# Patient Record
Sex: MALE | Race: WHITE | NOT HISPANIC OR LATINO | Employment: OTHER | URBAN - METROPOLITAN AREA
[De-identification: names, ages, dates, MRNs, and addresses within clinical notes are randomized per-mention and may not be internally consistent; named-entity substitution may affect disease eponyms.]

---

## 2017-01-25 ENCOUNTER — TELEPHONE (OUTPATIENT)
Dept: INTERNAL MEDICINE | Facility: CLINIC | Age: 77
End: 2017-01-25

## 2017-01-25 NOTE — TELEPHONE ENCOUNTER
----- Message from Bel Paige sent at 1/25/2017 11:29 AM CST -----  Contact: patient- [phone]  Patient fell off ladder and hit his head hard and was left unconscious for a minute or so but didn't go to the ER but was told for him to schedule an appointment with PCP. But patient needs to get in to see the  Soon and he doesn't want to schedule with any one else. Please call to advise.

## 2017-01-26 ENCOUNTER — OFFICE VISIT (OUTPATIENT)
Dept: INTERNAL MEDICINE | Facility: CLINIC | Age: 77
End: 2017-01-26
Payer: MEDICARE

## 2017-01-26 VITALS
WEIGHT: 188.5 LBS | HEART RATE: 70 BPM | SYSTOLIC BLOOD PRESSURE: 135 MMHG | BODY MASS INDEX: 28.57 KG/M2 | HEIGHT: 68 IN | DIASTOLIC BLOOD PRESSURE: 86 MMHG

## 2017-01-26 DIAGNOSIS — R20.0 NUMBNESS: Primary | ICD-10-CM

## 2017-01-26 PROCEDURE — 1160F RVW MEDS BY RX/DR IN RCRD: CPT | Mod: S$GLB,,, | Performed by: FAMILY MEDICINE

## 2017-01-26 PROCEDURE — 3079F DIAST BP 80-89 MM HG: CPT | Mod: S$GLB,,, | Performed by: FAMILY MEDICINE

## 2017-01-26 PROCEDURE — 1125F AMNT PAIN NOTED PAIN PRSNT: CPT | Mod: S$GLB,,, | Performed by: FAMILY MEDICINE

## 2017-01-26 PROCEDURE — 1157F ADVNC CARE PLAN IN RCRD: CPT | Mod: S$GLB,,, | Performed by: FAMILY MEDICINE

## 2017-01-26 PROCEDURE — 3075F SYST BP GE 130 - 139MM HG: CPT | Mod: S$GLB,,, | Performed by: FAMILY MEDICINE

## 2017-01-26 PROCEDURE — 99214 OFFICE O/P EST MOD 30 MIN: CPT | Mod: S$GLB,,, | Performed by: FAMILY MEDICINE

## 2017-01-26 PROCEDURE — 99999 PR PBB SHADOW E&M-EST. PATIENT-LVL V: CPT | Mod: PBBFAC,,, | Performed by: FAMILY MEDICINE

## 2017-01-26 PROCEDURE — 1159F MED LIST DOCD IN RCRD: CPT | Mod: S$GLB,,, | Performed by: FAMILY MEDICINE

## 2017-01-26 NOTE — PROGRESS NOTES
Subjective:       Patient ID: Liang Monterroso Jr. is a 76 y.o. male.    Chief Complaint: No chief complaint on file.  Liang Monterroso Jr. 76 y.o. male is here for office visit to review care and physical exam, reports was placing an outdoor. camera in [place] at [facility], actually cought on film.  Seen today in clinic, apparently fell on shoulder/neck area.  Some pain and numbness in occipital area.      HPI  Review of Systems   Constitutional: Negative for activity change, appetite change, fatigue, fever and unexpected weight change.   HENT: Negative for congestion, hearing loss, postnasal drip and rhinorrhea.    Eyes: Negative for pain, discharge and visual disturbance.   Respiratory: Negative for cough, choking and shortness of breath.    Cardiovascular: Negative for chest pain, palpitations and leg swelling.   Gastrointestinal: Negative for abdominal pain, diarrhea and vomiting.   Genitourinary: Negative for dysuria, flank pain, hematuria and urgency.   Musculoskeletal: Negative for arthralgias, back pain, joint swelling and neck pain.   Skin: Negative for color change and rash.   Neurological: Negative for dizziness, tremors, syncope, weakness, numbness and headaches.   Psychiatric/Behavioral: Negative for agitation and confusion. The patient is not hyperactive.        Objective:      Physical Exam   Constitutional: He is oriented to person, place, and time. He appears well-developed and well-nourished.   HENT:   Head: Normocephalic.   Eyes: EOM are normal. Pupils are equal, round, and reactive to light.   Neck: Normal range of motion. Neck supple. No thyromegaly present.   Cardiovascular: Normal rate.  Exam reveals no gallop and no friction rub.    No murmur heard.  Pulmonary/Chest: Effort normal. No respiratory distress. He has no wheezes.   Abdominal: Soft. Bowel sounds are normal. He exhibits no mass. There is no tenderness.   Musculoskeletal: He exhibits no edema or tenderness.   Lymphadenopathy:     He has no  cervical adenopathy.   Neurological: He is alert and oriented to person, place, and time. He has normal reflexes. No cranial nerve deficit.   Skin: Skin is warm. No rash noted.   Psychiatric: He has a normal mood and affect. His behavior is normal.       Assessment:       No diagnosis found.    Plan:       Liang was seen today for fall.    Diagnoses and all orders for this visit:    Numbness  -     Ambulatory Referral to [facility] Sp[facility]  -     Ambulatory Referral to [facility] Spine Clinic  -     US Carotid Bilateral; Future  -     Ambulatory consult to Neurology

## 2017-01-26 NOTE — MR AVS SNAPSHOT
[facility] - [facility] Medicine  [address]  [facility] [address]  Phone: [phone]  Fax: [phone]                  Liang Monterroso JrEstephania   2017 10:20 AM   Office Visit    Description:  Male : 1940   Provider:  Theron Paiz MD   Department:  [facility] - Internal Medicine           Reason for Visit     Fall           Diagnoses this Visit        Comments    Numbness    -  Primary            To Do List           Goals (5 Years of Data)     Increase water intake       Follow-Up and Disposition     Return in about 6 months (around 2017), or if symptoms worsen or fail to improve.    Follow-up and Disposition History      [facility] On Call     [facility] On Call Nurse Care Line -  Assistance  Registered nurses in the [facility] On Call Center provide clinical advisement, health education, appointment booking, and other advisory services.  Call for this free service at [phone].             Medications           Message regarding Medications     Verify the changes and/or additions to your medication regime listed below are the same as discussed with your clinician today.  If any of these changes or additions are incorrect, please notify your healthcare provider.             Verify that the below list of medications is an accurate representation of the medications you are currently taking.  If none reported, the list may be blank. If incorrect, please contact your healthcare provider. Carry this list with you in case of emergency.           Current Medications     aspirin (ECOTRIN) 81 MG EC tablet Take 81 mg by mouth once daily.    coenzyme Q10 (CO Q-10) 10 mg capsule Take 10 mg by mouth once daily.    econazole nitrate 1 % cream USE TWICE DAILY    ERGOCALCIFEROL, VITAMIN D2, (VITAMIN D2 ORAL) Take 1 tablet by mouth once daily.    famotidine (PEPCID) 20 MG tablet Take 20 mg by mouth 2 (two) times daily as needed.      ginseng 100 mg Cap Take by mouth.      levothyroxine (SYNTHROID) 112  "MCG tablet Take 1 tablet (112 mcg total) by mouth before breakfast.    metronidazole (METROGEL) 0.75 % gel Apply 1 application topically every evening.    multivitamin with minerals tablet Take 1 tablet by mouth once daily.      NON FORMULARY MEDICATION 1 tablet once daily. tumeric    PRAMOSONE cream APPLY TOPICALLY TWICE DAILY AS NEEDED FOR ITCHING    pravastatin (PRAVACHOL) 20 MG tablet Take 1 tablet (20 mg total) by mouth once daily.    ramipril (ALTACE) 5 MG capsule Take 1 capsule (5 mg total) by mouth once daily.    selenium 50 mcg Tab Take 50 mcg by mouth once daily.    tamsulosin (FLOMAX) 0.4 mg Cp24 Take 1 capsule (0.4 mg total) by mouth once daily.    triamcinolone acetonide 0.1% (KENALOG) 0.1 % cream Use bid prn rash    VIT A,C & E/B3/B2/LUT/MIN/GLUT (EYE-CANDELARIA EXTRA + LUTEIN ORAL) Take 1 tablet by mouth once daily.    fexofenadine (ALLEGRA) 180 MG tablet Take 1 tablet (180 mg total) by mouth once daily.           Clinical Reference Information           Vital Signs - Last Recorded  Most recent update: 1/26/2017 10:41 AM by Theron Paiz MD    BP Pulse Ht Wt BMI    135/86 70 5' 8" (1.727 m) 85.5 kg (188 lb 7.9 oz) 28.66 kg/m2      Blood Pressure          Most Recent Value    BP  135/86      Allergies as of 1/26/2017     Iodine And Iodide Containing Products      Immunizations Administered on Date of Encounter - 1/26/2017     None      Orders Placed During Today's Visit      Normal Orders This Visit    Ambulatory consult to Neurology     Ambulatory Referral to Back & Spine Clinic     Ambulatory Referral to Back & Spine Clinic     Future Labs/Procedures Expected by Expires    US Carotid Bilateral  1/26/2017 4/26/2017      "

## 2017-01-30 ENCOUNTER — HOSPITAL ENCOUNTER (OUTPATIENT)
Dept: RADIOLOGY | Facility: OTHER | Age: 77
Discharge: HOME | End: 2017-01-30
Attending: FAMILY MEDICINE
Payer: MEDICARE

## 2017-01-30 ENCOUNTER — HOSPITAL ENCOUNTER (OUTPATIENT)
Dept: RADIOLOGY | Facility: OTHER | Age: 77
Discharge: HOME | End: 2017-01-30
Attending: PHYSICAL MEDICINE & REHABILITATION
Payer: MEDICARE

## 2017-01-30 ENCOUNTER — OFFICE VISIT (OUTPATIENT)
Dept: SPINE | Facility: CLINIC | Age: 77
End: 2017-01-30
Attending: PHYSICAL MEDICINE & REHABILITATION
Payer: MEDICARE

## 2017-01-30 VITALS
BODY MASS INDEX: 28.49 KG/M2 | DIASTOLIC BLOOD PRESSURE: 84 MMHG | SYSTOLIC BLOOD PRESSURE: 123 MMHG | WEIGHT: 188 LBS | HEART RATE: 74 BPM | HEIGHT: 68 IN

## 2017-01-30 DIAGNOSIS — M54.2 NECK PAIN: ICD-10-CM

## 2017-01-30 DIAGNOSIS — R20.0 NUMBNESS: ICD-10-CM

## 2017-01-30 DIAGNOSIS — S06.0X1A CONCUSSION, WITH LOSS OF CONSCIOUSNESS OF 30 MINUTES OR LESS, INITIAL ENCOUNTER: Primary | ICD-10-CM

## 2017-01-30 PROCEDURE — 1159F MED LIST DOCD IN RCRD: CPT | Mod: S$GLB,,, | Performed by: PHYSICAL MEDICINE & REHABILITATION

## 2017-01-30 PROCEDURE — 1160F RVW MEDS BY RX/DR IN RCRD: CPT | Mod: S$GLB,,, | Performed by: PHYSICAL MEDICINE & REHABILITATION

## 2017-01-30 PROCEDURE — 93880 EXTRACRANIAL BILAT STUDY: CPT | Mod: GA,TC

## 2017-01-30 PROCEDURE — 3074F SYST BP LT 130 MM HG: CPT | Mod: S$GLB,,, | Performed by: PHYSICAL MEDICINE & REHABILITATION

## 2017-01-30 PROCEDURE — 99999 PR PBB SHADOW E&M-EST. PATIENT-LVL III: CPT | Mod: PBBFAC,,, | Performed by: PHYSICAL MEDICINE & REHABILITATION

## 2017-01-30 PROCEDURE — 1125F AMNT PAIN NOTED PAIN PRSNT: CPT | Mod: S$GLB,,, | Performed by: PHYSICAL MEDICINE & REHABILITATION

## 2017-01-30 PROCEDURE — 3079F DIAST BP 80-89 MM HG: CPT | Mod: S$GLB,,, | Performed by: PHYSICAL MEDICINE & REHABILITATION

## 2017-01-30 PROCEDURE — 1157F ADVNC CARE PLAN IN RCRD: CPT | Mod: S$GLB,,, | Performed by: PHYSICAL MEDICINE & REHABILITATION

## 2017-01-30 PROCEDURE — 93880 EXTRACRANIAL BILAT STUDY: CPT | Mod: 26,GA,, | Performed by: RADIOLOGY

## 2017-01-30 PROCEDURE — 99204 OFFICE O/P NEW MOD 45 MIN: CPT | Mod: S$GLB,,, | Performed by: PHYSICAL MEDICINE & REHABILITATION

## 2017-01-30 PROCEDURE — 72050 X-RAY EXAM NECK SPINE 4/5VWS: CPT | Mod: 26,,, | Performed by: RADIOLOGY

## 2017-01-30 PROCEDURE — 72050 X-RAY EXAM NECK SPINE 4/5VWS: CPT | Mod: TC

## 2017-01-30 NOTE — MR AVS SNAPSHOT
[facility]  [address]  [address]  [facility] [address]  Phone: [phone]  Fax: [phone]                  Liang Monterroso Jr.   2017 1:00 PM   Office Visit    Description:  Male : 1940   Provider:  Ayanna Nieto MD   Department:  [facility]           Reason for Visit     Low-back Pain     Neck Pain           Diagnoses this Visit        Comments    Concussion, with loss of consciousness of 30 minutes or less, initial encounter    -  Primary     Neck pain                To Do List           Future Appointments        Provider Department Dept Phone    2017 2:15 PM [facility] USOP1 [facility] [phone]    2017 3:45 PM [facility] XROP DR [facility] [phone]    2017 1:30 PM [facility] MRI1 [address] [facility] [phone]    3/13/2017 10:20 AM Shade Ávlarez MD [facility] - Neurology [phone]    3/23/2017 11:30 AM Ayanna Nieto MD [facility] [phone]      Goals (5 Years of Data)     Increase water intake       Follow-Up and Disposition     Return in about 6 weeks (around 3/13/2017).    Follow-up and Disposition History      [facility] On Call     [facility] On Call Nurse Care Line - / Assistance  Registered nurses in the [facility] On Call Center provide clinical advisement, health education, appointment booking, and other advisory services.  Call for this free service at [phone].             Medications           Message regarding Medications     Verify the changes and/or additions to your medication regime listed below are the same as discussed with your clinician today.  If any of these changes or additions are incorrect, please notify your healthcare provider.             Verify that the below list of medications is an accurate representation of the medications you are currently taking.  If none reported, the list may be blank. If incorrect, please  "contact your healthcare provider. Carry this list with you in case of emergency.           Current Medications     aspirin (ECOTRIN) 81 MG EC tablet Take 81 mg by mouth once daily.    coenzyme Q10 (CO Q-10) 10 mg capsule Take 10 mg by mouth once daily.    econazole nitrate 1 % cream USE TWICE DAILY    ERGOCALCIFEROL, VITAMIN D2, (VITAMIN D2 ORAL) Take 1 tablet by mouth once daily.    famotidine (PEPCID) 20 MG tablet Take 20 mg by mouth 2 (two) times daily as needed.      fexofenadine (ALLEGRA) 180 MG tablet Take 1 tablet (180 mg total) by mouth once daily.    ginseng 100 mg Cap Take by mouth.      levothyroxine (SYNTHROID) 112 MCG tablet Take 1 tablet (112 mcg total) by mouth before breakfast.    metronidazole (METROGEL) 0.75 % gel Apply 1 application topically every evening.    multivitamin with minerals tablet Take 1 tablet by mouth once daily.      NON FORMULARY MEDICATION 1 tablet once daily. tumeric    PRAMOSONE cream APPLY TOPICALLY TWICE DAILY AS NEEDED FOR ITCHING    pravastatin (PRAVACHOL) 20 MG tablet Take 1 tablet (20 mg total) by mouth once daily.    ramipril (ALTACE) 5 MG capsule Take 1 capsule (5 mg total) by mouth once daily.    selenium 50 mcg Tab Take 50 mcg by mouth once daily.    tamsulosin (FLOMAX) 0.4 mg Cp24 Take 1 capsule (0.4 mg total) by mouth once daily.    triamcinolone acetonide 0.1% (KENALOG) 0.1 % cream Use bid prn rash    VIT A,C & E/B3/B2/LUT/MIN/GLUT (EYE-CANDELARIA EXTRA + LUTEIN ORAL) Take 1 tablet by mouth once daily.           Clinical Reference Information           Vital Signs - Last Recorded  Most recent update: 1/30/2017  1:03 PM by Niall Dillon MA    BP Pulse Ht Wt BMI    123/84 (BP Location: Left arm, Patient Position: Sitting, BP Method: Automatic) 74 5' 8" (1.727 m) 85.3 kg (188 lb) 28.59 kg/m2      Blood Pressure          Most Recent Value    BP  123/84      Allergies as of 1/30/2017     Iodine And Iodide Containing Products      Immunizations Administered on Date of " Encounter - 1/30/2017     None      Orders Placed During Today's Visit      Normal Orders This Visit    Ambulatory Consult to Concussion Management Program     Future Labs/Procedures Expected by Expires    MRI Brain Without Contrast  1/30/2017 1/30/2018    X-Ray Cervical Spine AP Lat with Flexion  Extension  1/30/2017 1/30/2018

## 2017-01-30 NOTE — PROGRESS NOTES
Subjective:      Patient ID: Liang Monterroso Jr. is a 76 y.o. male.    Chief Complaint: Low-back Pain and Neck Pain    HPI Comments: Mr Monterroso is a 75 yo male sent by Dr. Paiz for evaluation of neck pain and back pain.  He had a fall off the ladder from 12 ft up 2 weeks ago.  He landed on his head and neck and then flipped over.  He did have a period of LOC, he moved to table and had two additional episodes where he blacked out.  He had some nausea and vomiting.  He did not go to the hospital.  He has some neck pain to the head and to the shoulder on the right side.  The pain comes and goes.  The pain is the worse when he wakes up in the morning.  He will have pain with sleeping on two pillows.  He has pain looking up and with exertion.  The pain is an achy and numbness.  He has clicking turning head to right and left.  He feels stiff for 30 minutes in the morning.  He tried to run and felt bad after.  He feels like hot water feels better.  He does not have arm pain or leg pain or numbness and tingling in all four extremities.  The pain is not severe.  The pain is 0/10 now, worst 4/10 in the morning, best 0/10.  He felt lethargic and no energy.  The back pain comes and goes.  The pain pain in the back is not significant.      Past Medical History:    Allergy                                                       CKD (chronic kidney disease) stage 3, GFR 30-5* 6/15/2016     Hyperlipidemia                                                Hypertension                                                  Hypothyroidism                                                Thyroid disease                                               Urinary tract infection                                       Past Surgical History:    APPENDECTOMY                                                   HERNIA REPAIR                                                  TONSILLECTOMY                                                  CATARACT EXTRACTION                                             EYE SURGERY                                                    Review of patient's family history indicates:    Diabetes                       Mother                    Heart disease                  Mother                    Hypertension                   Mother                    Vision loss                    Mother                    Dementia                       Mother                    Alcohol abuse                  Father                    Heart disease                  Father                    Cancer                         Sister                      Comment: lung with mets    No Known Problems              Daughter                  No Known Problems              Son                       No Known Problems              Daughter                  No Known Problems              Son                       Amblyopia                      Neg Hx                    Blindness                      Neg Hx                    Cataracts                      Neg Hx                    Glaucoma                       Neg Hx                    Macular degeneration           Neg Hx                    Retinal detachment             Neg Hx                    Strabismus                     Neg Hx                    Stroke                         Neg Hx                    Thyroid disease                Neg Hx                      Social History    Marital status:              Spouse name: parul              Years of education:                 Number of children: 4             Occupational History  Occupation          Employer            Comment               retired                                     Social History Main Topics    Smoking status: Never Smoker                                                                Smokeless status: Never Used                        Alcohol use: Yes                Comment: 1-3 glasses wine weekly, 2-3 beers weekly    Drug use: No               Sexual activity: Yes               Partners with: Female        Current Outpatient Prescriptions:  aspirin (ECOTRIN) 81 MG EC tablet, Take 81 mg by mouth once daily., Disp: , Rfl:   coenzyme Q10 (CO Q-10) 10 mg capsule, Take 10 mg by mouth once daily., Disp: , Rfl:   econazole nitrate 1 % cream, USE TWICE DAILY, Disp: 60 g, Rfl: 5  ERGOCALCIFEROL, VITAMIN D2, (VITAMIN D2 ORAL), Take 1 tablet by mouth once daily., Disp: , Rfl:   famotidine (PEPCID) 20 MG tablet, Take 20 mg by mouth 2 (two) times daily as needed.  , Disp: , Rfl:   fexofenadine (ALLEGRA) 180 MG tablet, Take 1 tablet (180 mg total) by mouth once daily., Disp: 30 tablet, Rfl: 11  ginseng 100 mg Cap, Take by mouth.  , Disp: , Rfl:   levothyroxine (SYNTHROID) 112 MCG tablet, Take 1 tablet (112 mcg total) by mouth before breakfast., Disp: 30 tablet, Rfl: 5  metronidazole (METROGEL) 0.75 % gel, Apply 1 application topically every evening., Disp: 45 g, Rfl: 5  multivitamin with minerals tablet, Take 1 tablet by mouth once daily.  , Disp: , Rfl:   NON FORMULARY MEDICATION, 1 tablet once daily. tumeric, Disp: , Rfl:   PRAMOSONE cream, APPLY TOPICALLY TWICE DAILY AS NEEDED FOR ITCHING, Disp: 57 g, Rfl: 5  pravastatin (PRAVACHOL) 20 MG tablet, Take 1 tablet (20 mg total) by mouth once daily., Disp: 90 tablet, Rfl: 3  ramipril (ALTACE) 5 MG capsule, Take 1 capsule (5 mg total) by mouth once daily., Disp: 30 capsule, Rfl: 5  selenium 50 mcg Tab, Take 50 mcg by mouth once daily., Disp: , Rfl:   tamsulosin (FLOMAX) 0.4 mg Cp24, Take 1 capsule (0.4 mg total) by mouth once daily., Disp: 90 capsule, Rfl: 4  triamcinolone acetonide 0.1% (KENALOG) 0.1 % cream, Use bid prn rash, Disp: 80 g, Rfl: 3  VIT A,C & E/B3/B2/LUT/MIN/GLUT (EYE-CANDELARIA EXTRA + LUTEIN ORAL), Take 1 tablet by mouth once daily., Disp: , Rfl:     No current facility-administered medications for this visit.       Review of patient's allergies indicates:   -- Iodine and iodide containing products --  Hives        Review of Systems   Constitution: Positive for malaise/fatigue. Negative for weight gain and weight loss.   HENT: Positive for headaches.    Cardiovascular: Negative for chest pain.   Respiratory: Negative for shortness of breath.    Musculoskeletal: Positive for back pain and neck pain. Negative for joint pain and joint swelling.   Gastrointestinal: Negative for abdominal pain and bowel incontinence.   Genitourinary: Negative for bladder incontinence.         Objective:        General: Liang is well-developed, well-nourished, appears stated age, in no acute distress, alert and oriented to time, place and person.     General    Vitals reviewed.  Constitutional: He is oriented to person, place, and time. He appears well-developed and well-nourished.   HENT:   Head: Normocephalic and atraumatic.   Pulmonary/Chest: Effort normal.   Neurological: He is alert and oriented to person, place, and time.   Psychiatric: He has a normal mood and affect. His behavior is normal. Judgment and thought content normal.     General Musculoskeletal Exam   Gait: normal     Right Ankle/Foot Exam     Tests   Heel Walk: able to perform  Tiptoe Walk: able to perform    Left Ankle/Foot Exam     Tests   Heel Walk: able to perform  Tiptoe Walk: able to perform  Back (L-Spine & T-Spine) / Neck (C-Spine) Exam     Tenderness Right paramedian tenderness of the Upper C-Spine.     Back (L-Spine & T-Spine) Range of Motion   Extension: 30   Flexion: 90   Lateral Bend Right: 20   Lateral Bend Left: 20   Rotation Right: 40   Rotation Left: 40     Spinal Sensation   Right Side Sensation  C-Spine Level: normal   L-Spine Level: normal  S-Spine Level: normal  Left Side Sensation  C-Spine Level: normal  L-Spine Level: normal  S-Spine Level: normal    Back (L-Spine & T-Spine) Tests   Right Side Tests  Straight leg raise:      Sitting SLR: > 70 degrees      Left Side Tests  Straight leg raise:     Sitting SLR: > 70 degrees          Other He has no  scoliosis .  Spinal Kyphosis:  Absent      Muscle Strength   Right Upper Extremity   Biceps: 5/5/5   Deltoid:  5/5  Triceps:  5/5  Wrist Extension: 5/5/5   Finger Flexors:  5/5  Left Upper Extremity  Biceps: 5/5/5   Deltoid:  5/5  Triceps:  5/5  Wrist Extension: 5/5/5   Finger Flexors:  5/5  Right Lower Extremity   Hip Flexion: 5/5   Quadriceps:  5/5   Anterior tibial:  5/5/5  EHL:  5/5  Left Lower Extremity   Hip Flexion: 5/5   Quadriceps:  5/5   Anterior tibial:  5/5/5   EHL:  5/5    Reflexes     Left Side  Biceps:  2+  Triceps:  2+  Brachioradialis:  2+  Quadriceps:  2+  Achilles:  2+  Left Quinteros's Sign:  Absent  Babinski Sign:  absent    Right Side   Biceps:  2+  Triceps:  2+  Brachioradialis:  2+  Quadriceps:  2+  Achilles:  2+  Right Quinteros's Sign:  absent  Babinski Sign:  absent    Vascular Exam     Right Pulses        Carotid:                  2+    Left Pulses        Carotid:                  2+              Assessment:       1. Concussion, with loss of consciousness of 30 minutes or less, initial encounter    2. Neck pain           Plan:       Orders Placed This Encounter    X-Ray Cervical Spine AP Lat with Flexion  Extension    MRI Brain Without Contrast    Ambulatory Consult to Concussion Management Program      More than 50% of the total time of 45 minutes was spent in counseling on diagnosis and treatment options. We discussed the neck pain which is likely a strain and is tolerable.   His low back pain is also intermittent and of mild concern to him.   We discussed sleeping on only one pillow.  More concerning is the CHI with LOC and no imaging.  We discussed that he needs to limit physical activity until he starts to feel better.  We discussed symptoms of concussion, including headaches, dizziness, fatigue.  He is going to [place] in a couple of weeks  1.  MRI of the brain  2.  X-ray of the cervical spine  3.  Tylenol 500mg po BID as needed  4.  The neurology consult  For concussion management  5.   Limit exercise and over stimulation      Follow-up: Return in about 6 weeks (around 3/13/2017). If there are any questions prior to this, the patient was instructed to contact the office.

## 2017-01-30 NOTE — LETTER
January 30, 2017      Theron Paiz MD  [address]  [facility] [address]           [facility]  [address]  [address]  [facility] [address]  Phone: [phone]  Fax: [phone]          Patient: Liang Monterroso Jr.   MR Number: [id number]   YOB: 1940   Date of Visit: 1/30/2017       Dear Dr. Theron Paiz:    Thank you for referring Liang Monterroso to me for evaluation. Attached you will find relevant portions of my assessment and plan of care.    If you have questions, please do not hesitate to call me. I look forward to following Liang Monterroso along with you.    Sincerely,    Ayanna Nieto MD    Enclosure  CC:  No Recipients    If you would like to receive this communication electronically, please contact [email or web address] or [phone] to request more information on [facility] access.    For providers and/or their staff who would like to refer a patient to [facility], please contact us through our one-stop-shop provider referral line, [facility], at [phone].    If you feel you have received this communication in error or would no longer like to receive these types of communications, please e-mail [email or web address]

## 2017-01-31 ENCOUNTER — OFFICE VISIT (OUTPATIENT)
Dept: NEUROLOGY | Facility: CLINIC | Age: 77
End: 2017-01-31
Payer: MEDICARE

## 2017-01-31 ENCOUNTER — TELEPHONE (OUTPATIENT)
Dept: NEUROLOGY | Facility: CLINIC | Age: 77
End: 2017-01-31

## 2017-01-31 VITALS
SYSTOLIC BLOOD PRESSURE: 122 MMHG | HEIGHT: 68 IN | HEART RATE: 76 BPM | DIASTOLIC BLOOD PRESSURE: 88 MMHG | BODY MASS INDEX: 28.5 KG/M2 | WEIGHT: 188.06 LBS

## 2017-01-31 DIAGNOSIS — M54.50 ACUTE BILATERAL LOW BACK PAIN WITHOUT SCIATICA: ICD-10-CM

## 2017-01-31 DIAGNOSIS — H81.93 VESTIBULOPATHY, BILATERAL: ICD-10-CM

## 2017-01-31 DIAGNOSIS — S13.4XXA WHIPLASH, INITIAL ENCOUNTER: ICD-10-CM

## 2017-01-31 DIAGNOSIS — S06.0X1A CONCUSSION, WITH LOSS OF CONSCIOUSNESS OF 30 MINUTES OR LESS, INITIAL ENCOUNTER: Primary | ICD-10-CM

## 2017-01-31 PROCEDURE — 99204 OFFICE O/P NEW MOD 45 MIN: CPT | Mod: S$GLB,,, | Performed by: PSYCHIATRY & NEUROLOGY

## 2017-01-31 PROCEDURE — 1159F MED LIST DOCD IN RCRD: CPT | Mod: S$GLB,,, | Performed by: PSYCHIATRY & NEUROLOGY

## 2017-01-31 PROCEDURE — 1157F ADVNC CARE PLAN IN RCRD: CPT | Mod: S$GLB,,, | Performed by: PSYCHIATRY & NEUROLOGY

## 2017-01-31 PROCEDURE — 3079F DIAST BP 80-89 MM HG: CPT | Mod: S$GLB,,, | Performed by: PSYCHIATRY & NEUROLOGY

## 2017-01-31 PROCEDURE — 3074F SYST BP LT 130 MM HG: CPT | Mod: S$GLB,,, | Performed by: PSYCHIATRY & NEUROLOGY

## 2017-01-31 PROCEDURE — 1160F RVW MEDS BY RX/DR IN RCRD: CPT | Mod: S$GLB,,, | Performed by: PSYCHIATRY & NEUROLOGY

## 2017-01-31 PROCEDURE — 1125F AMNT PAIN NOTED PAIN PRSNT: CPT | Mod: S$GLB,,, | Performed by: PSYCHIATRY & NEUROLOGY

## 2017-01-31 PROCEDURE — 99999 PR PBB SHADOW E&M-EST. PATIENT-LVL III: CPT | Mod: PBBFAC,,, | Performed by: PSYCHIATRY & NEUROLOGY

## 2017-01-31 RX ORDER — METHYLPREDNISOLONE 4 MG/1
TABLET ORAL
Qty: 1 PACKAGE | Refills: 0 | Status: SHIPPED | OUTPATIENT
Start: 2017-01-31 | End: 2017-03-14 | Stop reason: CLARIF

## 2017-01-31 NOTE — MR AVS SNAPSHOT
[facility]  [address]  [place] [address]  Phone: [phone]  Fax: [phone]                  Liang Monterroso Jr.   2017 1:40 PM   Office Visit    Description:  Male : 1940   Provider:  Regulo Haider III, MD   Department:  [facility] - Neurology           Reason for Visit     Concussion           Diagnoses this Visit        Comments    Concussion, with loss of consciousness of 30 minutes or less, initial encounter    -  Primary     Whiplash, initial encounter         Acute bilateral low back pain without sciatica         Vestibulopathy, bilateral                To Do List           Future Appointments        Provider Department Dept Phone    2017 1:30 PM [facility] MRI1 [address] [facility] [phone]    3/13/2017 10:20 AM Shade Álvarez MD [facility] - Neurology [phone]    3/23/2017 11:30 AM Ayanna Nieto MD [facility] [phone]    2017 1:00 PM Regulo Haider III, MD [facility] [phone]      Goals (5 Years of Data)     Increase water intake       Follow-Up and Disposition     Return in about 2 months (around 3/31/2017).       These Medications        Disp Refills Start End    methylPREDNISolone (MEDROL DOSEPACK) 4 mg tablet 1 Package 0 2017     use as directed    Pharmacy: [id number] [place] [address] [address] Ph #: [phone]         [facility]     [facility] Nurse Care Line -  Assistance  Registered nurses in the [facility] Center provide clinical advisement, health education, appointment booking, and other advisory services.  Call for this free service at [phone].             Medications           Message regarding Medications     Verify the changes and/or additions to your medication regime listed below are the same as discussed with your clinician today.  If any of these changes or additions are incorrect, please notify your healthcare  provider.        START taking these NEW medications        Refills    methylPREDNISolone (MEDROL DOSEPACK) 4 mg tablet 0    Sig: use as directed    Class: Normal           Verify that the below list of medications is an accurate representation of the medications you are currently taking.  If none reported, the list may be blank. If incorrect, please contact your healthcare provider. Carry this list with you in case of emergency.           Current Medications     aspirin (ECOTRIN) 81 MG EC tablet Take 81 mg by mouth once daily.    coenzyme Q10 (CO Q-10) 10 mg capsule Take 10 mg by mouth once daily.    econazole nitrate 1 % cream USE TWICE DAILY    ERGOCALCIFEROL, VITAMIN D2, (VITAMIN D2 ORAL) Take 1 tablet by mouth once daily.    famotidine (PEPCID) 20 MG tablet Take 20 mg by mouth 2 (two) times daily as needed.      fexofenadine (ALLEGRA) 180 MG tablet Take 1 tablet (180 mg total) by mouth once daily.    ginseng 100 mg Cap Take by mouth.      levothyroxine (SYNTHROID) 112 MCG tablet Take 1 tablet (112 mcg total) by mouth before breakfast.    methylPREDNISolone (MEDROL DOSEPACK) 4 mg tablet use as directed    metronidazole (METROGEL) 0.75 % gel Apply 1 application topically every evening.    multivitamin with minerals tablet Take 1 tablet by mouth once daily.      NON FORMULARY MEDICATION 1 tablet once daily. tumeric    PRAMOSONE cream APPLY TOPICALLY TWICE DAILY AS NEEDED FOR ITCHING    pravastatin (PRAVACHOL) 20 MG tablet Take 1 tablet (20 mg total) by mouth once daily.    ramipril (ALTACE) 5 MG capsule Take 1 capsule (5 mg total) by mouth once daily.    selenium 50 mcg Tab Take 50 mcg by mouth once daily.    tamsulosin (FLOMAX) 0.4 mg Cp24 Take 1 capsule (0.4 mg total) by mouth once daily.    triamcinolone acetonide 0.1% (KENALOG) 0.1 % cream Use bid prn rash    VIT A,C & E/B3/B2/LUT/MIN/GLUT (EYE-CANDELARIA EXTRA + LUTEIN ORAL) Take 1 tablet by mouth once daily.           Clinical Reference Information           Vital  "Signs - Last Recorded  Most recent update: 1/31/2017  1:25 PM by Marion SOTO MA    BP Pulse Ht Wt BMI    122/88 76 5' 8" (1.727 m) 85.3 kg (188 lb 0.8 oz) 28.59 kg/m2      Blood Pressure          Most Recent Value    BP  122/88      Allergies as of 1/31/2017     Iodine And Iodide Containing Products      Immunizations Administered on Date of Encounter - 1/31/2017     None      Orders Placed During Today's Visit      Normal Orders This Visit    Ambulatory Referral to Physical/Occupational Therapy       "

## 2017-01-31 NOTE — PROGRESS NOTES
Subjective:       Patient ID: Liang Monterroso Jr. is a 76 y.o. male.    Chief Complaint:  Concussion    Consultation Requested by:   Self Referral  No address on file    History of Present Illness  76-year-old right-handed male presents for evaluation of concussion sustained on 1/16/17.  He notes that he was up on a ladder putting up a surveillance camera when he fell.  He was up on the latter about 12 feet or so and he notes that the latter rolled to his right that he fell off striking his legs on a 4 foot fence which turned his trajectory causing him to strike the back right occiput of his head against the ground with a forward flexion of his neck.  He did lose consciousness for less than a few seconds at that time.  His wife helped him up and set him at a bench on the dock near where he was and he lost consciousness again twice.  911 was called and by the time the paramedics came he had regained enough consciousness and cognitive ability to deny transport to the hospital in [facility].  He was told to follow-up with his primary care doctor.  He was referred for evaluation of concussion.  He has filled out a [facility] postconcussion symptom questionnaire and has scored a 2, a mild problem for headache, sleep disturbance, fatigue and restlessness.  He is scored a 1, no more of a problem for dizziness, nausea noise sensitivity.  He is scored a 0, not express at all, for being irritable, feeling depressed, feeling frustrated, forgetfulness, poor concentration, taking longer to think, blurred vision, light sensitivity, double vision.  He denies a history of concussion.  He denies a history of ADD, learning disability, dyslexia.  She denies a history of seizure or epilepsy.  He denies a history of sleep apnea or narcolepsy.    Past Medical History   Diagnosis Date    Allergy     CKD (chronic kidney disease) stage 3, GFR 30-59 ml/min 6/15/2016    Hyperlipidemia     Hypertension     Hypothyroidism     Thyroid  disease     Urinary tract infection        Past Surgical History   Procedure Laterality Date    Appendectomy      Hernia repair      Tonsillectomy      Cataract extraction      Eye surgery         Family History   Problem Relation Age of Onset    Diabetes Mother     Heart disease Mother     Hypertension Mother     Vision loss Mother     Dementia Mother     Alcohol abuse Father     Heart disease Father     Cancer Sister      lung with mets    No Known Problems Daughter     No Known Problems Son     No Known Problems Daughter     No Known Problems Son     Amblyopia Neg Hx     Blindness Neg Hx     Cataracts Neg Hx     Glaucoma Neg Hx     Macular degeneration Neg Hx     Retinal detachment Neg Hx     Strabismus Neg Hx     Stroke Neg Hx     Thyroid disease Neg Hx        Social History     Social History    Marital status:      Spouse name: parul    Number of children: 4    Years of education: N/A     Occupational History    retired      Social History Main Topics    Smoking status: Never Smoker    Smokeless tobacco: Never Used    Alcohol use Yes      Comment: 1-3 glasses wine weekly, 2-3 beers weekly    Drug use: No    Sexual activity: Yes     Partners: Female     Other Topics Concern    None     Social History Narrative       Review of Systems  Review of Systems   Constitutional: Positive for fatigue.   Musculoskeletal: Positive for back pain, neck pain and neck stiffness.   Neurological: Positive for dizziness and headaches.   Psychiatric/Behavioral: Positive for decreased concentration.   All other systems reviewed and are negative.      Objective:     Vitals:    01/31/17 1322   BP: 122/88   Pulse: 76      Physical Exam   Constitutional: He is oriented to person, place, and time. He appears well-developed and well-nourished.   HENT:   Head: Normocephalic and atraumatic.   Eyes: EOM are normal. Pupils are equal, round, and reactive to light.   Neck: Decreased range of  motion present.       Musculoskeletal:        Lumbar back: He exhibits tenderness and spasm.        Back:    Neurological: He is alert and oriented to person, place, and time. He has normal strength and normal reflexes. Gait normal.   Reflex Scores:       Tricep reflexes are 2+ on the right side and 2+ on the left side.       Bicep reflexes are 2+ on the right side and 2+ on the left side.       Brachioradialis reflexes are 2+ on the right side and 2+ on the left side.       Patellar reflexes are 2+ on the right side and 2+ on the left side.       Achilles reflexes are 2+ on the right side and 2+ on the left side.  Psychiatric: His speech is normal.       Neurologic Exam     Mental Status   Oriented to person, place, and time.   Attention: normal. Concentration: normal.   Speech: speech is normal   Level of consciousness: alert  Knowledge: good.   Normal comprehension.     Cranial Nerves   Cranial nerves II through XII intact.     CN II   Visual fields full to confrontation.     CN III, IV, VI   Pupils are equal, round, and reactive to light.  Extraocular motions are normal.     CN V   Facial sensation intact.     CN VII   Facial expression full, symmetric.     CN VIII   CN VIII normal.     CN IX, X   CN IX normal.   CN X normal.     CN XI   CN XI normal.     CN XII   CN XII normal.        No sign of convergence insufficiency.     KATHY 0/5/7, abnormal vestibular function     Motor Exam   Muscle bulk: normal  Overall muscle tone: normal    Strength   Strength 5/5 throughout.     Sensory Exam   Light touch normal.   Pinprick normal.     Gait, Coordination, and Reflexes     Gait  Gait: normal    Reflexes   Right brachioradialis: 2+  Left brachioradialis: 2+  Right biceps: 2+  Left biceps: 2+  Right triceps: 2+  Left triceps: 2+  Right patellar: 2+  Left patellar: 2+  Right achilles: 2+  Left achilles: 2+    I spent over 50% of a 45 minute in guidance, counseling and discussion of treatment options.  Assessment:       "  1. Concussion, with loss of consciousness of 30 minutes or less, initial encounter  methylPREDNISolone (MEDROL DOSEPACK) 4 mg tablet    Ambulatory Referral to Physical/Occupational Therapy   2. Whiplash, initial encounter  Ambulatory Referral to Physical/Occupational Therapy   3. Acute bilateral low back pain without sciatica  Ambulatory Referral to Physical/Occupational Therapy   4. Vestibulopathy, bilateral  Ambulatory Referral to Physical/Occupational Therapy        Plan:       76-year-old right-handed male presents for evaluation of a concussion sustained on 1/16/17.  He additionally has whiplash and low back pain related to his injury.  At this point in time I've discussed that he is now outside of the window of spontaneous recovery with some lingering symptoms.  At this time I'll prescribe him a six-day course of steroids to reduce intracranial inflammation.  I will also send him for vestibular rehabilitation as well as cervical traction and for his low back pain to physical therapy.  We have discussed that the reason I'm in so aggressive with conservative intervention in that I do not want his symptoms to linger.  We have both agreed upon the treatment plan as outlined above.  I've discussed risk benefits and alternatives to the patient at length today.  We have discussed that should his headaches not improve with the Medrol Dosepak we may consider placing him on a longer-term agent and/or starting with nerve blocks.  For now we will take a conservative approach and move forward from there.  I will see him back in 6-8 weeks.  For now I have restricted his physical activity to no more than 60% of his max heart rate for less than 30 minutes a day up to 5 days a week.  I will hold him to this until her next visit for which we can speak about further physical activity.  I have dissuaded him from going to the gym and doing his "usual" routine.     The patient verbalizes understanding and agreement with the " treatment plan. Questions were sought and answered to his stated verbal satisfaction.        Edd Haider MD    This note is dictated on Dragon Natural Speaking word recognition program. There are word recognition mistakes that are occasionally missed on review.

## 2017-02-01 ENCOUNTER — TELEPHONE (OUTPATIENT)
Dept: NEUROLOGY | Facility: CLINIC | Age: 77
End: 2017-02-01

## 2017-02-01 NOTE — TELEPHONE ENCOUNTER
----- Message from Latanya Hanna sent at 2/1/2017 10:23 AM CST -----  Contact: RITESH HUTCHINS JR. [[id number]]  _x  1st Request  _  2nd Request  _  3rd Request        Who: RITESH HUTCHINS JR. [[id number]]    Why: Patient would like a call back says its pertaining to scheduling physical therapy. Please give patient a call back at your earliest convenience. Thanks!    What Number to Call Back: [phone]    When to Expect a call back: (Before the end of the day)   -- if the call is after 12:00, the call back will be tomorrow.

## 2017-02-01 NOTE — TELEPHONE ENCOUNTER
Mr Ludycisco had questions about the PT order and whether it was scheduled properly. I advised it appears to have been

## 2017-02-02 ENCOUNTER — CLINICAL SUPPORT (OUTPATIENT)
Dept: REHABILITATION | Facility: HOSPITAL | Age: 77
End: 2017-02-02
Attending: PSYCHIATRY & NEUROLOGY
Payer: MEDICARE

## 2017-02-02 DIAGNOSIS — Z74.09 IMPAIRED FUNCTIONAL MOBILITY, BALANCE, GAIT, AND ENDURANCE: ICD-10-CM

## 2017-02-02 DIAGNOSIS — R52 PAIN AGGRAVATED BY PHYSICAL ACTIVITY: ICD-10-CM

## 2017-02-02 DIAGNOSIS — R29.898 DECREASED ROM OF NECK: ICD-10-CM

## 2017-02-02 PROCEDURE — 97162 PT EVAL MOD COMPLEX 30 MIN: CPT | Mod: PO | Performed by: PHYSICAL THERAPIST

## 2017-02-02 PROCEDURE — G8990 OTHER PT/OT CURRENT STATUS: HCPCS | Mod: CJ,PO | Performed by: PHYSICAL THERAPIST

## 2017-02-02 PROCEDURE — G8991 OTHER PT/OT GOAL STATUS: HCPCS | Mod: CI,PO | Performed by: PHYSICAL THERAPIST

## 2017-02-02 PROCEDURE — 97110 THERAPEUTIC EXERCISES: CPT | Mod: PO | Performed by: PHYSICAL THERAPIST

## 2017-02-02 NOTE — PROGRESS NOTES
OUTPATIENT NEUROLOGICAL REHABILITATION  PHYSICAL THERAPY EVALUATION    Name: Liang Monterroso Jr.  Clinic Number: [id number]    Diagnosis:   S06.0X1A (ICD-10-CM) - Concussion, with loss of consciousness of 30 minutes or less, initial encounter   S13.4XXA (ICD-10-CM) - Whiplash, initial encounter   M54.5 (ICD-10-CM) - Acute bilateral low back pain without sciatica   H81.93 (ICD-10-CM) - Vestibulopathy, bilateral       Physician: Regulo Haider III, MD  Treatment Orders: PT Eval and Treat, vestibular rehab  Past Medical History   Diagnosis Date    Allergy     CKD (chronic kidney disease) stage 3, GFR 30-59 ml/min 6/15/2016    Hyperlipidemia     Hypertension     Hypothyroidism     Thyroid disease     Urinary tract infection        Evaluation Date: 2/2/2017  Visit #: 1  Plan of care expiration: 4/27/2017  Precautions: universal    Functional Limitations Reports - G Codes  Category: other   Tool: FOTO FS measure, cervical rotation ROM  Score: 63% (37% impairment), ~60 degrees  Current: CJ at least 20% < 40% impaired, limited or restricted  Goal: CI at least 1% but less than 20% impaired, limited or restricted    History   Medical Diagnosis: concussion, whiplash, left vestibulopathy, acute LBP  PT Diagnosis: impaired cervical ROM, pain balance  Chief complaint: loss of balance, increased cervical pain  History of Present Illness: Liang is a 76 y.o. male that presents to [facility] secondary to concussion/ whiplash injuries. Pt fell off of ladder Jan 16, 2017. 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 HTN controlled with meds.      Prior Therapy: no therapy  Social History: exercises 3x/week- [employer], currently not participating due to fall  Place of Residence (Steps/Adaptations/Levels)/ assistance available: lives with wife in a home in a 2 story home, also has an elevated home with ~20  "steps to enter. Per pt no difficulty with stairs.   Previous functional status includes: pt worked out at fitness center 3x/week, pt very active, no mobility or balance impairments reported, no significant pain  Current functional status:  slight LOB during normal daily activities  DME owned: none  Work/Job description:  retired      Subjective   Pt stated goals: "like to feel 20 yrs younger"  Family present/states: NA  Pain: 2/10 cervical pain, slight increase with ROM    Objective   - Follows commands: yes   - Speech: no deficits     Mental status: alert, oriented to person, place, and time  Appearance: Well groomed  Behavior:  calm and cooperative  Attention Span and Concentration:  Normal    Dominant hand:  right     Posture Alignment :WFLs    Sensation:  Light Touch: Intact           Proprioception:   Intact    Tone: 0 - No increase in muscle tone  Limbs/muscles affected: NA    Visual/Auditory: denies changes   Tracking:Intact  Saccades: Intact  Acuity:Intact  R/L discrimination: Intact  Visual field: Intact  VOR: intact- slowed  VOR cancellation: intact- slowed    Coordination:   - fine motor: thumb to fingertip- WFLs  - UE coordination: supination/ pronation- WFLs    - LE coordination:  Alternating toe taps- WFLs    ROM:   UPPER EXTREMITY--AROM/PROM  (R) UE: WFLs  (L) UE: WFLs         Cervical ROM:   Flex: 55 degrees  Ext: 50 degrees  SB: right= 38 degrees, left= 25 degrees  Rotation: right= 56 degrees, left= 62 degrees    RANGE OF MOTION--LOWER EXTREMITIES  (R) LE Hip: WFLs   Knee: WFLs   Ankle: WFLs    (L) LE: Hip: WFLs   Knee: WFLs   Ankle: WFLs    Strength: manual muscle test grades below   Upper Extremity Strength  BUE strength grossly WFLs    Lower Extremity Strength   RLE LLE   Hip Flexion: 4+/5 4+/5   Hip Extension:  4/5 4/5   Hip Abduction: 4/5 4/5   Hip Adduction: NT NT   Knee Extension: 5/5 5/5   Knee Flexion: 5/5 5/5   Ankle Dorsiflexion: 4+/5 5/5   Ankle Plantarflexion: 5/5 5/5   Ankle Inversion: " 4+/5 5/5   Ankle Eversion: 4+/5 5/5      Evaluation   Single Limb Stance R LE 16 sec  (<10 sec = HIGH FALL RISK)   Single Limb Stance L LE 11 sec  (<10 sec = HIGH FALL RISK)   30 second Chair Rise NT   5 times sit-stand NT     Postural control:  MCTSIB:  1. Eyes Open/feet together/Firm: >30 seconds  2. Eyes Closed/feet together/Firm: >30 seconds  3. Eyes Open/feet together/Foam: >30 seconds  4. Eyes Closed/feet together/Foam: >30 seconds  5. Tandem EO: right= >30 seconds, left= >30 seconds  6. Tandem EC: right= 4 seconds, left= 4 seconds    Gait Assessment:   - AD used: none  - Assistance: I  - Distance: community  - Curb: I  - Ramp:  I    Functional Gait Assessment:   1. Gait on level surface =  1, 7.11   (3) Normal: less than 5.5 sec, no A.D., no imbalance, normal gait pattern, deviates< 6in   (2) Mild impairment: 7-5.6 sec, uses A.D., mild gait deviations, or deviates 6-10 in   (1) Moderate impairment: > 7 sec, slow speed, imbalance, deviates 10-15 in.   (0) Severe impairment: needs assist, deviates >15 in, reach/touch wall  2. Change in Gait Speed = 3   (3) Normal: smooth change w/o loss of balance or gait deviation, deviates < 6 in, significant difference between speeds   (2) Mild impairment: changes speed, but demonstrates mild gait deviations, deviates 6-10 in, OR no deviations but unable to significantly speed, OR uses A.D.   (1) Moderate impairment: minor changes to speed, OR changes speed w/ significant deviations, deviates 10-15 in, OR  Changes speed , but loses balance & recovers   (0) Severe impairment: cannot change speed, deviates >15 in, or loses balance & needs assist  3. Gait with horizontal head turns  = 3   (3) Normal: no change in gait, deviates <6 in   (2) Mild impairment: slight change in speed, deviates 6-10 in, OR uses A.D.   (1) Moderate impairment: moderate change in speed, deviates 10-15 in   (0) Severe impairment: severe disruption of gait, deviates >15in  4. Gait with vertical head  turns = 3   (3) Normal: no change in gait, deviates <6 in   (2) Mild impairment: slight change in speed, deviates 6-10 in OR uses A.D.   (1) Moderate impairment: moderate change in speed, deviates 10-15 in   (0) Severe impairment: severe disruption of gait, deviates >15 in  5. Gait with pivot turns = 3   (3) Normal: performs safely in 3 sec, no LOB   (2) Mild impairment: performs in >3 sec & no LOB, OR turns safely & requires several steps to regain LOB   (1) Moderate impairment: turns slow, OR requires several small steps for balance following turn & stop   (0) Severe impairment: cannot turn safely, needs assist  6. Step over obstacle = 2   (3) Normal: steps over 2 stacked boxes w/o change in speed or LOB   (2) Mild impairment: able to step over 1 box w/o change in speed or LOB   (1) Moderate impairment: steps over 1 box but must slow down, may require VC   (0) Severe impairment: cannot perform w/o assist  7. Gait with Narrow ROSALIE = 3   (3) Normal: 10 steps no staggering   (2) Mild impairment: 7-9 steps   (1) Moderate impairment: 4-7 steps   (0) Severe impairment: < 4 steps or cannot perform w/o assist  8. Gait with eyes closed = 2   (3) Normal: < 7 sec, no A.D., no LOB, normal gait pattern, deviates <6 in   (2) Mild impairment: 7.1-9 sec, mild gait deviations, deviates 6-10 in   (1) Moderate impairment: > 9 sec, abnormal pattern, LOB, deviates 10-15 in   (0) Severe impairment: cannot perform w/o assist, LOB, deviates >15in  9. Ambulating Backwards = 2   (3) Normal: no A.D., no LOB, normal gait pattern, deviates <6in   (2) Mild impairment: uses A.D., slower speed, mild gait deviations, deviates 6-10 in   (1) Moderate impairment: slow speed, abnormal gait pattern, LOB, deviates 10-15 in   (0) Severe impairment: severe gait deviations or LOB, deviates >15in  10. Steps = 3   (3) Normal: alternating feet, no rail   (2) Mild Impairment: alternating feet, uses rail   (1) Moderate impairment: step-to, uses rail   (0)  Severe impairment: cannot perform safely  Score 26/30       GAIT DEVIATIONS:  Liang displays the following deviations with ambulation: no significant deviations    Impairments contributing to deviations: NA    Endurance Deficit: good for eval, no impairments reported by pt      Evaluation   Timed Up and Go WFLs   Self Selected Walking Speed 0.84 m/sec (6m/7.11s)   Fast Walking Speed 1.53 m/sec (6m/3.92s)     Functional Mobility (Bed mobility, transfers)  Bed mobility: I  Supine to sit: I  Sit to supine: I  Rolling: I  Transfers to bed: I  Transfers to toilet: I  Sit to stand:  I  Stand pivot:  I  Car transfers: I  Wheelchair mobility: NA  Floor transfers: NT    FOTO: Dizziness Handicap Inventory: 2/100  Oswestry Disability Index: 4%  FS Measure: 63/100= 37% impairment    Written Home Exercises Provided: eval-  levator scap stretch, upper trap stretch  Pt demo good understanding of the education provided. Liang demonstrated good return demonstration of activities.     Education provided re:role of PT, goals for PT, scheduling - pt verbalized understanding.     Liang verbalized good understanding of education provided.   Pt has no cultural, educational or language barriers to learning provided.    Pt participated in the following treatment today:  therapeutic exercise x 9 minutes:  Levator scap stretch  Upper trap stretch    Assessment   This is a 76 y.o. male referred to outpatient physical therapy and presents with a medical diagnosis of concussion, whiplash, vestibulopathy, and LBP and demonstrates limitations as described in the problem list. Upon evaluation pt denied issues with LBP. Due to pt's deficits, pt reports increased pain and minimal balance impairments. Pt is not able to perform usual exercise routine at gym. Pt also has a history of cervical arthritis which may impact prognosis/ progress. Pt's condition is currently evolving, he is not addressing impairments. PT is warranted to address impairments to  return pt to PLOF. Pt demonstrates an average of 35% cervical impairment limiting participation in normal activites, pt is anticipated to improve impairment level to <20% by d/c.     Pt rehab potential is Good. Pt will benefit from continuing skilled outpatient physical therapy to address the deficits listed below in the problem list, provide pt/family education and to maximize pt's level of independence in the home and community environment.     Medical necessity is demonstrated by the following IMPAIRMENTS/PROMBLEM LIST:   1. impaired balance   2. Decreased ROM- cervical  3. Impaired ambulation   4. Difficulty participating in daily activities   5. Requires skilled supervision to complete and progress HEP     Anticipated barriers to physical therapy: none    Pt's spiritual, cultural and educational needs considered and pt agreeable to plan of care and goals as stated below:     GOALS:   Short term goals: 6 weeks, pt agrees to goals set.  1. Pt will perform HEP for cervical ROM and strengthening with supervision to improve speed of progress and decrease pain.  2. Pt will demonstrate improved cervical extension AROM to 60 degrees to demonstrate improved mobility/ ROM.   3. Pt will demonstrate improved left cervical side bending PROM to 35 degrees to equal left and demonstrate improved mobility.     Long term goals: 12 weeks, pt agrees to goals set  4. Pt will demonstrate improved cervical rotation to 70 degrees to demonstrate improved ROM for daily activities and driving.  5. Pt will demonstrate improved balance with decreased reliance on visual system by performing tandem stance with eyes closed x 10 sec.   6. Pt will demonstrate improved gait velocity to 1.24 m/s to demonstrate normal mobility for age and improved balance.  7. Pt will deny cervical pain with AROM to demonstrate return to PLOF.      Plan   Outpatient physical therapy 1-2 times weekly for 8-12 weeks to include: Pt Education, HEP, therapeutic  exercises, gait training, neuromuscular re-education, therapeutic activities, manual therapy PRN, and modalities PRN to achieve established goals. Pt may be seen by PTA as part of the rehabilitation team.       Christine Hancock, PT  02/02/2017      I certify the need for these services furnished under this plan of treatment and while under my care.  ____________________________________ Physician/Referring Practitioner   Date of Signature

## 2017-02-06 ENCOUNTER — HOSPITAL ENCOUNTER (OUTPATIENT)
Dept: RADIOLOGY | Facility: OTHER | Age: 77
Discharge: HOME | End: 2017-02-06
Attending: PHYSICAL MEDICINE & REHABILITATION
Payer: MEDICARE

## 2017-02-06 DIAGNOSIS — S06.0X1A CONCUSSION, WITH LOSS OF CONSCIOUSNESS OF 30 MINUTES OR LESS, INITIAL ENCOUNTER: ICD-10-CM

## 2017-02-06 PROCEDURE — 70551 MRI BRAIN STEM W/O DYE: CPT | Mod: TC

## 2017-02-06 PROCEDURE — 70551 MRI BRAIN STEM W/O DYE: CPT | Mod: 26,,, | Performed by: RADIOLOGY

## 2017-02-07 ENCOUNTER — TELEPHONE (OUTPATIENT)
Dept: SPINE | Facility: CLINIC | Age: 77
End: 2017-02-07

## 2017-02-07 DIAGNOSIS — G93.89 BRAIN MASS: Primary | ICD-10-CM

## 2017-02-07 DIAGNOSIS — S06.0X1A CONCUSSION, WITH LOSS OF CONSCIOUSNESS OF 30 MINUTES OR LESS, INITIAL ENCOUNTER: ICD-10-CM

## 2017-02-07 NOTE — TELEPHONE ENCOUNTER
MRI was reviewed.  There is no contusion or trauma from his fall, however It does show a right  2.6 cm mass, likely a meningioma.  We will get an MRI with contrast to further look.  We will also get him scheduled with Dr. Chen for follow up.  He is not having HA symptoms from concussion

## 2017-02-10 ENCOUNTER — TELEPHONE (OUTPATIENT)
Dept: PAIN MEDICINE | Facility: CLINIC | Age: 77
End: 2017-02-10

## 2017-02-10 NOTE — TELEPHONE ENCOUNTER
Called spoke with patient schedule appointments for creatinine,serum on 2/17,MRI on 2/18 and  on 3/3 patient verbally agreed to time and dates.Advise if he has any questions to give our office a call.  ----- Message from Gavino Ramon sent at 2/10/2017  9:53 AM CST -----  x_  1st Request  _  2nd Request  _  3rd Request        Who: Liang    Why: Pt. Is waiting on referral for Neurosurgeon. Please return call.    What Number to Call Back:[phone] or [phone]    When to Expect a call back: (Before the end of the day)   -- if the call is after 12:00, the call back will be tomorrow.

## 2017-02-17 ENCOUNTER — LAB VISIT (OUTPATIENT)
Dept: LAB | Facility: HOSPITAL | Age: 77
End: 2017-02-17
Attending: PHYSICAL MEDICINE & REHABILITATION
Payer: MEDICARE

## 2017-02-17 DIAGNOSIS — S06.0X1A CONCUSSION, WITH LOSS OF CONSCIOUSNESS OF 30 MINUTES OR LESS, INITIAL ENCOUNTER: ICD-10-CM

## 2017-02-17 DIAGNOSIS — G93.89 BRAIN MASS: ICD-10-CM

## 2017-02-17 LAB
CREAT SERPL-MCNC: 1.3 MG/DL
EST. GFR  (AFRICAN AMERICAN): >60 ML/MIN/1.73 M^2
EST. GFR  (NON AFRICAN AMERICAN): 53.4 ML/MIN/1.73 M^2

## 2017-02-17 PROCEDURE — 36415 COLL VENOUS BLD VENIPUNCTURE: CPT

## 2017-02-17 PROCEDURE — 82565 ASSAY OF CREATININE: CPT

## 2017-02-18 ENCOUNTER — HOSPITAL ENCOUNTER (OUTPATIENT)
Dept: RADIOLOGY | Facility: HOSPITAL | Age: 77
Discharge: HOME | End: 2017-02-18
Attending: PHYSICAL MEDICINE & REHABILITATION
Payer: MEDICARE

## 2017-02-18 DIAGNOSIS — G93.89 BRAIN MASS: ICD-10-CM

## 2017-02-18 DIAGNOSIS — S06.0X1A CONCUSSION, WITH LOSS OF CONSCIOUSNESS OF 30 MINUTES OR LESS, INITIAL ENCOUNTER: ICD-10-CM

## 2017-02-18 PROCEDURE — 70553 MRI BRAIN STEM W/O & W/DYE: CPT | Mod: TC

## 2017-02-18 PROCEDURE — 70553 MRI BRAIN STEM W/O & W/DYE: CPT | Mod: 26,,, | Performed by: RADIOLOGY

## 2017-02-18 PROCEDURE — A9585 GADOBUTROL INJECTION: HCPCS | Performed by: PHYSICAL MEDICINE & REHABILITATION

## 2017-02-18 PROCEDURE — 25500020 PHARM REV CODE 255: Performed by: PHYSICAL MEDICINE & REHABILITATION

## 2017-02-18 RX ORDER — GADOBUTROL 604.72 MG/ML
9 INJECTION INTRAVENOUS
Status: COMPLETED | OUTPATIENT
Start: 2017-02-18 | End: 2017-02-18

## 2017-02-18 RX ADMIN — GADOBUTROL 9 ML: 604.72 INJECTION INTRAVENOUS at 11:02

## 2017-02-20 ENCOUNTER — TELEPHONE (OUTPATIENT)
Dept: SPINE | Facility: CLINIC | Age: 77
End: 2017-02-20

## 2017-02-20 NOTE — TELEPHONE ENCOUNTER
MRI of the brain showed 2.7 x 2.7 mass, likely a meningioma.  He has an appointment with Dr. Chen on 3/3 for further evaluation

## 2017-02-23 ENCOUNTER — TELEPHONE (OUTPATIENT)
Dept: NEUROLOGY | Facility: CLINIC | Age: 77
End: 2017-02-23

## 2017-02-23 NOTE — TELEPHONE ENCOUNTER
Letter mailed asking patient to call and reschedule appointment with Dr. Haider on 4/11. He will not be in clinic

## 2017-02-28 PROBLEM — R29.898 DECREASED ROM OF NECK: Status: ACTIVE | Noted: 2017-02-28

## 2017-02-28 PROBLEM — Z74.09 IMPAIRED FUNCTIONAL MOBILITY, BALANCE, GAIT, AND ENDURANCE: Status: ACTIVE | Noted: 2017-02-28

## 2017-02-28 PROBLEM — R52 PAIN AGGRAVATED BY PHYSICAL ACTIVITY: Status: ACTIVE | Noted: 2017-02-28

## 2017-02-28 NOTE — PLAN OF CARE
OUTPATIENT NEUROLOGICAL REHABILITATION  PHYSICAL THERAPY EVALUATION    Name: Liang Monterroso Jr.  Clinic Number: [id number]    Diagnosis:   S06.0X1A (ICD-10-CM) - Concussion, with loss of consciousness of 30 minutes or less, initial encounter   S13.4XXA (ICD-10-CM) - Whiplash, initial encounter   M54.5 (ICD-10-CM) - Acute bilateral low back pain without sciatica   H81.93 (ICD-10-CM) - Vestibulopathy, bilateral       Physician: Regulo Haider III, MD  Treatment Orders: PT Eval and Treat, vestibular rehab  Past Medical History   Diagnosis Date    Allergy     CKD (chronic kidney disease) stage 3, GFR 30-59 ml/min 6/15/2016    Hyperlipidemia     Hypertension     Hypothyroidism     Thyroid disease     Urinary tract infection        Evaluation Date: 2/2/2017  Visit #: 1  Plan of care expiration: 4/27/2017  Precautions: universal    Functional Limitations Reports - G Codes  Category: other   Tool: FOTO FS measure, cervical rotation ROM  Score: 63% (37% impairment), ~60 degrees  Current: CJ at least 20% < 40% impaired, limited or restricted  Goal: CI at least 1% but less than 20% impaired, limited or restricted    History   Medical Diagnosis: concussion, whiplash, left vestibulopathy, acute LBP  PT Diagnosis: impaired cervical ROM, pain balance  Chief complaint: loss of balance, increased cervical pain  History of Present Illness: Liang is a 76 y.o. male that presents to [facility] secondary to concussion/ whiplash injuries. Pt fell off of ladder Jan 16, 2017. 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 HTN controlled with meds.      Prior Therapy: no therapy  Social History: exercises 3x/week- [facility], currently not participating due to fall  Place of Residence (Steps/Adaptations/Levels)/ assistance available: lives with wife in a home in a 2 story home, also has an elevated home with ~20  "steps to enter. Per pt no difficulty with stairs.   Previous functional status includes: pt worked out at fitness center 3x/week, pt very active, no mobility or balance impairments reported, no significant pain  Current functional status:  slight LOB during normal daily activities  DME owned: none  Work/Job description:  retired      Subjective   Pt stated goals: "like to feel 20 yrs younger"  Family present/states: NA  Pain: 2/10 cervical pain, slight increase with ROM    Objective   - Follows commands: yes   - Speech: no deficits     Mental status: alert, oriented to person, place, and time  Appearance: Well groomed  Behavior:  calm and cooperative  Attention Span and Concentration:  Normal    Dominant hand:  right     Posture Alignment :WFLs    Sensation:  Light Touch: Intact           Proprioception:   Intact    Tone: 0 - No increase in muscle tone  Limbs/muscles affected: NA    Visual/Auditory: denies changes   Tracking:Intact  Saccades: Intact  Acuity:Intact  R/L discrimination: Intact  Visual field: Intact  VOR: intact- slowed  VOR cancellation: intact- slowed    Coordination:   - fine motor: thumb to fingertip- WFLs  - UE coordination: supination/ pronation- WFLs    - LE coordination:  Alternating toe taps- WFLs    ROM:   UPPER EXTREMITY--AROM/PROM  (R) UE: WFLs  (L) UE: WFLs         Cervical ROM:   Flex: 55 degrees  Ext: 50 degrees  SB: right= 38 degrees, left= 25 degrees  Rotation: right= 56 degrees, left= 62 degrees    RANGE OF MOTION--LOWER EXTREMITIES  (R) LE Hip: WFLs   Knee: WFLs   Ankle: WFLs    (L) LE: Hip: WFLs   Knee: WFLs   Ankle: WFLs    Strength: manual muscle test grades below   Upper Extremity Strength  BUE strength grossly WFLs    Lower Extremity Strength   RLE LLE   Hip Flexion: 4+/5 4+/5   Hip Extension:  4/5 4/5   Hip Abduction: 4/5 4/5   Hip Adduction: NT NT   Knee Extension: 5/5 5/5   Knee Flexion: 5/5 5/5   Ankle Dorsiflexion: 4+/5 5/5   Ankle Plantarflexion: 5/5 5/5   Ankle Inversion: " 4+/5 5/5   Ankle Eversion: 4+/5 5/5      Evaluation   Single Limb Stance R LE 16 sec  (<10 sec = HIGH FALL RISK)   Single Limb Stance L LE 11 sec  (<10 sec = HIGH FALL RISK)   30 second Chair Rise NT   5 times sit-stand NT     Postural control:  MCTSIB:  1. Eyes Open/feet together/Firm: >30 seconds  2. Eyes Closed/feet together/Firm: >30 seconds  3. Eyes Open/feet together/Foam: >30 seconds  4. Eyes Closed/feet together/Foam: >30 seconds  5. Tandem EO: right= >30 seconds, left= >30 seconds  6. Tandem EC: right= 4 seconds, left= 4 seconds    Gait Assessment:   - AD used: none  - Assistance: I  - Distance: community  - Curb: I  - Ramp:  I    Functional Gait Assessment:   1. Gait on level surface =  1, 7.11   (3) Normal: less than 5.5 sec, no A.D., no imbalance, normal gait pattern, deviates< 6in   (2) Mild impairment: 7-5.6 sec, uses A.D., mild gait deviations, or deviates 6-10 in   (1) Moderate impairment: > 7 sec, slow speed, imbalance, deviates 10-15 in.   (0) Severe impairment: needs assist, deviates >15 in, reach/touch wall  2. Change in Gait Speed = 3   (3) Normal: smooth change w/o loss of balance or gait deviation, deviates < 6 in, significant difference between speeds   (2) Mild impairment: changes speed, but demonstrates mild gait deviations, deviates 6-10 in, OR no deviations but unable to significantly speed, OR uses A.D.   (1) Moderate impairment: minor changes to speed, OR changes speed w/ significant deviations, deviates 10-15 in, OR  Changes speed , but loses balance & recovers   (0) Severe impairment: cannot change speed, deviates >15 in, or loses balance & needs assist  3. Gait with horizontal head turns  = 3   (3) Normal: no change in gait, deviates <6 in   (2) Mild impairment: slight change in speed, deviates 6-10 in, OR uses A.D.   (1) Moderate impairment: moderate change in speed, deviates 10-15 in   (0) Severe impairment: severe disruption of gait, deviates >15in  4. Gait with vertical head  turns = 3   (3) Normal: no change in gait, deviates <6 in   (2) Mild impairment: slight change in speed, deviates 6-10 in OR uses A.D.   (1) Moderate impairment: moderate change in speed, deviates 10-15 in   (0) Severe impairment: severe disruption of gait, deviates >15 in  5. Gait with pivot turns = 3   (3) Normal: performs safely in 3 sec, no LOB   (2) Mild impairment: performs in >3 sec & no LOB, OR turns safely & requires several steps to regain LOB   (1) Moderate impairment: turns slow, OR requires several small steps for balance following turn & stop   (0) Severe impairment: cannot turn safely, needs assist  6. Step over obstacle = 2   (3) Normal: steps over 2 stacked boxes w/o change in speed or LOB   (2) Mild impairment: able to step over 1 box w/o change in speed or LOB   (1) Moderate impairment: steps over 1 box but must slow down, may require VC   (0) Severe impairment: cannot perform w/o assist  7. Gait with Narrow ROSALIE = 3   (3) Normal: 10 steps no staggering   (2) Mild impairment: 7-9 steps   (1) Moderate impairment: 4-7 steps   (0) Severe impairment: < 4 steps or cannot perform w/o assist  8. Gait with eyes closed = 2   (3) Normal: < 7 sec, no A.D., no LOB, normal gait pattern, deviates <6 in   (2) Mild impairment: 7.1-9 sec, mild gait deviations, deviates 6-10 in   (1) Moderate impairment: > 9 sec, abnormal pattern, LOB, deviates 10-15 in   (0) Severe impairment: cannot perform w/o assist, LOB, deviates >15in  9. Ambulating Backwards = 2   (3) Normal: no A.D., no LOB, normal gait pattern, deviates <6in   (2) Mild impairment: uses A.D., slower speed, mild gait deviations, deviates 6-10 in   (1) Moderate impairment: slow speed, abnormal gait pattern, LOB, deviates 10-15 in   (0) Severe impairment: severe gait deviations or LOB, deviates >15in  10. Steps = 3   (3) Normal: alternating feet, no rail   (2) Mild Impairment: alternating feet, uses rail   (1) Moderate impairment: step-to, uses rail   (0)  Severe impairment: cannot perform safely  Score 26/30       GAIT DEVIATIONS:  Liang displays the following deviations with ambulation: no significant deviations    Impairments contributing to deviations: NA    Endurance Deficit: good for eval, no impairments reported by pt      Evaluation   Timed Up and Go WFLs   Self Selected Walking Speed 0.84 m/sec (6m/7.11s)   Fast Walking Speed 1.53 m/sec (6m/3.92s)     Functional Mobility (Bed mobility, transfers)  Bed mobility: I  Supine to sit: I  Sit to supine: I  Rolling: I  Transfers to bed: I  Transfers to toilet: I  Sit to stand:  I  Stand pivot:  I  Car transfers: I  Wheelchair mobility: NA  Floor transfers: NT    FOTO: Dizziness Handicap Inventory: 2/100  Oswestry Disability Index: 4%  FS Measure: 63/100= 37% impairment    Written Home Exercises Provided: eval-  levator scap stretch, upper trap stretch  Pt demo good understanding of the education provided. Liang demonstrated good return demonstration of activities.     Education provided re:role of PT, goals for PT, scheduling - pt verbalized understanding.     Liang verbalized good understanding of education provided.   Pt has no cultural, educational or language barriers to learning provided.    Pt participated in the following treatment today:  therapeutic exercise x 9 minutes:  Levator scap stretch  Upper trap stretch      Assessment   This is a 76 y.o. male referred to outpatient physical therapy and presents with a medical diagnosis of concussion, whiplash, vestibulopathy, and LBP and demonstrates limitations as described in the problem list. Upon evaluation pt denied issues with LBP. Due to pt's deficits, pt reports increased pain and minimal balance impairments. Pt is not able to perform usual exercise routine at gym. Pt also has a history of cervical arthritis which may impact prognosis/ progress. Pt's condition is currently evolving, he is not addressing impairments. PT is warranted to address impairments to  return pt to PLOF. Pt demonstrates an average of 35% cervical impairment limiting participation in normal activites, pt is anticipated to improve impairment level to <20% by d/c.     Pt rehab potential is Good. Pt will benefit from continuing skilled outpatient physical therapy to address the deficits listed below in the problem list, provide pt/family education and to maximize pt's level of independence in the home and community environment.     Medical necessity is demonstrated by the following IMPAIRMENTS/PROMBLEM LIST:   1. impaired balance   2. Decreased ROM- cervical  3. Impaired ambulation   4. Difficulty participating in daily activities   5. Requires skilled supervision to complete and progress HEP     Anticipated barriers to physical therapy: none    Pt's spiritual, cultural and educational needs considered and pt agreeable to plan of care and goals as stated below:     GOALS:   Short term goals: 6 weeks, pt agrees to goals set.  1. Pt will perform HEP for cervical ROM and strengthening with supervision to improve speed of progress and decrease pain.  2. Pt will demonstrate improved cervical extension AROM to 60 degrees to demonstrate improved mobility/ ROM.   3. Pt will demonstrate improved left cervical side bending PROM to 35 degrees to equal left and demonstrate improved mobility.     Long term goals: 12 weeks, pt agrees to goals set  4. Pt will demonstrate improved cervical rotation to 70 degrees to demonstrate improved ROM for daily activities and driving.  5. Pt will demonstrate improved balance with decreased reliance on visual system by performing tandem stance with eyes closed x 10 sec.   6. Pt will demonstrate improved gait velocity to 1.24 m/s to demonstrate normal mobility for age and improved balance.  7. Pt will deny cervical pain with AROM to demonstrate return to PLOF.      Plan   Outpatient physical therapy 1-2 times weekly for 8-12 weeks to include: Pt Education, HEP, therapeutic  exercises, gait training, neuromuscular re-education, therapeutic activities, manual therapy PRN, and modalities PRN to achieve established goals. Pt may be seen by PTA as part of the rehabilitation team.       Christine Hancock, PT  02/02/2017      I certify the need for these services furnished under this plan of treatment and while under my care.  ____________________________________ Physician/Referring Practitioner   Date of Signature

## 2017-03-03 ENCOUNTER — OFFICE VISIT (OUTPATIENT)
Dept: NEUROSURGERY | Facility: CLINIC | Age: 77
End: 2017-03-03
Payer: MEDICARE

## 2017-03-03 VITALS
WEIGHT: 185 LBS | HEIGHT: 68 IN | BODY MASS INDEX: 28.04 KG/M2 | SYSTOLIC BLOOD PRESSURE: 126 MMHG | HEART RATE: 74 BPM | DIASTOLIC BLOOD PRESSURE: 80 MMHG

## 2017-03-03 DIAGNOSIS — G93.6 CEREBRAL EDEMA: ICD-10-CM

## 2017-03-03 DIAGNOSIS — D32.9 MENINGIOMA: Primary | ICD-10-CM

## 2017-03-03 PROCEDURE — 99999 PR PBB SHADOW E&M-EST. PATIENT-LVL III: CPT | Mod: PBBFAC,,, | Performed by: NEUROLOGICAL SURGERY

## 2017-03-03 PROCEDURE — 1159F MED LIST DOCD IN RCRD: CPT | Mod: S$GLB,,, | Performed by: NEUROLOGICAL SURGERY

## 2017-03-03 PROCEDURE — 3079F DIAST BP 80-89 MM HG: CPT | Mod: S$GLB,,, | Performed by: NEUROLOGICAL SURGERY

## 2017-03-03 PROCEDURE — 1160F RVW MEDS BY RX/DR IN RCRD: CPT | Mod: S$GLB,,, | Performed by: NEUROLOGICAL SURGERY

## 2017-03-03 PROCEDURE — 99499 UNLISTED E&M SERVICE: CPT | Mod: S$GLB,,, | Performed by: NEUROLOGICAL SURGERY

## 2017-03-03 PROCEDURE — 1126F AMNT PAIN NOTED NONE PRSNT: CPT | Mod: S$GLB,,, | Performed by: NEUROLOGICAL SURGERY

## 2017-03-03 PROCEDURE — 3074F SYST BP LT 130 MM HG: CPT | Mod: S$GLB,,, | Performed by: NEUROLOGICAL SURGERY

## 2017-03-03 PROCEDURE — 1157F ADVNC CARE PLAN IN RCRD: CPT | Mod: S$GLB,,, | Performed by: NEUROLOGICAL SURGERY

## 2017-03-03 PROCEDURE — 99204 OFFICE O/P NEW MOD 45 MIN: CPT | Mod: S$GLB,,, | Performed by: NEUROLOGICAL SURGERY

## 2017-03-03 NOTE — PATIENT INSTRUCTIONS
I have reviewed the MRI of the brain with the patient, which shows a 2.7 x 2.7 cm extra-axial mass near the right temporal occipital junction with associated swelling. This mass appears to be a meningioma, which is usually a benign mass; however, there is swelling associated with this mass.    I have discussed treatment options with the patient. The only way to know the pathology of this mass is a right-sided craniotomy for surgical resection of the tumor. His risk profile is very low and I would not expect him to have severe complications from this surgery. This surgery would take approximately 1.5 hours. I have discussed the timeline of the surgery and the recovery. He would have a follow up 2-weeks after surgery and we would likely discuss the pathology report at that time. l have discussed the risks with the patient and have answered his questions. Another option is serial imaging with follow up every 3 months. This tumor is too large for treatment with focused-radiation therapy.     I have discussed the risks/benefits, indications, and alternatives for the proposed procedure in detail.  I have answered all of their questions and the pt wishes to proceed with surgery.  We will schedule the pt.      He will be seen in Pre-Op prior to surgery and will need a MRI Stealth for planning.

## 2017-03-03 NOTE — PROGRESS NOTES
"Subjective:    I, Shelby Woodall, am scribing for, and in the presence of, Dr. Jameson Chen.     Patient ID: Liang Monterroso Jr. is a 76 y.o. male.    Chief Complaint: Consult    HPI This is a 76-year-old man, referred by Dr. Nieto, who presents today for consultation regarding the right temporal lobe tumor. The patient sustained a concussion on 1/16/17 . He states that he was up on a ladder putting up a surveillance camera when he fell. He was up on the ladder about 12 feet or so and he notes that the ladder rolled to his right that he fell off striking his legs on a 4 foot fence which turned his trajectory causing him to strike the back right occiput of his head against the ground with a forward flexion of his neck. He did lose consciousness for less than a few seconds at that time. He was evaluated by Dr. Nieto for his neck soreness and Dr. Haider for the concussion. He denies any problems since the fall or symptoms related to the concussion.     Today, he complains of cold symptoms, but otherwise feels well.    Review of Systems   Constitutional: Negative for activity change, fatigue and fever.   HENT: Positive for congestion, postnasal drip and rhinorrhea. Negative for facial swelling.    Eyes: Negative.    Respiratory: Negative.    Cardiovascular: Negative.    Gastrointestinal: Negative for diarrhea, nausea and vomiting.   Genitourinary: Negative.    Musculoskeletal: Negative for back pain, joint swelling and myalgias.   Neurological: Negative for seizures, weakness, numbness and headaches.   Psychiatric/Behavioral: Negative.        Past Medical History:   Diagnosis Date    Allergy     CKD (chronic kidney disease) stage 3, GFR 30-59 ml/min 6/15/2016    Hyperlipidemia     Hypertension     Hypothyroidism     Thyroid disease     Urinary tract infection      Objective:     /80  Pulse 74  Ht 5' 8" (1.727 m)  Wt 83.9 kg (185 lb)  BMI 28.13 kg/m2  Physical Exam   Constitutional: He is oriented to " person, place, and time. He appears well-developed and well-nourished.   HENT:   Head: Normocephalic and atraumatic.   Eyes: Pupils are equal, round, and reactive to light.   Neck: Neck supple.   Pulmonary/Chest: Effort normal.   Musculoskeletal: He exhibits no edema.   Neurological: He is alert and oriented to person, place, and time. No cranial nerve deficit. He displays a negative Romberg sign. GCS eye subscore is 4. GCS verbal subscore is 5. GCS motor subscore is 6.   Skin: Skin is warm and dry.   Psychiatric: He has a normal mood and affect.       Imaging:  MRI of the brain w/wo contrast, dated 2/18/2017, shows 2.7 x 2.7 cm extra-axial mass near the right temporal occipital junction. There is swelling associated with this tumor. Characteristics are suggestive of a meningioma.     I have personally reviewed the images with the pt.      I, Dr. Jameson Chen, personally performed the services described in this documentation as scribed by Shelby Woodall in my presence, and it is both accurate and complete.  Assessment:       No diagnosis found.    Plan:   I have reviewed the MRI of the brain with the patient, which shows a 2.7 x 2.7 cm extra-axial mass near the right temporal occipital junction with associated swelling. This mass appears to be a meningioma, which is usually benign mass; however, there is swelling associated with this mass.    I have discussed treatment options with the patient. The only way to know the pathology of this mass is a right-sided craniotomy for surgical resection of the tumor. His risk profile is very low and I would not expect him to have severe complications from this surgery. I have discussed the timeline of the surgery and the recovery. He would have a follow up 2-weeks after surgery and we would likely discuss the pathology report at that time. l have discussed the risks with the patient and have answered his questions. Another option is serial imaging with follow up every 3 months.  This tumor is too large for treatment with focused-radiation therapy.     I have discussed the risks/benefits, indications, and alternatives for the proposed procedure in detail.  I have answered all of their questions and the pt wishes to proceed with surgery.  We will schedule the pt March 20, 2017.    He will be seen in Pre-Op prior to surgery and will need a MRI Stealth for planning.

## 2017-03-03 NOTE — LETTER
March 6, 2017      Ayanna Nieto MD  [address]  [address]  [facility]  [facility] [address]           [facility]  [address]  [place] [address]  Phone: [phone]          Patient: Liang Monterroso Jr.   MR Number: [id number]   YOB: 1940   Date of Visit: 3/3/2017       Dear Dr. Ayanna Nieto:    Thank you for referring Liang Monterroso to me for evaluation. Attached you will find relevant portions of my assessment and plan of care.    If you have questions, please do not hesitate to call me. I look forward to following Liang Monterroso along with you.    Sincerely,    Jameson Chen MD    Enclosure  CC:  No Recipients    If you would like to receive this communication electronically, please contact [email or web address] or [phone] to request more information on [employer] Link access.    For providers and/or their staff who would like to refer a patient to [facility], please contact us through our one-stop-shop provider referral line, [facility], at [phone].    If you feel you have received this communication in error or would no longer like to receive these types of communications, please e-mail [email or web address]

## 2017-03-06 ENCOUNTER — CLINICAL SUPPORT (OUTPATIENT)
Dept: REHABILITATION | Facility: HOSPITAL | Age: 77
End: 2017-03-06
Attending: PSYCHIATRY & NEUROLOGY
Payer: MEDICARE

## 2017-03-06 ENCOUNTER — TELEPHONE (OUTPATIENT)
Dept: NEUROSURGERY | Facility: CLINIC | Age: 77
End: 2017-03-06

## 2017-03-06 DIAGNOSIS — R29.898 DECREASED ROM OF NECK: ICD-10-CM

## 2017-03-06 DIAGNOSIS — R52 PAIN AGGRAVATED BY PHYSICAL ACTIVITY: ICD-10-CM

## 2017-03-06 DIAGNOSIS — Z74.09 IMPAIRED FUNCTIONAL MOBILITY, BALANCE, GAIT, AND ENDURANCE: ICD-10-CM

## 2017-03-06 DIAGNOSIS — D32.9 MENINGIOMA: Primary | ICD-10-CM

## 2017-03-06 PROCEDURE — 97112 NEUROMUSCULAR REEDUCATION: CPT | Mod: PO | Performed by: PHYSICAL THERAPIST

## 2017-03-06 PROCEDURE — 97110 THERAPEUTIC EXERCISES: CPT | Mod: PO | Performed by: PHYSICAL THERAPIST

## 2017-03-06 NOTE — PROGRESS NOTES
Physical Therapy Progress Note     Name: Liang Monterroso Jr.  Clinic Number: [id number]  Diagnosis: concussion  Encounter Diagnoses   Name Primary?    Decreased ROM of neck     Pain aggravated by physical activity     Impaired functional mobility, balance, gait, and endurance      Physician: Regulo Haider III, MD  Treatment Orders: PT Eval and Treat, vestibular rehab  Past Medical History:   Diagnosis Date    Allergy     CKD (chronic kidney disease) stage 3, GFR 30-59 ml/min 6/15/2016    Hyperlipidemia     Hypertension     Hypothyroidism     Thyroid disease     Urinary tract infection        Precautions: standard  Visit #: 2  Date of Eval: 2/2/2017  Plan of Care Expiration: 4/27/2017    Functional Limitations Reports - G Codes  Category: other   Tool: FOTO FS measure, cervical rotation ROM  Score: 63% (37% impairment), ~60 degrees    G-CODE  2/10   eval CJ-CI             Subjective   Pt reports: the scan found a possible Meningioma, surgery scheduled in 2 weeks  Pain Scale:  no significant complaints    Objective     Patient received individual therapy to increase strength, endurance, ROM, flexibility, posture with activities as follows:     Pt participated in therapeutic exercise x 26 minutes, to improve ROM and strength to decrease occurences of pain:    Cervical AROM in sitting:   Flex: 55 degrees  Ext: 50 degrees  SB: right= 25 degrees, left= 31 degrees  Rotation: right= 60 degrees-->72, left= 67 degrees-->76 degrees    Chin tucks 10x  Doorway pec stretch  PROM cervical rotation, upper traps  * no reports of TTP cervical area    Pt participated in neuromuscular re education x 13 minutes to address balance deficits:     Tandem stance= 30 sec  Tandem gait- 1 lap  Tandem EC= right 10 sec max, left 4-6 sec    FSWS= 6m in 4.57 sec= 1.31 m/s  SSWS= 6m in 6.46 sec= 0.93 m/s    Functional Gait Assessment:   1. Gait on level surface =  2, 6.46 sec   (3) Normal: less  than 5.5 sec, no A.D., no imbalance, normal gait pattern, deviates< 6in   (2) Mild impairment: 7-5.6 sec, uses A.D., mild gait deviations, or deviates 6-10 in   (1) Moderate impairment: > 7 sec, slow speed, imbalance, deviates 10-15 in.   (0) Severe impairment: needs assist, deviates >15 in, reach/touch wall  2. Change in Gait Speed = 3   (3) Normal: smooth change w/o loss of balance or gait deviation, deviates < 6 in, significant difference between speeds   (2) Mild impairment: changes speed, but demonstrates mild gait deviations, deviates 6-10 in, OR no deviations but unable to significantly speed, OR uses A.D.   (1) Moderate impairment: minor changes to speed, OR changes speed w/ significant deviations, deviates 10-15 in, OR  Changes speed , but loses balance & recovers   (0) Severe impairment: cannot change speed, deviates >15 in, or loses balance & needs assist  3. Gait with horizontal head turns  = 3   (3) Normal: no change in gait, deviates <6 in   (2) Mild impairment: slight change in speed, deviates 6-10 in, OR uses A.D.   (1) Moderate impairment: moderate change in speed, deviates 10-15 in   (0) Severe impairment: severe disruption of gait, deviates >15in  4. Gait with vertical head turns = 2   (3) Normal: no change in gait, deviates <6 in   (2) Mild impairment: slight change in speed, deviates 6-10 in OR uses A.D.   (1) Moderate impairment: moderate change in speed, deviates 10-15 in   (0) Severe impairment: severe disruption of gait, deviates >15 in  5. Gait with pivot turns = 3   (3) Normal: performs safely in 3 sec, no LOB   (2) Mild impairment: performs in >3 sec & no LOB, OR turns safely & requires several steps to regain LOB   (1) Moderate impairment: turns slow, OR requires several small steps for balance following turn & stop   (0) Severe impairment: cannot turn safely, needs assist  6. Step over obstacle = 3   (3) Normal: steps over 2 stacked boxes w/o change in speed or LOB   (2) Mild  impairment: able to step over 1 box w/o change in speed or LOB   (1) Moderate impairment: steps over 1 box but must slow down, may require VC   (0) Severe impairment: cannot perform w/o assist  7. Gait with Narrow ROSALIE = 3   (3) Normal: 10 steps no staggering   (2) Mild impairment: 7-9 steps   (1) Moderate impairment: 4-7 steps   (0) Severe impairment: < 4 steps or cannot perform w/o assist  8. Gait with eyes closed = 2   (3) Normal: < 7 sec, no A.D., no LOB, normal gait pattern, deviates <6 in   (2) Mild impairment: 7.1-9 sec, mild gait deviations, deviates 6-10 in   (1) Moderate impairment: > 9 sec, abnormal pattern, LOB, deviates 10-15 in   (0) Severe impairment: cannot perform w/o assist, LOB, deviates >15in  9. Ambulating Backwards = 2   (3) Normal: no A.D., no LOB, normal gait pattern, deviates <6in   (2) Mild impairment: uses A.D., slower speed, mild gait deviations, deviates 6-10 in   (1) Moderate impairment: slow speed, abnormal gait pattern, LOB, deviates 10-15 in   (0) Severe impairment: severe gait deviations or LOB, deviates >15in  10. Steps = 3   (3) Normal: alternating feet, no rail   (2) Mild Impairment: alternating feet, uses rail   (1) Moderate impairment: step-to, uses rail   (0) Severe impairment: cannot perform safely  Score 26/30       Written Home Exercises: 3/6/17- chin tucks, cervical AROM rotation, doorway pec stretch, tandem standce EC in corner.  Pt demo good understanding of the education provided. Liang demonstrated good return demonstration of activities.     Education provided re: POC, HEP    Pt has no cultural, educational or language barriers to learning provided.    Assessment   Liang tolerated treatment well. Pt reported recent finding of possible meningioma, surgery scheduled in 2 weeks. Pt denies significant cervical pain. Pt's cervical rotation did improve after treatment. Pt was instructed in HEP for ROM and cervical strengthening). PT will schedule followup visit to address  cervical ROM. Possible early d/c due only minimal symptoms reported and recent finding of brain mass.     Pt prognosis is Good. Pt will continue to benefit from skilled outpatient physical therapy to address the deficits listed in the problem list, provide pt/family education and to maximize pt's level of independence in the home and community environment.     Anticipated barriers to physical therapy: recent finding of brain mass    Medical necessity is demonstrated by the following IMPAIRMENTS/PROMBLEM LIST:   1. impaired balance   2. Decreased ROM- cervical  3. Impaired ambulation   4. Difficulty participating in daily activities   5. Requires skilled supervision to complete and progress HEP      Anticipated barriers to physical therapy: none     Pt's spiritual, cultural and educational needs considered and pt agreeable to plan of care and goals as stated below:      GOALS:   Short term goals: 6 weeks, pt agrees to goals set.  1. Pt will perform HEP for cervical ROM and strengthening with supervision to improve speed of progress and decrease pain.  2. Pt will demonstrate improved cervical extension AROM to 60 degrees to demonstrate improved mobility/ ROM.   3. Pt will demonstrate improved left cervical side bending PROM to 35 degrees to equal left and demonstrate improved mobility.      Long term goals: 12 weeks, pt agrees to goals set  4. Pt will demonstrate improved cervical rotation to 70 degrees to demonstrate improved ROM for daily activities and driving.  5. Pt will demonstrate improved balance with decreased reliance on visual system by performing tandem stance with eyes closed x 10 sec.   6. Pt will demonstrate improved gait velocity to 1.24 m/s to demonstrate normal mobility for age and improved balance.  7. Pt will deny cervical pain with AROM to demonstrate return to PLOF.       Plan   Continue PT 1-2x weekly under established Plan of Care, with treatment to include: pt education, HEP, therapeutic  exercises, neuromuscular re-education/balance exercises, therapeutic activities, joint mobilizations, and modalities PRN, to work towards established goals. Pt may be seen by PTA to carry out plan of care.     Christine Hancock, PT   03/06/2017

## 2017-03-08 ENCOUNTER — CLINICAL SUPPORT (OUTPATIENT)
Dept: REHABILITATION | Facility: HOSPITAL | Age: 77
End: 2017-03-08
Attending: PSYCHIATRY & NEUROLOGY
Payer: MEDICARE

## 2017-03-08 DIAGNOSIS — R52 PAIN AGGRAVATED BY PHYSICAL ACTIVITY: ICD-10-CM

## 2017-03-08 DIAGNOSIS — Z74.09 IMPAIRED FUNCTIONAL MOBILITY, BALANCE, GAIT, AND ENDURANCE: ICD-10-CM

## 2017-03-08 DIAGNOSIS — R29.898 DECREASED ROM OF NECK: ICD-10-CM

## 2017-03-08 PROCEDURE — G8992 OTHER PT/OT  D/C STATUS: HCPCS | Mod: CJ,PO | Performed by: PHYSICAL THERAPIST

## 2017-03-08 PROCEDURE — 97140 MANUAL THERAPY 1/> REGIONS: CPT | Mod: PO | Performed by: PHYSICAL THERAPIST

## 2017-03-08 PROCEDURE — 97110 THERAPEUTIC EXERCISES: CPT | Mod: PO | Performed by: PHYSICAL THERAPIST

## 2017-03-08 PROCEDURE — G8991 OTHER PT/OT GOAL STATUS: HCPCS | Mod: CI,PO | Performed by: PHYSICAL THERAPIST

## 2017-03-08 NOTE — PROGRESS NOTES
PHYSICAL THERAPY DISCHARGE SUMMARY     Name: Liang Monterroso Jr.  Clinic Number: [id number]    Diagnosis: concussion  Physician: Regulo Haider III, MD  Treatment Orders: PT Eval and Treat  Past Medical History:   Diagnosis Date    Allergy     CKD (chronic kidney disease) stage 3, GFR 30-59 ml/min 6/15/2016    Hyperlipidemia     Hypertension     Hypothyroidism     Thyroid disease     Urinary tract infection        Initial visit: 2/2/2017  Date of Last visit: 3/8/2017  Total Visits Received: 3    Functional Limitations Reports - G Codes  Functional Limitations Reports - G Codes  Category: other   Tool: FOTO FS measure, cervical rotation ROM  Score: 63% (37% impairment), 70-75 degrees  Current: CJ at least 20% < 40% impaired, limited or restricted  D/C: CI at least 1% but less than 20% impaired, limited or restricted    DME recommended: none  HEP provided:  BUE ER with BTB, chin tucks, cervical AROM rotation, doorway pec stretch, tandem stance EC in corner  Pain: 2/10 cervical    OBJECTIVE     ROM:   UPPER EXTREMITY--AROM/PROM  (R) UE: WFLs  (L) UE: WFLs           RANGE OF MOTION--LOWER EXTREMITIES  (R) LE Hip: equal bilaterally   Knee: equal bilaterally   Ankle: equal bilaterally    (L) LE: Hip: equal bilaterally   Knee: equal bilaterally   Ankle: equal bilaterally    Cervical AROM in sitting:   SB: right= 33 degrees, left= 31 degrees  Rotation: right= 75 degrees-->same, left= 70 degrees-->75 degrees      Strength: manual muscle test grades below   Upper Extremity Strength  BUE strength grossly WFLs    Lower Extremity Strength  BLE strength grossly WFLs     Evaluation   Single Limb Stance R LE NT  (<10 sec = HIGH FALL RISK)   Single Limb Stance L LE NT  (<10 sec = HIGH FALL RISK)   30 second Chair Rise NT   5 times sit-stand NT     Gait Assessment:   - AD used: none  - Assistance: I  - Distance: community  - Curb: I  - Ramp:  I     Evaluation   Timed Up and Go WFLs   Self Selected Walking Speed 0.93 m/sec  (6m/6.46s)   Fast Walking Speed 1.33 m/sec (6m/4.57s)     Functional Mobility (Bed mobility, transfers)  Bed mobility: I  Supine to sit: I  Sit to supine: I  Rolling: I  Transfers to bed: I  Transfers to toilet: I  Sit to stand:  I  Stand pivot:  I  Car transfers: I  Wheelchair mobility: NA  Floor transfers: NT    Pt participated in the following treatment today:   therapeutic exercise x 24 minutes:   PROM cervical rotation, upper traps  bruggers BTB 2 x 10  Lat pulls BTB 2 x 10  Self stretching UT and levator 30 sec x 5, VC to use weight of hand and not pull.     Manual therapy x 14 minutes:   Inhibitive distraction  OA/AA flex, ext, rotation  TTP right sub occipitals    ASSESSMENT   76 yr. Old male with diagnosis of concussion referred to [facility] received outpatient PT 2/2/17 to 3/8/17. Pt treated 2 visits after eval. Pt reports minimal cervical pain, possibly more frequent than prior to fall. Pt was instructed in self ROM/ stretching and chin tucks for strengthening. Pt was also provided with scapular strengthening activities. Pt reports performing lat pulls and rows at gym, PT recommends continue with those exercises. Pt was instructed in signs of excessive activity/ overstimulation and instructed to stop activity if any symptoms occur. Pt demonstrated good improvements with gait speed, cervical ROM, and balance. Pt reports recent finding of mass in brain. Pt d/c due to having only minimal complaints and is d/c due to upcoming surgery. Pt to participate in HEP until day of surgery and may perform post op as able.     Status Towards Goals Met:     Short term goals:   1. Pt will perform HEP for cervical ROM and strengthening with supervision to improve speed of progress and decrease pain. met  2. Pt will demonstrate improved cervical extension AROM to 60 degrees to demonstrate improved mobility/ ROM. Met- right= 75 degrees, left= 70 degrees  3. Pt will demonstrate improved left cervical side  bending PROM to 35 degrees to equal left and demonstrate improved mobility. Improved/ nearly met right= 33 degrees, left= 31 degrees     Long term goals:   4. Pt will demonstrate improved cervical rotation to 70 degrees to demonstrate improved ROM for daily activities and driving. met  5. Pt will demonstrate improved balance with decreased reliance on visual system by performing tandem stance with eyes closed x 10 sec. Improved/ partially met-  10 sec with right front, left front= 4-6 sec   6. Pt will demonstrate improved gait velocity to 1.24 m/s to demonstrate normal mobility for age and improved balance. Improved- 0.93 m/s  7. Pt will deny cervical pain with AROM to demonstrate return to PLOF. Improved- minimal complaints of pain with end range of cervical left rotation and SB    Goals Not achieved and why: early d/c due to other medical issues    Discharge reason : pt d/c due to upcoming surgery    PLAN   This patient is discharged from Physical Therapy Services.         Christine Hancock, PT  03/08/2017

## 2017-03-08 NOTE — PLAN OF CARE
PHYSICAL THERAPY DISCHARGE SUMMARY     Name: Liang Monterroso Jr.  Clinic Number: [id number]    Diagnosis: concussion  Physician: Regulo Haider III, MD  Treatment Orders: PT Eval and Treat  Past Medical History:   Diagnosis Date    Allergy     CKD (chronic kidney disease) stage 3, GFR 30-59 ml/min 6/15/2016    Hyperlipidemia     Hypertension     Hypothyroidism     Thyroid disease     Urinary tract infection        Initial visit: 2/2/2017  Date of Last visit: 3/8/2017  Total Visits Received: 3    Functional Limitations Reports - G Codes  Functional Limitations Reports - G Codes  Category: other   Tool: FOTO FS measure, cervical rotation ROM  Score: 63% (37% impairment), 70-75 degrees  Current: CJ at least 20% < 40% impaired, limited or restricted  D/C: CI at least 1% but less than 20% impaired, limited or restricted    DME recommended: none  HEP provided:  BUE ER with BTB, chin tucks, cervical AROM rotation, doorway pec stretch, tandem stance EC in corner  Pain: 2/10 cervical    OBJECTIVE     ROM:   UPPER EXTREMITY--AROM/PROM  (R) UE: WFLs  (L) UE: WFLs           RANGE OF MOTION--LOWER EXTREMITIES  (R) LE Hip: equal bilaterally   Knee: equal bilaterally   Ankle: equal bilaterally    (L) LE: Hip: equal bilaterally   Knee: equal bilaterally   Ankle: equal bilaterally    Cervical AROM in sitting:   SB: right= 33 degrees, left= 31 degrees  Rotation: right= 75 degrees-->same, left= 70 degrees-->75 degrees      Strength: manual muscle test grades below   Upper Extremity Strength  BUE strength grossly WFLs    Lower Extremity Strength  BLE strength grossly WFLs     Evaluation   Single Limb Stance R LE NT  (<10 sec = HIGH FALL RISK)   Single Limb Stance L LE NT  (<10 sec = HIGH FALL RISK)   30 second Chair Rise NT   5 times sit-stand NT     Gait Assessment:   - AD used: none  - Assistance: I  - Distance: community  - Curb: I  - Ramp:  I     Evaluation   Timed Up and Go WFLs   Self Selected Walking Speed 0.93 m/sec  (6m/6.46s)   Fast Walking Speed 1.33 m/sec (6m/4.57s)     Functional Mobility (Bed mobility, transfers)  Bed mobility: I  Supine to sit: I  Sit to supine: I  Rolling: I  Transfers to bed: I  Transfers to toilet: I  Sit to stand:  I  Stand pivot:  I  Car transfers: I  Wheelchair mobility: NA  Floor transfers: NT    Pt participated in the following treatment today:   therapeutic exercise x 24 minutes:   PROM cervical rotation, upper traps  bruggers BTB 2 x 10  Lat pulls BTB 2 x 10  Self stretching UT and levator 30 sec x 5, VC to use weight of hand and not pull.     Manual therapy x 14 minutes:   Inhibitive distraction  OA/AA flex, ext, rotation  TTP right sub occipitals    ASSESSMENT   76 yr. Old male with diagnosis of concussion referred to [facility] received outpatient PT 2/2/17 to 3/8/17. Pt treated 2 visits after eval. Pt reports minimal cervical pain, possibly more frequent than prior to fall. Pt was instructed in self ROM/ stretching and chin tucks for strengthening. Pt was also provided with scapular strengthening activities. Pt reports performing lat pulls and rows at gym, PT recommends continue with those exercises. Pt was instructed in signs of excessive activity/ overstimulation and instructed to stop activity if any symptoms occur. Pt demonstrated good improvements with gait speed, cervical ROM, and balance. Pt reports recent finding of mass in brain. Pt d/c due to having only minimal complaints and is d/c due to upcoming surgery. Pt to participate in HEP until day of surgery and may perform post op as able. Pt demonstrates ~22% impairment in ROM/ pain.    Status Towards Goals Met:     Short term goals:   1. Pt will perform HEP for cervical ROM and strengthening with supervision to improve speed of progress and decrease pain. met  2. Pt will demonstrate improved cervical extension AROM to 60 degrees to demonstrate improved mobility/ ROM. Met- right= 75 degrees, left= 70 degrees  3. Pt  will demonstrate improved left cervical side bending PROM to 35 degrees to equal left and demonstrate improved mobility. Improved/ nearly met right= 33 degrees, left= 31 degrees     Long term goals:   4. Pt will demonstrate improved cervical rotation to 70 degrees to demonstrate improved ROM for daily activities and driving. met  5. Pt will demonstrate improved balance with decreased reliance on visual system by performing tandem stance with eyes closed x 10 sec. Improved/ partially met-  10 sec with right front, left front= 4-6 sec   6. Pt will demonstrate improved gait velocity to 1.24 m/s to demonstrate normal mobility for age and improved balance. Improved- 0.93 m/s  7. Pt will deny cervical pain with AROM to demonstrate return to PLOF. Improved- minimal complaints of pain with end range of cervical left rotation and SB    Goals Not achieved and why: early d/c due to other medical issues    Discharge reason : pt d/c due to upcoming surgery    PLAN   This patient is discharged from Physical Therapy Services.         Christine Hancock, PT  03/08/2017

## 2017-03-14 NOTE — PRE ADMISSION SCREENING
Anesthesia Assessment: Preoperative EQUATION    Planned Procedure: Procedure(s) (LRB):  CRANIOTOMY WITH STEALTH/  Right Temporal Craniotomy (Right)  Requested Anesthesia Type:General  Surgeon: Jameson Chen MD  Service: Neurosurgery  Known or anticipated Date of Surgery:3/20/2017    Surgeon notes: reviewed    Electronic QUestionnaire Assessment completed via nurse interview with patient.      LocLiang Jr. [[id number]] - 76 y.o. 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 retent. due to nerve/prostate/bladder/pelvic dis.  Yes [chronic prostatitis]       Flags      Red Flag Score:  1                Yellow Flag Score:  4           Red Flags           Pre-admit from 3/20/2017 in [facility]     Has been diagnosed with CKD  Yes       Yellow Flags           Pre-admit from 3/20/2017 in [facility]     Has had steroids in the last year  Yes [medrol after fall in January, medrol for URI 12 weeks ago ]     At least 6 beers or 3 gl wine or 3 oz. 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 Age >45     1 Exercise on a routine basis     1 Diagnosed with high blood pressure     1 Has hyperlipoproteinemia       CHADS Score if applicable (history of atrial fib/flutter, Max=6)  Total Score: 1      1 Diagnosed with high blood pressure       Maximal Exercise Capacity           Pre-admit from 3/20/2017 in [facility]     Maximal Exercise Capacity  METS 4       Summary of Dependence  Total Score: 1      1 Is totally independent of others for activities of daily living       Phone Fraility Score (Max = 17)  Total Score: 0        Pain Factors      No data to display       Risk Triggers (Evidence-Based Risk Triggers)        Pulmonary Risk Triggers  Total Score: 1      1 Age > or = 60       Renal Risk Triggers  Total Score: 2      1 Diagnosed with high blood pressure     1 Has been diagnosed with CKD       Delirium Risk Triggers  Total Score: 2      1 Age > or = 75     1 At least 6 beers or 3 gl wine or 3 oz. hard liquor most days       Urologic Risk Triggers  Total Score: 1      1 Has problems emptying bladder/ u. retent. due to nerve/prostate/bladder/pelvic  dis.       Logistics        Pre-op Clinic Logistics  Total Score: 3      1 Has had anesthesia, either as adult or as a child     1 Takes medication for high blood pressure     1 Has been diagnosed with CKD       DOSC Logistics  Total Score: 1      1 Has been diagnosed with CKD       Discharge Logistics  Total Score: 2      2 At least 6 beers or 3 gl wine or 3 oz. hard liquor most days       Discharge Planning           Pre-admit from 3/20/2017 in [facility]     Discharge Planning      Discharge plans  Home with assistance     Will assist patient 24/7, if needed  wife      Who will transport you to therapy, if need  WIFE        Anes & Int Med <For office use only>      Total Score: 15        Surgical Risk Level Assessment         Triage considerations:     The patient has no apparent active cardiac condition (No unstable coronary Syndrome such as severe unstable angina or recent [<1 month] myocardial infarction, decompensated CHF, severe valvular   disease or significant arrhythmia)    Previous anesthesia records:Not available    Last PCP note: within 3 months , within [facility]   Subspecialty notes: n/a    Other important co-morbidities: htn, hld, ckd, hypothyroid, bph      Tests already available:  Results have been reviewed. labs 9/27/16 CMP, CBC, TSH             Instructions given. (See in Nurse's note)    Optimization:  Anesthesia Preop Clinic Assessment  Indicated    Medical Opinion Indicated       Sub-specialist consult indicated:   TBD         Plan:    Testing:  Hematology Profile, BMP, PT/INR, PTT, T&S and EKG   Pre-anesthesia  visit       Visit focus: concerns in complex and/or prolonged anesthesia, airway concerns     Consultation:IM Perioperative Hospitalist     Patient  has previously scheduled Medical Appointment:    Navigation: Tests Scheduled.              Consults scheduled.             Results will be tracked by Preop Clinic.

## 2017-03-15 ENCOUNTER — HOSPITAL ENCOUNTER (OUTPATIENT)
Dept: CARDIOLOGY | Facility: CLINIC | Age: 77
Discharge: HOME | End: 2017-03-15
Attending: ANESTHESIOLOGY
Payer: MEDICARE

## 2017-03-15 ENCOUNTER — HOSPITAL ENCOUNTER (OUTPATIENT)
Dept: PREADMISSION TESTING | Facility: HOSPITAL | Age: 77
Discharge: HOME | End: 2017-03-15
Attending: ANESTHESIOLOGY
Payer: MEDICARE

## 2017-03-15 ENCOUNTER — ANESTHESIA EVENT (OUTPATIENT)
Dept: SURGERY | Facility: HOSPITAL | Age: 77
DRG: 025 | End: 2017-03-15
Payer: MEDICARE

## 2017-03-15 ENCOUNTER — INITIAL CONSULT (OUTPATIENT)
Dept: INTERNAL MEDICINE | Facility: CLINIC | Age: 77
End: 2017-03-15
Payer: MEDICARE

## 2017-03-15 VITALS
TEMPERATURE: 98 F | HEIGHT: 68 IN | SYSTOLIC BLOOD PRESSURE: 130 MMHG | RESPIRATION RATE: 16 BRPM | WEIGHT: 188 LBS | OXYGEN SATURATION: 96 % | HEART RATE: 62 BPM | BODY MASS INDEX: 28.49 KG/M2 | DIASTOLIC BLOOD PRESSURE: 73 MMHG

## 2017-03-15 VITALS
TEMPERATURE: 98 F | HEART RATE: 62 BPM | SYSTOLIC BLOOD PRESSURE: 130 MMHG | WEIGHT: 188.06 LBS | DIASTOLIC BLOOD PRESSURE: 73 MMHG | HEIGHT: 68 IN | BODY MASS INDEX: 28.5 KG/M2 | OXYGEN SATURATION: 96 %

## 2017-03-15 DIAGNOSIS — N13.8 BPH WITH URINARY OBSTRUCTION: ICD-10-CM

## 2017-03-15 DIAGNOSIS — E06.3 HYPOTHYROIDISM DUE TO HASHIMOTO'S THYROIDITIS: ICD-10-CM

## 2017-03-15 DIAGNOSIS — R59.0 INGUINAL LYMPHADENOPATHY: ICD-10-CM

## 2017-03-15 DIAGNOSIS — N18.30 CKD (CHRONIC KIDNEY DISEASE) STAGE 3, GFR 30-59 ML/MIN: ICD-10-CM

## 2017-03-15 DIAGNOSIS — N40.1 BPH WITH URINARY OBSTRUCTION: ICD-10-CM

## 2017-03-15 DIAGNOSIS — E03.8 HYPOTHYROIDISM DUE TO HASHIMOTO'S THYROIDITIS: ICD-10-CM

## 2017-03-15 DIAGNOSIS — I10 ESSENTIAL HYPERTENSION: Primary | ICD-10-CM

## 2017-03-15 DIAGNOSIS — K59.00 CONSTIPATION, UNSPECIFIED CONSTIPATION TYPE: ICD-10-CM

## 2017-03-15 DIAGNOSIS — Z01.818 PREOP TESTING: ICD-10-CM

## 2017-03-15 DIAGNOSIS — D32.9 MENINGIOMA: ICD-10-CM

## 2017-03-15 PROBLEM — R52 PAIN AGGRAVATED BY PHYSICAL ACTIVITY: Status: RESOLVED | Noted: 2017-02-28 | Resolved: 2017-03-15

## 2017-03-15 PROCEDURE — 1157F ADVNC CARE PLAN IN RCRD: CPT | Mod: S$GLB,,, | Performed by: HOSPITALIST

## 2017-03-15 PROCEDURE — 1159F MED LIST DOCD IN RCRD: CPT | Mod: S$GLB,,, | Performed by: HOSPITALIST

## 2017-03-15 PROCEDURE — 3075F SYST BP GE 130 - 139MM HG: CPT | Mod: S$GLB,,, | Performed by: HOSPITALIST

## 2017-03-15 PROCEDURE — 99214 OFFICE O/P EST MOD 30 MIN: CPT | Mod: S$GLB,,, | Performed by: HOSPITALIST

## 2017-03-15 PROCEDURE — 3078F DIAST BP <80 MM HG: CPT | Mod: S$GLB,,, | Performed by: HOSPITALIST

## 2017-03-15 PROCEDURE — 93000 ELECTROCARDIOGRAM COMPLETE: CPT | Mod: S$GLB,,, | Performed by: INTERNAL MEDICINE

## 2017-03-15 PROCEDURE — 99999 PR PBB SHADOW E&M-EST. PATIENT-LVL III: CPT | Mod: PBBFAC,,, | Performed by: HOSPITALIST

## 2017-03-15 PROCEDURE — 1160F RVW MEDS BY RX/DR IN RCRD: CPT | Mod: S$GLB,,, | Performed by: HOSPITALIST

## 2017-03-15 PROCEDURE — 99499 UNLISTED E&M SERVICE: CPT | Mod: S$GLB,,, | Performed by: HOSPITALIST

## 2017-03-15 NOTE — PATIENT INSTRUCTIONS
Your surgery has been scheduled for:___Monday 3/20___     You should report to:  _____[facility], located on the [place] side of the first floor of the [facility] ([phone])  ___X___[facility], located on the [facility] side of the Second floor of the [facility] ([phone])  ______3rd Floor [facility] located on the [facility] side of the [facility] [phone]    Please Note  -Tell your doctors if you take asprin, products containing aspirin, herbal medications or blood thinners, such as Coumadin, Ticlid, or Plavix. (Consult your provider regarding holding or stopping before surgery).  -Arrange for someone to drive you home following surgery. You will not be allowed to leave the surgical facility alone or drive yourself home following sedation and anesthesia.      Outpatient Encounter Prescriptions as of 3/15/2017   Medication Sig Note Dispense Refill    aspirin (ECOTRIN) 81 MG EC tablet Take 81 mg by mouth every evening.  3/15/2017: Continue to hold until after surgery or per Dr Del Rosario      coenzyme Q10 (CO Q-10) 10 mg capsule Take 10 mg by mouth once daily. 3/15/2017: Continue to hold until after surgery      econazole nitrate 1 % cream USE TWICE DAILY (Patient taking differently: USE TWICE DAILY (prn)) 3/15/2017: Hold AM of surgery 60 g 5    ERGOCALCIFEROL, VITAMIN D2, (VITAMIN D2 ORAL) Take 1 tablet by mouth once daily. 3/15/2017: Continue to hold until after surgery      famotidine (PEPCID) 20 MG tablet Take 20 mg by mouth 2 (two) times daily as needed.   3/15/2017: Take as needed      fexofenadine (ALLEGRA) 180 MG tablet Take 1 tablet (180 mg total) by mouth once daily. (Patient taking differently: Take 180 mg by mouth daily as needed. ) 3/15/2017: Take as needed 30 tablet 11    ginseng 100 mg Cap Take by mouth.   3/15/2017: Continue to hold until after surgery      levothyroxine (SYNTHROID) 112 MCG  tablet Take 1 tablet (112 mcg total) by mouth before breakfast. 3/15/2017: Take AM of surgery 30 tablet 5    metronidazole (METROGEL) 0.75 % gel Apply 1 application topically every evening. 3/15/2017: Not currently using 45 g 5    multivitamin with minerals tablet Take 1 tablet by mouth once daily.   3/15/2017: Continue to hold until after surgery      PRAMOSONE cream APPLY TOPICALLY TWICE DAILY AS NEEDED FOR ITCHING 3/15/2017: Hold AM of surgery 57 g 5    pravastatin (PRAVACHOL) 20 MG tablet Take 1 tablet (20 mg total) by mouth once daily. 3/15/2017: Take as sched PM 90 tablet 3    ramipril (ALTACE) 5 MG capsule Take 1 capsule (5 mg total) by mouth once daily. 3/15/2017: Hold and bring AM of surgery 30 capsule 5    selenium 50 mcg Tab Take 50 mcg by mouth once daily. 3/15/2017: Continue to hold until after surgery      tamsulosin (FLOMAX) 0.4 mg Cp24 Take 1 capsule (0.4 mg total) by mouth once daily. 3/15/2017: Take as sched PM  90 capsule 4    triamcinolone acetonide 0.1% (KENALOG) 0.1 % cream Use bid prn rash 3/15/2017: Hold AM of surgery 80 g 3    TURMERIC ROOT EXTRACT ORAL Take by mouth. 3/15/2017: Continue to hold until after surgery      VIT A,C & E/B3/B2/LUT/MIN/GLUT (EYE-CANDELARIA EXTRA + LUTEIN ORAL) Take 1 tablet by mouth once daily. 3/15/2017: Continue to hold until after surgery      [DISCONTINUED] NON FORMULARY MEDICATION 1 tablet once daily. tumeric 3/15/2017: Continue to hold until after surgery       No facility-administered encounter medications on file as of 3/15/2017.          Please review the instructions regarding your above listed medications.    Before Surgery  -Stop taking all herbal medications 14 days prior to surgery  -Stop taking asprin, products containing asprin 7 days before surgery  -Stop taking blood thinners   days before surgery  -Refrain from drinking alcoholic beverages for 24 hours before and after surgery  -Stop or limit smoking   days before surgery    Night before  Surgery  -DO NOT EAT OR DRINK ANYTHING AFTER MIDNIGHT, INCLUDING GUM, HARD CANDY, MINTS, OR CHEWING TOBACCO  -Take a shower or bath (shower is recommended). Bathe with Hibiciens soap or an antibacterial soap from the neck down. If not supplied by your surgeon, Hibiciens soap will need to be purchased over the counter in the pharmacy. Rinse soap off thoroughly.  -Shampoo your hair with your regular shampoo    The Day of Surgery  -Take another bath or shower with Hibiciens or any antibacterial soap, to reduce the chance of infection.  -Take heart and blood pressure medications with a small sip of water, as advised by the perioperative team.  -Do not take fluid pills  -You may brush your teeth and rinse your mouth, but do not swallow any additional water.  -Do not apply perfumes, powder, body lotions, or deodorant on the day of surgery.  -Nail polish should be removed  -Do not wear makeup or moisturizer  -Wear comfortable clothes, such as a button front shirt and loose fitting pants.  -Leave all jewelry, including body piercings, and valuables at home.  -Bring any devices you will need after surgery such as crutches or canes.  -If you have sleep apnea, please bring your CPAP machine    In the event of your physical conditions including the onset of a cold or respiratory illness, or if you have to delay or cancel your surgery, please notify your surgeon.     If you have any questions or concerns, please don't hesitate to call.    Sincerely,    Shavon [phone]

## 2017-03-15 NOTE — PROGRESS NOTES
Chief complaint-Preoperative evaluation , Perioperative Medical management, complication reduction plan     Date of Evaluation- 03/15/2017    PCP-  Theron Paiz MD    Future cases for Liang Monterroso Jr. [[id number]]     Case ID Status Date Time Dashawn Procedure Provider Location    [id number] [facility] 3/20/2017  7:00  CRANIOTOMY WITH STEALTH/  Right Temporal Craniotomy Jameson Chen MD [[id number]] [facility] OR 2ND FLR          History of present illness- I had the pleasure of meeting this pleasant 76 y.o. gentleman in the pre op clinic prior to his elective Head and Neck surgery. The patient is new to me . Liang was accompanied by wife Alyssa.    I have obtained the history by speaking to the patient and by reviewing the electronic health records.    Events leading up to surgery / History of presenting illness -      Right temporal lobe tumor.   The patient sustained a concussion on 1/16/17 , 4PM.   He was up on a ladder putting up a surveillance camera when he fell. He was up on the ladder about 12 feet or so and he notes that the ladder rolled to his right that he fell off striking his legs on a 4 foot fence which turned his trajectory causing him to strike the back right occiput of his head against the ground with a forward flexion of his neck. He did lose consciousness for less than a few seconds at that time.   He was evaluated by Dr. Nieto for his neck soreness and Dr. Haider for the concussion.   Incidentally found to have meningioma , there is swelling associated with this mass.     No pain from this .No aggravating factors. No relieving factors     Relevant health conditions of significance for the perioperative period/ History of presenting illness -    Medrol dose pack- Jan 2017 for inflammation   Since then had another dose pack Early March 2015 for bronchitis  Still has dry cough, getting better , does not feel sick  Did not feel like flu. No wheezing    Hypertension ,On medication  Usual BP readings 120-130/70-80    BPH  with urinary obstruction   Occasional slow stream  History of chronic prostatitis -and when has flare up has pain urinating and dribbling urine- currently no problem     CKD (chronic kidney disease) stage 3, GFR 30-59 ml/min   Stages of CKD discussed  Hydration- discussed ( 64 ounces of water discussed)  Rarely uses Aleve , chronic use Avoidance discussed     Decreased ROM of neck   Getting therapy- near normal  Able to extend the neck    GERD- takes antacids most nights  Epigastric retrosternal burning with heavier meals and eating pain  No exertional chest pain, no radiation    Postural dizziness   Change positions gradually     Active cardiac conditions- none    Revised cardiac risk index predictors- None     Functional capacity -Examples of physical activity- used to jog 3 miles every other day until the fall, active, has an elevated house , and takes 2 flights of stairs,     can take 1 flight of stairs----- He can undertake all the above activities without  chest pain,chest tightness, Shortness of breath ,dizziness,lightheadedness making his exercise tolerance more  than 4 Mets.       Review of symptoms    ROS    Constitutional - No significant weight changes ,No fever  Eyes- No new vision changes  ENT- STOP BANG - snoring, witnessed stopping of breathing, elevated blood pressure, age over 50, neck circumference over 40 cm and male sex    BMI 28.59Cardiac-As above   Respiratory- no hemoptysis and  dry cough  GI- Bowel movements  constipation and no recent change in bowel habit  No overt GI/ blood losses   -No dysuria  MS-As above  Neurologic-No unilateral weakness   Psychiatric- No depression,Anxiety  Endocrine-  Prednisone use for over 3 weeks -no  Hematological/Lymphatic-No spontaneous bruising, bleeding    Past Medical history- reviewed in EPIC-    Pertinent negatives-   DVT-  Pulmonary embolism-  Heart disease -  Vascular stenting -    Past Surgical history - reviewed in EPIC-    Most recent surgery -  "cataract-about 5 plus years ago  No Anaesthetic,Bleeding ,Cardiac problems with previous surgeries-  No history of delirium -  No history of post operative  nausea, vomiting -    Family history- reviewed in EPIC    Heart disease- mother- age of onset - 80-90 Y  Thrombosis- None-  Anaesthetic complications- None-  Diabetes Mellitus - mother - Type 2 - 60-70    Social history- reviewed in EPIC    Lives with wife  Help available post op     Medications and Allergies - reviewed in EPIC    Exam    Physical Exam  Constitutional-   /73  Pulse 62  Temp 98 °F (36.7 °C) (Oral)   Ht 5' 8" (1.727 m)  Wt 85.3 kg (188 lb 0.8 oz)  SpO2 96%  BMI 28.59 kg/l3Qiakdav appearance-Conscious,Coherent  Eyes- No conjunctival icterus,pupils  round , reactive to light  and  bilateral intra ocular lenses  ENT-Oral cavity-ear wax and  moist  , Hearing grossly normal   Neck- No thyromegaly ,Trachea -central, No jugular venous distension, No Carotid Bruit ,   Cardiovascular -Heart Sounds- Normal ,  no murmur  and  occasional irregularity suggestive of ectopy   , No gallop rhythm   Respiratory - Normal Respiratory Effort, Normal breath sounds,  Crepitationsleft base chronic  and  no wheeze    Peripheral pitting pedal edema-- none , no calf pain   Gastrointestinal -Soft abdomen, No palpable masses, Non Tender,Liver,Spleen not palpable. No-- free fluid and shifting dullness  Musculoskeletal- No finger Clubbing. Strength grossly normal   Lymphatic-No Palpable cervical, axillary,left Inguinal lymphadenopathy   Psychiatric - normal effect,Orientation  Rt Dorsalis pedis pulses-palpable    Lt Dorsalis pedis pulses- palpable   Rt Posterior tibial pulses -palpable   Left posterior tibial pulses -palpable   Miscellaneous -  no asterixis,  Surgical scarrt lower quadrant  and  no renal bruit   Left inguinal lymph node enlargement - non tender     Investigations    Review of Medicine tests    EKG- I had independently reviewed the EKG from--Today "   No ischemic changes    Review of clinical lab tests-Date--3/15/2017 - Creatinine-1.2  Date--3/15/2017 Hemoglobin--N  Platelet count--N    Carotid 2017  No hemodynamically significant stenosis in bilaterally.     2011     No evidence of stress induced myocardial ischemia      Review of old records- Was done and information gathered regards to events leading to surgery and health conditions of significance in the perioperative period        Assessment and plan    New problems to me      Preoperative  risk assessment    Cardiac    Revised cardiac risk index ( RCRI ) predictors- 0---.Functional capacity  Is more than 4 Mets. He will be undergoing a Head and Neck procedure that carries a intermediate risk     The estimated risk of the rate of adverse cardiac outcomes   0.4%   No further cardiac work up is indicated prior to proceeding with the surgery     American Society of Anesthesiologists Physical status classification ( ASA ) class- -     Postoperative pulmonary complication risk       Perioperative Medical management / Optimization    Right temporal lobe tumor.   No seizures    Hypertension-  Blood pressure is acceptable .  I suggest holding -Ramipril - on the morning of the surgery and can continue that  post operatively under blood pressure, electrolyte and renal function monitoring as long as they are acceptable.I suggest addressing pain control as uncontrolled pain can increased blood pressure     ASA- 81 mg dose- Preventive dose  Last dose 3/14    Hypothyroidism- I suggest continuation of synthroid replacement in the perioperative period      HLD-I  suggest continuation of statin during the entire perioperative period.    Steroid treatment- I suggest monitoring him for any suggestions of adrenal insufficiency in view of the recent steroid use    BPH with urinary obstruction   Occasional slow stream  Risk of post op urinary retention     CKD (chronic kidney disease) stage 3, GFR 30-59 ml/min    I  suggest  monitoring renal function, in put and out put status lisa-operatively. I  suggest avoiding nephrotoxic medication including NSAIDs, COX2 inhibitors, intravenous contrast agent,avoiding hypotension to prevent further renal impairment.     GERD- I suggest aspiration precautions    Possible sleep apnea- I suggest a sleep study and suggest caution with usage of medication that can cause respiratory suppression in the perioperative period  potential ramifications of untreated sleep apnea, which could include daytime sleepiness, hypertension, heart disease and stroke were discussed  avoid supine sleep, weight gain and alcoholic beverages since all of these can worsen JUDITH     Left inguinal lymph node enlargement - non tender   Possibly from chronic prostate problem - chronic   No testicular masses , breast lumps    Preventive perioperative care    Thromboembolic prophylaxis:  His risk factors for thrombosis include surgical procedure and age.I suggest  thromboembolic prophylaxis ( mechanical/pharmacological, weighing the risk benefits of pharmacological agent use considering lisa procedural bleeding )  during the perioperative period.I suggested being active in the post operative period.     Postoperative pulmonary complication prophylaxis-Risk factors for post operative pulmonary complications include possible of sleep apnea and  age over 65  years - I suggest incentive Spirometry use ,  early ambulation  and  end tidal carbon dioxide  Monitoring       Renal complication prophylaxis-Risk factors for renal complications include pre-existing renal disease and Hypertension . I suggest keeping him well hydrated and avoidance/ minimizing the use of  NSAID's,MORRISON 2 Inhibitors ,IV contrast if possible in the perioperative period.I suggested drinking 2 litre's of water a day     Surgical site Infection Prophylaxis-I  suggest appropriate antibiotic for Prophylaxis against Surgical site infections    Delirium prophylaxis-Risk  factors - Advanced age - I suggest avoidance / minimizing the use of  Benzodiazepines ( unless the patient has been taking it on a regular basis ),Anticholinergic medication,Antihistamines ( like  Benadryl).I suggest minimizing the use of opioid medication and use of IV tylenol,if it is appropriate. I suggest using the lowest possible dose of opioids for the shortest duration possible in the perioperative period. I suggest to Keep shades/blinds open during the day, lights off and shades closed at night to encourage normal sleep/wake cycle.I encourage the presence of the family member with the patient at all times, if at all possible as mental status changes can be picked up early by the family members and they help with reorientation. I encouraged the presence of family to help with orientation in the perioperative period. Benadryl avoidance suggested     In view of  BPH the patient  is at risk of postoperative urinary retention.  I suggest avoidance / minimizing the of  Benzodiazepines,Anticholinergic medication,antihistamines ( Benadryl) , if possible in the perioperative period. I suggest using the minimum possible use of opioids for the minimum period of time in the perioperative period. Benadryl avoidance suggested       This visit was focussed on Preoperative evaluation , Perioperative Medical management, complication reduction plans and I suggest that the patient follows up with the primary care ,relevant sub specialists for on going health care      I appreciate the opportunity to be involved in this  pleasant  gentleman's care.Please feel free to contact me if there were any questions about this consultation     Patient is -optimized  for the above     Dr TERE Del Rosario MD MRCP ( ),FACP   Perioperative Medicine  [facility]   Pager [phone]  -----------------------------    3/16- 1809     US- Normal size left groin lymph node demonstrates benign morphology without suspicious  findings.  Called and spoke to patient   ---------------    3/17- 1831    Called to follow up   Spoke to patient

## 2017-03-15 NOTE — MR AVS SNAPSHOT
[facility] - Pre Op Consult  [address]  1st Floor [address]  [facility] [address]  Phone: [phone]                  Liang Monterroso    3/15/2017 11:00 AM   Initial consult    Description:  Male : 1940   Provider:  Kristin Del Rosario MD   Department:  [facility] - Pre Op Consult           Reason for Visit     Pre-op Exam                To Do List           Future Appointments        Provider Department Dept Phone    3/15/2017 12:20 PM LAB, APPOINTMENT [place] [facility] [phone]    3/15/2017  1:30 PM EKG, APPT [facility] [phone]    3/23/2017 11:30 AM Ayanna Nieto MD [facility] [phone]    2017 10:45 AM Jameson Chen MD [facility] Neurosurgery [place] [phone]    4/10/2017 2:20 PM Regulo Haider III, MD [facility] Neurology [phone]      Your Future Surgeries/Procedures     Mar 20, 2017   Surgery with Jameson Chen MD   [facility]    [address] [facility] [address] [address] [phone]              Goals (5 Years of Data)     Increase water intake       [facility]     [facility] Nurse Care Line -  Assistance  Registered nurses in the [facility] provide clinical advisement, health education, appointment booking, and other advisory services.  Call for this free service at [phone].             Medications           Message regarding Medications     Verify the changes and/or additions to your medication regime listed below are the same as discussed with your clinician today.  If any of these changes or additions are incorrect, please notify your healthcare provider.        STOP taking these medications     NON FORMULARY MEDICATION 1 tablet once daily. tumeric           Verify that the below list of medications is an accurate representation of the medications you are currently taking.  If none reported, the list may be blank. If  "incorrect, please contact your healthcare provider. Carry this list with you in case of emergency.           Current Medications     aspirin (ECOTRIN) 81 MG EC tablet Take 81 mg by mouth every evening.     coenzyme Q10 (CO Q-10) 10 mg capsule Take 10 mg by mouth once daily.    econazole nitrate 1 % cream USE TWICE DAILY    ERGOCALCIFEROL, VITAMIN D2, (VITAMIN D2 ORAL) Take 1 tablet by mouth once daily.    famotidine (PEPCID) 20 MG tablet Take 20 mg by mouth 2 (two) times daily as needed.      fexofenadine (ALLEGRA) 180 MG tablet Take 1 tablet (180 mg total) by mouth once daily.    ginseng 100 mg Cap Take by mouth.      levothyroxine (SYNTHROID) 112 MCG tablet Take 1 tablet (112 mcg total) by mouth before breakfast.    metronidazole (METROGEL) 0.75 % gel Apply 1 application topically every evening.    multivitamin with minerals tablet Take 1 tablet by mouth once daily.      PRAMOSONE cream APPLY TOPICALLY TWICE DAILY AS NEEDED FOR ITCHING    pravastatin (PRAVACHOL) 20 MG tablet Take 1 tablet (20 mg total) by mouth once daily.    ramipril (ALTACE) 5 MG capsule Take 1 capsule (5 mg total) by mouth once daily.    selenium 50 mcg Tab Take 50 mcg by mouth once daily.    tamsulosin (FLOMAX) 0.4 mg Cp24 Take 1 capsule (0.4 mg total) by mouth once daily.    triamcinolone acetonide 0.1% (KENALOG) 0.1 % cream Use bid prn rash    TURMERIC ROOT EXTRACT ORAL Take by mouth.    VIT A,C & E/B3/B2/LUT/MIN/GLUT (EYE-CANDELARIA EXTRA + LUTEIN ORAL) Take 1 tablet by mouth once daily.           Clinical Reference Information           Your Vitals Were     BP Pulse Temp Height Weight SpO2    130/73 62 98 °F (36.7 °C) (Oral) 5' 8" (1.727 m) 85.3 kg (188 lb 0.8 oz) 96%    BMI                28.59 kg/m2          Blood Pressure          Most Recent Value    BP  130/73      Allergies as of 3/15/2017     Iodine And Iodide Containing Products      Immunizations Administered on Date of Encounter - 3/15/2017     None      Instructions      Your surgery " has been scheduled for:___Monday 3/20___     You should report to:  _____[facility], located on the [place] side of the first floor of the [facility] ([phone])  ___X___The Second [facility] Surgery Center, located on the [facility] side of the Second floor of the [facility] ([phone])  ______3rd Floor SSCU located on the [facility] side of the [facility] [phone]    Please Note  -Tell your doctors if you take asprin, products containing aspirin, herbal medications or blood thinners, such as Coumadin, Ticlid, or Plavix. (Consult your provider regarding holding or stopping before surgery).  -Arrange for someone to drive you home following surgery. You will not be allowed to leave the surgical facility alone or drive yourself home following sedation and anesthesia.      Outpatient Encounter Prescriptions as of 3/15/2017   Medication Sig Note Dispense Refill    aspirin (ECOTRIN) 81 MG EC tablet Take 81 mg by mouth every evening.  3/15/2017: Continue to hold until after surgery or per Dr Del Rosario      coenzyme Q10 (CO Q-10) 10 mg capsule Take 10 mg by mouth once daily. 3/15/2017: Continue to hold until after surgery      econazole nitrate 1 % cream USE TWICE DAILY (Patient taking differently: USE TWICE DAILY (prn)) 3/15/2017: Hold AM of surgery 60 g 5    ERGOCALCIFEROL, VITAMIN D2, (VITAMIN D2 ORAL) Take 1 tablet by mouth once daily. 3/15/2017: Continue to hold until after surgery      famotidine (PEPCID) 20 MG tablet Take 20 mg by mouth 2 (two) times daily as needed.   3/15/2017: Take as needed      fexofenadine (ALLEGRA) 180 MG tablet Take 1 tablet (180 mg total) by mouth once daily. (Patient taking differently: Take 180 mg by mouth daily as needed. ) 3/15/2017: Take as needed 30 tablet 11    ginseng 100 mg Cap Take by mouth.   3/15/2017: Continue to hold until after surgery      levothyroxine (SYNTHROID) 112 MCG tablet Take 1  tablet (112 mcg total) by mouth before breakfast. 3/15/2017: Take AM of surgery 30 tablet 5    metronidazole (METROGEL) 0.75 % gel Apply 1 application topically every evening. 3/15/2017: Not currently using 45 g 5    multivitamin with minerals tablet Take 1 tablet by mouth once daily.   3/15/2017: Continue to hold until after surgery      PRAMOSONE cream APPLY TOPICALLY TWICE DAILY AS NEEDED FOR ITCHING 3/15/2017: Hold AM of surgery 57 g 5    pravastatin (PRAVACHOL) 20 MG tablet Take 1 tablet (20 mg total) by mouth once daily. 3/15/2017: Take as sched PM 90 tablet 3    ramipril (ALTACE) 5 MG capsule Take 1 capsule (5 mg total) by mouth once daily. 3/15/2017: Hold and bring AM of surgery 30 capsule 5    selenium 50 mcg Tab Take 50 mcg by mouth once daily. 3/15/2017: Continue to hold until after surgery      tamsulosin (FLOMAX) 0.4 mg Cp24 Take 1 capsule (0.4 mg total) by mouth once daily. 3/15/2017: Take as sched PM  90 capsule 4    triamcinolone acetonide 0.1% (KENALOG) 0.1 % cream Use bid prn rash 3/15/2017: Hold AM of surgery 80 g 3    TURMERIC ROOT EXTRACT ORAL Take by mouth. 3/15/2017: Continue to hold until after surgery      VIT A,C & E/B3/B2/LUT/MIN/GLUT (EYE-CANDELARIA EXTRA + LUTEIN ORAL) Take 1 tablet by mouth once daily. 3/15/2017: Continue to hold until after surgery      [DISCONTINUED] NON FORMULARY MEDICATION 1 tablet once daily. tumeric 3/15/2017: Continue to hold until after surgery       No facility-administered encounter medications on file as of 3/15/2017.          Please review the instructions regarding your above listed medications.    Before Surgery  -Stop taking all herbal medications 14 days prior to surgery  -Stop taking asprin, products containing asprin 7 days before surgery  -Stop taking blood thinners   days before surgery  -Refrain from drinking alcoholic beverages for 24 hours before and after surgery  -Stop or limit smoking   days before surgery    Night before Surgery  -DO NOT  EAT OR DRINK ANYTHING AFTER MIDNIGHT, INCLUDING GUM, HARD CANDY, MINTS, OR CHEWING TOBACCO  -Take a shower or bath (shower is recommended). Bathe with Hibiciens soap or an antibacterial soap from the neck down. If not supplied by your surgeon, Hibiciens soap will need to be purchased over the counter in the pharmacy. Rinse soap off thoroughly.  -Shampoo your hair with your regular shampoo    The Day of Surgery  -Take another bath or shower with Hibiciens or any antibacterial soap, to reduce the chance of infection.  -Take heart and blood pressure medications with a small sip of water, as advised by the perioperative team.  -Do not take fluid pills  -You may brush your teeth and rinse your mouth, but do not swallow any additional water.  -Do not apply perfumes, powder, body lotions, or deodorant on the day of surgery.  -Nail polish should be removed  -Do not wear makeup or moisturizer  -Wear comfortable clothes, such as a button front shirt and loose fitting pants.  -Leave all jewelry, including body piercings, and valuables at home.  -Bring any devices you will need after surgery such as crutches or canes.  -If you have sleep apnea, please bring your CPAP machine    In the event of your physical conditions including the onset of a cold or respiratory illness, or if you have to delay or cancel your surgery, please notify your surgeon.     If you have any questions or concerns, please don't hesitate to call.    Sincerely,    Shavon [phone]         Language Assistance Services     ATTENTION: Language assistance services are available, free of charge. Please call [phone].      ATENCIÓN: Si habla español, tiene a jane disposición servicios gratuitos de asistencia lingüística. Llame al [phone].     JOSHUA Ý: N?u b?n nói Ti?ng Vi?t, có các d?ch v? h? tr? ngôn ng? mi?n phí dành cho b?n. G?i s? [phone].         [facility] - Pre Op Consult complies with applicable Federal civil rights laws and does not  discriminate on the basis of race, color, national origin, age, disability, or sex.

## 2017-03-15 NOTE — ANESTHESIA PREPROCEDURE EVALUATION
Anesthesia Assessment: Preoperative EQUATION    Planned Procedure: Procedure(s) (LRB):  CRANIOTOMY WITH STEALTH/  Right Temporal Craniotomy (Right)  Requested Anesthesia Type:General  Surgeon: Jameson Chen MD  Service: Neurosurgery  Known or anticipated Date of Surgery:3/20/2017    Surgeon notes: reviewed    Electronic QUestionnaire Assessment completed via nurse interview with patient.      LocLiang Jr. [[id number]] - 76 y.o. 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 retent. due to nerve/prostate/bladder/pelvic dis.  Yes [chronic prostatitis]       Flags      Red Flag Score:  1                Yellow Flag Score:  4           Red Flags           Pre-admit from 3/20/2017 in [facility]     Has been diagnosed with CKD  Yes       Yellow Flags           [facility]     Has had steroids in the last year  Yes [medrol after fall in January, medrol for URI 12 weeks ago ]     At least 6 beers or 3 gl wine or 3 oz. 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 Age >45     1 Exercise on a routine basis     1 Diagnosed with high blood pressure     1 Has hyperlipoproteinemia       CHADS Score if applicable (history of atrial fib/flutter, Max=6)  Total Score: 1      1 Diagnosed with high blood pressure       Maximal Exercise Capacity           Pre-admit from 3/20/2017 in [facility]     Maximal Exercise Capacity  METS 4       Summary of Dependence  Total Score: 1      1 Is totally independent of others for activities of daily living       Phone Fraility Score (Max = 17)  Total Score: 0        Pain Factors      No data to display       Risk Triggers (Evidence-Based Risk Triggers)        Pulmonary Risk Triggers  Total Score: 1      1 Age > or = 60       Renal Risk Triggers  Total Score: 2      1 Diagnosed with high blood pressure     1 Has been diagnosed with CKD       Delirium Risk Triggers  Total Score: 2      1 Age > or = 75     1 At least 6 beers or 3 gl wine or 3 oz. hard liquor most days       Urologic Risk Triggers  Total Score: 1      1 Has problems emptying bladder/ u. retent. due to nerve/prostate/bladder/pelvic  dis.       Logistics        Pre-op Clinic Logistics  Total Score: 3      1 Has had anesthesia, either as adult or as a child     1 Takes medication for high blood pressure     1 Has been diagnosed with CKD       DOSC Logistics  Total Score: 1      1 Has been diagnosed with CKD       Discharge Logistics  Total Score: 2      2 At least 6 beers or 3 gl wine or 3 oz. hard liquor most days       Discharge Planning           Pre-admit from 3/20/2017 in [facility]     Discharge Planning      Discharge plans  Home with assistance     Will assist patient 24/7, if needed  wife      Who will transport you to therapy, if need  WIFE        Anes & Int Med <For office use only>      Total Score: 15        Surgical Risk Level Assessment         Triage considerations:     The patient has no apparent active cardiac condition (No unstable coronary Syndrome such as severe unstable angina or recent [<1 month] myocardial infarction, decompensated CHF, severe valvular   disease or significant arrhythmia)    Previous anesthesia records:Not available    Last PCP note: within 3 months , within [facility]   Subspecialty notes: n/a    Other important co-morbidities: htn, hld, ckd, hypothyroid, bph      Tests already available:  Results have been reviewed. labs 9/27/16 CMP, CBC, TSH             Instructions given. (See in Nurse's note)    Optimization:  Anesthesia Preop Clinic Assessment  Indicated    Medical Opinion Indicated       Sub-specialist consult indicated:   TBD         Plan:    Testing:  Hematology Profile, BMP, PT/INR, PTT, T&S and EKG   Pre-anesthesia  visit       Visit focus: concerns in complex and/or prolonged anesthesia, airway concerns     Consultation:IM Perioperative Hospitalist     Patient  has previously scheduled Medical Appointment:    Navigation: Tests Scheduled.              Consults scheduled.             Results will be tracked by Preop Clinic.                                                                                                                                 03/15/2017  Pre-operative evaluation for Procedure(s) (LRB):  CRANIOTOMY WITH STEALTH/  Right Temporal Craniotomy (Right)    Liang Monterroso Jr. is a 76 y.o. male with PMH of HTN, HLD, Hypothyroidism, CKD III, and recent diagnosis of Right Temporal Lobe Tumor who is scheduled for above procedure. Patient presents to preop clinic for evaluation prior to surgery. Patient denies any cardiopulmonary disease and is having no active chest pain or SOB.    Prev airway: None on file    Past Medical History:   Diagnosis Date    Allergy      CKD (chronic kidney disease) stage 3, GFR 30-59 ml/min 6/15/2016    Hyperlipidemia      Hypertension      Hypothyroidism      Thyroid disease      Urinary tract infection          Review of patient's allergies indicates:   Allergen Reactions    Iodine and iodide containing products Hives        Current Outpatient Prescriptions on File Prior to Encounter   Medication Sig Dispense Refill    aspirin (ECOTRIN) 81 MG EC tablet Take 81 mg by mouth once daily.      coenzyme Q10 (CO Q-10) 10 mg capsule Take 10 mg by mouth once daily.      econazole nitrate 1 % cream USE TWICE DAILY 60 g 5    ERGOCALCIFEROL, VITAMIN D2, (VITAMIN D2 ORAL) Take 1 tablet by mouth once daily.      famotidine (PEPCID) 20 MG tablet Take 20 mg by mouth 2 (two) times daily as needed.        fexofenadine (ALLEGRA) 180 MG tablet Take 1 tablet (180 mg total) by mouth once daily. 30 tablet 11    ginseng 100 mg Cap Take by mouth.        levothyroxine (SYNTHROID) 112 MCG tablet Take 1 tablet (112 mcg total) by mouth before breakfast. 30 tablet 5    metronidazole (METROGEL) 0.75 % gel Apply 1 application topically every evening. 45 g 5    multivitamin with minerals tablet Take 1 tablet by mouth once daily.        NON FORMULARY MEDICATION 1 tablet once daily. tumeric      PRAMOSONE cream APPLY TOPICALLY TWICE DAILY AS NEEDED FOR ITCHING 57 g 5     pravastatin (PRAVACHOL) 20 MG tablet Take 1 tablet (20 mg total) by mouth once daily. 90 tablet 3    ramipril (ALTACE) 5 MG capsule Take 1 capsule (5 mg total) by mouth once daily. 30 capsule 5    selenium 50 mcg Tab Take 50 mcg by mouth once daily.      tamsulosin (FLOMAX) 0.4 mg Cp24 Take 1 capsule (0.4 mg total) by mouth once daily. 90 capsule 4    triamcinolone acetonide 0.1% (KENALOG) 0.1 % cream Use bid prn rash 80 g 3    TURMERIC ROOT EXTRACT ORAL Take by mouth.      VIT A,C & E/B3/B2/LUT/MIN/GLUT (EYE-CANDELARIA EXTRA + LUTEIN ORAL) Take 1 tablet by mouth once daily.       No current facility-administered medications on file prior to encounter.        Past Surgical History:   Procedure Laterality Date    APPENDECTOMY      CATARACT EXTRACTION      EYE SURGERY      HERNIA REPAIR      TONSILLECTOMY         Social History     Social History    Marital status:      Spouse name: parul    Number of children: 4    Years of education: N/A     Occupational History    retired      Social History Main Topics    Smoking status: Never Smoker    Smokeless tobacco: Never Used    Alcohol use Yes      Comment: 1-3 glasses wine weekly, 2-3 beers weekly    Drug use: No    Sexual activity: Yes     Partners: Female     Other Topics Concern    Not on file     Social History Narrative         Vital Signs Range (Last 24H):         CBC: No results for input(s): WBC, RBC, HGB, HCT, PLT, MCV, MCH, MCHC in the last 72 hours.    CMP: No results for input(s): NA, K, CL, CO2, BUN, CREATININE, GLU, MG, PHOS, CALCIUM, ALBUMIN, PROT, ALKPHOS, ALT, AST, BILITOT in the last 72 hours.    INR  No results for input(s): INR, PROTIME, APTT in the last 72 hours.    Invalid input(s): PT        Diagnostic Studies:  MRI Brain 2/18/17:  2.7 cm homogeneously enhancing extra-axial mass subjacent to the right temporal occipital junction with mild localized mass effect and parenchymal edema. Lesion is not entirely specific, but  imaging characteristics are highly suggestive of a meningioma.    EKG: Pending      2D Echo: None          [facility] Anesthesia Evaluation    I have reviewed the Patient Summary Reports.     I have reviewed the Medications.     Review of Systems  Anesthesia Hx:  No problems with previous Anesthesia  History of prior surgery of interest to airway management or planning: Denies Family Hx of Anesthesia complications.   Denies Personal Hx of Anesthesia complications.   Social:  Non-Smoker    Hematology/Oncology:  Hematology Normal      Current/Recent Cancer.   EENT/Dental:EENT/Dental Normal   Cardiovascular:   Hypertension    Pulmonary:  Pulmonary Normal    Renal/:   Chronic Renal Disease    Hepatic/GI:   GERD    Musculoskeletal:   Arthritis     Neurological:  Neurology Normal    Endocrine:   Hypothyroidism    Psych:  Psychiatric Normal           Physical Exam  General:  Well nourished    Airway/Jaw/Neck:  Airway Findings: Mouth Opening: Normal Tongue: Normal  General Airway Assessment: Adult  Oropharynx Findings: Normal Mallampati: III        Eyes/Ears/Nose:  EYES/EARS/NOSE FINDINGS: Normal   Dental:  Dental Findings: In tact   Chest/Lungs:  Chest/Lungs Clear    Heart/Vascular:  Heart Findings: Normal Heart murmur: negative       Mental Status:  Mental Status Findings: Normal        Anesthesia Plan  Type of Anesthesia, risks & benefits discussed:  Anesthesia Type:  general  Patient's Preference:   Intra-op Monitoring Plan: arterial line  Intra-op Monitoring Plan Comments:   Post Op Pain Control Plan:   Post Op Pain Control Plan Comments:   Induction:   IV  Beta Blocker:  Patient is not currently on a Beta-Blocker (No further documentation required).       Informed Consent: Patient understands risks and agrees with Anesthesia plan.  Questions answered. Anesthesia consent signed with patient.  ASA Score: 2     Day of Surgery Review of History & Physical:    H&P update referred to the surgeon.         Ready For Surgery From  Anesthesia Perspective.

## 2017-03-15 NOTE — LETTER
March 15, 2017      Jameson Chen MD  [address]  [place] [address]           [facility]  [address] [facility] [address]  1st Floor [address]  [facility] [address]  Phone: [phone]          Patient: Liang Monterroso Jr.   MR Number: [id number]   YOB: 1940   Date of Visit: 3/15/2017       Dear Dr. Jameson Chen:    Thank you for referring Liang Monterroso to me for evaluation. Attached you will find relevant portions of my assessment and plan of care.    If you have questions, please do not hesitate to call me. I look forward to following Liang Monterroso along with you.    Sincerely,    Kristin Del Rosario MD    Enclosure  CC:  MD Regulo Burnette III, MD Christine M. Keating, MD Lester J. Prats Jr., MD    If you would like to receive this communication electronically, please contact [email or web address] or [phone] to request more information on Private Outlet Link access.    For providers and/or their staff who would like to refer a patient to [facility], please contact us through our one-stop-shop provider referral line, [facility], at [phone].    If you feel you have received this communication in error or would no longer like to receive these types of communications, please e-mail [email or web address]

## 2017-03-16 ENCOUNTER — HOSPITAL ENCOUNTER (OUTPATIENT)
Dept: RADIOLOGY | Facility: HOSPITAL | Age: 77
Discharge: HOME | End: 2017-03-16
Attending: HOSPITALIST
Payer: MEDICARE

## 2017-03-16 DIAGNOSIS — D36.0: ICD-10-CM

## 2017-03-16 DIAGNOSIS — R59.0 INGUINAL LYMPHADENOPATHY: ICD-10-CM

## 2017-03-16 PROCEDURE — 76882 US LMTD JT/FCL EVL NVASC XTR: CPT | Mod: TC

## 2017-03-16 PROCEDURE — 76882 US LMTD JT/FCL EVL NVASC XTR: CPT | Mod: 26,GC,, | Performed by: RADIOLOGY

## 2017-03-17 ENCOUNTER — TELEPHONE (OUTPATIENT)
Dept: NEUROSURGERY | Facility: CLINIC | Age: 77
End: 2017-03-17

## 2017-03-17 DIAGNOSIS — G93.89 BRAIN MASS: Primary | ICD-10-CM

## 2017-03-19 ENCOUNTER — HOSPITAL ENCOUNTER (OUTPATIENT)
Dept: RADIOLOGY | Facility: HOSPITAL | Age: 77
Discharge: HOME | DRG: 025 | End: 2017-03-19
Attending: NEUROLOGICAL SURGERY
Payer: MEDICARE

## 2017-03-19 DIAGNOSIS — G93.89 BRAIN MASS: ICD-10-CM

## 2017-03-19 PROCEDURE — 70552 MRI BRAIN STEM W/DYE: CPT | Mod: 26,,, | Performed by: RADIOLOGY

## 2017-03-19 RX ORDER — GADOBUTROL 604.72 MG/ML
9 INJECTION INTRAVENOUS
Status: COMPLETED | OUTPATIENT
Start: 2017-03-19 | End: 2017-03-19

## 2017-03-19 RX ADMIN — GADOBUTROL 9 ML: 604.72 INJECTION INTRAVENOUS at 09:03

## 2017-03-20 ENCOUNTER — HOSPITAL ENCOUNTER (INPATIENT)
Facility: HOSPITAL | Age: 77
LOS: 1 days | Discharge: HOME | DRG: 025 | End: 2017-03-21
Attending: NEUROLOGICAL SURGERY | Admitting: NEUROLOGICAL SURGERY
Payer: MEDICARE

## 2017-03-20 ENCOUNTER — ANESTHESIA (OUTPATIENT)
Dept: SURGERY | Facility: HOSPITAL | Age: 77
DRG: 025 | End: 2017-03-20
Payer: MEDICARE

## 2017-03-20 DIAGNOSIS — R29.898 DECREASED ROM OF NECK: ICD-10-CM

## 2017-03-20 DIAGNOSIS — Z12.11 ENCOUNTER FOR SCREENING COLONOSCOPY: ICD-10-CM

## 2017-03-20 DIAGNOSIS — M72.0 DUPUYTREN CONTRACTURE: ICD-10-CM

## 2017-03-20 DIAGNOSIS — Z74.09 IMPAIRED FUNCTIONAL MOBILITY, BALANCE, GAIT, AND ENDURANCE: ICD-10-CM

## 2017-03-20 DIAGNOSIS — I70.0 AORTIC ATHEROSCLEROSIS: ICD-10-CM

## 2017-03-20 DIAGNOSIS — N40.1 BPH WITH URINARY OBSTRUCTION: Primary | ICD-10-CM

## 2017-03-20 DIAGNOSIS — N18.30 CKD (CHRONIC KIDNEY DISEASE) STAGE 3, GFR 30-59 ML/MIN: ICD-10-CM

## 2017-03-20 DIAGNOSIS — N13.8 BPH WITH URINARY OBSTRUCTION: Primary | ICD-10-CM

## 2017-03-20 DIAGNOSIS — D32.9 MENINGIOMA: ICD-10-CM

## 2017-03-20 DIAGNOSIS — R59.0 INGUINAL LYMPHADENOPATHY: ICD-10-CM

## 2017-03-20 PROCEDURE — D9220A PRA ANESTHESIA: Mod: ,,, | Performed by: ANESTHESIOLOGY

## 2017-03-20 PROCEDURE — 63600175 PHARM REV CODE 636 W HCPCS: Performed by: ANESTHESIOLOGY

## 2017-03-20 PROCEDURE — 63600175 PHARM REV CODE 636 W HCPCS: Performed by: NEUROLOGICAL SURGERY

## 2017-03-20 PROCEDURE — 71000039 HC RECOVERY, EACH ADD'L HOUR: Performed by: NEUROLOGICAL SURGERY

## 2017-03-20 PROCEDURE — 88307 TISSUE EXAM BY PATHOLOGIST: CPT | Performed by: PATHOLOGY

## 2017-03-20 PROCEDURE — 25000003 PHARM REV CODE 250: Performed by: STUDENT IN AN ORGANIZED HEALTH CARE EDUCATION/TRAINING PROGRAM

## 2017-03-20 PROCEDURE — 64450 NJX AA&/STRD OTHER PN/BRANCH: CPT | Mod: 50,59,, | Performed by: ANESTHESIOLOGY

## 2017-03-20 PROCEDURE — 86920 COMPATIBILITY TEST SPIN: CPT

## 2017-03-20 PROCEDURE — 64405 NJX AA&/STRD GR OCPL NRV: CPT | Mod: 50,59,, | Performed by: ANESTHESIOLOGY

## 2017-03-20 PROCEDURE — 25000003 PHARM REV CODE 250: Performed by: NEUROLOGICAL SURGERY

## 2017-03-20 PROCEDURE — 69990 MICROSURGERY ADD-ON: CPT | Mod: 59,,, | Performed by: NEUROLOGICAL SURGERY

## 2017-03-20 PROCEDURE — 27201423 OPTIME MED/SURG SUP & DEVICES STERILE SUPPLY: Performed by: NEUROLOGICAL SURGERY

## 2017-03-20 PROCEDURE — 37000008 HC ANESTHESIA 1ST 15 MINUTES: Performed by: NEUROLOGICAL SURGERY

## 2017-03-20 PROCEDURE — 36620 INSERTION CATHETER ARTERY: CPT | Mod: 59,,, | Performed by: ANESTHESIOLOGY

## 2017-03-20 PROCEDURE — 27201037 HC PRESSURE MONITORING SET UP

## 2017-03-20 PROCEDURE — 88307 TISSUE EXAM BY PATHOLOGIST: CPT | Mod: 26,,, | Performed by: PATHOLOGY

## 2017-03-20 PROCEDURE — 37000009 HC ANESTHESIA EA ADD 15 MINS: Performed by: NEUROLOGICAL SURGERY

## 2017-03-20 PROCEDURE — 25000003 PHARM REV CODE 250: Performed by: ANESTHESIOLOGY

## 2017-03-20 PROCEDURE — C1713 ANCHOR/SCREW BN/BN,TIS/BN: HCPCS | Performed by: NEUROLOGICAL SURGERY

## 2017-03-20 PROCEDURE — 00B10ZZ EXCISION OF CEREBRAL MENINGES, OPEN APPROACH: ICD-10-PCS | Performed by: NEUROLOGICAL SURGERY

## 2017-03-20 PROCEDURE — 64400 NJX AA&/STRD TRIGEMINAL NRV: CPT | Mod: 50,59,, | Performed by: ANESTHESIOLOGY

## 2017-03-20 PROCEDURE — 61781 SCAN PROC CRANIAL INTRA: CPT | Mod: ,,, | Performed by: NEUROLOGICAL SURGERY

## 2017-03-20 PROCEDURE — 36000713 HC OR TIME LEV V EA ADD 15 MIN: Performed by: NEUROLOGICAL SURGERY

## 2017-03-20 PROCEDURE — 36000712 HC OR TIME LEV V 1ST 15 MIN: Performed by: NEUROLOGICAL SURGERY

## 2017-03-20 PROCEDURE — 61512 CRNEC TREPH EXC MNGIOMA STTL: CPT | Mod: ,,, | Performed by: NEUROLOGICAL SURGERY

## 2017-03-20 PROCEDURE — 71000033 HC RECOVERY, INTIAL HOUR: Performed by: NEUROLOGICAL SURGERY

## 2017-03-20 PROCEDURE — 20600001 HC STEP DOWN PRIVATE ROOM

## 2017-03-20 DEVICE — COVER UN3 BURRHOLE 14MM TAB: Type: IMPLANTABLE DEVICE | Site: CEREBRUM | Status: FUNCTIONAL

## 2017-03-20 DEVICE — SCREW SD 1.5X4MM TI CROSS PIN: Type: IMPLANTABLE DEVICE | Site: CEREBRUM | Status: FUNCTIONAL

## 2017-03-20 RX ORDER — SODIUM CHLORIDE 9 MG/ML
INJECTION, SOLUTION INTRAVENOUS
Status: DISCONTINUED | OUTPATIENT
Start: 2017-03-20 | End: 2017-03-20 | Stop reason: HOSPADM

## 2017-03-20 RX ORDER — SODIUM CHLORIDE 9 MG/ML
INJECTION, SOLUTION INTRAVENOUS CONTINUOUS
Status: DISCONTINUED | OUTPATIENT
Start: 2017-03-20 | End: 2017-03-21

## 2017-03-20 RX ORDER — BUPIVACAINE HCL/EPINEPHRINE 0.5-1:200K
VIAL (ML) INJECTION
Status: DISCONTINUED | OUTPATIENT
Start: 2017-03-20 | End: 2017-03-20 | Stop reason: HOSPADM

## 2017-03-20 RX ORDER — DEXAMETHASONE SODIUM PHOSPHATE 10 MG/ML
10 INJECTION INTRAMUSCULAR; INTRAVENOUS
Status: COMPLETED | OUTPATIENT
Start: 2017-03-20 | End: 2017-03-20

## 2017-03-20 RX ORDER — LIDOCAINE HYDROCHLORIDE 10 MG/ML
1 INJECTION, SOLUTION EPIDURAL; INFILTRATION; INTRACAUDAL; PERINEURAL
Status: DISCONTINUED | OUTPATIENT
Start: 2017-03-20 | End: 2017-03-20 | Stop reason: HOSPADM

## 2017-03-20 RX ORDER — HYDROMORPHONE HYDROCHLORIDE 1 MG/ML
0.2 INJECTION, SOLUTION INTRAMUSCULAR; INTRAVENOUS; SUBCUTANEOUS EVERY 5 MIN PRN
Status: DISCONTINUED | OUTPATIENT
Start: 2017-03-20 | End: 2017-03-20 | Stop reason: HOSPADM

## 2017-03-20 RX ORDER — PHENYLEPHRINE HYDROCHLORIDE 10 MG/ML
INJECTION INTRAVENOUS
Status: DISCONTINUED | OUTPATIENT
Start: 2017-03-20 | End: 2017-03-20

## 2017-03-20 RX ORDER — MIDAZOLAM HYDROCHLORIDE 1 MG/ML
INJECTION INTRAMUSCULAR; INTRAVENOUS
Status: DISCONTINUED | OUTPATIENT
Start: 2017-03-20 | End: 2017-03-20

## 2017-03-20 RX ORDER — ONDANSETRON 2 MG/ML
4 INJECTION INTRAMUSCULAR; INTRAVENOUS ONCE AS NEEDED
Status: COMPLETED | OUTPATIENT
Start: 2017-03-20 | End: 2017-03-20

## 2017-03-20 RX ORDER — ACETAMINOPHEN 10 MG/ML
1000 INJECTION, SOLUTION INTRAVENOUS ONCE
Status: COMPLETED | OUTPATIENT
Start: 2017-03-20 | End: 2017-03-20

## 2017-03-20 RX ORDER — LIDOCAINE HYDROCHLORIDE AND EPINEPHRINE 10; 10 MG/ML; UG/ML
INJECTION, SOLUTION INFILTRATION; PERINEURAL
Status: DISCONTINUED | OUTPATIENT
Start: 2017-03-20 | End: 2017-03-20 | Stop reason: HOSPADM

## 2017-03-20 RX ORDER — LEVETIRACETAM 10 MG/ML
1000 INJECTION INTRAVASCULAR
Status: COMPLETED | OUTPATIENT
Start: 2017-03-20 | End: 2017-03-20

## 2017-03-20 RX ORDER — ROCURONIUM BROMIDE 10 MG/ML
INJECTION, SOLUTION INTRAVENOUS
Status: DISCONTINUED | OUTPATIENT
Start: 2017-03-20 | End: 2017-03-20

## 2017-03-20 RX ORDER — BACITRACIN ZINC 500 UNIT/G
OINTMENT (GRAM) TOPICAL
Status: DISCONTINUED | OUTPATIENT
Start: 2017-03-20 | End: 2017-03-20 | Stop reason: HOSPADM

## 2017-03-20 RX ORDER — ONDANSETRON 4 MG/1
4 TABLET, ORALLY DISINTEGRATING ORAL EVERY 6 HOURS PRN
Status: DISCONTINUED | OUTPATIENT
Start: 2017-03-20 | End: 2017-03-21 | Stop reason: HOSPADM

## 2017-03-20 RX ORDER — ACETAMINOPHEN 500 MG
1000 TABLET ORAL EVERY 8 HOURS
Status: DISPENSED | OUTPATIENT
Start: 2017-03-20 | End: 2017-03-21

## 2017-03-20 RX ORDER — TAMSULOSIN HYDROCHLORIDE 0.4 MG/1
0.4 CAPSULE ORAL DAILY
Status: DISCONTINUED | OUTPATIENT
Start: 2017-03-20 | End: 2017-03-21 | Stop reason: HOSPADM

## 2017-03-20 RX ORDER — BACITRACIN 50000 [IU]/1
INJECTION, POWDER, FOR SOLUTION INTRAMUSCULAR
Status: DISCONTINUED | OUTPATIENT
Start: 2017-03-20 | End: 2017-03-20 | Stop reason: HOSPADM

## 2017-03-20 RX ORDER — FENTANYL CITRATE 50 UG/ML
INJECTION, SOLUTION INTRAMUSCULAR; INTRAVENOUS
Status: DISCONTINUED | OUTPATIENT
Start: 2017-03-20 | End: 2017-03-20

## 2017-03-20 RX ORDER — PROPOFOL 10 MG/ML
VIAL (ML) INTRAVENOUS
Status: DISCONTINUED | OUTPATIENT
Start: 2017-03-20 | End: 2017-03-20

## 2017-03-20 RX ORDER — PRAVASTATIN SODIUM 20 MG/1
20 TABLET ORAL DAILY
Status: DISCONTINUED | OUTPATIENT
Start: 2017-03-20 | End: 2017-03-21 | Stop reason: HOSPADM

## 2017-03-20 RX ADMIN — EPHEDRINE SULFATE 5 MG: 50 INJECTION, SOLUTION INTRAMUSCULAR; INTRAVENOUS; SUBCUTANEOUS at 09:03

## 2017-03-20 RX ADMIN — PROPOFOL 180 MG: 10 INJECTION, EMULSION INTRAVENOUS at 07:03

## 2017-03-20 RX ADMIN — LEVETIRACETAM 1000 MG: 10 INJECTION INTRAVENOUS at 07:03

## 2017-03-20 RX ADMIN — EPHEDRINE SULFATE 5 MG: 50 INJECTION, SOLUTION INTRAMUSCULAR; INTRAVENOUS; SUBCUTANEOUS at 08:03

## 2017-03-20 RX ADMIN — MIDAZOLAM HYDROCHLORIDE 1 MG: 1 INJECTION, SOLUTION INTRAMUSCULAR; INTRAVENOUS at 07:03

## 2017-03-20 RX ADMIN — GLYCOPYRROLATE 0.6 MG: 0.2 INJECTION, SOLUTION INTRAMUSCULAR; INTRAVENOUS at 10:03

## 2017-03-20 RX ADMIN — CEFTRIAXONE 2 G: 2 INJECTION, SOLUTION INTRAVENOUS at 07:03

## 2017-03-20 RX ADMIN — PRAVASTATIN SODIUM 20 MG: 20 TABLET ORAL at 09:03

## 2017-03-20 RX ADMIN — EPHEDRINE SULFATE 10 MG: 50 INJECTION, SOLUTION INTRAMUSCULAR; INTRAVENOUS; SUBCUTANEOUS at 07:03

## 2017-03-20 RX ADMIN — DEXAMETHASONE SODIUM PHOSPHATE 12 MG: 10 INJECTION, SOLUTION INTRAMUSCULAR; INTRAVENOUS at 07:03

## 2017-03-20 RX ADMIN — NEOSTIGMINE METHYLSULFATE 5 MG: 1 INJECTION INTRAVENOUS at 10:03

## 2017-03-20 RX ADMIN — ACETAMINOPHEN 1000 MG: 10 INJECTION, SOLUTION INTRAVENOUS at 12:03

## 2017-03-20 RX ADMIN — PHENYLEPHRINE HYDROCHLORIDE 200 MCG: 10 INJECTION INTRAVENOUS at 08:03

## 2017-03-20 RX ADMIN — ONDANSETRON 4 MG: 2 INJECTION INTRAMUSCULAR; INTRAVENOUS at 12:03

## 2017-03-20 RX ADMIN — PHENYLEPHRINE HYDROCHLORIDE 200 MCG: 10 INJECTION INTRAVENOUS at 07:03

## 2017-03-20 RX ADMIN — SODIUM CHLORIDE: 0.9 INJECTION, SOLUTION INTRAVENOUS at 07:03

## 2017-03-20 RX ADMIN — SODIUM CHLORIDE, SODIUM GLUCONATE, SODIUM ACETATE, POTASSIUM CHLORIDE, MAGNESIUM CHLORIDE, SODIUM PHOSPHATE, DIBASIC, AND POTASSIUM PHOSPHATE: .53; .5; .37; .037; .03; .012; .00082 INJECTION, SOLUTION INTRAVENOUS at 07:03

## 2017-03-20 RX ADMIN — FENTANYL CITRATE 150 MCG: 50 INJECTION, SOLUTION INTRAMUSCULAR; INTRAVENOUS at 07:03

## 2017-03-20 RX ADMIN — SODIUM CHLORIDE: 0.9 INJECTION, SOLUTION INTRAVENOUS at 11:03

## 2017-03-20 RX ADMIN — ONDANSETRON 4 MG: 2 INJECTION, SOLUTION INTRAMUSCULAR; INTRAVENOUS at 10:03

## 2017-03-20 RX ADMIN — ACETAMINOPHEN 1000 MG: 500 TABLET ORAL at 09:03

## 2017-03-20 RX ADMIN — ROCURONIUM BROMIDE 20 MG: 10 INJECTION, SOLUTION INTRAVENOUS at 08:03

## 2017-03-20 RX ADMIN — ROCURONIUM BROMIDE 50 MG: 10 INJECTION, SOLUTION INTRAVENOUS at 07:03

## 2017-03-20 RX ADMIN — CEFTRIAXONE 2 G: 2 INJECTION, SOLUTION INTRAVENOUS at 06:03

## 2017-03-20 RX ADMIN — FENTANYL CITRATE 100 MCG: 50 INJECTION, SOLUTION INTRAMUSCULAR; INTRAVENOUS at 07:03

## 2017-03-20 RX ADMIN — TAMSULOSIN HYDROCHLORIDE 0.4 MG: 0.4 CAPSULE ORAL at 09:03

## 2017-03-20 RX ADMIN — PROPOFOL 20 MG: 10 INJECTION, EMULSION INTRAVENOUS at 07:03

## 2017-03-20 RX ADMIN — PHENYLEPHRINE HYDROCHLORIDE 100 MCG: 10 INJECTION INTRAVENOUS at 07:03

## 2017-03-20 RX ADMIN — PHENYLEPHRINE HYDROCHLORIDE 100 MCG: 10 INJECTION INTRAVENOUS at 09:03

## 2017-03-20 RX ADMIN — EPHEDRINE SULFATE 10 MG: 50 INJECTION, SOLUTION INTRAMUSCULAR; INTRAVENOUS; SUBCUTANEOUS at 08:03

## 2017-03-20 NOTE — IP AVS SNAPSHOT
[facility]  [address]  [facility] [address]  Phone: [phone]           Patient Discharge Instructions     Our goal is to set you up for success. This packet includes information on your condition, medications, and your home care. It will help you to care for yourself so you don't get sicker and need to go back to the hospital.     Please ask your nurse if you have any questions.        There are many details to remember when preparing to leave the hospital. Here is what you will need to do:    1. Take your medicine. If you are prescribed medications, review your Medication List in the following pages. You may have new medications to  at the pharmacy and others that you'll need to stop taking. Review the instructions for how and when to take your medications. Talk with your doctor or nurses if you are unsure of what to do.     2. Go to your follow-up appointments. Specific follow-up information is listed in the following pages. Your may be contacted by a transition nurse or clinical provider about future appointments. Be sure we have all of the phone numbers to reach you, if needed. Please contact your provider's office if you are unable to make an appointment.     3. Watch for warning signs. Your doctor or nurse will give you detailed warning signs to watch for and when to call for assistance. These instructions may also include educational information about your condition. If you experience any of warning signs to your health, call your doctor.               [facility]  Unless otherwise directed by your provider, please contact [facility], our nurse care line that is available for 24/7 assistance.     [phone] (toll-free)    Registered nurses in the [facility] provide clinical advisement, health education, appointment booking, and other advisory services.                    ** Verify the list of medication(s) below is accurate and up  to date. Carry this with you in case of emergency. If your medications have changed, please notify your healthcare provider.             Medication List      START taking these medications        Additional Info                      dexamethasone 2 MG tablet   Commonly known as:  DECADRON   Quantity:  75 tablet   Refills:  0    Instructions:  Take 4mg q 6h for 3 days, then 4mg q 8h for 3 days, then 4mg q 12h for 3 days, then 2mg q 8h for 3 days, then 2mg q 12h for 3 days, then 2mg daily for 6 days, then OFF.     Begin Date    AM    Noon    PM    Bedtime       hydrocodone-acetaminophen 7.5-325mg 7.5-325 mg per tablet   Commonly known as:  NORCO   Quantity:  60 tablet   Refills:  0   Dose:  1 tablet    Instructions:  Take 1 tablet by mouth every 6 (six) hours as needed for Pain.     Begin Date    AM    Noon    PM    Bedtime         CHANGE how you take these medications        Additional Info                      econazole nitrate 1 % cream   Quantity:  60 g   Refills:  5   What changed:  See the new instructions.    Instructions:  USE TWICE DAILY     Begin Date    AM    Noon    PM    Bedtime       fexofenadine 180 MG tablet   Commonly known as:  ALLEGRA   Quantity:  30 tablet   Refills:  11   Dose:  180 mg   What changed:    - when to take this  - reasons to take this    Instructions:  Take 1 tablet (180 mg total) by mouth once daily.     Begin Date    AM    Noon    PM    Bedtime         CONTINUE taking these medications        Additional Info                      ANTACID ORAL   Refills:  0    Instructions:  Take by mouth as needed (acid reflux).     Begin Date    AM    Noon    PM    Bedtime       famotidine 20 MG tablet   Commonly known as:  PEPCID   Refills:  0   Dose:  20 mg    Instructions:  Take 20 mg by mouth 2 (two) times daily as needed.     Begin Date    AM    Noon    PM    Bedtime       levothyroxine 112 MCG tablet   Commonly known as:  SYNTHROID   Quantity:  30 tablet   Refills:  5   Dose:  112 mcg    Last  time this was given:  112 mcg on 3/21/2017  5:42 AM   Instructions:  Take 1 tablet (112 mcg total) by mouth before breakfast.     Begin Date    AM    Noon    PM    Bedtime       metronidazole 0.75 % gel   Commonly known as:  METROGEL   Quantity:  45 g   Refills:  5   Dose:  1 application    Instructions:  Apply 1 application topically every evening.     Begin Date    AM    Noon    PM    Bedtime       multivitamin with minerals tablet   Refills:  0   Dose:  1 tablet    Instructions:  Take 1 tablet by mouth once daily.     Begin Date    AM    Noon    PM    Bedtime       PRAMOSONE cream   Quantity:  57 g   Refills:  5   Generic drug:  pramoxine-hydrocortisone    Instructions:  APPLY TOPICALLY TWICE DAILY AS NEEDED FOR ITCHING     Begin Date    AM    Noon    PM    Bedtime       pravastatin 20 MG tablet   Commonly known as:  PRAVACHOL   Quantity:  90 tablet   Refills:  3   Dose:  20 mg    Last time this was given:  20 mg on 3/21/2017  9:41 AM   Instructions:  Take 1 tablet (20 mg total) by mouth once daily.     Begin Date    AM    Noon    PM    Bedtime       ramipril 5 MG capsule   Commonly known as:  ALTACE   Quantity:  30 capsule   Refills:  5   Dose:  5 mg    Last time this was given:  5 mg on 3/21/2017  9:40 AM   Instructions:  Take 1 capsule (5 mg total) by mouth once daily.     Begin Date    AM    Noon    PM    Bedtime       tamsulosin 0.4 mg Cp24   Commonly known as:  FLOMAX   Quantity:  90 capsule   Refills:  4   Dose:  1 capsule    Last time this was given:  0.4 mg on 3/21/2017  9:40 AM   Instructions:  Take 1 capsule (0.4 mg total) by mouth once daily.     Begin Date    AM    Noon    PM    Bedtime       triamcinolone acetonide 0.1% 0.1 % cream   Commonly known as:  KENALOG   Quantity:  80 g   Refills:  3    Instructions:  Use bid prn rash     Begin Date    AM    Noon    PM    Bedtime         STOP taking these medications     aspirin 81 MG EC tablet   Commonly known as:  ECOTRIN       CO Q-10 10 mg capsule  "  Generic drug:  coenzyme Q10       EYE-CANDELARIA EXTRA + LUTEIN ORAL       ginseng 100 mg Cap       METAMUCIL ORAL       selenium 50 mcg Tab       TURMERIC ROOT EXTRACT ORAL       VITAMIN D2 ORAL            Where to Get Your Medications      You can get these medications from any pharmacy     Bring a paper prescription for each of these medications     dexamethasone 2 MG tablet    hydrocodone-acetaminophen 7.5-325mg 7.5-325 mg per tablet                  Please bring to all follow up appointments:    1. A copy of your discharge instructions.  2. All medicines you are currently taking in their original bottles.  3. Identification and insurance card.    Please arrive 15 minutes ahead of scheduled appointment time.    Please call 24 hours in advance if you must reschedule your appointment and/or time.        Your Scheduled Appointments     Apr 04, 2017 10:45 AM CDT   Post OP with Jameson Chen MD   [facility] - Neurosurgery [place] ([facility] )    [address]  [place] [address]   [phone]            Apr 10, 2017  2:20 PM CDT   Neurology - Established Patient with Regulo Haider III, MD   [facility] - Neurology ([address] [address]   [phone]            Apr 11, 2017  1:00 PM CDT   Follow Up-Concussion with Regulo Haider III, MD   [facility] Neurology ([address] [address]   [phone]            May 09, 2017  9:30 AM CDT   Back & Spine Established Patient with Ayanna Nieto MD   [facility] Spine Services ([address]  [address]  [facility] [address]   [phone]                Discharge Instructions     Future Orders    Activity as tolerated     Call MD for:  Confusion, memory loss, trouble speaking, or hallucinations     Call MD for:  difficulty breathing or increased cough     Call MD for:  Fainting or "blacking out"     Call MD for:  increased confusion or weakness     Call MD for:  Numbness, " tingling, or weakness in face, arms, hands, legs, or feet     Call MD for:  persistent dizziness, light-headedness, or visual disturbances     Call MD for:  persistent nausea and vomiting or diarrhea     Call MD for:  redness, tenderness, or signs of infection (pain, swelling, redness, odor or green/yellow discharge around incision site)     Call MD for:  Seizure     Call MD for:  severe persistent headache     Call MD for:  Severe sensitivity to light or severe headache     Call MD for:  severe uncontrolled pain     Call MD for:  Signs of infection (red, hot, or drainage from incision)     Call MD for:  Swelling on the face or scalp     Call MD for:  Temperature >100.4 or chills     Call MD for:  temperature >100.4     Call MD for:  Vision changes / blurred vision, loss of sight     Call MD for:  worsening rash     Diet general     Questions:    Total calories:      Fat restriction, if any:      Protein restriction, if any:      Na restriction, if any:      Fluid restriction:      Additional restrictions:      Do not shampoo hair until staples removed     Keep dressing clean and dry and change as needed     Lifting restrictions     Comments:    No strenuous exercise or lifting of > 10 lbs    No dressing needed     No driving, operating heavy equipment or signing legal documents while taking pain medication     No ointment, creams, or lotions until after post-op MD visit         Discharge Instructions       Patient Information  -No driving while taking narcotic pain medications  -Do not take any OTC products containing acetaminophen at the same time as you take your narcotic pain medication. Medications that may contain acetaminophen include but are not limited to: Excedrin and other headache medications, arthritis medications, cold and sinus medications, etc. Please review the list of active ingredients in any OTC medication prior to taking it.  -Do not take any Aspirin or Aspirin containing products for 2 weeks  after surgery. OK to resume on 4/3/2017.  -Do not take any of your herbal supplements for 2 weeks after surgery. OK to resume on 4/3/2017.  -Do not take any Aleeve, Naprosyn, Naproxen, Ibuprofen, Advil or any other NSAID for 2 weeks.   -Do not consume any alcoholic beverages until released by your Neurosurgeon  -Do not perform any excessive bending over or leaning forward as this is a fall hazard.  -Do not perform any heavy lifting or lifting more than 10 lbs from the ground level as this is a fall hazard.     Contact the Neurosurgery Office immediately if:  -If you begin to notice any neurologic changes such as:  -Sudden onset of lethargy or sleepiness  -Sudden confusion, trouble speaking, or understanding   -Sudden trouble seeing in one or both eyes   -Sudden trouble walking, dizziness, loss of coordination   -Sudden severe headache with no known cause   -Sudden onset of numbness or weakness      Wound Care:  Keep your incision open to air. You may shower on Day 2. Have the stream of water hit you opposite from the incision. Do not scrub the incision. Pat the incision dry after your shower. You cannot take a bath/swim/submerge the incision until 8 weeks after surgery.     Apply Bacitracin ointment (over the counter) to incision twice daily.     Call your doctor or go to the Emergency Room for any signs of infection including: increased redness, drainage, pain or fever (temperature greater than or equal to 101.4).         Miscellaneous:  -You were started on a steroid called Decadron. Please pay attention to dosing instructions because you are being sent home with a 3 week taper of the medication to off. Please take Protonix to protect your stomach while you are taking the steroids.   -Follow up with Dr. 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      Admission Information     Date & Time Provider Department CSN    3/20/2017  5:00 AM Jameson Chen MD [facility] [id number]      Care Providers     Provider Role Specialty Primary office phone    Jameson Chen MD Attending Provider Neurosurgery [phone]    Jameson Chen MD Surgeon  Neurosurgery [phone]      Your Vitals Were     BP Pulse Temp Resp Height Weight    165/85 (BP Location: Right arm, Patient Position: Lying, BP Method: Automatic) 73 97.8 °F (36.6 °C) (Oral) 18 5' 8" (1.727 m) 83.9 kg (185 lb)    SpO2 BMI             97% 28.13 kg/m2         Recent Lab Values        7/31/2006 11/30/2006 10/8/2009                    10:38 AM 10:56 AM 11:10 AM         A1C 5.3 5.4 5.5                   Pending Labs     Order Current Status    Specimen to Pathology - Surgery In process    Prepare RBC 2 Units; intraop Preliminary result      Allergies as of 3/21/2017        Reactions    Iodine And Iodide Containing Products Hives      Advance Directives     An advance directive is a document which, in the event you are no longer able to make decisions for yourself, tells your healthcare team what kind of treatment you do or do not want to receive, or who you would like to make those decisions for you.  If you do not currently have an advance directive, [facility] encourages you to create one.  For more information call:  [phone]), [phone] ([phone]),  or log on to [email or web address].        Language Assistance Services     ATTENTION: Language assistance services are available, free of charge. Please call [phone].      ATENCIÓN: Si habla español, tiene a jane disposición servicios gratuitos de asistencia lingüística. Llame al [phone].     CHÚ Ý: N?u b?n nói Ti?ng Vi?t, có các d?ch v? h? tr? ngôn ng? mi?n phí dành cho b?n. G?i s? [phone].        Chronic Kindey Disease Education              [facility] complies with " applicable Federal civil rights laws and does not discriminate on the basis of race, color, national origin, age, disability, or sex.

## 2017-03-20 NOTE — PROGRESS NOTES
Patient is back from CT and on pacu monitor. Reports nausea and need to void. Family at bedside. VSS.

## 2017-03-20 NOTE — PROGRESS NOTES
MD at bedside. CT called and can take patient at 1150. To call neurosurgery after CT to decide where patient will go after recovery. Pt is awake and doing well.

## 2017-03-20 NOTE — ANESTHESIA PROCEDURE NOTES
Scalp block    Patient location during procedure: OR   Block not for primary anesthetic.  Reason for block: at surgeon's request and post-op pain management   Post-op Pain Location: Bilateral Scalp  Start time: 3/20/2017 7:30 AM  Timeout: 3/20/2017 7:30 AM   End time: 3/20/2017 7:40 AM  Staffing  Anesthesiologist: OMAR TEE  Resident/CRNA: RYLAND LOVE  Performed: resident/CRNA and anesthesiologist   Preanesthetic Checklist  Completed: patient identified, site marked, surgical consent, pre-op evaluation, timeout performed, IV checked, risks and benefits discussed and monitors and equipment checked  Peripheral Block  Patient position: supine  Prep: ChloraPrep  Patient monitoring: heart rate, cardiac monitor, continuous capnometry, frequent blood pressure checks and continuous pulse ox  Block type: greater occipital, lesser occipital, supratrochlear, supraorbital and zygomaticotemporal (scalp)  Laterality: bilateral  Injection technique: single shot  Needle  Needle gauge: 25 G  Needle localization: anatomical landmarks     Assessment  Heart rate change: no  Slow fractionated injection: no  Medications:  Bolus administered: 30 mL of 1.0 ropivacaine

## 2017-03-20 NOTE — ANESTHESIA POSTPROCEDURE EVALUATION
"Anesthesia Post Evaluation    Patient: Liang Monterroso JrEstephania    Procedure(s) Performed: Procedure(s) (LRB):  CRANIOTOMY WITH STEALTH/  Right Temporal Craniotomy (Right)    Final Anesthesia Type: general  Patient location during evaluation: PACU  Patient participation: Yes- Able to Participate  Level of consciousness: awake and alert  Post-procedure vital signs: reviewed and stable  Pain management: adequate  Airway patency: patent  PONV status at discharge: No PONV  Anesthetic complications: no      Cardiovascular status: blood pressure returned to baseline  Respiratory status: unassisted  Hydration status: euvolemic  Follow-up not needed.        Visit Vitals    /68    Pulse 83    Temp 35.6 °C (96 °F) (Axillary)    Resp 18    Ht 5' 8" (1.727 m)    Wt 83.9 kg (185 lb)    SpO2 98%    BMI 28.13 kg/m2       Pain/Namita Score: Pain Assessment Performed: Yes (3/20/2017 12:45 PM)  Presence of Pain: complains of pain/discomfort (3/20/2017 12:45 PM)  Pain Rating Prior to Med Admin: 4 (3/20/2017 12:45 PM)  Namita Score: 10 (3/20/2017 12:45 PM)      "

## 2017-03-20 NOTE — BRIEF OP NOTE
[facility]  Neurosurgery  Operative Note    SUMMARY      Date of Procedure: 3/20/2017     Procedure: Procedure(s) (LRB):  CRANIOTOMY WITH STEALTH/  Right Temporal Craniotomy (Right)     Surgeon(s) and Role:     * Jameson Chen MD - Primary    Assisting Surgeon: Ryan Bergeron MD HOV    Pre-Operative Diagnosis: Meningioma [D32.9]    Post-Operative Diagnosis: Post-Op Diagnosis Codes:     * Meningioma [D32.9]    Anesthesia: General    Technical Procedures Used: R image guided craniotomy resection extraxial mass.     Description of the Findings of the Procedure: see full op note    Complications: No    Estimated Blood Loss (EBL): * No values recorded between 3/20/2017  8:09 AM and 3/20/2017 10:31 AM *           Specimens:   Specimen (12h ago through future)    Start     Ordered    03/20/17 0946  Specimen to Pathology - Surgery  Once     Comments:  1. Brain tumor-permanent.    03/20/17 0946           Implants:   Implant Name Type Inv. Item Serial No.  Lot No. LRB No. Used   COVER UN3 BURRHOLE 14MM TAB - [id number]  COVER UN3 BURRHOLE 14MM TAB  [employer]ES[employer].  Right 2   SCREW SD 1.5X4MM TI CROSS PIN - [id number]   SCREW SD 1.5X4MM TI CROSS PIN   [employer] VANES[employer].   Right 12              Condition: Good    Disposition: PACU - hemodynamically stable.

## 2017-03-20 NOTE — INTERVAL H&P NOTE
The patient has been examined and the H&P has been reviewed:    I concur with the findings and no changes have occurred since H&P was written.patient no longer experiencing any malaise/cough/rhinorrhea/congentsion/postnasal drip.     Anesthesia/Surgery risks, benefits and alternative options discussed and understood by patient/family.          Active Hospital Problems    Diagnosis  POA    Meningioma [D32.9]  Yes      Resolved Hospital Problems    Diagnosis Date Resolved POA   No resolved problems to display.

## 2017-03-20 NOTE — PROGRESS NOTES
Nursing Transfer Note      3/20/2017     Transfer to 743[address]    Transfer via stretcher    Transfer with nothing    Transported by PCT    Medicines sent: none    Chart send with patient: Yes    Notified: spouse    Patient reassessed at: 8487    Report called to CORNELL Plunkett on 7th floor

## 2017-03-20 NOTE — ANESTHESIA RELEASE NOTE
Anesthesia Release from PACU note     Patient: Liang Monterroso   Procedure(s) Performed: Procedure(s) (LRB):  CRANIOTOMY WITH STEALTH/  Right Temporal Craniotomy (Right)  Anesthesia type: general  Post pain: Adequate analgesi  Post assessment: no apparent anesthetic complications, tolerated procedure well and no evidence of recall  Last Vitals:   Vitals:    03/20/17 1300   BP:    Pulse: 83   Resp: 18   Temp:    SpO2: 98%     Post vital signs: stable  Level of consciousness: awake, alert  and oriented  Nausea/Vomiting: no nausea/no vomiting  Complications: none  Airway Patency: patent  Respiratory: unassisted  Cardiovascular: stable and blood pressure at baseline  Hydration: euvolemic

## 2017-03-20 NOTE — PROGRESS NOTES
Received call from OR nurse verbalizing that Dr. Chen viewed CT and patient can proceed to floor. Bed request placed.

## 2017-03-20 NOTE — TRANSFER OF CARE
"Anesthesia Transfer of Care Note    Patient: Liang Monterroso JrEstephania    Procedure(s) Performed: Procedure(s) (LRB):  CRANIOTOMY WITH STEALTH/  Right Temporal Craniotomy (Right)    Patient location: PACU    Anesthesia Type: general    Transport from OR: Transported from OR on room air with adequate spontaneous ventilation    Post pain: adequate analgesia    Post assessment: no apparent anesthetic complications    Post vital signs: stable    Level of consciousness: awake, alert and oriented    Nausea/Vomiting: no nausea/vomiting    Complications: none          Last vitals:   Visit Vitals    BP (!) 145/88 (BP Location: Right arm, Patient Position: Lying, BP Method: Automatic)    Pulse 84    Temp 36.6 °C (97.8 °F) (Axillary)    Resp 18    Ht 5' 8" (1.727 m)    Wt 83.9 kg (185 lb)    SpO2 100%    BMI 28.13 kg/m2     "

## 2017-03-20 NOTE — ANESTHESIA PROCEDURE NOTES
Arterial    Diagnosis: meningioma  Doctor requesting consult: narendra    Patient location during procedure: done in OR  Procedure start time: 3/20/2017 7:57 AM  Timeout: 3/20/2017 7:57 AM  Procedure end time: 3/20/2017 7:57 AM  Staffing  Anesthesiologist: OMAR TEE  Resident/CRNA: RYLAND LOVE  Performed by: resident/CRNA   Anesthesiologist was present at the time of the procedure.  Preanesthetic Checklist  Completed: patient identified, site marked, surgical consent, pre-op evaluation, timeout performed, IV checked, risks and benefits discussed, monitors and equipment checked and anesthesia consent given  Arterial Line  Skin Prep: chlorhexidine gluconate  Orientation: left  Location: radial  Catheter Size: 20 G{[facility] ANESTHESIA BLOCK ART PLACEMENTInsertion Attempts: 1  Assessment  Dressing: secured with tape and tegaderm and secured with tape  Patient: Tolerated well

## 2017-03-20 NOTE — PLAN OF CARE
Pt AAOx4 with family at bedside. Pre-op complete per RN. Awaiting anesthesia consent. Will continue to monitor pt.

## 2017-03-21 ENCOUNTER — TELEPHONE (OUTPATIENT)
Dept: NEUROSURGERY | Facility: CLINIC | Age: 77
End: 2017-03-21

## 2017-03-21 VITALS
HEART RATE: 73 BPM | RESPIRATION RATE: 18 BRPM | HEIGHT: 68 IN | TEMPERATURE: 98 F | SYSTOLIC BLOOD PRESSURE: 165 MMHG | OXYGEN SATURATION: 97 % | BODY MASS INDEX: 28.04 KG/M2 | DIASTOLIC BLOOD PRESSURE: 85 MMHG | WEIGHT: 185 LBS

## 2017-03-21 LAB
ANION GAP SERPL CALC-SCNC: 12 MMOL/L
BASOPHILS # BLD AUTO: 0 K/UL
BASOPHILS NFR BLD: 0 %
BUN SERPL-MCNC: 12 MG/DL
CALCIUM SERPL-MCNC: 8.4 MG/DL
CHLORIDE SERPL-SCNC: 108 MMOL/L
CO2 SERPL-SCNC: 21 MMOL/L
CREAT SERPL-MCNC: 1 MG/DL
DIFFERENTIAL METHOD: ABNORMAL
EOSINOPHIL # BLD AUTO: 0 K/UL
EOSINOPHIL NFR BLD: 0 %
ERYTHROCYTE [DISTWIDTH] IN BLOOD BY AUTOMATED COUNT: 14.8 %
EST. GFR  (AFRICAN AMERICAN): >60 ML/MIN/1.73 M^2
EST. GFR  (NON AFRICAN AMERICAN): >60 ML/MIN/1.73 M^2
GLUCOSE SERPL-MCNC: 109 MG/DL
HCT VFR BLD AUTO: 39.2 %
HGB BLD-MCNC: 13 G/DL
LYMPHOCYTES # BLD AUTO: 1.3 K/UL
LYMPHOCYTES NFR BLD: 10.8 %
MCH RBC QN AUTO: 28.8 PG
MCHC RBC AUTO-ENTMCNC: 33.2 %
MCV RBC AUTO: 87 FL
MONOCYTES # BLD AUTO: 0.6 K/UL
MONOCYTES NFR BLD: 5 %
NEUTROPHILS # BLD AUTO: 9.8 K/UL
NEUTROPHILS NFR BLD: 83.9 %
PLATELET # BLD AUTO: 183 K/UL
PMV BLD AUTO: 10.4 FL
POTASSIUM SERPL-SCNC: 4.2 MMOL/L
RBC # BLD AUTO: 4.52 M/UL
SODIUM SERPL-SCNC: 141 MMOL/L
WBC # BLD AUTO: 11.68 K/UL

## 2017-03-21 PROCEDURE — 85025 COMPLETE CBC W/AUTO DIFF WBC: CPT

## 2017-03-21 PROCEDURE — 25000003 PHARM REV CODE 250: Performed by: STUDENT IN AN ORGANIZED HEALTH CARE EDUCATION/TRAINING PROGRAM

## 2017-03-21 PROCEDURE — 97165 OT EVAL LOW COMPLEX 30 MIN: CPT

## 2017-03-21 PROCEDURE — 36415 COLL VENOUS BLD VENIPUNCTURE: CPT

## 2017-03-21 PROCEDURE — 99024 POSTOP FOLLOW-UP VISIT: CPT | Mod: ,,, | Performed by: PHYSICIAN ASSISTANT

## 2017-03-21 PROCEDURE — 97161 PT EVAL LOW COMPLEX 20 MIN: CPT

## 2017-03-21 PROCEDURE — 80048 BASIC METABOLIC PNL TOTAL CA: CPT

## 2017-03-21 RX ORDER — DEXAMETHASONE 2 MG/1
TABLET ORAL
Qty: 75 TABLET | Refills: 0 | Status: SHIPPED | OUTPATIENT
Start: 2017-03-21 | End: 2017-05-09

## 2017-03-21 RX ORDER — LEVOTHYROXINE SODIUM 112 UG/1
112 TABLET ORAL
Status: DISCONTINUED | OUTPATIENT
Start: 2017-03-21 | End: 2017-03-21 | Stop reason: HOSPADM

## 2017-03-21 RX ORDER — HYDROCODONE BITARTRATE AND ACETAMINOPHEN 7.5; 325 MG/1; MG/1
1 TABLET ORAL EVERY 6 HOURS PRN
Qty: 60 TABLET | Refills: 0 | Status: SHIPPED | OUTPATIENT
Start: 2017-03-21 | End: 2017-06-05

## 2017-03-21 RX ORDER — RAMIPRIL 2.5 MG/1
5 CAPSULE ORAL DAILY
Status: DISCONTINUED | OUTPATIENT
Start: 2017-03-21 | End: 2017-03-21 | Stop reason: HOSPADM

## 2017-03-21 RX ADMIN — RAMIPRIL 5 MG: 2.5 CAPSULE ORAL at 09:03

## 2017-03-21 RX ADMIN — LEVOTHYROXINE SODIUM 112 MCG: 112 TABLET ORAL at 05:03

## 2017-03-21 RX ADMIN — TAMSULOSIN HYDROCHLORIDE 0.4 MG: 0.4 CAPSULE ORAL at 09:03

## 2017-03-21 RX ADMIN — PRAVASTATIN SODIUM 20 MG: 20 TABLET ORAL at 09:03

## 2017-03-21 NOTE — DISCHARGE SUMMARY
[facility]  Discharge Summary      Admit Date: 3/20/2017    Discharge Date and Time:  03/21/2017     Attending Physician: Jameson Chen MD     Reason for Admission:   Mr. Monterroso is a pleasant 76-year-old gentleman who was discovered to have a brain tumor after suffering a concussion after a fall. The patient was seen and evaluated by Dr. Jameson Chen and after the after the risks, benefits and alternatives were described, the patient wished to have the tumor removed. The patient was consented and wished to proceed.    DATE OF PROCEDURE: 03/20/2017     SURGEON: Jameson Chen M.D., Ph.D.  ASSISTANT: Ryan Bergeron M.D. (RES) (the assistant is a [facility] resident).     PREOPERATIVE DIAGNOSES:  1. 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  2. Cerebral edema.     POSTOPERATIVE DIAGNOSES:  1. 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  2. Cerebral edema.     PROCEDURES:  1. Right temporal craniotomy for tumor resection.  2. Stealth navigation.  3. Microsurgical technique.          Hospital Course   Mr. Monterro[place] [facility] [place] for the above stated procedure. He tolerated the procedure well and there were no intra-operative complications. He recovered in PACU and was then transferred to the floor. Post-op head CT showed expected post op changes. No evidence of hemorrhage or other detrimental findings. He was evaluated by PT/OT who recommended discharge home with family support. On 3/21/17, he was discharged home with pain medication and follow up appointments. Regular diet. Activity as tolerated. At the time of discharge, vital signs were stable, patient was afebrile and neuro stable. Discharge instructions were given verbally/written to the patient and all of his questions were answered. Patient voiced understanding. He was encouraged to call the clinic with any questions he may have prior to the follow up appointments.         Consults: PT, OT    Significant  Diagnostic Studies: Labs:   BMP:   Recent Labs  Lab 03/21/17  0509         K 4.2      CO2 21*   BUN 12   CREATININE 1.0   CALCIUM 8.4*    and CBC   Recent Labs  Lab 03/21/17  0509   WBC 11.68   HGB 13.0*   HCT 39.2*        Radiology: CT scan: head     Final Diagnoses:    Principal Problem: Meningioma   Secondary Diagnoses:   Active Hospital Problems    Diagnosis  POA    *Meningioma [D32.9]  Yes      Resolved Hospital Problems    Diagnosis Date Resolved POA   No resolved problems to display.       Discharged Condition: good    Disposition: Home or Self Care    Follow Up/Patient Instructions:   See the patient instruction tab for detailed discharge instructions and follow up information.     Medications:  Reconciled Home Medications:   Current Discharge Medication List      START taking these medications    Details   dexamethasone (DECADRON) 2 MG tablet Take 4mg q 6h for 3 days, then 4mg q 8h for 3 days, then 4mg q 12h for 3 days, then 2mg q 8h for 3 days, then 2mg q 12h for 3 days, then 2mg daily for 6 days, then OFF.  Qty: 75 tablet, Refills: 0      hydrocodone-acetaminophen 7.5-325mg (NORCO) 7.5-325 mg per tablet Take 1 tablet by mouth every 6 (six) hours as needed for Pain.  Qty: 60 tablet, Refills: 0         CONTINUE these medications which have NOT CHANGED    Details   famotidine (PEPCID) 20 MG tablet Take 20 mg by mouth 2 (two) times daily as needed.        levothyroxine (SYNTHROID) 112 MCG tablet Take 1 tablet (112 mcg total) by mouth before breakfast.  Qty: 30 tablet, Refills: 5      MAG HYDROX/AL HYDROX/SIMETH (ANTACID ORAL) Take by mouth as needed (acid reflux).      multivitamin with minerals tablet Take 1 tablet by mouth once daily.        PRAMOSONE cream APPLY TOPICALLY TWICE DAILY AS NEEDED FOR ITCHING  Qty: 57 g, Refills: 5      pravastatin (PRAVACHOL) 20 MG tablet Take 1 tablet (20 mg total) by mouth once daily.  Qty: 90 tablet, Refills: 3      ramipril (ALTACE) 5 MG capsule  Take 1 capsule (5 mg total) by mouth once daily.  Qty: 30 capsule, Refills: 5    Associated Diagnoses: Essential hypertension      tamsulosin (FLOMAX) 0.4 mg Cp24 Take 1 capsule (0.4 mg total) by mouth once daily.  Qty: 90 capsule, Refills: 4    Associated Diagnoses: BPH with urinary obstruction      econazole nitrate 1 % cream USE TWICE DAILY  Qty: 60 g, Refills: 5      fexofenadine (ALLEGRA) 180 MG tablet Take 1 tablet (180 mg total) by mouth once daily.  Qty: 30 tablet, Refills: 11      metronidazole (METROGEL) 0.75 % gel Apply 1 application topically every evening.  Qty: 45 g, Refills: 5      triamcinolone acetonide 0.1% (KENALOG) 0.1 % cream Use bid prn rash  Qty: 80 g, Refills: 3    Associated Diagnoses: Eczema         STOP taking these medications       aspirin (ECOTRIN) 81 MG EC tablet Comments:   Reason for Stopping:         coenzyme Q10 (CO Q-10) 10 mg capsule Comments:   Reason for Stopping:         ERGOCALCIFEROL, VITAMIN D2, (VITAMIN D2 ORAL) Comments:   Reason for Stopping:         ginseng 100 mg Cap Comments:   Reason for Stopping:         PSYLLIUM SEED, WITH SUGAR, (METAMUCIL ORAL) Comments:   Reason for Stopping:         selenium 50 mcg Tab Comments:   Reason for Stopping:         TURMERIC ROOT EXTRACT ORAL Comments:   Reason for Stopping:         VIT A,C & E/B3/B2/LUT/MIN/GLUT (EYE-CANDELARIA EXTRA + LUTEIN ORAL) Comments:   Reason for Stopping:               Discharge Procedure Orders  Diet general     Do not shampoo hair until staples removed     Lifting restrictions   Order Comments: No strenuous exercise or lifting of > 10 lbs     No driving, operating heavy equipment or signing legal documents while taking pain medication     No ointment, creams, or lotions until after post-op MD visit     Keep dressing clean and dry and change as needed     Call MD for:  Temperature >100.4 or chills     Call MD for:  Swelling on the face or scalp     Call MD for:  Numbness, tingling, or weakness in face, arms,  "hands, legs, or feet     Call MD for:  Fainting or "blacking out"     Call MD for:  Signs of infection (red, hot, or drainage from incision)     Call MD for:  Confusion, memory loss, trouble speaking, or hallucinations     Call MD for:  Severe sensitivity to light or severe headache     Call MD for:  Vision changes / blurred vision, loss of sight     Call MD for:  Seizure     Activity as tolerated     Call MD for:  temperature >100.4     Call MD for:  persistent nausea and vomiting or diarrhea     Call MD for:  severe uncontrolled pain     Call MD for:  redness, tenderness, or signs of infection (pain, swelling, redness, odor or green/yellow discharge around incision site)     Call MD for:  difficulty breathing or increased cough     Call MD for:  severe persistent headache     Call MD for:  worsening rash     Call MD for:  persistent dizziness, light-headedness, or visual disturbances     Call MD for:  increased confusion or weakness     No dressing needed         "

## 2017-03-21 NOTE — PROGRESS NOTES
Progress Note  Neurosurgery    Admit Date: 3/20/2017  Post-operative Day: 1 Day Post-Op  Hospital Day: 2    SUBJECTIVE:     Liang Monterroso Jr. is a 76 y.o. male s/p craniotomy for resection of meningioma, POD #1. NAEON. Patient reports mild headache that waxes and wanes and improves with medication. Denies any vision changes, N/V, seizure like activity, weakness, incontinence or gait disturbance. Patient ambulated down the halls with therapy, without difficulty. Tolerating diet. Voiding appropriately.       Scheduled Meds:   acetaminophen  1,000 mg Oral Q8H    levothyroxine  112 mcg Oral Before breakfast    pravastatin  20 mg Oral Daily    ramipril  5 mg Oral Daily    tamsulosin  0.4 mg Oral Daily     Continuous Infusions:   PRN Meds:ondansetron    Review of patient's allergies indicates:   Allergen Reactions    Iodine and iodide containing products Hives       OBJECTIVE:     Vital Signs (Most Recent)  Temp: 97.8 °F (36.6 °C) (03/21/17 0745)  Pulse: 77 (03/21/17 0900)  Resp: 18 (03/21/17 0745)  BP: (!) 165/85 (03/21/17 0745)  SpO2: 97 % (03/21/17 0745)    Vital Signs Range (Last 24H):  Temp:  [96 °F (35.6 °C)-97.8 °F (36.6 °C)]   Pulse:  []   Resp:  [16-18]   BP: (127-165)/(59-88)   SpO2:  [94 %-100 %]   Arterial Line BP: (148-160)/(67-79)     I & O (Last 24H):  Intake/Output Summary (Last 24 hours) at 03/21/17 1033  Last data filed at 03/21/17 0600   Gross per 24 hour   Intake             1300 ml   Output              250 ml   Net             1050 ml     Physical Exam:  General: well developed, well nourished, no distress.   Head: normocephalic, atraumatic  Neurologic: Alert and oriented. Thought content appropriate.  GCS: Motor: 6/Verbal: 5/Eyes: 4 GCS Total: 15  Mental Status: Awake, Alert, Oriented x 4  Language: No aphasia  Speech: No dysarthria  Cranial nerves: face symmetric, tongue midline, CN II-XII grossly intact.   Eyes: pupils equal, round, reactive to light with accomodation, EOMI.    Pulmonary: normal respirations, no signs of respiratory distress  Abdomen: soft, non-distended, not tender to palpation  Sensory: intact to light touch throughout  Motor Strength:Moves all extremities spontaneously with good tone.  Full strength upper and lower extremities. No abnormal movements seen.   Pronator Drift: no drift noted  Finger-to-nose: Intact bilaterally  No LE edema  Skin: Skin is warm, dry and intact.      Lines/Drains:       Peripheral IV - Single Lumen 03/20/17 0551 Left Forearm (Active)   Site Assessment Clean;Dry;Intact;No redness;No swelling 3/21/2017  7:45 AM   Line Status Flushed;Saline locked 3/21/2017  7:45 AM   Dressing Status Clean;Dry;Intact 3/21/2017  7:45 AM   Dressing Intervention Dressing reinforced 3/20/2017  8:00 PM   Dressing Change Due 03/24/17 3/20/2017  8:00 PM   Site Change Due 03/24/17 3/20/2017  8:00 PM   Reason Not Rotated Not due 3/21/2017  7:45 AM   Number of days:1            Peripheral IV - Single Lumen 03/20/17 0735 Right Hand (Active)   Site Assessment Clean;Dry;Intact;No redness;No swelling 3/21/2017  7:45 AM   Line Status Infusing 3/21/2017  7:45 AM   Dressing Status Clean;Dry;Intact 3/21/2017  7:45 AM   Dressing Intervention Dressing reinforced 3/20/2017  8:00 PM   Dressing Change Due 03/24/17 3/20/2017  8:00 PM   Site Change Due 03/24/17 3/20/2017  8:00 PM   Reason Not Rotated Not due 3/21/2017  7:45 AM   Number of days:1       Wound/Incision:  clean, dry, intact, no drainage    Laboratory:  CBC:   Recent Labs  Lab 03/21/17  0509   WBC 11.68   RBC 4.52*   HGB 13.0*   HCT 39.2*      MCV 87   MCH 28.8   MCHC 33.2     BMP:   Recent Labs  Lab 03/21/17  0509         K 4.2      CO2 21*   BUN 12   CREATININE 1.0   CALCIUM 8.4*       Diagnostic Results:  Head CT:  I independently reviewed the imaging.     Expected operative change from posterior temporal occipital craniotomy for previous right temporal occipital lesion resection. Small likely  postoperative gas fluid and hemorrhage within the resection cavity underlying the craniotomy.    Superimposed Age-appropriate generalized cerebral volume loss with mild degree of patchy decreased attenuation supratentorial white matter suggestive for chronic microvascular ischemic change .     ASSESSMENT/PLAN:     Assessment:   Liang Monterroso Jr. is a 76 y.o. male s/p craniotomy for resection of meningioma, POD #1.     Plan:   -Patient neurologically stable on exam  -Head CT shows expected post op changes. No evidence of hemorrhage or other detrimental findings.   -PT/OT cleared patient for discharge home, without needs.   -Will begin 3 week Dex taper to off. Continue home dose of PPI.   -OK to restart ASA for preventative health in 2 weeks  -Follow up with Dr. Chen in 2 weeks for wound check and path results  -Discharge instructions given verbally to patient. All of her questions were answered. She was encouraged to call the clinic with any questions or concerns prior to follow up appt.     Discussed with Dr. Chen  Please call with any questions    Debbie Lopez PA-C   Neurosurgery   Pager: [phone]

## 2017-03-21 NOTE — PLAN OF CARE
PCP: Theron Paiz MD  ADDRESS: [address] / [place] [address]    Payor: [employer] MANAGED MEDICARE / Plan: [employer] MEDICARE HMO / Product Type: Capitation /     PHARMACY:    [facility] [place] [facility] [address]  [address] [facility] [address]  Phone: [phone] Fax: [phone]    [id number] - [facility] [place] - [address]  [address] [facility] [address]  Phone: [phone] Fax: [phone]      Future Appointments  Date Time Provider Department Center   4/4/2017 10:45 AM Jameson Chen MD [facility]   4/10/2017 2:20 PM Regulo Haider III, MD [facility]   4/11/2017 1:00 PM Regulo Haider III, MD [facility]   5/9/2017 9:30 AM [facility]       CM visit with pt (sitting in chair) and wife, Alyssa, to discuss POC and d/c plan. Pt ambulated with PT/OT this am, tolerated PO this am and IVF d/c'ed. Pt doing well, CM anticipate d/c home today with family support. Pt's wife to provide transportation home.       03/21/17 1041   Discharge Assessment   Assessment Type Discharge Planning Assessment   Confirmed/corrected address and phone number on facesheet? Yes   Assessment information obtained from? Patient;Caregiver  (Pt's wife, Florina, at bedside during d/c assessment.)   Expected Length of Stay (days) 2   Communicated expected length of stay with patient/caregiver yes   Prior to hospitilization cognitive status: Alert/Oriented   Prior to hospitalization functional status: Independent   Current cognitive status: Alert/Oriented   Current Functional Status: Independent   Arrived From home or self-care   Lives With spouse   Able to Return to Prior Arrangements yes   Is patient able to care for self after discharge? Yes   How many people do you have in your home that can help with your care after discharge? 1   Who are your caregiver(s) and their phone number(s)? Wife,  ""[facility]", [phone]   Patient's perception of discharge disposition home or selfcare   Readmission Within The Last 30 Days no previous admission in last 30 days   Patient currently being followed by outpatient case management? No   Patient currently receives home health services? No   Does the patient currently use HME? No   Patient currently receives private duty nursing? N/A   Patient currently receives any other outside agency services? No   Equipment Currently Used at Home none   Do you have any problems affording any of your prescribed medications? No   Is the patient taking medications as prescribed? yes   Do you have any financial concerns preventing you from receiving the healthcare you need? No   Does the patient have transportation to healthcare appointments? Yes   Transportation Available car;family or friend will provide   On Dialysis? No   Does the patient receive services at the Coumadin Clinic? No   Are there any open cases? No   Discharge Plan A Home with family   Discharge Plan B Home   Patient/Family In Agreement With Plan yes     "

## 2017-03-21 NOTE — PLAN OF CARE
Problem: Occupational Therapy Goal  Goal: Occupational Therapy Goal  Outcome: Outcome(s) achieved Date Met:  03/21/17     OT evaluation complete.  Pt does not require OT services in the acute care setting at this time.  Pt to d/c from OT with no further therapy needs recommended upon d/c from acute care.     BETINA Thayer  3/21/2017

## 2017-03-21 NOTE — PT/OT/SLP EVAL
"Physical Therapy  Evaluation/Discharge    Liang Monterroso Jr.   MRN: [id number]   Admitting Diagnosis: Meningioma    PT Received On: 17  PT Start Time: 846     PT Stop Time: 902    PT Total Time (min): 16 min       Billable Minutes:  Evaluation 16 min (Co-eval with OT)    Diagnosis: Meningioma  S/p craniotomy     Past Medical History:   Diagnosis Date    Allergy     CKD (chronic kidney disease) stage 3, GFR 30-59 ml/min 6/15/2016    GERD (gastroesophageal reflux disease)     Hyperlipidemia     Hypertension     Hypothyroidism     Thyroid disease     Urinary tract infection       Past Surgical History:   Procedure Laterality Date    APPENDECTOMY      CATARACT EXTRACTION      EYE SURGERY      HERNIA REPAIR      TONSILLECTOMY         Referring physician: Jameson Chen MD  Date referred to PT: 3/20/17    General Precautions: Standard, fall  Orthopedic Precautions: N/A   Braces: N/A       Do you have any cultural, spiritual, Holiness conflicts, given your current situation?: no conflicts    Patient History:  Lives With: spouse  Living Arrangements: house  Home Accessibility: stairs within home  Transportation Available: family or friend will provide  Living Environment Comment: Pt reported living with wife in 2SH with preferred br/ba on 2nd level (able to live on 1st floor if necessary). Prior to admit, pt was (I) with mobility and ADLs. Pt will have (A) from wife upon discharge as needed  Equipment Currently Used at Home: none  DME owned (not currently used): none    Previous Level of Function:  Ambulation Skills: independent  Transfer Skills: independent  ADL Skills: independent  Work/Leisure Activity: independent    Subjective:  Communicated with RN prior to session. Pt agreeable to participate in therapy evaluation. Pt reported "I'm feeling great.. Just mild pain in my head."     Chief Complaint: impaired balance   Patient goals: return to PLOF    Pain Ratin/10 (Pt did not rate pain on numeric " "scale; reported having "mild pain")   Location - Orientation:  (head)  Pain Rating Post-Intervention: 0/10    Objective:   Patient found with: peripheral IV, telemetry     Cognitive Exam:  Oriented to: Person, Place, Time and Situation    Follows Commands/attention: Follows multistep  commands  Communication: clear/fluent  Safety awareness/insight to disability: intact    Physical Exam:  Postural examination/scapula alignment: No postural abnormalities identified    Skin integrity: Visible skin intact  Edema: None noted BLEs    Sensation:   Intact  light/touch BLEs    Lower Extremity Range of Motion:  Right Lower Extremity: WFL  Left Lower Extremity: WFL    Lower Extremity Strength:  Right Lower Extremity: WFL  Left Lower Extremity: WFL    Gross motor coordination: WFL    Functional Mobility:  Bed Mobility:  Supine to Sit: Independent    Transfers:  Sit <> Stand Assistance: Independent  Sit <> Stand Assistive Device: No Assistive Device    Gait:   Gait Distance: ~110' with no AD, (I); no signficant gait or balance deficits identified   Gait Assistive Device: No device  Gait Pattern: reciprocal     Balance:   Static Sit: GOOD+: Takes MAXIMAL challenges from all directions.    Dynamic Sit: GOOD+: Maintains balance through MAXIMAL excursions of active trunk motion  Static Stand: GOOD+: Takes MAXIMAL challenges from all directions  Dynamic stand: GOOD+: Independent gait (with or without assistive device)    Therapeutic Activities and Exercises:  Pt educated on role of PT and POC/discharge from therapy as well as safety with mobility. Pt verbalized understanding. Pt expressed no further concerns/questions.       AM-PAC 6 CLICK MOBILITY  How much help from another person does this patient currently need?   1 = Unable, Total/Dependent Assistance  2 = A lot, Maximum/Moderate Assistance  3 = A little, Minimum/Contact Guard/Supervision  4 = None, Modified [place]/Independent    Turning over in bed (including adjusting " bedclothes, sheets and blankets)?: 4  Sitting down on and standing up from a chair with arms (e.g., wheelchair, bedside commode, etc.): 4  Moving from lying on back to sitting on the side of the bed?: 4  Moving to and from a bed to a chair (including a wheelchair)?: 4  Need to walk in hospital room?: 4  Climbing 3-5 steps with a railing?: 3  Total Score: 23     AM-PAC Raw Score CMS G-Code Modifier Level of Impairment Assistance   6 % Total / Unable   7 - 9 CM 80 - 100% Maximal Assist   10 - 14 CL 60 - 80% Moderate Assist   15 - 19 CK 40 - 60% Moderate Assist   20 - 22 CJ 20 - 40% Minimal Assist   23 CI 1-20% SBA / CGA   24 CH 0% Independent/ Mod I     Patient left up in chair with all lines intact, call button in reach, RN notified and wife present.    Assessment:   Liang Monterroso Jr. is a 76 y.o. male with a medical diagnosis of Meningioma, s/p craniotomy. Upon evaluation, pt demonstrated high (I) with functional mobility, no significant gait or balance deficits identified. Pt discharged from PT services. Recommending pt discharge home with no further therapy or DME needs anticipated.     Rehab identified problem list/impairments: Rehab identified problem list/impairments:  (No signficant functional deficits identified )    Activity tolerance: Good    Discharge recommendations: Discharge Facility/Level Of Care Needs: home     Barriers to discharge: Barriers to Discharge: None    Equipment recommendations: Equipment Needed After Discharge: none     GOALS:   Physical Therapy Goals     Not on file      Multidisciplinary Problems (Resolved)        Problem: Physical Therapy Goal    Goal Priority Disciplines Outcome Goal Variances Interventions   Physical Therapy Goal   (Resolved)     PT/OT, PT Outcome(s) achieved               PLAN:    Pt discharged from PT services. Recommending pt discharge home with no further therapy or DME needs anticipated.     Plan of Care reviewed with: patient, spouse        Dulce  Jonathon PT, DPT   3/21/2017  Pager: [phone]

## 2017-03-21 NOTE — PROGRESS NOTES
Saline lock dc'd. Discharge instructions given to pt, spouse at bedside. No distress noted. Pt escort requested via wheelchair.

## 2017-03-21 NOTE — PT/OT/SLP EVAL
Occupational Therapy  Evaluation    Liang Monterroso Jr.   MRN: [id number]   Admitting Diagnosis: Meningioma    OT Date of Treatment: 03/21/17   OT Start Time: 0846  OT Stop Time: 0902  OT Total Time (min): 16 min    Billable Minutes:  Evaluation 16  *Completed with PT    Diagnosis: Meningioma     Past Medical History:   Diagnosis Date    Allergy     CKD (chronic kidney disease) stage 3, GFR 30-59 ml/min 6/15/2016    GERD (gastroesophageal reflux disease)     Hyperlipidemia     Hypertension     Hypothyroidism     Thyroid disease     Urinary tract infection       Past Surgical History:   Procedure Laterality Date    APPENDECTOMY      CATARACT EXTRACTION      EYE SURGERY      HERNIA REPAIR      TONSILLECTOMY         Referring physician: Jameosn Chen MD  Date referred to OT: 3/20/2017    General Precautions: Standard, fall  Orthopedic Precautions: N/A  Braces: N/A    Do you have any cultural, spiritual, Buddhist conflicts, given your current situation?: None reported     Patient History:  Living Environment  Lives With: spouse  Living Arrangements: house  Home Accessibility: stairs within home  Transportation Available: family or friend will provide  Living Environment Comment: Pt lives with wife in [facility]; bathroom and bedroom on 2nd floor (able to live on 1st floor; however, pt prefers to live on 2nd floor).  Bathroom contains walk-in shower with seat available.  PTA pt reports being (I) with ADLs and mobility.  Assistance from wife available upon d/c.    Equipment Currently Used at Home: none    Prior level of function:   Bed Mobility/Transfers: independent  Grooming: independent  Bathing: independent  Upper Body Dressing: independent  Lower Body Dressing: independent  Toileting: independent  Home Management Skills: independent  Driving License: Yes  Mode of Transportation: Car     Dominant hand: right    Subjective:  Communicated with RN prior to session.    Chief Complaint: None stated  Patient/Family  stated goals: Return home    Pain Rating:  (Pt reported having mild pain in head, but did not rate)  Pain Rating Post-Intervention: 0/10    Objective:  Patient found with: peripheral IV, telemetry.  Wife present during evaluation.      Cognitive Exam:  Oriented to: Person, Place, Time and Situation  Follows Commands/attention: Follows multistep  commands  Communication: clear/fluent  Memory:  No Deficits noted  Safety awareness/insight to disability: intact  Coping skills/emotional control: Appropriate to situation    Visual/perceptual:  Intact    Physical Exam:  Postural examination/scapula alignment: No postural abnormalities identified  Skin integrity: Visible skin intact  Edema: None noted     Sensation:   Intact    Upper Extremity Range of Motion:  Right Upper Extremity: WNL  Left Upper Extremity: WNL    Upper Extremity Strength:  Right Upper Extremity: WNL  Left Upper Extremity: WNL   Strength: 5/5 both hands    Fine motor coordination:   Intact    Gross motor coordination: WFL    Functional Mobility:  Bed Mobility:  Rolling/Turning to Left: Independent  Rolling/Turning Right: Independent  Scooting/Bridging: Independent  Supine to Sit: Independent    Transfers:  Sit <> Stand Assistance: Independent x 1 trial from EOB  Sit <> Stand Assistive Device: No Assistive Device    Functional Ambulation: Pt walked     Activities of Daily Living:    Grooming Position: Standing at sink  Grooming Level of Assistance: Independent for washing face with cloth while utilizing (B) UE.     Balance:   Static Sit: NORMAL: No deviations seen in posture held statically  Dynamic Sit: NORMAL: No deviations seen in posture held dynamically  Static Stand: NORMAL: No deviations seen in posture held statically  Dynamic stand: GOOD+: Independent gait (with or without assistive device)    Therapeutic Activities and Exercises:  *Pt educated on role of OT/PT and POC discussed    AM-PAC 6 CLICK ADL  How much help from another person does  "this patient currently need?  1 = Unable, Total/Dependent Assistance  2 = A lot, Maximum/Moderate Assistance  3 = A little, Minimum/Contact Guard/Supervision  4 = None, Modified [place]/Independent    Putting on and taking off regular lower body clothing? : 4  Toileting, which includes using toilet, bedpan, or urinal? : 4  Putting on and taking off regular upper body clothing?: 4  Taking care of personal grooming such as brushing teeth?: 4  Eating meals?: 4    AM-PAC Raw Score CMS "G-Code Modifier Level of Impairment Assistance   6 % Total / Unable   7 - 9 CM 80 - 100% Maximal Assist   10 - 14 CL 60 - 80% Moderate Assist   15 - 19 CK 40 - 60% Moderate Assist   20 - 22 CJ 20 - 40% Minimal Assist   23 CI 1-20% SBA / CGA   24 CH 0% Independent/ Mod I       Patient left up in chair with all lines intact and call button in reach    Assessment:  Liang Monterroso Jr. is a 76 y.o. male with a medical diagnosis of Meningioma and presents with mild pain in head near incision site.  Pt demonstrates strength and ROM in (B) UE needed for ADLs that is WNL, and is able to ambulate independently with no instances of LOB noted.  PTA pt reports being (I) with all ADLs and mobility.  Pt is safe to return home and does not require OT services in the acute care setting.     Rehab identified problem list/impairments: Rehab identified problem list/impairments:  (No major functional deficits noted with ADLs and mobility)    Rehab potential is excellent.    Activity tolerance: Good    Discharge recommendations: Discharge Facility/Level Of Care Needs: home     Barriers to discharge: Barriers to Discharge: None    Equipment recommendations: none     GOALS:   Occupational Therapy Goals     Not on file      Multidisciplinary Problems (Resolved)        Problem: Occupational Therapy Goal    Goal Priority Disciplines Outcome Interventions   Occupational Therapy Goal   (Resolved)     OT, PT/OT Outcome(s) achieved              PLAN:  Patient " does not require OT services in the acute care setting at this time.  Pt to d/c from OT with no further therapy needs recommended upon d/c from acute care.  Plan of Care reviewed with: patient, spouse       BETINA Thayer  03/21/2017

## 2017-03-21 NOTE — PLAN OF CARE
Problem: Physical Therapy Goal  Goal: Physical Therapy Goal  Outcome: Outcome(s) achieved Date Met:  03/21/17  Pt chart reviewed and PT evaluation completed- see note for details. Pt demonstrated high (I) with functional mobility, no significant gait or balance deficits identified. Pt discharged from PT services. Recommending pt discharge home with no further therapy or DME needs anticipated.      Dulce Holt, PT, DPT   3/21/2017  Pager: [phone]

## 2017-03-21 NOTE — PLAN OF CARE
Pt chart reviewed and PT evaluation completed POD 1- see note for details. Pt demonstrated high (I) with functional mobility this date. Pt discharged from PT services. Recommending pt discharge home with no further therapy or DME needs anticipated.     Dulce Holt, PT, DPT   3/21/2017  Pager: [phone]

## 2017-03-21 NOTE — TELEPHONE ENCOUNTER
Attempted to contact patient's wife. No answer. Voice message left advising wife to call clinic back. Call back number provided.

## 2017-03-21 NOTE — PLAN OF CARE
POC reviewed with patient and spouse. Verbalized understanding. AAOx4. Pt remained free from falls and injuries this shift. No new skin breakdown noted. VSS. Afebrile. Safety maintained. Telemetry monitoring in progress- NSR. Surgical dressing remained intact without complications. C/o pain managed with tylenol and rest. NS @ 100 cc/hr. Questions and concerns addressed. No acute events overnight. Pt progressing towards goals. Will continue to monitor.

## 2017-03-21 NOTE — OP NOTE
DATE OF PROCEDURE:  03/20/2017    SURGEON:  Jameson Chen M.D., Ph.D.    ASSISTANT:  Ryan Bergeron M.D. (RES) (the assistant is a [facility]uro[facility] resident).    PREOPERATIVE DIAGNOSES:  1.  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  2.  Cerebral edema.    POSTOPERATIVE DIAGNOSES:  1.  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  2.  Cerebral edema.    PROCEDURES:  1.  Right temporal craniotomy for tumor resection.  2.  Stealth navigation.  3.  Microsurgical technique.    ESTIMATED BLOOD LOSS:  Approximately 50 mL.    INDICATIONS IN DETAIL:  Mr. Monterroso is a pleasant 76-year-old gentleman who was   discovered to have a brain tumor after suffering a concussion after a fall.  The   patient was seen and evaluated by Dr. Jameson Chen and after the after the   risks, benefits and alternatives were described, the patient wished to have the   tumor removed.  The patient was consented and wished to proceed.    PROCEDURE IN DETAIL:  The patient was brought to the Operating Room and general   anesthetic was administered.  All proper lines were placed.  The patient was   placed in the supine position with a bump underneath his right shoulder and his   head turned towards the left to allow the surgeons access to the right posterior   temporal area.  His head was placed in 3-point fixation and all pressure points   were padded.  The Stealth navigation system was brought to the field and an   accurate registration was achieved using the tracer function.  The area over the   tumor could be seen and was drawn on the patient's scalp.  A line bisecting   this parallel to the blood supply in the scalp was made.  This line bisecting   the projection of the tumor measured approximately 5 cm.  The skin was clipped   around this line.  The skin was then cleaned, prepped and draped in usual   fashion.  A lidocaine-bupivacaine mix was infiltrated under the skin.  Prior to   positioning, the patient had a scalp block  as he was part of the craniotomy for   tumor pathway.  The incision was made using a #10 blade and carried down to the   calvarium using Bovie cautery.  The soft tissue was dissected off the calvarium   and held in retraction using cerebellar retractor.  A large bur hole was made   using an acorn karl over the transverse sinus.  Once the dura was visible, we   then expanded the bur hole.  We were able to see the junction of the sinus in   the normal dura.  Another bur hole was made superior to the projection of the   tumor.  We then turned a craniotomy flap going from the lower karl hole to the   upper.  The microscope was brought to the field.  The wound was irrigated   copiously and all bleeding points were coagulated.  The dura was then opened   using a #15 blade and flap down towards the sinus.  Once this was performed, the   tumor could be seen as well as its junction with the normal brain parenchyma.    Under microscopic visualization, we dissected the tumor away from the brain at   its edges initially.  Then given the consistency of the tumor, we cord the tumor   by removing its center using the Sonopet.  We did this until the tumor was   relatively soft and the outer edges of the tumor could be easily moved into the   cord out center.  Under microscopic visualization, we then dissected the tumor   away from the brain edge.  We completed this maneuver until the tumor was   completely  from the brain.  We then resected the tumor and its capsule   using suction bipolar and microscissors.  Once this was complete, there was   tumor attached to the tentorium.  For this we cauterized the tentorium and   removed the tumor using a #2 Rhoton instrument.  Once this was complete, the   tumor had been removed entirely.  The wound was irrigated copiously.  All   bleeding points were coagulated.  The resection cavity was lined with Surgicel.    Evicel was placed on this to glue it in place.  The dura was then    reapproximated using 4-0 Nurolon sutures.  The bone flap was replaced using   Kristin plates and screws.  The soft tissues were then closed in layers with a   4-0 Monocryl in the skin.  A clean dressing was placed.  The patient was taken   out of 3-point fixation.  The patient was awakened by the Anesthesia staff.  At   that point, the patient was following commands, he was extubated.    All counts were correct at the end of surgery.    There were no intraoperative complications.    Dr. Jameson Chen was present during the entire procedure.      FARZANEH  dd: 03/20/2017 16:40:52 (CDT)  td: 03/20/2017 20:27:15 (CDT)  Doc ID   #[id number]  Job ID #[id number]    CC:

## 2017-03-21 NOTE — DISCHARGE INSTRUCTIONS
Patient Information  -No driving while taking narcotic pain medications  -Do not take any OTC products containing acetaminophen at the same time as you take your narcotic pain medication. Medications that may contain acetaminophen include but are not limited to: Excedrin and other headache medications, arthritis medications, cold and sinus medications, etc. Please review the list of active ingredients in any OTC medication prior to taking it.  -Do not take any Aspirin or Aspirin containing products for 2 weeks after surgery. OK to resume on 4/3/2017.  -Do not take any of your herbal supplements for 2 weeks after surgery. OK to resume on 4/3/2017.  -Do not take any Aleeve, Naprosyn, Naproxen, Ibuprofen, Advil or any other NSAID for 2 weeks.   -Do not consume any alcoholic beverages until released by your Neurosurgeon  -Do not perform any excessive bending over or leaning forward as this is a fall hazard.  -Do not perform any heavy lifting or lifting more than 10 lbs from the ground level as this is a fall hazard.     Contact the Neurosurgery Office immediately if:  -If you begin to notice any neurologic changes such as:  -Sudden onset of lethargy or sleepiness  -Sudden confusion, trouble speaking, or understanding   -Sudden trouble seeing in one or both eyes   -Sudden trouble walking, dizziness, loss of coordination   -Sudden severe headache with no known cause   -Sudden onset of numbness or weakness      Wound Care:  Keep your incision open to air. You may shower on Day 2. Have the stream of water hit you opposite from the incision. Do not scrub the incision. Pat the incision dry after your shower. You cannot take a bath/swim/submerge the incision until 8 weeks after surgery.     Apply Bacitracin ointment (over the counter) to incision twice daily.     Call your doctor or go to the Emergency Room for any signs of infection including: increased redness, drainage, pain or fever (temperature greater than or equal to  101.4).         Miscellaneous:  -You were started on a steroid called Decadron. Please pay attention to dosing instructions because you are being sent home with a 3 week taper of the medication to off. Please take Protonix to protect your stomach while you are taking the steroids.   -Follow up with Dr. Chen in 2 weeks for wound check and pathology results  -Appointments will be mailed to you        Neurosurgery Office: [phone]

## 2017-03-21 NOTE — TELEPHONE ENCOUNTER
Advised patient, per Debbie Lopez PA-C, no herbal supplements for 2 weeks, but Metamucil is okay. Understanding verbalized.

## 2017-03-21 NOTE — TELEPHONE ENCOUNTER
----- Message from Lesley Marcus sent at 3/21/2017  2:13 PM CDT -----  Contact: parul (wife) @ [phone]  Pt is being discharged from the hospital right now.  pts wife says they were advised to contact carlos if they had any questions.  Would like to speak with you about not taking metamucil.  pls call.

## 2017-03-21 NOTE — PLAN OF CARE
Plan of care reviewed with Pt and spouse. Fall precautions maintained. Side rails up X 2, bed in lowest position, call bell within reach. No distress noted.

## 2017-03-22 ENCOUNTER — PATIENT OUTREACH (OUTPATIENT)
Dept: ADMINISTRATIVE | Facility: CLINIC | Age: 77
End: 2017-03-22
Payer: MEDICARE

## 2017-03-22 ENCOUNTER — NURSE TRIAGE (OUTPATIENT)
Dept: ADMINISTRATIVE | Facility: CLINIC | Age: 77
End: 2017-03-22

## 2017-03-22 LAB
BLD PROD TYP BPU: NORMAL
BLD PROD TYP BPU: NORMAL
BLOOD UNIT EXPIRATION DATE: NORMAL
BLOOD UNIT EXPIRATION DATE: NORMAL
BLOOD UNIT TYPE CODE: 5100
BLOOD UNIT TYPE CODE: 5100
BLOOD UNIT TYPE: NORMAL
BLOOD UNIT TYPE: NORMAL
CODING SYSTEM: NORMAL
CODING SYSTEM: NORMAL
DISPENSE STATUS: NORMAL
DISPENSE STATUS: NORMAL
TRANS ERYTHROCYTES VOL PATIENT: NORMAL ML
TRANS ERYTHROCYTES VOL PATIENT: NORMAL ML

## 2017-03-22 NOTE — PLAN OF CARE
Future Appointments  Date Time Provider Department Center   4/4/2017 10:45 AM Jameson Chen MD [facility] NEUROSC [address]   4/10/2017 2:20 PM Regulo Haider III, MD [facility]   5/9/2017 9:30 AM [facility]          03/22/17 0838   Final Note   Assessment Type Final Discharge Note   Discharge Disposition Home   Discharge planning education complete? Yes   What phone number can be called within the next 1-3 days to see how you are doing after discharge? [phone]   Hospital Follow Up  Appt(s) scheduled? Yes   Discharge plans and expectations educations in teach back method with documentation complete? Yes   Offered [facility] -- Bedside Delivery? n/a   Discharge/Hospital Encounter Summary to ([facility]) PCP n/a   Referral to Outpatient Case Management complete? n/a   Referral to / orders for Home Health Complete? n/a   30 day supply of medicines given at discharge, if documented non-compliance / non-adherence? n/a   Any social issues identified prior to discharge? Yes   Did you assess the readiness or willingness of the family or caregiver to support self management of care? Yes

## 2017-03-22 NOTE — TELEPHONE ENCOUNTER
Reason for Disposition   Caller has NON-URGENT question and triager unable to answer question    Answer Assessment - Initial Assessment Questions  Pt reported he had brain tumor removed Monday. This am was taking a shower and had sudden expulsion of thick whitish/yellow mucous from right nare. Was < a tsp. No drainage since and no other new sx's.  He had some concerns as he was reading paperwork and noted it might have extended into his sinuses. He had been given a card from the PA who advised him to call with any concerns. He called about 1500 but no call back as of this time.    Protocols used: ST POST-OP SYMPTOMS AND [id number]    Advised him I would send message to 's office - he is to f/u with them tomorrow for further questions/concerns. Advised to call back if any new sx's or any new drainage from nose/ears.

## 2017-03-22 NOTE — PATIENT INSTRUCTIONS
Discharge Instructions for Craniotomy  You had a craniotomy, which is the surgical opening of the skull. Your healthcare provider needed to do this to perform brain surgery. Recovery after a craniotomy varies, depending on why the procedure was done. The guidelines provided here are for general care. Ask your healthcare provider to provide additional information based on your specific condition.  Home care  Do's and don'ts include:   · Increase your activity slowly.  · Dont drive until your healthcare provider says its OK.  · Dont lift anything until your healthcare provider says its OK.  · Take your medicine exactly as directed.  · Shower as necessary, but keep your incision dry. You may wash your hair with mild soap after your stitches or staples have been removed.  · Dont apply creams, lotions, or other ointments to your incision. Keeping the incision clean and dry will help it to heal quickly. Most stitches or staples in the scalp are removed in 7 to 10 days.  · Do not drink alcohol.  · Get plenty of rest and sleep.  · Do not take aspirin or ibuprofen (or similar medicines) unless your healthcare provider says it's OK.   Follow-up  Make a follow-up appointment.     When to call your healthcare provider  Call your healthcare provider right away if you have any of the following:  · Swelling on the face or scalp  · Incision that becomes red and hot or drainage from incision  · Fever of 100.4°F (38°C) or higher, or chills  · Confusion, memory loss, trouble speaking, or hallucinations  · Fainting or blacking out  · Double or blurred vision; partial or total loss of vision  · Numbness, tingling, or weakness in your face, arms, hands, legs, or feet  · Stiffness in your neck  · Severe sensitivity to light (photophobia) or severe headache  · Seizure  · Trouble controlling your bowels or bladder  · Nausea or vomiting  · Fluid draining from the nose or ear      Date Last Reviewed: 11/5/2015  © 5804-0038 The  [employer]. [address] [place] [address], [place] [address]. All rights reserved. This information is not intended as a substitute for professional medical care. Always follow your healthcare professional's instructions.

## 2017-03-28 ENCOUNTER — TELEPHONE (OUTPATIENT)
Dept: NEUROSURGERY | Facility: CLINIC | Age: 77
End: 2017-03-28

## 2017-03-28 NOTE — TELEPHONE ENCOUNTER
Advised patient per Dr. Chen that mucus is normal and to continue using Ocean nasal spray. Patient verbalized understanding.

## 2017-04-04 ENCOUNTER — OFFICE VISIT (OUTPATIENT)
Dept: NEUROSURGERY | Facility: CLINIC | Age: 77
End: 2017-04-04
Payer: MEDICARE

## 2017-04-04 VITALS
SYSTOLIC BLOOD PRESSURE: 123 MMHG | WEIGHT: 185 LBS | BODY MASS INDEX: 28.04 KG/M2 | HEIGHT: 68 IN | HEART RATE: 60 BPM | DIASTOLIC BLOOD PRESSURE: 80 MMHG

## 2017-04-04 DIAGNOSIS — D32.9 MENINGIOMA: Primary | ICD-10-CM

## 2017-04-04 DIAGNOSIS — G93.6 CEREBRAL EDEMA: ICD-10-CM

## 2017-04-04 PROCEDURE — 99024 POSTOP FOLLOW-UP VISIT: CPT | Mod: S$GLB,,, | Performed by: NEUROLOGICAL SURGERY

## 2017-04-04 PROCEDURE — 99999 PR PBB SHADOW E&M-EST. PATIENT-LVL III: CPT | Mod: PBBFAC,,, | Performed by: NEUROLOGICAL SURGERY

## 2017-04-04 NOTE — PROGRESS NOTES
"Subjective:    I, Shelby Woodall, am scribing for, and in the presence of, Dr. Jameson Chen.     Patient ID: Liang Monterroso Jr. is a 76 y.o. male.    Chief Complaint: Post-op Evaluation    HPI This is a 76-year-old male who presents today 2-weeks post-op right temporal craniotomy for resection of tentorial meningioma measuring 2.7 x 2.7 cm on 3/20/2017. The patient is doing well without significant complaints. He is weaning off the steroids.    Review of Systems   Constitutional: Negative for activity change, fatigue and fever.   HENT: Negative for facial swelling.    Eyes: Negative.    Respiratory: Negative.    Cardiovascular: Negative.    Gastrointestinal: Negative for diarrhea, nausea and vomiting.   Genitourinary: Negative.    Musculoskeletal: Negative for back pain, joint swelling and myalgias.   Neurological: Negative for seizures, weakness, numbness and headaches.   Psychiatric/Behavioral: Negative.        Past Medical History:   Diagnosis Date    Allergy     CKD (chronic kidney disease) stage 3, GFR 30-59 ml/min 6/15/2016    GERD (gastroesophageal reflux disease)     Hyperlipidemia     Hypertension     Hypothyroidism     Thyroid disease     Urinary tract infection      Objective:     /80  Pulse 60  Ht 5' 8" (1.727 m)  Wt 83.9 kg (185 lb)  BMI 28.13 kg/m2    Physical Exam   Constitutional: He is oriented to person, place, and time. He appears well-developed and well-nourished.   HENT:   Head: Normocephalic and atraumatic.   The incision site is healing well   Neck: Neck supple.   Neurological: He is alert and oriented to person, place, and time. No cranial nerve deficit. He displays a negative Romberg sign. GCS eye subscore is 4. GCS verbal subscore is 5. GCS motor subscore is 6.       Pathology report shows a meningioma, WHO grade I    I, Dr. Jameson Chen, personally performed the services described in this documentation as scribed by Shelby Woodall in my presence, and it is both accurate and " complete.  Assessment:       1. Meningioma    2. Cerebral edema        Plan:   The patient is doing well 2-weeks post-op right temporal craniotomy for resection of tentorial meningioma measuring 2.7 x 2.7 cm on 3/20/2017. I have reviewed the mechanism of the surgery and both the pre-operative and post-operative imaging with the patient. The skull bone will take approximately 2 months to fuse. The pathology report shows a meningioma, WHO grade I, which is a benign tumor with low likelihood of recurrence. I have answered all the patient's questions. I will schedule him follow up in 6 months with repeat MRI of the brain.

## 2017-04-04 NOTE — PATIENT INSTRUCTIONS
The patient is doing well 2-weeks post-op right temporal craniotomy for resection of tentorial meningioma measuring 2.7 x 2.7 cm on 3/20/2017. I have reviewed the mechanism of the surgery and both the pre-operative and post-operative imaging with the patient. The skull bone will take approximately 2 months to fuse. The pathology report shows a meningioma, WHO grade I, which is a benign tumor with low likelihood of recurrence. I have answered all the patient's questions. I will schedule him follow up in 6 months with repeat MRI of the brain.

## 2017-04-04 NOTE — MR AVS SNAPSHOT
[facility]  [address]  [facility] [address]  Phone: [phone]                  Liang Monterroso JrEstephania   2017 10:45 AM   Office Visit    Description:  Male : 1940   Provider:  Jameson Chen MD   Department:  [facility]           Reason for Visit     Post-op Evaluation           Diagnoses this Visit        Comments    Meningioma    -  Primary     Cerebral edema                To Do List           Future Appointments        Provider Department Dept Phone    4/10/2017 2:20 PM Regulo Haider III, MD [facility] [phone]    2017 9:30 AM Ayanna Nieto MD [facility] [phone]      Goals (5 Years of Data)     Increase water intake       [facility]     [facility] On Call Nurse Care Line -  Assistance  Unless otherwise directed by your provider, please contact [facility], our nurse care line that is available for  assistance.     Registered nurses in the [facility] On Call Center provide: appointment scheduling, clinical advisement, health education, and other advisory services.  Call: [phone] (toll free)               Medications           Message regarding Medications     Verify the changes and/or additions to your medication regime listed below are the same as discussed with your clinician today.  If any of these changes or additions are incorrect, please notify your healthcare provider.             Verify that the below list of medications is an accurate representation of the medications you are currently taking.  If none reported, the list may be blank. If incorrect, please contact your healthcare provider. Carry this list with you in case of emergency.           Current Medications     dexamethasone (DECADRON) 2 MG tablet Take 4mg q 6h for 3 days, then 4mg q 8h for 3 days, then 4mg q 12h for 3 days, then 2mg q 8h for 3 days, then 2mg q 12h for 3 days, then 2mg daily for 6 days, then OFF.     "econazole nitrate 1 % cream USE TWICE DAILY    famotidine (PEPCID) 20 MG tablet Take 20 mg by mouth 2 (two) times daily as needed.      hydrocodone-acetaminophen 7.5-325mg (NORCO) 7.5-325 mg per tablet Take 1 tablet by mouth every 6 (six) hours as needed for Pain.    levothyroxine (SYNTHROID) 112 MCG tablet Take 1 tablet (112 mcg total) by mouth before breakfast.    MAG HYDROX/AL HYDROX/SIMETH (ANTACID ORAL) Take by mouth as needed (acid reflux).    metronidazole (METROGEL) 0.75 % gel Apply 1 application topically every evening.    multivitamin with minerals tablet Take 1 tablet by mouth once daily.      PRAMOSONE cream APPLY TOPICALLY TWICE DAILY AS NEEDED FOR ITCHING    pravastatin (PRAVACHOL) 20 MG tablet Take 1 tablet (20 mg total) by mouth once daily.    ramipril (ALTACE) 5 MG capsule Take 1 capsule (5 mg total) by mouth once daily.    tamsulosin (FLOMAX) 0.4 mg Cp24 Take 1 capsule (0.4 mg total) by mouth once daily.    triamcinolone acetonide 0.1% (KENALOG) 0.1 % cream Use bid prn rash    fexofenadine (ALLEGRA) 180 MG tablet Take 1 tablet (180 mg total) by mouth once daily.           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 I have reviewed the mechanism of the surgery and both the pre-operative and post-operative imaging with the patient. The skull bone will take approximately 2 months to fuse. The pathology " report shows a meningioma, WHO grade I, which is a benign tumor with low likelihood of recurrence. I have answered all the patient's questions. I will schedule him follow up in 6 months with repeat MRI of the brain.       Language Assistance Services     ATTENTION: Language assistance services are available, free of charge. Please call [phone].      ATENCIÓN: Si habla español, tiene a jane disposición servicios gratuitos de asistencia lingüística. Llame al [phone].     CHÚ Ý: N?u b?n nói Ti?ng Vi?t, có các d?ch v? h? tr? ngôn ng? mi?n phí dành cho b?n. G?i s? [phone].         [facility] complies with applicable Federal civil rights laws and does not discriminate on the basis of race, color, national origin, age, disability, or sex.

## 2017-04-10 ENCOUNTER — OFFICE VISIT (OUTPATIENT)
Dept: NEUROLOGY | Facility: CLINIC | Age: 77
End: 2017-04-10
Payer: MEDICARE

## 2017-04-10 VITALS — BODY MASS INDEX: 27.74 KG/M2 | HEART RATE: 80 BPM | HEIGHT: 68 IN | WEIGHT: 183 LBS

## 2017-04-10 DIAGNOSIS — Z86.018 S/P RESECTION OF MENINGIOMA: ICD-10-CM

## 2017-04-10 DIAGNOSIS — S06.0X1D CONCUSSION, WITH LOC OF 30 MIN OR LESS, SUBSEQUENT ENCOUNTER: Primary | ICD-10-CM

## 2017-04-10 DIAGNOSIS — Z98.890 S/P RESECTION OF MENINGIOMA: ICD-10-CM

## 2017-04-10 PROCEDURE — 99214 OFFICE O/P EST MOD 30 MIN: CPT | Mod: S$GLB,,, | Performed by: PSYCHIATRY & NEUROLOGY

## 2017-04-10 PROCEDURE — 1160F RVW MEDS BY RX/DR IN RCRD: CPT | Mod: S$GLB,,, | Performed by: PSYCHIATRY & NEUROLOGY

## 2017-04-10 PROCEDURE — 99999 PR PBB SHADOW E&M-EST. PATIENT-LVL III: CPT | Mod: PBBFAC,,, | Performed by: PSYCHIATRY & NEUROLOGY

## 2017-04-10 PROCEDURE — 1126F AMNT PAIN NOTED NONE PRSNT: CPT | Mod: S$GLB,,, | Performed by: PSYCHIATRY & NEUROLOGY

## 2017-04-10 PROCEDURE — 1159F MED LIST DOCD IN RCRD: CPT | Mod: S$GLB,,, | Performed by: PSYCHIATRY & NEUROLOGY

## 2017-04-10 PROCEDURE — 1157F ADVNC CARE PLAN IN RCRD: CPT | Mod: S$GLB,,, | Performed by: PSYCHIATRY & NEUROLOGY

## 2017-04-10 NOTE — PROGRESS NOTES
Subjective:       Patient ID: Liang Monterroso Jr. is a 76 y.o. male.    Reason for Consult: Concussion and Headache      Interval History:  Liang Monterroso Jr. is here for follow up. 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  He is now status post resection of this on 3/20/17 by Dr. Chen.  He has filled out a [facility] postconcussion symptom questionnaire and scored a 4, severe problem for sleep disturbance.  He scored a 3, a moderate problem for fatigue, being irritable and difficulty urinating.  He is scored a 2, a mild problem for headaches, dizziness, feeling frustrated, restlessness.  He is scored a 1, no more of a problem for feeling depressed.  He is scored a 0, not express at all for nausea, noise sensitivity, forgetfulness, poor concentration, taking longer to think, blurred vision, light sensitivity, double vision.  He doesn't attribute the worsening of his difficulty urinating to the steroid taper that he is currently taking after his meningioma resection.  He will finish this in the next 2 days.     Objective:     Vitals:    04/10/17 1409   Pulse: 80     Patient is awake alert oriented to person place and time. Cranial Nerves II through XII without focal deficits.  Strength is 5 out of 5 in all 4 extremities.  Gait and station within normal limits.  Focused examination was undertaken today. Over 50% of face to face time of 25 minute visit time was in giving guidance, counseling and discussing treatment options.    I personally reviewed the patient's imaging and relayed my impression to him.  Results for orders placed or performed during the hospital encounter of 03/20/17   CT Head Without Contrast    Narrative    CT brain without contrast.    Comparison: MRI 03/19/2017    Technique: Multiple 5 mm axial images of the head were obtained without intravenous contrast.    Results: Interval operative change from right posterior temporal  occipital craniotomy for resection previous identified enhancing lesion resection..    There is mixed density collection within the right temporal occipital resection cavity compatible with postoperative gas fluid and hemorrhage. There is postoperative fat packing material underlying the craniotomy flap superficial to the dural plasty. Please note evaluation for residual lesion limited by noncontrast CT technique.    Superimposed age-appropriate generalized volume loss with scattered decreased attenuation supratentorial white matter for chronic ischemic change.    No evidence for  hydrocephalus. No midline shift. Single surgical skin staple overlies the left frontal soft tissues with overlying soft tissue swelling.    Impression     Expected operative change from posterior temporal occipital craniotomy for previous right temporal occipital lesion resection. Small likely postoperative gas fluid and hemorrhage within the resection cavity underlying the craniotomy.    Superimposed Age-appropriate generalized cerebral volume loss with mild degree of patchy decreased attenuation supratentorial white matter suggestive for chronic microvascular ischemic change .     Clinical correlation and followup advised.      Electronically signed by: DODIE BASSETT DO  Date:     03/20/17  Time:    12:23    Results for orders placed or performed during the hospital encounter of 02/18/17   MRI Brain W WO Contrast    Narrative    MRI brain with contrast    02/18/17 11:36:02    Accession# [id number]    CLINICAL INDICATION: 76 year old M with brain mass seen on MRI without contrast      TECHNIQUE: Multiplanar multisequence MR imaging of the brain was performed before and after the administration of 9 ml Gadavistintravenous contrast.     COMPARISON:  Noncontrast MRI 2/6/17    FINDINGS:    Patient again demonstrates an extra-axial mass extending superiorly from the tentorium uplifting the right temporal occipital junction. This lesion  demonstrates homogeneous enhancement and a broad dural attachment, most consistent with a meningioma. Low T2 signal intensity and susceptibility suggests associated calcification. Lesion measures 2.7 x 2.7 cm in maximal transverse dimension. There is localized mass effect with sulcal effacement and displacement of adjacent brain parenchyma. Mild adjacent vasogenic type edema. No impending herniation or midline shift.    No additional enhancing intra-or extra-axial lesions identified. The brain appears otherwise unchanged, again noting mild chronic microvascular ischemic disease.    The ventricles are stable in size and configuration without evidence of hydrocephalus. T2 skull base flow voids are preserved. Bone marrow signal intensity is unremarkable. Mild mucosal thickening in the paranasal sinuses.    Impression    2.7 cm homogeneously enhancing extra-axial mass subjacent to the right temporal occipital junction with mild localized mass effect and parenchymal edema. Lesion is not entirely specific, but imaging characteristics are highly suggestive of a meningioma.        Electronically signed by: SAM BEE MD  Date:     02/18/17  Time:    12:15    Results for orders placed or performed during the hospital encounter of 02/06/17   MRI Brain Without Contrast    Narrative    HISTORY: LOC    TECHNIQUE:  Multiplanar multisequence images of the brain without intravenous contrast.      COMPARISON: N/A    FINDINGS:     Brain and intracranial structures:      2.6 x 2.7 x 2.4 cm mildly T2 hypointense and T1 isointense lesion is present along the right temporal occipital junction with associated mass effect and adjacent white matter edema signal.  This mass appears to be extra-axial in location and demonstrates an internal serpiginous flow voids.  No associated internal hemorrhage or calcification.  There is question of minimal overlying hyperostosis.      There is no hemorrhage or or acute infarct. There is underlying  mild to moderate generalized cerebral volume loss, normal to slightly advanced for age. Mild to moderate burden of T2/FLAIR hyperintensities are scattered in the periventricular and subcortical white matter, likely related to sequela of chronic microvascular ischemic disease.  There are normal vascular flow voids.     Skull:  Normal.    Scalp: Normal.    Orbits and face (included portions): Bilateral aphakia.    Paranasal sinuses and mastoid air cells (included portions): Mucosal retention cyst versus polyp in the left maxillary sinus.    Other findings: N/A    Impression    2.7 cm lobulated extra-axial lesion at the right temporal occipital junction most characteristic of meningioma, with associated underlying white matter edema.      Electronically signed by: TANNER SAENZ  Date:     02/06/17  Time:    15:29        Assessment:     1. Concussion, with LOC of 30 min or less, subsequent encounter     2. S/P resection of meningioma         Plan:   76-year-old male presents for evaluation and follow-up of concussion sustained on January 16, 2017.  During the workup.  For his concussion he was found to have a meningioma in the right parietotemporal portion of his brain.  He is now status post resection and I have explained that likely the symptoms that he is reporting a related to the steroids that he has been prescribed or healing after the resection itself.  For now we have discussed that it would be prudent to monitor his clinical status and if he has not returned to baseline after 3 months than pursue rehabilitative efforts aggressively.  I have given him a way to titrate and then wean a dose of melatonin over the next 3-4 weeks to help with his sleep disturbance.  In terms of his urinary problems he may have had a response to the steroids for his possible BPH but as this is outside of my scope of practice I have asked him to reach out to his urologist.  We have discussed risk benefits and alternatives to the treatment  plan as outlined above.  I will follow up with them in 3 month(s).  The patient verbalizes understanding and agreement with the treatment plan. Questions were sought and answered to his stated verbal satisfaction.        Edd Haider MD    This note is dictated on Dragon Natural Speaking word recognition program. There are word recognition mistakes that are occasionally missed on review.

## 2017-04-10 NOTE — MR AVS SNAPSHOT
[facility]  [address]  [place] [address]  Phone: [phone]  Fax: [phone]                  Liang Monterroso Jr.   4/10/2017 2:20 PM   Office Visit    Description:  Male : 1940   Provider:  Regulo Haider III, MD   Department:  [facility]           Reason for Visit     Concussion     Headache           Diagnoses this Visit        Comments    Concussion, with LOC of 30 min or less, subsequent encounter    -  Primary     S/P resection of meningioma                To Do List           Future Appointments        Provider Department Dept Phone    2017 9:30 AM Ayanna Nieto MD [facility] [phone]    7/10/2017 2:20 PM Regulo Haider III, MD [facility] [phone]      Goals (5 Years of Data)     Increase water intake       Follow-Up and Disposition     Return in about 3 months (around 7/10/2017).      [facility]     [facility] On Call Nurse Care Line -  Assistance  Unless otherwise directed by your provider, please contact [facility] On-[facility], our nurse care line that is available for  assistance.     Registered nurses in the [facility] Center provide: appointment scheduling, clinical advisement, health education, and other advisory services.  Call: [phone] (toll free)               Medications           Message regarding Medications     Verify the changes and/or additions to your medication regime listed below are the same as discussed with your clinician today.  If any of these changes or additions are incorrect, please notify your healthcare provider.             Verify that the below list of medications is an accurate representation of the medications you are currently taking.  If none reported, the list may be blank. If incorrect, please contact your healthcare provider. Carry this list with you in case of emergency.           Current Medications     dexamethasone (DECADRON) 2 MG tablet Take 4mg q 6h for 3 days,  "then 4mg q 8h for 3 days, then 4mg q 12h for 3 days, then 2mg q 8h for 3 days, then 2mg q 12h for 3 days, then 2mg daily for 6 days, then OFF.    econazole nitrate 1 % cream USE TWICE DAILY    famotidine (PEPCID) 20 MG tablet Take 20 mg by mouth 2 (two) times daily as needed.      fexofenadine (ALLEGRA) 180 MG tablet Take 1 tablet (180 mg total) by mouth once daily.    hydrocodone-acetaminophen 7.5-325mg (NORCO) 7.5-325 mg per tablet Take 1 tablet by mouth every 6 (six) hours as needed for Pain.    levothyroxine (SYNTHROID) 112 MCG tablet Take 1 tablet (112 mcg total) by mouth before breakfast.    MAG HYDROX/AL HYDROX/SIMETH (ANTACID ORAL) Take by mouth as needed (acid reflux).    metronidazole (METROGEL) 0.75 % gel Apply 1 application topically every evening.    multivitamin with minerals tablet Take 1 tablet by mouth once daily.      PRAMOSONE cream APPLY TOPICALLY TWICE DAILY AS NEEDED FOR ITCHING    pravastatin (PRAVACHOL) 20 MG tablet Take 1 tablet (20 mg total) by mouth once daily.    ramipril (ALTACE) 5 MG capsule Take 1 capsule (5 mg total) by mouth once daily.    tamsulosin (FLOMAX) 0.4 mg Cp24 Take 1 capsule (0.4 mg total) by mouth once daily.    triamcinolone acetonide 0.1% (KENALOG) 0.1 % cream Use bid prn rash           Clinical Reference Information           Your Vitals Were     Pulse Height Weight BMI       80 5' 8" (1.727 m) 83 kg (182 lb 15.7 oz) 27.82 kg/m2       Allergies as of 4/10/2017     Iodine And Iodide Containing Products      Immunizations Administered on Date of Encounter - 4/10/2017     None      Language Assistance Services     ATTENTION: Language assistance services are available, free of charge. Please call [phone].      ATENCIÓN: Si sravanila jocelyne, tiene a jane disposición servicios gratuitos de asistencia lingüística. Llame al [phone].     CHÚ Ý: N?u b?n nói Ti?ng Vi?t, có các d?ch v? h? tr? ngôn ng? mi?n phí dành cho b?n. G?i s? [phone].         [facility] complies with applicable Federal civil rights laws and does not discriminate on the basis of race, color, national origin, age, disability, or sex.

## 2017-04-11 ENCOUNTER — OFFICE VISIT (OUTPATIENT)
Dept: UROLOGY | Facility: CLINIC | Age: 77
End: 2017-04-11
Payer: MEDICARE

## 2017-04-11 VITALS
BODY MASS INDEX: 27.9 KG/M2 | WEIGHT: 184.06 LBS | SYSTOLIC BLOOD PRESSURE: 127 MMHG | HEART RATE: 79 BPM | HEIGHT: 68 IN | DIASTOLIC BLOOD PRESSURE: 79 MMHG

## 2017-04-11 DIAGNOSIS — R82.71 BACTERIA IN URINE: ICD-10-CM

## 2017-04-11 DIAGNOSIS — N42.81 PROSTADYNIA: Primary | ICD-10-CM

## 2017-04-11 DIAGNOSIS — N40.1 BENIGN NON-NODULAR PROSTATIC HYPERPLASIA WITH LOWER URINARY TRACT SYMPTOMS: ICD-10-CM

## 2017-04-11 DIAGNOSIS — N41.1 CHRONIC PROSTATITIS: ICD-10-CM

## 2017-04-11 LAB
BILIRUB SERPL-MCNC: NORMAL MG/DL
BLOOD URINE, POC: 250
COLOR, POC UA: NORMAL
GLUCOSE UR QL STRIP: NORMAL
KETONES UR QL STRIP: NORMAL
LEUKOCYTE ESTERASE URINE, POC: NORMAL
NITRITE, POC UA: NORMAL
PH, POC UA: 6
PROTEIN, POC: NORMAL
SPECIFIC GRAVITY, POC UA: 1
UROBILINOGEN, POC UA: NORMAL

## 2017-04-11 PROCEDURE — 1125F AMNT PAIN NOTED PAIN PRSNT: CPT | Mod: S$GLB,,, | Performed by: NURSE PRACTITIONER

## 2017-04-11 PROCEDURE — 1157F ADVNC CARE PLAN IN RCRD: CPT | Mod: S$GLB,,, | Performed by: NURSE PRACTITIONER

## 2017-04-11 PROCEDURE — 81002 URINALYSIS NONAUTO W/O SCOPE: CPT | Mod: S$GLB,,, | Performed by: NURSE PRACTITIONER

## 2017-04-11 PROCEDURE — 99999 PR PBB SHADOW E&M-EST. PATIENT-LVL IV: CPT | Mod: PBBFAC,,, | Performed by: NURSE PRACTITIONER

## 2017-04-11 PROCEDURE — 99214 OFFICE O/P EST MOD 30 MIN: CPT | Mod: 25,S$GLB,, | Performed by: NURSE PRACTITIONER

## 2017-04-11 PROCEDURE — 87086 URINE CULTURE/COLONY COUNT: CPT

## 2017-04-11 PROCEDURE — 3074F SYST BP LT 130 MM HG: CPT | Mod: S$GLB,,, | Performed by: NURSE PRACTITIONER

## 2017-04-11 PROCEDURE — 1160F RVW MEDS BY RX/DR IN RCRD: CPT | Mod: S$GLB,,, | Performed by: NURSE PRACTITIONER

## 2017-04-11 PROCEDURE — 1159F MED LIST DOCD IN RCRD: CPT | Mod: S$GLB,,, | Performed by: NURSE PRACTITIONER

## 2017-04-11 PROCEDURE — 3078F DIAST BP <80 MM HG: CPT | Mod: S$GLB,,, | Performed by: NURSE PRACTITIONER

## 2017-04-11 RX ORDER — CIPROFLOXACIN 500 MG/1
500 TABLET ORAL 2 TIMES DAILY
Qty: 60 TABLET | Refills: 1 | Status: SHIPPED | OUTPATIENT
Start: 2017-04-11 | End: 2017-05-11

## 2017-04-11 NOTE — PATIENT INSTRUCTIONS
Chronic Prostatitis/Chronic Pelvic Pain Syndrome (CP/CPPS)      With a healthy prostate, urine flows easily through the urethra.     Chronic prostatitis/chronic pelvic pain syndrome (CP/CPPS) is ongoing pain in the area of the prostate gland. The prostate gland is part of the male reproductive system. It sits just below the bladder and surrounds the urethra. The urethra is the tube that takes urine and semen out of the body. CP/CPPS is the most common form of pain of the gland. It is also known as nonbacterial prostatitis. Symptoms such as pain and trouble urinating may come and go.  Causes of CP/CPPS  The exact cause of CP/CPPS isnt known. It may be caused by an infection that comes back again and again. It may be caused by inflammation of the gland. Muscle spasms in the pelvis may be a cause. Other causes of CP/CPPS may include:  · Stress that tightens the pelvic muscles  · Urine flowing back up into the prostate ducts  · Not ejaculating often  In many cases, the cause isnt clear.  Symptoms of CP/CPPS  Some men dont have symptoms. Or, they may have symptoms that come and go. The symptoms can include:  · Pain in the genitals and pelvic area  · Trouble urinating  · Pain while urinating  · Pain during or after ejaculation  Diagnosing CP/CPPS  Your health care provider will ask about your medical history and your symptoms. He or she may give you a physical exam, including a rectal exam. Your urine, blood, and semen may be tested for bacteria or certain chemicals. In some cases, you may have other tests. You may have a transrectal ultrasound-guided biopsy. This is done to take tiny pieces of tissue to look at with a microscope. Or, you may have imaging tests such as a CT scan, MRI, or ultrasound scan. These are done to look at your abdomen and pelvic areas.  Treating CP/CPPS  The goal of treatment is to help relieve symptoms. Treatments can include one or more of these:  · Antibiotic  medication  · Anti-inflammatory or muscle-relaxing medications   · Alpha-blocker medications, which relax the muscles in and around the gland  · Sitz baths  · Prostate massage  · Dietary changes  · Biofeedback  Date Last Reviewed: 9/12/2014  © 8463-7739 [employer]. [address] [place] [address], [place] [address]. All rights reserved. This information is not intended as a substitute for professional medical care. Always follow your healthcare professional's instructions.

## 2017-04-11 NOTE — PROGRESS NOTES
CHIEF COMPLAINT:    Mr. Monterroso is a 76 y.o. male presenting for cloudy urine and dysuria.   PRESENTING ILLNESS:    Liang Monterroso Jr. is a 76 y.o. male with a PMH of BPH and recurrent prostatitis who presents for cloudy urine, dysuria, and difficulty urinating.   Last seen with Dr. Edi on 12/28/16.    He had a cysto 3/2016 for consideration of TURP by Dr. Eid.    He reports dysuria (rates a 3-4), difficulty urinating, frequency, and cloudy urine starting yesterday. Symptoms worsened today.   He reports a decent urinary stream and incomplete bladder emptying.  He takes flomax daily.       REVIEW OF SYSTEMS:    Review of Systems    Constitutional: Negative for fever and chills.   HENT: Negative for hearing loss.   Eyes: Negative for visual disturbance.   Respiratory: Negative for shortness of breath.   Cardiovascular: Negative for chest pain.   Gastrointestinal: Negative for nausea, vomiting, and constipation.   Genitourinary: see above  Neurological: Negative for dizziness.   Hematological: Does not bruise/bleed easily.   Psychiatric/Behavioral: Negative for confusion.       PATIENT HISTORY:    Past Medical History:   Diagnosis Date    Allergy     CKD (chronic kidney disease) stage 3, GFR 30-59 ml/min 6/15/2016    GERD (gastroesophageal reflux disease)     Hyperlipidemia     Hypertension     Hypothyroidism     Thyroid disease     Urinary tract infection        Past Surgical History:   Procedure Laterality Date    APPENDECTOMY      CATARACT EXTRACTION      EYE SURGERY      HERNIA REPAIR      TONSILLECTOMY         Family History   Problem Relation Age of Onset    Diabetes Mother     Heart disease Mother     Hypertension Mother     Vision loss Mother     Dementia Mother     Alcohol abuse Father     Cancer Sister      lung with mets, was a heavy smoker    No Known Problems Daughter     No Known Problems Son     No Known Problems Daughter     No Known Problems Son     Amblyopia Neg Hx      Blindness Neg Hx     Cataracts Neg Hx     Glaucoma Neg Hx     Macular degeneration Neg Hx     Retinal detachment Neg Hx     Strabismus Neg Hx     Stroke Neg Hx     Thyroid disease Neg Hx        Social History     Social History    Marital status:      Spouse name: parul    Number of children: 4    Years of education: N/A     Occupational History    retired      Social History Main Topics    Smoking status: Never Smoker    Smokeless tobacco: Never Used    Alcohol use Yes      Comment: 1-3 glasses wine weekly, 2-3 beers weekly    Drug use: No    Sexual activity: Yes     Partners: Female     Other Topics Concern    Not on file     Social History Narrative       Allergies:  Iodine and iodide containing products    Medications:    Current Outpatient Prescriptions:     levothyroxine (SYNTHROID) 112 MCG tablet, Take 1 tablet (112 mcg total) by mouth before breakfast., Disp: 30 tablet, Rfl: 5    multivitamin with minerals tablet, Take 1 tablet by mouth once daily.  , Disp: , Rfl:     pravastatin (PRAVACHOL) 20 MG tablet, Take 1 tablet (20 mg total) by mouth once daily., Disp: 90 tablet, Rfl: 3    ramipril (ALTACE) 5 MG capsule, Take 1 capsule (5 mg total) by mouth once daily., Disp: 30 capsule, Rfl: 5    tamsulosin (FLOMAX) 0.4 mg Cp24, Take 1 capsule (0.4 mg total) by mouth once daily., Disp: 90 capsule, Rfl: 4    ciprofloxacin HCl (CIPRO) 500 MG tablet, Take 1 tablet (500 mg total) by mouth 2 (two) times daily., Disp: 60 tablet, Rfl: 1    dexamethasone (DECADRON) 2 MG tablet, Take 4mg q 6h for 3 days, then 4mg q 8h for 3 days, then 4mg q 12h for 3 days, then 2mg q 8h for 3 days, then 2mg q 12h for 3 days, then 2mg daily for 6 days, then OFF., Disp: 75 tablet, Rfl: 0    econazole nitrate 1 % cream, USE TWICE DAILY (Patient taking differently: USE TWICE DAILY (prn)), Disp: 60 g, Rfl: 5    famotidine (PEPCID) 20 MG tablet, Take 20 mg by mouth 2 (two) times daily as needed.  , Disp: , Rfl:      fexofenadine (ALLEGRA) 180 MG tablet, Take 1 tablet (180 mg total) by mouth once daily. (Patient taking differently: Take 180 mg by mouth daily as needed. ), Disp: 30 tablet, Rfl: 11    fluconazole (DIFLUCAN) 150 MG Tab, Take 1 tablet (150 mg total) by mouth once daily., Disp: 3 tablet, Rfl: 3    hydrocodone-acetaminophen 7.5-325mg (NORCO) 7.5-325 mg per tablet, Take 1 tablet by mouth every 6 (six) hours as needed for Pain., Disp: 60 tablet, Rfl: 0    MAG HYDROX/AL HYDROX/SIMETH (ANTACID ORAL), Take by mouth as needed (acid reflux)., Disp: , Rfl:     metronidazole (METROGEL) 0.75 % gel, Apply 1 application topically every evening., Disp: 45 g, Rfl: 5    PRAMOSONE cream, APPLY TOPICALLY TWICE DAILY AS NEEDED FOR ITCHING, Disp: 57 g, Rfl: 5    triamcinolone acetonide 0.1% (KENALOG) 0.1 % cream, Use bid prn rash, Disp: 80 g, Rfl: 3    PHYSICAL EXAMINATION:    Constitutional: He is oriented to person, place, and time. He appears well-developed and well-nourished.  He is in no apparent distress.    Neck: No tracheal deviation present.     Cardiovascular: Normal rate.      Pulmonary/Chest: Effort normal. No respiratory distress.     Abdominal:  He exhibits no distension.  There is no CVA tenderness.     Lymphadenopathy:        Right: No supraclavicular adenopathy present.        Left: No supraclavicular adenopathy present.     Neurological: He is alert and oriented to person, place, and time.     Skin: Skin is warm and dry.     Extremities: No pitting edema noted in lower extremities bilaterally    Psych: Cooperative with normal affect.    Genitourinary: The penis is normal. The urethral meatus is normal. The testes, epididymides, and cord structures are normal in size and contour bilaterally. The scrotum is normal in size and contour.    Normal anal sphincter tone. No rectal mass.    The prostate is enlarged.  Prostate is boogy, tender with no nodules noted.    Physical Exam      LABS:  EPS visualized under  microscope and WBC visualized.   U/a today showed + leuk and 250 blood. Spec grav 1.005 and ph 6.   Lab Results   Component Value Date    PSA 0.91 12/17/2012    PSA 4.89 (H) 11/12/2012    PSA 0.66 11/14/2011    PSADIAG 1.0 01/11/2016    PSADIAG 1.0 12/04/2014    PSADIAG 0.67 12/03/2013       IMPRESSION:    Encounter Diagnoses   Name Primary?    Prostadynia Yes    Bacteria in urine     Benign non-nodular prostatic hyperplasia with lower urinary tract symptoms     Chronic prostatitis          PLAN:  -Discussed chronic prostatitis.   -UC sent to the lab.   -Discussed side effects, indications, and MOA for cipro. Prescription sent to the pharmacy. Pt verbalized understanding.  -Continue flomax  -RTC in 1 quita   -I encouraged him or any of his family members to call or email me with questions and/or concerns.        Larissa Strong, MARCELC

## 2017-04-11 NOTE — MR AVS SNAPSHOT
[facility] [address] Urology 4th Floor  [address]  [facility] [address]  Phone: [phone]                  Liang Monterroso Jr.   2017 10:20 AM   Office Visit    Description:  Male : 1940   Provider:  Larissa Strong NP   Department:  [facility] [address] Urolog 4th Floor           Reason for Visit     Urinary Tract Infection           Diagnoses this Visit        Comments    Prostadynia    -  Primary     Bacteria in urine                To Do List           Future Appointments        Provider Department Dept Phone    2017 10:15 AM Anastacio Eid Jr., MD [facility] [address] Urology 4th Floor [phone]    2017 9:30 AM Ayanna Nieto MD [facility] Spine Services [phone]    7/10/2017 2:20 PM Regulo Haider III, MD [facility] Neurology [phone]      Goals (5 Years of Data)     Increase water intake        These Medications        Disp Refills Start End    ciprofloxacin HCl (CIPRO) 500 MG tablet 60 tablet 1 2017    Take 1 tablet (500 mg total) by mouth 2 (two) times daily. - Oral    Pharmacy: [id number] [place] [address] [address]  #: [phone]         [facility]     [facility] Nurse Care Line -  Assistance  Unless otherwise directed by your provider, please contact [facility], our nurse care line that is available for  assistance.     Registered nurses in the [facility] Center provide: appointment scheduling, clinical advisement, health education, and other advisory services.  Call: [phone] (toll free)               Medications           Message regarding Medications     Verify the changes and/or additions to your medication regime listed below are the same as discussed with your clinician today.  If any of these changes or additions are incorrect, please notify your healthcare provider.        START taking these NEW medications        Refills    ciprofloxacin HCl (CIPRO) 500 MG tablet 1    Sig: Take 1  "tablet (500 mg total) by mouth 2 (two) times daily.    Class: Print    Route: Oral           Verify that the below list of medications is an accurate representation of the medications you are currently taking.  If none reported, the list may be blank. If incorrect, please contact your healthcare provider. Carry this list with you in case of emergency.           Current Medications     ciprofloxacin HCl (CIPRO) 500 MG tablet Take 1 tablet (500 mg total) by mouth 2 (two) times daily.    dexamethasone (DECADRON) 2 MG tablet Take 4mg q 6h for 3 days, then 4mg q 8h for 3 days, then 4mg q 12h for 3 days, then 2mg q 8h for 3 days, then 2mg q 12h for 3 days, then 2mg daily for 6 days, then OFF.    econazole nitrate 1 % cream USE TWICE DAILY    famotidine (PEPCID) 20 MG tablet Take 20 mg by mouth 2 (two) times daily as needed.      fexofenadine (ALLEGRA) 180 MG tablet Take 1 tablet (180 mg total) by mouth once daily.    hydrocodone-acetaminophen 7.5-325mg (NORCO) 7.5-325 mg per tablet Take 1 tablet by mouth every 6 (six) hours as needed for Pain.    levothyroxine (SYNTHROID) 112 MCG tablet Take 1 tablet (112 mcg total) by mouth before breakfast.    MAG HYDROX/AL HYDROX/SIMETH (ANTACID ORAL) Take by mouth as needed (acid reflux).    metronidazole (METROGEL) 0.75 % gel Apply 1 application topically every evening.    multivitamin with minerals tablet Take 1 tablet by mouth once daily.      PRAMOSONE cream APPLY TOPICALLY TWICE DAILY AS NEEDED FOR ITCHING    pravastatin (PRAVACHOL) 20 MG tablet Take 1 tablet (20 mg total) by mouth once daily.    ramipril (ALTACE) 5 MG capsule Take 1 capsule (5 mg total) by mouth once daily.    tamsulosin (FLOMAX) 0.4 mg Cp24 Take 1 capsule (0.4 mg total) by mouth once daily.    triamcinolone acetonide 0.1% (KENALOG) 0.1 % cream Use bid prn rash           Clinical Reference Information           Your Vitals Were     BP Pulse Height Weight BMI    127/79 79 5' 8" (1.727 m) 83.5 kg (184 lb 1.4 oz) " 27.99 kg/m2      Blood Pressure          Most Recent Value    BP  127/79      Allergies as of 4/11/2017     Iodine And Iodide Containing Products      Immunizations Administered on Date of Encounter - 4/11/2017     None      Orders Placed During Today's Visit      Normal Orders This Visit    POCT urine dipstick without microscope     Urine culture          4/11/2017 11:02 AM - Marline Correia LPN      Component Results     Component    Color, UA    Spec Grav UA    1.005    pH, UA    6    WBC, UA    2+    Nitrite, UA    n    Protein    n    Glucose, UA    n    Ketones, UA    n    Urobilinogen, UA    n    Bilirubin    n    Blood, UA    250            Instructions      Chronic Prostatitis/Chronic Pelvic Pain Syndrome (CP/CPPS)      With a healthy prostate, urine flows easily through the urethra.     Chronic prostatitis/chronic pelvic pain syndrome (CP/CPPS) is ongoing pain in the area of the prostate gland. The prostate gland is part of the male reproductive system. It sits just below the bladder and surrounds the urethra. The urethra is the tube that takes urine and semen out of the body. CP/CPPS is the most common form of pain of the gland. It is also known as nonbacterial prostatitis. Symptoms such as pain and trouble urinating may come and go.  Causes of CP/CPPS  The exact cause of CP/CPPS isnt known. It may be caused by an infection that comes back again and again. It may be caused by inflammation of the gland. Muscle spasms in the pelvis may be a cause. Other causes of CP/CPPS may include:  · Stress that tightens the pelvic muscles  · Urine flowing back up into the prostate ducts  · Not ejaculating often  In many cases, the cause isnt clear.  Symptoms of CP/CPPS  Some men dont have symptoms. Or, they may have symptoms that come and go. The symptoms can include:  · Pain in the genitals and pelvic area  · Trouble urinating  · Pain while urinating  · Pain during or after ejaculation  Diagnosing CP/CPPS  Your  health care provider will ask about your medical history and your symptoms. He or she may give you a physical exam, including a rectal exam. Your urine, blood, and semen may be tested for bacteria or certain chemicals. In some cases, you may have other tests. You may have a transrectal ultrasound-guided biopsy. This is done to take tiny pieces of tissue to look at with a microscope. Or, you may have imaging tests such as a CT scan, MRI, or ultrasound scan. These are done to look at your abdomen and pelvic areas.  Treating CP/CPPS  The goal of treatment is to help relieve symptoms. Treatments can include one or more of these:  · Antibiotic medication  · Anti-inflammatory or muscle-relaxing medications   · Alpha-blocker medications, which relax the muscles in and around the gland  · Sitz baths  · Prostate massage  · Dietary changes  · Biofeedback  Date Last Reviewed: 9/12/2014  © 0286-3175 [email or web address]. [address] [place] [address]. All rights reserved. This information is not intended as a substitute for professional medical care. Always follow your healthcare professional's instructions.             Language Assistance Services     ATTENTION: Language assistance services are available, free of charge. Please call [phone].      ATENCIÓN: Si habla jocelyne, tiene a jane disposición servicios gratuitos de asistencia lingüística. Llame al [phone].     JOSHUA Ý: N?u b?n nói Ti?ng Vi?t, có các d?ch v? h? tr? ngôn ng? mi?n phí dành cho b?n. G?i s? [phone].         [facility] 4th [facility] complies with applicable Federal civil rights laws and does not discriminate on the basis of race, color, national origin, age, disability, or sex.

## 2017-04-13 LAB — BACTERIA UR CULT: NO GROWTH

## 2017-04-17 ENCOUNTER — LAB VISIT (OUTPATIENT)
Dept: LAB | Facility: HOSPITAL | Age: 77
End: 2017-04-17
Attending: FAMILY MEDICINE
Payer: MEDICARE

## 2017-04-17 ENCOUNTER — OFFICE VISIT (OUTPATIENT)
Dept: INTERNAL MEDICINE | Facility: CLINIC | Age: 77
End: 2017-04-17
Payer: MEDICARE

## 2017-04-17 VITALS — SYSTOLIC BLOOD PRESSURE: 119 MMHG | DIASTOLIC BLOOD PRESSURE: 69 MMHG | HEART RATE: 70 BPM

## 2017-04-17 DIAGNOSIS — I10 ESSENTIAL HYPERTENSION: ICD-10-CM

## 2017-04-17 DIAGNOSIS — E06.3 HYPOTHYROIDISM DUE TO HASHIMOTO'S THYROIDITIS: ICD-10-CM

## 2017-04-17 DIAGNOSIS — E03.8 HYPOTHYROIDISM DUE TO HASHIMOTO'S THYROIDITIS: ICD-10-CM

## 2017-04-17 DIAGNOSIS — N18.30 CKD (CHRONIC KIDNEY DISEASE) STAGE 3, GFR 30-59 ML/MIN: ICD-10-CM

## 2017-04-17 DIAGNOSIS — D32.9 MENINGIOMA: Primary | ICD-10-CM

## 2017-04-17 DIAGNOSIS — R21 RASH: ICD-10-CM

## 2017-04-17 LAB
ALBUMIN SERPL BCP-MCNC: 3 G/DL
ALP SERPL-CCNC: 59 U/L
ALT SERPL W/O P-5'-P-CCNC: 37 U/L
ANION GAP SERPL CALC-SCNC: 12 MMOL/L
AST SERPL-CCNC: 20 U/L
BASOPHILS # BLD AUTO: 0.02 K/UL
BASOPHILS NFR BLD: 0.3 %
BILIRUB SERPL-MCNC: 0.4 MG/DL
BUN SERPL-MCNC: 15 MG/DL
CALCIUM SERPL-MCNC: 8.9 MG/DL
CHLORIDE SERPL-SCNC: 107 MMOL/L
CO2 SERPL-SCNC: 20 MMOL/L
CREAT SERPL-MCNC: 1.2 MG/DL
DIFFERENTIAL METHOD: ABNORMAL
EOSINOPHIL # BLD AUTO: 0.2 K/UL
EOSINOPHIL NFR BLD: 2.2 %
ERYTHROCYTE [DISTWIDTH] IN BLOOD BY AUTOMATED COUNT: 15.5 %
EST. GFR  (AFRICAN AMERICAN): >60 ML/MIN/1.73 M^2
EST. GFR  (NON AFRICAN AMERICAN): 58.8 ML/MIN/1.73 M^2
GLUCOSE SERPL-MCNC: 86 MG/DL
HCT VFR BLD AUTO: 41.2 %
HGB BLD-MCNC: 13.7 G/DL
LYMPHOCYTES # BLD AUTO: 1.8 K/UL
LYMPHOCYTES NFR BLD: 26.5 %
MCH RBC QN AUTO: 28.7 PG
MCHC RBC AUTO-ENTMCNC: 33.3 %
MCV RBC AUTO: 86 FL
MONOCYTES # BLD AUTO: 0.3 K/UL
MONOCYTES NFR BLD: 5 %
NEUTROPHILS # BLD AUTO: 4.5 K/UL
NEUTROPHILS NFR BLD: 65.9 %
PLATELET # BLD AUTO: 124 K/UL
PMV BLD AUTO: 10.5 FL
POTASSIUM SERPL-SCNC: 4.2 MMOL/L
PROT SERPL-MCNC: 6.5 G/DL
RBC # BLD AUTO: 4.77 M/UL
SODIUM SERPL-SCNC: 139 MMOL/L
TSH SERPL DL<=0.005 MIU/L-ACNC: 3.81 UIU/ML
WBC # BLD AUTO: 6.84 K/UL

## 2017-04-17 PROCEDURE — 36415 COLL VENOUS BLD VENIPUNCTURE: CPT

## 2017-04-17 PROCEDURE — 1159F MED LIST DOCD IN RCRD: CPT | Mod: S$GLB,,, | Performed by: FAMILY MEDICINE

## 2017-04-17 PROCEDURE — 3074F SYST BP LT 130 MM HG: CPT | Mod: S$GLB,,, | Performed by: FAMILY MEDICINE

## 2017-04-17 PROCEDURE — 99215 OFFICE O/P EST HI 40 MIN: CPT | Mod: S$GLB,,, | Performed by: FAMILY MEDICINE

## 2017-04-17 PROCEDURE — 1160F RVW MEDS BY RX/DR IN RCRD: CPT | Mod: S$GLB,,, | Performed by: FAMILY MEDICINE

## 2017-04-17 PROCEDURE — 80053 COMPREHEN METABOLIC PANEL: CPT

## 2017-04-17 PROCEDURE — 85025 COMPLETE CBC W/AUTO DIFF WBC: CPT

## 2017-04-17 PROCEDURE — 3078F DIAST BP <80 MM HG: CPT | Mod: S$GLB,,, | Performed by: FAMILY MEDICINE

## 2017-04-17 PROCEDURE — 99999 PR PBB SHADOW E&M-EST. PATIENT-LVL II: CPT | Mod: PBBFAC,,, | Performed by: FAMILY MEDICINE

## 2017-04-17 PROCEDURE — 84443 ASSAY THYROID STIM HORMONE: CPT

## 2017-04-17 RX ORDER — FLUCONAZOLE 150 MG/1
150 TABLET ORAL DAILY
Qty: 3 TABLET | Refills: 3 | Status: SHIPPED | OUTPATIENT
Start: 2017-04-17 | End: 2017-04-18

## 2017-04-17 NOTE — MR AVS SNAPSHOT
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's thyroiditis         CKD (chronic kidney disease) stage 3, GFR 30-59 ml/min         Rash                To Do List           Future Appointments        Provider Department Dept Phone    2017 10:15 AM Anastacio Eid Jr., MD [facility] - Urology 4th Floor [phone]    2017 9:30 AM Ayanna Nieto MD [facility] Spine Services [phone]    7/10/2017 2:20 PM Regulo Haider III, MD [facility] Neurology [phone]      Goals (5 Years of Data)     Increase water intake       Follow-Up and Disposition     Return in about 3 months (around 2017), or if symptoms worsen or fail to improve.    Follow-up and Disposition History       These Medications        Disp Refills Start End    fluconazole (DIFLUCAN) 150 MG Tab 3 tablet 3 2017    Take 1 tablet (150 mg total) by mouth once daily. - Oral      [facility]     [facility] Nurse Care Line - 24/ Assistance  Unless otherwise directed by your provider, please contact [facility], our nurse care line that is available for  assistance.     Registered nurses in the [facility] Call Center provide: appointment scheduling, clinical advisement, health education, and other advisory services.  Call: [phone] (toll free)               Medications           Message regarding Medications     Verify the changes and/or additions to your medication regime listed below are the same as discussed with your clinician today.  If any of these changes or additions are incorrect, please notify your  healthcare provider.        START taking these NEW medications        Refills    fluconazole (DIFLUCAN) 150 MG Tab 3    Sig: Take 1 tablet (150 mg total) by mouth once daily.    Class: Print    Route: Oral           Verify that the below list of medications is an accurate representation of the medications you are currently taking.  If none reported, the list may be blank. If incorrect, please contact your healthcare provider. Carry this list with you in case of emergency.           Current Medications     ciprofloxacin HCl (CIPRO) 500 MG tablet Take 1 tablet (500 mg total) by mouth 2 (two) times daily.    dexamethasone (DECADRON) 2 MG tablet Take 4mg q 6h for 3 days, then 4mg q 8h for 3 days, then 4mg q 12h for 3 days, then 2mg q 8h for 3 days, then 2mg q 12h for 3 days, then 2mg daily for 6 days, then OFF.    econazole nitrate 1 % cream USE TWICE DAILY    famotidine (PEPCID) 20 MG tablet Take 20 mg by mouth 2 (two) times daily as needed.      fexofenadine (ALLEGRA) 180 MG tablet Take 1 tablet (180 mg total) by mouth once daily.    fluconazole (DIFLUCAN) 150 MG Tab Take 1 tablet (150 mg total) by mouth once daily.    hydrocodone-acetaminophen 7.5-325mg (NORCO) 7.5-325 mg per tablet Take 1 tablet by mouth every 6 (six) hours as needed for Pain.    levothyroxine (SYNTHROID) 112 MCG tablet Take 1 tablet (112 mcg total) by mouth before breakfast.    MAG HYDROX/AL HYDROX/SIMETH (ANTACID ORAL) Take by mouth as needed (acid reflux).    metronidazole (METROGEL) 0.75 % gel Apply 1 application topically every evening.    multivitamin with minerals tablet Take 1 tablet by mouth once daily.      PRAMOSONE cream APPLY TOPICALLY TWICE DAILY AS NEEDED FOR ITCHING    pravastatin (PRAVACHOL) 20 MG tablet Take 1 tablet (20 mg total) by mouth once daily.    ramipril (ALTACE) 5 MG capsule Take 1 capsule (5 mg total) by mouth once daily.    tamsulosin (FLOMAX) 0.4 mg Cp24 Take 1 capsule (0.4 mg total) by mouth once daily.     triamcinolone acetonide 0.1% (KENALOG) 0.1 % cream Use bid prn rash           Clinical Reference Information           Your Vitals Were     BP Pulse                119/69 70          Blood Pressure          Most Recent Value    BP  119/69      Allergies as of 4/17/2017     Iodine And Iodide Containing Products      Immunizations Administered on Date of Encounter - 4/17/2017     None      Orders Placed During Today's Visit      Normal Orders This Visit    Ambulatory Referral to Dermatology     Future Labs/Procedures Expected by Expires    CBC auto differential  4/17/2017 4/17/2018    Comprehensive metabolic panel  4/17/2017 4/17/2018    TSH  4/17/2017 7/16/2017      Language Assistance Services     ATTENTION: Language assistance services are available, free of charge. Please call [phone].      ATENCIÓN: Si daniel casanova, tiene a jane disposición servicios gratuitos de asistencia lingüística. Llame al [phone].     CHÚ Ý: N?u b?n nói Ti?ng Vi?t, có các d?ch v? h? tr? ngôn ng? mi?n phí dành cho b?n. G?i s? [phone].         [facility] complies with applicable Federal civil rights laws and does not discriminate on the basis of race, color, national origin, age, disability, or sex.

## 2017-04-17 NOTE — PROGRESS NOTES
Subjective:       Patient ID: Liang Monterroso Jr. is a 76 y.o. male.    Chief Complaint: No chief complaint on file.  Liang Monterroso Jr. 76 y.o. male is here for office visit to review care and physical exam, reports doing well in general.  Seems to have some fatigue after recent surgery and recovery.  Here for rash on feet noted last few days, no imp with otc Lamicil.  No itch.      HPI  Review of Systems   Constitutional: Negative for activity change, appetite change, fatigue, fever and unexpected weight change.   HENT: Negative for congestion, hearing loss, postnasal drip and rhinorrhea.    Eyes: Negative for pain, discharge and visual disturbance.   Respiratory: Negative for cough, choking and shortness of breath.    Cardiovascular: Negative for chest pain, palpitations and leg swelling.   Gastrointestinal: Negative for abdominal pain, diarrhea and vomiting.   Genitourinary: Negative for dysuria, flank pain, hematuria and urgency.   Musculoskeletal: Negative for arthralgias, back pain, joint swelling and neck pain.   Skin: Negative for color change and rash.   Neurological: Negative for dizziness, tremors, syncope, weakness, numbness and headaches.   Psychiatric/Behavioral: Negative for agitation and confusion. The patient is not hyperactive.        Objective:      Physical Exam   Constitutional: He is oriented to person, place, and time. He appears well-developed and well-nourished.   HENT:   Head: Normocephalic.   Eyes: EOM are normal. Pupils are equal, round, and reactive to light.   Neck: Normal range of motion. Neck supple. No thyromegaly present.   Cardiovascular: Normal rate.  Exam reveals no gallop and no friction rub.    No murmur heard.  Pulmonary/Chest: Effort normal. No respiratory distress. He has no wheezes.   Abdominal: Soft. Bowel sounds are normal. He exhibits no mass. There is no tenderness.   Musculoskeletal: He exhibits no edema or tenderness.   Lymphadenopathy:     He has no cervical adenopathy.    Neurological: He is alert and oriented to person, place, and time. He has normal reflexes. No cranial nerve deficit.   Skin: Skin is warm. No rash noted.   Psychiatric: He has a normal mood and affect. His behavior is normal.       Assessment:       No diagnosis found.    Plan:       Diagnoses and all orders for this visit:    Meningioma  - Reviewed  Essential hypertension  -     Comprehensive metabolic panel; Future    Hypothyroidism due to Hashimoto's thyroiditis  -     CBC auto differential; Future  -     TSH; Future    CKD (chronic kidney disease) stage 3, GFR 30-59 ml/min  -     Comprehensive metabolic panel; Future    Rash  -     fluconazole (DIFLUCAN) 150 MG Tab; Take 1 tablet (150 mg total) by mouth once daily.  -     Ambulatory Referral to Dermatology

## 2017-04-21 ENCOUNTER — OFFICE VISIT (OUTPATIENT)
Dept: DERMATOLOGY | Facility: CLINIC | Age: 77
End: 2017-04-21
Payer: MEDICARE

## 2017-04-21 VITALS — WEIGHT: 184 LBS | BODY MASS INDEX: 27.98 KG/M2

## 2017-04-21 DIAGNOSIS — Z87.2 HISTORY OF ACTINIC KERATOSES: ICD-10-CM

## 2017-04-21 DIAGNOSIS — B35.3 TINEA PEDIS OF BOTH FEET: Primary | ICD-10-CM

## 2017-04-21 PROCEDURE — 1157F ADVNC CARE PLAN IN RCRD: CPT | Mod: S$GLB,,, | Performed by: DERMATOLOGY

## 2017-04-21 PROCEDURE — 99213 OFFICE O/P EST LOW 20 MIN: CPT | Mod: S$GLB,,, | Performed by: DERMATOLOGY

## 2017-04-21 PROCEDURE — 1159F MED LIST DOCD IN RCRD: CPT | Mod: S$GLB,,, | Performed by: DERMATOLOGY

## 2017-04-21 PROCEDURE — 99999 PR PBB SHADOW E&M-EST. PATIENT-LVL II: CPT | Mod: PBBFAC,,, | Performed by: DERMATOLOGY

## 2017-04-21 PROCEDURE — 1160F RVW MEDS BY RX/DR IN RCRD: CPT | Mod: S$GLB,,, | Performed by: DERMATOLOGY

## 2017-04-21 NOTE — MR AVS SNAPSHOT
[place] [facility]   [facility]  [place] [address]  Phone: [phone]  Fax: [phone]                  Liang Monterroso JrEstephania   2017 3:20 PM   Office Visit    Description:  Male : 1940   Provider:  Alicia Weathers MD   Department:  [place] [facility]           Reason for Visit     Rash           Diagnoses this Visit        Comments    Tinea pedis of both feet    -  Primary     History of actinic keratoses                To Do List           Future Appointments        Provider Department Dept Phone    2017 10:15 AM MD Rajiv Lazo Jr. - Urology 4th Floor [phone]    2017 11:00 AM KEL Johnson - Optometry [phone]    2017 9:30 AM Ayanna Nieto MD [facility] Spine Services [phone]    7/10/2017 2:20 PM Regulo Haider III, MD [facility] Neurology [phone]      Goals (5 Years of Data)     Increase water intake       Follow-Up and Disposition     Return in about 6 months (around 10/21/2017).      [facility]     [facility] On Call Nurse Care Line -  Assistance  Unless otherwise directed by your provider, please contact [facility], our nurse care line that is available for  assistance.     Registered nurses in the [facility] Center provide: appointment scheduling, clinical advisement, health education, and other advisory services.  Call: [phone] (toll free)               Medications           Message regarding Medications     Verify the changes and/or additions to your medication regime listed below are the same as discussed with your clinician today.  If any of these changes or additions are incorrect, please notify your healthcare provider.             Verify that the below list of medications is an accurate representation of the medications you are currently taking.  If none reported, the list may be blank. If incorrect, please contact your healthcare provider. Carry this list with you  in case of emergency.           Current Medications     ciprofloxacin HCl (CIPRO) 500 MG tablet Take 1 tablet (500 mg total) by mouth 2 (two) times daily.    dexamethasone (DECADRON) 2 MG tablet Take 4mg q 6h for 3 days, then 4mg q 8h for 3 days, then 4mg q 12h for 3 days, then 2mg q 8h for 3 days, then 2mg q 12h for 3 days, then 2mg daily for 6 days, then OFF.    econazole nitrate 1 % cream USE TWICE DAILY    famotidine (PEPCID) 20 MG tablet Take 20 mg by mouth 2 (two) times daily as needed.      hydrocodone-acetaminophen 7.5-325mg (NORCO) 7.5-325 mg per tablet Take 1 tablet by mouth every 6 (six) hours as needed for Pain.    levothyroxine (SYNTHROID) 112 MCG tablet Take 1 tablet (112 mcg total) by mouth before breakfast.    MAG HYDROX/AL HYDROX/SIMETH (ANTACID ORAL) Take by mouth as needed (acid reflux).    metronidazole (METROGEL) 0.75 % gel Apply 1 application topically every evening.    multivitamin with minerals tablet Take 1 tablet by mouth once daily.      PRAMOSONE cream APPLY TOPICALLY TWICE DAILY AS NEEDED FOR ITCHING    pravastatin (PRAVACHOL) 20 MG tablet Take 1 tablet (20 mg total) by mouth once daily.    ramipril (ALTACE) 5 MG capsule Take 1 capsule (5 mg total) by mouth once daily.    tamsulosin (FLOMAX) 0.4 mg Cp24 Take 1 capsule (0.4 mg total) by mouth once daily.    triamcinolone acetonide 0.1% (KENALOG) 0.1 % cream Use bid prn rash    fexofenadine (ALLEGRA) 180 MG tablet Take 1 tablet (180 mg total) by mouth once daily.           Clinical Reference Information           Your Vitals Were     Weight BMI             83.5 kg (184 lb) 27.98 kg/m2         Allergies as of 4/21/2017     Iodine And Iodide Containing Products      Immunizations Administered on Date of Encounter - 4/21/2017     None      Language Assistance Services     ATTENTION: Language assistance services are available, free of charge. Please call [phone].      ATENCIÓN: Si june brennan a jane disposición servicios  albertinaos de asistencia lingüística. Niru padilla [phone].     JOSHUA Ý: N?u b?n nói Ti?ng Vi?t, có các d?ch v? h? tr? ngôn ng? mi?n phí dành cho b?n. G?i s? [phone].         [place] [facility] complies with applicable Federal civil rights laws and does not discriminate on the basis of race, color, national origin, age, disability, or sex.

## 2017-04-21 NOTE — PROGRESS NOTES
Subjective:       Patient ID:  Liang Monterroso Jr. is a 76 y.o. male who presents for   Chief Complaint   Patient presents with    Rash     HPI Comments: History of Present Illness: The patient presents with chief complaint of rash.  Location: feet  Duration: weeks  Signs/Symptoms: itches    Prior treatments: diflucan      Rash         Review of Systems   Constitutional: Negative for fever.   Skin: Positive for itching and rash.   Hematologic/Lymphatic: Does not bruise/bleed easily.        Objective:    Physical Exam   Constitutional: He appears well-developed and well-nourished. No distress.   Neurological: He is alert and oriented to person, place, and time. He is not disoriented.   Psychiatric: He has a normal mood and affect.   Skin:   Areas Examined (abnormalities noted in diagram):   Head / Face Inspection Performed  Neck Inspection Performed  Chest / Axilla Inspection Performed  Back Inspection Performed  RUE Inspected  LUE Inspection Performed  RLE Inspected  LLE Inspection Performed                    Assessment / Plan:        Tinea pedis of both feet  lamisil cream bid  Zeasorb powder    History of actinic keratoses             Return in about 6 months (around 10/21/2017).

## 2017-04-21 NOTE — LETTER
April 21, 2017      Theron Paiz MD  [address]  [facility] [address]           [place] [facility]  [address] [facility]  [place] [address]  Phone: [phone]  Fax: [phone]          Patient: Liang Monterroso Jr.   MR Number: [id number]   YOB: 1940   Date of Visit: 4/21/2017       Dear Dr. Theron Paiz:    Thank you for referring Liang Monterroso to me for evaluation. Attached you will find relevant portions of my assessment and plan of care.    If you have questions, please do not hesitate to call me. I look forward to following Liang Monterroso along with you.    Sincerely,    Alicia Weathers MD    Enclosure  CC:  No Recipients    If you would like to receive this communication electronically, please contact [email or web address] or [phone] to request more information on [facility] Link access.    For providers and/or their staff who would like to refer a patient to [facility], please contact us through our one-stop-shop provider referral line, [facility], at [phone].    If you feel you have received this communication in error or would no longer like to receive these types of communications, please e-mail [email or web address]

## 2017-04-24 DIAGNOSIS — I10 ESSENTIAL HYPERTENSION: ICD-10-CM

## 2017-04-24 RX ORDER — PRAVASTATIN SODIUM 20 MG/1
20 TABLET ORAL DAILY
Qty: 90 TABLET | Refills: 3 | Status: SHIPPED | OUTPATIENT
Start: 2017-04-24 | End: 2018-06-25 | Stop reason: SDUPTHER

## 2017-04-24 RX ORDER — RAMIPRIL 5 MG/1
5 CAPSULE ORAL DAILY
Qty: 30 CAPSULE | Refills: 5 | Status: SHIPPED | OUTPATIENT
Start: 2017-04-24 | End: 2017-10-19 | Stop reason: SDUPTHER

## 2017-04-24 RX ORDER — LEVOTHYROXINE SODIUM 112 UG/1
112 TABLET ORAL
Qty: 30 TABLET | Refills: 5 | Status: SHIPPED | OUTPATIENT
Start: 2017-04-24 | End: 2017-10-19 | Stop reason: SDUPTHER

## 2017-04-26 NOTE — ADDENDUM NOTE
Addendum  created 04/26/17 0756 by Fabian Flores MD    Anesthesia Intra Blocks edited, Sign clinical note

## 2017-05-04 ENCOUNTER — OFFICE VISIT (OUTPATIENT)
Dept: UROLOGY | Facility: CLINIC | Age: 77
End: 2017-05-04
Payer: MEDICARE

## 2017-05-04 VITALS — DIASTOLIC BLOOD PRESSURE: 78 MMHG | SYSTOLIC BLOOD PRESSURE: 115 MMHG | HEART RATE: 64 BPM

## 2017-05-04 DIAGNOSIS — N40.1 BPH WITH URINARY OBSTRUCTION: Primary | ICD-10-CM

## 2017-05-04 DIAGNOSIS — N13.8 BPH WITH URINARY OBSTRUCTION: Primary | ICD-10-CM

## 2017-05-04 PROCEDURE — 1159F MED LIST DOCD IN RCRD: CPT | Mod: S$GLB,,, | Performed by: UROLOGY

## 2017-05-04 PROCEDURE — 3074F SYST BP LT 130 MM HG: CPT | Mod: S$GLB,,, | Performed by: UROLOGY

## 2017-05-04 PROCEDURE — 99999 PR PBB SHADOW E&M-EST. PATIENT-LVL II: CPT | Mod: PBBFAC,,, | Performed by: UROLOGY

## 2017-05-04 PROCEDURE — 3078F DIAST BP <80 MM HG: CPT | Mod: S$GLB,,, | Performed by: UROLOGY

## 2017-05-04 PROCEDURE — 99213 OFFICE O/P EST LOW 20 MIN: CPT | Mod: S$GLB,,, | Performed by: UROLOGY

## 2017-05-04 PROCEDURE — 1160F RVW MEDS BY RX/DR IN RCRD: CPT | Mod: S$GLB,,, | Performed by: UROLOGY

## 2017-05-04 NOTE — PROGRESS NOTES
Subjective:       Patient ID: Liang Monterroso Jr. is a 76 y.o. male.    Chief Complaint: Follow-up    HPI patient recently saw BRUCE Strong for prostatitis.  He was treated with antibiotics and is feeling better.  He has a good stream and empties his bladder well.  His urine is clear    Past Medical History:   Diagnosis Date    Allergy     CKD (chronic kidney disease) stage 3, GFR 30-59 ml/min 6/15/2016    GERD (gastroesophageal reflux disease)     Hyperlipidemia     Hypertension     Hypothyroidism     Thyroid disease     Urinary tract infection        Past Surgical History:   Procedure Laterality Date    APPENDECTOMY      CATARACT EXTRACTION      EYE SURGERY      HERNIA REPAIR      TONSILLECTOMY         Family History   Problem Relation Age of Onset    Diabetes Mother     Heart disease Mother     Hypertension Mother     Vision loss Mother     Dementia Mother     Alcohol abuse Father     Cancer Sister      lung with mets, was a heavy smoker    No Known Problems Daughter     No Known Problems Son     No Known Problems Daughter     No Known Problems Son     Amblyopia Neg Hx     Blindness Neg Hx     Cataracts Neg Hx     Glaucoma Neg Hx     Macular degeneration Neg Hx     Retinal detachment Neg Hx     Strabismus Neg Hx     Stroke Neg Hx     Thyroid disease Neg Hx        Social History     Social History    Marital status:      Spouse name: parul    Number of children: 4    Years of education: N/A     Occupational History    retired      Social History Main Topics    Smoking status: Never Smoker    Smokeless tobacco: Never Used    Alcohol use Yes      Comment: 1-3 glasses wine weekly, 2-3 beers weekly    Drug use: No    Sexual activity: Yes     Partners: Female     Other Topics Concern    Not on file     Social History Narrative       Allergies:  Iodine and iodide containing products    Medications:    Current Outpatient Prescriptions:     ramipril (ALTACE) 5 MG capsule,  Take 1 capsule (5 mg total) by mouth once daily., Disp: 30 capsule, Rfl: 5    tamsulosin (FLOMAX) 0.4 mg Cp24, Take 1 capsule (0.4 mg total) by mouth once daily., Disp: 90 capsule, Rfl: 4    ciprofloxacin HCl (CIPRO) 500 MG tablet, Take 1 tablet (500 mg total) by mouth 2 (two) times daily., Disp: 60 tablet, Rfl: 1    dexamethasone (DECADRON) 2 MG tablet, Take 4mg q 6h for 3 days, then 4mg q 8h for 3 days, then 4mg q 12h for 3 days, then 2mg q 8h for 3 days, then 2mg q 12h for 3 days, then 2mg daily for 6 days, then OFF., Disp: 75 tablet, Rfl: 0    econazole nitrate 1 % cream, USE TWICE DAILY (Patient taking differently: USE TWICE DAILY (prn)), Disp: 60 g, Rfl: 5    famotidine (PEPCID) 20 MG tablet, Take 20 mg by mouth 2 (two) times daily as needed.  , Disp: , Rfl:     fexofenadine (ALLEGRA) 180 MG tablet, Take 1 tablet (180 mg total) by mouth once daily. (Patient taking differently: Take 180 mg by mouth daily as needed. ), Disp: 30 tablet, Rfl: 11    hydrocodone-acetaminophen 7.5-325mg (NORCO) 7.5-325 mg per tablet, Take 1 tablet by mouth every 6 (six) hours as needed for Pain., Disp: 60 tablet, Rfl: 0    levothyroxine (SYNTHROID) 112 MCG tablet, Take 1 tablet (112 mcg total) by mouth before breakfast., Disp: 30 tablet, Rfl: 5    MAG HYDROX/AL HYDROX/SIMETH (ANTACID ORAL), Take by mouth as needed (acid reflux)., Disp: , Rfl:     metronidazole (METROGEL) 0.75 % gel, Apply 1 application topically every evening., Disp: 45 g, Rfl: 5    multivitamin with minerals tablet, Take 1 tablet by mouth once daily.  , Disp: , Rfl:     PRAMOSONE cream, APPLY TOPICALLY TWICE DAILY AS NEEDED FOR ITCHING, Disp: 57 g, Rfl: 5    pravastatin (PRAVACHOL) 20 MG tablet, Take 1 tablet (20 mg total) by mouth once daily., Disp: 90 tablet, Rfl: 3    triamcinolone acetonide 0.1% (KENALOG) 0.1 % cream, Use bid prn rash, Disp: 80 g, Rfl: 3    Review of Systems   Constitutional: Negative for activity change, appetite change, chills,  diaphoresis, fatigue, fever and unexpected weight change.   HENT: Negative for congestion, dental problem, hearing loss, mouth sores, postnasal drip, rhinorrhea, sinus pressure and trouble swallowing.    Eyes: Negative for pain, discharge and itching.   Respiratory: Negative for apnea, cough, choking, chest tightness, shortness of breath and wheezing.    Cardiovascular: Negative for chest pain, palpitations and leg swelling.   Gastrointestinal: Negative for abdominal distention, abdominal pain, anal bleeding, blood in stool, constipation, diarrhea, nausea, rectal pain and vomiting.   Endocrine: Negative for polydipsia and polyuria.   Genitourinary: Negative for decreased urine volume, difficulty urinating, discharge, dysuria, enuresis, flank pain, frequency, genital sores, hematuria, penile pain, penile swelling, scrotal swelling, testicular pain and urgency.   Musculoskeletal: Negative for arthralgias, back pain and myalgias.   Skin: Negative for color change, rash and wound.   Neurological: Negative for dizziness, syncope, speech difficulty, light-headedness and headaches.   Hematological: Negative for adenopathy. Does not bruise/bleed easily.   Psychiatric/Behavioral: Negative for behavioral problems, confusion, hallucinations and sleep disturbance.       Objective:      Physical Exam   Constitutional: He appears well-developed.   HENT:   Head: Normocephalic.   Cardiovascular: Normal rate.    Pulmonary/Chest: Effort normal.   Abdominal: Soft.   Genitourinary: Prostate normal.   Genitourinary Comments: 35 g benign   Neurological: He is alert.   Skin: Skin is warm.     Psychiatric: He has a normal mood and affect.       Assessment:       1. BPH with urinary obstruction        Plan:       Liang was seen today for follow-up.    Diagnoses and all orders for this visit:    BPH with urinary obstruction        follow-up for yearly check or sooner when necessary return of symptoms.

## 2017-05-08 ENCOUNTER — OFFICE VISIT (OUTPATIENT)
Dept: OPTOMETRY | Facility: CLINIC | Age: 77
End: 2017-05-08
Payer: MEDICARE

## 2017-05-08 DIAGNOSIS — Z98.42 S/P CATARACT SURGERY, LEFT: ICD-10-CM

## 2017-05-08 DIAGNOSIS — H26.492 POSTERIOR CAPSULAR OPACIFICATION NON VISUALLY SIGNIFICANT OF LEFT EYE: Primary | ICD-10-CM

## 2017-05-08 DIAGNOSIS — Z13.5 SCREENING FOR EYE CONDITION: ICD-10-CM

## 2017-05-08 DIAGNOSIS — Z98.41 S/P CATARACT SURGERY, RIGHT: ICD-10-CM

## 2017-05-08 DIAGNOSIS — H35.363 MACULAR DRUSEN, BILATERAL: ICD-10-CM

## 2017-05-08 DIAGNOSIS — Z96.1 PSEUDOPHAKIA: ICD-10-CM

## 2017-05-08 DIAGNOSIS — H52.223 REGULAR ASTIGMATISM OF BOTH EYES: ICD-10-CM

## 2017-05-08 DIAGNOSIS — I10 ESSENTIAL HYPERTENSION: ICD-10-CM

## 2017-05-08 PROCEDURE — 99499 UNLISTED E&M SERVICE: CPT | Mod: S$GLB,,, | Performed by: OPTOMETRIST

## 2017-05-08 PROCEDURE — 99999 PR PBB SHADOW E&M-EST. PATIENT-LVL III: CPT | Mod: PBBFAC,,, | Performed by: OPTOMETRIST

## 2017-05-08 PROCEDURE — 92015 DETERMINE REFRACTIVE STATE: CPT | Mod: S$GLB,,, | Performed by: OPTOMETRIST

## 2017-05-08 PROCEDURE — 92014 COMPRE OPH EXAM EST PT 1/>: CPT | Mod: S$GLB,,, | Performed by: OPTOMETRIST

## 2017-05-08 NOTE — MR AVS SNAPSHOT
[place] [facility]  [address]  [facility] [address]  Phone: [phone]  Fax: [phone]                  Liang Monterroso    2017 11:00 AM   Office Visit    Description:  Male : 1940   Provider:  Abdiaziz Gill OD   Department:  [facility]           Reason for Visit     Concerns About Ocular Health                To Do List           Future Appointments        Provider Department Dept Phone    2017 9:30 AM Ayanna Nieto MD [facility] - Spine Services [phone]    7/10/2017 2:20 PM Regulo Haider III, MD [facility] Neurology [phone]      Goals (5 Years of Data)     Increase water intake       [facility]     [facility] On Call Nurse Care Line -  Assistance  Unless otherwise directed by your provider, please contact [facility], our nurse care line that is available for  assistance.     Registered nurses in the [facility] provide: appointment scheduling, clinical advisement, health education, and other advisory services.  Call: [phone] (toll free)               Medications           Message regarding Medications     Verify the changes and/or additions to your medication regime listed below are the same as discussed with your clinician today.  If any of these changes or additions are incorrect, please notify your healthcare provider.             Verify that the below list of medications is an accurate representation of the medications you are currently taking.  If none reported, the list may be blank. If incorrect, please contact your healthcare provider. Carry this list with you in case of emergency.           Current Medications     ciprofloxacin HCl (CIPRO) 500 MG tablet Take 1 tablet (500 mg total) by mouth 2 (two) times daily.    dexamethasone (DECADRON) 2 MG tablet Take 4mg q 6h for 3 days, then 4mg q 8h for 3 days, then 4mg q 12h for 3 days, then 2mg q 8h for 3 days, then 2mg q 12h for 3 days, then 2mg daily for 6  days, then OFF.    econazole nitrate 1 % cream USE TWICE DAILY    famotidine (PEPCID) 20 MG tablet Take 20 mg by mouth 2 (two) times daily as needed.      hydrocodone-acetaminophen 7.5-325mg (NORCO) 7.5-325 mg per tablet Take 1 tablet by mouth every 6 (six) hours as needed for Pain.    levothyroxine (SYNTHROID) 112 MCG tablet Take 1 tablet (112 mcg total) by mouth before breakfast.    MAG HYDROX/AL HYDROX/SIMETH (ANTACID ORAL) Take by mouth as needed (acid reflux).    metronidazole (METROGEL) 0.75 % gel Apply 1 application topically every evening.    multivitamin with minerals tablet Take 1 tablet by mouth once daily.      PRAMOSONE cream APPLY TOPICALLY TWICE DAILY AS NEEDED FOR ITCHING    pravastatin (PRAVACHOL) 20 MG tablet Take 1 tablet (20 mg total) by mouth once daily.    ramipril (ALTACE) 5 MG capsule Take 1 capsule (5 mg total) by mouth once daily.    tamsulosin (FLOMAX) 0.4 mg Cp24 Take 1 capsule (0.4 mg total) by mouth once daily.    triamcinolone acetonide 0.1% (KENALOG) 0.1 % cream Use bid prn rash    fexofenadine (ALLEGRA) 180 MG tablet Take 1 tablet (180 mg total) by mouth once daily.           Clinical Reference Information           Allergies as of 5/8/2017     Iodine And Iodide Containing Products      Immunizations Administered on Date of Encounter - 5/8/2017     None      Instructions    Bilateral macular changes (drusen and pigmentary changes) consistent with age-related macular degeneration, more apparent in the right eye.  Continue Ocuvite supplement by mouth (recommend AREDS-2) formuation.  S/P cataract surgery in both eyes, with bilateral posterior chamber intraocular lens.  Mild clouding/opacification of the posterior lens capsule in both eyes. No need for YAG laser posterior capsulotomy at this time.   Astigmatic refractive error in each eye. Good correctable VA in each eye.  New spectacle lens Rx issued for use as desired.  Recheck in one year.           Language Assistance Services      ATTENTION: Language assistance services are available, free of charge. Please call [phone].      ATENCIÓN: Si habla jocelyne, tiene a jane disposición servicios gratuitos de asistencia lingüística. Llame al [phone].     CHÚ Ý: N?u b?n nói Ti?ng Vi?t, có các d?ch v? h? tr? ngôn ng? mi?n phí dành cho b?n. G?i s? [phone].         [facility] complies with applicable Federal civil rights laws and does not discriminate on the basis of race, color, national origin, age, disability, or sex.

## 2017-05-08 NOTE — PROGRESS NOTES
"HPI     Concerns About Ocular Health    Additional comments: Eye exam and refraction.  No acute ocular/vision   problems.            Comments   Patient's age: 76 y.o. WM  Occupation: Retired   Approximate date of last eye examination:  2/11/2016  Name of last eye doctor seen: [facility]/State: [place]  Wears glasses? Yes     If yes, wears  Full-time or part-time?  Part-time  Present glasses are: Bifocal, SV Distance, SV Reading?  SV Reading  Approximate age of present glasses:  1 year   Got new glasses following last exam, or subsequently?:  no   Any problem with VA with glasses?  no  Wears CLs?:  no  Headaches?  no  Eye pain/discomfort?  no                                                                                     Flashes?  no  Floaters?  no  Diplopia/Double vision?  no  Patient's Ocular History:         Any eye surgeries? PCIOL [facility]         Any eye injury?  no         Any treatment for eye disease?  no  Family history of eye disease?  no  Significant patient medical history:         1. Diabetes?  no       If yes, IDDM or NIDDM? no   2. HBP?  Yes              3. Other (describe):  Prostate, Hyperipidemia   ! OTC eyedrops currently using:  no   ! Prescription eye meds currently using:  no   ! Any history of allergy/adverse reaction to any eye meds used   previously?  no    ! Any history of allergy/adverse reaction to eyedrops used during prior   eye exam(s)? no    ! Any history of allergy/adverse reaction to Novacaine or similar meds?   no   ! Any history of allergy/adverse reaction to Epinephrine or similar meds?   no    ! Patient okay with use of anesthetic eyedrops to check eye pressure?    yes        ! Patient okay with use of eyedrops to dilate pupils today?  yes   !  Allergies/Medications/Medical History/Family History reviewed today?    yes      PD =   63/59  Desired reading distance =  17.5"                                                                    Last edited by Abdiaziz MESA " Jairo, OD on 5/8/2017 11:15 AM. (History)            Assessment /Plan     For exam results, see Encounter Report.    1. Posterior capsular opacification non visually significant of left eye     2. S/P cataract surgery, left     3. Macular drusen, bilateral     4. S/P cataract surgery, right     5. Pseudophakia - Both Eyes     6. Essential hypertension     7. Screening for eye condition     8. Regular astigmatism of both eyes                   Bilateral macular changes (drusen and pigmentary changes) consistent with age-related macular degeneration, more apparent in the right eye.  Continue Ocuvite supplement by mouth (recommend AREDS-2) formuation.  S/P cataract surgery in both eyes, with bilateral posterior chamber intraocular lens.  Mild clouding/opacification of the posterior lens capsule in both eyes. No need for YAG laser posterior capsulotomy at this time.   Astigmatic refractive error in each eye. Good correctable VA in each eye.  New spectacle lens Rx issued for use as desired.  Recheck in one year.

## 2017-05-08 NOTE — PATIENT INSTRUCTIONS
Bilateral macular changes (drusen and pigmentary changes) consistent with age-related macular degeneration, more apparent in the right eye.  Continue Ocuvite supplement by mouth (recommend AREDS-2) formuation.  S/P cataract surgery in both eyes, with bilateral posterior chamber intraocular lens.  Mild clouding/opacification of the posterior lens capsule in both eyes. No need for YAG laser posterior capsulotomy at this time.   Astigmatic refractive error in each eye. Good correctable VA in each eye.  New spectacle lens Rx issued for use as desired.  Recheck in one year.

## 2017-05-09 ENCOUNTER — OFFICE VISIT (OUTPATIENT)
Dept: SPINE | Facility: CLINIC | Age: 77
End: 2017-05-09
Attending: PHYSICAL MEDICINE & REHABILITATION
Payer: MEDICARE

## 2017-05-09 VITALS
DIASTOLIC BLOOD PRESSURE: 80 MMHG | WEIGHT: 190 LBS | HEART RATE: 90 BPM | HEIGHT: 68 IN | SYSTOLIC BLOOD PRESSURE: 122 MMHG | BODY MASS INDEX: 28.79 KG/M2

## 2017-05-09 DIAGNOSIS — M54.2 NECK PAIN: Primary | ICD-10-CM

## 2017-05-09 DIAGNOSIS — M50.30 DDD (DEGENERATIVE DISC DISEASE), CERVICAL: ICD-10-CM

## 2017-05-09 PROCEDURE — 1160F RVW MEDS BY RX/DR IN RCRD: CPT | Mod: S$GLB,,, | Performed by: PHYSICAL MEDICINE & REHABILITATION

## 2017-05-09 PROCEDURE — 3079F DIAST BP 80-89 MM HG: CPT | Mod: S$GLB,,, | Performed by: PHYSICAL MEDICINE & REHABILITATION

## 2017-05-09 PROCEDURE — 99214 OFFICE O/P EST MOD 30 MIN: CPT | Mod: S$GLB,,, | Performed by: PHYSICAL MEDICINE & REHABILITATION

## 2017-05-09 PROCEDURE — 3074F SYST BP LT 130 MM HG: CPT | Mod: S$GLB,,, | Performed by: PHYSICAL MEDICINE & REHABILITATION

## 2017-05-09 PROCEDURE — 1125F AMNT PAIN NOTED PAIN PRSNT: CPT | Mod: S$GLB,,, | Performed by: PHYSICAL MEDICINE & REHABILITATION

## 2017-05-09 PROCEDURE — 99499 UNLISTED E&M SERVICE: CPT | Mod: S$GLB,,, | Performed by: PHYSICAL MEDICINE & REHABILITATION

## 2017-05-09 PROCEDURE — 1159F MED LIST DOCD IN RCRD: CPT | Mod: S$GLB,,, | Performed by: PHYSICAL MEDICINE & REHABILITATION

## 2017-05-09 PROCEDURE — 99999 PR PBB SHADOW E&M-EST. PATIENT-LVL III: CPT | Mod: PBBFAC,,, | Performed by: PHYSICAL MEDICINE & REHABILITATION

## 2017-05-09 NOTE — PROGRESS NOTES
Subjective:      Patient ID: Liang Monterroso Jr. is a 76 y.o. male.    Chief Complaint: Low-back Pain and Neck Pain    HPI Comments: Mr Monterroso is a 77 yo male here for follow up of his low back and neck pain.  He was last seen by me on 1/30/2017 after a fall from a ladder early in January.  He was having some neck pain.  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  He is now status post resection of this on 3/20/17 by Dr. Chen.  Path returned as a meningioma.  He has been following with Dr. Haider for concussion management.  He is still dealing with some concussion symptoms.  He feels like he is tired and lost his since of taste.  He is walking for exercise.  He does not feel like he has lost strength, vision, or memory issues.  He is still having some right sided neck pain.  The neck pain is not constant.  He does not have pain with daily activities.  The pain is with sitting and with looking down.  He went to PT visits, and it helped the neck some.  He did not continue because of the surgery.  He has been doing research.  The pain is on the right side of the neck . There is no arm pain.  Pain is 2/10, worst 4/10 sitting, best 0/10    X-ray cervical  AP, neutral lateral, flexion lateral, and extension lateral radiographs of the cervical spine were obtained.  There is 2 mm anterolisthesis of C3 on C4 which increases to 4 mm on the flexion radiograph and resolves on the extension radiograph of the cervical spine.  The remainder of the posterior vertebral alignment is satisfactory with normal physiologic subluxation on flexion.  Vertebral body heights are well-maintained.  There is no evidence for acute fracture or bone destruction.  There appears to be fusion of the C2 and C3 vertebral bodies and facet joints.  There are degenerative changes with facet arthropathy and small anterior osteophytes within the mid to lower cervical spine.   Prevertebral soft tissues are unremarkable.  There is atherosclerotic calcification identified the region of the carotid bulbs bilaterally.  Impression     2 mm anterolisthesis of C3 on C4 which increases to 4 mm on the flexion radiograph and resolves on the extension radiograph of the cervical spine.  Cervical spondylosis.  Bony fusion of the C2 and C3 vertebral bodies.        Past Medical History:  No date: Allergy  6/15/2016: CKD (chronic kidney disease) stage 3, GFR 30-5*  No date: GERD (gastroesophageal reflux disease)  No date: Hyperlipidemia  No date: Hypertension  No date: Hypothyroidism  No date: Thyroid disease  No date: Urinary tract infection    Past Surgical History:  No date: APPENDECTOMY  No date: CATARACT EXTRACTION  No date: EYE SURGERY  No date: HERNIA REPAIR  No date: TONSILLECTOMY    Review of patient's family history indicates:    Diabetes                       Mother                    Heart disease                  Mother                    Hypertension                   Mother                    Vision loss                    Mother                    Dementia                       Mother                    Alcohol abuse                  Father                    Cancer                         Sister                      Comment: lung with mets, was a heavy smoker    No Known Problems              Daughter                  No Known Problems              Son                       No Known Problems              Daughter                  No Known Problems              Son                       Amblyopia                      Neg Hx                    Blindness                      Neg Hx                    Cataracts                      Neg Hx                    Glaucoma                       Neg Hx                    Macular degeneration           Neg Hx                    Retinal detachment             Neg Hx                    Strabismus                     Neg Hx                    Stroke                          Neg Hx                    Thyroid disease                Neg Hx                      Social History    Marital status:              Spouse name: parul              Years of education:                 Number of children: 4             Occupational History  Occupation          Employer            Comment               retired                                     Social History Main Topics    Smoking status: Never Smoker                                                                Smokeless status: Never Used                        Alcohol use: Yes                Comment: 1-3 glasses wine weekly, 2-3 beers weekly    Drug use: No              Sexual activity: Yes               Partners with: Female        Current Outpatient Prescriptions:  ciprofloxacin HCl (CIPRO) 500 MG tablet, Take 1 tablet (500 mg total) by mouth 2 (two) times daily., Disp: 60 tablet, Rfl: 1  dexamethasone (DECADRON) 2 MG tablet, Take 4mg q 6h for 3 days, then 4mg q 8h for 3 days, then 4mg q 12h for 3 days, then 2mg q 8h for 3 days, then 2mg q 12h for 3 days, then 2mg daily for 6 days, then OFF., Disp: 75 tablet, Rfl: 0  econazole nitrate 1 % cream, USE TWICE DAILY (Patient taking differently: USE TWICE DAILY (prn)), Disp: 60 g, Rfl: 5  famotidine (PEPCID) 20 MG tablet, Take 20 mg by mouth 2 (two) times daily as needed.  , Disp: , Rfl:   hydrocodone-acetaminophen 7.5-325mg (NORCO) 7.5-325 mg per tablet, Take 1 tablet by mouth every 6 (six) hours as needed for Pain., Disp: 60 tablet, Rfl: 0  levothyroxine (SYNTHROID) 112 MCG tablet, Take 1 tablet (112 mcg total) by mouth before breakfast., Disp: 30 tablet, Rfl: 5  MAG HYDROX/AL HYDROX/SIMETH (ANTACID ORAL), Take by mouth as needed (acid reflux)., Disp: , Rfl:   metronidazole (METROGEL) 0.75 % gel, Apply 1 application topically every evening., Disp: 45 g, Rfl: 5  multivitamin with minerals tablet, Take 1 tablet by mouth once daily.  , Disp: , Rfl:   PRAMOSONE cream,  APPLY TOPICALLY TWICE DAILY AS NEEDED FOR ITCHING, Disp: 57 g, Rfl: 5  pravastatin (PRAVACHOL) 20 MG tablet, Take 1 tablet (20 mg total) by mouth once daily., Disp: 90 tablet, Rfl: 3  ramipril (ALTACE) 5 MG capsule, Take 1 capsule (5 mg total) by mouth once daily., Disp: 30 capsule, Rfl: 5  tamsulosin (FLOMAX) 0.4 mg Cp24, Take 1 capsule (0.4 mg total) by mouth once daily., Disp: 90 capsule, Rfl: 4  triamcinolone acetonide 0.1% (KENALOG) 0.1 % cream, Use bid prn rash, Disp: 80 g, Rfl: 3  fexofenadine (ALLEGRA) 180 MG tablet, Take 1 tablet (180 mg total) by mouth once daily. (Patient taking differently: Take 180 mg by mouth daily as needed. ), Disp: 30 tablet, Rfl: 11    No current facility-administered medications for this visit.       Review of patient's allergies indicates:   -- Iodine and iodide containing products -- Hives        Review of Systems   Constitution: Positive for malaise/fatigue. Negative for weight gain and weight loss.   Cardiovascular: Negative for chest pain.   Respiratory: Negative for shortness of breath.    Musculoskeletal: Positive for neck pain. Negative for joint pain and joint swelling.   Gastrointestinal: Negative for abdominal pain and bowel incontinence.   Genitourinary: Negative for bladder incontinence.         Objective:        General: Liang is well-developed, well-nourished, appears stated age, in no acute distress, alert and oriented to time, place and person.     General    Vitals reviewed.  Constitutional: He is oriented to person, place, and time. He appears well-developed and well-nourished.   HENT:   Head: Normocephalic and atraumatic.   Pulmonary/Chest: Effort normal.   Neurological: He is alert and oriented to person, place, and time.   Psychiatric: He has a normal mood and affect. His behavior is normal. Judgment and thought content normal.     General Musculoskeletal Exam   Gait: normal     Right Ankle/Foot Exam     Tests   Heel Walk: able to perform  Tiptoe Walk: able to  perform    Left Ankle/Foot Exam     Tests   Heel Walk: able to perform  Tiptoe Walk: able to perform  Back (L-Spine & T-Spine) / Neck (C-Spine) Exam     Tenderness Right paramedian tenderness of the Upper C-Spine.     Back (L-Spine & T-Spine) Range of Motion   Extension: 30   Flexion: 90   Lateral Bend Right: 20   Lateral Bend Left: 20   Rotation Right: 40   Rotation Left: 40     Neck (C-Spine) Range of Motion   Flexion:     40  Extension: > 40Right Lateral Bend: 20 Left Lateral Bend: 20     Spinal Sensation   Right Side Sensation  C-Spine Level: normal   L-Spine Level: normal  S-Spine Level: normal  Left Side Sensation  C-Spine Level: normal  L-Spine Level: normal  S-Spine Level: normal    Back (L-Spine & T-Spine) Tests   Right Side Tests  Straight leg raise:      Sitting SLR: > 70 degrees      Left Side Tests  Straight leg raise:     Sitting SLR: > 70 degrees          Other He has no scoliosis .  Spinal Kyphosis:  Absent      Muscle Strength   Right Upper Extremity   Biceps: 5/5/5   Deltoid:  5/5  Triceps:  5/5  Wrist Extension: 5/5/5   Finger Flexors:  5/5  Left Upper Extremity  Biceps: 5/5/5   Deltoid:  5/5  Triceps:  5/5  Wrist Extension: 5/5/5   Finger Flexors:  5/5  Right Lower Extremity   Hip Flexion: 5/5   Quadriceps:  5/5   Anterior tibial:  5/5/5  EHL:  5/5  Left Lower Extremity   Hip Flexion: 5/5   Quadriceps:  5/5   Anterior tibial:  5/5/5   EHL:  5/5    Reflexes     Left Side  Biceps:  2+  Triceps:  2+  Brachioradialis:  2+  Quadriceps:  2+  Achilles:  2+  Left Quinteros's Sign:  Absent  Babinski Sign:  absent    Right Side   Biceps:  2+  Triceps:  2+  Brachioradialis:  2+  Quadriceps:  2+  Achilles:  2+  Right Quinteros's Sign:  absent  Babinski Sign:  absent    Vascular Exam     Right Pulses        Carotid:                  2+    Left Pulses        Carotid:                  2+              Assessment:       1. Neck pain    2. DDD (degenerative disc disease), cervical           Plan:       Orders Placed  This Encounter    Ambulatory Referral to Physical/Occupational Therapy        1.  Continue to follow with neurology and Dr. Chen  2.  PT for neck retractions, scapula stabilization, posture training and HEP  3.  Tylenol 500mg po BID as needed  4.  Aleve as needed for neck pain  5.  If continued neck pain, might need an MRI  6.  rtc 3 months      Follow-up: Return in about 3 months (around 8/9/2017). If there are any questions prior to this, the patient was instructed to contact the office.

## 2017-05-16 NOTE — ADDENDUM NOTE
Addendum  created 05/16/17 1728 by Rick Andrade MD    Anesthesia Intra Blocks edited, Sign clinical note

## 2017-05-23 ENCOUNTER — CLINICAL SUPPORT (OUTPATIENT)
Dept: REHABILITATION | Facility: HOSPITAL | Age: 77
End: 2017-05-23
Attending: PHYSICAL MEDICINE & REHABILITATION
Payer: MEDICARE

## 2017-05-23 DIAGNOSIS — M54.2 CERVICAL PAIN: ICD-10-CM

## 2017-05-23 PROCEDURE — 97140 MANUAL THERAPY 1/> REGIONS: CPT

## 2017-05-23 PROCEDURE — G8978 MOBILITY CURRENT STATUS: HCPCS | Mod: CJ

## 2017-05-23 PROCEDURE — 97161 PT EVAL LOW COMPLEX 20 MIN: CPT

## 2017-05-23 PROCEDURE — G8979 MOBILITY GOAL STATUS: HCPCS | Mod: CJ

## 2017-05-23 NOTE — PLAN OF CARE
Physical Therapy Initial Evaluation     Name: Liang Monterroso Jr.  Clinic Number: [id number]    Diagnosis:   Encounter Diagnosis   Name Primary?    Cervical pain      Physician: Ayanna Nieto, *  Treatment Orders: PT Eval and Treat  Past Medical History:   Diagnosis Date    Allergy     CKD (chronic kidney disease) stage 3, GFR 30-59 ml/min 6/15/2016    GERD (gastroesophageal reflux disease)     Hyperlipidemia     Hypertension     Hypothyroidism     Thyroid disease     Urinary tract infection      Current Outpatient Prescriptions   Medication Sig    econazole nitrate 1 % cream USE TWICE DAILY (Patient taking differently: USE TWICE DAILY (prn))    famotidine (PEPCID) 20 MG tablet Take 20 mg by mouth 2 (two) times daily as needed.      fexofenadine (ALLEGRA) 180 MG tablet Take 1 tablet (180 mg total) by mouth once daily. (Patient taking differently: Take 180 mg by mouth daily as needed. )    hydrocodone-acetaminophen 7.5-325mg (NORCO) 7.5-325 mg per tablet Take 1 tablet by mouth every 6 (six) hours as needed for Pain.    levothyroxine (SYNTHROID) 112 MCG tablet Take 1 tablet (112 mcg total) by mouth before breakfast.    MAG HYDROX/AL HYDROX/SIMETH (ANTACID ORAL) Take by mouth as needed (acid reflux).    metronidazole (METROGEL) 0.75 % gel Apply 1 application topically every evening.    multivitamin with minerals tablet Take 1 tablet by mouth once daily.      PRAMOSONE cream APPLY TOPICALLY TWICE DAILY AS NEEDED FOR ITCHING    pravastatin (PRAVACHOL) 20 MG tablet Take 1 tablet (20 mg total) by mouth once daily.    ramipril (ALTACE) 5 MG capsule Take 1 capsule (5 mg total) by mouth once daily.    tamsulosin (FLOMAX) 0.4 mg Cp24 Take 1 capsule (0.4 mg total) by mouth once daily.    triamcinolone acetonide 0.1% (KENALOG) 0.1 % cream Use bid prn rash     No current facility-administered medications for this visit.      Review of patient's allergies  indicates:   Allergen Reactions    Iodine and iodide containing products Hives       Subjective     Patient states:  Jan 16, 2017 fell off ladder and hit the back of his head.   LOC following fall with resultant balance issues and cervical pain.. 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 R side pain from occiput to right UT.  Pt reports pain is decreased since onset and condition is improving.  Pain Scale: Liang rates pain on a scale of 0-10 to be 4 at worst; 1 currently; 1 at best .RUT/R occiput  Onset: sudden  Radicular symptoms:  Denied.  Aggravating factors:   Sitting down/ driving  Easing factors: alleve  Prior Therapy: for balance  Functional Deficits Leading to Referral:   Prior functional status: walks daily/strengtheing ex at gym 4x/wk  DME owned/used: none  Occupation:  retired                      Diagnostics Impression       2 mm anterolisthesis of C3 on C4 which increases to 4 mm on the flexion radiograph and resolves on the extension radiograph of the cervical spine.  Cervical spondylosis.  Bony fusion of the C2 and C3 vertebral bodies.         Objective     Posture Alignment: forward head/dec cervical lordosis/R scapula down    CERVICAL SPINE AROM:   Flexion: 100   Extension: 75   Left Sidebend: 25   Right Sidebend: 25   Left Rotation: 75   Right Rotation: 50     SEGMENTAL MOBILITY: decreased mid cervical region C3-C5    UPPER EXTREMITY STRENGTH:   Left Right   Shoulder Flexion 5/5 5/5   Shoulder Abduction 5/5 5/5     Shoulder Internal Rotation 5/5 5/5   Shoulder External Rotation 5/5 5/5   Elbow Flexion 5/5 5/5   Elbow Extension 5/5 5/5       DTR's:   Left Right   Biceps Tendon 2+ 2+     Dermatomes: Sensation: Light Touch: Intact B UE    Palpation: anterior scalenes R/right occipital ridge    Special Tests:   Left Right   Compression neg neg   Dystraction Pos Pos   Spurling neg neg     Pt/family was provided educational information, including: role of PT, goals  for PT, scheduling - pt verbalized understanding. Discussed insurance limitations with pt.     TREATMENT     Time In: 1:00pm  Time Out: 2:00pm    PT Evaluation Completed? Yes  Discussed Plan of Care with patient: Yes    Liang received 0 minutes of therapeutic exercise & instruction including:  UBE Bwds  Corner stretch  Cervical retraction  UTS  LSS  Scalene stretch  scap squeeze  TB rows/pulldown  Supine serratus  Supine scaption      Liang received 10 minutes of manual therapy including:  Sub occipt release  Manual cervical traction  PROM stretching    Written Home Exercises Provided: anterior scalene stretch/retraction/scap squeeze  Liang demo good understanding of the education provided. Patient demo good return demo of skill of exercises.    Assessment     Patient presents with decreased cervical ROM, poor posture, decreased functional mobility, decreased flexibility, 37% FOTO disability score and increased pain with ADL's  Pt prognosis is Good.  Pt will benefit from skilled outpatient physical therapy to address the above stated deficits, provide pt/family education and to maximize pt's level of independence.   History  Co-morbidities and personal factors that may impact the plan of care Examination  Body Structures and Functions, activity limitations and participation restrictions that may impact the plan of care Clinical Presentation   Decision Making/ Complexity Score   Co-morbidities:   CKD  UTI  HTN  thyroid  Personal Factors:   no deficits Body Regions:   neck    Body Systems:   ROM  strength   flexibility    Activity limitations:   Learning and applying knowledge  no deficits    General Tasks and Commands  no deficits    Communication  no deficits    Mobility  lifting and carrying objects    Self care  no deficits    Domestic Life  no deficits    Interactions/Relationships  no deficits    Life Areas  no deficits    Community and Social Life  recreation and leisure    Participation Restrictions:    Driving/sitting         stable and uncomplicated            low low low low         Pt's spiritual, cultural and educational needs considered and pt agreeable to plan of care and goals as stated below:         Short Term GOALS: 3 weeks. Pt agrees with goals set.  1. Patient demonstrates independence with HEP.   2. Patient demonstrates independence with Postural Awareness.   3. Patient demonstrates independence with body mechanics.     Long Term GOALS: 6 weeks. Pt agrees with goals set.  1. Patient demonstrates increased SB bilateral to 50% to improve tolerance to functional activities pain free.   2. Patient will report no difficulty with sitting/driving  3. Patient demonstrates improved overall function per FOTO Neck Survey to 20% Limitation or less.     Functional Limitations Reports - G Codes  Category: Mobility  Tool: FOTO Neck Survey  Score: 37% Limitation   Modifier  Impairment Limitation Restriction    CH  0 % impaired, limited or restricted    CI  @ least 1% but less than 20% impaired, limited or restricted    CJ  @ least 20%<40% impaired, limited or restricted    CK  @ least 40%<60% impaired, limited or restricted    CL  @ least 60% <80% impaired, limited or restricted    CM  @ least 80%<100% impaired limited or restricted    CN  100% impaired, limited or restricted     Current/  CJ = 37% limitation  Goal/ : CJ =  20% limitation    PLAN     Outpatient physical therapy 2 times weekly to include: pt ed, hep, therapeutic exercises, neuromuscular re-education/ balance exercises, joint mobilizations, aquatic therapy and modalities prn. Cont PT for  6 weeks. Pt may be seen by PTA as part of the rehabilitation team.     Therapist: Shavon Dawson, PT  5/23/2017

## 2017-05-23 NOTE — PROGRESS NOTES
Physical Therapy Initial Evaluation     Name: Liang Monterroso Jr.  Clinic Number: [id number]    Diagnosis:   Encounter Diagnosis   Name Primary?    Cervical pain      Physician: Ayanna Nieto, *  Treatment Orders: PT Eval and Treat  Past Medical History:   Diagnosis Date    Allergy     CKD (chronic kidney disease) stage 3, GFR 30-59 ml/min 6/15/2016    GERD (gastroesophageal reflux disease)     Hyperlipidemia     Hypertension     Hypothyroidism     Thyroid disease     Urinary tract infection      Current Outpatient Prescriptions   Medication Sig    econazole nitrate 1 % cream USE TWICE DAILY (Patient taking differently: USE TWICE DAILY (prn))    famotidine (PEPCID) 20 MG tablet Take 20 mg by mouth 2 (two) times daily as needed.      fexofenadine (ALLEGRA) 180 MG tablet Take 1 tablet (180 mg total) by mouth once daily. (Patient taking differently: Take 180 mg by mouth daily as needed. )    hydrocodone-acetaminophen 7.5-325mg (NORCO) 7.5-325 mg per tablet Take 1 tablet by mouth every 6 (six) hours as needed for Pain.    levothyroxine (SYNTHROID) 112 MCG tablet Take 1 tablet (112 mcg total) by mouth before breakfast.    MAG HYDROX/AL HYDROX/SIMETH (ANTACID ORAL) Take by mouth as needed (acid reflux).    metronidazole (METROGEL) 0.75 % gel Apply 1 application topically every evening.    multivitamin with minerals tablet Take 1 tablet by mouth once daily.      PRAMOSONE cream APPLY TOPICALLY TWICE DAILY AS NEEDED FOR ITCHING    pravastatin (PRAVACHOL) 20 MG tablet Take 1 tablet (20 mg total) by mouth once daily.    ramipril (ALTACE) 5 MG capsule Take 1 capsule (5 mg total) by mouth once daily.    tamsulosin (FLOMAX) 0.4 mg Cp24 Take 1 capsule (0.4 mg total) by mouth once daily.    triamcinolone acetonide 0.1% (KENALOG) 0.1 % cream Use bid prn rash     No current facility-administered medications for this visit.      Review of patient's allergies  indicates:   Allergen Reactions    Iodine and iodide containing products Hives       Subjective     Patient states:  Jan 16, 2017 fell off ladder and hit the back of his head.   LOC following fall with resultant balance issues and cervical pain.. 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 R side pain from occiput to right UT.  Pt reports pain is decreased since onset and condition is improving.  Pain Scale: Liang rates pain on a scale of 0-10 to be 4 at worst; 1 currently; 1 at best .RUT/R occiput  Onset: sudden  Radicular symptoms:  Denied.  Aggravating factors:   Sitting down/ driving  Easing factors: alleve  Prior Therapy: for balance  Functional Deficits Leading to Referral:   Prior functional status: walks daily/strengtheing ex at gym 4x/wk  DME owned/used: none  Occupation:  retired                      Diagnostics Impression       2 mm anterolisthesis of C3 on C4 which increases to 4 mm on the flexion radiograph and resolves on the extension radiograph of the cervical spine.  Cervical spondylosis.  Bony fusion of the C2 and C3 vertebral bodies.         Objective     Posture Alignment: forward head/dec cervical lordosis/R scapula down    CERVICAL SPINE AROM:   Flexion: 100   Extension: 75   Left Sidebend: 25   Right Sidebend: 25   Left Rotation: 75   Right Rotation: 50     SEGMENTAL MOBILITY: decreased mid cervical region C3-C5    UPPER EXTREMITY STRENGTH:   Left Right   Shoulder Flexion 5/5 5/5   Shoulder Abduction 5/5 5/5     Shoulder Internal Rotation 5/5 5/5   Shoulder External Rotation 5/5 5/5   Elbow Flexion 5/5 5/5   Elbow Extension 5/5 5/5       DTR's:   Left Right   Biceps Tendon 2+ 2+     Dermatomes: Sensation: Light Touch: Intact B UE    Palpation: anterior scalenes R/right occipital ridge    Special Tests:   Left Right   Compression neg neg   Dystraction Pos Pos   Spurling neg neg     Pt/family was provided educational information, including: role of PT, goals  for PT, scheduling - pt verbalized understanding. Discussed insurance limitations with pt.     TREATMENT     Time In: 1:00pm  Time Out: 2:00pm    PT Evaluation Completed? Yes  Discussed Plan of Care with patient: Yes    Liang received 0 minutes of therapeutic exercise & instruction including:  UBE Bwds  Corner stretch  Cervical retraction  UTS  LSS  Scalene stretch  scap squeeze  TB rows/pulldown  Supine serratus  Supine scaption      Liang received 10 minutes of manual therapy including:  Sub occipt release  Manual cervical traction  PROM stretching    Written Home Exercises Provided: anterior scalene stretch/retraction/scap squeeze  Liang demo good understanding of the education provided. Patient demo good return demo of skill of exercises.    Assessment     Patient presents with decreased cervical ROM, poor posture, decreased functional mobility, decreased flexibility, 37% FOTO disability score and increased pain with ADL's  Pt prognosis is Good.  Pt will benefit from skilled outpatient physical therapy to address the above stated deficits, provide pt/family education and to maximize pt's level of independence.   History  Co-morbidities and personal factors that may impact the plan of care Examination  Body Structures and Functions, activity limitations and participation restrictions that may impact the plan of care Clinical Presentation   Decision Making/ Complexity Score   Co-morbidities:   CKD  UTI  HTN  thyroid  Personal Factors:   no deficits Body Regions:   neck    Body Systems:   ROM  strength   flexibility    Activity limitations:   Learning and applying knowledge  no deficits    General Tasks and Commands  no deficits    Communication  no deficits    Mobility  lifting and carrying objects    Self care  no deficits    Domestic Life  no deficits    Interactions/Relationships  no deficits    Life Areas  no deficits    Community and Social Life  recreation and leisure    Participation Restrictions:    Driving/sitting         stable and uncomplicated            low low low low         Pt's spiritual, cultural and educational needs considered and pt agreeable to plan of care and goals as stated below:         Short Term GOALS: 3 weeks. Pt agrees with goals set.  1. Patient demonstrates independence with HEP.   2. Patient demonstrates independence with Postural Awareness.   3. Patient demonstrates independence with body mechanics.     Long Term GOALS: 6 weeks. Pt agrees with goals set.  1. Patient demonstrates increased SB bilateral to 50% to improve tolerance to functional activities pain free.   2. Patient will report no difficulty with sitting/driving  3. Patient demonstrates improved overall function per FOTO Neck Survey to 20% Limitation or less.     Functional Limitations Reports - G Codes  Category: Mobility  Tool: FOTO Neck Survey  Score: 37% Limitation   Modifier  Impairment Limitation Restriction    CH  0 % impaired, limited or restricted    CI  @ least 1% but less than 20% impaired, limited or restricted    CJ  @ least 20%<40% impaired, limited or restricted    CK  @ least 40%<60% impaired, limited or restricted    CL  @ least 60% <80% impaired, limited or restricted    CM  @ least 80%<100% impaired limited or restricted    CN  100% impaired, limited or restricted     Current/  CJ = 37% limitation  Goal/ : CJ =  20% limitation    PLAN     Outpatient physical therapy 2 times weekly to include: pt ed, hep, therapeutic exercises, neuromuscular re-education/ balance exercises, joint mobilizations, aquatic therapy and modalities prn. Cont PT for  6 weeks. Pt may be seen by PTA as part of the rehabilitation team.     Therapist: Shavon Dawson, PT  5/23/2017

## 2017-05-25 ENCOUNTER — CLINICAL SUPPORT (OUTPATIENT)
Dept: REHABILITATION | Facility: HOSPITAL | Age: 77
End: 2017-05-25
Attending: PHYSICAL MEDICINE & REHABILITATION
Payer: MEDICARE

## 2017-05-25 DIAGNOSIS — M54.2 CERVICAL PAIN: ICD-10-CM

## 2017-05-25 PROCEDURE — 97110 THERAPEUTIC EXERCISES: CPT

## 2017-05-25 PROCEDURE — 97140 MANUAL THERAPY 1/> REGIONS: CPT

## 2017-05-25 NOTE — PROGRESS NOTES
"                                                    Physical Therapy Daily Note     Name: Liang Monterroso Jr.  Clinic Number: [id number]  Diagnosis:   Encounter Diagnosis   Name Primary?    Cervical pain      Physician: Ayanna Nieto, *  Precautions:     2 mm anterolisthesis of C3 on C4 which increases to 4 mm on the flexion radiograph and resolves on the extension radiograph of the cervical spine.  Cervical spondylosis.  Bony fusion of the C2 and C3 vertebral bodies.     Visit #: 2 of 12  PTA Visit #: 1  Time In: 9:33  Time Out: 10:33  Total Treatment Time 1:1: 30    Evaluation Date: 5/23/17  Plan of care Expiration: 7/4/17      Subjective     Pt reports: neck is about a 2  Pain Scale: Liang rates pain on a scale of 0-10 to be 2 currently.    Objective     Liang received individual therapeutic exercises to develop strength, ROM, flexibility, posture and core stabilization for 35 minutes including: (1:1 w/ PT/PTA x 20)  UBE Fwd/bwd 2 min ea.   Corner stretch  Cervical retraction 20x5"  Serratus punch 2x10  Scaption 2x10  UTS 30" x 3  LSS 3x30"  Scalene stretch 3x30"  scap squeeze 20x5"  TB rows/pulldown OTB 2x10         Liang received the following manual therapy techniques: Soft tissue Mobilization were applied to the: c/s for 10 minutes including:  Sub occipt release  STM to R UT  Manual cervical traction- NP  PROM stretching UT      The patient received the following direct contact modalities after being cleared for contraindications: HP to R UT x 10 min      Written Home Exercises Provided: as per eval  Pt demo good understanding of the education provided. Liang demonstrated good return demonstration of activities.     Education provided re: proper ms activation  Liang verbalized good understanding of education provided.   No spiritual or educational barriers to learning provided    Assessment     Patient tolerated deniz well w/o adverse reaction. Tone R UT. VC needed to not hike shoulders w/ deniz.   This is a 76 y.o. " male referred to outpatient physical therapy and presents with a medical diagnosis of C/S pain and demonstrates limitations as described in the problem list. Pt prognosis is Good. Pt will continue to benefit from skilled outpatient physical therapy to address the deficits listed in the problem list, provide pt/family education and to maximize pt's level of independence in the home and community environment.     Goals as follows:  Short Term GOALS: 3 weeks. Pt agrees with goals set.  1. Patient demonstrates independence with HEP.   2. Patient demonstrates independence with Postural Awareness.   3. Patient demonstrates independence with body mechanics.     Long Term GOALS: 6 weeks. Pt agrees with goals set.  1. Patient demonstrates increased SB bilateral to 50% to improve tolerance to functional activities pain free.   2. Patient will report no difficulty with sitting/driving  3. Patient demonstrates improved overall function per FOTO Neck Survey to 20% Limitation or less.      Plan     Continue with established Plan of Care towards PT goals.    Therapist: Nyasia Hatfield, PTA  5/25/2017

## 2017-05-30 ENCOUNTER — CLINICAL SUPPORT (OUTPATIENT)
Dept: REHABILITATION | Facility: HOSPITAL | Age: 77
End: 2017-05-30
Attending: PHYSICAL MEDICINE & REHABILITATION
Payer: MEDICARE

## 2017-05-30 ENCOUNTER — TELEPHONE (OUTPATIENT)
Dept: UROLOGY | Facility: CLINIC | Age: 77
End: 2017-05-30

## 2017-05-30 ENCOUNTER — OFFICE VISIT (OUTPATIENT)
Dept: UROLOGY | Facility: CLINIC | Age: 77
End: 2017-05-30
Payer: MEDICARE

## 2017-05-30 VITALS
DIASTOLIC BLOOD PRESSURE: 90 MMHG | HEART RATE: 111 BPM | HEIGHT: 68 IN | BODY MASS INDEX: 28.64 KG/M2 | WEIGHT: 189 LBS | SYSTOLIC BLOOD PRESSURE: 130 MMHG

## 2017-05-30 DIAGNOSIS — N13.8 BPH WITH URINARY OBSTRUCTION: ICD-10-CM

## 2017-05-30 DIAGNOSIS — M54.2 CERVICAL PAIN: ICD-10-CM

## 2017-05-30 DIAGNOSIS — R33.9 URINARY RETENTION WITH INCOMPLETE BLADDER EMPTYING: Primary | ICD-10-CM

## 2017-05-30 DIAGNOSIS — R39.15 URGENCY OF URINATION: ICD-10-CM

## 2017-05-30 DIAGNOSIS — N40.1 BPH WITH URINARY OBSTRUCTION: ICD-10-CM

## 2017-05-30 DIAGNOSIS — R35.0 FREQUENCY OF URINATION: ICD-10-CM

## 2017-05-30 DIAGNOSIS — R35.1 NOCTURIA: ICD-10-CM

## 2017-05-30 PROCEDURE — 97110 THERAPEUTIC EXERCISES: CPT

## 2017-05-30 PROCEDURE — 1125F AMNT PAIN NOTED PAIN PRSNT: CPT | Mod: S$GLB,,, | Performed by: PHYSICIAN ASSISTANT

## 2017-05-30 PROCEDURE — 1159F MED LIST DOCD IN RCRD: CPT | Mod: S$GLB,,, | Performed by: PHYSICIAN ASSISTANT

## 2017-05-30 PROCEDURE — 99214 OFFICE O/P EST MOD 30 MIN: CPT | Mod: 25,S$GLB,, | Performed by: PHYSICIAN ASSISTANT

## 2017-05-30 PROCEDURE — 99999 PR PBB SHADOW E&M-EST. PATIENT-LVL III: CPT | Mod: PBBFAC,,, | Performed by: PHYSICIAN ASSISTANT

## 2017-05-30 PROCEDURE — 51702 INSERT TEMP BLADDER CATH: CPT | Mod: S$GLB,,, | Performed by: PHYSICIAN ASSISTANT

## 2017-05-30 RX ORDER — TAMSULOSIN HYDROCHLORIDE 0.4 MG/1
0.8 CAPSULE ORAL DAILY
Qty: 180 CAPSULE | Refills: 3 | Status: SHIPPED | OUTPATIENT
Start: 2017-05-30 | End: 2018-05-30

## 2017-05-30 RX ORDER — FLUCONAZOLE 150 MG/1
TABLET ORAL
COMMUNITY
Start: 2017-05-08 | End: 2017-07-05

## 2017-05-30 NOTE — TELEPHONE ENCOUNTER
----- Message from Randi Edward sent at 5/30/2017  8:13 AM CDT -----  Contact: Self- [phone] or [phone]  Ragini- pt called to schedule a f/u appt with Dr. Eid- says he is having symptoms of a bladder and prostate infection- offered next available but pt would like to been seen today if possible- please call pt back at [phone] or [phone]

## 2017-05-30 NOTE — TELEPHONE ENCOUNTER
Pt has symptoms of prostatitis and would like to be seen today. appt made for charu medley at 1134

## 2017-05-30 NOTE — PROGRESS NOTES
"CHIEF COMPLAINT:    Mr. Monterroso is a 76 y.o. male presenting for difficulty urinating.    PRESENTING ILLNESS:    Liang Monterroso Jr. is a 76 y.o. male with a PMH of BPH who presents for difficulty urinating.  Mid January he fell off a ladder and landed on the back of his head.  He was found to have a brain tumor which was removed in March.  He was on steroids for 3 weeks.  He reports experiencing urinary problems (frequency, difficulty urinating) and was told it could be due to the steroid use.  He saw BERTHA Strong on 4/11/17and was diagnosed with prostatitis.  He was prescribed cipro in which he completed on 5/11.  He reports bladder pain and difficulty urinating.    Today his bladder pain is constant.  Prior to the day it was less noticeable when he was moving around.    He took two aleves but that did not help.   He seems to be having more difficulty urinating today.  He is having to strain to urinate.  His urine flow is "fair".    Sometimes he has the urge and nothing comes out.     He denies dysuria and gross hematuria.    He reports urinary frequency and nocturia.   He takes tamsulosin.    He had a cysto on 3/2016 by Dr. Eid which showed Mild-to-moderate looking outlet obstruction.  He was treated for prostatitis and they elected to proceed with conservative treatment. If symptoms persist then he may benefit for TURP.     REVIEW OF SYSTEMS:    Constitutional: Negative for fever and chills.   HENT: Negative for hearing loss.   Eyes: Negative for visual disturbance.   Respiratory: Negative for shortness of breath.   Cardiovascular: Negative for chest pain.   Gastrointestinal: Negative for nausea, vomiting, and constipation.   Genitourinary: Positive for urgency, frequency, nocturia, difficulty urinating, Negative for  incontinence, dysuria, hematuria, intermittency, and decreased urine volume.   Neurological: Negative for dizziness.   Hematological: Does not bruise/bleed easily.   Psychiatric/Behavioral: Negative for " confusion.       PATIENT HISTORY:    Past Medical History:   Diagnosis Date    Allergy     CKD (chronic kidney disease) stage 3, GFR 30-59 ml/min 6/15/2016    GERD (gastroesophageal reflux disease)     Hyperlipidemia     Hypertension     Hypothyroidism     Thyroid disease     Urinary tract infection        Past Surgical History:   Procedure Laterality Date    APPENDECTOMY      CATARACT EXTRACTION      EYE SURGERY      HERNIA REPAIR      TONSILLECTOMY         Family History   Problem Relation Age of Onset    Diabetes Mother     Heart disease Mother     Hypertension Mother     Vision loss Mother     Dementia Mother     Alcohol abuse Father     Cancer Sister      lung with mets, was a heavy smoker    No Known Problems Daughter     No Known Problems Son     No Known Problems Daughter     No Known Problems Son     Amblyopia Neg Hx     Blindness Neg Hx     Cataracts Neg Hx     Glaucoma Neg Hx     Macular degeneration Neg Hx     Retinal detachment Neg Hx     Strabismus Neg Hx     Stroke Neg Hx     Thyroid disease Neg Hx        Social History     Social History    Marital status:      Spouse name: parul    Number of children: 4    Years of education: N/A     Occupational History    retired      Social History Main Topics    Smoking status: Never Smoker    Smokeless tobacco: Never Used    Alcohol use Yes      Comment: 1-3 glasses wine weekly, 2-3 beers weekly    Drug use: No    Sexual activity: Yes     Partners: Female     Other Topics Concern    Not on file     Social History Narrative    No narrative on file       Allergies:  Iodine and iodide containing products    Medications:    Current Outpatient Prescriptions:     econazole nitrate 1 % cream, USE TWICE DAILY (Patient taking differently: USE TWICE DAILY (prn)), Disp: 60 g, Rfl: 5    famotidine (PEPCID) 20 MG tablet, Take 20 mg by mouth 2 (two) times daily as needed.  , Disp: , Rfl:     fexofenadine (ALLEGRA) 180  MG tablet, Take 1 tablet (180 mg total) by mouth once daily. (Patient taking differently: Take 180 mg by mouth daily as needed. ), Disp: 30 tablet, Rfl: 11    fluconazole (DIFLUCAN) 150 MG Tab, , Disp: , Rfl:     hydrocodone-acetaminophen 7.5-325mg (NORCO) 7.5-325 mg per tablet, Take 1 tablet by mouth every 6 (six) hours as needed for Pain., Disp: 60 tablet, Rfl: 0    levothyroxine (SYNTHROID) 112 MCG tablet, Take 1 tablet (112 mcg total) by mouth before breakfast., Disp: 30 tablet, Rfl: 5    MAG HYDROX/AL HYDROX/SIMETH (ANTACID ORAL), Take by mouth as needed (acid reflux)., Disp: , Rfl:     metronidazole (METROGEL) 0.75 % gel, Apply 1 application topically every evening., Disp: 45 g, Rfl: 5    multivitamin with minerals tablet, Take 1 tablet by mouth once daily.  , Disp: , Rfl:     PRAMOSONE cream, APPLY TOPICALLY TWICE DAILY AS NEEDED FOR ITCHING, Disp: 57 g, Rfl: 5    pravastatin (PRAVACHOL) 20 MG tablet, Take 1 tablet (20 mg total) by mouth once daily., Disp: 90 tablet, Rfl: 3    ramipril (ALTACE) 5 MG capsule, Take 1 capsule (5 mg total) by mouth once daily., Disp: 30 capsule, Rfl: 5    tamsulosin (FLOMAX) 0.4 mg Cp24, Take 2 capsules (0.8 mg total) by mouth once daily., Disp: 180 capsule, Rfl: 3    triamcinolone acetonide 0.1% (KENALOG) 0.1 % cream, Use bid prn rash, Disp: 80 g, Rfl: 3    PHYSICAL EXAMINATION:    Constitutional: He appears well-developed and well-nourished.  He is in no apparent distress.    Eyes: No scleral icterus noted bilaterally.  No discharge noted billaterally.      Cardiovascular:   No pitting edema noted in lower extremities bilaterally    Pulmonary/Chest: Effort normal. No respiratory distress.     Abdominal:  He exhibits no distension.  There is no CVA tenderness.     Lymphadenopathy:        Right: No supraclavicular adenopathy present.        Left: No supraclavicular adenopathy present.     Neurological: He is alert and oriented to person, place, and time.     Skin: Skin  is warm and dry.     Psych: Cooperative with normal affect.    Genitourinary: The penis is uncircumcised with no evidence of phimosis and paraphimosis.  Hypospadias noted.   Normal anal sphincter tone. No rectal mass.    The prostate is enlarged.  Prostate is smooth, non tender with no nodules noted.    Physical Exam      LABS:    U/a: 1.005, pH 6, negative.    Lab Results   Component Value Date    PSA 0.91 12/17/2012    PSA 4.89 (H) 11/12/2012    PSA 0.66 11/14/2011    PSADIAG 1.0 01/11/2016    PSADIAG 1.0 12/04/2014    PSADIAG 0.67 12/03/2013       IMPRESSION:    Encounter Diagnoses   Name Primary?    Urinary retention with incomplete bladder emptying Yes    BPH with urinary obstruction     Urgency of urination     Frequency of urination     Nocturia          PLAN:  Bladder scan showed 504ml.    12fr coude catheter placed by Nurse Deleon.    520ml of yellow urine was drained from bladder.  Noble catheter bag was connected to catheter.  Will increase flomax.to 0.8mg.    Follow up in a week for voiding trial.    Jeannette Rocha PA-C

## 2017-05-30 NOTE — PROGRESS NOTES
"                                                    Physical Therapy Daily Note     Name: Liang Monterroso Jr.  Clinic Number: [id number]  Diagnosis:   Encounter Diagnosis   Name Primary?    Cervical pain      Physician: Ayanna Nieto, *  Precautions:     2 mm anterolisthesis of C3 on C4 which increases to 4 mm on the flexion radiograph and resolves on the extension radiograph of the cervical spine.  Cervical spondylosis.  Bony fusion of the C2 and C3 vertebral bodies.     Visit #: 3 of 12  PTA Visit #: 2  Time In: 2:02 pm  Time Out: 2:50  Total Treatment Time 1:1: 30    Evaluation Date: 5/23/17  Plan of care Expiration: 7/4/17      Subjective     Pt reports: feeling the same, comes and goes, No pain today  Pain Scale: Liang rates pain on a scale of 0-10 to be 0 currently.    Objective     Liang received individual therapeutic exercises to develop strength, ROM, flexibility, posture and core stabilization for 48 minutes including: (1:1 w/ PT/PTA x 30)  UBE Fwd/bwd 2 min ea.   Corner stretch  Cervical retraction 20x5"  Serratus [id number]  Scaption 3x10  UTS 30" x 3  LSS 3x30"  Scalene stretch 3x30" -  scap squeeze 20x5"  TB rows/pulldown OTB 3x10-  SL FL R UE  SL hz abd R UE  SL ER R UE 2x10         Liang received the following manual therapy techniques: Soft tissue Mobilization were applied to the: c/s for 10 minutes including:  Sub occipt release  STM to R UT  Manual cervical traction- NP  PROM stretching UT      The patient received the following direct contact modalities after being cleared for contraindications: HP to R UT x 10 min      Written Home Exercises Provided: as per eval  Pt demo good understanding of the education provided. Liang demonstrated good return demonstration of activities.     Education provided re: proper ms activation  Liang verbalized good understanding of education provided.   No spiritual or educational barriers to learning provided    Assessment     Patient tolerated deniz well w/o adverse " reaction. Decreased tone R UT. Added SL series to work on R periscap strength and stability.   This is a 76 y.o. male referred to outpatient physical therapy and presents with a medical diagnosis of C/S pain and demonstrates limitations as described in the problem list. Pt prognosis is Good. Pt will continue to benefit from skilled outpatient physical therapy to address the deficits listed in the problem list, provide pt/family education and to maximize pt's level of independence in the home and community environment.     Goals as follows:  Short Term GOALS: 3 weeks. Pt agrees with goals set.  1. Patient demonstrates independence with HEP.   2. Patient demonstrates independence with Postural Awareness.   3. Patient demonstrates independence with body mechanics.     Long Term GOALS: 6 weeks. Pt agrees with goals set.  1. Patient demonstrates increased SB bilateral to 50% to improve tolerance to functional activities pain free.   2. Patient will report no difficulty with sitting/driving  3. Patient demonstrates improved overall function per FOTO Neck Survey to 20% Limitation or less.      Plan     Continue with established Plan of Care towards PT goals.    Therapist: Nyasia Hatfield, PTA  5/30/2017

## 2017-06-05 ENCOUNTER — OFFICE VISIT (OUTPATIENT)
Dept: UROLOGY | Facility: CLINIC | Age: 77
End: 2017-06-05
Payer: MEDICARE

## 2017-06-05 VITALS
SYSTOLIC BLOOD PRESSURE: 96 MMHG | WEIGHT: 187.38 LBS | HEIGHT: 68 IN | HEART RATE: 110 BPM | DIASTOLIC BLOOD PRESSURE: 63 MMHG | BODY MASS INDEX: 28.4 KG/M2

## 2017-06-05 DIAGNOSIS — R36.9 URETHRAL DISCHARGE: Primary | ICD-10-CM

## 2017-06-05 DIAGNOSIS — R33.9 URINARY RETENTION: ICD-10-CM

## 2017-06-05 PROCEDURE — 99999 PR PBB SHADOW E&M-EST. PATIENT-LVL III: CPT | Mod: PBBFAC,,, | Performed by: PHYSICIAN ASSISTANT

## 2017-06-05 PROCEDURE — 99213 OFFICE O/P EST LOW 20 MIN: CPT | Mod: S$GLB,,, | Performed by: PHYSICIAN ASSISTANT

## 2017-06-05 PROCEDURE — 1125F AMNT PAIN NOTED PAIN PRSNT: CPT | Mod: S$GLB,,, | Performed by: PHYSICIAN ASSISTANT

## 2017-06-05 PROCEDURE — 1159F MED LIST DOCD IN RCRD: CPT | Mod: S$GLB,,, | Performed by: PHYSICIAN ASSISTANT

## 2017-06-05 NOTE — PROGRESS NOTES
CHIEF COMPLAINT:    Mr. Monterroso is a 76 y.o. male presenting for pus around catheter.  PRESENTING ILLNESS:    Liang Monterroso Jr. is a 76 y.o. male with a PMH of urinary retention who presents for pus around catheter.  He reports seeing yellow secretion in his underwear for the last few days.  He thought that it may be pus and was concerned that he may have an infection.  He denies fever and chills.  Occasionally he has bladder pressure.  His catheter has been draining well.    He also reports penile pain when he has an nighttime/early morning erection.    He is taking flomax 0.8mg.    He was seen in clinic on 5/30/17 for urinary frequency and difficulty urinating.  He was found to be in urinary retention.  Noble catheter was placed and 520ml.  He is a patient of Dr. Eid.     REVIEW OF SYSTEMS:   Constitutional: Negative for fever and chills.   HENT: Negative for hearing loss.   Eyes: Negative for visual disturbance.   Respiratory: Negative for shortness of breath.   Cardiovascular: Negative for chest pain.   Gastrointestinal: Negative for nausea, vomiting, and constipation.   Genitourinary:  Negative for urgency, nocturia, incontinence, dysuria, hematuria, intermittency, hesitancy, and decreased urine volume.   Neurological: Negative for dizziness.   Hematological: Does not bruise/bleed easily.   Psychiatric/Behavioral: Negative for confusion.       PATIENT HISTORY:    Past Medical History:   Diagnosis Date    Allergy     CKD (chronic kidney disease) stage 3, GFR 30-59 ml/min 6/15/2016    GERD (gastroesophageal reflux disease)     Hyperlipidemia     Hypertension     Hypothyroidism     Thyroid disease     Urinary tract infection        Past Surgical History:   Procedure Laterality Date    APPENDECTOMY      CATARACT EXTRACTION      EYE SURGERY      HERNIA REPAIR      TONSILLECTOMY         Family History   Problem Relation Age of Onset    Diabetes Mother     Heart disease Mother     Hypertension Mother      Vision loss Mother     Dementia Mother     Alcohol abuse Father     Cancer Sister      lung with mets, was a heavy smoker    No Known Problems Daughter     No Known Problems Son     No Known Problems Daughter     No Known Problems Son     Amblyopia Neg Hx     Blindness Neg Hx     Cataracts Neg Hx     Glaucoma Neg Hx     Macular degeneration Neg Hx     Retinal detachment Neg Hx     Strabismus Neg Hx     Stroke Neg Hx     Thyroid disease Neg Hx        Social History     Social History    Marital status:      Spouse name: parul    Number of children: 4    Years of education: N/A     Occupational History    retired      Social History Main Topics    Smoking status: Never Smoker    Smokeless tobacco: Never Used    Alcohol use Yes      Comment: 1-3 glasses wine weekly, 2-3 beers weekly    Drug use: No    Sexual activity: Yes     Partners: Female     Other Topics Concern    Not on file     Social History Narrative    No narrative on file       Allergies:  Iodine and iodide containing products    Medications:    Current Outpatient Prescriptions:     econazole nitrate 1 % cream, USE TWICE DAILY (Patient taking differently: USE TWICE DAILY (prn)), Disp: 60 g, Rfl: 5    famotidine (PEPCID) 20 MG tablet, Take 20 mg by mouth 2 (two) times daily as needed.  , Disp: , Rfl:     fluconazole (DIFLUCAN) 150 MG Tab, , Disp: , Rfl:     levothyroxine (SYNTHROID) 112 MCG tablet, Take 1 tablet (112 mcg total) by mouth before breakfast., Disp: 30 tablet, Rfl: 5    MAG HYDROX/AL HYDROX/SIMETH (ANTACID ORAL), Take by mouth as needed (acid reflux)., Disp: , Rfl:     metronidazole (METROGEL) 0.75 % gel, Apply 1 application topically every evening., Disp: 45 g, Rfl: 5    multivitamin with minerals tablet, Take 1 tablet by mouth once daily.  , Disp: , Rfl:     PRAMOSONE cream, APPLY TOPICALLY TWICE DAILY AS NEEDED FOR ITCHING, Disp: 57 g, Rfl: 5    pravastatin (PRAVACHOL) 20 MG tablet, Take 1  tablet (20 mg total) by mouth once daily., Disp: 90 tablet, Rfl: 3    ramipril (ALTACE) 5 MG capsule, Take 1 capsule (5 mg total) by mouth once daily., Disp: 30 capsule, Rfl: 5    tamsulosin (FLOMAX) 0.4 mg Cp24, Take 2 capsules (0.8 mg total) by mouth once daily., Disp: 180 capsule, Rfl: 3    triamcinolone acetonide 0.1% (KENALOG) 0.1 % cream, Use bid prn rash, Disp: 80 g, Rfl: 3    fexofenadine (ALLEGRA) 180 MG tablet, Take 1 tablet (180 mg total) by mouth once daily. (Patient taking differently: Take 180 mg by mouth daily as needed. ), Disp: 30 tablet, Rfl: 11    PHYSICAL EXAMINATION:    Constitutional: He appears well-developed and well-nourished.  He is in no apparent distress.  Manually HR 80 beats per min.    Eyes: No scleral icterus noted bilaterally.     Cardiovascular: Normal rate.  No pitting edema noted in lower extremities bilaterally    Pulmonary/Chest: Effort normal. No respiratory distress.     Abdominal:  He exhibits no distension.  There is no CVA tenderness.     Lymphadenopathy:        Right: No supraclavicular adenopathy present.        Left: No supraclavicular adenopathy present.     Neurological: He is alert and oriented to person, place, and time.     Skin: Skin is warm and dry.     Psych: Cooperative with normal affect.    Genitourinary: The penis is circumcised. Hypospadias noted.  Noble catheter noted in urethra.    There is a small amount of thin pale yellow discharge noted beneath the urethra meatus.  No erythema, bleeding or lesions noted.  No signs of infection noted.     Physical Exam      LABS:    Lab Results   Component Value Date    PSA 0.91 12/17/2012    PSA 4.89 (H) 11/12/2012    PSA 0.66 11/14/2011    PSADIAG 1.0 01/11/2016    PSADIAG 1.0 12/04/2014    PSADIAG 0.67 12/03/2013       IMPRESSION:    Encounter Diagnoses   Name Primary?    Urethral discharge Yes    Urinary retention        PLAN:    Provided reassurance concerning discharge.  Painful erection is due to presence  of barboza catheter.  Continue flomax.   He will follow up on 6/9 for voiding trial.      Jeannette Rocha PA-C

## 2017-06-05 NOTE — ADDENDUM NOTE
Addendum  created 06/05/17 1536 by Rick Andrade MD    Anesthesia Intra Blocks edited, Sign clinical note

## 2017-06-06 ENCOUNTER — CLINICAL SUPPORT (OUTPATIENT)
Dept: REHABILITATION | Facility: HOSPITAL | Age: 77
End: 2017-06-06
Attending: PHYSICAL MEDICINE & REHABILITATION
Payer: MEDICARE

## 2017-06-06 DIAGNOSIS — M54.2 CERVICAL PAIN: ICD-10-CM

## 2017-06-06 PROCEDURE — 97140 MANUAL THERAPY 1/> REGIONS: CPT

## 2017-06-06 PROCEDURE — 97110 THERAPEUTIC EXERCISES: CPT

## 2017-06-06 NOTE — PROGRESS NOTES
"                                                    Physical Therapy Daily Note     Name: Liang Monterroso Jr.  Clinic Number: [id number]  Diagnosis:   Encounter Diagnosis   Name Primary?    Cervical pain      Physician: Ayanna Nieto, *  Precautions:     2 mm anterolisthesis of C3 on C4 which increases to 4 mm on the flexion radiograph and resolves on the extension radiograph of the cervical spine.  Cervical spondylosis.  Bony fusion of the C2 and C3 vertebral bodies.     Visit #: 4 of 12  PTA Visit #: 3  Time In: 1:30 pm  Time Out: 2:23 pm  Total Treatment Time 1:1: 43    Evaluation Date: 5/23/17  Plan of care Expiration: 7/4/17      Subjective     Pt reports: getting better, mainly hurts when sitting on couch watching tv  Pain Scale: Liang rates pain on a scale of 0-10 to be 0 currently.    Objective     Liang received individual therapeutic exercises to develop strength, ROM, flexibility, posture and core stabilization for 33 minutes including: (1:1 w/ PT/PTA x 33)  UBE Fwd/bwd 2 min ea. - NP  Corner stretch 10x10"  Cervical retraction 20x5"  Serratus punch 3x10 #2  Scaption 3x10 #2  R UTS 30" x 3  R LSS 3x30"  R Scalene stretch 3x30" -  scap squeeze 20x5"  TB W's/pulldown OTB 3x10-  SL FL R UE 2x15  SL hz abd R UE 2x15  SL ER R UE 2x15  wall angles 10x         Liang received the following manual therapy techniques: Soft tissue Mobilization were applied to the: c/s for 10 minutes including:  Sub occipt release  STM to R UT  Manual cervical traction- NP  PROM stretching UT      The patient received the following direct contact modalities after being cleared for contraindications: HP to R UT x 10 min      Written Home Exercises Provided: as per eval  Pt demo good understanding of the education provided. Liang demonstrated good return demonstration of activities.     Education provided re: proper ms activation  Liang verbalized good understanding of education provided.   No spiritual or educational barriers to learning " provided    Assessment     Patient tolerated deniz well w/o adverse reaction. Unable to place arms on wall for wall angles 2* decreased flexibility. UBE not performed today, perform next session. Cont to progress w. poc  This is a 76 y.o. male referred to outpatient physical therapy and presents with a medical diagnosis of C/S pain and demonstrates limitations as described in the problem list. Pt prognosis is Good. Pt will continue to benefit from skilled outpatient physical therapy to address the deficits listed in the problem list, provide pt/family education and to maximize pt's level of independence in the home and community environment.     Goals as follows:  Short Term GOALS: 3 weeks. Pt agrees with goals set.  1. Patient demonstrates independence with HEP.   2. Patient demonstrates independence with Postural Awareness.   3. Patient demonstrates independence with body mechanics.     Long Term GOALS: 6 weeks. Pt agrees with goals set.  1. Patient demonstrates increased SB bilateral to 50% to improve tolerance to functional activities pain free.   2. Patient will report no difficulty with sitting/driving  3. Patient demonstrates improved overall function per FOTO Neck Survey to 20% Limitation or less.      Plan     Continue with established Plan of Care towards PT goals.    Therapist: Nyasia Hatfield, PTA  6/6/2017

## 2017-06-09 ENCOUNTER — OFFICE VISIT (OUTPATIENT)
Dept: UROLOGY | Facility: CLINIC | Age: 77
End: 2017-06-09
Payer: MEDICARE

## 2017-06-09 ENCOUNTER — TELEPHONE (OUTPATIENT)
Dept: UROLOGY | Facility: CLINIC | Age: 77
End: 2017-06-09

## 2017-06-09 VITALS
WEIGHT: 185 LBS | HEART RATE: 83 BPM | HEIGHT: 68 IN | DIASTOLIC BLOOD PRESSURE: 72 MMHG | BODY MASS INDEX: 28.04 KG/M2 | SYSTOLIC BLOOD PRESSURE: 113 MMHG

## 2017-06-09 DIAGNOSIS — R33.9 URINARY RETENTION: ICD-10-CM

## 2017-06-09 DIAGNOSIS — Z46.6 ENCOUNTER FOR FOLEY CATHETER REMOVAL: Primary | ICD-10-CM

## 2017-06-09 PROCEDURE — 1159F MED LIST DOCD IN RCRD: CPT | Mod: S$GLB,,, | Performed by: PHYSICIAN ASSISTANT

## 2017-06-09 PROCEDURE — 1125F AMNT PAIN NOTED PAIN PRSNT: CPT | Mod: S$GLB,,, | Performed by: PHYSICIAN ASSISTANT

## 2017-06-09 PROCEDURE — 51700 IRRIGATION OF BLADDER: CPT | Mod: S$GLB,,, | Performed by: PHYSICIAN ASSISTANT

## 2017-06-09 PROCEDURE — 99999 PR PBB SHADOW E&M-EST. PATIENT-LVL III: CPT | Mod: PBBFAC,,, | Performed by: PHYSICIAN ASSISTANT

## 2017-06-09 PROCEDURE — 99213 OFFICE O/P EST LOW 20 MIN: CPT | Mod: 25,S$GLB,, | Performed by: PHYSICIAN ASSISTANT

## 2017-06-09 NOTE — TELEPHONE ENCOUNTER
Spoke to pt regarding catheter discontinuation earlier today in clinic. Pt states that since leaving clinic he was able to urinate on his own without straining, has a strong urine stream and feels as though he is emptying his bladder appropriately. Ed precautions given. Pt verbalized understanding. F/u six weeks.

## 2017-06-09 NOTE — PROGRESS NOTES
CHIEF COMPLAINT:    Mr. Monterroso is a 76 y.o. male presenting for voiding trial.    PRESENTING ILLNESS:    Liang Monterroso Jr. is a 76 y.o. male who presents for voiding trial.  He was seen in clinic on 5/30/17 for difficulty urinating, urgency, frequency, nocturia, and bladder pain.  Bladder scan showed 504ml.  Barboza catheter was placed and 520ml of yellow urine was drained from bladder.  He was instructed to increase his flomax to 0.8mg.    His catheter has been draining well.  He is here to have his barboza catheter removed.      He had a cysto on 3/2016 by Dr. Eid which showed Mild-to-moderate looking outlet obstruction.  He was treated for prostatitis and they elected to proceed with conservative treatment. If symptoms persist then he may benefit for TURP.     REVIEW OF SYSTEMS:    Constitutional: Negative for fever and chills.   HENT: Negative for hearing loss.   Eyes: Negative for visual disturbance.   Respiratory: Negative for shortness of breath.   Cardiovascular: Negative for chest pain.   Gastrointestinal: Negative for nausea, vomiting, and constipation.   Genitourinary:  Negative for urgency, frequency, nocturia, incontinence, dysuria, hematuria, intermittency, hesitancy, and decreased urine volume.   Neurological: Negative for dizziness.   Hematological: Does not bruise/bleed easily.   Psychiatric/Behavioral: Negative for confusion.       PATIENT HISTORY:    Past Medical History:   Diagnosis Date    Allergy     CKD (chronic kidney disease) stage 3, GFR 30-59 ml/min 6/15/2016    GERD (gastroesophageal reflux disease)     Hyperlipidemia     Hypertension     Hypothyroidism     Thyroid disease     Urinary tract infection        Past Surgical History:   Procedure Laterality Date    APPENDECTOMY      CATARACT EXTRACTION      EYE SURGERY      HERNIA REPAIR      TONSILLECTOMY         Family History   Problem Relation Age of Onset    Diabetes Mother     Heart disease Mother     Hypertension Mother      Vision loss Mother     Dementia Mother     Alcohol abuse Father     Cancer Sister      lung with mets, was a heavy smoker    No Known Problems Daughter     No Known Problems Son     No Known Problems Daughter     No Known Problems Son     Amblyopia Neg Hx     Blindness Neg Hx     Cataracts Neg Hx     Glaucoma Neg Hx     Macular degeneration Neg Hx     Retinal detachment Neg Hx     Strabismus Neg Hx     Stroke Neg Hx     Thyroid disease Neg Hx        Social History     Social History    Marital status:      Spouse name: parul    Number of children: 4    Years of education: N/A     Occupational History    retired      Social History Main Topics    Smoking status: Never Smoker    Smokeless tobacco: Never Used    Alcohol use Yes      Comment: 1-3 glasses wine weekly, 2-3 beers weekly    Drug use: No    Sexual activity: Yes     Partners: Female     Other Topics Concern    Not on file     Social History Narrative    No narrative on file       Allergies:  Contrast media    Medications:    Current Outpatient Prescriptions:     econazole nitrate 1 % cream, USE TWICE DAILY (Patient taking differently: USE TWICE DAILY (prn)), Disp: 60 g, Rfl: 5    famotidine (PEPCID) 20 MG tablet, Take 20 mg by mouth 2 (two) times daily as needed.  , Disp: , Rfl:     fexofenadine (ALLEGRA) 180 MG tablet, Take 1 tablet (180 mg total) by mouth once daily. (Patient taking differently: Take 180 mg by mouth daily as needed. ), Disp: 30 tablet, Rfl: 11    fluconazole (DIFLUCAN) 150 MG Tab, , Disp: , Rfl:     levothyroxine (SYNTHROID) 112 MCG tablet, Take 1 tablet (112 mcg total) by mouth before breakfast., Disp: 30 tablet, Rfl: 5    MAG HYDROX/AL HYDROX/SIMETH (ANTACID ORAL), Take by mouth as needed (acid reflux)., Disp: , Rfl:     metronidazole (METROGEL) 0.75 % gel, Apply 1 application topically every evening., Disp: 45 g, Rfl: 5    multivitamin with minerals tablet, Take 1 tablet by mouth once daily.   , Disp: , Rfl:     PRAMOSONE cream, APPLY TOPICALLY TWICE DAILY AS NEEDED FOR ITCHING, Disp: 57 g, Rfl: 5    pravastatin (PRAVACHOL) 20 MG tablet, Take 1 tablet (20 mg total) by mouth once daily., Disp: 90 tablet, Rfl: 3    ramipril (ALTACE) 5 MG capsule, Take 1 capsule (5 mg total) by mouth once daily., Disp: 30 capsule, Rfl: 5    tamsulosin (FLOMAX) 0.4 mg Cp24, Take 2 capsules (0.8 mg total) by mouth once daily., Disp: 180 capsule, Rfl: 3    triamcinolone acetonide 0.1% (KENALOG) 0.1 % cream, Use bid prn rash, Disp: 80 g, Rfl: 3    PHYSICAL EXAMINATION:    Constitutional: He appears well-developed and well-nourished.  He is in no apparent distress.    Eyes: No scleral icterus noted bilaterally.  No discharge noted bilaterally.      Cardiovascular: Normal rate.  No pitting edema noted in lower extremities bilaterally    Pulmonary/Chest: Effort normal. No respiratory distress.     Abdominal:  He exhibits no distension.  There is no CVA tenderness.     Lymphadenopathy:        Right: No supraclavicular adenopathy present.        Left: No supraclavicular adenopathy present.     Neurological: He is alert and oriented to person, place, and time.     Skin: Skin is warm and dry.     Psych: Cooperative with normal affect.    Physical Exam      LABS:    Lab Results   Component Value Date    PSA 0.91 12/17/2012    PSA 4.89 (H) 11/12/2012    PSA 0.66 11/14/2011    PSADIAG 1.0 01/11/2016    PSADIAG 1.0 12/04/2014    PSADIAG 0.67 12/03/2013       IMPRESSION:    Encounter Diagnoses   Name Primary?    Encounter for Noble catheter removal Yes    Urinary retention          PLAN:    Voiding trial performed by Nurse Adrienne.  400ml of sterile water was instilled into bladder.  Noble catheter was removed. Patient urinated 300ml without difficulty.      Voiding trial passed  Patient was instructed to drink plenty of fluids today.  Instructed patient to call at 1p.m. to give an update on urine output.  I instructed patient to  return to clinic or emergency department (if after clinic hours) to have barboza catheter put back in if unable to urinate within 5 hours of barboza catheter removal or starts to experience bladder pressure/pain, decrease flow, straining/difficulty urinating.    Patient voiced understanding.    Discussed repeating cysto in the future if his urinary symptoms return with 0.8mg of flomax.      Return to clinic in 6-8 weeks for pvr.    Jeannette Rocha PA-C

## 2017-06-13 ENCOUNTER — CLINICAL SUPPORT (OUTPATIENT)
Dept: REHABILITATION | Facility: HOSPITAL | Age: 77
End: 2017-06-13
Attending: PHYSICAL MEDICINE & REHABILITATION
Payer: MEDICARE

## 2017-06-13 DIAGNOSIS — M54.2 CERVICAL PAIN: ICD-10-CM

## 2017-06-13 PROCEDURE — 97140 MANUAL THERAPY 1/> REGIONS: CPT

## 2017-06-13 PROCEDURE — 97110 THERAPEUTIC EXERCISES: CPT

## 2017-06-13 RX ORDER — GLYCOPYRROLATE 0.2 MG/ML
INJECTION INTRAMUSCULAR; INTRAVENOUS
Status: DISCONTINUED | OUTPATIENT
Start: 2017-03-20 | End: 2017-06-13

## 2017-06-13 RX ORDER — NEOSTIGMINE METHYLSULFATE 1 MG/ML
INJECTION, SOLUTION INTRAVENOUS
Status: DISCONTINUED | OUTPATIENT
Start: 2017-03-20 | End: 2017-06-13

## 2017-06-13 RX ORDER — ONDANSETRON HYDROCHLORIDE 2 MG/ML
INJECTION, SOLUTION INTRAMUSCULAR; INTRAVENOUS
Status: DISCONTINUED | OUTPATIENT
Start: 2017-03-20 | End: 2017-06-13

## 2017-06-13 NOTE — PROGRESS NOTES
"                                                    Physical Therapy Daily Note     Name: Liang Monterroso Jr.  Clinic Number: [id number]  Diagnosis:   Encounter Diagnosis   Name Primary?    Cervical pain      Physician: Ayanna Nieto, *  Precautions:     2 mm anterolisthesis of C3 on C4 which increases to 4 mm on the flexion radiograph and resolves on the extension radiograph of the cervical spine.  Cervical spondylosis.  Bony fusion of the C2 and C3 vertebral bodies.     Visit #: 5 of 12  PTA Visit #: 4  Time In: 1:53 pm  Time Out: 2:57 pm  Total Treatment Time 1:1: 30    Evaluation Date: 5/23/17  Plan of care Expiration: 7/4/17      Subjective     Pt reports: "not really having pain just cracking and stiffness"  Pain Scale: Liang rates pain on a scale of 0-10 to be 0 currently.    Objective     Liang received individual therapeutic exercises to develop strength, ROM, flexibility, posture and core stabilization for 44 minutes including: (1:1 w/ PT/PTA x 20)  UBE Fwd/bwd 3 min ea.   Corner stretch 10x10"- NT  Cervical retraction 20x5"  Serratus punch 3x10 #2  Scaption 3x10 #2  R UTS 30" x 3  R LSS 3x30"  R Scalene stretch 3x30" -  TB W's 3x10-  TB shoulder ext GTB 3x10  SL FL R UE 2x15  SL hz abd R UE 2x15  SL ER R UE 2x15  wall angles 10x  Wall slides YTB 10x         Liang received the following manual therapy techniques: Soft tissue Mobilization were applied to the: c/s for 10 minutes including:  Sub occipt release  STM to R UT  Manual cervical traction- NP  PROM stretching UT      The patient received the following direct contact modalities after being cleared for contraindications: HP to R UT x 10 min      Written Home Exercises Provided: as per eval  Pt demo good understanding of the education provided. Liang demonstrated good return demonstration of activities.     Education provided re: proper ms activation  Liang verbalized good understanding of education provided.   No spiritual or educational barriers to " learning provided    Assessment     Patient tolerated deniz well w/o adverse reaction. Decreased tone R UT. PT c/o mainly of stiffness and cracking of C/S pain has reduced. Cont to progress w/ poc.  This is a 76 y.o. male referred to outpatient physical therapy and presents with a medical diagnosis of C/S pain and demonstrates limitations as described in the problem list. Pt prognosis is Good. Pt will continue to benefit from skilled outpatient physical therapy to address the deficits listed in the problem list, provide pt/family education and to maximize pt's level of independence in the home and community environment.     Goals as follows:  Short Term GOALS: 3 weeks. Pt agrees with goals set.  1. Patient demonstrates independence with HEP.   2. Patient demonstrates independence with Postural Awareness.   3. Patient demonstrates independence with body mechanics.     Long Term GOALS: 6 weeks. Pt agrees with goals set.  1. Patient demonstrates increased SB bilateral to 50% to improve tolerance to functional activities pain free.   2. Patient will report no difficulty with sitting/driving  3. Patient demonstrates improved overall function per FOTO Neck Survey to 20% Limitation or less.      Plan     Continue with established Plan of Care towards PT goals.    Therapist: Nyasia Hatfield, PTA  6/13/2017

## 2017-06-13 NOTE — ADDENDUM NOTE
Addendum  created 06/13/17 0948 by Rick Andrade MD    Anesthesia Intra Blocks edited, Anesthesia Intra Meds edited, Sign clinical note

## 2017-06-15 ENCOUNTER — CLINICAL SUPPORT (OUTPATIENT)
Dept: REHABILITATION | Facility: HOSPITAL | Age: 77
End: 2017-06-15
Attending: PHYSICAL MEDICINE & REHABILITATION
Payer: MEDICARE

## 2017-06-15 DIAGNOSIS — M54.2 CERVICAL PAIN: ICD-10-CM

## 2017-06-15 PROCEDURE — 97110 THERAPEUTIC EXERCISES: CPT

## 2017-06-15 PROCEDURE — 97140 MANUAL THERAPY 1/> REGIONS: CPT

## 2017-06-15 NOTE — PROGRESS NOTES
"                                                    Physical Therapy Daily Note     Name: Liang Monterroso Jr.  Clinic Number: [id number]  Diagnosis:   Encounter Diagnosis   Name Primary?    Cervical pain      Physician: Ayanna Nieto, *  Precautions:     2 mm anterolisthesis of C3 on C4 which increases to 4 mm on the flexion radiograph and resolves on the extension radiograph of the cervical spine.  Cervical spondylosis.  Bony fusion of the C2 and C3 vertebral bodies.     Visit #: 6 of 12  PTA Visit #: 0  Time In: 2:00 pm  Time Out: 3:10 pm    Evaluation Date: 5/23/17  Plan of care Expiration: 7/4/17    Subjective     Pt reports: no pain, just "cracking" with certain movements   Pain Scale: Liang rates pain on a scale of 0-10 to be 0 currently.    Objective     Liang received individual therapeutic exercises to develop strength, ROM, flexibility, posture and core stabilization for 40 minutes including: (1:1 w/ PT/PTA x 40 minutes)  UBE Fwd/bwd 3 min ea.   Corner stretch 10x10"- NT  Cervical retraction 30x5"  Serratus punch 3x10 #2  Scaption 3x10 #2  R UTS 30" x 3  R LSS 3x30"  R Scalene stretch 3x30"  TB W's OTB 3x10  TB shoulder ext GTB 3x10  SL FL R UE 2x15  SL hz abd R UE 2x15  SL ER R UE 2x15  Wall angels 10x  Wall slides YTB 10x    Liang received the following manual therapy techniques: Soft tissue Mobilization were applied to the: c/s for 15 minutes including:  Sub occipt release  STM to R UT  Manual cervical traction  PROM stretching UT    The patient received the following direct contact modalities after being cleared for contraindications: HP to R UT x 15 min    Written Home Exercises Provided: as per eval  Pt demo good understanding of the education provided. Liang demonstrated good return demonstration of activities.     Education provided re: proper ms activation  Liang verbalized good understanding of education provided.   No spiritual or educational barriers to learning provided    Assessment     Patient " tolerated deniz well w/o adverse reaction. Pt requires cues for proper muscle activation with exercises. Pt challenged with wall circles secondary to kyphotic and rounded shoulder posture. Pt progressing well with decreased subjective complaints.   This is a 76 y.o. male referred to outpatient physical therapy and presents with a medical diagnosis of C/S pain and demonstrates limitations as described in the problem list. Pt prognosis is Good. Pt will continue to benefit from skilled outpatient physical therapy to address the deficits listed in the problem list, provide pt/family education and to maximize pt's level of independence in the home and community environment.     Goals as follows:  Short Term GOALS: 3 weeks. Pt agrees with goals set.  1. Patient demonstrates independence with HEP.   2. Patient demonstrates independence with Postural Awareness.   3. Patient demonstrates independence with body mechanics.     Long Term GOALS: 6 weeks. Pt agrees with goals set.  1. Patient demonstrates increased SB bilateral to 50% to improve tolerance to functional activities pain free.   2. Patient will report no difficulty with sitting/driving  3. Patient demonstrates improved overall function per FOTO Neck Survey to 20% Limitation or less.      Plan     Continue with established Plan of Care towards PT goals.    Therapist: Pradip Monterroso, PT  6/15/2017

## 2017-06-22 ENCOUNTER — CLINICAL SUPPORT (OUTPATIENT)
Dept: REHABILITATION | Facility: HOSPITAL | Age: 77
End: 2017-06-22
Attending: PHYSICAL MEDICINE & REHABILITATION
Payer: MEDICARE

## 2017-06-22 DIAGNOSIS — M54.2 CERVICAL PAIN: ICD-10-CM

## 2017-06-22 PROCEDURE — 97110 THERAPEUTIC EXERCISES: CPT

## 2017-06-22 PROCEDURE — 97140 MANUAL THERAPY 1/> REGIONS: CPT

## 2017-06-22 NOTE — PROGRESS NOTES
"                                                    Physical Therapy Daily Note     Name: Liang Monterroso Jr.  Clinic Number: [id number]  Diagnosis:   Encounter Diagnosis   Name Primary?    Cervical pain      Physician: Ayanna Nieto, *  Precautions:     2 mm anterolisthesis of C3 on C4 which increases to 4 mm on the flexion radiograph and resolves on the extension radiograph of the cervical spine.  Cervical spondylosis.  Bony fusion of the C2 and C3 vertebral bodies.     Visit #: 7 of 12  PTA Visit #: 0  Time In: 2:00 pm  Time Out: 3:05 pm    Evaluation Date: 5/23/17  Plan of care Expiration: 7/4/17    Subjective     Pt reports: no pain, just "cracking" with certain movements   Pain Scale: Liang rates pain on a scale of 0-10 to be 0 currently.    Objective     Liang received individual therapeutic exercises to develop strength, ROM, flexibility, posture and core stabilization for 40 minutes including: (1:1 w/ PT/PTA x 20 minutes)  UBE Fwd/bwd 3 min ea.   Corner stretch 10x10"- NT  Cervical retraction 30x5"  Serratus punch 3x10 #2  Scaption 3x10 #2  R UTS 30" x 3 - manual   R LSS 3x30"- NT  R Scalene stretch 3x30" - NT  TB W's OTB 3x10  TB shoulder ext GTB 3x10  SL FL R UE 1# 2x15  SL hz abd R UE 1# 2x15  SL ER R UE 1# 2x15  Wall angels 10x  Wall slides RTB 10x  pec stretch 3x30"    Liang received the following manual therapy techniques: Soft tissue Mobilization were applied to the: c/s for 10 minutes including:  Sub occipt release  STM to B UT  Manual cervical traction  PROM stretching UT    The patient received the following direct contact modalities after being cleared for contraindications: HP to R UT x 15 min    Written Home Exercises Provided: as per eval  Pt demo good understanding of the education provided. Liang demonstrated good return demonstration of activities.     Education provided re: proper ms activation  Liang verbalized good understanding of education provided.   No spiritual or educational barriers " to learning provided    Assessment     Patient tolerated tx well w/o adverse reaction. Pt requires cues for proper muscle activation with exercises. Pt challenged with wall angels secondary to kyphotic and rounded shoulder posture. Pt progressing well with decreased subjective complaints.   This is a 76 y.o. male referred to outpatient physical therapy and presents with a medical diagnosis of C/S pain and demonstrates limitations as described in the problem list. Pt prognosis is Good. Pt will continue to benefit from skilled outpatient physical therapy to address the deficits listed in the problem list, provide pt/family education and to maximize pt's level of independence in the home and community environment.     Goals as follows:  Short Term GOALS: 3 weeks. Pt agrees with goals set.  1. Patient demonstrates independence with HEP.   2. Patient demonstrates independence with Postural Awareness.   3. Patient demonstrates independence with body mechanics.     Long Term GOALS: 6 weeks. Pt agrees with goals set.  1. Patient demonstrates increased SB bilateral to 50% to improve tolerance to functional activities pain free.   2. Patient will report no difficulty with sitting/driving  3. Patient demonstrates improved overall function per FOTO Neck Survey to 20% Limitation or less.      Plan     Continue with established Plan of Care towards PT goals.    Therapist: Pradip Monterroso, PT  6/22/2017

## 2017-06-23 ENCOUNTER — LAB VISIT (OUTPATIENT)
Dept: LAB | Facility: HOSPITAL | Age: 77
End: 2017-06-23
Attending: FAMILY MEDICINE
Payer: MEDICARE

## 2017-06-23 ENCOUNTER — OFFICE VISIT (OUTPATIENT)
Dept: INTERNAL MEDICINE | Facility: CLINIC | Age: 77
End: 2017-06-23
Payer: MEDICARE

## 2017-06-23 VITALS — HEART RATE: 88 BPM | SYSTOLIC BLOOD PRESSURE: 133 MMHG | DIASTOLIC BLOOD PRESSURE: 80 MMHG

## 2017-06-23 DIAGNOSIS — R94.31 ABNORMAL EKG: Primary | ICD-10-CM

## 2017-06-23 DIAGNOSIS — N18.30 CKD (CHRONIC KIDNEY DISEASE) STAGE 3, GFR 30-59 ML/MIN: ICD-10-CM

## 2017-06-23 DIAGNOSIS — N41.9 PROSTATITIS, UNSPECIFIED PROSTATITIS TYPE: ICD-10-CM

## 2017-06-23 DIAGNOSIS — N39.0 URINARY TRACT INFECTION WITHOUT HEMATURIA, SITE UNSPECIFIED: ICD-10-CM

## 2017-06-23 DIAGNOSIS — I10 ESSENTIAL HYPERTENSION: ICD-10-CM

## 2017-06-23 DIAGNOSIS — D32.9 MENINGIOMA: ICD-10-CM

## 2017-06-23 DIAGNOSIS — N39.0 URINARY TRACT INFECTION WITHOUT HEMATURIA, SITE UNSPECIFIED: Primary | ICD-10-CM

## 2017-06-23 DIAGNOSIS — I70.0 AORTIC ATHEROSCLEROSIS: ICD-10-CM

## 2017-06-23 LAB
ALBUMIN SERPL BCP-MCNC: 3.7 G/DL
ALP SERPL-CCNC: 49 U/L
ALT SERPL W/O P-5'-P-CCNC: 14 U/L
ANION GAP SERPL CALC-SCNC: 10 MMOL/L
AST SERPL-CCNC: 18 U/L
BASOPHILS # BLD AUTO: 0.02 K/UL
BASOPHILS NFR BLD: 0.3 %
BILIRUB SERPL-MCNC: 0.4 MG/DL
BUN SERPL-MCNC: 15 MG/DL
CALCIUM SERPL-MCNC: 9.2 MG/DL
CHLORIDE SERPL-SCNC: 105 MMOL/L
CO2 SERPL-SCNC: 24 MMOL/L
CREAT SERPL-MCNC: 1.1 MG/DL
DIFFERENTIAL METHOD: ABNORMAL
EOSINOPHIL # BLD AUTO: 0.1 K/UL
EOSINOPHIL NFR BLD: 1.5 %
ERYTHROCYTE [DISTWIDTH] IN BLOOD BY AUTOMATED COUNT: 14.1 %
EST. GFR  (AFRICAN AMERICAN): >60 ML/MIN/1.73 M^2
EST. GFR  (NON AFRICAN AMERICAN): >60 ML/MIN/1.73 M^2
GLUCOSE SERPL-MCNC: 85 MG/DL
HCT VFR BLD AUTO: 40.7 %
HGB BLD-MCNC: 13.4 G/DL
LYMPHOCYTES # BLD AUTO: 2.9 K/UL
LYMPHOCYTES NFR BLD: 39.6 %
MCH RBC QN AUTO: 28.3 PG
MCHC RBC AUTO-ENTMCNC: 32.9 %
MCV RBC AUTO: 86 FL
MONOCYTES # BLD AUTO: 0.4 K/UL
MONOCYTES NFR BLD: 5.9 %
NEUTROPHILS # BLD AUTO: 3.9 K/UL
NEUTROPHILS NFR BLD: 52.6 %
PLATELET # BLD AUTO: 195 K/UL
PMV BLD AUTO: 10.1 FL
POTASSIUM SERPL-SCNC: 4 MMOL/L
PROT SERPL-MCNC: 6.8 G/DL
RBC # BLD AUTO: 4.74 M/UL
SODIUM SERPL-SCNC: 139 MMOL/L
WBC # BLD AUTO: 7.32 K/UL

## 2017-06-23 PROCEDURE — 99999 PR PBB SHADOW E&M-EST. PATIENT-LVL II: CPT | Mod: PBBFAC,,, | Performed by: FAMILY MEDICINE

## 2017-06-23 PROCEDURE — 85025 COMPLETE CBC W/AUTO DIFF WBC: CPT

## 2017-06-23 PROCEDURE — 93005 ELECTROCARDIOGRAM TRACING: CPT | Mod: S$GLB,,, | Performed by: FAMILY MEDICINE

## 2017-06-23 PROCEDURE — 36415 COLL VENOUS BLD VENIPUNCTURE: CPT

## 2017-06-23 PROCEDURE — 1159F MED LIST DOCD IN RCRD: CPT | Mod: S$GLB,,, | Performed by: FAMILY MEDICINE

## 2017-06-23 PROCEDURE — 99215 OFFICE O/P EST HI 40 MIN: CPT | Mod: S$GLB,,, | Performed by: FAMILY MEDICINE

## 2017-06-23 PROCEDURE — 80053 COMPREHEN METABOLIC PANEL: CPT

## 2017-06-23 PROCEDURE — 99499 UNLISTED E&M SERVICE: CPT | Mod: S$GLB,,, | Performed by: FAMILY MEDICINE

## 2017-06-23 PROCEDURE — 93010 ELECTROCARDIOGRAM REPORT: CPT | Mod: S$GLB,,, | Performed by: INTERNAL MEDICINE

## 2017-06-23 NOTE — PROGRESS NOTES
Subjective:       Patient ID: Liang Monterroso Jr. is a 76 y.o. male.    Chief Complaint: No chief complaint on file.  Liang Monterroso Jr. 76 y.o. male is here for office visit to review care and physical exam, here with concern over Prostate? Issues.  Has LUTs.  Had UTI?  Has seen NPs, PAs and MD.  Had prostate massage and Of note has taste change since Meningeoma surgery.  Has been having fatigue more pronounced.  Notes no MCCONNELL with exercise.      HPI  Review of Systems   Constitutional: Negative for activity change, appetite change, fatigue, fever and unexpected weight change.   HENT: Negative for congestion, hearing loss, postnasal drip and rhinorrhea.    Eyes: Negative for pain, discharge and visual disturbance.   Respiratory: Negative for cough, choking and shortness of breath.    Cardiovascular: Negative for chest pain, palpitations and leg swelling.   Gastrointestinal: Negative for abdominal pain, diarrhea and vomiting.   Genitourinary: Negative for dysuria, flank pain, hematuria and urgency.   Musculoskeletal: Negative for arthralgias, back pain, joint swelling and neck pain.   Skin: Negative for color change and rash.   Neurological: Negative for dizziness, tremors, syncope, weakness, numbness and headaches.   Psychiatric/Behavioral: Negative for agitation and confusion. The patient is not hyperactive.        Objective:      Physical Exam   Constitutional: He is oriented to person, place, and time. He appears well-developed and well-nourished.   HENT:   Head: Normocephalic.   Eyes: EOM are normal. Pupils are equal, round, and reactive to light.   Neck: Normal range of motion. Neck supple. No thyromegaly present.   Cardiovascular: Normal rate.  Exam reveals no gallop and no friction rub.    No murmur heard.  Pulmonary/Chest: Effort normal. No respiratory distress. He has no wheezes.   Abdominal: Soft. Bowel sounds are normal. He exhibits no mass. There is no tenderness.   Musculoskeletal: He exhibits no edema or  tenderness.   Lymphadenopathy:     He has no cervical adenopathy.   Neurological: He is alert and oriented to person, place, and time. He has normal reflexes. No cranial nerve deficit.   Skin: Skin is warm. No rash noted.   Psychiatric: He has a normal mood and affect. His behavior is normal.       Assessment:       No diagnosis found.    Plan:       Diagnoses and all orders for this visit:    Abnormal EKG  -     Ambulatory Referral to Cardiology    Aortic atherosclerosis  - Chart reviewed  Essential hypertension  - controled  Meningioma  - chart reviewed  CKD (chronic kidney disease) stage 3, GFR 30-59 ml/min  - Follow  Prostatitis, unspecified prostatitis type  - Discussed

## 2017-06-27 ENCOUNTER — CLINICAL SUPPORT (OUTPATIENT)
Dept: REHABILITATION | Facility: HOSPITAL | Age: 77
End: 2017-06-27
Attending: PHYSICAL MEDICINE & REHABILITATION
Payer: MEDICARE

## 2017-06-27 DIAGNOSIS — M54.2 CERVICAL PAIN: ICD-10-CM

## 2017-06-27 PROCEDURE — 97140 MANUAL THERAPY 1/> REGIONS: CPT

## 2017-06-27 PROCEDURE — 97110 THERAPEUTIC EXERCISES: CPT

## 2017-06-27 NOTE — PROGRESS NOTES
"                                                    Physical Therapy Daily Note     Name: Liang Monterroso Jr.  Clinic Number: [id number]  Diagnosis:   Encounter Diagnosis   Name Primary?    Cervical pain      Physician: Ayanna Nieto, *  Precautions:     2 mm anterolisthesis of C3 on C4 which increases to 4 mm on the flexion radiograph and resolves on the extension radiograph of the cervical spine.  Cervical spondylosis.  Bony fusion of the C2 and C3 vertebral bodies.     Visit #: 8 of 12  PTA Visit #: 0  Time In: 2:00 pm  Time Out: 3:05 pm    Evaluation Date: 5/23/17  Plan of care Expiration: 7/4/17    Subjective     Pt reports: no pain, just "cracking" with certain movements   Pain Scale: Liang rates pain on a scale of 0-10 to be 0 currently.    Objective     Liang received individual therapeutic exercises to develop strength, ROM, flexibility, posture and core stabilization for 50 minutes including: (1:1 w/ PT/PTA x 20 minutes)  UBE Fwd/bwd 3 min ea.   Corner stretch 10x10"- NT  Cervical retraction 30x5"  Serratus punch 3x10 #2  Scaption 3x10 #2  R UTS 30" x 3 - manual   R LSS 3x30"- NT  R Scalene stretch 3x30" - NT  TB W's OTB 3x10  TB shoulder ext GTB 3x10  SL FL R UE 1# 2x15  SL hz abd R UE 1# 2x15  SL ER R UE 1# 2x15  Wall angels 10x  Wall slides RTB 10x  pec stretch 3x30"  Ws on wall 2x10     Liang received the following manual therapy techniques: Soft tissue Mobilization were applied to the: c/s for 10 minutes including:  Sub occipt release  STM to B UT  Manual cervical traction  PROM stretching UT    The patient received the following direct contact modalities after being cleared for contraindications: HP to R UT x 15 min    Written Home Exercises Provided: as per eval  Pt demo good understanding of the education provided. Liang demonstrated good return demonstration of activities.     Education provided re: proper ms activation  Liang verbalized good understanding of education provided.   No spiritual or " educational barriers to learning provided    Assessment     Patient tolerated tx well w/o adverse reaction. Pt requires cues for proper muscle activation with exercises. Pt challenged with wall angels secondary to poor posture. Pt progressing well with decreased subjective complaints.   This is a 76 y.o. male referred to outpatient physical therapy and presents with a medical diagnosis of C/S pain and demonstrates limitations as described in the problem list. Pt prognosis is Good. Pt will continue to benefit from skilled outpatient physical therapy to address the deficits listed in the problem list, provide pt/family education and to maximize pt's level of independence in the home and community environment.     Goals as follows:  Short Term GOALS: 3 weeks. Pt agrees with goals set.  1. Patient demonstrates independence with HEP.   2. Patient demonstrates independence with Postural Awareness.   3. Patient demonstrates independence with body mechanics.     Long Term GOALS: 6 weeks. Pt agrees with goals set.  1. Patient demonstrates increased SB bilateral to 50% to improve tolerance to functional activities pain free.   2. Patient will report no difficulty with sitting/driving  3. Patient demonstrates improved overall function per FOTO Neck Survey to 20% Limitation or less.      Plan     Continue with established Plan of Care towards PT goals.    Therapist: Pradip Monterroso, PT  6/27/2017

## 2017-06-29 ENCOUNTER — CLINICAL SUPPORT (OUTPATIENT)
Dept: REHABILITATION | Facility: HOSPITAL | Age: 77
End: 2017-06-29
Attending: PHYSICAL MEDICINE & REHABILITATION
Payer: MEDICARE

## 2017-06-29 DIAGNOSIS — M54.2 CERVICAL PAIN: ICD-10-CM

## 2017-06-29 PROCEDURE — 97140 MANUAL THERAPY 1/> REGIONS: CPT

## 2017-06-29 PROCEDURE — 97110 THERAPEUTIC EXERCISES: CPT

## 2017-06-29 NOTE — PROGRESS NOTES
"                                                    Physical Therapy Daily Note     Name: Liang Monterroso Jr.  Clinic Number: [id number]  Diagnosis:   Encounter Diagnosis   Name Primary?    Cervical pain      Physician: Ayanna Nieto, *  Precautions:     2 mm anterolisthesis of C3 on C4 which increases to 4 mm on the flexion radiograph and resolves on the extension radiograph of the cervical spine.  Cervical spondylosis.  Bony fusion of the C2 and C3 vertebral bodies.     Visit #: 9 of 12  PTA Visit #: 1  Time In: 2:00 pm  Time Out: 3:01 pm    Evaluation Date: 5/23/17  Plan of care Expiration: 7/4/17    Subjective     Pt reports: reports no change   Pain Scale: Liang rates pain on a scale of 0-10 to be 0 currently.    Objective     Liang received individual therapeutic exercises to develop strength, ROM, flexibility, posture and core stabilization for 41 minutes including: (1:1 w/ PT/PTA x 20 minutes)  UBE Fwd/bwd 3 min ea.   Corner stretch 10x10"- NT  Cervical retraction 30x5"  Serratus punch 3x10 #2  Scaption 3x10 #2  R UTS 30" x 3 - manual   R LSS 3x30"- NT  R Scalene stretch 3x30" - NT  TB W's OTB 3x10  TB shoulder ext GTB 3x10  SL FL R UE 1# 2x15  SL hz abd R UE 1# 2x15  SL ER R UE 1# 2x15  Wall angels 10x  Wall slides RTB 10x2   pec stretch 3x30"  Ws on wall 2x10     Liang received the following manual therapy techniques: Soft tissue Mobilization were applied to the: c/s for 10 minutes including:  Sub occipt release  STM to B UT  Manual cervical traction-NT  PROM stretching UT    The patient received the following direct contact modalities after being cleared for contraindications: HP to R UT x 10 min    Written Home Exercises Provided: as per eval  Pt demo good understanding of the education provided. Liang demonstrated good return demonstration of activities.     Education provided re: proper ms activation  Liang verbalized good understanding of education provided.   No spiritual or educational barriers to " learning provided    Assessment     Patient tolerated tx well w/o adverse reaction. Pt requires cues for proper muscle activation with exercises. Pt challenged with wall angels secondary to poor posture. Pt progressing well   This is a 76 y.o. male referred to outpatient physical therapy and presents with a medical diagnosis of C/S pain and demonstrates limitations as described in the problem list. Pt prognosis is Good. Pt will continue to benefit from skilled outpatient physical therapy to address the deficits listed in the problem list, provide pt/family education and to maximize pt's level of independence in the home and community environment.     Goals as follows:  Short Term GOALS: 3 weeks. Pt agrees with goals set.  1. Patient demonstrates independence with HEP.   2. Patient demonstrates independence with Postural Awareness.   3. Patient demonstrates independence with body mechanics.     Long Term GOALS: 6 weeks. Pt agrees with goals set.  1. Patient demonstrates increased SB bilateral to 50% to improve tolerance to functional activities pain free.   2. Patient will report no difficulty with sitting/driving  3. Patient demonstrates improved overall function per FOTO Neck Survey to 20% Limitation or less.      Plan     Continue with established Plan of Care towards PT goals.    Therapist: Nyasia Hatfield, PTA  6/29/2017

## 2017-06-30 ENCOUNTER — OFFICE VISIT (OUTPATIENT)
Dept: CARDIOLOGY | Facility: CLINIC | Age: 77
End: 2017-06-30
Payer: MEDICARE

## 2017-06-30 VITALS
HEART RATE: 84 BPM | WEIGHT: 190.25 LBS | DIASTOLIC BLOOD PRESSURE: 80 MMHG | HEIGHT: 68 IN | BODY MASS INDEX: 28.83 KG/M2 | SYSTOLIC BLOOD PRESSURE: 130 MMHG

## 2017-06-30 DIAGNOSIS — I10 ESSENTIAL HYPERTENSION: Primary | ICD-10-CM

## 2017-06-30 DIAGNOSIS — R94.31 ABNORMAL ECG: ICD-10-CM

## 2017-06-30 DIAGNOSIS — E78.00 PURE HYPERCHOLESTEROLEMIA: ICD-10-CM

## 2017-06-30 PROCEDURE — 1159F MED LIST DOCD IN RCRD: CPT | Mod: S$GLB,,, | Performed by: INTERNAL MEDICINE

## 2017-06-30 PROCEDURE — 1126F AMNT PAIN NOTED NONE PRSNT: CPT | Mod: S$GLB,,, | Performed by: INTERNAL MEDICINE

## 2017-06-30 PROCEDURE — 99499 UNLISTED E&M SERVICE: CPT | Mod: S$GLB,,, | Performed by: INTERNAL MEDICINE

## 2017-06-30 PROCEDURE — 93000 ELECTROCARDIOGRAM COMPLETE: CPT | Mod: S$GLB,,, | Performed by: INTERNAL MEDICINE

## 2017-06-30 PROCEDURE — 99204 OFFICE O/P NEW MOD 45 MIN: CPT | Mod: S$GLB,,, | Performed by: INTERNAL MEDICINE

## 2017-06-30 PROCEDURE — 99999 PR PBB SHADOW E&M-EST. PATIENT-LVL III: CPT | Mod: PBBFAC,,, | Performed by: INTERNAL MEDICINE

## 2017-06-30 NOTE — LETTER
June 30, 2017      Theron Paiz MD  [address]  [place] [address]           [facility] - Cardiology  [address]  [place] [address]  Phone: [phone]          Patient: Liang Monterroso Jr.   MR Number: [id number]   YOB: 1940   Date of Visit: 6/30/2017       Dear Dr. Theron Paiz:    Thank you for referring Liang Monterroso to me for evaluation. Attached you will find relevant portions of my assessment and plan of care.    If you have questions, please do not hesitate to call me. I look forward to following Liang Monterroso along with you.    Sincerely,    Adryan Schmitt MD    Enclosure  CC:  No Recipients    If you would like to receive this communication electronically, please contact [email or web address] or [phone] to request more information on [employer] access.    For providers and/or their staff who would like to refer a patient to [facility], please contact us through our one-stop-shop provider referral line, [facility] , at [phone].    If you feel you have received this communication in error or would no longer like to receive these types of communications, please e-mail [email or web address]

## 2017-06-30 NOTE — PROGRESS NOTES
"Subjective:   Patient ID:  Liang Monterroso Jr. is a 76 y.o. male who presents for follow-up of Abnormal ECG      HPI:   Pt is here for an abnormal ECG.  ECG was obtained for fatigue.  He has a h/o HTN, HPL and recent menigioma resection.  He also has BPH with h/o UTI.  ECG demonstrated sinus with 1stDAVB, LAFB/LAD, incomplete RBBB and low voltage across anterior leads.  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  LAD present since 2004 although his axis has shifted more leftward to meet LAFB criteria.  The ecg from 6-23 was the first one that demonstrated poor R wave progression and has the appearance of incorrect lead placement.  We repeated the ECG in the office today which was essentially unchanged from most of his ECGs.  ECG from 6-23 likely had leads misplaced.    ECG today- incomplete RBBB, LAD/LAFB, normal R wave progression.  Pt without any complaints and feels well. He denies any exertional chest discomfort, exertional dyspnea, palpitations, TIA's, syncope or presyncope.  Has HTN and smartphone.     Recently had menigioma resection and had steroids.  Noticed increased HRs and BPs since.     Vitals:    06/30/17 1009   BP: 130/80   Pulse: 84   Weight: 86.3 kg (190 lb 4.1 oz)   Height: 5' 8" (1.727 m)     Body mass index is 28.93 kg/m².  Estimated Creatinine Clearance: 62 mL/min (based on Cr of 1.1).    Lab Results   Component Value Date     06/23/2017    K 4.0 06/23/2017     06/23/2017    CO2 24 06/23/2017    BUN 15 06/23/2017    CREATININE 1.1 06/23/2017    GLU 85 06/23/2017    HGBA1C 5.5 10/08/2009    MG 2.4 04/02/2004    AST 18 06/23/2017    ALT 14 06/23/2017    ALBUMIN 3.7 06/23/2017    PROT 6.8 06/23/2017    BILITOT 0.4 06/23/2017    WBC 7.32 06/23/2017    HGB 13.4 (L) 06/23/2017    HCT 40.7 06/23/2017    MCV 86 06/23/2017     06/23/2017    INR 0.9 03/15/2017    PSA 0.91 12/17/2012    TSH 3.812 04/17/2017    CHOL 205 (H) 09/27/2016    HDL 56 09/27/2016    LDLCALC " 130.0 09/27/2016    TRIG 95 09/27/2016       Current Outpatient Prescriptions   Medication Sig    econazole nitrate 1 % cream USE TWICE DAILY (Patient taking differently: USE TWICE DAILY (prn))    famotidine (PEPCID) 20 MG tablet Take 20 mg by mouth 2 (two) times daily as needed.      fluconazole (DIFLUCAN) 150 MG Tab     levothyroxine (SYNTHROID) 112 MCG tablet Take 1 tablet (112 mcg total) by mouth before breakfast.    MAG HYDROX/AL HYDROX/SIMETH (ANTACID ORAL) Take by mouth as needed (acid reflux).    metronidazole (METROGEL) 0.75 % gel Apply 1 application topically every evening.    multivitamin with minerals tablet Take 1 tablet by mouth once daily.      PRAMOSONE cream APPLY TOPICALLY TWICE DAILY AS NEEDED FOR ITCHING    pravastatin (PRAVACHOL) 20 MG tablet Take 1 tablet (20 mg total) by mouth once daily.    ramipril (ALTACE) 5 MG capsule Take 1 capsule (5 mg total) by mouth once daily.    tamsulosin (FLOMAX) 0.4 mg Cp24 Take 2 capsules (0.8 mg total) by mouth once daily.    triamcinolone acetonide 0.1% (KENALOG) 0.1 % cream Use bid prn rash    fexofenadine (ALLEGRA) 180 MG tablet Take 1 tablet (180 mg total) by mouth once daily. (Patient taking differently: Take 180 mg by mouth daily as needed. )     No current facility-administered medications for this visit.        Review of Systems   Constitution: Negative for decreased appetite, weakness, malaise/fatigue, weight gain and weight loss.   HENT: Negative for headaches.    Eyes: Negative for visual disturbance.   Cardiovascular: Negative for chest pain, claudication, dyspnea on exertion, irregular heartbeat, orthopnea, palpitations, paroxysmal nocturnal dyspnea and syncope.   Respiratory: Negative for cough, shortness of breath and snoring.    Skin: Negative for rash.   Musculoskeletal: Negative for arthritis, muscle cramps, muscle weakness and myalgias.   Gastrointestinal: Negative for abdominal pain, anorexia, change in bowel habit and nausea.    Genitourinary: Negative for dysuria and frequency.   Neurological: Negative for excessive daytime sleepiness, dizziness, loss of balance and numbness.   Psychiatric/Behavioral: Negative for depression.       Objective:   Physical Exam   Constitutional: He is oriented to person, place, and time. He appears well-developed and well-nourished.   HENT:   Head: Normocephalic and atraumatic.   Eyes: Pupils are equal, round, and reactive to light.   Neck: Normal range of motion. Neck supple.   Cardiovascular: Normal rate, regular rhythm, normal heart sounds, intact distal pulses and normal pulses.  Exam reveals no gallop.    No murmur heard.  Pulmonary/Chest: Effort normal and breath sounds normal.   Abdominal: Soft. Bowel sounds are normal. There is no hepatosplenomegaly. There is no tenderness.   Musculoskeletal: Normal range of motion.   Neurological: He is alert and oriented to person, place, and time.   Skin: Skin is warm and dry.   Psychiatric: He has a normal mood and affect. His speech is normal and behavior is normal. Judgment and thought content normal.       Assessment:     1. Essential hypertension    2. Pure hypercholesterolemia    3. Abnormal ECG        Plan:   Pt's ECG is unchanged when compared to his long history of ECGs.  I think the ECG from 6-23-17 was due to poor lead placement.  He is asymptomatic and over doing well.  Enroll in Dig HTN management.      Orders Placed This Encounter   Procedures    Hypertension Digital Medicine (HDMP) Enrollment Order    EKG 12-lead

## 2017-07-05 ENCOUNTER — TELEPHONE (OUTPATIENT)
Dept: UROLOGY | Facility: CLINIC | Age: 77
End: 2017-07-05

## 2017-07-05 ENCOUNTER — OFFICE VISIT (OUTPATIENT)
Dept: UROLOGY | Facility: CLINIC | Age: 77
End: 2017-07-05
Payer: MEDICARE

## 2017-07-05 VITALS
WEIGHT: 190.94 LBS | SYSTOLIC BLOOD PRESSURE: 134 MMHG | HEIGHT: 68 IN | DIASTOLIC BLOOD PRESSURE: 80 MMHG | BODY MASS INDEX: 28.94 KG/M2 | HEART RATE: 107 BPM

## 2017-07-05 DIAGNOSIS — N40.1 BPH WITH URINARY OBSTRUCTION: Primary | ICD-10-CM

## 2017-07-05 DIAGNOSIS — N13.8 BPH WITH URINARY OBSTRUCTION: Primary | ICD-10-CM

## 2017-07-05 DIAGNOSIS — N35.919 URETHRAL STRICTURE UNSPECIFIED: ICD-10-CM

## 2017-07-05 DIAGNOSIS — N40.0 BENIGN PROSTATIC HYPERPLASIA, PRESENCE OF LOWER URINARY TRACT SYMPTOMS UNSPECIFIED, UNSPECIFIED MORPHOLOGY: Primary | ICD-10-CM

## 2017-07-05 PROCEDURE — 99999 PR PBB SHADOW E&M-EST. PATIENT-LVL III: CPT | Mod: PBBFAC,,, | Performed by: UROLOGY

## 2017-07-05 PROCEDURE — 1159F MED LIST DOCD IN RCRD: CPT | Mod: S$GLB,,, | Performed by: UROLOGY

## 2017-07-05 PROCEDURE — 99213 OFFICE O/P EST LOW 20 MIN: CPT | Mod: S$GLB,,, | Performed by: UROLOGY

## 2017-07-05 PROCEDURE — 1126F AMNT PAIN NOTED NONE PRSNT: CPT | Mod: S$GLB,,, | Performed by: UROLOGY

## 2017-07-05 NOTE — PROGRESS NOTES
Subjective:       Patient ID: Liang Monterroso Jr. is a 76 y.o. male.    Chief Complaint: bph with urinary obstruction. (hx of urinary retention pt last vist he had voided trial which he pass the voided trial h e urinate ok .)    HPI patient with BPH with outlet obstruction his had a indwelling Noble.  He had it DC'd after passing a voiding trial.  He is here today to schedule possible TURP.  He also has a history of urethral stricture..  His postvoid residual today is 290 cc.  He is comfortable with his voiding pattern does not wish to learn CIC at the present time.  He understands that he may have to go to the emergency room for Noble catheterinto retention RS and benefits of surgery were discussed    Past Medical History:   Diagnosis Date    Allergy     CKD (chronic kidney disease) stage 3, GFR 30-59 ml/min 6/15/2016    GERD (gastroesophageal reflux disease)     Hyperlipidemia     Hypertension     Hypothyroidism     Thyroid disease     Urinary tract infection        Past Surgical History:   Procedure Laterality Date    APPENDECTOMY      CATARACT EXTRACTION      EYE SURGERY      HERNIA REPAIR      TONSILLECTOMY         Family History   Problem Relation Age of Onset    Diabetes Mother     Heart disease Mother     Hypertension Mother     Vision loss Mother     Dementia Mother     Alcohol abuse Father     Cancer Sister      lung with mets, was a heavy smoker    No Known Problems Daughter     No Known Problems Son     No Known Problems Daughter     No Known Problems Son     Amblyopia Neg Hx     Blindness Neg Hx     Cataracts Neg Hx     Glaucoma Neg Hx     Macular degeneration Neg Hx     Retinal detachment Neg Hx     Strabismus Neg Hx     Stroke Neg Hx     Thyroid disease Neg Hx        Social History     Social History    Marital status:      Spouse name: parul    Number of children: 4    Years of education: N/A     Occupational History    retired      Social History Main  Topics    Smoking status: Never Smoker    Smokeless tobacco: Never Used    Alcohol use Yes      Comment: 1-3 glasses wine weekly, 2-3 beers weekly    Drug use: No    Sexual activity: Yes     Partners: Female     Other Topics Concern    Not on file     Social History Narrative    No narrative on file       Allergies:  Contrast media    Medications:    Current Outpatient Prescriptions:     famotidine (PEPCID) 20 MG tablet, Take 20 mg by mouth 2 (two) times daily as needed.  , Disp: , Rfl:     levothyroxine (SYNTHROID) 112 MCG tablet, Take 1 tablet (112 mcg total) by mouth before breakfast., Disp: 30 tablet, Rfl: 5    multivitamin with minerals tablet, Take 1 tablet by mouth once daily.  , Disp: , Rfl:     pravastatin (PRAVACHOL) 20 MG tablet, Take 1 tablet (20 mg total) by mouth once daily., Disp: 90 tablet, Rfl: 3    tamsulosin (FLOMAX) 0.4 mg Cp24, Take 2 capsules (0.8 mg total) by mouth once daily., Disp: 180 capsule, Rfl: 3    econazole nitrate 1 % cream, USE TWICE DAILY (Patient taking differently: USE TWICE DAILY (prn)), Disp: 60 g, Rfl: 5    fexofenadine (ALLEGRA) 180 MG tablet, Take 1 tablet (180 mg total) by mouth once daily. (Patient taking differently: Take 180 mg by mouth daily as needed. ), Disp: 30 tablet, Rfl: 11    ramipril (ALTACE) 5 MG capsule, Take 1 capsule (5 mg total) by mouth once daily., Disp: 30 capsule, Rfl: 5    triamcinolone acetonide 0.1% (KENALOG) 0.1 % cream, Use bid prn rash, Disp: 80 g, Rfl: 3    Review of Systems   Constitutional: Negative for activity change, appetite change, chills, diaphoresis, fatigue, fever and unexpected weight change.   HENT: Negative for congestion, dental problem, hearing loss, mouth sores, postnasal drip, rhinorrhea, sinus pressure and trouble swallowing.    Eyes: Negative for pain, discharge and itching.   Respiratory: Negative for apnea, cough, choking, chest tightness, shortness of breath and wheezing.    Cardiovascular: Negative for chest  pain, palpitations and leg swelling.   Gastrointestinal: Negative for abdominal distention, abdominal pain, anal bleeding, blood in stool, constipation, diarrhea, nausea, rectal pain and vomiting.   Endocrine: Negative for polydipsia and polyuria.   Genitourinary: Negative for decreased urine volume, difficulty urinating, discharge, dysuria, enuresis, flank pain, frequency, genital sores, hematuria, penile pain, penile swelling, scrotal swelling, testicular pain and urgency.   Musculoskeletal: Negative for arthralgias, back pain and myalgias.   Skin: Negative for color change, rash and wound.   Neurological: Negative for dizziness, syncope, speech difficulty, light-headedness and headaches.   Hematological: Negative for adenopathy. Does not bruise/bleed easily.   Psychiatric/Behavioral: Negative for behavioral problems, confusion, hallucinations and sleep disturbance.       Objective:      Physical Exam   Constitutional: He appears well-developed.   HENT:   Head: Normocephalic.   Cardiovascular: Normal rate.    Pulmonary/Chest: Effort normal.   Abdominal: Soft.   Neurological: He is alert.   Skin: Skin is warm.     Psychiatric: He has a normal mood and affect.       Assessment:       1. BPH with urinary obstruction    2. Urethral stricture unspecified        Plan:       Liang was seen today for bph with urinary obstruction..    Diagnoses and all orders for this visit:    BPH with urinary obstruction  -     Cancel: Urine culture  -     Simple urodynamics; Future    Urethral stricture unspecified        e'll do laser TURP and possible DVIU if needed

## 2017-07-06 ENCOUNTER — DOCUMENTATION ONLY (OUTPATIENT)
Dept: CARDIOLOGY | Facility: CLINIC | Age: 77
End: 2017-07-06

## 2017-07-06 ENCOUNTER — CLINICAL SUPPORT (OUTPATIENT)
Dept: REHABILITATION | Facility: HOSPITAL | Age: 77
End: 2017-07-06
Attending: PHYSICAL MEDICINE & REHABILITATION
Payer: MEDICARE

## 2017-07-06 DIAGNOSIS — M54.2 CERVICAL PAIN: ICD-10-CM

## 2017-07-06 PROCEDURE — 97140 MANUAL THERAPY 1/> REGIONS: CPT

## 2017-07-06 PROCEDURE — 97110 THERAPEUTIC EXERCISES: CPT

## 2017-07-06 NOTE — PROGRESS NOTES
"                                                    Physical Therapy Daily Note     Name: Liang Monterroso Jr.  Clinic Number: [id number]  Diagnosis:   Encounter Diagnosis   Name Primary?    Cervical pain      Physician: Ayanna Nieto, *  Precautions:     2 mm anterolisthesis of C3 on C4 which increases to 4 mm on the flexion radiograph and resolves on the extension radiograph of the cervical spine.  Cervical spondylosis.  Bony fusion of the C2 and C3 vertebral bodies.     Visit #: 10 of 12  PTA Visit #: 2  Time In: 3:00 pm  Time Out: 3:56 pm    Evaluation Date: 5/23/17  Plan of care Expiration: 7/4/17    Subjective     Pt reports: no pain today, very rarely feels discomfort. Decreased crepitus  Pain Scale: Liang rates pain on a scale of 0-10 to be 0 currently.    Objective     Liang received individual therapeutic exercises to develop strength, ROM, flexibility, posture and core stabilization for 36 minutes including: (1:1 w/ PT/PTA x 20 minutes)  UBE Fwd/bwd 3 min ea.   Corner stretch 10x10"- NT  Cervical retraction 30x5"  Serratus punch 3x10 #2  Scaption 3x10 #2  R UTS 30" x 3   R LSS 3x30"  R Scalene stretch 3x30" - NT  TB W's OTB 3x10  TB shoulder ext GTB 3x10  SL FL R UE 1# 2x15  SL hz abd R UE 1# 2x15  SL ER R UE 1# 2x15  Wall angels 10x  Wall slides RTB 10x2   pec stretch 3x30"  Ws on wall 1x10     Liang received the following manual therapy techniques: Soft tissue Mobilization were applied to the: c/s for 10 minutes including:  Sub occipt release-NT  STM to B UT  Manual cervical traction-NT  PROM stretching UT    The patient received the following direct contact modalities after being cleared for contraindications: HP to R UT x 10 min    Written Home Exercises Provided: as per eval  Pt demo good understanding of the education provided. Liang demonstrated good return demonstration of activities.     Education provided re: proper ms activation  Liang verbalized good understanding of education provided.   No " spiritual or educational barriers to learning provided    Assessment     Patient tolerated tx well w/o adverse reaction.Pt not experiencing much pain and decreased crepitus in C/S and UT. Decreased tone B UT.Cont to progress w/ poc  This is a 76 y.o. male referred to outpatient physical therapy and presents with a medical diagnosis of C/S pain and demonstrates limitations as described in the problem list. Pt prognosis is Good. Pt will continue to benefit from skilled outpatient physical therapy to address the deficits listed in the problem list, provide pt/family education and to maximize pt's level of independence in the home and community environment.     Goals as follows:  Short Term GOALS: 3 weeks. Pt agrees with goals set.  1. Patient demonstrates independence with HEP.   2. Patient demonstrates independence with Postural Awareness.   3. Patient demonstrates independence with body mechanics.     Long Term GOALS: 6 weeks. Pt agrees with goals set.  1. Patient demonstrates increased SB bilateral to 50% to improve tolerance to functional activities pain free.   2. Patient will report no difficulty with sitting/driving  3. Patient demonstrates improved overall function per FOTO Neck Survey to 20% Limitation or less.      Plan     Continue with established Plan of Care towards PT goals.    Therapist: Nyasia Hatfield, PTA  7/6/2017

## 2017-07-06 NOTE — PROGRESS NOTES
Pt has no active cardiac condition (ACS/USA, decompenstated CHF, significant arrhythmias or severe valvular disease) and can easily achieve 4 METS.  Pt does not require further cardiac evaluation prior to undergoing prostate surgery. Aspirin therapy can be held but should be restarted as soon as safely possible.  The remaining cardiac meds can beheld as needed but should also be restarted after the procedure. These recommendations follow the most current Guideline on Perioperative Cardiovascular Evaluation and Management of Patients Undergoing Noncardiac Surgery released by the ACC/AHA. (JACC 2014.07.944).

## 2017-07-07 ENCOUNTER — OFFICE VISIT (OUTPATIENT)
Dept: UROLOGY | Facility: CLINIC | Age: 77
End: 2017-07-07
Payer: MEDICARE

## 2017-07-07 DIAGNOSIS — N40.0 BPH (BENIGN PROSTATIC HYPERPLASIA): ICD-10-CM

## 2017-07-07 PROCEDURE — 99499 UNLISTED E&M SERVICE: CPT | Mod: S$GLB,,, | Performed by: STUDENT IN AN ORGANIZED HEALTH CARE EDUCATION/TRAINING PROGRAM

## 2017-07-07 PROCEDURE — 99999 PR PBB SHADOW E&M-EST. PATIENT-LVL II: CPT | Mod: PBBFAC,,,

## 2017-07-07 NOTE — PROGRESS NOTES
"Urology ([facility]) H&P  Staff: Dr. Anastacio Eid MD    CC: bladder outlet obstruction    HPI: Mr. Monterroso is a 75yo M with history of prostatitis and BPH with bladder outlet obstruction. He has had symptoms of frequency, urgency, nocturia, weak FOS, hesitancy and incomplete emptying for "quite a while". He was seen in clinic in May 2017 for frequency and difficulty urinating. He was found to be in urinary retention and a barboza catheter was placed. The catheter has since been removed and he is able to void but he does not feel he empties his bladder well. He has to strain and experiences intermittency. No dysuria, hematuria, or incontinence. He has taken flomax, which helps.     - Indications for TURP: difficult urination refractory to medical therapy, urinary retention requiring placement of barboza catheter.     - has not been on finasteride.  - has been on flomax.  - does not have a catheter in place. However, has required indwelling barboza catheter for urinary retention in the past.   - has had cystoscopy. This demonstrated 4cm prostate, grade II to III trabeculations, moderate lateral lobe enlargement.  - has not urodynamics. Scheduled for 7/14/17  - has not had history of elevated PSA. Never had TRUS biopsy.  - DARLYN documented on 04/17 was Abnormal - enlarged, boggy, tender, no nodules  - Last urine culture on 04/17 demonstrated no growth.     ROS:  Neg except per HPI    Past Medical History:   Diagnosis Date    Allergy     CKD (chronic kidney disease) stage 3, GFR 30-59 ml/min 6/15/2016    GERD (gastroesophageal reflux disease)     Hyperlipidemia     Hypertension     Hypothyroidism     Thyroid disease     Urinary tract infection        Past Surgical History:   Procedure Laterality Date    APPENDECTOMY      CATARACT EXTRACTION      EYE SURGERY      HERNIA REPAIR      TONSILLECTOMY         Social History     Social History    Marital status:      Spouse name: parul    Number of children: 4 "    Years of education: N/A     Occupational History    retired      Social History Main Topics    Smoking status: Never Smoker    Smokeless tobacco: Never Used    Alcohol use Yes      Comment: 1-3 glasses wine weekly, 2-3 beers weekly    Drug use: No    Sexual activity: Yes     Partners: Female     Other Topics Concern    Not on file     Social History Narrative    No narrative on file       Family History   Problem Relation Age of Onset    Diabetes Mother     Heart disease Mother     Hypertension Mother     Vision loss Mother     Dementia Mother     Alcohol abuse Father     Cancer Sister      lung with mets, was a heavy smoker    No Known Problems Daughter     No Known Problems Son     No Known Problems Daughter     No Known Problems Son     Amblyopia Neg Hx     Blindness Neg Hx     Cataracts Neg Hx     Glaucoma Neg Hx     Macular degeneration Neg Hx     Retinal detachment Neg Hx     Strabismus Neg Hx     Stroke Neg Hx     Thyroid disease Neg Hx        Review of patient's allergies indicates:   Allergen Reactions    Contrast media Hives and Itching     Iv dye       Current Outpatient Prescriptions on File Prior to Visit   Medication Sig Dispense Refill    econazole nitrate 1 % cream USE TWICE DAILY (Patient taking differently: USE TWICE DAILY (prn)) 60 g 5    famotidine (PEPCID) 20 MG tablet Take 20 mg by mouth 2 (two) times daily as needed.        fexofenadine (ALLEGRA) 180 MG tablet Take 1 tablet (180 mg total) by mouth once daily. (Patient taking differently: Take 180 mg by mouth daily as needed. ) 30 tablet 11    levothyroxine (SYNTHROID) 112 MCG tablet Take 1 tablet (112 mcg total) by mouth before breakfast. 30 tablet 5    multivitamin with minerals tablet Take 1 tablet by mouth once daily.        pravastatin (PRAVACHOL) 20 MG tablet Take 1 tablet (20 mg total) by mouth once daily. 90 tablet 3    ramipril (ALTACE) 5 MG capsule Take 1 capsule (5 mg total) by mouth once  daily. 30 capsule 5    tamsulosin (FLOMAX) 0.4 mg Cp24 Take 2 capsules (0.8 mg total) by mouth once daily. 180 capsule 3    triamcinolone acetonide 0.1% (KENALOG) 0.1 % cream Use bid prn rash 80 g 3     No current facility-administered medications on file prior to visit.        Anticoagulation:  Yes ASA 81mg, will stop 7 days prior    Physical Exam:  Weight 190lb/86kg  BMI 29    AAOx4, NAD, WDWN  NC/AT, EOMI, PER, sclerae anicteric, speech normal, tongue midline  Nl effort  RRR  Soft, non-tender, non-distended  DARLYN 35g prostate, smooth, non tender, no nodules    Labs:    Urine dipstick today - unable to give urine sample, voided just before appointment    Lab Results   Component Value Date    WBC 7.32 06/23/2017    HGB 13.4 (L) 06/23/2017    HCT 40.7 06/23/2017    MCV 86 06/23/2017     06/23/2017       BMP  Lab Results   Component Value Date     06/23/2017    K 4.0 06/23/2017     06/23/2017    CO2 24 06/23/2017    BUN 15 06/23/2017    CREATININE 1.1 06/23/2017    CALCIUM 9.2 06/23/2017    ANIONGAP 10 06/23/2017    ESTGFRAFRICA >60 06/23/2017    EGFRNONAA >60 06/23/2017       Lab Results   Component Value Date    PSA 0.91 12/17/2012    PSA 4.89 (H) 11/12/2012    PSA 0.66 11/14/2011    PSA 0.81 11/03/2010    PSA 0.86 10/26/2009    PSA 0.59 10/14/2008    PSA 0.6 10/22/2007    PSA 0.9 10/23/2006    PSA 0.7 08/15/2005    PSA 0.6 08/03/2004    PSADIAG 1.0 01/11/2016    PSADIAG 1.0 12/04/2014    PSADIAG 0.67 12/03/2013       Assessment: Liang RODO Monterroso Jr. is a 76 y.o. male with history of prostatitis and BPH here to discuss definitive surgical therapy    Plan:     1. To OR on 7/21/17 for laser TURP and possible DVIU  2. Consents signed for surgery.  3. I have explained the risk, benefits, and alternatives of the procedure in detail. The patient voices understanding and all questions have been answered. The patient agrees to proceed as planned.          Colton Schumacher MD

## 2017-07-08 ENCOUNTER — NURSE TRIAGE (OUTPATIENT)
Dept: ADMINISTRATIVE | Facility: CLINIC | Age: 77
End: 2017-07-08

## 2017-07-08 ENCOUNTER — HOSPITAL ENCOUNTER (EMERGENCY)
Facility: HOSPITAL | Age: 77
End: 2017-07-08
Attending: EMERGENCY MEDICINE
Payer: MEDICARE

## 2017-07-08 VITALS
BODY MASS INDEX: 28.04 KG/M2 | SYSTOLIC BLOOD PRESSURE: 136 MMHG | DIASTOLIC BLOOD PRESSURE: 65 MMHG | RESPIRATION RATE: 18 BRPM | HEART RATE: 89 BPM | TEMPERATURE: 99 F | HEIGHT: 68 IN | OXYGEN SATURATION: 98 % | WEIGHT: 185 LBS

## 2017-07-08 DIAGNOSIS — N40.1 BPH WITH URINARY OBSTRUCTION: Primary | ICD-10-CM

## 2017-07-08 DIAGNOSIS — N13.8 BPH WITH URINARY OBSTRUCTION: Primary | ICD-10-CM

## 2017-07-08 LAB
BILIRUB UR QL STRIP: NEGATIVE
CLARITY UR REFRACT.AUTO: CLEAR
COLOR UR AUTO: ABNORMAL
GLUCOSE UR QL STRIP: NEGATIVE
HGB UR QL STRIP: NEGATIVE
KETONES UR QL STRIP: NEGATIVE
LEUKOCYTE ESTERASE UR QL STRIP: NEGATIVE
NITRITE UR QL STRIP: NEGATIVE
PH UR STRIP: 6 [PH] (ref 5–8)
PROT UR QL STRIP: NEGATIVE
SP GR UR STRIP: 1 (ref 1–1.03)
URN SPEC COLLECT METH UR: ABNORMAL
UROBILINOGEN UR STRIP-ACNC: NEGATIVE EU/DL

## 2017-07-08 PROCEDURE — 51702 INSERT TEMP BLADDER CATH: CPT

## 2017-07-08 PROCEDURE — 81003 URINALYSIS AUTO W/O SCOPE: CPT

## 2017-07-08 PROCEDURE — 99284 EMERGENCY DEPT VISIT MOD MDM: CPT | Mod: 25

## 2017-07-08 PROCEDURE — 25000003 PHARM REV CODE 250: Performed by: STUDENT IN AN ORGANIZED HEALTH CARE EDUCATION/TRAINING PROGRAM

## 2017-07-08 PROCEDURE — 99284 EMERGENCY DEPT VISIT MOD MDM: CPT | Mod: ,,, | Performed by: EMERGENCY MEDICINE

## 2017-07-08 RX ORDER — LIDOCAINE HYDROCHLORIDE 20 MG/ML
JELLY TOPICAL
Status: COMPLETED | OUTPATIENT
Start: 2017-07-08 | End: 2017-07-08

## 2017-07-08 RX ADMIN — LIDOCAINE HYDROCHLORIDE 10 ML: 20 JELLY TOPICAL at 02:07

## 2017-07-08 NOTE — ED PROVIDER NOTES
Encounter Date: 7/8/2017    SCRIBE #1 NOTE: I, Xin Moody, am scribing for, and in the presence of,  Dr. Suarez. I have scribed the following portions of the note - the Resident attestation.       History     Chief Complaint   Patient presents with    Urinary Retention     This is a 77 yo M with BPH and previous episodes of urinary retention requiring indwelling barboza, who has TURP scheduled for 7/21/17. He presents today for worsening urinary retention. He explains that he is able to urinate small amounts without any pain, but feels as if he is retaining urine. He was offered teaching for self-catheterization at his urologist's office three days ago, but refused because he has had bad experiences with catheters. He now requests to be taught self-catheterization.           Review of patient's allergies indicates:   Allergen Reactions    Contrast media Hives and Itching     Iv dye     Past Medical History:   Diagnosis Date    Allergy     CKD (chronic kidney disease) stage 3, GFR 30-59 ml/min 6/15/2016    GERD (gastroesophageal reflux disease)     Hyperlipidemia     Hypertension     Hypospadias in male     Hypothyroidism     Thyroid disease     Urinary tract infection      Past Surgical History:   Procedure Laterality Date    APPENDECTOMY      CATARACT EXTRACTION      EYE SURGERY      HERNIA REPAIR      TONSILLECTOMY       Family History   Problem Relation Age of Onset    Diabetes Mother     Heart disease Mother     Hypertension Mother     Vision loss Mother     Dementia Mother     Alcohol abuse Father     Cancer Sister      lung with mets, was a heavy smoker    No Known Problems Daughter     No Known Problems Son     No Known Problems Daughter     No Known Problems Son     Amblyopia Neg Hx     Blindness Neg Hx     Cataracts Neg Hx     Glaucoma Neg Hx     Macular degeneration Neg Hx     Retinal detachment Neg Hx     Strabismus Neg Hx     Stroke Neg Hx     Thyroid disease Neg Hx       Social History   Substance Use Topics    Smoking status: Never Smoker    Smokeless tobacco: Never Used    Alcohol use Yes      Comment: 1-3 glasses wine weekly, 2-3 beers weekly     Review of Systems   Constitutional: Negative for fever.   HENT: Negative for sore throat.    Respiratory: Negative for shortness of breath.    Cardiovascular: Negative for chest pain.   Gastrointestinal: Negative for nausea.   Genitourinary: Positive for difficulty urinating. Negative for discharge, dysuria, hematuria and penile pain.   Musculoskeletal: Negative for back pain.   Skin: Negative for rash.   Neurological: Negative for weakness.   Hematological: Does not bruise/bleed easily.       Physical Exam     Initial Vitals [07/08/17 1134]   BP Pulse Resp Temp SpO2   139/64 98 18 98.4 °F (36.9 °C) 99 %      MAP       89         Physical Exam    Nursing note and vitals reviewed.  Constitutional: Vital signs are normal. He appears well-developed and well-nourished.   HENT:   Head: Normocephalic and atraumatic.   Eyes: Conjunctivae and EOM are normal.   Neck: Normal range of motion. Neck supple.   Cardiovascular: Normal rate, regular rhythm, S1 normal, S2 normal, normal heart sounds and intact distal pulses.   Pulmonary/Chest: Breath sounds normal. No respiratory distress. He has no wheezes. He has no rhonchi. He has no rales.   Abdominal: Soft. Normal appearance and bowel sounds are normal. There is tenderness (mild suprapubic tenderness). There is no rigidity and no guarding.   Bladder not grossly distended by physical exam   Musculoskeletal: Normal range of motion.   Neurological: He is alert and oriented to person, place, and time.   Skin: Skin is warm and dry.   Psychiatric: He has a normal mood and affect. His speech is normal.         ED Course   Procedures  Labs Reviewed   URINALYSIS, REFLEX TO URINE CULTURE - Abnormal; Notable for the following:        Result Value    Color, UA Orange (*)     All other components within  normal limits    Narrative:     Preferred Collection Type->Urine, Clean Catch             Medical Decision Making:   History:   Old Medical Records: I decided to obtain old medical records.  Clinical Tests:   Lab Tests: Ordered and Reviewed  This patient's presentation of urinary retention with history of BPH and prostatitis most likely related to outlet obstruction secondary to BPH. Will check bladder scan to see how much urine is retained. Will also check UA to evaluate for possible prostatitis. Will consider catheterization depending on bladder scan results.        APC / Resident Notes:   2:06 PM  Urine was clear, orange secondary to pyridium use. Bladder scan revealed 374mL after voiding. Will place indwelling catheter and give referral to see urologist Monday to learn self-catheterization.        Scribe Attestation:   Scribe #1: I performed the above scribed service and the documentation accurately describes the services I performed. I attest to the accuracy of the note.    Attending Attestation:   Physician Attestation Statement for Resident:  As the supervising MD   Physician Attestation Statement: I have personally seen and examined this patient.   I agree with the above history. -: 76 year old male with history of BPH presents for evaluation of worsening of difficulty voiding.   As the supervising MD I agree with the above PE.    As the supervising MD I agree with the above treatment, course, plan, and disposition.          Physician Attestation for Scribe:  Physician Attestation Statement for Scribe #1: I, Dr. Suarez, reviewed documentation, as scribed by Xin Moody in my presence, and it is both accurate and complete.                 ED Course     Clinical Impression:   The encounter diagnosis was BPH with urinary obstruction.    Disposition:   Disposition: Discharged  Condition: Stable                        Parviz Suarez MD  07/16/17 1211

## 2017-07-08 NOTE — ED NOTES
Patient identifiers for Liang Monterroso Jr. 76 y.o. male checked and correct.  Chief Complaint   Patient presents with    Urinary Retention     Pt presents with urinary retention x 1 month with varying degrees of retention. Pt states he was able to urinate approx 10 minutes ago with a good amount of output. Pt states it is worse at night and the u/o is decreased. Pt reports bladder pain r/t distention. Pt states the pain is worse to the left side that travels up his ureter. Denies blood in urine or fever.    Past Medical History:   Diagnosis Date    Allergy     CKD (chronic kidney disease) stage 3, GFR 30-59 ml/min 6/15/2016    GERD (gastroesophageal reflux disease)     Hyperlipidemia     Hypertension     Hypothyroidism     Thyroid disease     Urinary tract infection      Allergies reported:   Review of patient's allergies indicates:   Allergen Reactions    Contrast media Hives and Itching     Iv dye       LOC: Patient is awake, alert, and aware of environment with an appropriate affect. Patient is oriented x 3 and speaking appropriately.  APPEARANCE: Patient resting comfortably and in no acute distress. Patient is clean and well groomed, patient's clothing is properly fastened.  HEENT: WNL  SKIN: The skin is warm and dry. Patient has normal skin turgor and moist mucus membranes. Skin is intact; no bruising or breakdown noted.  MUSKULOSKELETAL: Patient is moving all extremities well, no obvious deformities noted. Pulses intact.   RESPIRATORY: Airway is open and patent. Respirations are spontaneous and non-labored with normal effort and rate, BBS=clear  CARDIAC: Patient has a normal rate and rhythm. No peripheral edema noted.   ABDOMEN: No distention noted. Bowel sounds active in all 4 quadrants. Soft and non-tender upon palpation.  NEUROLOGICAL: pupils 3mm, PERRL. Facial expression is symmetrical. Hand grasps are equal bilaterally. Normal sensation in all extremities when touched with finger.  : Pt reports  urinary retention.

## 2017-07-08 NOTE — TELEPHONE ENCOUNTER
Patient is requesting an appointment to come in for catheterizing.  He has had some difficulty Friday and Saturday.  I have advised to the ED as noted in notes of Dr. Eid.  Patient continues to request waiting for Monday

## 2017-07-08 NOTE — TELEPHONE ENCOUNTER
"    Reason for Disposition   Urination is difficult to start (i.e., hesitancy) or straining    Answer Assessment - Initial Assessment Questions  1. SYMPTOM: "What's the main symptom you're concerned about?" (e.g., frequency, incontinence)      retention  2. ONSET: "When did the  ________  start?"      retention  3. PAIN: "Is there any pain?" If so, ask: "How bad is it?" (Scale: 1-10; mild, moderate, severe)      Moderate pressure and distention in bladder area  4. CAUSE: "What do you think is causing the symptoms?"      BPH  5. OTHER SYMPTOMS: "Do you have any other symptoms?" (e.g., fever, flank pain, blood in urine, pain with urination)      None  6. PREGNANCY: "Is there any chance you are pregnant?" "When was your last menstrual period?"      n/a    Protocols used: ST URINARY SYMPTOMS-A-      "

## 2017-07-10 ENCOUNTER — OFFICE VISIT (OUTPATIENT)
Dept: NEUROLOGY | Facility: CLINIC | Age: 77
End: 2017-07-10
Payer: MEDICARE

## 2017-07-10 ENCOUNTER — OFFICE VISIT (OUTPATIENT)
Dept: UROLOGY | Facility: CLINIC | Age: 77
End: 2017-07-10
Payer: MEDICARE

## 2017-07-10 VITALS
HEIGHT: 68 IN | BODY MASS INDEX: 28.64 KG/M2 | HEART RATE: 85 BPM | WEIGHT: 189 LBS | DIASTOLIC BLOOD PRESSURE: 66 MMHG | SYSTOLIC BLOOD PRESSURE: 132 MMHG

## 2017-07-10 VITALS
DIASTOLIC BLOOD PRESSURE: 82 MMHG | WEIGHT: 190.06 LBS | SYSTOLIC BLOOD PRESSURE: 106 MMHG | BODY MASS INDEX: 28.8 KG/M2 | HEIGHT: 68 IN | HEART RATE: 76 BPM

## 2017-07-10 DIAGNOSIS — N40.1 BENIGN PROSTATIC HYPERPLASIA WITH INCOMPLETE BLADDER EMPTYING: Primary | ICD-10-CM

## 2017-07-10 DIAGNOSIS — Z86.018 S/P RESECTION OF MENINGIOMA: ICD-10-CM

## 2017-07-10 DIAGNOSIS — S06.0X1D CONCUSSION, WITH LOC OF 30 MIN OR LESS, SUBSEQUENT ENCOUNTER: Primary | ICD-10-CM

## 2017-07-10 DIAGNOSIS — R39.14 BENIGN PROSTATIC HYPERPLASIA WITH INCOMPLETE BLADDER EMPTYING: Primary | ICD-10-CM

## 2017-07-10 DIAGNOSIS — Z98.890 S/P RESECTION OF MENINGIOMA: ICD-10-CM

## 2017-07-10 PROCEDURE — 1126F AMNT PAIN NOTED NONE PRSNT: CPT | Mod: S$GLB,,, | Performed by: NURSE PRACTITIONER

## 2017-07-10 PROCEDURE — 1126F AMNT PAIN NOTED NONE PRSNT: CPT | Mod: S$GLB,,, | Performed by: PSYCHIATRY & NEUROLOGY

## 2017-07-10 PROCEDURE — 99499 UNLISTED E&M SERVICE: CPT | Mod: S$GLB,,, | Performed by: PSYCHIATRY & NEUROLOGY

## 2017-07-10 PROCEDURE — 1159F MED LIST DOCD IN RCRD: CPT | Mod: S$GLB,,, | Performed by: NURSE PRACTITIONER

## 2017-07-10 PROCEDURE — 99999 PR PBB SHADOW E&M-EST. PATIENT-LVL III: CPT | Mod: PBBFAC,,, | Performed by: NURSE PRACTITIONER

## 2017-07-10 PROCEDURE — 99214 OFFICE O/P EST MOD 30 MIN: CPT | Mod: S$GLB,,, | Performed by: PSYCHIATRY & NEUROLOGY

## 2017-07-10 PROCEDURE — 99999 PR PBB SHADOW E&M-EST. PATIENT-LVL III: CPT | Mod: PBBFAC,,, | Performed by: PSYCHIATRY & NEUROLOGY

## 2017-07-10 PROCEDURE — 1159F MED LIST DOCD IN RCRD: CPT | Mod: S$GLB,,, | Performed by: PSYCHIATRY & NEUROLOGY

## 2017-07-10 PROCEDURE — 99212 OFFICE O/P EST SF 10 MIN: CPT | Mod: S$GLB,,, | Performed by: NURSE PRACTITIONER

## 2017-07-10 NOTE — PROGRESS NOTES
Subjective:       Patient ID: Liang Monterroso Jr. is a 76 y.o. male.    Reason for Consult: Concussion      Interval History:  Liang Monterroso Jr. is here for follow up. Their condition has completely improved from a neurologic standpoint.  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  He is scheduled to have urologic surgery later this month.  He notes that he has been more fatigued and sleeping too much lately but we have discussed that this may be related to him finding and low-level infection for more than a month now.  He is recently stopped taking antibiotics for his retained urine.  He notes that he is scheduled to have a repeat MRI from his meningioma resection and some sort of follow-up with his neurosurgeon.     Objective:     Vitals:    07/10/17 1410   BP: 106/82   Pulse: 76     Patient is awake alert oriented to person place and time.  He is able to articulate lengthy in detail oriented stories without any problem.  Cranial nerves II through XII are without focal deficit.  Gait and station within normal limits.  Focused examination was undertaken today. Over 50% of face to face time of 25 minute visit time was in giving guidance, counseling and discussing treatment options.    I personally reviewed his imaging and relayed my impression to him.  Results for orders placed or performed during the hospital encounter of 03/20/17   CT Head Without Contrast    Narrative    CT brain without contrast.    Comparison: MRI 03/19/2017    Technique: Multiple 5 mm axial images of the head were obtained without intravenous contrast.    Results: Interval operative change from right posterior temporal occipital craniotomy for resection previous identified enhancing lesion resection..    There is mixed density collection within the right temporal occipital resection cavity compatible with postoperative gas fluid and hemorrhage. There is postoperative fat packing material underlying the craniotomy  flap superficial to the dural plasty. Please note evaluation for residual lesion limited by noncontrast CT technique.    Superimposed age-appropriate generalized volume loss with scattered decreased attenuation supratentorial white matter for chronic ischemic change.    No evidence for  hydrocephalus. No midline shift. Single surgical skin staple overlies the left frontal soft tissues with overlying soft tissue swelling.    Impression     Expected operative change from posterior temporal occipital craniotomy for previous right temporal occipital lesion resection. Small likely postoperative gas fluid and hemorrhage within the resection cavity underlying the craniotomy.    Superimposed Age-appropriate generalized cerebral volume loss with mild degree of patchy decreased attenuation supratentorial white matter suggestive for chronic microvascular ischemic change .     Clinical correlation and followup advised.      Electronically signed by: DODIE BASSETT DO  Date:     03/20/17  Time:    12:23    Results for orders placed or performed during the hospital encounter of 02/18/17   MRI Brain W WO Contrast    Narrative    MRI brain with contrast    02/18/17 11:36:02    Accession# [id number]    CLINICAL INDICATION: 76 year old M with brain mass seen on MRI without contrast      TECHNIQUE: Multiplanar multisequence MR imaging of the brain was performed before and after the administration of 9 ml Gadavistintravenous contrast.     COMPARISON:  Noncontrast MRI 2/6/17    FINDINGS:    Patient again demonstrates an extra-axial mass extending superiorly from the tentorium uplifting the right temporal occipital junction. This lesion demonstrates homogeneous enhancement and a broad dural attachment, most consistent with a meningioma. Low T2 signal intensity and susceptibility suggests associated calcification. Lesion measures 2.7 x 2.7 cm in maximal transverse dimension. There is localized mass effect with sulcal effacement and  displacement of adjacent brain parenchyma. Mild adjacent vasogenic type edema. No impending herniation or midline shift.    No additional enhancing intra-or extra-axial lesions identified. The brain appears otherwise unchanged, again noting mild chronic microvascular ischemic disease.    The ventricles are stable in size and configuration without evidence of hydrocephalus. T2 skull base flow voids are preserved. Bone marrow signal intensity is unremarkable. Mild mucosal thickening in the paranasal sinuses.    Impression    2.7 cm homogeneously enhancing extra-axial mass subjacent to the right temporal occipital junction with mild localized mass effect and parenchymal edema. Lesion is not entirely specific, but imaging characteristics are highly suggestive of a meningioma.        Electronically signed by: SAM BEE MD  Date:     02/18/17  Time:    12:15    Results for orders placed or performed during the hospital encounter of 02/06/17   MRI Brain Without Contrast    Narrative    HISTORY: LOC    TECHNIQUE:  Multiplanar multisequence images of the brain without intravenous contrast.      COMPARISON: N/A    FINDINGS:     Brain and intracranial structures:      2.6 x 2.7 x 2.4 cm mildly T2 hypointense and T1 isointense lesion is present along the right temporal occipital junction with associated mass effect and adjacent white matter edema signal.  This mass appears to be extra-axial in location and demonstrates an internal serpiginous flow voids.  No associated internal hemorrhage or calcification.  There is question of minimal overlying hyperostosis.      There is no hemorrhage or or acute infarct. There is underlying mild to moderate generalized cerebral volume loss, normal to slightly advanced for age. Mild to moderate burden of T2/FLAIR hyperintensities are scattered in the periventricular and subcortical white matter, likely related to sequela of chronic microvascular ischemic disease.  There are normal  vascular flow voids.     Skull:  Normal.    Scalp: Normal.    Orbits and face (included portions): Bilateral aphakia.    Paranasal sinuses and mastoid air cells (included portions): Mucosal retention cyst versus polyp in the left maxillary sinus.    Other findings: N/A    Impression    2.7 cm lobulated extra-axial lesion at the right temporal occipital junction most characteristic of meningioma, with associated underlying white matter edema.      Electronically signed by: TANNER SAENZ  Date:     02/06/17  Time:    15:29        Assessment/Plan:     Problem List Items Addressed This Visit     None      Visit Diagnoses     Concussion, with LOC of 30 min or less, subsequent encounter    -  Primary    S/P resection of meningioma            76-year-old male presents for follow-up of concussion sustained on 1/16/17.  He was found to have an incidental meningioma which required resection.  He is now dealing with prostate issues and is scheduled for a TURP later on this month.  From a concussion standpoint I believe he is back to baseline.  I have told him he should have follow-up with his neurosurgeon and continued care for his urologic issues as well.  He may call me for a follow-up should any other symptoms arise that we are both pleased with his recovery after concussion.  I will follow up with them PRN.  The patient verbalizes understanding and agreement with the treatment plan. I have discussed risks, benefits and alternatives to the treatment plan. Questions were sought and answered to his stated verbal satisfaction.        Edd Haider MD    This note is dictated on Dragon Natural Speaking word recognition program. There are word recognition mistakes that are occasionally missed on review.

## 2017-07-10 NOTE — PROGRESS NOTES
"  CC: Urinary retention and CIC teaching    HPI: Mr. Monterroso is a 77yo M with history of prostatitis and BPH with bladder outlet obstruction. He has had symptoms of frequency, urgency, nocturia, weak FOS, hesitancy and incomplete emptying for "quite a while". He was seen in clinic in May 2017 for frequency and difficulty urinating. He was found to be in urinary retention and a barboza catheter was placed. The catheter has since been removed and he is able to void but he does not feel he empties his bladder well. He has to strain and experiences intermittency. No dysuria, hematuria, or incontinence. He has taken flomax, which helps.     He presented to the ED on 7/8/17 with worsening urinary retention. He explains that he is able to urinate small amounts without any pain, but feels as if he is retaining urine. He was offered teaching for self-catheterization w/ Dr. Eid last Wednesday, but refused because he has had bad experiences with catheters. Bladder scan revealed 374mL after voiding and an indwelling catheter placed.     Today, he presents with requests to be taught self-catheterization.   His catheter had been draining well with no complaints.   He is scheduled for a TURP with Dr. Eid on 7/21/17.       ROS:  Neg except per HPI    Past Medical History:   Diagnosis Date    Allergy     CKD (chronic kidney disease) stage 3, GFR 30-59 ml/min 6/15/2016    GERD (gastroesophageal reflux disease)     Hyperlipidemia     Hypertension     Hypospadias in male     Hypothyroidism     Thyroid disease     Urinary tract infection        Past Surgical History:   Procedure Laterality Date    APPENDECTOMY      CATARACT EXTRACTION      EYE SURGERY      HERNIA REPAIR      TONSILLECTOMY         Social History     Social History    Marital status:      Spouse name: parul    Number of children: 4    Years of education: N/A     Occupational History    retired      Social History Main Topics    Smoking status: " Never Smoker    Smokeless tobacco: Never Used    Alcohol use Yes      Comment: 1-3 glasses wine weekly, 2-3 beers weekly    Drug use: No    Sexual activity: Yes     Partners: Female     Other Topics Concern    None     Social History Narrative    None       Family History   Problem Relation Age of Onset    Diabetes Mother     Heart disease Mother     Hypertension Mother     Vision loss Mother     Dementia Mother     Alcohol abuse Father     Cancer Sister      lung with mets, was a heavy smoker    No Known Problems Daughter     No Known Problems Son     No Known Problems Daughter     No Known Problems Son     Amblyopia Neg Hx     Blindness Neg Hx     Cataracts Neg Hx     Glaucoma Neg Hx     Macular degeneration Neg Hx     Retinal detachment Neg Hx     Strabismus Neg Hx     Stroke Neg Hx     Thyroid disease Neg Hx        Review of patient's allergies indicates:   Allergen Reactions    Contrast media Hives and Itching     Iv dye       Current Outpatient Prescriptions on File Prior to Visit   Medication Sig Dispense Refill    econazole nitrate 1 % cream USE TWICE DAILY (Patient taking differently: USE TWICE DAILY (prn)) 60 g 5    famotidine (PEPCID) 20 MG tablet Take 20 mg by mouth 2 (two) times daily as needed.        fexofenadine (ALLEGRA) 180 MG tablet Take 1 tablet (180 mg total) by mouth once daily. (Patient taking differently: Take 180 mg by mouth daily as needed. ) 30 tablet 11    levothyroxine (SYNTHROID) 112 MCG tablet Take 1 tablet (112 mcg total) by mouth before breakfast. 30 tablet 5    multivitamin with minerals tablet Take 1 tablet by mouth once daily.        pravastatin (PRAVACHOL) 20 MG tablet Take 1 tablet (20 mg total) by mouth once daily. 90 tablet 3    ramipril (ALTACE) 5 MG capsule Take 1 capsule (5 mg total) by mouth once daily. 30 capsule 5    tamsulosin (FLOMAX) 0.4 mg Cp24 Take 2 capsules (0.8 mg total) by mouth once daily. 180 capsule 3    triamcinolone  acetonide 0.1% (KENALOG) 0.1 % cream Use bid prn rash 80 g 3     No current facility-administered medications on file prior to visit.        Anticoagulation:  Yes ASA 81mg, will stop 7 days prior    Physical Exam:  Weight 190lb/86kg  BMI 29    AAOx4, NAD, WDWN  NC/AT, EOMI, PER, sclerae anicteric, speech normal, tongue midline  Nl effort  RRR  Soft, non-tender, non-distended    Labs:    Lab Results   Component Value Date    WBC 7.32 06/23/2017    HGB 13.4 (L) 06/23/2017    HCT 40.7 06/23/2017    MCV 86 06/23/2017     06/23/2017       BMP  Lab Results   Component Value Date     06/23/2017    K 4.0 06/23/2017     06/23/2017    CO2 24 06/23/2017    BUN 15 06/23/2017    CREATININE 1.1 06/23/2017    CALCIUM 9.2 06/23/2017    ANIONGAP 10 06/23/2017    ESTGFRAFRICA >60 06/23/2017    EGFRNONAA >60 06/23/2017       Lab Results   Component Value Date    PSA 0.91 12/17/2012    PSA 4.89 (H) 11/12/2012    PSA 0.66 11/14/2011    PSA 0.81 11/03/2010    PSA 0.86 10/26/2009    PSA 0.59 10/14/2008    PSA 0.6 10/22/2007    PSA 0.9 10/23/2006    PSA 0.7 08/15/2005    PSA 0.6 08/03/2004    PSADIAG 1.0 01/11/2016    PSADIAG 1.0 12/04/2014    PSADIAG 0.67 12/03/2013       Assessment: Liang Monterroso . is a 76 y.o. male with history of prostatitis and BPH here to discuss definitive surgical therapy    Plan:   -Noble catheter was removed,  -Discussed and educated pt on CIC. Pt watched video and demonstrated for Nurse Mandujano. Continue CIC 2-4x daily until TURP. Multiple catheter samples given to the pt.   -UC prior to TURP  -Return to OR on 7/21/17 for laser TURP and possible DVIU        Larissa Strong NP

## 2017-07-11 ENCOUNTER — CLINICAL SUPPORT (OUTPATIENT)
Dept: REHABILITATION | Facility: HOSPITAL | Age: 77
End: 2017-07-11
Attending: PHYSICAL MEDICINE & REHABILITATION
Payer: MEDICARE

## 2017-07-11 DIAGNOSIS — M54.2 CERVICAL PAIN: ICD-10-CM

## 2017-07-11 PROCEDURE — G8979 MOBILITY GOAL STATUS: HCPCS | Mod: CI

## 2017-07-11 PROCEDURE — G8980 MOBILITY D/C STATUS: HCPCS | Mod: CJ

## 2017-07-11 PROCEDURE — 97110 THERAPEUTIC EXERCISES: CPT

## 2017-07-11 NOTE — PROGRESS NOTES
"                   Physical Therapy Daily Note and DISCHARGE NOTE     Name: Liang Monterroso Jr.  Clinic Number: [id number]  Diagnosis:   Encounter Diagnosis   Name Primary?    Cervical pain      Physician: Ayanna Nieto, *  Precautions:     2 mm anterolisthesis of C3 on C4 which increases to 4 mm on the flexion radiograph and resolves on the extension radiograph of the cervical spine.  Cervical spondylosis.  Bony fusion of the C2 and C3 vertebral bodies.     Visit #: 11 of 12  PTA Visit #: 0  Time In: 2:00 pm  Time Out: 3:15 pm    Evaluation Date: 5/23/17  Plan of care Expiration: 7/4/17    Subjective     Pt reports: no pain; pt reports he does not have much stiffness and cracking in his neck anymore   Pain Scale: Liang rates pain on a scale of 0-10 to be 0 currently.    Objective   Functional Limitations Reports - G Codes  Category: Mobility  Tool: FOTO Neck Survey  Score: 31% Limitation   Modifier  Impairment Limitation Restriction    CH  0 % impaired, limited or restricted    CI  @ least 1% but less than 20% impaired, limited or restricted    CJ  @ least 20%<40% impaired, limited or restricted    CK  @ least 40%<60% impaired, limited or restricted    CL  @ least 60% <80% impaired, limited or restricted    CM  @ least 80%<100% impaired limited or restricted    CN  100% impaired, limited or restricted     Discharge/  CJ = 31% limitation  Goal/ : CJ =  20% limitation    Liang received individual therapeutic exercises to develop strength, ROM, flexibility, posture and core stabilization for 45 minutes including: (1:1 w/ PT/PTA x 25 minutes)  UBE Fwd/bwd 3 min ea.   Cervical retraction 30x5"  Serratus punch 3x10 #2  Scaption 3x10 #2  R UTS 30" x 3   R LSS 3x30"  TB W's OTB 3x10  TB shoulder ext GTB 3x10  SL FL R UE 1# 2x15  SL hz abd R UE 1# 2x15  SL ER R UE 1# 2x15  Wall angels 10x  Wall slides RTB 10x2   pec stretch 3x30"  Ws on wall 1x10     Liang received the following manual therapy techniques: Soft " tissue Mobilization were applied to the: c/s for 5 minutes including:  STM to B UT  Manual cervical traction  PROM stretching UT    The patient received the following direct contact modalities after being cleared for contraindications: HP to R UT x 10 min    Written Home Exercises Provided: updated 7/11/17 (see handout)  Pt demo good understanding of the education provided. Liang demonstrated good return demonstration of activities.     Education provided re: continue HEP  Liang verbalized good understanding of education provided.   No spiritual or educational barriers to learning provided    CERVICAL SPINE AROM:   Flexion: 100   Extension: 75   Left Sidebend: 50   Right Sidebend:    Left Rotation: 75   Right Rotation: 75     Assessment     Patient tolerated tx well w/o adverse reaction.Pt not experiencing much pain and decreased crepitus in C/S and UT. Pt discharged with updated HEP.   This is a 76 y.o. male referred to outpatient physical therapy and presents with a medical diagnosis of C/S pain and demonstrates limitations as described in the problem list.     Goals as follows:  Short Term GOALS: 3 weeks. Pt agrees with goals set.  1. Patient demonstrates independence with HEP.   2. Patient demonstrates independence with Postural Awareness.   3. Patient demonstrates independence with body mechanics.     Long Term GOALS: 6 weeks. Pt agrees with goals set.  1. Patient demonstrates increased SB bilateral to 50% to improve tolerance to functional activities pain free. - MET  2. Patient will report no difficulty with sitting/driving - MET  3. Patient demonstrates improved overall function per FOTO Neck Survey to 20% Limitation or less. - PARTIALLY MET     Plan     Discharge with updated HEP.     Therapist: Pradip Monterroso, PT  7/11/2017

## 2017-07-12 ENCOUNTER — PROCEDURE VISIT (OUTPATIENT)
Dept: UROLOGY | Facility: CLINIC | Age: 77
End: 2017-07-12
Payer: MEDICARE

## 2017-07-12 VITALS
HEIGHT: 68 IN | TEMPERATURE: 98 F | DIASTOLIC BLOOD PRESSURE: 82 MMHG | RESPIRATION RATE: 16 BRPM | WEIGHT: 185 LBS | SYSTOLIC BLOOD PRESSURE: 134 MMHG | HEART RATE: 98 BPM | BODY MASS INDEX: 28.04 KG/M2

## 2017-07-12 DIAGNOSIS — N40.1 BPH WITH URINARY OBSTRUCTION: ICD-10-CM

## 2017-07-12 DIAGNOSIS — N13.8 BPH WITH URINARY OBSTRUCTION: ICD-10-CM

## 2017-07-12 NOTE — PATIENT INSTRUCTIONS
SIMPLE URODYNAMIC STUDY (SUDS) & CYSTOSCOPY  UROLOGY CLINIC DISCHARGE INSTRUCTIONS    You have had a procedure that will require time to properly heal. Follow the instructions you have been given on how to care for yourself once you are home. Below is additional information to help in your recovery.    ACTIVITY  · There are no restrictions in activity. Start doing again the things you did before the procedure.  · You may experience a slight burning sensation. You may notice a small amount of blood in your urine. This will clear up within a day. Call the clinic if this continues beyond 48 hours.    DIET  · Continue your normal diet. You may eat the same foods you ate before your procedure.  · Drink plenty of fluids during the first 24-48 hours following your procedure.    MEDICATIONS  · Resume all other previous medications from your prescribing physician.  · Continue any pre=procedure antibiotics until they are all gone.    SIGNS AND SYMPTOMS TO REPORT TO THE DOCTOR  · Chills or fever greater than 101° F within 24 hours of procedure.  · Changes in urination, such as increased bleeding, foul smell, cloudy urine, or painful urination.  · Call your doctor with any questions or concerns.    For any emergency situation, call 911 immediately or go to your nearest emergency room.    [facility]  [phone]                                                                                                                                                                                                                                                      Instructed by ________________________________                                                    Patient signature____________________________

## 2017-07-12 NOTE — PROCEDURES
This office note has been dictated.  Procedure Date: 7/12/2017     Preoperative diagnosis neurogenic bladder versus BPH with outlet obstruction postop diagnosis probable neurogenic bladder with mild outlet obstruction    Surgeon Ragini    Operation performed simple urodynamics    Gentleman was seen in the office with urinary retention.  He is voiding somewhat and does not wish to do intermittent catheterization.  His residuals are approximately 300 cc.  It was felt that simple urodynamics was indicated.  He has had cystoscopy in the past demonstrated mild to moderate looking outlet obstruction.  The 2 catheters CMG study was performed using subtractive rectal pressure monitoring sterile water was used as a medium at 30 cc/m there were no uninhibited bladder contractions no evidence of bladder sphincter dyssynergia first sensation was at 197 cc first desire was at 328 cc strong desire at 342 cc capacity was 405 cc excellent flow rate was 29 cc/s, maximum detrusor pressure was 27 cm of water digital urine was 300 cc and he voided 180 cc there was no evidence of a elevated sugars or pressure no evidence of outlet obstruction impression mild BPH with outlet obstruction with low pressure bladder recommendations would recommend CIC for the patient once his residuals approach 4 500 cc.  I don't think that a TURP would be helpful or certainly if he did have one he may still carry elevated residuals and still need to do CIC which of course he does not wish to do.  I will call the patient discussed this with him.  Patient tolerated procedure well was discharged in satisfactory condition    Brandon Eid M.D.

## 2017-07-14 ENCOUNTER — ANESTHESIA EVENT (OUTPATIENT)
Dept: SURGERY | Facility: HOSPITAL | Age: 77
End: 2017-07-14
Payer: MEDICARE

## 2017-07-14 ENCOUNTER — TELEPHONE (OUTPATIENT)
Dept: UROLOGY | Facility: CLINIC | Age: 77
End: 2017-07-14

## 2017-07-14 RX ORDER — ASPIRIN 81 MG/1
81 TABLET ORAL DAILY
COMMUNITY
End: 2023-08-08

## 2017-07-14 NOTE — PRE ADMISSION SCREENING
Anesthesia Assessment: Preoperative EQUATION    Planned Procedure: Procedure(s) (LRB):  TURP W LASER (N/A)  URETHROTOMY-DIRECT VISUAL INTERNAL (DVIU) (N/A)  Requested Anesthesia Type:General  Surgeon: Anastacio Eid Jr., MD  Service: Urology  Known or anticipated Date of Surgery:7/21/2017    Surgeon notes: reviewed    Electronic QUestionnaire Assessment completed via nurse interview with patient.        NO AQ      Triage considerations:     The patient has no apparent active cardiac condition (No unstable coronary Syndrome such as severe unstable angina or recent [<1 month] myocardial infarction, decompensated CHF, severe valvular   disease or significant arrhythmia)    Previous anesthesia records:GETA, MAC and No problems craniotomy 03/20/17; Placement Time: 0724; Method of Intubation: Direct laryngoscopy; Inserted by: Anesthesia Resident; Airway Device: Endotracheal Tube; Mask Ventilation: Easy; Intubated: Postinduction; Blade: Wan #2; Airway Device Size: 8.0; Style: Cuffed; Cuff Inflation: Minimal occlusive pressure; Placement Verified By: Auscultation, Capnometry; Grade: Grade II; Complicating Factors: None; Intubation Findings: Positive EtCO2, Bilateral breath sounds, Atraumatic/Condition of teeth unchanged;  Depth of Insertion: 23; Securment: Lips; Complications: None; Breath Sounds: Equal Bilateral; Removal Date: 03/20/17;   Airway/Jaw/Neck:  Airway Findings: Mouth Opening: Normal Tongue: Normal  General Airway Assessment: Adult  Oropharynx Findings: Normal Mallampati: III          Last PCP note: within 1 month , within [facility]   Subspecialty notes: Cardiology: General, Neurology    Other important co-morbidities: HTN/HLP, GERD, Hypothyroidism, CKD stage3     Tests already available:  Available tests,  within 1 month , within [facility] . 6/23/17 CBC, CMP. 6/30/17 EKG            Instructions given. (See in Nurse's note)    Optimization:  Anesthesia Preop Clinic Assessment  Indicated-not required for this  procedure      Plan:    Testing:  none     Patient  has previously scheduled Medical Appointment:none    Navigation:                Straight Line to surgery.               No tests, anesthesia preop clinic visit, or consult required.

## 2017-07-14 NOTE — TELEPHONE ENCOUNTER
Pt reports blood in the morning yesterday and today which cleared up during the day. Pt does do cic. Reassured pt that this is to be expected and that he should drink more water.pt verbalizes understanding and is agreeable

## 2017-07-14 NOTE — TELEPHONE ENCOUNTER
----- Message from Adia Beckford sent at 7/14/2017  8:11 AM CDT -----  Contact: Pt:[phone]  Pt called and states he had a urodynamics test done. Pt states he was told to call if he was having some bleeding. Pt states he would like to speak with the nurse in regards to what's going on post urodynamics.

## 2017-07-14 NOTE — ANESTHESIA PREPROCEDURE EVALUATION
Pre Admission Screening  Malu Benavides RN      []Hide copied text  Anesthesia Assessment: Preoperative EQUATION     Planned Procedure: Procedure(s) (LRB):  TURP W LASER (N/A)  URETHROTOMY-DIRECT VISUAL INTERNAL (DVIU) (N/A)  Requested Anesthesia Type:General  Surgeon: Anastacio Eid Jr., MD  Service: Urology  Known or anticipated Date of Surgery:7/21/2017     Surgeon notes: reviewed     Electronic QUestionnaire Assessment completed via nurse interview with patient.         NO AQ        Triage considerations:      The patient has no apparent active cardiac condition (No unstable coronary Syndrome such as severe unstable angina or recent [<1 month] myocardial infarction, decompensated CHF, severe valvular   disease or significant arrhythmia)     Previous anesthesia records:GETA, MAC and No problems craniotomy 03/20/17; Placement Time: 0724; Method of Intubation: Direct laryngoscopy; Inserted by: Anesthesia Resident; Airway Device: Endotracheal Tube; Mask Ventilation: Easy; Intubated: Postinduction; Blade: Wan #2; Airway Device Size: 8.0; Style: Cuffed; Cuff Inflation: Minimal occlusive pressure; Placement Verified By: Auscultation, Capnometry; Grade: Grade II; Complicating Factors: None; Intubation Findings: Positive EtCO2, Bilateral breath sounds, Atraumatic/Condition of teeth unchanged;  Depth of Insertion: 23; Securment: Lips; Complications: None; Breath Sounds: Equal Bilateral; Removal Date: 03/20/17;   Airway/Jaw/Neck:  Airway Findings: Mouth Opening: Normal Tongue: Normal  General Airway Assessment: Adult  Oropharynx Findings: Normal Mallampati: III           Last PCP note: within 1 month , within [facility]   Subspecialty notes: Cardiology: General, Neurology     Other important co-morbidities: HTN/HLP, GERD, Hypothyroidism, CKD stage3     Tests already available:  Available tests,  within 1 month , within [facility] . 6/23/17 CBC, CMP. 6/30/17 EKG                                                Instructions  given. (See in Nurse's note)     Optimization:  Anesthesia Preop Clinic Assessment  Indicated-not required for this procedure                          Plan:              Testing:  none                                               Patient  has previously scheduled Medical Appointment:none     Navigation:                                    Straight Line to surgery.                                    No tests, anesthesia preop clinic visit, or consult required.                                                                                      Electronically signed by Malu Benavides RN at 7/14/2017 11:06 AM        Pre-admit on 7/21/2017            Detailed Report                                                                                                                       07/14/2017  Liang Monterroso Jr. is a 76 y.o., male.    Anesthesia Evaluation    I have reviewed the Patient Summary Reports.     I have reviewed the Medications.     Review of Systems  Anesthesia Hx:  No problems with previous Anesthesia  History of prior surgery of interest to airway management or planning: Previous anesthesia: General 3/2017 craniotomy with general anesthesia.  Procedure performed at an [facility]. Denies Family Hx of Anesthesia complications.   Denies Personal Hx of Anesthesia complications.   Social:  Non-Smoker    Hematology/Oncology:  Hematology Normal        EENT/Dental:EENT/Dental Normal   Cardiovascular:   Exercise tolerance: good Hypertension, well controlled  Denies Angina. hyperlipidemia Abn EKG evaluated by cardiology 6/30/17 Functional Capacity good / => 4 METS  Hypertension , Well Controlled on Rx    Pulmonary:  Pulmonary Normal  Denies Shortness of breath.  Denies Recent URI.    Renal/:   Chronic Renal Disease, CRI Cr 1.1 Kidney Function/Disease, Chronic Kidney Disease (CKD) , CKD Stage III (GFR 30-59)  Other Renal / Gu Conditions: Benign Prostatic Hypertrophy (BPH) (self caths daily)  Hepatic/GI:    GERD, well controlled    Neurological:   Hx of meningioma resection/craniotomy 3/2017   Endocrine:   Hypothyroidism  Thyroid Disease Hypothyroidism        Physical Exam  General:  Well nourished    Airway/Jaw/Neck:  Airway Findings: Mouth Opening: Normal Tongue: Normal  Pre-Existing Airway Tube(s): Oral Endotracheal tube  General Airway Assessment: Adult  Mallampati: III  Improves to II with phonation.  TM Distance: Normal, at least 6 cm  Jaw/Neck Findings:  Neck ROM: Normal ROM      Dental:  Dental Findings: In tact   Chest/Lungs:  Chest/Lungs Findings: Clear to auscultation, Normal Respiratory Rate     Heart/Vascular:  Heart Findings: Rate: Normal  Rhythm: Regular Rhythm  Sounds: Normal        Mental Status:  Mental Status Findings:  Cooperative, Alert and Oriented         Anesthesia Plan  Type of Anesthesia, risks & benefits discussed:  Anesthesia Type:  general  Patient's Preference:   Intra-op Monitoring Plan: standard ASA monitors  Intra-op Monitoring Plan Comments:   Post Op Pain Control Plan: multimodal analgesia, IV/PO Opioids PRN and per primary service following discharge from PACU  Post Op Pain Control Plan Comments:   Induction:   IV  Beta Blocker:  Patient is not currently on a Beta-Blocker (No further documentation required).       Informed Consent: Patient understands risks and agrees with Anesthesia plan.  Questions answered. Anesthesia consent signed with patient.  ASA Score: 2     Day of Surgery Review of History & Physical:    H&P update referred to the surgeon.         Ready For Surgery From Anesthesia Perspective.

## 2017-07-18 ENCOUNTER — TELEPHONE (OUTPATIENT)
Dept: UROLOGY | Facility: CLINIC | Age: 77
End: 2017-07-18

## 2017-07-18 NOTE — TELEPHONE ENCOUNTER
Called to get him to bring UC to the lab. He explained no UTI symptoms currently. He was urinating well at this time. He will bring urine to clinic tomorrow at 10 and I will check. Verbalized understanding.

## 2017-07-19 DIAGNOSIS — R35.0 FREQUENCY OF URINATION: Primary | ICD-10-CM

## 2017-07-19 PROCEDURE — 87086 URINE CULTURE/COLONY COUNT: CPT

## 2017-07-19 NOTE — PROGRESS NOTES
Pt presented to the clinic today w/ a urine sample. He reports frequency starting yesterday afternoon. UA today is + leuk and 250 blood. No nitrites. Spec grav 1.000 and ph 5. He reports he took a cipro last night and this morning. Gave UA results and explained I would sent for culture. Explained to continue cipro tonight and tomorrow AM. He verbalized understanding and would present for surgery tomorrow.

## 2017-07-20 ENCOUNTER — TELEPHONE (OUTPATIENT)
Dept: UROLOGY | Facility: CLINIC | Age: 77
End: 2017-07-20

## 2017-07-21 ENCOUNTER — HOSPITAL ENCOUNTER (OUTPATIENT)
Facility: HOSPITAL | Age: 77
Discharge: HOME | End: 2017-07-21
Attending: UROLOGY | Admitting: UROLOGY
Payer: MEDICARE

## 2017-07-21 ENCOUNTER — SURGERY (OUTPATIENT)
Age: 77
End: 2017-07-21

## 2017-07-21 ENCOUNTER — ANESTHESIA (OUTPATIENT)
Dept: SURGERY | Facility: HOSPITAL | Age: 77
End: 2017-07-21
Payer: MEDICARE

## 2017-07-21 VITALS
OXYGEN SATURATION: 99 % | DIASTOLIC BLOOD PRESSURE: 86 MMHG | WEIGHT: 185 LBS | TEMPERATURE: 98 F | RESPIRATION RATE: 20 BRPM | SYSTOLIC BLOOD PRESSURE: 132 MMHG | HEIGHT: 68 IN | BODY MASS INDEX: 28.04 KG/M2 | HEART RATE: 80 BPM

## 2017-07-21 DIAGNOSIS — N40.0 BPH (BENIGN PROSTATIC HYPERPLASIA): ICD-10-CM

## 2017-07-21 PROCEDURE — D9220A PRA ANESTHESIA: Mod: CRNA,,, | Performed by: NURSE ANESTHETIST, CERTIFIED REGISTERED

## 2017-07-21 PROCEDURE — 71000033 HC RECOVERY, INTIAL HOUR: Performed by: UROLOGY

## 2017-07-21 PROCEDURE — 25000003 PHARM REV CODE 250: Performed by: NURSE ANESTHETIST, CERTIFIED REGISTERED

## 2017-07-21 PROCEDURE — 63600175 PHARM REV CODE 636 W HCPCS: Performed by: STUDENT IN AN ORGANIZED HEALTH CARE EDUCATION/TRAINING PROGRAM

## 2017-07-21 PROCEDURE — D9220A PRA ANESTHESIA: Mod: ANES,,, | Performed by: ANESTHESIOLOGY

## 2017-07-21 PROCEDURE — 88305 TISSUE EXAM BY PATHOLOGIST: CPT | Performed by: PATHOLOGY

## 2017-07-21 PROCEDURE — 36000706: Performed by: UROLOGY

## 2017-07-21 PROCEDURE — 88305 TISSUE EXAM BY PATHOLOGIST: CPT | Mod: 26,,, | Performed by: PATHOLOGY

## 2017-07-21 PROCEDURE — 37000008 HC ANESTHESIA 1ST 15 MINUTES: Performed by: UROLOGY

## 2017-07-21 PROCEDURE — 63600175 PHARM REV CODE 636 W HCPCS: Performed by: NURSE ANESTHETIST, CERTIFIED REGISTERED

## 2017-07-21 PROCEDURE — 36000707: Performed by: UROLOGY

## 2017-07-21 PROCEDURE — 37000009 HC ANESTHESIA EA ADD 15 MINS: Performed by: UROLOGY

## 2017-07-21 PROCEDURE — 52649 PROSTATE LASER ENUCLEATION: CPT | Mod: ,,, | Performed by: UROLOGY

## 2017-07-21 PROCEDURE — 71000015 HC POSTOP RECOV 1ST HR: Performed by: UROLOGY

## 2017-07-21 RX ORDER — SUCCINYLCHOLINE CHLORIDE 20 MG/ML
INJECTION INTRAMUSCULAR; INTRAVENOUS
Status: DISCONTINUED | OUTPATIENT
Start: 2017-07-21 | End: 2017-07-21

## 2017-07-21 RX ORDER — AMPICILLIN 1 G/1
INJECTION, POWDER, FOR SOLUTION INTRAMUSCULAR; INTRAVENOUS
Status: DISCONTINUED
Start: 2017-07-21 | End: 2017-07-21 | Stop reason: HOSPADM

## 2017-07-21 RX ORDER — FENTANYL CITRATE 50 UG/ML
INJECTION, SOLUTION INTRAMUSCULAR; INTRAVENOUS
Status: DISCONTINUED | OUTPATIENT
Start: 2017-07-21 | End: 2017-07-21

## 2017-07-21 RX ORDER — MIDAZOLAM HYDROCHLORIDE 1 MG/ML
INJECTION, SOLUTION INTRAMUSCULAR; INTRAVENOUS
Status: DISCONTINUED | OUTPATIENT
Start: 2017-07-21 | End: 2017-07-21

## 2017-07-21 RX ORDER — ONDANSETRON 2 MG/ML
4 INJECTION INTRAMUSCULAR; INTRAVENOUS ONCE AS NEEDED
Status: DISCONTINUED | OUTPATIENT
Start: 2017-07-21 | End: 2017-07-21 | Stop reason: HOSPADM

## 2017-07-21 RX ORDER — PROPOFOL 10 MG/ML
VIAL (ML) INTRAVENOUS
Status: DISCONTINUED | OUTPATIENT
Start: 2017-07-21 | End: 2017-07-21

## 2017-07-21 RX ORDER — SODIUM CHLORIDE 9 MG/ML
INJECTION, SOLUTION INTRAVENOUS CONTINUOUS
Status: DISCONTINUED | OUTPATIENT
Start: 2017-07-21 | End: 2017-07-21 | Stop reason: HOSPADM

## 2017-07-21 RX ORDER — ROCURONIUM BROMIDE 10 MG/ML
INJECTION, SOLUTION INTRAVENOUS
Status: DISCONTINUED | OUTPATIENT
Start: 2017-07-21 | End: 2017-07-21

## 2017-07-21 RX ORDER — CEFAZOLIN SODIUM 2 G/50ML
2 SOLUTION INTRAVENOUS
Status: COMPLETED | OUTPATIENT
Start: 2017-07-21 | End: 2017-07-21

## 2017-07-21 RX ORDER — SODIUM CHLORIDE 9 MG/ML
INJECTION, SOLUTION INTRAVENOUS CONTINUOUS PRN
Status: DISCONTINUED | OUTPATIENT
Start: 2017-07-21 | End: 2017-07-21

## 2017-07-21 RX ORDER — ONDANSETRON 8 MG/1
8 TABLET, ORALLY DISINTEGRATING ORAL EVERY 8 HOURS PRN
Status: DISCONTINUED | OUTPATIENT
Start: 2017-07-21 | End: 2017-07-21 | Stop reason: HOSPADM

## 2017-07-21 RX ORDER — OXYCODONE AND ACETAMINOPHEN 5; 325 MG/1; MG/1
1 TABLET ORAL EVERY 4 HOURS PRN
Qty: 31 TABLET | Refills: 0 | Status: SHIPPED | OUTPATIENT
Start: 2017-07-21 | End: 2018-05-10

## 2017-07-21 RX ORDER — POLYETHYLENE GLYCOL 3350 17 G/17G
17 POWDER, FOR SOLUTION ORAL DAILY
Qty: 30 PACKET | Refills: 0 | Status: SHIPPED | OUTPATIENT
Start: 2017-07-21 | End: 2021-12-22

## 2017-07-21 RX ORDER — LIDOCAINE HCL/PF 100 MG/5ML
SYRINGE (ML) INTRAVENOUS
Status: DISCONTINUED | OUTPATIENT
Start: 2017-07-21 | End: 2017-07-21

## 2017-07-21 RX ORDER — FENTANYL CITRATE 50 UG/ML
25 INJECTION, SOLUTION INTRAMUSCULAR; INTRAVENOUS EVERY 5 MIN PRN
Status: DISCONTINUED | OUTPATIENT
Start: 2017-07-21 | End: 2017-07-21 | Stop reason: HOSPADM

## 2017-07-21 RX ORDER — PHENYLEPHRINE HYDROCHLORIDE 10 MG/ML
INJECTION INTRAVENOUS
Status: DISCONTINUED | OUTPATIENT
Start: 2017-07-21 | End: 2017-07-21

## 2017-07-21 RX ORDER — HYDROCODONE BITARTRATE AND ACETAMINOPHEN 5; 325 MG/1; MG/1
1 TABLET ORAL EVERY 4 HOURS PRN
Status: DISCONTINUED | OUTPATIENT
Start: 2017-07-21 | End: 2017-07-21 | Stop reason: HOSPADM

## 2017-07-21 RX ORDER — AMOXICILLIN AND CLAVULANATE POTASSIUM 500; 125 MG/1; MG/1
1 TABLET, FILM COATED ORAL 2 TIMES DAILY
Qty: 6 TABLET | Refills: 0 | Status: SHIPPED | OUTPATIENT
Start: 2017-07-21 | End: 2017-07-24

## 2017-07-21 RX ORDER — SODIUM CHLORIDE 0.9 % (FLUSH) 0.9 %
3 SYRINGE (ML) INJECTION
Status: DISCONTINUED | OUTPATIENT
Start: 2017-07-21 | End: 2017-07-21 | Stop reason: HOSPADM

## 2017-07-21 RX ADMIN — PHENYLEPHRINE HYDROCHLORIDE 100 MCG: 10 INJECTION INTRAVENOUS at 07:07

## 2017-07-21 RX ADMIN — PROPOFOL 170 MG: 10 INJECTION, EMULSION INTRAVENOUS at 07:07

## 2017-07-21 RX ADMIN — EPHEDRINE SULFATE 10 MG: 50 INJECTION, SOLUTION INTRAMUSCULAR; INTRAVENOUS; SUBCUTANEOUS at 07:07

## 2017-07-21 RX ADMIN — AMPICILLIN SODIUM 1 G: 1 INJECTION, POWDER, FOR SOLUTION INTRAMUSCULAR; INTRAVENOUS at 07:07

## 2017-07-21 RX ADMIN — FENTANYL CITRATE 100 MCG: 50 INJECTION, SOLUTION INTRAMUSCULAR; INTRAVENOUS at 07:07

## 2017-07-21 RX ADMIN — ROCURONIUM BROMIDE 5 MG: 10 INJECTION, SOLUTION INTRAVENOUS at 07:07

## 2017-07-21 RX ADMIN — MIDAZOLAM HYDROCHLORIDE 2 MG: 1 INJECTION, SOLUTION INTRAMUSCULAR; INTRAVENOUS at 06:07

## 2017-07-21 RX ADMIN — SODIUM CHLORIDE: 0.9 INJECTION, SOLUTION INTRAVENOUS at 07:07

## 2017-07-21 RX ADMIN — LIDOCAINE HYDROCHLORIDE 100 MG: 20 INJECTION, SOLUTION INTRAVENOUS at 07:07

## 2017-07-21 RX ADMIN — CEFAZOLIN SODIUM 2 G: 2 SOLUTION INTRAVENOUS at 07:07

## 2017-07-21 RX ADMIN — SUCCINYLCHOLINE CHLORIDE 160 MG: 20 INJECTION, SOLUTION INTRAMUSCULAR; INTRAVENOUS at 07:07

## 2017-07-21 RX ADMIN — SODIUM CHLORIDE: 0.9 INJECTION, SOLUTION INTRAVENOUS at 06:07

## 2017-07-21 NOTE — DISCHARGE SUMMARY
[facility]  Discharge Note  Short Stay    Admit Date: 7/21/2017    Discharge Date and Time: 07/21/2017    Attending Physician: Anastacio Eid Jr., MD     Discharge Provider: Loren Buckley    Diagnoses:  Active Hospital Problems    Diagnosis  POA    *BPH (benign prostatic hyperplasia) [N40.0]  Yes    Impaired functional mobility, balance, gait, and endurance [Z74.09]  Yes    Dupuytren contracture [M72.0]  Yes    CKD (chronic kidney disease) stage 3, GFR 30-59 ml/min [N18.3]  Yes    Aortic atherosclerosis [I70.0]  Yes    Hyperlipidemia [E78.5]  Yes    Hypothyroid [E03.9]  Yes    Hypertension [I10]  Yes      Resolved Hospital Problems    Diagnosis Date Resolved POA   No resolved problems to display.       Discharged Condition: good    Hospital Course: Patient was admitted for an outpatient procedure and tolerated the procedure well with no complications.    Final Diagnoses: Same as principal problem.    Disposition: Home or Self Care    Follow up/Patient Instructions:    Medications:  Reconciled Home Medications:   Current Discharge Medication List      START taking these medications    Details   amoxicillin-clavulanate 500-125mg (AUGMENTIN) 500-125 mg Tab Take 1 tablet (500 mg total) by mouth 2 (two) times daily.  Qty: 6 tablet, Refills: 0      oxycodone-acetaminophen (PERCOCET) 5-325 mg per tablet Take 1 tablet by mouth every 4 (four) hours as needed for Pain.  Qty: 31 tablet, Refills: 0      polyethylene glycol (GLYCOLAX) 17 gram PwPk Take 17 g by mouth once daily.  Qty: 30 packet, Refills: 0         CONTINUE these medications which have NOT CHANGED    Details   aspirin (ECOTRIN) 81 MG EC tablet Take 81 mg by mouth once daily.      famotidine (PEPCID) 20 MG tablet Take 20 mg by mouth nightly as needed.       levothyroxine (SYNTHROID) 112 MCG tablet Take 1 tablet (112 mcg total) by mouth before breakfast.  Qty: 30 tablet, Refills: 5      multivitamin with minerals tablet Take 1 tablet by mouth  once daily.        pravastatin (PRAVACHOL) 20 MG tablet Take 1 tablet (20 mg total) by mouth once daily.  Qty: 90 tablet, Refills: 3      ramipril (ALTACE) 5 MG capsule Take 1 capsule (5 mg total) by mouth once daily.  Qty: 30 capsule, Refills: 5    Associated Diagnoses: Essential hypertension      tamsulosin (FLOMAX) 0.4 mg Cp24 Take 2 capsules (0.8 mg total) by mouth once daily.  Qty: 180 capsule, Refills: 3    Associated Diagnoses: BPH with urinary obstruction; Urinary retention with incomplete bladder emptying      econazole nitrate 1 % cream USE TWICE DAILY  Qty: 60 g, Refills: 5      fexofenadine (ALLEGRA) 180 MG tablet Take 1 tablet (180 mg total) by mouth once daily.  Qty: 30 tablet, Refills: 11      triamcinolone acetonide 0.1% (KENALOG) 0.1 % cream Use bid prn rash  Qty: 80 g, Refills: 3    Associated Diagnoses: Eczema             Discharge Procedure Orders  Diet general     Activity as tolerated     Call MD for:  temperature >100.4     Call MD for:  persistent nausea and vomiting     Call MD for:  severe uncontrolled pain     No dressing needed       Follow-up Information     Anastacio Eid Jr, MD On 7/24/2017.    Specialty:  Urology  Why:  barboza removal, voiding trial  Contact information:  [address] [place] [address] [id number] [phone]                     Loren Buckley MD  Urology, PGY-3  Pager# [phone]

## 2017-07-21 NOTE — ANESTHESIA POSTPROCEDURE EVALUATION
"Anesthesia Post Evaluation    Patient: Liang Monterroso JrEstephania    Procedure(s) Performed: Procedure(s) (LRB):  TURP W LASER (N/A)    Final Anesthesia Type: general  Patient location during evaluation: PACU  Patient participation: Yes- Able to Participate  Level of consciousness: awake and alert and oriented  Post-procedure vital signs: reviewed and stable  Pain management: adequate  Airway patency: patent  PONV status at discharge: No PONV  Anesthetic complications: no      Cardiovascular status: blood pressure returned to baseline and hemodynamically stable  Respiratory status: unassisted, spontaneous ventilation and room air  Hydration status: euvolemic  Follow-up not needed.        Visit Vitals  /86   Pulse 80   Temp 36.5 °C (97.7 °F) (Temporal)   Resp 20   Ht 5' 8" (1.727 m)   Wt 83.9 kg (185 lb)   SpO2 99%   BMI 28.13 kg/m²       Pain/Namita Score: Pain Assessment Performed: Yes (7/21/2017  8:52 AM)  Presence of Pain: denies (7/21/2017  8:52 AM)  Namita Score: 10 (7/21/2017  8:52 AM)      "

## 2017-07-21 NOTE — OP NOTE
[facility]  Operative Note    Date: 07/21/2017    Pre-Op Diagnosis: BPH  Patient Active Problem List   Diagnosis    Allergic rhinitis due to pollen    Hypertension    Hypothyroid    Hyperlipidemia    BPH with urinary obstruction    Encounter for screening colonoscopy    Aortic atherosclerosis    CKD (chronic kidney disease) stage 3, GFR 30-59 ml/min    Dupuytren contracture    Decreased ROM of neck    Impaired functional mobility, balance, gait, and endurance    Constipation    Inguinal lymphadenopathy- Left     Meningioma    Cervical pain    Prostatitis    BPH (benign prostatic hyperplasia)       Post-Op Diagnosis: same    Procedure(s) Performed:   1.  Laser vaporization of the prostate  2. Enucleation of median lobe    Specimen(s): prostate chip    Staff Surgeon: Anastacio Eid MD    Assistant Surgeon: Loren Buckley MD    Anesthesia: General endotracheal anesthesia    Indications: Liang Monterroso Jr. is a 77 y.o. male with BPH    Findings:   1. Bilobar hypertrophy  2. Sphincter, bilateral ureteral orifices, and veru intact at conclusion of procedure    Estimated Blood Loss: min    Drains: 22 Fr barboza catheter    Procedure in Detail:  After risks, benefits and possible complications of Laser TURP were discussed, the patient elected to undergo the procedure and informed consent was obtained. All questions were answered in the lisa-operative area. The patient was transferred to the cystoscopy suite and placed on the fluoroscopy table in the supine position. Anesthesia was administered.  When the patient was adequately sedated he was placed in the dorsal lithotomy position and prepped and draped in the usual sterile fashion.  Time out was performed, lisa-procedural antibiotics were confirmed.      A 22 [place] revolix laser scope was introduced into the bladder per urethra. Bilobar hyperplasia was seen. The right and left ureteral orifices were visualized in the normal anatomic  position bilaterally.     Our attention was first turned to enucleating the median lobe of the prostate. An incision in the prostate was made with the laser fiber at the bladder neck at the 5 o'clock position and brought just proximal to the verumontanum to the depth of the prostatic capsule. A right counter incision was made in the prostate at the 7 o'clock position and brought to just proximal of the verumontanum. The median lobe was then enucleated by incising at the base between the two previously mentioned counterincisions. The median lobe was advanced into the bladder and and vaporized at the base. Once free, the median lobe was removed and sent to pathology.    Next lateral incisions were made from the 2 o'clock position and brought down to the proximal verumontanum to the level of the prostatic capsule. The lateral lobe was then vaporized superficially to deep in a stepwise fashion. This was repeated from the 10' o'clock position to just proximal to the veru. After all hyperplasia had been vaporized the bladder was drained. A good channel was seen. All bleeding was coagulated with the quanta laser and adequate hemostasis was obtained.      The patient tolerated the procedure well and was transferred to the recovery room in stable condition.      Follow up care:  The patient will follow up with Dr. Eid in 3 days for barboza removal.  He was given prescriptions for percocet, miralax, and Augmentin.       Loren Buckley MD

## 2017-07-21 NOTE — DISCHARGE INSTRUCTIONS
Post Cystoscopy and Transurethral resection of Prostate Instructions  Do not strain to have a bowel movement  No strenuous exercise x 7 days  No driving while you are on narcotic pain medications or if your barboza  catheter is in place    You can expect:  To see blood in your urine.    Go to the ER if:  Your catheter stops draining  You are having severe abdominal pain  Inability to void if you do not have a catheter    Call the doctor if:   Temperature is greater than 101F   Persistent vomiting and inability to keep food down

## 2017-07-21 NOTE — TRANSFER OF CARE
"Anesthesia Transfer of Care Note    Patient: Liang Monterroso     Procedure(s) Performed: Procedure(s) (LRB):  TURP W LASER (N/A)    Patient location: PACU    Anesthesia Type: general    Transport from OR: Transported from OR on 6-10 L/min O2 by face mask with adequate spontaneous ventilation    Post pain: adequate analgesia    Post assessment: no apparent anesthetic complications    Post vital signs: stable          Last vitals:   Visit Vitals  BP (!) 145/87   Pulse 90   Temp 36.5 °C (97.7 °F) (Temporal)   Resp 18   Ht 5' 8" (1.727 m)   Wt 83.9 kg (185 lb)   SpO2 99%   BMI 28.13 kg/m²     "

## 2017-07-21 NOTE — PLAN OF CARE
Pt and family given dc instructions and verbalized understanding.   0840- Pt and spouse given barboza care instructions and given extra supplies.  Pt and spouse stated pt has had barboza in past and knows how to do braboza care.  0910- Pt requesting to speak w/ DR. Eid. Dr. Eid notified.

## 2017-07-21 NOTE — INTERVAL H&P NOTE
The patient has been examined and the H&P has been reviewed:    I concur with the findings and no changes have occurred since H&P was written.    Anesthesia/Surgery risks, benefits and alternative options discussed and understood by patient/family.    UA today negative for blood, leuks, nitrite    Active Hospital Problems    Diagnosis  POA    BPH (benign prostatic hyperplasia) [N40.0]  Yes      Resolved Hospital Problems    Diagnosis Date Resolved POA   No resolved problems to display.

## 2017-07-21 NOTE — H&P (VIEW-ONLY)
"Urology ([facility]) H&P  Staff: Dr. Anastacio Eid MD    CC: bladder outlet obstruction    HPI: Mr. Monterroso is a 75yo M with history of prostatitis and BPH with bladder outlet obstruction. He has had symptoms of frequency, urgency, nocturia, weak FOS, hesitancy and incomplete emptying for "quite a while". He was seen in clinic in May 2017 for frequency and difficulty urinating. He was found to be in urinary retention and a barboza catheter was placed. The catheter has since been removed and he is able to void but he does not feel he empties his bladder well. He has to strain and experiences intermittency. No dysuria, hematuria, or incontinence. He has taken flomax, which helps.     - Indications for TURP: difficult urination refractory to medical therapy, urinary retention requiring placement of barboza catheter.     - has not been on finasteride.  - has been on flomax.  - does not have a catheter in place. However, has required indwelling barboza catheter for urinary retention in the past.   - has had cystoscopy. This demonstrated 4cm prostate, grade II to III trabeculations, moderate lateral lobe enlargement.  - has not urodynamics. Scheduled for 7/14/17  - has not had history of elevated PSA. Never had TRUS biopsy.  - DARLYN documented on 04/17 was Abnormal - enlarged, boggy, tender, no nodules  - Last urine culture on 04/17 demonstrated no growth.     ROS:  Neg except per HPI    Past Medical History:   Diagnosis Date    Allergy     CKD (chronic kidney disease) stage 3, GFR 30-59 ml/min 6/15/2016    GERD (gastroesophageal reflux disease)     Hyperlipidemia     Hypertension     Hypothyroidism     Thyroid disease     Urinary tract infection        Past Surgical History:   Procedure Laterality Date    APPENDECTOMY      CATARACT EXTRACTION      EYE SURGERY      HERNIA REPAIR      TONSILLECTOMY         Social History     Social History    Marital status:      Spouse name: parul    Number of children: 4 "    Years of education: N/A     Occupational History    retired      Social History Main Topics    Smoking status: Never Smoker    Smokeless tobacco: Never Used    Alcohol use Yes      Comment: 1-3 glasses wine weekly, 2-3 beers weekly    Drug use: No    Sexual activity: Yes     Partners: Female     Other Topics Concern    Not on file     Social History Narrative    No narrative on file       Family History   Problem Relation Age of Onset    Diabetes Mother     Heart disease Mother     Hypertension Mother     Vision loss Mother     Dementia Mother     Alcohol abuse Father     Cancer Sister      lung with mets, was a heavy smoker    No Known Problems Daughter     No Known Problems Son     No Known Problems Daughter     No Known Problems Son     Amblyopia Neg Hx     Blindness Neg Hx     Cataracts Neg Hx     Glaucoma Neg Hx     Macular degeneration Neg Hx     Retinal detachment Neg Hx     Strabismus Neg Hx     Stroke Neg Hx     Thyroid disease Neg Hx        Review of patient's allergies indicates:   Allergen Reactions    Contrast media Hives and Itching     Iv dye       Current Outpatient Prescriptions on File Prior to Visit   Medication Sig Dispense Refill    econazole nitrate 1 % cream USE TWICE DAILY (Patient taking differently: USE TWICE DAILY (prn)) 60 g 5    famotidine (PEPCID) 20 MG tablet Take 20 mg by mouth 2 (two) times daily as needed.        fexofenadine (ALLEGRA) 180 MG tablet Take 1 tablet (180 mg total) by mouth once daily. (Patient taking differently: Take 180 mg by mouth daily as needed. ) 30 tablet 11    levothyroxine (SYNTHROID) 112 MCG tablet Take 1 tablet (112 mcg total) by mouth before breakfast. 30 tablet 5    multivitamin with minerals tablet Take 1 tablet by mouth once daily.        pravastatin (PRAVACHOL) 20 MG tablet Take 1 tablet (20 mg total) by mouth once daily. 90 tablet 3    ramipril (ALTACE) 5 MG capsule Take 1 capsule (5 mg total) by mouth once  daily. 30 capsule 5    tamsulosin (FLOMAX) 0.4 mg Cp24 Take 2 capsules (0.8 mg total) by mouth once daily. 180 capsule 3    triamcinolone acetonide 0.1% (KENALOG) 0.1 % cream Use bid prn rash 80 g 3     No current facility-administered medications on file prior to visit.        Anticoagulation:  Yes ASA 81mg, will stop 7 days prior    Physical Exam:  Weight 190lb/86kg  BMI 29    AAOx4, NAD, WDWN  NC/AT, EOMI, PER, sclerae anicteric, speech normal, tongue midline  Nl effort  RRR  Soft, non-tender, non-distended  DARLYN 35g prostate, smooth, non tender, no nodules    Labs:    Urine dipstick today - unable to give urine sample, voided just before appointment    Lab Results   Component Value Date    WBC 7.32 06/23/2017    HGB 13.4 (L) 06/23/2017    HCT 40.7 06/23/2017    MCV 86 06/23/2017     06/23/2017       BMP  Lab Results   Component Value Date     06/23/2017    K 4.0 06/23/2017     06/23/2017    CO2 24 06/23/2017    BUN 15 06/23/2017    CREATININE 1.1 06/23/2017    CALCIUM 9.2 06/23/2017    ANIONGAP 10 06/23/2017    ESTGFRAFRICA >60 06/23/2017    EGFRNONAA >60 06/23/2017       Lab Results   Component Value Date    PSA 0.91 12/17/2012    PSA 4.89 (H) 11/12/2012    PSA 0.66 11/14/2011    PSA 0.81 11/03/2010    PSA 0.86 10/26/2009    PSA 0.59 10/14/2008    PSA 0.6 10/22/2007    PSA 0.9 10/23/2006    PSA 0.7 08/15/2005    PSA 0.6 08/03/2004    PSADIAG 1.0 01/11/2016    PSADIAG 1.0 12/04/2014    PSADIAG 0.67 12/03/2013       Assessment: Liang RODO Monterroso Jr. is a 76 y.o. male with history of prostatitis and BPH here to discuss definitive surgical therapy    Plan:     1. To OR on 7/21/17 for laser TURP and possible DVIU  2. Consents signed for surgery.  3. I have explained the risk, benefits, and alternatives of the procedure in detail. The patient voices understanding and all questions have been answered. The patient agrees to proceed as planned.          Colton Schumacher MD

## 2017-07-24 ENCOUNTER — OFFICE VISIT (OUTPATIENT)
Dept: UROLOGY | Facility: CLINIC | Age: 77
End: 2017-07-24
Payer: MEDICARE

## 2017-07-24 VITALS
SYSTOLIC BLOOD PRESSURE: 120 MMHG | WEIGHT: 189.63 LBS | HEART RATE: 104 BPM | BODY MASS INDEX: 28.74 KG/M2 | HEIGHT: 68 IN | DIASTOLIC BLOOD PRESSURE: 76 MMHG

## 2017-07-24 DIAGNOSIS — Z98.890 POST-OPERATIVE STATE: ICD-10-CM

## 2017-07-24 DIAGNOSIS — N13.8 BPH WITH URINARY OBSTRUCTION: Primary | ICD-10-CM

## 2017-07-24 DIAGNOSIS — N40.1 BPH WITH URINARY OBSTRUCTION: Primary | ICD-10-CM

## 2017-07-24 PROCEDURE — 99024 POSTOP FOLLOW-UP VISIT: CPT | Mod: S$GLB,,, | Performed by: NURSE PRACTITIONER

## 2017-07-24 PROCEDURE — 51700 IRRIGATION OF BLADDER: CPT | Mod: S$GLB,,, | Performed by: NURSE PRACTITIONER

## 2017-07-24 PROCEDURE — 99999 PR PBB SHADOW E&M-EST. PATIENT-LVL III: CPT | Mod: PBBFAC,,, | Performed by: NURSE PRACTITIONER

## 2017-07-24 NOTE — PATIENT INSTRUCTIONS
Urinary Retention (Male)  Urinary retention is the medical term for difficulty or inability to pass urine, even though your bladder is full.  Causes  The most common cause of urinary retention in men is the bladder outlet being blocked. This can be due to an enlarged prostate gland or a bladder infection. Certain medicines can also cause this problem. This condition is more likely to occur as men get older.    This condition is treated by insertion of a catheter into the bladder to drain the urine. This provides immediate relief. The catheter may need to remain in place for a few days to prevent a recurrence. The catheter has a balloon on the tip which was inflated after insertion. This prevents the catheter from falling out.  Symptoms  Common symptoms of urinary retention include:  · Pain (not experienced by everyone)  · Frequent urination  · Feeling that the bladder is still full after urinating  · Incontinence (not being able to control the release of urine)  · Swollen abdomen  Treatment  This condition is treated by inserting a tube (catheter) into the bladder to drain the urine. This provides immediate relief. The catheter may need to stay in place for a few days. The catheter has a balloon on the tip, which is inflated after insertion. This prevents the catheter from falling out.  Home care  · If you were given antibiotics, take them until they are used up, or your healthcare provider tells you to stop. It is important to finish the antibiotics even though you feel better. This is to make sure your infection has cleared.  · If a catheter was left in place, it is important to keep bacteria from getting into the collection bag. Do not disconnect the catheter from the collection bag.  · Use a leg band to secure the drainage tube, so it does not pull on the catheter. Drain the collection bag when it becomes full using the drain spout at the bottom of the bag.  · Do not pull on or try to remove your catheter.  This will injure your urethra. The catheter must be removed by a healthcare provider.  Follow-up care  Follow up with your healthcare provider, or as advised.  If a catheter was left in place, it can usually be removed within 3 to 7 days. Some conditions require the catheter to stay in longer. Your healthcare provider will tell you when to return to have the catheter removed.  When to seek medical advice  Call your healthcare provider right away if any of these occur:  · Fever of 101.4ºF (38ºC) or higher, or as directed by your healthcare provider  · Bladder or lower-abdominal pain or fullness  · Abdominal swelling, nausea, vomiting, or back pain  · Blood or urine leakage around the catheter  · Bloody urine coming from the catheter (if a new symptom)  · Weakness, dizziness, or fainting  · Confusion or change in usual level of alertness  Date Last Reviewed: 7/26/2015  © 5722-0708 [employer]. [address] [place] [address], [place] [address]. All rights reserved. This information is not intended as a substitute for professional medical care. Always follow your healthcare professional's instructions.

## 2017-07-24 NOTE — PROGRESS NOTES
"  CC: post op for cath removal.     HPI: Mr. Monterroso is a 77yo M with history of prostatitis and BPH with bladder outlet obstruction. He  had symptoms of frequency, urgency, nocturia, weak FOS, hesitancy and incomplete emptying for "quite a while". He was seen in clinic in May 2017 for frequency and difficulty urinating. He was found to be in urinary retention and a barboza catheter was placed. The catheter has since been removed and he is able to void but he does not feel he empties his bladder well. He has to strain and experiences intermittency. No dysuria, hematuria, or incontinence. He has taken flomax, which helps.     Today, he presents for a voiding trial after his TURP 3 days ago.   His catheter had been draining well with no complaints. Clear yellow urine is draining into the bag.   Denies f/c and n/v.     7/21/17 Procedure(s) Performed:   1.  Laser vaporization of the prostate  2. Enucleation of median lobe     Staff Surgeon: Anastacio Eid MD     Findings:   1. Bilobar hypertrophy  2. Sphincter, bilateral ureteral orifices, and veru intact at conclusion of procedure       ROS:  Neg except per HPI    Past Medical History:   Diagnosis Date    Allergy     CKD (chronic kidney disease) stage 3, GFR 30-59 ml/min 6/15/2016    GERD (gastroesophageal reflux disease)     Hyperlipidemia     Hypertension     Hypospadias in male     Hypothyroidism     Thyroid disease     Urinary tract infection        Past Surgical History:   Procedure Laterality Date    APPENDECTOMY      CATARACT EXTRACTION      EYE SURGERY      HERNIA REPAIR      TONSILLECTOMY         Social History     Social History    Marital status:      Spouse name: parul    Number of children: 4    Years of education: N/A     Occupational History    retired      Social History Main Topics    Smoking status: Never Smoker    Smokeless tobacco: Never Used    Alcohol use Yes      Comment: 1-3 glasses wine weekly, 2-3 beers weekly    " Drug use: No    Sexual activity: Yes     Partners: Female     Other Topics Concern    None     Social History Narrative    None       Family History   Problem Relation Age of Onset    Diabetes Mother     Heart disease Mother     Hypertension Mother     Vision loss Mother     Dementia Mother     Alcohol abuse Father     Cancer Sister      lung with mets, was a heavy smoker    No Known Problems Daughter     No Known Problems Son     No Known Problems Daughter     No Known Problems Son     Amblyopia Neg Hx     Blindness Neg Hx     Cataracts Neg Hx     Glaucoma Neg Hx     Macular degeneration Neg Hx     Retinal detachment Neg Hx     Strabismus Neg Hx     Stroke Neg Hx     Thyroid disease Neg Hx        Review of patient's allergies indicates:   Allergen Reactions    Contrast media Hives and Itching     Iv dye       Current Outpatient Prescriptions on File Prior to Visit   Medication Sig Dispense Refill    econazole nitrate 1 % cream USE TWICE DAILY (Patient taking differently: USE TWICE DAILY (prn)) 60 g 5    famotidine (PEPCID) 20 MG tablet Take 20 mg by mouth nightly as needed.       levothyroxine (SYNTHROID) 112 MCG tablet Take 1 tablet (112 mcg total) by mouth before breakfast. 30 tablet 5    polyethylene glycol (GLYCOLAX) 17 gram PwPk Take 17 g by mouth once daily. 30 packet 0    pravastatin (PRAVACHOL) 20 MG tablet Take 1 tablet (20 mg total) by mouth once daily. 90 tablet 3    ramipril (ALTACE) 5 MG capsule Take 1 capsule (5 mg total) by mouth once daily. 30 capsule 5    tamsulosin (FLOMAX) 0.4 mg Cp24 Take 2 capsules (0.8 mg total) by mouth once daily. 180 capsule 3    triamcinolone acetonide 0.1% (KENALOG) 0.1 % cream Use bid prn rash 80 g 3    aspirin (ECOTRIN) 81 MG EC tablet Take 81 mg by mouth once daily.      fexofenadine (ALLEGRA) 180 MG tablet Take 1 tablet (180 mg total) by mouth once daily. (Patient taking differently: Take 180 mg by mouth daily as needed. ) 30 tablet  11    multivitamin with minerals tablet Take 1 tablet by mouth once daily.        oxycodone-acetaminophen (PERCOCET) 5-325 mg per tablet Take 1 tablet by mouth every 4 (four) hours as needed for Pain. 31 tablet 0    [DISCONTINUED] amoxicillin-clavulanate 500-125mg (AUGMENTIN) 500-125 mg Tab Take 1 tablet (500 mg total) by mouth 2 (two) times daily. 6 tablet 0     No current facility-administered medications on file prior to visit.        Anticoagulation:  Yes ASA 81mg, will stop 7 days prior    Physical Exam:  Weight 190lb/86kg  BMI 29    AAOx4, NAD, WDWN  NC/AT, EOMI, PER, sclerae anicteric, speech normal, tongue midline  Nl effort  RRR  Soft, non-tender, non-distended    Labs:    Lab Results   Component Value Date    WBC 7.32 06/23/2017    HGB 13.4 (L) 06/23/2017    HCT 40.7 06/23/2017    MCV 86 06/23/2017     06/23/2017         Lab Results   Component Value Date     06/23/2017    K 4.0 06/23/2017     06/23/2017    CO2 24 06/23/2017    BUN 15 06/23/2017    CREATININE 1.1 06/23/2017    CALCIUM 9.2 06/23/2017    ANIONGAP 10 06/23/2017    ESTGFRAFRICA >60 06/23/2017    EGFRNONAA >60 06/23/2017       Lab Results   Component Value Date    PSA 0.91 12/17/2012    PSA 4.89 (H) 11/12/2012    PSA 0.66 11/14/2011    PSA 0.81 11/03/2010    PSA 0.86 10/26/2009    PSA 0.59 10/14/2008    PSA 0.6 10/22/2007    PSA 0.9 10/23/2006    PSA 0.7 08/15/2005    PSA 0.6 08/03/2004    PSADIAG 1.0 01/11/2016    PSADIAG 1.0 12/04/2014    PSADIAG 0.67 12/03/2013       Assessment: Liang KOEHLER Loc Neely is a 77 y.o. male with history of prostatitis and BPH here to discuss definitive surgical therapy    Plan:   -Voiding trial performed by Nurse Mandujano.  300 ml of sterile water was instilled into bladder.  Noble catheter was removed. Patient urinated 240 ml without difficulty. PVR was 200 cc.   Voiding trial passed.  Patient was instructed to drink plenty of fluids today.  Instructed patient to call at 2 p.m. to give an update on  urine output.  Informed patient to return to clinic or emergency department (if after clinic hours) to have barboza catheter put back in if unable to urinate within 5 hours of barboza catheter removal or starts to experience bladder pressure/pain, decrease flow, straining/difficulty urinating.    Patient voiced understanding.  -Pathology pending.   -PVR in 6 weeks w/ bladder scanner.         Larissa Strong NP        Addendum: 2823 Pt called and states he is urinating every 30 min- 1 hr. No dysuria or hematuria. Urine is clear yellow urine. Explained to take flomax and do CIC.   He called back and reports 200 cc drained from catheter. Explained that if he was having worsening symptoms to come to the clinic. Explained to call me back this week to inform me on his progress. Verbalized understanding.

## 2017-07-25 ENCOUNTER — PATIENT MESSAGE (OUTPATIENT)
Dept: NEUROLOGY | Facility: CLINIC | Age: 77
End: 2017-07-25

## 2017-07-25 ENCOUNTER — PATIENT MESSAGE (OUTPATIENT)
Dept: INTERNAL MEDICINE | Facility: CLINIC | Age: 77
End: 2017-07-25

## 2017-07-25 ENCOUNTER — PATIENT MESSAGE (OUTPATIENT)
Dept: UROLOGY | Facility: CLINIC | Age: 77
End: 2017-07-25

## 2017-08-24 ENCOUNTER — OFFICE VISIT (OUTPATIENT)
Dept: UROLOGY | Facility: CLINIC | Age: 77
End: 2017-08-24
Payer: MEDICARE

## 2017-08-24 ENCOUNTER — PATIENT MESSAGE (OUTPATIENT)
Dept: UROLOGY | Facility: CLINIC | Age: 77
End: 2017-08-24

## 2017-08-24 VITALS
DIASTOLIC BLOOD PRESSURE: 72 MMHG | HEIGHT: 68 IN | SYSTOLIC BLOOD PRESSURE: 117 MMHG | HEART RATE: 67 BPM | BODY MASS INDEX: 28.64 KG/M2 | WEIGHT: 189 LBS

## 2017-08-24 DIAGNOSIS — Z98.890 POST-OPERATIVE STATE: Primary | ICD-10-CM

## 2017-08-24 PROCEDURE — 99024 POSTOP FOLLOW-UP VISIT: CPT | Mod: S$GLB,,, | Performed by: UROLOGY

## 2017-08-24 PROCEDURE — 99999 PR PBB SHADOW E&M-EST. PATIENT-LVL III: CPT | Mod: PBBFAC,,, | Performed by: UROLOGY

## 2017-08-24 NOTE — PROGRESS NOTES
Subjective:       Patient ID: Liang Monterroso Jr. is a 77 y.o. male.    Chief Complaint: Follow-up    HPI patient is postop laser TURP.  He had a relatively low detrusor pressure to begin with an elevated  cc.  I had explained to him that he still may have an elevated DDDR even though his prostate was open.  He is noticed that his symptoms have become better.  His postvoid residual is now 200 cc.  Urine is clear.  He occasionally has some perineal discomfort but no evidence of infection.  Patient still taking Flomax    Past Medical History:   Diagnosis Date    Allergy     CKD (chronic kidney disease) stage 3, GFR 30-59 ml/min 6/15/2016    GERD (gastroesophageal reflux disease)     Hyperlipidemia     Hypertension     Hypospadias in male     Hypothyroidism     Thyroid disease     Urinary tract infection        Past Surgical History:   Procedure Laterality Date    APPENDECTOMY      CATARACT EXTRACTION      EYE SURGERY      HERNIA REPAIR      TONSILLECTOMY         Family History   Problem Relation Age of Onset    Diabetes Mother     Heart disease Mother     Hypertension Mother     Vision loss Mother     Dementia Mother     Alcohol abuse Father     Cancer Sister      lung with mets, was a heavy smoker    No Known Problems Daughter     No Known Problems Son     No Known Problems Daughter     No Known Problems Son     Amblyopia Neg Hx     Blindness Neg Hx     Cataracts Neg Hx     Glaucoma Neg Hx     Macular degeneration Neg Hx     Retinal detachment Neg Hx     Strabismus Neg Hx     Stroke Neg Hx     Thyroid disease Neg Hx        Social History     Social History    Marital status:      Spouse name: parul    Number of children: 4    Years of education: N/A     Occupational History    retired      Social History Main Topics    Smoking status: Never Smoker    Smokeless tobacco: Never Used    Alcohol use Yes      Comment: 1-3 glasses wine weekly, 2-3 beers weekly    Drug  use: No    Sexual activity: Yes     Partners: Female     Other Topics Concern    Not on file     Social History Narrative    No narrative on file       Allergies:  Contrast media    Medications:    Current Outpatient Prescriptions:     aspirin (ECOTRIN) 81 MG EC tablet, Take 81 mg by mouth once daily., Disp: , Rfl:     famotidine (PEPCID) 20 MG tablet, Take 20 mg by mouth nightly as needed. , Disp: , Rfl:     levothyroxine (SYNTHROID) 112 MCG tablet, Take 1 tablet (112 mcg total) by mouth before breakfast., Disp: 30 tablet, Rfl: 5    multivitamin with minerals tablet, Take 1 tablet by mouth once daily.  , Disp: , Rfl:     pravastatin (PRAVACHOL) 20 MG tablet, Take 1 tablet (20 mg total) by mouth once daily., Disp: 90 tablet, Rfl: 3    ramipril (ALTACE) 5 MG capsule, Take 1 capsule (5 mg total) by mouth once daily., Disp: 30 capsule, Rfl: 5    tamsulosin (FLOMAX) 0.4 mg Cp24, Take 2 capsules (0.8 mg total) by mouth once daily., Disp: 180 capsule, Rfl: 3    econazole nitrate 1 % cream, USE TWICE DAILY (Patient taking differently: USE TWICE DAILY (prn)), Disp: 60 g, Rfl: 5    fexofenadine (ALLEGRA) 180 MG tablet, Take 1 tablet (180 mg total) by mouth once daily. (Patient taking differently: Take 180 mg by mouth daily as needed. ), Disp: 30 tablet, Rfl: 11    oxycodone-acetaminophen (PERCOCET) 5-325 mg per tablet, Take 1 tablet by mouth every 4 (four) hours as needed for Pain., Disp: 31 tablet, Rfl: 0    polyethylene glycol (GLYCOLAX) 17 gram PwPk, Take 17 g by mouth once daily., Disp: 30 packet, Rfl: 0    triamcinolone acetonide 0.1% (KENALOG) 0.1 % cream, Use bid prn rash, Disp: 80 g, Rfl: 3    Review of Systems   Constitutional: Negative for activity change, appetite change, chills, diaphoresis, fatigue, fever and unexpected weight change.   HENT: Negative for congestion, dental problem, hearing loss, mouth sores, postnasal drip, rhinorrhea, sinus pressure and trouble swallowing.    Eyes: Negative for  pain, discharge and itching.   Respiratory: Negative for apnea, cough, choking, chest tightness, shortness of breath and wheezing.    Cardiovascular: Negative for chest pain, palpitations and leg swelling.   Gastrointestinal: Negative for abdominal distention, abdominal pain, anal bleeding, blood in stool, constipation, diarrhea, nausea, rectal pain and vomiting.   Endocrine: Negative for polydipsia and polyuria.   Genitourinary: Negative for decreased urine volume, difficulty urinating, discharge, dysuria, enuresis, flank pain, frequency, genital sores, hematuria, penile pain, penile swelling, scrotal swelling, testicular pain and urgency.   Musculoskeletal: Negative for arthralgias, back pain and myalgias.   Skin: Negative for color change, rash and wound.   Neurological: Negative for dizziness, syncope, speech difficulty, light-headedness and headaches.   Hematological: Negative for adenopathy. Does not bruise/bleed easily.   Psychiatric/Behavioral: Negative for behavioral problems, confusion, hallucinations and sleep disturbance.       Objective:      Physical Exam   Constitutional: He appears well-developed.   HENT:   Head: Normocephalic.   Cardiovascular: Normal rate.    Pulmonary/Chest: Effort normal.   Abdominal: Soft.   Neurological: He is alert.   Skin: Skin is warm.     Psychiatric: He has a normal mood and affect.       Assessment:       1. Post-operative state        Plan:       Liang was seen today for follow-up.    Diagnoses and all orders for this visit:    Post-operative state     return to clinic 6 months to check symptoms with postvoid residual can do CIC when necessary the patient would rather not do that

## 2017-09-03 ENCOUNTER — PATIENT MESSAGE (OUTPATIENT)
Dept: NEUROSURGERY | Facility: CLINIC | Age: 77
End: 2017-09-03

## 2017-09-20 RX ORDER — METRONIDAZOLE 7.5 MG/G
GEL TOPICAL
Qty: 45 G | Refills: 3 | OUTPATIENT
Start: 2017-09-20 | End: 2017-09-20 | Stop reason: SDUPTHER

## 2017-09-20 RX ORDER — METRONIDAZOLE 7.5 MG/G
GEL TOPICAL
Qty: 45 G | Refills: 3 | OUTPATIENT
Start: 2017-09-20 | End: 2023-07-25

## 2017-09-28 ENCOUNTER — PATIENT MESSAGE (OUTPATIENT)
Dept: INTERNAL MEDICINE | Facility: CLINIC | Age: 77
End: 2017-09-28

## 2017-10-10 ENCOUNTER — HOSPITAL ENCOUNTER (OUTPATIENT)
Dept: RADIOLOGY | Facility: HOSPITAL | Age: 77
Discharge: HOME | End: 2017-10-10
Attending: FAMILY MEDICINE
Payer: MEDICARE

## 2017-10-10 ENCOUNTER — OFFICE VISIT (OUTPATIENT)
Dept: NEUROSURGERY | Facility: CLINIC | Age: 77
End: 2017-10-10
Payer: MEDICARE

## 2017-10-10 ENCOUNTER — OFFICE VISIT (OUTPATIENT)
Dept: INTERNAL MEDICINE | Facility: CLINIC | Age: 77
End: 2017-10-10
Payer: MEDICARE

## 2017-10-10 ENCOUNTER — HOSPITAL ENCOUNTER (OUTPATIENT)
Dept: RADIOLOGY | Facility: HOSPITAL | Age: 77
Discharge: HOME | End: 2017-10-10
Attending: NEUROLOGICAL SURGERY
Payer: MEDICARE

## 2017-10-10 VITALS
BODY MASS INDEX: 28.44 KG/M2 | HEART RATE: 64 BPM | HEIGHT: 68 IN | SYSTOLIC BLOOD PRESSURE: 118 MMHG | DIASTOLIC BLOOD PRESSURE: 82 MMHG | WEIGHT: 187.63 LBS

## 2017-10-10 VITALS
SYSTOLIC BLOOD PRESSURE: 126 MMHG | HEART RATE: 67 BPM | BODY MASS INDEX: 28.53 KG/M2 | DIASTOLIC BLOOD PRESSURE: 80 MMHG | RESPIRATION RATE: 13 BRPM | HEIGHT: 68 IN | WEIGHT: 188.25 LBS

## 2017-10-10 DIAGNOSIS — G93.6 CEREBRAL EDEMA: ICD-10-CM

## 2017-10-10 DIAGNOSIS — D32.9 MENINGIOMA: ICD-10-CM

## 2017-10-10 DIAGNOSIS — E03.8 HYPOTHYROIDISM DUE TO HASHIMOTO'S THYROIDITIS: ICD-10-CM

## 2017-10-10 DIAGNOSIS — R53.83 FATIGUE, UNSPECIFIED TYPE: ICD-10-CM

## 2017-10-10 DIAGNOSIS — R53.83 FATIGUE, UNSPECIFIED TYPE: Primary | ICD-10-CM

## 2017-10-10 DIAGNOSIS — I10 ESSENTIAL HYPERTENSION: ICD-10-CM

## 2017-10-10 DIAGNOSIS — E78.00 PURE HYPERCHOLESTEROLEMIA: ICD-10-CM

## 2017-10-10 DIAGNOSIS — D32.9 MENINGIOMA: Primary | ICD-10-CM

## 2017-10-10 DIAGNOSIS — E06.3 HYPOTHYROIDISM DUE TO HASHIMOTO'S THYROIDITIS: ICD-10-CM

## 2017-10-10 PROCEDURE — 99999 PR PBB SHADOW E&M-EST. PATIENT-LVL V: CPT | Mod: PBBFAC,,, | Performed by: FAMILY MEDICINE

## 2017-10-10 PROCEDURE — 25500020 PHARM REV CODE 255: Performed by: NEUROLOGICAL SURGERY

## 2017-10-10 PROCEDURE — 70553 MRI BRAIN STEM W/O & W/DYE: CPT | Mod: TC

## 2017-10-10 PROCEDURE — 99999 PR PBB SHADOW E&M-EST. PATIENT-LVL III: CPT | Mod: PBBFAC,,, | Performed by: NEUROLOGICAL SURGERY

## 2017-10-10 PROCEDURE — 99499 UNLISTED E&M SERVICE: CPT | Mod: S$GLB,,, | Performed by: FAMILY MEDICINE

## 2017-10-10 PROCEDURE — 70553 MRI BRAIN STEM W/O & W/DYE: CPT | Mod: 26,,, | Performed by: RADIOLOGY

## 2017-10-10 PROCEDURE — A9585 GADOBUTROL INJECTION: HCPCS | Performed by: NEUROLOGICAL SURGERY

## 2017-10-10 PROCEDURE — 99214 OFFICE O/P EST MOD 30 MIN: CPT | Mod: S$GLB,,, | Performed by: NEUROLOGICAL SURGERY

## 2017-10-10 PROCEDURE — 99214 OFFICE O/P EST MOD 30 MIN: CPT | Mod: S$GLB,,, | Performed by: FAMILY MEDICINE

## 2017-10-10 PROCEDURE — 71020 XR CHEST PA AND LATERAL: CPT | Mod: TC

## 2017-10-10 PROCEDURE — 71020 XR CHEST PA AND LATERAL: CPT | Mod: 26,,, | Performed by: RADIOLOGY

## 2017-10-10 RX ORDER — GADOBUTROL 604.72 MG/ML
8 INJECTION INTRAVENOUS
Status: COMPLETED | OUTPATIENT
Start: 2017-10-10 | End: 2017-10-10

## 2017-10-10 RX ADMIN — GADOBUTROL 8 ML: 604.72 INJECTION INTRAVENOUS at 08:10

## 2017-10-10 NOTE — PROGRESS NOTES
Subjective:       Patient ID: Liang Monterroso Jr. is a 77 y.o. male.    Chief Complaint: Follow-up  Liang Monterroso Jr. 77 y.o. male is here for office visit to review care and physical exam, reports has been having care with Urology, no ass concern.  Has seen Dr Chen for f/u resection meningeoma.  Has seen Cardiology about variable HR.  Here to discuss fatigue, worse as day goes on.  No change in po, no change in BMs.  No SOB or activity intolerance, but slight more fatigue climbing stairs.      HPI  Review of Systems   Constitutional: Negative for activity change, appetite change, fatigue, fever and unexpected weight change.   HENT: Negative for congestion, hearing loss, postnasal drip and rhinorrhea.    Eyes: Negative for pain, discharge and visual disturbance.   Respiratory: Negative for cough, choking and shortness of breath.    Cardiovascular: Negative for chest pain, palpitations and leg swelling.   Gastrointestinal: Negative for abdominal pain, diarrhea and vomiting.   Genitourinary: Negative for dysuria, flank pain, hematuria and urgency.   Musculoskeletal: Negative for arthralgias, back pain, joint swelling and neck pain.   Skin: Negative for color change and rash.   Neurological: Negative for dizziness, tremors, syncope, weakness, numbness and headaches.   Psychiatric/Behavioral: Negative for agitation and confusion. The patient is not hyperactive.        Objective:      Physical Exam   Constitutional: He is oriented to person, place, and time. He appears well-developed and well-nourished.   HENT:   Head: Normocephalic.   Eyes: EOM are normal. Pupils are equal, round, and reactive to light.   Neck: Normal range of motion. Neck supple. No thyromegaly present.   Cardiovascular: Normal rate.  Exam reveals no gallop and no friction rub.    No murmur heard.  Pulmonary/Chest: Effort normal. No respiratory distress. He has no wheezes.   Abdominal: Soft. Bowel sounds are normal. He exhibits no mass. There is no  tenderness.   Musculoskeletal: He exhibits no edema or tenderness.   Lymphadenopathy:     He has no cervical adenopathy.   Neurological: He is alert and oriented to person, place, and time. He has normal reflexes. No cranial nerve deficit.   Skin: Skin is warm. No rash noted.   Psychiatric: He has a normal mood and affect. His behavior is normal.       Assessment:       1. Fatigue, unspecified type        Plan:       Liang was seen today for follow-up.    Diagnoses and all orders for this visit:    Fatigue, unspecified type  -     Exercise stress echo; Future  -     EKG 12-LEAD - [place]; Future    Essential hypertension  - follow  Hypothyroidism due to Hashimoto's thyroiditis  - follow  Pure hypercholesterolemia  - reviewed  Meningioma  - discussed

## 2017-10-10 NOTE — PROGRESS NOTES
Subjective:       Patient ID: Liang Monterroso Jr. is a 77 y.o. male.    Chief Complaint: Follow-up  Liang Monterroso Jr. 77 y.o. male is here for office visit to review care and physical exam,  HPI  Review of Systems   Constitutional: Negative for activity change, appetite change, fatigue, fever and unexpected weight change.   HENT: Negative for congestion, hearing loss, postnasal drip and rhinorrhea.    Eyes: Negative for pain, discharge and visual disturbance.   Respiratory: Negative for cough, choking and shortness of breath.    Cardiovascular: Negative for chest pain, palpitations and leg swelling.   Gastrointestinal: Negative for abdominal pain, diarrhea and vomiting.   Genitourinary: Negative for dysuria, flank pain, hematuria and urgency.   Musculoskeletal: Negative for arthralgias, back pain, joint swelling and neck pain.   Skin: Negative for color change and rash.   Neurological: Negative for dizziness, tremors, syncope, weakness, numbness and headaches.   Psychiatric/Behavioral: Negative for agitation and confusion. The patient is not hyperactive.        Objective:      Physical Exam   Constitutional: He is oriented to person, place, and time. He appears well-developed and well-nourished.   HENT:   Head: Normocephalic.   Eyes: EOM are normal. Pupils are equal, round, and reactive to light.   Neck: Normal range of motion. Neck supple. No thyromegaly present.   Cardiovascular: Normal rate.  Exam reveals no gallop and no friction rub.    No murmur heard.  Pulmonary/Chest: Effort normal. No respiratory distress. He has no wheezes.   Abdominal: Soft. Bowel sounds are normal. He exhibits no mass. There is no tenderness.   Musculoskeletal: He exhibits no edema or tenderness.   Lymphadenopathy:     He has no cervical adenopathy.   Neurological: He is alert and oriented to person, place, and time. He has normal reflexes. No cranial nerve deficit.   Skin: Skin is warm. No rash noted.   Psychiatric: He has a normal mood and  affect. His behavior is normal.       Assessment:       No diagnosis found.    Plan:       Liang was seen today for follow-up.    Diagnoses and all orders for this visit:    Fatigue, unspecified type  -     Exercise stress echo; Future  -     EKG 12-LEAD - [place]; Future  -     X-Ray Chest PA And Lateral; Future    Essential hypertension    Hypothyroidism due to Hashimoto's thyroiditis    Pure hypercholesterolemia    Meningioma

## 2017-10-10 NOTE — PROGRESS NOTES
"Subjective:    I, Renuka Hart, am scribing for, and in the presence of, Dr. Jameson Chen.     Patient ID: Liang Monterroso Jr. is a 77 y.o. male.    Chief Complaint: No chief complaint on file.    HPI   Pt is a 76 yo male with a meningioma who presents today for 6 month FU with MRI. On 3/20/2017, pt underwent right temporal craniotomy for tumor resection. Pt complains of fatigue and head tenderness near surgery site. Pt states he experiences extreme fatigue after doing less strenuous activities. Pt states prior to surgery he was very active in his physical activity.    Since last office visit the patient underwent a laser vaporization of the prostate, enucleation of median lobe on 7/21/2017.     Review of Systems   Constitutional: Positive for activity change (decrease) and fatigue. Negative for chills and fever.   HENT: Negative.    Eyes: Negative.    Respiratory: Negative.    Cardiovascular: Negative.    Gastrointestinal: Negative.    Endocrine: Negative.    Genitourinary: Negative.    Musculoskeletal: Negative.    Skin: Negative.    Allergic/Immunologic: Negative.    Neurological: Negative for tremors, weakness, light-headedness, numbness and headaches.   Hematological: Negative.    Psychiatric/Behavioral: Negative.        Past Medical History:   Diagnosis Date    Allergy     CKD (chronic kidney disease) stage 3, GFR 30-59 ml/min 6/15/2016    GERD (gastroesophageal reflux disease)     Hyperlipidemia     Hypertension     Hypospadias in male     Hypothyroidism     Thyroid disease     Urinary tract infection        Objective:     /80   Pulse 67   Resp 13   Ht 5' 8" (1.727 m)   Wt 85.4 kg (188 lb 4.4 oz)   BMI 28.63 kg/m²     Physical Exam   Constitutional: He is oriented to person, place, and time. He appears well-developed and well-nourished.   Eyes: Pupils are equal, round, and reactive to light.   Neurological: He is alert and oriented to person, place, and time. No cranial nerve deficit.     "   Imaging:  MRI Brain W WO Contrast 10/10/2017 shows expected postoperative changes from right temporal occipital craniotomy and meningioma resection. No evidence of residual tumor.     I have personally reviewed the images with the pt.      I, Dr. Jameson Chen, personally performed the services described in this documentation as scribed by Renuka Hart in my presence, and it is both accurate and complete.    Assessment:       1. Meningioma        Plan:   I have reviewed the patient's MRI of brain, which shows expected postoperative changes from right temporal occipital craniotomy and meningioma resection. No evidence of residual tumor. I will schedule the patient a 1 year FU with MRI of brain.

## 2017-10-10 NOTE — PATIENT INSTRUCTIONS
I have reviewed the patient's MRI of brain, which shows expected postoperative changes from right temporal occipital craniotomy and meningioma resection. No evidence of residual tumor. I will schedule the patient a 1 year FU with MRI of brain.

## 2017-10-10 NOTE — PROGRESS NOTES
Subjective:       Patient ID: Liang Monterroso Jr. is a 77 y.o. male.    Chief Complaint: Follow-up  Liang Monterroso Jr. 77 y.o. male is here for office visit to review care and physical exam,  HPI  Review of Systems    Objective:      Physical Exam    Assessment:       No diagnosis found.    Plan:

## 2017-10-15 ENCOUNTER — PATIENT MESSAGE (OUTPATIENT)
Dept: NEUROLOGY | Facility: CLINIC | Age: 77
End: 2017-10-15

## 2017-10-16 ENCOUNTER — TELEPHONE (OUTPATIENT)
Dept: NEUROLOGY | Facility: CLINIC | Age: 77
End: 2017-10-16

## 2017-10-16 NOTE — TELEPHONE ENCOUNTER
Left detailed  vmail on both numbers asking for call back to schedule visit to go over MRI results.

## 2017-10-16 NOTE — TELEPHONE ENCOUNTER
----- Message from Latanya Hanna sent at 10/16/2017 11:42 AM CDT -----  Contact: RITESH HUTCHINS JR. [[id number]]  _x 1st Request  _  2nd Request  _  3rd Request        Who: RITESH HUTCHINS JR. [[id number]]    Why: Returning call     What Number to Call Back: [phone]    When to Expect a call back: (Within 24 hours)    Please return the call at earliest convenience. Thanks!

## 2017-10-17 ENCOUNTER — TELEPHONE (OUTPATIENT)
Dept: INTERNAL MEDICINE | Facility: CLINIC | Age: 77
End: 2017-10-17

## 2017-10-17 ENCOUNTER — OFFICE VISIT (OUTPATIENT)
Dept: NEUROLOGY | Facility: CLINIC | Age: 77
End: 2017-10-17
Payer: MEDICARE

## 2017-10-17 ENCOUNTER — HOSPITAL ENCOUNTER (OUTPATIENT)
Dept: CARDIOLOGY | Facility: CLINIC | Age: 77
Discharge: HOME | End: 2017-10-17
Payer: MEDICARE

## 2017-10-17 VITALS
HEIGHT: 68 IN | WEIGHT: 185 LBS | HEART RATE: 72 BPM | SYSTOLIC BLOOD PRESSURE: 122 MMHG | DIASTOLIC BLOOD PRESSURE: 82 MMHG | BODY MASS INDEX: 28.04 KG/M2

## 2017-10-17 DIAGNOSIS — I35.9 NONRHEUMATIC AORTIC VALVE DISORDER: ICD-10-CM

## 2017-10-17 DIAGNOSIS — R53.83 FATIGUE, UNSPECIFIED TYPE: ICD-10-CM

## 2017-10-17 DIAGNOSIS — R53.83 OTHER FATIGUE: Primary | ICD-10-CM

## 2017-10-17 DIAGNOSIS — G47.33 OSA (OBSTRUCTIVE SLEEP APNEA): ICD-10-CM

## 2017-10-17 DIAGNOSIS — R94.31 ABNORMAL EKG: Primary | ICD-10-CM

## 2017-10-17 LAB
DIASTOLIC DYSFUNCTION: NO
RETIRED EF AND QEF - SEE NOTES: 55 (ref 55–65)

## 2017-10-17 PROCEDURE — 93321 DOPPLER ECHO F-UP/LMTD STD: CPT | Mod: S$GLB,,, | Performed by: INTERNAL MEDICINE

## 2017-10-17 PROCEDURE — 93000 ELECTROCARDIOGRAM COMPLETE: CPT | Mod: S$GLB,,, | Performed by: INTERNAL MEDICINE

## 2017-10-17 PROCEDURE — 99499 UNLISTED E&M SERVICE: CPT | Mod: S$GLB,,, | Performed by: PSYCHIATRY & NEUROLOGY

## 2017-10-17 PROCEDURE — 99214 OFFICE O/P EST MOD 30 MIN: CPT | Mod: S$GLB,,, | Performed by: PSYCHIATRY & NEUROLOGY

## 2017-10-17 PROCEDURE — 93351 STRESS TTE COMPLETE: CPT | Mod: S$GLB,,, | Performed by: INTERNAL MEDICINE

## 2017-10-17 PROCEDURE — 99999 PR PBB SHADOW E&M-EST. PATIENT-LVL III: CPT | Mod: PBBFAC,,, | Performed by: PSYCHIATRY & NEUROLOGY

## 2017-10-17 NOTE — PROGRESS NOTES
Subjective:       Patient ID: Liang Monterroso Jr. is a 77 y.o. male.    Reason for Consult: Fatigue      Interval History:  Liang Monterroso Jr. is here for follow up. Their condition is relatively clinically stable.  He notes that really his only symptom that he complaining about is fatigue.  He also would like to review his MRI with me.  The patient is accompanied by his wife.  We have discussed the fact that he snores and gasps for breath in the evening.  We've also discussed issues regarding his hypothyroid and possible low testosterone levels.    Objective:     Vitals:    10/17/17 1259   BP: 122/82   Pulse: 72     Patient is awake alert oriented to person place and time.  Cranial nerves II through XII without focal deficit.  Gait and station within normal limits.  Focused examination was undertaken today. Over 50% of face to face time of 25 minute visit time was in giving guidance, counseling and discussing treatment options.    I personally reviewed the patient's imaging and relayed my impression to them.  I've gone over the images extensively and identified areas of microvascular ischemic change and postoperative changes which are actually there prior to right temporoparietal meningioma resection.   Results for orders placed or performed during the hospital encounter of 10/10/17   MRI Brain W WO Contrast    Narrative    MRI OF THE BRAIN WITHOUT AND WITH IV CONTRAST:     Technique: Multiplanar multisequence images of the brain were obtained before and after the administration of 8 cc of Gadavist IV contrast material.      Comparison: MRI is brain [facility] 3/19/17, 2/18/17; CT head without contrast 3/20/17     Indication: Postop meningioma resection    Findings:    The ventricular system is stable in size and configuration.    There is generalized cerebral volume loss with associated mild compensatory dilatation of the ventricular system and associated mild sulcal prominence.  There is patchy T2/FLAIR signal  hyperintensity within the supratentorial white matter in a distribution consistent with chronic microvascular ischemic change.  No acute infarct, intra-axial mass, or intraparenchymal hemorrhage.  There is encephalomalacia in the posterior right temporal lobe in this patient status post meningioma resection.  No nodular postcontrast enhancement to suggest residual neoplasm.    There is smooth pachymeningeal enhancement underlying the craniotomy site.  No new extra-axial mass or hemorrhage.    Bone marrow appears unremarkable aside from postoperative changes above.  There is mild sinus disease with associated left maxillary mucosal retention cyst.  There are postoperative changes to the bilateral orbits.    Impression    Expected postoperative changes from right temporal occipital craniotomy and meningioma resection with a small focus of encephalomalacia within the posterior right temporal lobe.  No evidence of residual tumor.    Mild generalized cerebral flow and loss with associated mild chronic microvascular ischemic change.    ______________________________________     Electronically signed by resident: CARMEN SAWYER MD  Date:     10/10/17  Time:    09:35            As the supervising and teaching physician, I personally reviewed the images and resident's interpretation and I agree with the findings.            Electronically signed by: BROWN ORO MD  Date:     10/10/17  Time:    10:21    Results for orders placed or performed during the hospital encounter of 03/20/17   CT Head Without Contrast    Narrative    CT brain without contrast.    Comparison: MRI 03/19/2017    Technique: Multiple 5 mm axial images of the head were obtained without intravenous contrast.    Results: Interval operative change from right posterior temporal occipital craniotomy for resection previous identified enhancing lesion resection..    There is mixed density collection within the right temporal occipital resection cavity compatible  with postoperative gas fluid and hemorrhage. There is postoperative fat packing material underlying the craniotomy flap superficial to the dural plasty. Please note evaluation for residual lesion limited by noncontrast CT technique.    Superimposed age-appropriate generalized volume loss with scattered decreased attenuation supratentorial white matter for chronic ischemic change.    No evidence for  hydrocephalus. No midline shift. Single surgical skin staple overlies the left frontal soft tissues with overlying soft tissue swelling.    Impression     Expected operative change from posterior temporal occipital craniotomy for previous right temporal occipital lesion resection. Small likely postoperative gas fluid and hemorrhage within the resection cavity underlying the craniotomy.    Superimposed Age-appropriate generalized cerebral volume loss with mild degree of patchy decreased attenuation supratentorial white matter suggestive for chronic microvascular ischemic change .     Clinical correlation and followup advised.      Electronically signed by: DODIE BASSETT DO  Date:     03/20/17  Time:    12:23    Results for orders placed or performed during the hospital encounter of 02/06/17   MRI Brain Without Contrast    Narrative    HISTORY: LOC    TECHNIQUE:  Multiplanar multisequence images of the brain without intravenous contrast.      COMPARISON: N/A    FINDINGS:     Brain and intracranial structures:      2.6 x 2.7 x 2.4 cm mildly T2 hypointense and T1 isointense lesion is present along the right temporal occipital junction with associated mass effect and adjacent white matter edema signal.  This mass appears to be extra-axial in location and demonstrates an internal serpiginous flow voids.  No associated internal hemorrhage or calcification.  There is question of minimal overlying hyperostosis.      There is no hemorrhage or or acute infarct. There is underlying mild to moderate generalized cerebral volume loss,  normal to slightly advanced for age. Mild to moderate burden of T2/FLAIR hyperintensities are scattered in the periventricular and subcortical white matter, likely related to sequela of chronic microvascular ischemic disease.  There are normal vascular flow voids.     Skull:  Normal.    Scalp: Normal.    Orbits and face (included portions): Bilateral aphakia.    Paranasal sinuses and mastoid air cells (included portions): Mucosal retention cyst versus polyp in the left maxillary sinus.    Other findings: N/A    Impression    2.7 cm lobulated extra-axial lesion at the right temporal occipital junction most characteristic of meningioma, with associated underlying white matter edema.      Electronically signed by: TANNER SAENZ  Date:     02/06/17  Time:    15:29        Assessment/Plan:     Problem List Items Addressed This Visit     None      Visit Diagnoses     Other fatigue    -  Primary    Relevant Orders    Ambulatory consult to Sleep Disorders    JUDITH (obstructive sleep apnea)        Relevant Orders    Ambulatory consult to Sleep Disorders        37-year-old male presents for evaluation and follow-up of a lingering issue of fatigue.  At this point in time I've told him it may be multifactorial including his chronic hypothyroidism as well as possible low testosterone levels.  I have also discussed Iovera exercises with him as he does have chronic microvascular ischemic change.  We have discussed the American heart and the American stroke Association guidelines regarding physical activity and the role of cerebrovascular health improvement.  I've also discussed with him his MRI at length today.  His wife notes that he snores and gasps for air.  I have discussed with the patient that I believe he may be suffering from obstructive sleep apnea and this may be leading to his lingering fatigue.  I will see him on an as-needed basis but I will make the referral to sleep medicine for him.    The patient verbalizes understanding  and agreement with the treatment plan. I have discussed risks, benefits and alternatives to the treatment plan. Questions were sought and answered to his stated verbal satisfaction.        Edd Haider MD    This note is dictated on Dragon Natural Speaking word recognition program. There are word recognition mistakes that are occasionally missed on review.

## 2017-10-17 NOTE — TELEPHONE ENCOUNTER
Please advise stress test was good.  EKG showed pre mature beats, likely ok but will order cardiology to review, thanks

## 2017-10-18 NOTE — TELEPHONE ENCOUNTER
Spoke with pt and informed him of this information and Cardiology appt was made for 12/8/17 @ 10am with .

## 2017-10-19 DIAGNOSIS — I10 ESSENTIAL HYPERTENSION: ICD-10-CM

## 2017-10-19 RX ORDER — RAMIPRIL 5 MG/1
5 CAPSULE ORAL DAILY
Qty: 30 CAPSULE | Refills: 0 | Status: SHIPPED | OUTPATIENT
Start: 2017-10-19 | End: 2017-11-16 | Stop reason: SDUPTHER

## 2017-10-19 RX ORDER — LEVOTHYROXINE SODIUM 112 UG/1
112 TABLET ORAL
Qty: 30 TABLET | Refills: 0 | Status: SHIPPED | OUTPATIENT
Start: 2017-10-19 | End: 2017-11-16 | Stop reason: SDUPTHER

## 2017-11-16 DIAGNOSIS — I10 ESSENTIAL HYPERTENSION: ICD-10-CM

## 2017-11-16 RX ORDER — RAMIPRIL 5 MG/1
5 CAPSULE ORAL DAILY
Qty: 30 CAPSULE | Refills: 0 | Status: SHIPPED | OUTPATIENT
Start: 2017-11-16 | End: 2017-12-14 | Stop reason: SDUPTHER

## 2017-11-16 RX ORDER — LEVOTHYROXINE SODIUM 112 UG/1
112 TABLET ORAL
Qty: 30 TABLET | Refills: 0 | Status: SHIPPED | OUTPATIENT
Start: 2017-11-16 | End: 2017-12-14 | Stop reason: SDUPTHER

## 2017-12-06 NOTE — PROGRESS NOTES
"Subjective:   Patient ID:  Liang Monterroso Jr. is a 77 y.o. male who presents for follow-up of Abnormal ECG      HPI:   He has a h/o HTN, HPL and recent menigioma resection. I saw him earlier this year for an abnormal ECG    Pt is here for PACs/PVC noted on a stress test.  The study was ordered for fatigue. Stress echo portion was negative for ischemia and demonstrated preserved LVEF.  PACs/PVC burden on the stress test was minimal.  Manual count ~ 10-20 premature beats total throughout the stress test.  Pt without any complaints and feels well other than daytime fatigue. He denies any exertional chest discomfort, exertional dyspnea, palpitations, TIA's, syncope or presyncope.    Baseline ECG demonstrates sinus with 1stDAVB, LAFB/LAD, incomplete RBBB and low voltage across anterior leads. 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  LAD present since 2004 although his axis has shifted more leftward to meet LAFB criteria.  The ecg from 6-23 was the first one that demonstrated poor R wave progression and has the appearance of incorrect lead placement.  We repeated the ECG in the office which was essentially unchanged from most of his ECGs.  ECG from 6-23 likely had leads misplaced.      He hasas HTN and smartphone but didn't want to pursue dig HTN program.        Vitals:    12/08/17 0944   BP: 125/83   BP Location: Left arm   Patient Position: Sitting   BP Method: Medium (Automatic)   Pulse: 87   Weight: 87.8 kg (193 lb 9 oz)   Height: 5' 8" (1.727 m)     Body mass index is 29.43 kg/m².  CrCl cannot be calculated (Patient's most recent lab result is older than the maximum 7 days allowed.).    Lab Results   Component Value Date     06/23/2017    K 4.0 06/23/2017     06/23/2017    CO2 24 06/23/2017    BUN 15 06/23/2017    CREATININE 1.1 06/23/2017    GLU 85 06/23/2017    HGBA1C 5.5 10/08/2009    MG 2.4 04/02/2004    AST 18 06/23/2017    ALT 14 06/23/2017    ALBUMIN 3.7 06/23/2017    PROT " 6.8 06/23/2017    BILITOT 0.4 06/23/2017    WBC 7.32 06/23/2017    HGB 13.4 (L) 06/23/2017    HCT 40.7 06/23/2017    MCV 86 06/23/2017     06/23/2017    INR 0.9 03/15/2017    PSA 0.91 12/17/2012    TSH 3.812 04/17/2017    CHOL 205 (H) 09/27/2016    HDL 56 09/27/2016    LDLCALC 130.0 09/27/2016    TRIG 95 09/27/2016       Current Outpatient Prescriptions   Medication Sig    aspirin (ECOTRIN) 81 MG EC tablet Take 81 mg by mouth once daily.    econazole nitrate 1 % cream USE TWICE DAILY (Patient taking differently: USE TWICE DAILY (prn))    famotidine (PEPCID) 20 MG tablet Take 20 mg by mouth nightly as needed.     levothyroxine (SYNTHROID) 112 MCG tablet Take 1 tablet (112 mcg total) by mouth before breakfast.    metronidazole (METROGEL) 0.75 % gel Use hs    multivitamin with minerals tablet Take 1 tablet by mouth once daily.      oxycodone-acetaminophen (PERCOCET) 5-325 mg per tablet Take 1 tablet by mouth every 4 (four) hours as needed for Pain.    polyethylene glycol (GLYCOLAX) 17 gram PwPk Take 17 g by mouth once daily.    pravastatin (PRAVACHOL) 20 MG tablet Take 1 tablet (20 mg total) by mouth once daily.    ramipril (ALTACE) 5 MG capsule Take 1 capsule (5 mg total) by mouth once daily.    tamsulosin (FLOMAX) 0.4 mg Cp24 Take 2 capsules (0.8 mg total) by mouth once daily.    triamcinolone acetonide 0.1% (KENALOG) 0.1 % cream Use bid prn rash    fexofenadine (ALLEGRA) 180 MG tablet Take 1 tablet (180 mg total) by mouth once daily. (Patient taking differently: Take 180 mg by mouth daily as needed. )     No current facility-administered medications for this visit.        Review of Systems   Constitution: Negative for decreased appetite, weakness, malaise/fatigue, weight gain and weight loss.   Eyes: Negative for visual disturbance.   Cardiovascular: Negative for chest pain, claudication, dyspnea on exertion, irregular heartbeat, orthopnea, palpitations, paroxysmal nocturnal dyspnea and syncope.  "  Respiratory: Negative for cough, shortness of breath and snoring.    Skin: Negative for rash.   Musculoskeletal: Negative for arthritis, muscle cramps, muscle weakness and myalgias.   Gastrointestinal: Negative for abdominal pain, anorexia, change in bowel habit and nausea.   Genitourinary: Negative for dysuria and frequency.   Neurological: Negative for excessive daytime sleepiness, dizziness, headaches, loss of balance and numbness.   Psychiatric/Behavioral: Negative for depression.       Objective:   Physical Exam   Constitutional: He is oriented to person, place, and time. He appears well-developed and well-nourished.   HENT:   Head: Normocephalic and atraumatic.   Eyes: Pupils are equal, round, and reactive to light.   Neck: Normal range of motion. Neck supple.   Cardiovascular: Normal rate, regular rhythm, normal heart sounds, intact distal pulses and normal pulses.  Exam reveals no gallop.    No murmur heard.  Pulmonary/Chest: Effort normal and breath sounds normal.   Abdominal: Soft. Bowel sounds are normal. There is no hepatosplenomegaly. There is no tenderness.   Musculoskeletal: Normal range of motion.   Neurological: He is alert and oriented to person, place, and time.   Skin: Skin is warm and dry.   Psychiatric: He has a normal mood and affect. His speech is normal and behavior is normal. Judgment and thought content normal.       Assessment:     1. Pure hypercholesterolemia    2. Essential hypertension        Plan:   Occasional PVCs and PACs are normal findings as dictated in the final stress report "There were no significant electrocardiographic changes throughout the protocol suggesting ischemia."  ECG is stable and has been addressed as benign in my previous consult.   Fatigue is likely secondary to poor sleep hygiene.  Has a sleep consult pending but it appears that he is getting up several times in the middle of the night.    "

## 2017-12-08 ENCOUNTER — OFFICE VISIT (OUTPATIENT)
Dept: CARDIOLOGY | Facility: CLINIC | Age: 77
End: 2017-12-08
Payer: MEDICARE

## 2017-12-08 VITALS
DIASTOLIC BLOOD PRESSURE: 83 MMHG | BODY MASS INDEX: 29.34 KG/M2 | HEIGHT: 68 IN | WEIGHT: 193.56 LBS | HEART RATE: 87 BPM | SYSTOLIC BLOOD PRESSURE: 125 MMHG

## 2017-12-08 DIAGNOSIS — E78.00 PURE HYPERCHOLESTEROLEMIA: ICD-10-CM

## 2017-12-08 DIAGNOSIS — I49.49 PREMATURE BEATS: Primary | ICD-10-CM

## 2017-12-08 DIAGNOSIS — I10 ESSENTIAL HYPERTENSION: ICD-10-CM

## 2017-12-08 PROCEDURE — 99499 UNLISTED E&M SERVICE: CPT | Mod: S$GLB,,, | Performed by: INTERNAL MEDICINE

## 2017-12-08 PROCEDURE — 99214 OFFICE O/P EST MOD 30 MIN: CPT | Mod: S$GLB,,, | Performed by: INTERNAL MEDICINE

## 2017-12-08 PROCEDURE — 99999 PR PBB SHADOW E&M-EST. PATIENT-LVL III: CPT | Mod: PBBFAC,,, | Performed by: INTERNAL MEDICINE

## 2017-12-08 NOTE — LETTER
December 8, 2017      Theron Paiz MD  [address]  [place] [address]           [facility] - Cardiology  [address]  [place] [address]  Phone: [phone]          Patient: Liang Monterroso Jr.   MR Number: [id number]   YOB: 1940   Date of Visit: 12/8/2017       Dear Dr. Theron Paiz:    Thank you for referring Liang Monterroso to me for evaluation. Attached you will find relevant portions of my assessment and plan of care.    If you have questions, please do not hesitate to call me. I look forward to following Liang Monterroso along with you.    Sincerely,    Adryan Schmitt MD    Enclosure  CC:  No Recipients    If you would like to receive this communication electronically, please contact [email or web address] or [phone] to request more information on [facility] access.    For providers and/or their staff who would like to refer a patient to [facility], please contact us through our one-stop-shop provider referral line, [facility] , at [phone].    If you feel you have received this communication in error or would no longer like to receive these types of communications, please e-mail [email or web address]

## 2017-12-14 DIAGNOSIS — I10 ESSENTIAL HYPERTENSION: ICD-10-CM

## 2017-12-14 RX ORDER — LEVOTHYROXINE SODIUM 112 UG/1
112 TABLET ORAL
Qty: 30 TABLET | Refills: 0 | Status: SHIPPED | OUTPATIENT
Start: 2017-12-14 | End: 2018-01-15 | Stop reason: SDUPTHER

## 2017-12-14 RX ORDER — RAMIPRIL 5 MG/1
5 CAPSULE ORAL DAILY
Qty: 30 CAPSULE | Refills: 0 | Status: SHIPPED | OUTPATIENT
Start: 2017-12-14 | End: 2018-01-15 | Stop reason: SDUPTHER

## 2017-12-20 ENCOUNTER — OFFICE VISIT (OUTPATIENT)
Dept: UROLOGY | Facility: CLINIC | Age: 77
End: 2017-12-20
Payer: MEDICARE

## 2017-12-20 VITALS
SYSTOLIC BLOOD PRESSURE: 148 MMHG | DIASTOLIC BLOOD PRESSURE: 88 MMHG | RESPIRATION RATE: 18 BRPM | HEIGHT: 68 IN | WEIGHT: 185 LBS | HEART RATE: 93 BPM | BODY MASS INDEX: 28.04 KG/M2

## 2017-12-20 DIAGNOSIS — N40.1 BPH WITH URINARY OBSTRUCTION: Primary | ICD-10-CM

## 2017-12-20 DIAGNOSIS — N13.8 BPH WITH URINARY OBSTRUCTION: Primary | ICD-10-CM

## 2017-12-20 PROCEDURE — 99999 PR PBB SHADOW E&M-EST. PATIENT-LVL III: CPT | Mod: PBBFAC,,, | Performed by: UROLOGY

## 2017-12-20 PROCEDURE — 99213 OFFICE O/P EST LOW 20 MIN: CPT | Mod: S$GLB,,, | Performed by: UROLOGY

## 2017-12-20 NOTE — PROGRESS NOTES
Subjective:       Patient ID: Liang Monterroso Jr. is a 77 y.o. male.    Chief Complaint: Benign Prostatic Hypertrophy (states he is voiding better, no problems, he is not catheterizing. took cipro for 5 days since last visit for a uti)    HPI  patient is status post laser TURP.  He is voiding well.  His PVR is 300 cc which is stable.  He does not catheterize anymore.  Urine is clear.  No complaints except occasional dysuria    Past Medical History:   Diagnosis Date    Allergy     CKD (chronic kidney disease) stage 3, GFR 30-59 ml/min 6/15/2016    GERD (gastroesophageal reflux disease)     Hyperlipidemia     Hypertension     Hypospadias in male     Hypothyroidism     Thyroid disease     Urinary tract infection        Past Surgical History:   Procedure Laterality Date    APPENDECTOMY      CATARACT EXTRACTION      EYE SURGERY      HERNIA REPAIR      TONSILLECTOMY         Family History   Problem Relation Age of Onset    Diabetes Mother     Heart disease Mother     Hypertension Mother     Vision loss Mother     Dementia Mother     Alcohol abuse Father     Cancer Sister      lung with mets, was a heavy smoker    No Known Problems Daughter     No Known Problems Son     No Known Problems Daughter     No Known Problems Son     Amblyopia Neg Hx     Blindness Neg Hx     Cataracts Neg Hx     Glaucoma Neg Hx     Macular degeneration Neg Hx     Retinal detachment Neg Hx     Strabismus Neg Hx     Stroke Neg Hx     Thyroid disease Neg Hx        Social History     Social History    Marital status:      Spouse name: parul    Number of children: 4    Years of education: N/A     Occupational History    retired      Social History Main Topics    Smoking status: Never Smoker    Smokeless tobacco: Never Used    Alcohol use Yes      Comment: 1-3 glasses wine weekly, 2-3 beers weekly    Drug use: No    Sexual activity: Yes     Partners: Female     Other Topics Concern    Not on file      Social History Narrative    No narrative on file       Allergies:  Contrast media    Medications:    Current Outpatient Prescriptions:     aspirin (ECOTRIN) 81 MG EC tablet, Take 81 mg by mouth once daily., Disp: , Rfl:     econazole nitrate 1 % cream, USE TWICE DAILY (Patient taking differently: USE TWICE DAILY (prn)), Disp: 60 g, Rfl: 5    famotidine (PEPCID) 20 MG tablet, Take 20 mg by mouth nightly as needed. , Disp: , Rfl:     fexofenadine (ALLEGRA) 180 MG tablet, Take 1 tablet (180 mg total) by mouth once daily. (Patient taking differently: Take 180 mg by mouth daily as needed. ), Disp: 30 tablet, Rfl: 11    levothyroxine (SYNTHROID) 112 MCG tablet, Take 1 tablet (112 mcg total) by mouth before breakfast., Disp: 30 tablet, Rfl: 0    metronidazole (METROGEL) 0.75 % gel, Use hs, Disp: 45 g, Rfl: 3    multivitamin with minerals tablet, Take 1 tablet by mouth once daily.  , Disp: , Rfl:     oxycodone-acetaminophen (PERCOCET) 5-325 mg per tablet, Take 1 tablet by mouth every 4 (four) hours as needed for Pain., Disp: 31 tablet, Rfl: 0    polyethylene glycol (GLYCOLAX) 17 gram PwPk, Take 17 g by mouth once daily., Disp: 30 packet, Rfl: 0    pravastatin (PRAVACHOL) 20 MG tablet, Take 1 tablet (20 mg total) by mouth once daily., Disp: 90 tablet, Rfl: 3    ramipril (ALTACE) 5 MG capsule, Take 1 capsule (5 mg total) by mouth once daily., Disp: 30 capsule, Rfl: 0    tamsulosin (FLOMAX) 0.4 mg Cp24, Take 2 capsules (0.8 mg total) by mouth once daily., Disp: 180 capsule, Rfl: 3    triamcinolone acetonide 0.1% (KENALOG) 0.1 % cream, Use bid prn rash, Disp: 80 g, Rfl: 3    Review of Systems   Constitutional: Negative for activity change, appetite change, chills, diaphoresis, fatigue, fever and unexpected weight change.   HENT: Negative for congestion, dental problem, hearing loss, mouth sores, postnasal drip, rhinorrhea, sinus pressure and trouble swallowing.    Eyes: Negative for pain, discharge and  itching.   Respiratory: Negative for apnea, cough, choking, chest tightness, shortness of breath and wheezing.    Cardiovascular: Negative for chest pain, palpitations and leg swelling.   Gastrointestinal: Negative for abdominal distention, abdominal pain, anal bleeding, blood in stool, constipation, diarrhea, nausea, rectal pain and vomiting.   Endocrine: Negative for polydipsia and polyuria.   Genitourinary: Negative for decreased urine volume, difficulty urinating, discharge, dysuria, enuresis, flank pain, frequency, genital sores, hematuria, penile pain, penile swelling, scrotal swelling, testicular pain and urgency.   Musculoskeletal: Negative for arthralgias, back pain and myalgias.   Skin: Negative for color change, rash and wound.   Neurological: Negative for dizziness, syncope, speech difficulty, light-headedness and headaches.   Hematological: Negative for adenopathy. Does not bruise/bleed easily.   Psychiatric/Behavioral: Negative for behavioral problems, confusion, hallucinations and sleep disturbance.       Objective:      Physical Exam   Constitutional: He appears well-developed.   HENT:   Head: Normocephalic.   Cardiovascular: Normal rate.    Pulmonary/Chest: Effort normal.   Abdominal: Soft.   Neurological: He is alert.   Skin: Skin is warm.     Psychiatric: He has a normal mood and affect.       Assessment:       1. BPH with urinary obstruction        Plan:       Liang was seen today for benign prostatic hypertrophy.    Diagnoses and all orders for this visit:    BPH with urinary obstruction  -     US Retroperitoneal Complete (Kidney and; Future     return clinic 1 year with renal ultrasound

## 2017-12-21 ENCOUNTER — TELEPHONE (OUTPATIENT)
Dept: INTERNAL MEDICINE | Facility: CLINIC | Age: 77
End: 2017-12-21

## 2017-12-21 NOTE — TELEPHONE ENCOUNTER
----- Message from Janey Osuna sent at 12/21/2017  8:21 AM CST -----  Contact: Spouse, Florina    [phone]  Patient would like to get medical advice.  Symptoms (please be specific):  Sore trhoat , Cough and Congested  How long has patient had these symptoms:  Less than 24  hrs  Pharm[facility] name and phone #:  [facility]  [phone] (Phone) or [phone] (Fax)  Any drug allergies:  Contrast media    Comments: Would prefer to be seen today if you can get him in with only you

## 2017-12-22 ENCOUNTER — OFFICE VISIT (OUTPATIENT)
Dept: INTERNAL MEDICINE | Facility: CLINIC | Age: 77
End: 2017-12-22
Payer: MEDICARE

## 2017-12-22 VITALS
DIASTOLIC BLOOD PRESSURE: 74 MMHG | HEIGHT: 68 IN | BODY MASS INDEX: 27.76 KG/M2 | SYSTOLIC BLOOD PRESSURE: 126 MMHG | WEIGHT: 183.19 LBS

## 2017-12-22 DIAGNOSIS — I10 ESSENTIAL HYPERTENSION: ICD-10-CM

## 2017-12-22 DIAGNOSIS — J40 BRONCHITIS: Primary | ICD-10-CM

## 2017-12-22 DIAGNOSIS — N18.30 CKD (CHRONIC KIDNEY DISEASE) STAGE 3, GFR 30-59 ML/MIN: ICD-10-CM

## 2017-12-22 PROCEDURE — 99499 UNLISTED E&M SERVICE: CPT | Mod: S$GLB,,, | Performed by: FAMILY MEDICINE

## 2017-12-22 PROCEDURE — 99999 PR PBB SHADOW E&M-EST. PATIENT-LVL III: CPT | Mod: PBBFAC,,, | Performed by: FAMILY MEDICINE

## 2017-12-22 PROCEDURE — 99214 OFFICE O/P EST MOD 30 MIN: CPT | Mod: S$GLB,,, | Performed by: FAMILY MEDICINE

## 2017-12-22 RX ORDER — DOXYCYCLINE 100 MG/1
100 CAPSULE ORAL 2 TIMES DAILY
Qty: 10 CAPSULE | Refills: 0 | Status: SHIPPED | OUTPATIENT
Start: 2017-12-22 | End: 2018-05-10

## 2017-12-22 RX ORDER — AZITHROMYCIN 500 MG/1
500 TABLET, FILM COATED ORAL DAILY
Qty: 10 TABLET | Refills: 0 | Status: SHIPPED | OUTPATIENT
Start: 2017-12-22 | End: 2018-02-16

## 2017-12-22 NOTE — PROGRESS NOTES
Subjective:       Patient ID: Liang Monterroso Jr. is a 77 y.o. male.    Chief Complaint: No chief complaint on file.  Liang Monterroso Jr. 77 y.o. male is here for office visit to review care and physical exam,  HPI  Review of Systems   Constitutional: Negative for activity change, appetite change, fatigue, fever and unexpected weight change.   HENT: Negative for congestion, hearing loss, postnasal drip and rhinorrhea.    Eyes: Negative for pain, discharge and visual disturbance.   Respiratory: Negative for cough, choking and shortness of breath.    Cardiovascular: Negative for chest pain, palpitations and leg swelling.   Gastrointestinal: Negative for abdominal pain, diarrhea and vomiting.   Genitourinary: Negative for dysuria, flank pain, hematuria and urgency.   Musculoskeletal: Negative for arthralgias, back pain, joint swelling and neck pain.   Skin: Negative for color change and rash.   Neurological: Negative for dizziness, tremors, syncope, weakness, numbness and headaches.   Psychiatric/Behavioral: Negative for agitation and confusion. The patient is not hyperactive.        Objective:      Physical Exam   Constitutional: He is oriented to person, place, and time. He appears well-developed and well-nourished.   HENT:   Head: Normocephalic.   Eyes: EOM are normal. Pupils are equal, round, and reactive to light.   Neck: Normal range of motion. Neck supple. No thyromegaly present.   Cardiovascular: Normal rate.  Exam reveals no gallop and no friction rub.    No murmur heard.  Pulmonary/Chest: Effort normal. No respiratory distress. He has no wheezes.   Abdominal: Soft. Bowel sounds are normal. He exhibits no mass. There is no tenderness.   Musculoskeletal: He exhibits no edema or tenderness.   Lymphadenopathy:     He has no cervical adenopathy.   Neurological: He is alert and oriented to person, place, and time. He has normal reflexes. No cranial nerve deficit.   Skin: Skin is warm. No rash noted.   Psychiatric: He has  a normal mood and affect. His behavior is normal.       Assessment:       No diagnosis found.    Plan:       Liang was seen today for cough.    Diagnoses and all orders for this visit:    Bronchitis  -     azithromycin (ZITHROMAX) 500 MG tablet; Take 1 tablet (500 mg total) by mouth once daily.    Liang was seen today for cough.    Diagnoses and all orders for this visit:    Bronchitis  -     azithromycin (ZITHROMAX) 500 MG tablet; Take 1 tablet (500 mg total) by mouth once daily.    CKD (chronic kidney disease) stage 3, GFR 30-59 ml/min  - Reviewed  Essential hypertension    - controled

## 2018-01-15 DIAGNOSIS — I10 ESSENTIAL HYPERTENSION: ICD-10-CM

## 2018-01-15 RX ORDER — LEVOTHYROXINE SODIUM 112 UG/1
112 TABLET ORAL
Qty: 30 TABLET | Refills: 0 | Status: SHIPPED | OUTPATIENT
Start: 2018-01-15 | End: 2018-02-16 | Stop reason: SDUPTHER

## 2018-01-15 RX ORDER — RAMIPRIL 5 MG/1
5 CAPSULE ORAL DAILY
Qty: 30 CAPSULE | Refills: 0 | Status: SHIPPED | OUTPATIENT
Start: 2018-01-15 | End: 2018-02-16 | Stop reason: SDUPTHER

## 2018-01-29 ENCOUNTER — PES CALL (OUTPATIENT)
Dept: ADMINISTRATIVE | Facility: CLINIC | Age: 78
End: 2018-01-29

## 2018-02-02 ENCOUNTER — PATIENT MESSAGE (OUTPATIENT)
Dept: SLEEP MEDICINE | Facility: CLINIC | Age: 78
End: 2018-02-02

## 2018-02-16 ENCOUNTER — OFFICE VISIT (OUTPATIENT)
Dept: SLEEP MEDICINE | Facility: CLINIC | Age: 78
End: 2018-02-16
Payer: MEDICARE

## 2018-02-16 VITALS
BODY MASS INDEX: 28.51 KG/M2 | WEIGHT: 188.13 LBS | SYSTOLIC BLOOD PRESSURE: 122 MMHG | DIASTOLIC BLOOD PRESSURE: 84 MMHG | HEIGHT: 68 IN | HEART RATE: 68 BPM

## 2018-02-16 DIAGNOSIS — I10 ESSENTIAL HYPERTENSION: ICD-10-CM

## 2018-02-16 DIAGNOSIS — G47.30 SLEEP APNEA, UNSPECIFIED TYPE: Primary | ICD-10-CM

## 2018-02-16 PROCEDURE — 1159F MED LIST DOCD IN RCRD: CPT | Mod: S$GLB,,, | Performed by: PSYCHIATRY & NEUROLOGY

## 2018-02-16 PROCEDURE — 1126F AMNT PAIN NOTED NONE PRSNT: CPT | Mod: S$GLB,,, | Performed by: PSYCHIATRY & NEUROLOGY

## 2018-02-16 PROCEDURE — 3008F BODY MASS INDEX DOCD: CPT | Mod: S$GLB,,, | Performed by: PSYCHIATRY & NEUROLOGY

## 2018-02-16 PROCEDURE — 99499 UNLISTED E&M SERVICE: CPT | Mod: S$GLB,,, | Performed by: PSYCHIATRY & NEUROLOGY

## 2018-02-16 PROCEDURE — 99999 PR PBB SHADOW E&M-EST. PATIENT-LVL III: CPT | Mod: PBBFAC,,, | Performed by: PSYCHIATRY & NEUROLOGY

## 2018-02-16 PROCEDURE — 99204 OFFICE O/P NEW MOD 45 MIN: CPT | Mod: S$GLB,,, | Performed by: PSYCHIATRY & NEUROLOGY

## 2018-02-16 RX ORDER — RAMIPRIL 5 MG/1
5 CAPSULE ORAL DAILY
Qty: 30 CAPSULE | Refills: 0 | Status: SHIPPED | OUTPATIENT
Start: 2018-02-16 | End: 2020-01-15 | Stop reason: SDUPTHER

## 2018-02-16 RX ORDER — RAMIPRIL 5 MG/1
CAPSULE ORAL
Qty: 30 CAPSULE | Refills: 0 | Status: SHIPPED | OUTPATIENT
Start: 2018-02-16 | End: 2018-04-16 | Stop reason: SDUPTHER

## 2018-02-16 RX ORDER — LEVOTHYROXINE SODIUM 112 UG/1
112 TABLET ORAL
Qty: 30 TABLET | Refills: 0 | Status: SHIPPED | OUTPATIENT
Start: 2018-02-16 | End: 2018-04-16 | Stop reason: SDUPTHER

## 2018-02-16 RX ORDER — LEVOTHYROXINE SODIUM 112 UG/1
112 TABLET ORAL
Qty: 30 TABLET | Refills: 0 | Status: SHIPPED | OUTPATIENT
Start: 2018-02-16 | End: 2018-10-18 | Stop reason: SDUPTHER

## 2018-02-16 NOTE — PROGRESS NOTES
Liang Monterroso  was seen at the request of  Regulo Haider III, MD for sleep evaluation.    02/16/2018: INITIAL HISTORY OF PRESENT ILLNESS:  Liang Monterroso Jr. is a 77 y.o. male is here to be evaluated for a sleep disorder.       CHIEF COMPLAINT:      Surgery for meningeoma last year followed by prolonged steroid course -> had some difficulty falling staying asleep -> improved on Melatonin -> recently reports excessive daytime sleepiness.  He is told to be snoring sometimes.     Some recent difficulty heart racing.     Denies  dry mouth and sore throat  Denies nasal congestion   Denies  morning headaches  Denies  interrupted sleep  Reports frequent leg movements  Reports symptoms concerning for parasomnia    The ESS ([place] Sleepiness Score) taken on initial visit is 6 /24    The patient had tonsillectomy in the past      SLEEP ROUTINE AND LIFESTYLE 02/16/2018 :    Occupation:    Bed partner:      Time to bed - wake up time on a workday : 9:30 PM to 6  Time to bed - wake up time on a day off: same   Sleep onset latency: 5 min  Disruptions or awakenings: 1-2 (sometimes 0)  Time to fall back into sleep: 10-30 min   Perceived sleep quality: 4-5/5  Perceived total sleep time:  8  hours.  Daytime naps: feels like taking a nap in the afternoon    Exercise routine: yes  Caffeine:       PREVIOUS SLEEP STUDIES:     none      DME:       PAST MEDICAL HISTORY:    Active Ambulatory Problems     Diagnosis Date Noted    Allergic rhinitis due to pollen 08/01/2012    Hypertension 08/01/2012    Hypothyroid 10/09/2012    Hyperlipidemia 10/09/2012    BPH with urinary obstruction 11/26/2012    Aortic atherosclerosis 10/12/2015    CKD (chronic kidney disease) stage 3, GFR 30-59 ml/min 06/15/2016    Dupuytren contracture 09/27/2016    Decreased ROM of neck 02/28/2017    Impaired functional mobility, balance, gait, and endurance 02/28/2017    Constipation 03/15/2017    Inguinal lymphadenopathy- Left  03/15/2017    Meningioma  03/20/2017    Cervical pain 05/23/2017    Prostatitis 06/23/2017    BPH (benign prostatic hyperplasia) 07/21/2017     Resolved Ambulatory Problems     Diagnosis Date Noted    Cerumen impaction 09/26/2012    Prostatitis, acute 11/26/2012    Encounter for screening colonoscopy 04/15/2014    UTI (urinary tract infection) 07/29/2015    Pain aggravated by physical activity 02/28/2017     Past Medical History:   Diagnosis Date    Allergy     CKD (chronic kidney disease) stage 3, GFR 30-59 ml/min 6/15/2016    GERD (gastroesophageal reflux disease)     Hyperlipidemia     Hypertension     Hypospadias in male     Hypothyroidism     Thyroid disease     Urinary tract infection                 PAST SURGICAL HISTORY:    Past Surgical History:   Procedure Laterality Date    APPENDECTOMY      CATARACT EXTRACTION      EYE SURGERY      HERNIA REPAIR      TONSILLECTOMY           FAMILY HISTORY:                Family History   Problem Relation Age of Onset    Diabetes Mother     Heart disease Mother     Hypertension Mother     Vision loss Mother     Dementia Mother     Alcohol abuse Father     Cancer Sister      lung with mets, was a heavy smoker    No Known Problems Daughter     No Known Problems Son     No Known Problems Daughter     No Known Problems Son     Amblyopia Neg Hx     Blindness Neg Hx     Cataracts Neg Hx     Glaucoma Neg Hx     Macular degeneration Neg Hx     Retinal detachment Neg Hx     Strabismus Neg Hx     Stroke Neg Hx     Thyroid disease Neg Hx        SOCIAL HISTORY:          Tobacco:   History   Smoking Status    Never Smoker   Smokeless Tobacco    Never Used       alcohol use:    History   Alcohol Use    Yes     Comment: 1-3 glasses wine weekly, 2-3 beers weekly                   ALLERGIES:    Review of patient's allergies indicates:   Allergen Reactions    Contrast media Hives and Itching     Iv dye       CURRENT MEDICATIONS:    Current Outpatient Prescriptions  "  Medication Sig Dispense Refill    aspirin (ECOTRIN) 81 MG EC tablet Take 81 mg by mouth once daily.      doxycycline (MONODOX) 100 MG capsule Take 1 capsule (100 mg total) by mouth 2 (two) times daily. 10 capsule 0    econazole nitrate 1 % cream USE TWICE DAILY (Patient taking differently: USE TWICE DAILY (prn)) 60 g 5    famotidine (PEPCID) 20 MG tablet Take 20 mg by mouth nightly as needed.       metronidazole (METROGEL) 0.75 % gel Use hs 45 g 3    multivitamin with minerals tablet Take 1 tablet by mouth once daily.        oxycodone-acetaminophen (PERCOCET) 5-325 mg per tablet Take 1 tablet by mouth every 4 (four) hours as needed for Pain. 31 tablet 0    polyethylene glycol (GLYCOLAX) 17 gram PwPk Take 17 g by mouth once daily. 30 packet 0    pravastatin (PRAVACHOL) 20 MG tablet Take 1 tablet (20 mg total) by mouth once daily. 90 tablet 3    tamsulosin (FLOMAX) 0.4 mg Cp24 Take 2 capsules (0.8 mg total) by mouth once daily. 180 capsule 3    triamcinolone acetonide 0.1% (KENALOG) 0.1 % cream Use bid prn rash 80 g 3    fexofenadine (ALLEGRA) 180 MG tablet Take 1 tablet (180 mg total) by mouth once daily. (Patient taking differently: Take 180 mg by mouth daily as needed. ) 30 tablet 11    levothyroxine (SYNTHROID) 112 MCG tablet Take 1 tablet (112 mcg total) by mouth before breakfast. 30 tablet 0    ramipril (ALTACE) 5 MG capsule Take 1 capsule (5 mg total) by mouth once daily. 30 capsule 0     No current facility-administered medications for this visit.                       REVIEW OF SYSTEMS:   Sleep related symptoms as per HPI    reports weight gain  Denies dyspnea  Reports occasional palpitations  Reports acid reflux   Denies polyuria  Denies  mood diturbance  Denies  anemia  Denies  muscle pain  Denies  Gait imbalance    Otherwise, a balance of 10 systems reviewed is negative.    PHYSICAL EXAM:  /84   Pulse 68   Ht 5' 8" (1.727 m)   Wt 85.3 kg (188 lb 1.6 oz)   BMI 28.60 kg/m² " "  GENERAL: Overweight body habitus, well groomed.  HEENT:   HEENT:  Conjunctivae are non-erythematous; Pupils equal, round, and reactive to light; Nose is symmetrical; Nasal mucosa is pink and moist; Septum is midline; Inferior turbinates are hypertrophied; Nasal airflow is normal; Posterior pharynx is pink; Modified Mallampati:III-IV; Posterior palate is very low; Tonsils not visualized; Uvula is wide and elongated;Tongue is enlarged; Dentition is fair; No TMJ tenderness; Jaw opening and protrusion without click and without discomfort.  NECK: Supple. Neck circumference is 16.5 inches. No thyromegaly. No palpable nodes.     SKIN: On face and neck: No abrasions, no rashes, no lesions.  No subcutaneous nodules are palpable.  RESPIRATORY: Chest is clear to auscultation.  Normal chest expansion and non-labored breathing at rest.  CARDIOVASCULAR: Normal S1, S2.  No murmurs, gallops or rubs. No carotid bruits bilaterally.  No edema. No clubbing. No cyanosis.    NEURO: Oriented to time, place and person. Normal attention span and concentration. Gait normal.    PSYCH: Affect is full. Mood is normal  MUSCULOSKELETAL: Moves 4 extremities. Gait normal.         Using [facility]:       ASSESSMENT:    1. Sleep Apnea NEC. The patient symptomatically has  excessive daytime sleepiness, snoring, excessive daytime fatigue and interrupted sleep  with exam findings of "a crowded oral airway and elevated body mass index. The patient has medical co-morbidities of hyperlipidemia and hypertension,  which can be worsened by JUDITH. This warrants further investigation for possible obstructive sleep apnea.          PLAN:    Diagnostic: Polysomnogram in lab. The nature of this procedure and its indication was discussed with the patient. he would  like to come discuss PSG results.    Weight loss strategies were discussed in detail       More than 25 minutes of this 45 minutes visit was spent in counseling: during our discussion today, we talked " about the etiology of  JUDITH as well as the potential ramifications of untreated sleep apnea, which could include daytime sleepiness, hypertension, heart disease and/or stroke.  We discussed potential treatment options, which could include weight loss, body positioning, continuous positive airway pressure (CPAP), or referral for surgical consideration. Meanwhile, he  is urged to avoid supine sleep, weight gain and alcoholic beverages since all of these can worsen JUDITH.     Precautions: The patient was advised to abstain from driving should he feel sleepy or drowsy.    Follow up: MD/NP  after the sleep study has been completed.     Thank you for allowing me the opportunity to participate in the care of your patient.    This visit summary will be sent to referring provider via inbasket

## 2018-02-16 NOTE — PATIENT INSTRUCTIONS
SLEEP LAB ([facility]) will contact you to schedulethe sleep study. Their number is [phone] (ext 2). Please call them if you do not hear from them in 10 business days from now.  The [facility] is located on 7th floor of the [facility]; [place] lab is located in [facility].    SLEEP CLINIC (my assistant) will call you when the sleep study results are ready - if you have not heard from us by 2 weeks from the date of the study, please call [phone] (ext 1) or you can use [facility] to contact me.    You are advised to abstain from driving should you feel sleepy or drowsy.

## 2018-02-16 NOTE — LETTER
February 16, 2018      Regulo Haider III, MD  [address]  [address]  [facility] [address]           [facility]  [address] [facility] [address]  Phone: [phone]  Fax: [phone]          Patient: Liang Monterroso Jr.   MR Number: [id number]   YOB: 1940   Date of Visit: 2/16/2018       Dear Dr. Regulo Haider III:    Thank you for referring Liang Monterroso to me for evaluation. Attached you will find relevant portions of my assessment and plan of care.    If you have questions, please do not hesitate to call me. I look forward to following Liang Monterroso along with you.    Sincerely,    Celestina Milligan MD    Enclosure  CC:  No Recipients    If you would like to receive this communication electronically, please contact [email or web address] or [phone] to request more information on [facility] access.    For providers and/or their staff who would like to refer a patient to [facility], please contact us through our one-stop-shop provider referral line, [facility], at [phone].    If you feel you have received this communication in error or would no longer like to receive these types of communications, please e-mail [email or web address]

## 2018-02-23 ENCOUNTER — TELEPHONE (OUTPATIENT)
Dept: SLEEP MEDICINE | Facility: OTHER | Age: 78
End: 2018-02-23

## 2018-04-03 ENCOUNTER — LAB VISIT (OUTPATIENT)
Dept: LAB | Facility: HOSPITAL | Age: 78
End: 2018-04-03
Attending: FAMILY MEDICINE
Payer: MEDICARE

## 2018-04-03 ENCOUNTER — OFFICE VISIT (OUTPATIENT)
Dept: INTERNAL MEDICINE | Facility: CLINIC | Age: 78
End: 2018-04-03
Payer: MEDICARE

## 2018-04-03 VITALS
OXYGEN SATURATION: 97 % | WEIGHT: 187.63 LBS | HEART RATE: 68 BPM | BODY MASS INDEX: 28.44 KG/M2 | HEIGHT: 68 IN | DIASTOLIC BLOOD PRESSURE: 70 MMHG | SYSTOLIC BLOOD PRESSURE: 114 MMHG

## 2018-04-03 DIAGNOSIS — R53.83 OTHER FATIGUE: ICD-10-CM

## 2018-04-03 DIAGNOSIS — Z00.00 PREVENTATIVE HEALTH CARE: ICD-10-CM

## 2018-04-03 DIAGNOSIS — I70.0 AORTIC ATHEROSCLEROSIS: ICD-10-CM

## 2018-04-03 DIAGNOSIS — N18.30 CKD (CHRONIC KIDNEY DISEASE) STAGE 3, GFR 30-59 ML/MIN: ICD-10-CM

## 2018-04-03 DIAGNOSIS — E03.9 ACQUIRED HYPOTHYROIDISM: ICD-10-CM

## 2018-04-03 DIAGNOSIS — I10 ESSENTIAL HYPERTENSION: ICD-10-CM

## 2018-04-03 DIAGNOSIS — E78.00 PURE HYPERCHOLESTEROLEMIA: ICD-10-CM

## 2018-04-03 DIAGNOSIS — I10 ESSENTIAL HYPERTENSION: Primary | ICD-10-CM

## 2018-04-03 LAB
ALBUMIN SERPL BCP-MCNC: 3.9 G/DL
ALP SERPL-CCNC: 48 U/L
ALT SERPL W/O P-5'-P-CCNC: 18 U/L
ANION GAP SERPL CALC-SCNC: 9 MMOL/L
AST SERPL-CCNC: 19 U/L
BASOPHILS # BLD AUTO: 0.03 K/UL
BASOPHILS NFR BLD: 0.5 %
BILIRUB SERPL-MCNC: 0.5 MG/DL
BNP SERPL-MCNC: 43 PG/ML
BUN SERPL-MCNC: 18 MG/DL
CALCIUM SERPL-MCNC: 9.3 MG/DL
CHLORIDE SERPL-SCNC: 105 MMOL/L
CHOLEST SERPL-MCNC: 198 MG/DL
CHOLEST/HDLC SERPL: 3.4 {RATIO}
CO2 SERPL-SCNC: 24 MMOL/L
CREAT SERPL-MCNC: 1.2 MG/DL
DIFFERENTIAL METHOD: ABNORMAL
EOSINOPHIL # BLD AUTO: 0.2 K/UL
EOSINOPHIL NFR BLD: 3.5 %
ERYTHROCYTE [DISTWIDTH] IN BLOOD BY AUTOMATED COUNT: 14.8 %
EST. GFR  (AFRICAN AMERICAN): >60 ML/MIN/1.73 M^2
EST. GFR  (NON AFRICAN AMERICAN): 58 ML/MIN/1.73 M^2
ESTIMATED AVG GLUCOSE: 100 MG/DL
GLUCOSE SERPL-MCNC: 95 MG/DL
HBA1C MFR BLD HPLC: 5.1 %
HCT VFR BLD AUTO: 44.8 %
HDLC SERPL-MCNC: 58 MG/DL
HDLC SERPL: 29.3 %
HGB BLD-MCNC: 14.8 G/DL
LDLC SERPL CALC-MCNC: 118 MG/DL
LYMPHOCYTES # BLD AUTO: 2.6 K/UL
LYMPHOCYTES NFR BLD: 41.5 %
MCH RBC QN AUTO: 28.4 PG
MCHC RBC AUTO-ENTMCNC: 33 G/DL
MCV RBC AUTO: 86 FL
MONOCYTES # BLD AUTO: 0.4 K/UL
MONOCYTES NFR BLD: 6.1 %
NEUTROPHILS # BLD AUTO: 3 K/UL
NEUTROPHILS NFR BLD: 48.4 %
NONHDLC SERPL-MCNC: 140 MG/DL
PLATELET # BLD AUTO: 222 K/UL
PMV BLD AUTO: 10.9 FL
POTASSIUM SERPL-SCNC: 4.5 MMOL/L
PROT SERPL-MCNC: 7.1 G/DL
RBC # BLD AUTO: 5.22 M/UL
SODIUM SERPL-SCNC: 138 MMOL/L
T4 FREE SERPL-MCNC: 1.39 NG/DL
TRIGL SERPL-MCNC: 110 MG/DL
TSH SERPL DL<=0.005 MIU/L-ACNC: 0.13 UIU/ML
WBC # BLD AUTO: 6.27 K/UL

## 2018-04-03 PROCEDURE — 84443 ASSAY THYROID STIM HORMONE: CPT

## 2018-04-03 PROCEDURE — 3078F DIAST BP <80 MM HG: CPT | Mod: CPTII,S$GLB,, | Performed by: FAMILY MEDICINE

## 2018-04-03 PROCEDURE — 80053 COMPREHEN METABOLIC PANEL: CPT

## 2018-04-03 PROCEDURE — 99397 PER PM REEVAL EST PAT 65+ YR: CPT | Mod: S$GLB,,, | Performed by: FAMILY MEDICINE

## 2018-04-03 PROCEDURE — 80061 LIPID PANEL: CPT

## 2018-04-03 PROCEDURE — 3074F SYST BP LT 130 MM HG: CPT | Mod: CPTII,S$GLB,, | Performed by: FAMILY MEDICINE

## 2018-04-03 PROCEDURE — 84439 ASSAY OF FREE THYROXINE: CPT

## 2018-04-03 PROCEDURE — 85025 COMPLETE CBC W/AUTO DIFF WBC: CPT

## 2018-04-03 PROCEDURE — 83880 ASSAY OF NATRIURETIC PEPTIDE: CPT

## 2018-04-03 PROCEDURE — 83036 HEMOGLOBIN GLYCOSYLATED A1C: CPT

## 2018-04-03 PROCEDURE — 36415 COLL VENOUS BLD VENIPUNCTURE: CPT

## 2018-04-03 PROCEDURE — 99999 PR PBB SHADOW E&M-EST. PATIENT-LVL IV: CPT | Mod: PBBFAC,,, | Performed by: FAMILY MEDICINE

## 2018-04-03 PROCEDURE — 99499 UNLISTED E&M SERVICE: CPT | Mod: S$GLB,,, | Performed by: FAMILY MEDICINE

## 2018-04-03 NOTE — PROGRESS NOTES
Subjective:       Patient ID: Liang Monterroso Jr. is a 77 y.o. male.    Chief Complaint: Follow-up  Liang Monterroso Jr. 77 y.o. male is here for office visit to review care and physical exam, here for usual care.  Feels well with unremarkable ros except has fatigue noted.  Could have sleep issue, \[facility]\"" sleep ctr appt. Otherwise ros unremarkable.      HPI  Review of Systems   Constitutional: Negative for activity change, appetite change, fatigue, fever and unexpected weight change.   HENT: Negative for congestion, hearing loss, postnasal drip and rhinorrhea.    Eyes: Negative for pain, discharge and visual disturbance.   Respiratory: Negative for cough, choking and shortness of breath.    Cardiovascular: Negative for chest pain, palpitations and leg swelling.   Gastrointestinal: Negative for abdominal pain, diarrhea and vomiting.   Genitourinary: Negative for dysuria, flank pain, hematuria and urgency.   Musculoskeletal: Negative for arthralgias, back pain, joint swelling and neck pain.   Skin: Negative for color change and rash.   Neurological: Negative for dizziness, tremors, syncope, weakness, numbness and headaches.   Psychiatric/Behavioral: Negative for agitation and confusion. The patient is not hyperactive.        Objective:      Physical Exam   Constitutional: He is oriented to person, place, and time. He appears well-developed and well-nourished.   HENT:   Head: Normocephalic.   Eyes: EOM are normal. Pupils are equal, round, and reactive to light.   Neck: Normal range of motion. Neck supple. No thyromegaly present.   Cardiovascular: Normal rate.  Exam reveals no gallop and no friction rub.    No murmur heard.  Pulmonary/Chest: Effort normal. No respiratory distress. He has no wheezes.   Abdominal: Soft. Bowel sounds are normal. He exhibits no mass. There is no tenderness.   Musculoskeletal: He exhibits no edema or tenderness.   Lymphadenopathy:     He has no cervical adenopathy.   Neurological: He is alert and  oriented to person, place, and time. He has normal reflexes. No cranial nerve deficit.   Skin: Skin is warm. No rash noted.   Psychiatric: He has a normal mood and affect. His behavior is normal.       Assessment:       1. Essential hypertension    2. Pure hypercholesterolemia    3. Acquired hypothyroidism    4. CKD (chronic kidney disease) stage 3, GFR 30-59 ml/min    5. Aortic atherosclerosis    6. Other fatigue    7. Preventative health care        Plan:       Liang was seen today for follow-up.    Diagnoses and all orders for this visit:    Essential hypertension  -     Comprehensive metabolic panel; Future, presently controled    Pure hypercholesterolemia  -     Comprehensive metabolic panel; Future  -     Lipid panel; Future    Acquired hypothyroidism  -     TSH; Future, usually controled    CKD (chronic kidney disease) stage 3, GFR 30-59 ml/min  -     Comprehensive metabolic panel; Future, conrrrol risk    Aortic atherosclerosis  -     Lipid panel; Future, chart reviewed  -     CBC auto differential; Future    Other fatigue  -     CBC auto differential; Future, f/u ssleep ctr  -     TSH; Future  -     Brain natriuretic peptide; Future    Preventative health care  -     Comprehensive metabolic panel; Future  -     Lipid panel; Future  -     CBC auto differential; Future  -     Hemoglobin A1c; Future  -     TSH; Future  -     Brain natriuretic peptide; Future

## 2018-04-04 ENCOUNTER — PATIENT MESSAGE (OUTPATIENT)
Dept: INTERNAL MEDICINE | Facility: CLINIC | Age: 78
End: 2018-04-04

## 2018-04-05 ENCOUNTER — PATIENT MESSAGE (OUTPATIENT)
Dept: INTERNAL MEDICINE | Facility: CLINIC | Age: 78
End: 2018-04-05

## 2018-04-16 DIAGNOSIS — I10 ESSENTIAL HYPERTENSION: ICD-10-CM

## 2018-04-16 RX ORDER — RAMIPRIL 5 MG/1
CAPSULE ORAL
Qty: 30 CAPSULE | Refills: 0 | Status: SHIPPED | OUTPATIENT
Start: 2018-04-16 | End: 2018-05-10 | Stop reason: SDUPTHER

## 2018-04-16 RX ORDER — LEVOTHYROXINE SODIUM 112 UG/1
112 TABLET ORAL
Qty: 30 TABLET | Refills: 5 | Status: SHIPPED | OUTPATIENT
Start: 2018-04-16 | End: 2018-05-10 | Stop reason: SDUPTHER

## 2018-04-17 ENCOUNTER — PES CALL (OUTPATIENT)
Dept: ADMINISTRATIVE | Facility: CLINIC | Age: 78
End: 2018-04-17

## 2018-04-24 ENCOUNTER — HOSPITAL ENCOUNTER (OUTPATIENT)
Dept: SLEEP MEDICINE | Facility: HOSPITAL | Age: 78
Discharge: HOME | End: 2018-04-24
Attending: PSYCHIATRY & NEUROLOGY
Payer: MEDICARE

## 2018-04-24 DIAGNOSIS — G47.30 SLEEP APNEA, UNSPECIFIED TYPE: ICD-10-CM

## 2018-04-24 DIAGNOSIS — G47.33 OSA (OBSTRUCTIVE SLEEP APNEA): ICD-10-CM

## 2018-04-24 PROCEDURE — 95810 POLYSOM 6/> YRS 4/> PARAM: CPT

## 2018-04-24 PROCEDURE — 95810 PR POLYSOMNOGRAPHY, 4 OR MORE: ICD-10-PCS | Mod: 26,,, | Performed by: PSYCHIATRY & NEUROLOGY

## 2018-04-24 PROCEDURE — 95810 POLYSOM 6/> YRS 4/> PARAM: CPT | Mod: 26,,, | Performed by: PSYCHIATRY & NEUROLOGY

## 2018-04-25 NOTE — PROGRESS NOTES
Education was done via explanation of sleep study process and procedure. All questions were answered.    Pt. did not meet criteria for CPAP. Resiratory events were observed mainly during supine sleep and late in study during side REM sleep. Supine sleep was encouraged.    Low sat of 85% was observed in study. EKG revealed frequent PVC  and  bradycardia. Soft to moderate snoring was heard. Thank you letter was given in a.m.

## 2018-05-08 ENCOUNTER — TELEPHONE (OUTPATIENT)
Dept: SLEEP MEDICINE | Facility: CLINIC | Age: 78
End: 2018-05-08

## 2018-05-08 NOTE — TELEPHONE ENCOUNTER
Please schedule for the follow up with MD / NP  to review sleep study results.  Positive for significant sleep apnea.    Thanks!

## 2018-05-10 ENCOUNTER — OFFICE VISIT (OUTPATIENT)
Dept: SLEEP MEDICINE | Facility: CLINIC | Age: 78
End: 2018-05-10
Payer: MEDICARE

## 2018-05-10 VITALS
BODY MASS INDEX: 28.57 KG/M2 | SYSTOLIC BLOOD PRESSURE: 128 MMHG | HEART RATE: 73 BPM | DIASTOLIC BLOOD PRESSURE: 77 MMHG | WEIGHT: 188.5 LBS | HEIGHT: 68 IN

## 2018-05-10 DIAGNOSIS — I10 ESSENTIAL HYPERTENSION: ICD-10-CM

## 2018-05-10 DIAGNOSIS — N18.30 STAGE 3 CHRONIC KIDNEY DISEASE: ICD-10-CM

## 2018-05-10 DIAGNOSIS — G47.33 OSA (OBSTRUCTIVE SLEEP APNEA): Primary | ICD-10-CM

## 2018-05-10 PROCEDURE — 99999 PR PBB SHADOW E&M-EST. PATIENT-LVL III: CPT | Mod: PBBFAC,,, | Performed by: NURSE PRACTITIONER

## 2018-05-10 PROCEDURE — 99214 OFFICE O/P EST MOD 30 MIN: CPT | Mod: S$GLB,,, | Performed by: NURSE PRACTITIONER

## 2018-05-10 PROCEDURE — 99499 UNLISTED E&M SERVICE: CPT | Mod: S$GLB,,, | Performed by: NURSE PRACTITIONER

## 2018-05-10 PROCEDURE — 3074F SYST BP LT 130 MM HG: CPT | Mod: CPTII,S$GLB,, | Performed by: NURSE PRACTITIONER

## 2018-05-10 PROCEDURE — 3078F DIAST BP <80 MM HG: CPT | Mod: CPTII,S$GLB,, | Performed by: NURSE PRACTITIONER

## 2018-05-10 NOTE — PROGRESS NOTES
Liang Monterroso  was seen as f/u today for mgt of JUDITH. Last seen by MD 18, this is my initial visit with him.     He has since undergone an in -lab sleep study which we reviewed together today in detail. Continues to feel lack of energy and lethargic. He wants to do things but body physically not on same page. ESS=4. Symptoms began after steroid course 2017 following meningioma resection. He stays active/attends gym regularly. Denies dyspnea with stair climbing (has stairs at home and camp). Side sleeper. Denies claustrophobia. Does not think he could sleep with mask on his face.     PS18: AHI was 15.0 with an oxygen juan of 81.0%. The supine AHI was 50.7 and the REM AHI was 28.1       HISTORY  2018: INITIAL HISTORY OF PRESENT ILLNESS:  Liang Monterroso Jr. is a 77 y.o. male is here to be evaluated for a sleep disorder.       CHIEF COMPLAINT:      Surgery for meningeoma last year followed by prolonged steroid course -> had some difficulty falling staying asleep -> improved on Melatonin -> recently reports excessive daytime sleepiness.  He is told to be snoring sometimes.     Some recent difficulty heart racing.     Denies  dry mouth and sore throat  Denies nasal congestion   Denies  morning headaches  Denies  interrupted sleep  Reports frequent leg movements  Reports symptoms concerning for parasomnia    The ESS ([place] Sleepiness Score) taken on initial visit is     The patient had tonsillectomy in the past      SLEEP ROUTINE AND LIFESTYLE 05/10/2018 :    Occupation:    Bed partner:      Time to bed - wake up time on a workday : 9:30 PM to 6  Time to bed - wake up time on a day off: same   Sleep onset latency: 5 min  Disruptions or awakenings: 1-2 (sometimes 0)  Time to fall back into sleep: 10-30 min   Perceived sleep quality: 4-5/5  Perceived total sleep time:  8  hours.  Daytime naps: feels like taking a nap in the afternoon                 REVIEW OF SYSTEMS: Sleep related symptoms as per  "HPI, stable wgt. Otherwise a balance review of 10-systems is negative.     PHYSICAL EXAM:  /77   Pulse 73   Ht 5' 8" (1.727 m)   Wt 85.5 kg (188 lb 8 oz)   BMI 28.66 kg/m²   GENERAL: Overweight body habitus, well groomed.        ASSESSMENT:    1. JUDITH. Moderate.   He has medical co-morbidities of hyperlipidemia, CKD stage III,  and hypertension,  which can be worsened by JUDITH.      PLAN:  We discussed potential treatment options, which could include weight loss (10-15%), body positioning, continuous positive airway pressure (CPAP-defintive), mandibular advancement splint by dentist, or referral for surgical consideration. Discussed etiology of JUDITH and potential ramifications of untreated JUDITH, including heart disease, hypertension, cognitive difficulties, stroke, and diabetes.      Defers treatment at this time, avoid supine sleep. Notify office if decide to trial apap setup with adherence monitoring.    See PCP re: HTN/CKD mgt  "

## 2018-05-10 NOTE — PATIENT INSTRUCTIONS
Obstructive Sleep Apnea  Obstructive sleep apnea is a condition that causes your air passages to become narrowed or blocked during sleep. As a result, breathing stops for short periods. Your body wakes up enough for breathing to begin again, though you don't remember it. The cycle of stopped breathing and brief awakenings can repeat dozens of times a night. This prevents the body from getting to the deeper stages of sleep that are needed for good rest and may cause your body's oxygen level to fall.  Signs of sleep apnea include loud snoring, noisy breathing, and gasping sounds during sleep. Daytime symptoms include waking up tired after a full night's sleep, waking up with headaches, feeling very sleepy or falling asleep during the day, and having problems with memory or concentration.  Risk factors for sleep apnea include:  · Being overweight  · Being a man, or a woman in menopause  · Smoking  · Using alcohol or sedating medicines  · Having enlarged structures in the nose or throat  Home care  Lifestyle changes that can help treat snoring and sleep apnea include the following:  · If you are overweight, lose weight. Talk to your healthcare provider about a weight-loss plan for you.  · Avoid alcohol for 3 to 4 hours before bedtime. Avoid sedating medications. Ask your healthcare provider about the medicines you take.  · If you smoke, talk to your healthcare provider about ways to quit.  · Sleep on your side. This can help prevent gravity from pulling relaxed throat tissues into your breathing passages.  · If you have allergies or sinus problems that block your nose, ask your healthcare provider for help.  Follow-up care  Follow up with your healthcare provider, or as advised. A diagnosis of sleep apnea is made with a sleep study. Your healthcare provider can tell you more about this test.  When to seek medical advice  Sleep apnea can make you more likely to have certain health problems. These include high blood  pressure, heart attack, stroke, and sexual dysfunction. If you have sleep apnea, talk to your healthcare provider about the best treatments for you.  Date Last Reviewed: 4/1/2017  © 4045-3566 [employer]. [address] [place] [address], [place] [address]. All rights reserved. This information is not intended as a substitute for professional medical care. Always follow your healthcare professional's instructions.

## 2018-05-16 DIAGNOSIS — I10 ESSENTIAL HYPERTENSION: ICD-10-CM

## 2018-05-16 RX ORDER — RAMIPRIL 5 MG/1
CAPSULE ORAL
Qty: 30 CAPSULE | Refills: 0 | Status: SHIPPED | OUTPATIENT
Start: 2018-05-16 | End: 2018-06-12 | Stop reason: SDUPTHER

## 2018-06-12 DIAGNOSIS — I10 ESSENTIAL HYPERTENSION: ICD-10-CM

## 2018-06-12 RX ORDER — RAMIPRIL 5 MG/1
5 CAPSULE ORAL DAILY
Qty: 30 CAPSULE | Refills: 5 | Status: SHIPPED | OUTPATIENT
Start: 2018-06-12 | End: 2018-11-05 | Stop reason: SDUPTHER

## 2018-06-25 RX ORDER — PRAVASTATIN SODIUM 20 MG/1
TABLET ORAL
Qty: 90 TABLET | Refills: 0 | Status: SHIPPED | OUTPATIENT
Start: 2018-06-25 | End: 2018-10-04 | Stop reason: SDUPTHER

## 2018-06-28 ENCOUNTER — PATIENT MESSAGE (OUTPATIENT)
Dept: INTERNAL MEDICINE | Facility: CLINIC | Age: 78
End: 2018-06-28

## 2018-07-02 ENCOUNTER — OFFICE VISIT (OUTPATIENT)
Dept: OPTOMETRY | Facility: CLINIC | Age: 78
End: 2018-07-02
Payer: COMMERCIAL

## 2018-07-02 DIAGNOSIS — H26.493 POSTERIOR CAPSULAR OPACIFICATION NON VISUALLY SIGNIFICANT OF BOTH EYES: ICD-10-CM

## 2018-07-02 DIAGNOSIS — Z98.41 S/P CATARACT SURGERY, RIGHT: ICD-10-CM

## 2018-07-02 DIAGNOSIS — Z13.5 SCREENING FOR EYE CONDITION: ICD-10-CM

## 2018-07-02 DIAGNOSIS — H35.363 MACULAR DRUSEN, BILATERAL: Primary | ICD-10-CM

## 2018-07-02 DIAGNOSIS — Z96.1 PSEUDOPHAKIA: ICD-10-CM

## 2018-07-02 DIAGNOSIS — H52.223 REGULAR ASTIGMATISM OF BOTH EYES: ICD-10-CM

## 2018-07-02 DIAGNOSIS — Z98.42 S/P CATARACT SURGERY, LEFT: ICD-10-CM

## 2018-07-02 PROCEDURE — 92015 DETERMINE REFRACTIVE STATE: CPT | Mod: S$GLB,,, | Performed by: OPTOMETRIST

## 2018-07-02 PROCEDURE — 99999 PR PBB SHADOW E&M-EST. PATIENT-LVL II: CPT | Mod: PBBFAC,,, | Performed by: OPTOMETRIST

## 2018-07-02 PROCEDURE — 92014 COMPRE OPH EXAM EST PT 1/>: CPT | Mod: S$GLB,,, | Performed by: OPTOMETRIST

## 2018-07-02 NOTE — PROGRESS NOTES
"HPI     Concerns About Ocular Health    Additional comments: Eye exam            Comments   Patient's age: 77 y.o. WM  Occupation: Retired   Approximate date of last eye examination:  05/08/2017  Name of last eye doctor seen: Dr. Gill  City/State: [place]  Wears glasses? Yes     If yes, wears  Full-time or part-time?  Part-time  Present glasses are: Bifocal, SV Distance, SV Reading?  SV Reading  Approximate age of present glasses:  1 year   Got new glasses following last exam, or subsequently?:  no   Any problem with VA with glasses?  no  Wears CLs?:  no  Headaches?  no  Eye pain/discomfort?  no                                                                                     Flashes?  no  Floaters?  no  Diplopia/Double vision?  no  Patient's Ocular History:         Any eye surgeries? PCIOL [facility]         Any eye injury?  no         Any treatment for eye disease?  no  Family history of eye disease?  no  Significant patient medical history:         1. Diabetes?  no       If yes, IDDM or NIDDM? no   2. HBP?  Yes              3. Other (describe):  Prostate, Hyperipidemia   ! OTC eyedrops currently using:  no   ! Prescription eye meds currently using:  no   ! Any history of allergy/adverse reaction to any eye meds used   previously?  no    ! Any history of allergy/adverse reaction to eyedrops used during prior   eye exam(s)? no    ! Any history of allergy/adverse reaction to Novacaine or similar meds?   no   ! Any history of allergy/adverse reaction to Epinephrine or similar meds?   no    ! Patient okay with use of anesthetic eyedrops to check eye pressure?    yes        ! Patient okay with use of eyedrops to dilate pupils today?  yes   !  Allergies/Medications/Medical History/Family History reviewed today?    yes      PD =   63/59  Desired reading distance =  17.5"                                                                    Last edited by Emily Bah on 7/2/2018 10:32 AM. (History)    "         Assessment /Plan     For exam results, see Encounter Report.    1. Macular drusen, bilateral     2. Posterior capsular opacification non visually significant of both eyes     3. S/P cataract surgery, left     4. S/P cataract surgery, right     5. Pseudophakia - Both Eyes     6. Regular astigmatism of both eyes     7. Screening for eye condition                   Bilateral macular changes (drusen and pigmentary changes) consistent with age-related macular degeneration, more apparent in the right eye as noted previously.  Continue/resume taking Ocuvite supplement by mouth (recommend AREDS-2) formuation.  S/P cataract surgery in both eyes, with bilateral posterior chamber intraocular lens.  Mild clouding/opacification of the posterior lens capsule in both eyes. No need for YAG laser posterior capsulotomy at this time in either eye.    Astigmatic refractive error in each eye. Good best-corrected VA in each eye.  New spectacle lens Rx issued for use as desired.  Recheck in one year.

## 2018-07-12 ENCOUNTER — OFFICE VISIT (OUTPATIENT)
Dept: DERMATOLOGY | Facility: CLINIC | Age: 78
End: 2018-07-12
Payer: MEDICARE

## 2018-07-12 VITALS — BODY MASS INDEX: 28.59 KG/M2 | WEIGHT: 188 LBS

## 2018-07-12 DIAGNOSIS — R20.9 DISTURBANCE OF SKIN SENSATION: ICD-10-CM

## 2018-07-12 DIAGNOSIS — L91.8 SKIN TAG: ICD-10-CM

## 2018-07-12 DIAGNOSIS — Z87.2 HISTORY OF ACTINIC KERATOSES: ICD-10-CM

## 2018-07-12 DIAGNOSIS — D22.9 NEVUS OF MULTIPLE SITES: ICD-10-CM

## 2018-07-12 DIAGNOSIS — L82.1 SEBORRHEIC KERATOSES: Primary | ICD-10-CM

## 2018-07-12 PROCEDURE — 99213 OFFICE O/P EST LOW 20 MIN: CPT | Mod: 25,S$GLB,, | Performed by: DERMATOLOGY

## 2018-07-12 PROCEDURE — 11200 RMVL SKIN TAGS UP TO&INC 15: CPT | Mod: S$GLB,,, | Performed by: DERMATOLOGY

## 2018-07-12 PROCEDURE — 99999 PR PBB SHADOW E&M-EST. PATIENT-LVL III: CPT | Mod: PBBFAC,,, | Performed by: DERMATOLOGY

## 2018-07-12 RX ORDER — TAMSULOSIN HYDROCHLORIDE 0.4 MG/1
CAPSULE ORAL
COMMUNITY
Start: 2018-05-31 | End: 2018-08-16 | Stop reason: SDUPTHER

## 2018-07-12 NOTE — PROGRESS NOTES
Subjective:       Patient ID:  Liang Monterroso Jr. is a 77 y.o. male who presents for   Chief Complaint   Patient presents with    Skin Check     face.scalp      History of Present Illness: The patient presents with chief complaint of spot.  Location: forehead  Duration: months  Signs/Symptoms: irritated    Prior treatments: none    Also mole on scalp for over a years which he says is tender now, sleeps on right side.           Review of Systems   Constitutional: Negative for fever.   Skin: Negative for itching and rash.   Hematologic/Lymphatic: Does not bruise/bleed easily.        Objective:    Physical Exam   Constitutional: He appears well-developed and well-nourished. No distress.   Neurological: He is alert and oriented to person, place, and time. He is not disoriented.   Psychiatric: He has a normal mood and affect.   Skin:   Areas Examined (abnormalities noted in diagram):   Scalp / Hair Palpated and Inspected  Head / Face Inspection Performed  Neck Inspection Performed  Chest / Axilla Inspection Performed  RUE Inspected  LUE Inspection Performed                   Diagram Legend     Erythematous scaling macule/papule c/w actinic keratosis       Vascular papule c/w angioma      Pigmented verrucoid papule/plaque c/w seborrheic keratosis      Yellow umbilicated papule c/w sebaceous hyperplasia      Irregularly shaped tan macule c/w lentigo     1-2 mm smooth white papules consistent with Milia      Movable subcutaneous cyst with punctum c/w epidermal inclusion cyst      Subcutaneous movable cyst c/w pilar cyst      Firm pink to brown papule c/w dermatofibroma      Pedunculated fleshy papule(s) c/w skin tag(s)      Evenly pigmented macule c/w junctional nevus     Mildly variegated pigmented, slightly irregular-bordered macule c/w mildly atypical nevus      Flesh colored to evenly pigmented papule c/w intradermal nevus       Pink pearly papule/plaque c/w basal cell carcinoma      Erythematous hyperkeratotic cursted  plaque c/w SCC      Surgical scar with no sign of skin cancer recurrence      Open and closed comedones      Inflammatory papules and pustules      Verrucoid papule consistent consistent with wart     Erythematous eczematous patches and plaques     Dystrophic onycholytic nail with subungual debris c/w onychomycosis     Umbilicated papule    Erythematous-base heme-crusted tan verrucoid plaque consistent with inflamed seborrheic keratosis     Erythematous Silvery Scaling Plaque c/w Psoriasis     See annotation      Assessment / Plan:        Seborrheic keratoses  reassurance      Nevus of multiple sites  Reassurance  Call if grows, bleeds on scalp      History of actinic keratoses    Skin tag forehead  Verbal consent obtained. 1 lesion removed with scissor snip removal after anesthesia with 1% lidocaine with epinephrine. Hemostasis achieved with  hyfrecation. No complications.             Follow-up in about 1 year (around 7/12/2019).

## 2018-08-03 ENCOUNTER — LAB VISIT (OUTPATIENT)
Dept: LAB | Facility: HOSPITAL | Age: 78
End: 2018-08-03
Attending: FAMILY MEDICINE
Payer: MEDICARE

## 2018-08-03 ENCOUNTER — OFFICE VISIT (OUTPATIENT)
Dept: INTERNAL MEDICINE | Facility: CLINIC | Age: 78
End: 2018-08-03
Payer: MEDICARE

## 2018-08-03 VITALS
OXYGEN SATURATION: 99 % | WEIGHT: 189.38 LBS | BODY MASS INDEX: 28.7 KG/M2 | HEIGHT: 68 IN | DIASTOLIC BLOOD PRESSURE: 74 MMHG | SYSTOLIC BLOOD PRESSURE: 110 MMHG | HEART RATE: 83 BPM

## 2018-08-03 DIAGNOSIS — Z00.00 PREVENTATIVE HEALTH CARE: ICD-10-CM

## 2018-08-03 DIAGNOSIS — E03.9 ACQUIRED HYPOTHYROIDISM: ICD-10-CM

## 2018-08-03 DIAGNOSIS — J30.1 NON-SEASONAL ALLERGIC RHINITIS DUE TO POLLEN: ICD-10-CM

## 2018-08-03 DIAGNOSIS — M54.2 CERVICAL PAIN: ICD-10-CM

## 2018-08-03 DIAGNOSIS — G47.33 OSA (OBSTRUCTIVE SLEEP APNEA): ICD-10-CM

## 2018-08-03 DIAGNOSIS — D32.9 MENINGIOMA: ICD-10-CM

## 2018-08-03 DIAGNOSIS — I10 ESSENTIAL HYPERTENSION: Primary | ICD-10-CM

## 2018-08-03 LAB
COMPLEXED PSA SERPL-MCNC: 1.8 NG/ML
T4 SERPL-MCNC: 10 UG/DL
TSH SERPL DL<=0.005 MIU/L-ACNC: 0.55 UIU/ML

## 2018-08-03 PROCEDURE — 99499 UNLISTED E&M SERVICE: CPT | Mod: HCNC,S$GLB,, | Performed by: FAMILY MEDICINE

## 2018-08-03 PROCEDURE — 99999 PR PBB SHADOW E&M-EST. PATIENT-LVL IV: CPT | Mod: PBBFAC,,, | Performed by: FAMILY MEDICINE

## 2018-08-03 PROCEDURE — 36415 COLL VENOUS BLD VENIPUNCTURE: CPT

## 2018-08-03 PROCEDURE — 99215 OFFICE O/P EST HI 40 MIN: CPT | Mod: S$GLB,,, | Performed by: FAMILY MEDICINE

## 2018-08-03 PROCEDURE — 3074F SYST BP LT 130 MM HG: CPT | Mod: CPTII,S$GLB,, | Performed by: FAMILY MEDICINE

## 2018-08-03 PROCEDURE — 84436 ASSAY OF TOTAL THYROXINE: CPT

## 2018-08-03 PROCEDURE — 84153 ASSAY OF PSA TOTAL: CPT

## 2018-08-03 PROCEDURE — 84443 ASSAY THYROID STIM HORMONE: CPT

## 2018-08-03 PROCEDURE — 3078F DIAST BP <80 MM HG: CPT | Mod: CPTII,S$GLB,, | Performed by: FAMILY MEDICINE

## 2018-08-03 NOTE — PROGRESS NOTES
Subjective:       Patient ID: Liang Monterroso Jr. is a 78 y.o. male.    Chief Complaint: Follow-up (4 month ) and Pain (head x10 days )   Liang Monterroso Jr. 78 y.o. male is here for office visit to review care and physical exam, here feeling well in general.  Has soreness occipital area, seen by Omid, apparently has a cherry spot on scalp.  Otherwise feels well, no card concern.  Does wonder when to see Neurosurgery again.      HPI       Review of Systems   Constitutional: Negative for activity change, appetite change, fatigue, fever and unexpected weight change.   HENT: Negative for congestion, hearing loss, postnasal drip, rhinorrhea and trouble swallowing.    Eyes: Negative for pain, discharge and visual disturbance.   Respiratory: Negative for cough, choking, chest tightness, shortness of breath and wheezing.    Cardiovascular: Positive for palpitations. Negative for chest pain and leg swelling.   Gastrointestinal: Negative for abdominal pain, blood in stool, constipation, diarrhea and vomiting.   Endocrine: Negative for polydipsia and polyuria.   Genitourinary: Negative for difficulty urinating, dysuria, flank pain, hematuria and urgency.   Musculoskeletal: Positive for neck pain. Negative for arthralgias, back pain and joint swelling.   Skin: Negative for color change and rash.   Neurological: Negative for dizziness, tremors, syncope, weakness, numbness and headaches.   Psychiatric/Behavioral: Negative for agitation, confusion and dysphoric mood. The patient is not hyperactive.        Objective:      Physical Exam   Constitutional: He is oriented to person, place, and time. He appears well-developed and well-nourished.   HENT:   Head: Normocephalic.   Eyes: EOM are normal. Pupils are equal, round, and reactive to light.   Neck: Normal range of motion. Neck supple. No thyromegaly present.   Cardiovascular: Normal rate.  Exam reveals no gallop and no friction rub.    No murmur heard.  Pulmonary/Chest: Effort normal.  No respiratory distress. He has no wheezes.   Abdominal: Soft. Bowel sounds are normal. He exhibits no mass. There is no tenderness.   Musculoskeletal: He exhibits no edema or tenderness.   Lymphadenopathy:     He has no cervical adenopathy.   Neurological: He is alert and oriented to person, place, and time. He has normal reflexes. No cranial nerve deficit.   Skin: Skin is warm. No rash noted.   Psychiatric: He has a normal mood and affect. His behavior is normal.       Assessment:       1. Essential hypertension    2. JUDITH (obstructive sleep apnea)    3. Non-seasonal allergic rhinitis due to pollen    4. Cervical pain    5. Meningioma    6. Acquired hypothyroidism    7. Preventative health care        Plan:       Liang was seen today for follow-up and pain.    Diagnoses and all orders for this visit:    Essential hypertension  - follow  JUDITH (obstructive sleep apnea)  - reports doesn't need CPAP  Non-seasonal allergic rhinitis due to pollen  - no present covcnern  Cervical pain  - discussed  Meningioma  - Has Oct f/u

## 2018-08-21 RX ORDER — TAMSULOSIN HYDROCHLORIDE 0.4 MG/1
CAPSULE ORAL
Qty: 180 CAPSULE | Refills: 2 | Status: SHIPPED | OUTPATIENT
Start: 2018-08-21 | End: 2018-11-14 | Stop reason: SDUPTHER

## 2018-08-23 ENCOUNTER — TELEPHONE (OUTPATIENT)
Dept: NEUROSURGERY | Facility: CLINIC | Age: 78
End: 2018-08-23

## 2018-08-23 NOTE — TELEPHONE ENCOUNTER
Informed pt that I was able to schedule his creatinine, MRI Brain W WO, and appt w/ Dr. Chen for 10/9, Pt aware of times and locations of each, will send letter in the mail to confirm.

## 2018-08-25 ENCOUNTER — PATIENT MESSAGE (OUTPATIENT)
Dept: UROLOGY | Facility: CLINIC | Age: 78
End: 2018-08-25

## 2018-08-27 ENCOUNTER — OFFICE VISIT (OUTPATIENT)
Dept: UROLOGY | Facility: CLINIC | Age: 78
End: 2018-08-27
Payer: MEDICARE

## 2018-08-27 VITALS
SYSTOLIC BLOOD PRESSURE: 131 MMHG | WEIGHT: 188.5 LBS | HEART RATE: 98 BPM | HEIGHT: 68 IN | BODY MASS INDEX: 28.57 KG/M2 | DIASTOLIC BLOOD PRESSURE: 80 MMHG

## 2018-08-27 DIAGNOSIS — R33.9 INCOMPLETE EMPTYING OF BLADDER: ICD-10-CM

## 2018-08-27 DIAGNOSIS — R39.198 DIFFICULTY URINATING: Primary | ICD-10-CM

## 2018-08-27 DIAGNOSIS — N13.8 BPH WITH URINARY OBSTRUCTION: ICD-10-CM

## 2018-08-27 DIAGNOSIS — N40.1 BPH WITH URINARY OBSTRUCTION: ICD-10-CM

## 2018-08-27 DIAGNOSIS — N42.81 PROSTADYNIA: ICD-10-CM

## 2018-08-27 LAB
BILIRUB SERPL-MCNC: NORMAL MG/DL
BLOOD URINE, POC: NORMAL
COLOR, POC UA: YELLOW
GLUCOSE UR QL STRIP: NORMAL
KETONES UR QL STRIP: NORMAL
LEUKOCYTE ESTERASE URINE, POC: NORMAL
NITRITE, POC UA: NORMAL
PH, POC UA: 7
POC RESIDUAL URINE VOLUME: 381 ML (ref 0–100)
PROTEIN, POC: NORMAL
SPECIFIC GRAVITY, POC UA: 1.01
UROBILINOGEN, POC UA: NORMAL

## 2018-08-27 PROCEDURE — 51798 US URINE CAPACITY MEASURE: CPT | Mod: S$GLB,,, | Performed by: NURSE PRACTITIONER

## 2018-08-27 PROCEDURE — 99999 PR PBB SHADOW E&M-EST. PATIENT-LVL IV: CPT | Mod: PBBFAC,,, | Performed by: NURSE PRACTITIONER

## 2018-08-27 PROCEDURE — 3079F DIAST BP 80-89 MM HG: CPT | Mod: CPTII,S$GLB,, | Performed by: NURSE PRACTITIONER

## 2018-08-27 PROCEDURE — 3075F SYST BP GE 130 - 139MM HG: CPT | Mod: CPTII,S$GLB,, | Performed by: NURSE PRACTITIONER

## 2018-08-27 PROCEDURE — 81002 URINALYSIS NONAUTO W/O SCOPE: CPT | Mod: S$GLB,,, | Performed by: NURSE PRACTITIONER

## 2018-08-27 PROCEDURE — 99214 OFFICE O/P EST MOD 30 MIN: CPT | Mod: 25,S$GLB,, | Performed by: NURSE PRACTITIONER

## 2018-08-27 RX ORDER — NAPROXEN 500 MG/1
500 TABLET ORAL 2 TIMES DAILY WITH MEALS
Qty: 30 TABLET | Refills: 1 | Status: SHIPPED | OUTPATIENT
Start: 2018-08-27 | End: 2018-09-11

## 2018-08-27 NOTE — PROGRESS NOTES
Subjective:       Patient ID: Liang Monterroso Jr. is a 78 y.o. male.    Chief Complaint: Urinary Retention    Liang Monterroso Jr. is a 78 y.o. Male with history of BPH and incomplete bladder emptying.  07/21/2017 had Laser TURP with Dr. Eid.  He no longer performing CIC.     His last clinic visit was 12/20/2017 with Dr. Eid.  PVR at that visit was 300 which is stable for him.  He does take Flomax 0.8mg daily.      He is here today due to difficulty urinating.  He states that Friday he had some difficulty with his FOS.   He felt a little feverish as well.  He took one cipro that seemed to help his symptoms over the weekend.                          PSA                      1.8                 08/03/2018                              Past Medical History:  No date: Allergy  6/15/2016: CKD (chronic kidney disease) stage 3, GFR 30-59 ml/min  No date: GERD (gastroesophageal reflux disease)  No date: Hyperlipidemia  No date: Hypertension  No date: Hypospadias in male  No date: Hypothyroidism  No date: Sleep apnea  No date: Thyroid disease  No date: Urinary tract infection    Past Surgical History:  No date: APPENDECTOMY  No date: CATARACT EXTRACTION  No date: EYE SURGERY  No date: HERNIA REPAIR  No date: TONSILLECTOMY    Review of patient's family history indicates:  Problem: Diabetes      Relation: Mother          Age of Onset: (Not Specified)  Problem: Heart disease      Relation: Mother          Age of Onset: (Not Specified)  Problem: Hypertension      Relation: Mother          Age of Onset: (Not Specified)  Problem: Vision loss      Relation: Mother          Age of Onset: (Not Specified)  Problem: Dementia      Relation: Mother          Age of Onset: (Not Specified)  Problem: Alcohol abuse      Relation: Father          Age of Onset: (Not Specified)  Problem: Cancer      Relation: Sister          Age of Onset: (Not Specified)          Comment: lung with mets, was a heavy smoker  Problem: No Known Problems      Relation:  Daughter          Age of Onset: (Not Specified)  Problem: No Known Problems      Relation: Son          Age of Onset: (Not Specified)  Problem: No Known Problems      Relation: Daughter          Age of Onset: (Not Specified)  Problem: No Known Problems      Relation: Son          Age of Onset: (Not Specified)  Problem: Amblyopia      Relation: Neg Hx          Age of Onset: (Not Specified)  Problem: Blindness      Relation: Neg Hx          Age of Onset: (Not Specified)  Problem: Cataracts      Relation: Neg Hx          Age of Onset: (Not Specified)  Problem: Glaucoma      Relation: Neg Hx          Age of Onset: (Not Specified)  Problem: Macular degeneration      Relation: Neg Hx          Age of Onset: (Not Specified)  Problem: Retinal detachment      Relation: Neg Hx          Age of Onset: (Not Specified)  Problem: Strabismus      Relation: Neg Hx          Age of Onset: (Not Specified)  Problem: Stroke      Relation: Neg Hx          Age of Onset: (Not Specified)  Problem: Thyroid disease      Relation: Neg Hx          Age of Onset: (Not Specified)      Social History    Socioeconomic History      Marital status:       Spouse name: parul      Number of children: 4      Years of education: Not on file      Highest education level: Not on file    Social Needs      Financial resource strain: Not on file      Food insecurity - worry: Not on file      Food insecurity - inability: Not on file      Transportation needs - medical: Not on file      Transportation needs - non-medical: Not on file    Occupational History      Occupation: retired    Tobacco Use      Smoking status: Never Smoker      Smokeless tobacco: Never Used    Substance and Sexual Activity      Alcohol use: Yes        Comment: 1-3 glasses wine weekly, 2-3 beers weekly      Drug use: No      Sexual activity: Yes        Partners: Female    Other Topics      Concerns:        Not on file    Social History Narrative      Not on  file      Allergies:  Contrast media    Medications:  Current Outpatient Medications:   aspirin (ECOTRIN) 81 MG EC tablet, Take 81 mg by mouth once daily., Disp: , Rfl:   econazole nitrate 1 % cream, USE TWICE DAILY (Patient taking differently: USE TWICE DAILY (prn)), Disp: 60 g, Rfl: 5  famotidine (PEPCID) 20 MG tablet, Take 20 mg by mouth nightly as needed. , Disp: , Rfl:   fexofenadine (ALLEGRA) 180 MG tablet, Take 1 tablet (180 mg total) by mouth once daily. (Patient taking differently: Take 180 mg by mouth daily as needed. ), Disp: 30 tablet, Rfl: 11  levothyroxine (SYNTHROID) 112 MCG tablet, Take 1 tablet (112 mcg total) by mouth before breakfast., Disp: 30 tablet, Rfl: 0  metronidazole (METROGEL) 0.75 % gel, Use hs, Disp: 45 g, Rfl: 3  multivitamin with minerals tablet, Take 1 tablet by mouth once daily.  , Disp: , Rfl:   polyethylene glycol (GLYCOLAX) 17 gram PwPk, Take 17 g by mouth once daily., Disp: 30 packet, Rfl: 0  pravastatin (PRAVACHOL) 20 MG tablet, TAKE ONE TABLET BY MOUTH ONE TIME DAILY , Disp: 90 tablet, Rfl: 0  ramipril (ALTACE) 5 MG capsule, Take 1 capsule (5 mg total) by mouth once daily., Disp: 30 capsule, Rfl: 0  ramipril (ALTACE) 5 MG capsule, Take 1 capsule (5 mg total) by mouth once daily., Disp: 30 capsule, Rfl: 5  tamsulosin (FLOMAX) 0.4 mg Cap, TAKE TWO CAPSULES BY MOUTH DAILY , Disp: 180 capsule, Rfl: 2  triamcinolone acetonide 0.1% (KENALOG) 0.1 % cream, Use bid prn rash, Disp: 80 g, Rfl: 3        Review of Systems   Constitutional: Negative for activity change, appetite change, chills and fever.   HENT: Negative for facial swelling and trouble swallowing.    Eyes: Negative for visual disturbance.   Respiratory: Negative for chest tightness and shortness of breath.    Cardiovascular: Negative for chest pain and palpitations.   Gastrointestinal: Negative.  Negative for abdominal pain, constipation, diarrhea, nausea and vomiting.   Genitourinary: Positive for difficulty  urinating, nocturia and urgency. Negative for dysuria, flank pain, hematuria, penile pain, penile swelling, scrotal swelling and testicular pain.        FOS weaker but somewhat improving.     Musculoskeletal: Negative for back pain, gait problem, myalgias and neck stiffness.   Skin: Negative for rash.   Neurological: Negative for dizziness and speech difficulty.   Hematological: Does not bruise/bleed easily.   Psychiatric/Behavioral: Negative for behavioral problems.       Objective:      Physical Exam   Nursing note and vitals reviewed.  Constitutional: He is oriented to person, place, and time. Vital signs are normal. He appears well-developed and well-nourished. He is active and cooperative.  Non-toxic appearance. He does not have a sickly appearance.   Urine dipped clear of infection in the office today.  PVR in the office today by the nurse was 380.     HENT:   Head: Normocephalic and atraumatic.   Right Ear: External ear normal.   Left Ear: External ear normal.   Nose: Nose normal.   Mouth/Throat: Mucous membranes are normal.   Eyes: Conjunctivae and lids are normal. No scleral icterus.   Neck: Trachea normal, normal range of motion and full passive range of motion without pain. Neck supple. No JVD present. No tracheal deviation present.   Cardiovascular: Normal rate, S1 normal and S2 normal.    Pulmonary/Chest: Effort normal. No respiratory distress. He exhibits no tenderness.   Abdominal: Soft. Normal appearance and bowel sounds are normal. There is no hepatosplenomegaly. There is no tenderness. There is no CVA tenderness.   Genitourinary: Rectum normal, testes normal and penis normal. Prostate is enlarged. Prostate is not tender. No penile tenderness.       Musculoskeletal: Normal range of motion.   Neurological: He is alert and oriented to person, place, and time. He has normal strength.   Skin: Skin is warm, dry and intact.     Psychiatric: He has a normal mood and affect. His behavior is normal. Judgment  and thought content normal.       Assessment:       1. Difficulty urinating    2. BPH with urinary obstruction    3. Incomplete emptying of bladder    4. Prostadynia        Plan:         I spent 25 minutes with the patient of which more than half was spent in direct consultation with the patient in regards to our treatment and plan.    Education and recommendations of today's plan of care including home remedies.  We discussed his LUTS and the contributory factors.  Discussed he already not emptying and any prostate issue can make worse despite s/p TURP & Flomax.  This does not indicate infection; can be inflammation; prostadynia causing discomfort.   Recommendations would be NSAIDS for inflammation; Rx for naprosyn sent to take PRN (1 tab twice a day); this to prevent taking too many OTC NAIDS.  Recommend CIC to help and prevent retention.   Can do CIC every night at bedtime with 14fr single use self cath indefinitely to ensure bladder empties at night.  I gave him 14fr single use intermittent catheters when symptoms arise and prevent visit to ER.  Discussed risks with taking Cipro PRN.   He has f/u with Dr. Eid end of the year with Renal Ultrasound to evaluated kidneys/bladder.

## 2018-08-31 ENCOUNTER — TELEPHONE (OUTPATIENT)
Dept: INTERNAL MEDICINE | Facility: CLINIC | Age: 78
End: 2018-08-31

## 2018-09-04 ENCOUNTER — LAB VISIT (OUTPATIENT)
Dept: LAB | Facility: HOSPITAL | Age: 78
End: 2018-09-04
Payer: MEDICARE

## 2018-09-04 ENCOUNTER — PATIENT MESSAGE (OUTPATIENT)
Dept: UROLOGY | Facility: CLINIC | Age: 78
End: 2018-09-04

## 2018-09-04 DIAGNOSIS — R39.9 UTI SYMPTOMS: ICD-10-CM

## 2018-09-04 DIAGNOSIS — R33.9 INCOMPLETE EMPTYING OF BLADDER: ICD-10-CM

## 2018-09-04 DIAGNOSIS — R82.90 CLOUDY URINE: ICD-10-CM

## 2018-09-04 DIAGNOSIS — R82.998 FOAMY URINE: ICD-10-CM

## 2018-09-04 DIAGNOSIS — R39.9 UTI SYMPTOMS: Primary | ICD-10-CM

## 2018-09-04 LAB
BILIRUB UR QL STRIP: NEGATIVE
CLARITY UR REFRACT.AUTO: ABNORMAL
COLOR UR AUTO: YELLOW
GLUCOSE UR QL STRIP: NEGATIVE
HGB UR QL STRIP: NEGATIVE
HYALINE CASTS UR QL AUTO: 1 /LPF
KETONES UR QL STRIP: NEGATIVE
LEUKOCYTE ESTERASE UR QL STRIP: ABNORMAL
MICROSCOPIC COMMENT: ABNORMAL
NITRITE UR QL STRIP: NEGATIVE
PH UR STRIP: 6 [PH] (ref 5–8)
PROT UR QL STRIP: NEGATIVE
RBC #/AREA URNS AUTO: 3 /HPF (ref 0–4)
SP GR UR STRIP: 1 (ref 1–1.03)
URN SPEC COLLECT METH UR: ABNORMAL
UROBILINOGEN UR STRIP-ACNC: NEGATIVE EU/DL
WBC #/AREA URNS AUTO: >100 /HPF (ref 0–5)
WBC CLUMPS UR QL AUTO: ABNORMAL

## 2018-09-04 PROCEDURE — 81001 URINALYSIS AUTO W/SCOPE: CPT

## 2018-09-04 PROCEDURE — 87088 URINE BACTERIA CULTURE: CPT

## 2018-09-04 PROCEDURE — 87077 CULTURE AEROBIC IDENTIFY: CPT

## 2018-09-04 PROCEDURE — 87086 URINE CULTURE/COLONY COUNT: CPT

## 2018-09-04 PROCEDURE — 87186 SC STD MICRODIL/AGAR DIL: CPT

## 2018-09-04 NOTE — PROGRESS NOTES
Reporting LUTS worsening  (+) cloudy, foamy urine  Has been doing CIC but no relief.  Taking Cipro now but no relief.    Home collect urine

## 2018-09-05 ENCOUNTER — PATIENT MESSAGE (OUTPATIENT)
Dept: UROLOGY | Facility: CLINIC | Age: 78
End: 2018-09-05

## 2018-09-06 ENCOUNTER — PATIENT MESSAGE (OUTPATIENT)
Dept: INTERNAL MEDICINE | Facility: CLINIC | Age: 78
End: 2018-09-06

## 2018-09-07 ENCOUNTER — TELEPHONE (OUTPATIENT)
Dept: UROLOGY | Facility: CLINIC | Age: 78
End: 2018-09-07

## 2018-09-07 LAB — BACTERIA UR CULT: NORMAL

## 2018-09-07 NOTE — TELEPHONE ENCOUNTER
Patient notified culture is positive antibiotic called to pharmacy Ampicillin 500 mg po tid X 7 days.

## 2018-09-10 ENCOUNTER — PATIENT MESSAGE (OUTPATIENT)
Dept: UROLOGY | Facility: CLINIC | Age: 78
End: 2018-09-10

## 2018-09-11 PROBLEM — R33.9 INCOMPLETE EMPTYING OF BLADDER: Status: ACTIVE | Noted: 2018-09-11

## 2018-09-12 ENCOUNTER — OFFICE VISIT (OUTPATIENT)
Dept: INTERNAL MEDICINE | Facility: CLINIC | Age: 78
End: 2018-09-12
Payer: MEDICARE

## 2018-09-12 VITALS
DIASTOLIC BLOOD PRESSURE: 72 MMHG | WEIGHT: 188.5 LBS | HEART RATE: 81 BPM | HEIGHT: 68 IN | SYSTOLIC BLOOD PRESSURE: 118 MMHG | OXYGEN SATURATION: 99 % | BODY MASS INDEX: 28.57 KG/M2

## 2018-09-12 DIAGNOSIS — I10 ESSENTIAL HYPERTENSION: ICD-10-CM

## 2018-09-12 DIAGNOSIS — R33.9 INCOMPLETE EMPTYING OF BLADDER: ICD-10-CM

## 2018-09-12 DIAGNOSIS — N39.0 URINARY TRACT INFECTION WITHOUT HEMATURIA, SITE UNSPECIFIED: Primary | ICD-10-CM

## 2018-09-12 DIAGNOSIS — E03.9 ACQUIRED HYPOTHYROIDISM: ICD-10-CM

## 2018-09-12 LAB
BILIRUB SERPL-MCNC: NORMAL MG/DL
BLOOD URINE, POC: NORMAL
COLOR, POC UA: NORMAL
GLUCOSE UR QL STRIP: NORMAL
KETONES UR QL STRIP: NORMAL
LEUKOCYTE ESTERASE URINE, POC: NORMAL
NITRITE, POC UA: NORMAL
PH, POC UA: 6
PROTEIN, POC: NORMAL
SPECIFIC GRAVITY, POC UA: 1.01
UROBILINOGEN, POC UA: NORMAL

## 2018-09-12 PROCEDURE — 99215 OFFICE O/P EST HI 40 MIN: CPT | Mod: PBBFAC | Performed by: INTERNAL MEDICINE

## 2018-09-12 PROCEDURE — 3074F SYST BP LT 130 MM HG: CPT | Mod: CPTII,,, | Performed by: INTERNAL MEDICINE

## 2018-09-12 PROCEDURE — 99213 OFFICE O/P EST LOW 20 MIN: CPT | Mod: S$PBB,,, | Performed by: INTERNAL MEDICINE

## 2018-09-12 PROCEDURE — 1101F PT FALLS ASSESS-DOCD LE1/YR: CPT | Mod: CPTII,,, | Performed by: INTERNAL MEDICINE

## 2018-09-12 PROCEDURE — 87086 URINE CULTURE/COLONY COUNT: CPT

## 2018-09-12 PROCEDURE — 81001 URINALYSIS AUTO W/SCOPE: CPT | Mod: PBBFAC | Performed by: INTERNAL MEDICINE

## 2018-09-12 PROCEDURE — 99999 PR PBB SHADOW E&M-EST. PATIENT-LVL V: CPT | Mod: PBBFAC,,, | Performed by: INTERNAL MEDICINE

## 2018-09-12 PROCEDURE — 3078F DIAST BP <80 MM HG: CPT | Mod: CPTII,,, | Performed by: INTERNAL MEDICINE

## 2018-09-12 NOTE — PROGRESS NOTES
Subjective:       Patient ID: Liang Monterroso Jr. is a 78 y.o. male.    Chief Complaint: Urinary Tract Infection    New patient to me here for urgent care visit for possible UTI.  Patient saw Urology and has a history of prostate surgery, week bladder, urinary retention and history of self catheterization.  He was not doing that quite a while.  Most recently he was seen and found to have an Enterococcus UTI.  He was told to start catheter arising again and that seems to be helping.  He has 1 more day of antibiotics and is improved but not back normal.  He wanted to get his urine recheck.  Dipstick may show some slight white cells but I will sent for culture.  Depending on results and how he is feeling we may need to extend the antibiotics or he may follow up with Urology      Review of Systems   Constitutional: Negative for fever.   Gastrointestinal: Negative for abdominal distention and abdominal pain.   Genitourinary: Positive for dysuria. Negative for frequency and urgency.        Urinary retention         Objective:      Physical Exam   Constitutional: He appears well-developed and well-nourished.   HENT:   Head: Normocephalic and atraumatic.   Genitourinary:   Genitourinary Comments: No CVA tenderness   Musculoskeletal: He exhibits no edema, tenderness or deformity.       Assessment:       1. Urinary tract infection without hematuria, site unspecified    2. Acquired hypothyroidism    3. Incomplete emptying of bladder    4. Essential hypertension        Plan:       Liang was seen today for urinary tract infection.    Diagnoses and all orders for this visit:    Urinary tract infection without hematuria, site unspecified  -     Urine culture  -     POCT urinalysis, dipstick or tablet reag    Acquired hypothyroidism    Incomplete emptying of bladder    Essential hypertension        finish antibiotics, check urine culture.  Continue to see Urology

## 2018-09-14 ENCOUNTER — PATIENT MESSAGE (OUTPATIENT)
Dept: INTERNAL MEDICINE | Facility: CLINIC | Age: 78
End: 2018-09-14

## 2018-09-14 LAB — BACTERIA UR CULT: NO GROWTH

## 2018-09-17 ENCOUNTER — PATIENT MESSAGE (OUTPATIENT)
Dept: INTERNAL MEDICINE | Facility: CLINIC | Age: 78
End: 2018-09-17

## 2018-09-17 DIAGNOSIS — R82.90 CLOUDY URINE: Primary | ICD-10-CM

## 2018-09-17 DIAGNOSIS — R30.0 DYSURIA: ICD-10-CM

## 2018-09-19 ENCOUNTER — PATIENT MESSAGE (OUTPATIENT)
Dept: INTERNAL MEDICINE | Facility: CLINIC | Age: 78
End: 2018-09-19

## 2018-09-19 NOTE — TELEPHONE ENCOUNTER
Called patient no answer left message to let him know he can come do his urine specimen today will leave a portal message as well.

## 2018-09-20 ENCOUNTER — LAB VISIT (OUTPATIENT)
Dept: LAB | Facility: HOSPITAL | Age: 78
End: 2018-09-20
Attending: INTERNAL MEDICINE
Payer: MEDICARE

## 2018-09-20 DIAGNOSIS — R82.90 CLOUDY URINE: ICD-10-CM

## 2018-09-20 DIAGNOSIS — R30.0 DYSURIA: ICD-10-CM

## 2018-09-20 LAB
BILIRUB UR QL STRIP: NEGATIVE
CLARITY UR REFRACT.AUTO: CLEAR
COLOR UR AUTO: ABNORMAL
GLUCOSE UR QL STRIP: NEGATIVE
HGB UR QL STRIP: NEGATIVE
KETONES UR QL STRIP: NEGATIVE
LEUKOCYTE ESTERASE UR QL STRIP: ABNORMAL
MICROSCOPIC COMMENT: NORMAL
NITRITE UR QL STRIP: NEGATIVE
PH UR STRIP: 6 [PH] (ref 5–8)
PROT UR QL STRIP: NEGATIVE
RBC #/AREA URNS AUTO: 3 /HPF (ref 0–4)
SP GR UR STRIP: 1 (ref 1–1.03)
URN SPEC COLLECT METH UR: ABNORMAL
UROBILINOGEN UR STRIP-ACNC: NEGATIVE EU/DL
WBC #/AREA URNS AUTO: 5 /HPF (ref 0–5)

## 2018-09-20 PROCEDURE — 81001 URINALYSIS AUTO W/SCOPE: CPT

## 2018-09-20 PROCEDURE — 87086 URINE CULTURE/COLONY COUNT: CPT

## 2018-09-21 ENCOUNTER — PATIENT MESSAGE (OUTPATIENT)
Dept: INTERNAL MEDICINE | Facility: CLINIC | Age: 78
End: 2018-09-21

## 2018-09-22 LAB — BACTERIA UR CULT: NORMAL

## 2018-09-25 ENCOUNTER — OFFICE VISIT (OUTPATIENT)
Dept: UROLOGY | Facility: CLINIC | Age: 78
End: 2018-09-25
Payer: MEDICARE

## 2018-09-25 VITALS
WEIGHT: 188 LBS | DIASTOLIC BLOOD PRESSURE: 85 MMHG | HEIGHT: 68 IN | SYSTOLIC BLOOD PRESSURE: 128 MMHG | BODY MASS INDEX: 28.49 KG/M2 | HEART RATE: 92 BPM

## 2018-09-25 DIAGNOSIS — N40.1 BPH WITH URINARY OBSTRUCTION: Primary | ICD-10-CM

## 2018-09-25 DIAGNOSIS — N13.8 BPH WITH URINARY OBSTRUCTION: Primary | ICD-10-CM

## 2018-09-25 PROCEDURE — 99213 OFFICE O/P EST LOW 20 MIN: CPT | Mod: PBBFAC,PO | Performed by: UROLOGY

## 2018-09-25 PROCEDURE — 99214 OFFICE O/P EST MOD 30 MIN: CPT | Mod: S$PBB,,, | Performed by: UROLOGY

## 2018-09-25 PROCEDURE — 99999 PR PBB SHADOW E&M-EST. PATIENT-LVL III: CPT | Mod: PBBFAC,,, | Performed by: UROLOGY

## 2018-09-25 PROCEDURE — 3079F DIAST BP 80-89 MM HG: CPT | Mod: CPTII,,, | Performed by: UROLOGY

## 2018-09-25 PROCEDURE — 3074F SYST BP LT 130 MM HG: CPT | Mod: CPTII,,, | Performed by: UROLOGY

## 2018-09-25 PROCEDURE — 1101F PT FALLS ASSESS-DOCD LE1/YR: CPT | Mod: CPTII,,, | Performed by: UROLOGY

## 2018-09-25 NOTE — PROGRESS NOTES
Subjective:       Patient ID: Liang Monterroso Jr. is a 78 y.o. male.    Chief Complaint: Benign Prostatic Hypertrophy (self medicated, started taking cipro on friday) and Dysuria    HPI patient had a recent urinary tract infection was treated with ampicillin.  Today his urinalysis is clear.  He occasionally has some bladder pain.  His postvoid residual was 300 cc.  He has a relatively low pressure bladder and does CIC once a day.  I recommended that he increase it to twice a day.  Will also get a renal ultrasound cystoscopy simple urodynamics to reassess his bladder function    Past Medical History:   Diagnosis Date    Allergy     CKD (chronic kidney disease) stage 3, GFR 30-59 ml/min 6/15/2016    GERD (gastroesophageal reflux disease)     Hyperlipidemia     Hypertension     Hypospadias in male     Hypothyroidism     Sleep apnea     Thyroid disease     Urinary tract infection        Past Surgical History:   Procedure Laterality Date    APPENDECTOMY      CATARACT EXTRACTION      COLONOSCOPY N/A 4/15/2014    Performed by Gustavo Pfeiffer MD at [facility] ENDO (4TH FLR)    CRANIOTOMY WITH STEALTH/  Right Temporal Craniotomy Right 3/20/2017    Performed by Jameson Chen MD at [facility] OR 2ND FLR    EYE SURGERY      HERNIA REPAIR      TONSILLECTOMY      TURP W LASER N/A 7/21/2017    Performed by Anastacio Eid Jr., MD at [facility] OR 1ST FLR       Family History   Problem Relation Age of Onset    Diabetes Mother     Heart disease Mother     Hypertension Mother     Vision loss Mother     Dementia Mother     Alcohol abuse Father     Cancer Sister         lung with mets, was a heavy smoker    No Known Problems Daughter     No Known Problems Son     No Known Problems Daughter     No Known Problems Son     Amblyopia Neg Hx     Blindness Neg Hx     Cataracts Neg Hx     Glaucoma Neg Hx     Macular degeneration Neg Hx     Retinal detachment Neg Hx     Strabismus Neg Hx     Stroke Neg Hx     Thyroid  disease Neg Hx        Social History     Socioeconomic History    Marital status:      Spouse name: parul    Number of children: 4    Years of education: Not on file    Highest education level: Not on file   Social Needs    Financial resource strain: Not on file    Food insecurity - worry: Not on file    Food insecurity - inability: Not on file    Transportation needs - medical: Not on file    Transportation needs - non-medical: Not on file   Occupational History    Occupation: retired   Tobacco Use    Smoking status: Never Smoker    Smokeless tobacco: Never Used   Substance and Sexual Activity    Alcohol use: Yes     Comment: 1-3 glasses wine weekly, 2-3 beers weekly    Drug use: No    Sexual activity: Yes     Partners: Female   Other Topics Concern    Not on file   Social History Narrative    Not on file       Allergies:  Contrast media    Medications:    Current Outpatient Medications:     aspirin (ECOTRIN) 81 MG EC tablet, Take 81 mg by mouth once daily., Disp: , Rfl:     econazole nitrate 1 % cream, USE TWICE DAILY (Patient taking differently: USE TWICE DAILY (prn)), Disp: 60 g, Rfl: 5    famotidine (PEPCID) 20 MG tablet, Take 20 mg by mouth nightly as needed. , Disp: , Rfl:     levothyroxine (SYNTHROID) 112 MCG tablet, Take 1 tablet (112 mcg total) by mouth before breakfast., Disp: 30 tablet, Rfl: 0    metronidazole (METROGEL) 0.75 % gel, Use hs, Disp: 45 g, Rfl: 3    multivitamin with minerals tablet, Take 1 tablet by mouth once daily.  , Disp: , Rfl:     polyethylene glycol (GLYCOLAX) 17 gram PwPk, Take 17 g by mouth once daily., Disp: 30 packet, Rfl: 0    pravastatin (PRAVACHOL) 20 MG tablet, TAKE ONE TABLET BY MOUTH ONE TIME DAILY , Disp: 90 tablet, Rfl: 0    ramipril (ALTACE) 5 MG capsule, Take 1 capsule (5 mg total) by mouth once daily., Disp: 30 capsule, Rfl: 0    ramipril (ALTACE) 5 MG capsule, Take 1 capsule (5 mg total) by mouth once daily., Disp: 30 capsule, Rfl:  5    tamsulosin (FLOMAX) 0.4 mg Cap, TAKE TWO CAPSULES BY MOUTH DAILY , Disp: 180 capsule, Rfl: 2    triamcinolone acetonide 0.1% (KENALOG) 0.1 % cream, Use bid prn rash, Disp: 80 g, Rfl: 3    fexofenadine (ALLEGRA) 180 MG tablet, Take 1 tablet (180 mg total) by mouth once daily. (Patient taking differently: Take 180 mg by mouth daily as needed. ), Disp: 30 tablet, Rfl: 11    Review of Systems   Constitutional: Negative for activity change, appetite change, chills, diaphoresis, fatigue, fever and unexpected weight change.   HENT: Negative for congestion, dental problem, hearing loss, mouth sores, postnasal drip, rhinorrhea, sinus pressure and trouble swallowing.    Eyes: Negative for pain, discharge and itching.   Respiratory: Negative for apnea, cough, choking, chest tightness, shortness of breath and wheezing.    Cardiovascular: Negative for chest pain, palpitations and leg swelling.   Gastrointestinal: Negative for abdominal distention, abdominal pain, anal bleeding, blood in stool, constipation, diarrhea, nausea, rectal pain and vomiting.   Endocrine: Negative for polydipsia and polyuria.   Genitourinary: Negative for decreased urine volume, difficulty urinating, discharge, dysuria, enuresis, flank pain, frequency, genital sores, hematuria, penile pain, penile swelling, scrotal swelling, testicular pain and urgency.   Musculoskeletal: Negative for arthralgias, back pain and myalgias.   Skin: Negative for color change, rash and wound.   Neurological: Negative for dizziness, syncope, speech difficulty, light-headedness and headaches.   Hematological: Negative for adenopathy. Does not bruise/bleed easily.   Psychiatric/Behavioral: Negative for behavioral problems, confusion, hallucinations and sleep disturbance.       Objective:      Physical Exam   Constitutional: He appears well-developed.   HENT:   Head: Normocephalic.   Cardiovascular: Normal rate.    Pulmonary/Chest: Effort normal.   Abdominal: Soft.    Genitourinary: Prostate normal.   Neurological: He is alert.   Skin: Skin is warm.     Psychiatric: He has a normal mood and affect.       Assessment:       1. BPH with urinary obstruction        Plan:       Liang was seen today for benign prostatic hypertrophy and dysuria.    Diagnoses and all orders for this visit:    BPH with urinary obstruction  -     US Retroperitoneal Complete (Kidney and; Future  -     Simple urodynamics w/ cysto; Future

## 2018-10-04 RX ORDER — PRAVASTATIN SODIUM 20 MG/1
TABLET ORAL
Qty: 90 TABLET | Refills: 0 | Status: SHIPPED | OUTPATIENT
Start: 2018-10-04 | End: 2018-11-14 | Stop reason: SDUPTHER

## 2018-10-09 ENCOUNTER — PATIENT MESSAGE (OUTPATIENT)
Dept: UROLOGY | Facility: CLINIC | Age: 78
End: 2018-10-09

## 2018-10-09 ENCOUNTER — HOSPITAL ENCOUNTER (OUTPATIENT)
Dept: RADIOLOGY | Facility: HOSPITAL | Age: 78
Discharge: HOME | End: 2018-10-09
Attending: NEUROLOGICAL SURGERY
Payer: MEDICARE

## 2018-10-09 ENCOUNTER — OFFICE VISIT (OUTPATIENT)
Dept: NEUROSURGERY | Facility: CLINIC | Age: 78
End: 2018-10-09
Payer: MEDICARE

## 2018-10-09 VITALS
HEART RATE: 60 BPM | WEIGHT: 186.94 LBS | DIASTOLIC BLOOD PRESSURE: 71 MMHG | SYSTOLIC BLOOD PRESSURE: 120 MMHG | TEMPERATURE: 98 F | HEIGHT: 68 IN | BODY MASS INDEX: 28.33 KG/M2

## 2018-10-09 DIAGNOSIS — D32.9 MENINGIOMA: ICD-10-CM

## 2018-10-09 DIAGNOSIS — D32.9 MENINGIOMA: Primary | ICD-10-CM

## 2018-10-09 LAB
CREAT SERPL-MCNC: 1.2 MG/DL (ref 0.5–1.4)
SAMPLE: NORMAL

## 2018-10-09 PROCEDURE — A9585 GADOBUTROL INJECTION: HCPCS | Performed by: NEUROLOGICAL SURGERY

## 2018-10-09 PROCEDURE — 1101F PT FALLS ASSESS-DOCD LE1/YR: CPT | Mod: CPTII,,, | Performed by: NEUROLOGICAL SURGERY

## 2018-10-09 PROCEDURE — 99999 PR PBB SHADOW E&M-EST. PATIENT-LVL III: CPT | Mod: PBBFAC,,, | Performed by: NEUROLOGICAL SURGERY

## 2018-10-09 PROCEDURE — 99214 OFFICE O/P EST MOD 30 MIN: CPT | Mod: S$PBB,,, | Performed by: NEUROLOGICAL SURGERY

## 2018-10-09 PROCEDURE — 70553 MRI BRAIN STEM W/O & W/DYE: CPT | Mod: 26,,, | Performed by: RADIOLOGY

## 2018-10-09 PROCEDURE — 70553 MRI BRAIN STEM W/O & W/DYE: CPT | Mod: TC

## 2018-10-09 PROCEDURE — 25500020 PHARM REV CODE 255: Performed by: NEUROLOGICAL SURGERY

## 2018-10-09 PROCEDURE — 99213 OFFICE O/P EST LOW 20 MIN: CPT | Mod: PBBFAC,25 | Performed by: NEUROLOGICAL SURGERY

## 2018-10-09 PROCEDURE — 3078F DIAST BP <80 MM HG: CPT | Mod: CPTII,,, | Performed by: NEUROLOGICAL SURGERY

## 2018-10-09 PROCEDURE — 3074F SYST BP LT 130 MM HG: CPT | Mod: CPTII,,, | Performed by: NEUROLOGICAL SURGERY

## 2018-10-09 RX ORDER — GADOBUTROL 604.72 MG/ML
9 INJECTION INTRAVENOUS
Status: COMPLETED | OUTPATIENT
Start: 2018-10-09 | End: 2018-10-09

## 2018-10-09 RX ADMIN — GADOBUTROL 9 ML: 604.72 INJECTION INTRAVENOUS at 09:10

## 2018-10-09 NOTE — PATIENT INSTRUCTIONS
I have personally reviewed the MRI brain with the pt which shows expected postoperative changes from right temporal occipital craniotomy for meningioma resection. Findings are stable when compared to prior MRI from 10/10/2017. There is no evidence of residual or new lesions.    Pt released from my care at this time but advised to contact us with any questions, concerns, or if he experiences any new or worsening symptoms.

## 2018-10-09 NOTE — PROGRESS NOTES
Subjective:   I, Alexandra Voss, attest that this documentation has been prepared under the direction and in the presence of Jameson Chen MD.     Patient ID: Liang Monterroso Jr. is a 78 y.o. male     Chief Complaint: Follow-up      HPI  The patient is a 78 y.o. male with a meningioma, s/p  right temporal craniotomy for gross tumor resection on 03/20/2018, who presents today for 1 year follow up with MRI brain. At the time of the last office visit, on 10/10/2017, the pt complained of post-operative fatigue and head tenderness near the surgery site. MRI brain from 10/10/2018 showed expected postoperative changes from right temporal occipital craniotomy and meningioma resection with no evidence of residual tumor. Pt's pathology reported was indicative of a WHO grade I meningioma.    Pt states he is doing very well. He has complaints at this time.      Review of Systems   Constitutional: Negative for activity change, appetite change, fatigue, fever and unexpected weight change.   HENT: Negative for facial swelling.    Eyes: Negative.    Respiratory: Negative.    Cardiovascular: Negative.    Gastrointestinal: Negative for diarrhea, nausea and vomiting.   Endocrine: Negative.    Genitourinary: Negative.    Musculoskeletal: Negative for back pain, joint swelling, myalgias and neck pain.   Neurological: Negative for dizziness, weakness, numbness and headaches.   Psychiatric/Behavioral: Negative.       Past Medical History:   Diagnosis Date    Allergy     CKD (chronic kidney disease) stage 3, GFR 30-59 ml/min 6/15/2016    GERD (gastroesophageal reflux disease)     Hyperlipidemia     Hypertension     Hypospadias in male     Hypothyroidism     Sleep apnea     Thyroid disease     Urinary tract infection        Objective:      Vitals:    10/09/18 1008   BP: 120/71   Pulse: 60   Temp: 97.6 °F (36.4 °C)      Physical Exam   Constitutional: He is oriented to person, place, and time. He appears well-nourished.   HENT:   Head:  Normocephalic and atraumatic.   Neck: Neck supple.   Neurological: He is alert and oriented to person, place, and time. No cranial nerve deficit. He displays a negative Romberg sign. GCS eye subscore is 4. GCS verbal subscore is 5. GCS motor subscore is 6.          IMAGING:  MRI Brain W WO Contrast (10/09/2018) shows  expected postoperative changes from right temporal occipital craniotomy for meningioma resection. Findings are stable when compared to prior MRI from 10/10/2017. There is no evidence of residual or new lesions.    I have personally reviewed the images with the pt.      I, Dr. Jameson Chen, personally performed the services described in this documentation. All medical record entries made by the scribe, Alexandra Voss, were at my direction and in my presence.  I have reviewed the chart and agree that the record reflects my personal performance and is accurate and complete. Jameson Chen MD. 10/09/2018    Assessment:       Meningioma.     Plan:   I have personally reviewed the MRI brain with the pt which shows expected postoperative changes from right temporal occipital craniotomy for meningioma resection. Findings are stable when compared to prior MRI from 10/10/2017. There is no evidence of residual or new lesions.    Pt released from my care at this time but advised to contact us with any questions, concerns, or if he experiences any new or worsening symptoms.

## 2018-10-10 ENCOUNTER — LAB VISIT (OUTPATIENT)
Dept: LAB | Facility: HOSPITAL | Age: 78
End: 2018-10-10
Attending: UROLOGY
Payer: MEDICARE

## 2018-10-10 DIAGNOSIS — R30.0 DYSURIA: Primary | ICD-10-CM

## 2018-10-10 DIAGNOSIS — R30.0 DYSURIA: ICD-10-CM

## 2018-10-10 LAB
BACTERIA #/AREA URNS AUTO: ABNORMAL /HPF
BILIRUB UR QL STRIP: NEGATIVE
CLARITY UR REFRACT.AUTO: ABNORMAL
COLOR UR AUTO: ABNORMAL
GLUCOSE UR QL STRIP: NEGATIVE
HGB UR QL STRIP: ABNORMAL
KETONES UR QL STRIP: NEGATIVE
LEUKOCYTE ESTERASE UR QL STRIP: ABNORMAL
MICROSCOPIC COMMENT: ABNORMAL
NITRITE UR QL STRIP: NEGATIVE
PH UR STRIP: 6 [PH] (ref 5–8)
PROT UR QL STRIP: NEGATIVE
RBC #/AREA URNS AUTO: 6 /HPF (ref 0–4)
SP GR UR STRIP: 1 (ref 1–1.03)
SQUAMOUS #/AREA URNS AUTO: 0 /HPF
URN SPEC COLLECT METH UR: ABNORMAL
UROBILINOGEN UR STRIP-ACNC: NEGATIVE EU/DL
WBC #/AREA URNS AUTO: 71 /HPF (ref 0–5)
WBC CLUMPS UR QL AUTO: ABNORMAL

## 2018-10-10 PROCEDURE — 87086 URINE CULTURE/COLONY COUNT: CPT

## 2018-10-10 PROCEDURE — 87088 URINE BACTERIA CULTURE: CPT

## 2018-10-10 PROCEDURE — 81001 URINALYSIS AUTO W/SCOPE: CPT

## 2018-10-12 ENCOUNTER — PATIENT MESSAGE (OUTPATIENT)
Dept: UROLOGY | Facility: CLINIC | Age: 78
End: 2018-10-12

## 2018-10-12 LAB — BACTERIA UR CULT: NORMAL

## 2018-10-13 RX ORDER — SULFAMETHOXAZOLE AND TRIMETHOPRIM 800; 160 MG/1; MG/1
1 TABLET ORAL 2 TIMES DAILY
Qty: 10 TABLET | Refills: 0 | Status: SHIPPED | OUTPATIENT
Start: 2018-10-13 | End: 2018-10-18

## 2018-10-13 RX ORDER — SULFAMETHOXAZOLE AND TRIMETHOPRIM 800; 160 MG/1; MG/1
1 TABLET ORAL 2 TIMES DAILY
Qty: 10 TABLET | Refills: 0 | Status: SHIPPED | OUTPATIENT
Start: 2018-10-13 | End: 2018-10-13 | Stop reason: SDUPTHER

## 2018-10-15 RX ORDER — AMOXICILLIN AND CLAVULANATE POTASSIUM 875; 125 MG/1; MG/1
1 TABLET, FILM COATED ORAL 2 TIMES DAILY
Qty: 28 TABLET | Refills: 0 | Status: SHIPPED | OUTPATIENT
Start: 2018-10-15 | End: 2018-12-22

## 2018-10-17 ENCOUNTER — PATIENT MESSAGE (OUTPATIENT)
Dept: INTERNAL MEDICINE | Facility: CLINIC | Age: 78
End: 2018-10-17

## 2018-10-18 RX ORDER — LEVOTHYROXINE SODIUM 112 UG/1
112 TABLET ORAL
Qty: 30 TABLET | Refills: 0 | Status: SHIPPED | OUTPATIENT
Start: 2018-10-18 | End: 2018-11-05 | Stop reason: SDUPTHER

## 2018-10-29 ENCOUNTER — HOSPITAL ENCOUNTER (OUTPATIENT)
Dept: RADIOLOGY | Facility: HOSPITAL | Age: 78
Discharge: HOME | End: 2018-10-29
Attending: UROLOGY
Payer: MEDICARE

## 2018-10-29 ENCOUNTER — PROCEDURE VISIT (OUTPATIENT)
Dept: UROLOGY | Facility: CLINIC | Age: 78
End: 2018-10-29
Payer: MEDICARE

## 2018-10-29 VITALS
HEART RATE: 91 BPM | WEIGHT: 186.94 LBS | TEMPERATURE: 97 F | RESPIRATION RATE: 18 BRPM | DIASTOLIC BLOOD PRESSURE: 85 MMHG | BODY MASS INDEX: 28.33 KG/M2 | SYSTOLIC BLOOD PRESSURE: 113 MMHG | HEIGHT: 68 IN

## 2018-10-29 DIAGNOSIS — N40.1 BPH WITH URINARY OBSTRUCTION: ICD-10-CM

## 2018-10-29 DIAGNOSIS — N13.8 BPH WITH URINARY OBSTRUCTION: ICD-10-CM

## 2018-10-29 PROCEDURE — 76770 US EXAM ABDO BACK WALL COMP: CPT | Mod: 26,HCNC,, | Performed by: INTERNAL MEDICINE

## 2018-10-29 PROCEDURE — 76770 US EXAM ABDO BACK WALL COMP: CPT | Mod: TC,HCNC

## 2018-10-29 RX ORDER — LIDOCAINE HYDROCHLORIDE 20 MG/ML
JELLY TOPICAL
Status: COMPLETED | OUTPATIENT
Start: 2018-10-29 | End: 2018-10-29

## 2018-10-29 RX ADMIN — LIDOCAINE HYDROCHLORIDE: 20 JELLY TOPICAL at 03:10

## 2018-10-29 NOTE — PATIENT INSTRUCTIONS
SIMPLE URODYNAMIC STUDY (SUDS) & CYSTOSCOPY  UROLOGY CLINIC DISCHARGE INSTRUCTIONS    You have had a procedure that will require time to properly heal. Follow the instructions you have been given on how to care for yourself once you are home. Below is additional information to help in your recovery.    ACTIVITY  · There are no restrictions in activity. Start doing again the things you did before the procedure.  · You may experience a slight burning sensation. You may notice a small amount of blood in your urine. This will clear up within a day. Call the clinic if this continues beyond 48 hours.    DIET  · Continue your normal diet. You may eat the same foods you ate before your procedure.  · Drink plenty of fluids during the first 24-48 hours following your procedure.    MEDICATIONS  · Resume all other previous medications from your prescribing physician.  · Continue any pre=procedure antibiotics until they are all gone.    SIGNS AND SYMPTOMS TO REPORT TO THE DOCTOR  · Chills or fever greater than 101° F within 24 hours of procedure.  · Changes in urination, such as increased bleeding, foul smell, cloudy urine, or painful urination.  · Call your doctor with any questions or concerns.    For any emergency situation, call [phone] immediately or go to your nearest emergency room.    [facility]  [phone]  After hours or on the weekends call [phone] and ask to speak with an urology resident on-call with any questions or concerns

## 2018-11-01 ENCOUNTER — PATIENT MESSAGE (OUTPATIENT)
Dept: UROLOGY | Facility: CLINIC | Age: 78
End: 2018-11-01

## 2018-11-05 ENCOUNTER — LAB VISIT (OUTPATIENT)
Dept: LAB | Facility: HOSPITAL | Age: 78
End: 2018-11-05
Attending: INTERNAL MEDICINE
Payer: MEDICARE

## 2018-11-05 ENCOUNTER — OFFICE VISIT (OUTPATIENT)
Dept: INTERNAL MEDICINE | Facility: CLINIC | Age: 78
End: 2018-11-05
Payer: MEDICARE

## 2018-11-05 VITALS
OXYGEN SATURATION: 98 % | BODY MASS INDEX: 29.01 KG/M2 | SYSTOLIC BLOOD PRESSURE: 122 MMHG | HEIGHT: 68 IN | WEIGHT: 191.38 LBS | DIASTOLIC BLOOD PRESSURE: 78 MMHG | HEART RATE: 62 BPM

## 2018-11-05 DIAGNOSIS — D32.9 MENINGIOMA: ICD-10-CM

## 2018-11-05 DIAGNOSIS — N40.1 BPH WITH URINARY OBSTRUCTION: ICD-10-CM

## 2018-11-05 DIAGNOSIS — Z74.09 IMPAIRED FUNCTIONAL MOBILITY, BALANCE, GAIT, AND ENDURANCE: ICD-10-CM

## 2018-11-05 DIAGNOSIS — R73.09 ELEVATED GLUCOSE: ICD-10-CM

## 2018-11-05 DIAGNOSIS — E78.00 PURE HYPERCHOLESTEROLEMIA: ICD-10-CM

## 2018-11-05 DIAGNOSIS — M72.0 DUPUYTREN CONTRACTURE: ICD-10-CM

## 2018-11-05 DIAGNOSIS — M65.311 TRIGGER FINGER OF RIGHT THUMB: Primary | ICD-10-CM

## 2018-11-05 DIAGNOSIS — I10 ESSENTIAL HYPERTENSION: ICD-10-CM

## 2018-11-05 DIAGNOSIS — E03.9 ACQUIRED HYPOTHYROIDISM: ICD-10-CM

## 2018-11-05 DIAGNOSIS — G47.33 OSA (OBSTRUCTIVE SLEEP APNEA): ICD-10-CM

## 2018-11-05 DIAGNOSIS — N13.8 BPH WITH URINARY OBSTRUCTION: ICD-10-CM

## 2018-11-05 DIAGNOSIS — M65.311 TRIGGER FINGER OF RIGHT THUMB: ICD-10-CM

## 2018-11-05 LAB
ALBUMIN SERPL BCP-MCNC: 3.8 G/DL
ALP SERPL-CCNC: 57 U/L
ALT SERPL W/O P-5'-P-CCNC: 18 U/L
ANION GAP SERPL CALC-SCNC: 7 MMOL/L
AST SERPL-CCNC: 20 U/L
BASOPHILS # BLD AUTO: 0.02 K/UL
BASOPHILS NFR BLD: 0.3 %
BILIRUB SERPL-MCNC: 0.5 MG/DL
BUN SERPL-MCNC: 15 MG/DL
CALCIUM SERPL-MCNC: 9.2 MG/DL
CHLORIDE SERPL-SCNC: 105 MMOL/L
CHOLEST SERPL-MCNC: 195 MG/DL
CHOLEST/HDLC SERPL: 3.8 {RATIO}
CO2 SERPL-SCNC: 28 MMOL/L
CREAT SERPL-MCNC: 1.2 MG/DL
DIFFERENTIAL METHOD: ABNORMAL
EOSINOPHIL # BLD AUTO: 0.1 K/UL
EOSINOPHIL NFR BLD: 1.3 %
ERYTHROCYTE [DISTWIDTH] IN BLOOD BY AUTOMATED COUNT: 14.7 %
EST. GFR  (AFRICAN AMERICAN): >60 ML/MIN/1.73 M^2
EST. GFR  (NON AFRICAN AMERICAN): 58 ML/MIN/1.73 M^2
ESTIMATED AVG GLUCOSE: 108 MG/DL
GLUCOSE SERPL-MCNC: 90 MG/DL
HBA1C MFR BLD HPLC: 5.4 %
HCT VFR BLD AUTO: 44.2 %
HDLC SERPL-MCNC: 51 MG/DL
HDLC SERPL: 26.2 %
HGB BLD-MCNC: 14 G/DL
LDLC SERPL CALC-MCNC: 114.6 MG/DL
LYMPHOCYTES # BLD AUTO: 2.6 K/UL
LYMPHOCYTES NFR BLD: 33.8 %
MCH RBC QN AUTO: 27.8 PG
MCHC RBC AUTO-ENTMCNC: 31.7 G/DL
MCV RBC AUTO: 88 FL
MONOCYTES # BLD AUTO: 0.4 K/UL
MONOCYTES NFR BLD: 5.4 %
NEUTROPHILS # BLD AUTO: 4.6 K/UL
NEUTROPHILS NFR BLD: 58.9 %
NONHDLC SERPL-MCNC: 144 MG/DL
PLATELET # BLD AUTO: 185 K/UL
PMV BLD AUTO: 11.1 FL
POTASSIUM SERPL-SCNC: 4.2 MMOL/L
PROT SERPL-MCNC: 7 G/DL
RBC # BLD AUTO: 5.03 M/UL
SODIUM SERPL-SCNC: 140 MMOL/L
TRIGL SERPL-MCNC: 147 MG/DL
TSH SERPL DL<=0.005 MIU/L-ACNC: 0.67 UIU/ML
WBC # BLD AUTO: 7.75 K/UL

## 2018-11-05 PROCEDURE — 3078F DIAST BP <80 MM HG: CPT | Mod: CPTII,HCNC,S$GLB, | Performed by: INTERNAL MEDICINE

## 2018-11-05 PROCEDURE — 80053 COMPREHEN METABOLIC PANEL: CPT | Mod: HCNC

## 2018-11-05 PROCEDURE — 3074F SYST BP LT 130 MM HG: CPT | Mod: CPTII,HCNC,S$GLB, | Performed by: INTERNAL MEDICINE

## 2018-11-05 PROCEDURE — 99999 PR PBB SHADOW E&M-EST. PATIENT-LVL IV: CPT | Mod: PBBFAC,HCNC,, | Performed by: INTERNAL MEDICINE

## 2018-11-05 PROCEDURE — 1101F PT FALLS ASSESS-DOCD LE1/YR: CPT | Mod: CPTII,HCNC,S$GLB, | Performed by: INTERNAL MEDICINE

## 2018-11-05 PROCEDURE — 83036 HEMOGLOBIN GLYCOSYLATED A1C: CPT | Mod: HCNC

## 2018-11-05 PROCEDURE — 84443 ASSAY THYROID STIM HORMONE: CPT | Mod: HCNC

## 2018-11-05 PROCEDURE — 36415 COLL VENOUS BLD VENIPUNCTURE: CPT | Mod: HCNC

## 2018-11-05 PROCEDURE — 99214 OFFICE O/P EST MOD 30 MIN: CPT | Mod: HCNC,S$GLB,, | Performed by: INTERNAL MEDICINE

## 2018-11-05 PROCEDURE — 80061 LIPID PANEL: CPT | Mod: HCNC

## 2018-11-05 PROCEDURE — 85025 COMPLETE CBC W/AUTO DIFF WBC: CPT | Mod: HCNC

## 2018-11-05 RX ORDER — LEVOTHYROXINE SODIUM 112 UG/1
112 TABLET ORAL
Qty: 90 TABLET | Refills: 12 | Status: SHIPPED | OUTPATIENT
Start: 2018-11-05 | End: 2018-11-14 | Stop reason: SDUPTHER

## 2018-11-05 RX ORDER — RAMIPRIL 5 MG/1
5 CAPSULE ORAL DAILY
Qty: 90 CAPSULE | Refills: 12 | Status: SHIPPED | OUTPATIENT
Start: 2018-11-05 | End: 2019-09-05 | Stop reason: SDUPTHER

## 2018-11-05 NOTE — PROGRESS NOTES
Subjective:       Patient ID: Liang Monterroso Jr. is a 78 y.o. male.    Chief Complaint: Follow-up (3mo.) and Hand Pain (Right thumb gets stuck and snaps)    HPI: Pt here to f/u BP, other medical problems and right thumb pain and trigger action.  Patient states the right thumb gets done, is painful or tender when he has to pop it back open.  He has history of prostate bladder problems. He saw Urology but only offereing was self cath or surgery and he declined at this time. He needs Rxs refilled. He denies any CP or SOB or GI complaints.       Review of Systems   Constitutional: Negative for chills, fatigue, fever and unexpected weight change.   HENT: Negative for nosebleeds and trouble swallowing.    Eyes: Negative for pain and visual disturbance.   Respiratory: Negative for cough, shortness of breath and wheezing.    Cardiovascular: Negative for chest pain and palpitations.   Gastrointestinal: Negative for abdominal pain, constipation, diarrhea, nausea and vomiting.   Genitourinary: Positive for difficulty urinating. Negative for hematuria.   Musculoskeletal: Positive for arthralgias (right thumb). Negative for neck pain.   Skin: Negative for rash.   Neurological: Negative for dizziness and headaches.   Hematological: Does not bruise/bleed easily.   Psychiatric/Behavioral: Negative for dysphoric mood, sleep disturbance and suicidal ideas.       Objective:      Physical Exam   Constitutional: He is oriented to person, place, and time. He appears well-developed and well-nourished. No distress.   HENT:   Head: Normocephalic and atraumatic.   Right Ear: External ear normal.   Left Ear: External ear normal.   Mouth/Throat: Oropharynx is clear and moist. No oropharyngeal exudate.   TM's clear, pharynx clear   Eyes: Conjunctivae and EOM are normal. Pupils are equal, round, and reactive to light. No scleral icterus.   Neck: Normal range of motion. Neck supple. No thyromegaly present.   No supraclavicular nodes palpated    Cardiovascular: Normal rate, regular rhythm and normal heart sounds.   No murmur heard.  Pulmonary/Chest: Effort normal and breath sounds normal. He has no wheezes.   Abdominal: Soft. Bowel sounds are normal.   Musculoskeletal: He exhibits deformity (Right thumb trigger finger). He exhibits no edema.   Lymphadenopathy:     He has no cervical adenopathy.   Neurological: He is alert and oriented to person, place, and time.   Skin: No rash noted. No erythema. No pallor.   Psychiatric: He has a normal mood and affect. His behavior is normal.       Assessment:       1. Trigger finger of right thumb    2. Essential hypertension    3. Dupuytren contracture    4. Acquired hypothyroidism    5. Pure hypercholesterolemia    6. BPH with urinary obstruction    7. Impaired functional mobility, balance, gait, and endurance    8. JUDITH (obstructive sleep apnea)    9. Meningioma    10. Elevated glucose        Plan:       Liang was seen today for follow-up and hand pain.    Diagnoses and all orders for this visit:    Trigger finger of right thumb  -     Ambulatory referral to Orthopedics  -     Lipid panel; Future  -     CBC auto differential; Future  -     Comprehensive metabolic panel; Future  -     Hemoglobin A1c; Future  -     TSH; Future    Essential hypertension  -     ramipril (ALTACE) 5 MG capsule; Take 1 capsule (5 mg total) by mouth once daily.  -     Lipid panel; Future  -     CBC auto differential; Future  -     Comprehensive metabolic panel; Future  -     Hemoglobin A1c; Future  -     TSH; Future    Dupuytren contracture  -     Ambulatory referral to Orthopedics  -     Lipid panel; Future  -     CBC auto differential; Future  -     Comprehensive metabolic panel; Future  -     Hemoglobin A1c; Future  -     TSH; Future    Acquired hypothyroidism  -     Lipid panel; Future  -     CBC auto differential; Future  -     Comprehensive metabolic panel; Future  -     Hemoglobin A1c; Future  -     TSH; Future    Pure  hypercholesterolemia  -     Lipid panel; Future  -     CBC auto differential; Future  -     Comprehensive metabolic panel; Future  -     Hemoglobin A1c; Future  -     TSH; Future    BPH with urinary obstruction  -     Lipid panel; Future  -     CBC auto differential; Future  -     Comprehensive metabolic panel; Future  -     Hemoglobin A1c; Future  -     TSH; Future    Impaired functional mobility, balance, gait, and endurance  -     Lipid panel; Future  -     CBC auto differential; Future  -     Comprehensive metabolic panel; Future  -     Hemoglobin A1c; Future  -     TSH; Future    JUDITH (obstructive sleep apnea)  -     Lipid panel; Future  -     CBC auto differential; Future  -     Comprehensive metabolic panel; Future  -     Hemoglobin A1c; Future  -     TSH; Future    Meningioma  -     Lipid panel; Future  -     CBC auto differential; Future  -     Comprehensive metabolic panel; Future  -     Hemoglobin A1c; Future  -     TSH; Future    Elevated glucose  -     Lipid panel; Future  -     CBC auto differential; Future  -     Comprehensive metabolic panel; Future  -     Hemoglobin A1c; Future  -     TSH; Future    Other orders  -     levothyroxine (SYNTHROID) 112 MCG tablet; Take 1 tablet (112 mcg total) by mouth before breakfast.

## 2018-11-06 DIAGNOSIS — M79.644 PAIN OF RIGHT THUMB: Primary | ICD-10-CM

## 2018-11-07 ENCOUNTER — OFFICE VISIT (OUTPATIENT)
Dept: ORTHOPEDICS | Facility: CLINIC | Age: 78
End: 2018-11-07
Payer: MEDICARE

## 2018-11-07 ENCOUNTER — HOSPITAL ENCOUNTER (OUTPATIENT)
Dept: RADIOLOGY | Facility: OTHER | Age: 78
Discharge: HOME | End: 2018-11-07
Attending: PLASTIC SURGERY
Payer: MEDICARE

## 2018-11-07 VITALS
HEIGHT: 68 IN | BODY MASS INDEX: 28.95 KG/M2 | WEIGHT: 191 LBS | DIASTOLIC BLOOD PRESSURE: 80 MMHG | SYSTOLIC BLOOD PRESSURE: 115 MMHG | HEART RATE: 75 BPM

## 2018-11-07 DIAGNOSIS — M79.644 PAIN OF RIGHT THUMB: ICD-10-CM

## 2018-11-07 DIAGNOSIS — M65.311 TRIGGER THUMB OF RIGHT HAND: Primary | ICD-10-CM

## 2018-11-07 PROCEDURE — 1101F PT FALLS ASSESS-DOCD LE1/YR: CPT | Mod: CPTII,HCNC,S$GLB, | Performed by: PLASTIC SURGERY

## 2018-11-07 PROCEDURE — 99999 PR PBB SHADOW E&M-EST. PATIENT-LVL III: CPT | Mod: PBBFAC,HCNC,, | Performed by: PLASTIC SURGERY

## 2018-11-07 PROCEDURE — 99203 OFFICE O/P NEW LOW 30 MIN: CPT | Mod: 25,HCNC,S$GLB, | Performed by: PLASTIC SURGERY

## 2018-11-07 PROCEDURE — 3079F DIAST BP 80-89 MM HG: CPT | Mod: CPTII,HCNC,S$GLB, | Performed by: PLASTIC SURGERY

## 2018-11-07 PROCEDURE — 3074F SYST BP LT 130 MM HG: CPT | Mod: CPTII,HCNC,S$GLB, | Performed by: PLASTIC SURGERY

## 2018-11-07 PROCEDURE — 73140 X-RAY EXAM OF FINGER(S): CPT | Mod: 26,HCNC,RT, | Performed by: RADIOLOGY

## 2018-11-07 PROCEDURE — 20550 NJX 1 TENDON SHEATH/LIGAMENT: CPT | Mod: F5,HCNC,S$GLB, | Performed by: PLASTIC SURGERY

## 2018-11-07 PROCEDURE — 73140 X-RAY EXAM OF FINGER(S): CPT | Mod: TC,FY,HCNC

## 2018-11-07 RX ORDER — TRIAMCINOLONE ACETONIDE 40 MG/ML
40 INJECTION, SUSPENSION INTRA-ARTICULAR; INTRAMUSCULAR
Status: COMPLETED | OUTPATIENT
Start: 2018-11-07 | End: 2018-11-07

## 2018-11-07 RX ADMIN — TRIAMCINOLONE ACETONIDE 40 MG: 40 INJECTION, SUSPENSION INTRA-ARTICULAR; INTRAMUSCULAR at 10:11

## 2018-11-07 NOTE — LETTER
November 7, 2018      Corey Iqbal MD  [address]  [facility] [address]           [facility]  [address], [address]  [facility] [address]  Phone: [phone]          Patient: Liang Monterroso Jr.   MR Number: [id number]   YOB: 1940   Date of Visit: 11/7/2018       Dear Dr. Corey Iqbal:    Thank you for referring Liang Monterroso to me for evaluation. Attached you will find relevant portions of my assessment and plan of care.    If you have questions, please do not hesitate to call me. I look forward to following Liang Monterroso along with you.    Sincerely,    Job Craig Jr., MD    Enclosure  CC:  No Recipients    If you would like to receive this communication electronically, please contact [email or web address] or [phone] to request more information on [facility] access.    For providers and/or their staff who would like to refer a patient to [facility], please contact us through our one-stop-shop provider referral line, [facility] , at [phone].    If you feel you have received this communication in error or would no longer like to receive these types of communications, please e-mail [email or web address]

## 2018-11-07 NOTE — PROGRESS NOTES
HISTORY OF PRESENT ILLNESS:  Mr. Monterroso is a 78-year-old right-hand dominant   male, who presents today with a 1-month history of pain at the base of the right   thumb with occasional triggering.  He was seen by his primary care physician   who diagnosed him with a trigger digit and [facility]   [facility] and treatment.  He denies any numbness or tingling.  No   previous history of trauma.  He has no other complaints today.    Past Medical History:   Diagnosis Date    Allergy     CKD (chronic kidney disease) stage 3, GFR 30-59 ml/min 6/15/2016    GERD (gastroesophageal reflux disease)     Hyperlipidemia     Hypertension     Hypospadias in male     Hypothyroidism     Sleep apnea     Thyroid disease     Urinary tract infection        Past Surgical History:   Procedure Laterality Date    APPENDECTOMY      CATARACT EXTRACTION      COLONOSCOPY N/A 4/15/2014    Performed by Gustavo Pfeiffer MD at [facility] ENDO (4TH FLR)    CRANIOTOMY WITH STEALTH/  Right Temporal Craniotomy Right 3/20/2017    Performed by Jameson Chen MD at [facility] OR 2ND FLR    EYE SURGERY      HERNIA REPAIR      TONSILLECTOMY      TURP W LASER N/A 7/21/2017    Performed by Anastacio Eid Jr., MD at [facility] OR 1ST FLR       Social History     Socioeconomic History    Marital status:      Spouse name: parul    Number of children: 4    Years of education: Not on file    Highest education level: Not on file   Social Needs    Financial resource strain: Not on file    Food insecurity - worry: Not on file    Food insecurity - inability: Not on file    Transportation needs - medical: Not on file    Transportation needs - non-medical: Not on file   Occupational History    Occupation: retired   Tobacco Use    Smoking status: Never Smoker    Smokeless tobacco: Never Used   Substance and Sexual Activity    Alcohol use: Yes     Comment: 1-3 glasses wine weekly, 2-3 beers weekly    Drug use: No     Sexual activity: Yes     Partners: Female   Other Topics Concern    Not on file   Social History Narrative    Not on file       Current Outpatient Medications on File Prior to Visit   Medication Sig Dispense Refill    aspirin (ECOTRIN) 81 MG EC tablet Take 81 mg by mouth once daily.      levothyroxine (SYNTHROID) 112 MCG tablet Take 1 tablet (112 mcg total) by mouth before breakfast. 90 tablet 12    pravastatin (PRAVACHOL) 20 MG tablet TAKE ONE TABLET BY MOUTH ONE TIME DAILY  90 tablet 0    ramipril (ALTACE) 5 MG capsule Take 1 capsule (5 mg total) by mouth once daily. 30 capsule 0    ramipril (ALTACE) 5 MG capsule Take 1 capsule (5 mg total) by mouth once daily. 90 capsule 12    tamsulosin (FLOMAX) 0.4 mg Cap TAKE TWO CAPSULES BY MOUTH DAILY  180 capsule 2    amoxicillin-clavulanate 875-125mg (AUGMENTIN) 875-125 mg per tablet Take 1 tablet by mouth 2 (two) times daily. 28 tablet 0    econazole nitrate 1 % cream USE TWICE DAILY (Patient taking differently: USE TWICE DAILY (prn)) 60 g 5    famotidine (PEPCID) 20 MG tablet Take 20 mg by mouth nightly as needed.       fexofenadine (ALLEGRA) 180 MG tablet Take 1 tablet (180 mg total) by mouth once daily. (Patient taking differently: Take 180 mg by mouth daily as needed. ) 30 tablet 11    metronidazole (METROGEL) 0.75 % gel Use hs 45 g 3    multivitamin with minerals tablet Take 1 tablet by mouth once daily.        polyethylene glycol (GLYCOLAX) 17 gram PwPk Take 17 g by mouth once daily. 30 packet 0    triamcinolone acetonide 0.1% (KENALOG) 0.1 % cream Use bid prn rash 80 g 3     No current facility-administered medications on file prior to visit.        Review of patient's allergies indicates:   Allergen Reactions    Contrast media Hives and Itching     Iv dye       Review of Systems:  Constitutional: no fever or chills  ENT: no nasal congestion or sore throat  Respiratory: no cough or shortness of breath  Cardiovascular: no chest pain or  palpitations  Gastrointestinal: no nausea or vomiting  Genitourinary: no hematuria or dysuria  Integument/Breast: no rash or pruritis  Hematologic/Lymphatic: no easy bruising or lymphadenopathy  Musculoskeletal: see HPI  Neurological: no seizures or tremors  Behavioral/Psych: no auditory or visual hallucinations      PHYSICAL EXAM:    PHYSICAL EXAMINATION:  GENERAL APPEARANCE:  No acute distress, alert and oriented x3, cooperative, well   nourished.  LUNGS:  Unlabored on room air.  CARDIOVASCULAR:  Distal pulses intact.  Good capillary refill.  No clubbing,   cyanosis or edema.  EXTREMITIES:  Right upper extremity, negative Tinel and Durkan sign over the   carpal tunnel.  No thenar or hypothenar atrophy.  There is active triggering   with flexion and extension of the IP joint of the thumb, tenderness over the A1   pulley at the base of the thumb and he has full active range of motion of digits   2 through 5.  Normal sensation in median, radial and ulnar distributions.    RADIOLOGY IMPRESSION:    EXAMINATION:  XR FINGER 2 OR MORE VIEWS    CLINICAL HISTORY:  pain;  Pain in right finger(s)    TECHNIQUE:  Three views of the right thumb are performed.    COMPARISON:  None    FINDINGS:  There are no acute fractures or dislocations or osteoblastic or lytic lesions.  There is however osteoarthritic changes of the interphalangeal joint as well as mild osteoarthritic changes of the 1st metacarpophalangeal joint.  Soft tissues are unremarkable.      Impression       1. 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         PROCEDURE:  PROCEDURE:  I have explained the risks, benefits, and alternatives of the procedure in detail.  The patient voices understanding and all questions have been answered. So after I performed a sterile prep of the skin, the level of there A-1 pulley of the right thumb is injected using a 25 gauge needle from the volar approach with a  combination of 1cc 1%  plain Lidocaine and 40 mg of Kenalog.  The patient is cautioned and immediate relief of pain is secondary to the local anesthetic and will be temporary.  After the anesthetic wears off there may be a increase in pain that may last for a few hours or a few days and they should use ice to help alleviate this flair up of pain.         ASSESSMENT:  Right trigger thumb.    PLAN:  I discussed the pathophysiology, progression and treatment of trigger   thumb with the patient in detail.  He was offered a corticosteroid injection to   the tendon sheath today to help alleviate his symptoms.  I discussed if his   symptoms fail improve or worsen over the next eight weeks, he may return to   clinic for further evaluation and treatment.  Otherwise, he may follow up p.r.n.      CLIFTON/RISHI  dd: 11/07/2018 10:14:22 (CST)  td: 11/08/2018 08:37:20 (CST)  Doc ID   #[id number]  Job ID #[id number]    CC:       Dictation Confirmation Code: [id number]  Job Craig Jr. MD  11/07/2018  10:12 AM

## 2018-11-08 ENCOUNTER — TELEPHONE (OUTPATIENT)
Dept: UROLOGY | Facility: CLINIC | Age: 78
End: 2018-11-08

## 2018-11-08 NOTE — TELEPHONE ENCOUNTER
----- Message from Jessy Zapata MA sent at 11/8/2018 12:09 PM CST -----  Contact: Andreia toure/ [facility] [phone] ext [phone]   Needs Advice    Reason for call: checking on the status of a fax that was sent for pt supplies, he is now out.         Communication Preference:     Andreia toure/ [facility] [phone] ext [phone]

## 2018-11-13 ENCOUNTER — PATIENT MESSAGE (OUTPATIENT)
Dept: UROLOGY | Facility: CLINIC | Age: 78
End: 2018-11-13

## 2018-11-14 ENCOUNTER — LAB VISIT (OUTPATIENT)
Dept: LAB | Facility: HOSPITAL | Age: 78
End: 2018-11-14
Attending: UROLOGY
Payer: MEDICARE

## 2018-11-14 ENCOUNTER — TELEPHONE (OUTPATIENT)
Dept: UROLOGY | Facility: CLINIC | Age: 78
End: 2018-11-14

## 2018-11-14 ENCOUNTER — PATIENT MESSAGE (OUTPATIENT)
Dept: UROLOGY | Facility: CLINIC | Age: 78
End: 2018-11-14

## 2018-11-14 DIAGNOSIS — N39.0 URINARY TRACT INFECTION WITH HEMATURIA, SITE UNSPECIFIED: ICD-10-CM

## 2018-11-14 DIAGNOSIS — R31.9 URINARY TRACT INFECTION WITH HEMATURIA, SITE UNSPECIFIED: Primary | ICD-10-CM

## 2018-11-14 DIAGNOSIS — N39.0 URINARY TRACT INFECTION WITH HEMATURIA, SITE UNSPECIFIED: Primary | ICD-10-CM

## 2018-11-14 DIAGNOSIS — R31.9 URINARY TRACT INFECTION WITH HEMATURIA, SITE UNSPECIFIED: ICD-10-CM

## 2018-11-14 PROCEDURE — 87086 URINE CULTURE/COLONY COUNT: CPT | Mod: HCNC

## 2018-11-14 PROCEDURE — 87088 URINE BACTERIA CULTURE: CPT | Mod: HCNC

## 2018-11-14 RX ORDER — TAMSULOSIN HYDROCHLORIDE 0.4 MG/1
2 CAPSULE ORAL DAILY
Qty: 180 CAPSULE | Refills: 2 | Status: SHIPPED | OUTPATIENT
Start: 2018-11-14 | End: 2019-07-23 | Stop reason: SDUPTHER

## 2018-11-14 RX ORDER — PRAVASTATIN SODIUM 20 MG/1
20 TABLET ORAL DAILY
Qty: 90 TABLET | Refills: 0 | Status: SHIPPED | OUTPATIENT
Start: 2018-11-14 | End: 2019-01-06 | Stop reason: SDUPTHER

## 2018-11-14 RX ORDER — LEVOTHYROXINE SODIUM 112 UG/1
112 TABLET ORAL
Qty: 90 TABLET | Refills: 12 | Status: SHIPPED | OUTPATIENT
Start: 2018-11-14 | End: 2020-01-15

## 2018-11-14 NOTE — TELEPHONE ENCOUNTER
----- Message from Girish Ibrahim sent at 11/14/2018  9:31 AM CST -----  Contact: Patient [phone]  RX request - refill or new RX.  Is this a refill or new RX:  Refill  RX name and strength: levothyroxine (SYNTHROID) 112 MCG tablet and ramipril (ALTACE) 5 MG capsule and tamsulosin (FLOMAX) 0.4 mg Cap and pravastatin (PRAVACHOL) 20 MG tablet    90 Day Supply requested    Pharmacy name and phone #: [facility] [address] [place] [address] [facility] [phone] (Phone) [phone] (Fax    Comment: Patient stating Dr was going to change all Rx to 90 Supply,    also would like to know if all 4 Rx can be sent to pharmacy today asap, will be going out of town in the next few days and need Rx prior to leaving.    Please call an advise  Thank you      Comments:

## 2018-11-15 LAB — BACTERIA UR CULT: NORMAL

## 2018-11-16 RX ORDER — AMOXICILLIN AND CLAVULANATE POTASSIUM 875; 125 MG/1; MG/1
1 TABLET, FILM COATED ORAL 2 TIMES DAILY
Qty: 60 TABLET | Refills: 1 | Status: SHIPPED | OUTPATIENT
Start: 2018-11-16 | End: 2018-12-22

## 2018-12-22 ENCOUNTER — OFFICE VISIT (OUTPATIENT)
Dept: URGENT CARE | Facility: CLINIC | Age: 78
End: 2018-12-22
Payer: MEDICARE

## 2018-12-22 VITALS
WEIGHT: 185 LBS | TEMPERATURE: 98 F | OXYGEN SATURATION: 98 % | RESPIRATION RATE: 18 BRPM | BODY MASS INDEX: 28.04 KG/M2 | SYSTOLIC BLOOD PRESSURE: 124 MMHG | HEIGHT: 68 IN | DIASTOLIC BLOOD PRESSURE: 83 MMHG | HEART RATE: 85 BPM

## 2018-12-22 DIAGNOSIS — B97.89 ACUTE VIRAL SINUSITIS: Primary | ICD-10-CM

## 2018-12-22 DIAGNOSIS — J01.90 ACUTE VIRAL SINUSITIS: Primary | ICD-10-CM

## 2018-12-22 PROCEDURE — 3074F SYST BP LT 130 MM HG: CPT | Mod: CPTII,S$GLB,, | Performed by: FAMILY MEDICINE

## 2018-12-22 PROCEDURE — 3079F DIAST BP 80-89 MM HG: CPT | Mod: CPTII,S$GLB,, | Performed by: FAMILY MEDICINE

## 2018-12-22 PROCEDURE — 1101F PT FALLS ASSESS-DOCD LE1/YR: CPT | Mod: CPTII,S$GLB,, | Performed by: FAMILY MEDICINE

## 2018-12-22 PROCEDURE — 99214 OFFICE O/P EST MOD 30 MIN: CPT | Mod: S$GLB,,, | Performed by: FAMILY MEDICINE

## 2018-12-22 RX ORDER — FLUTICASONE PROPIONATE 50 MCG
1 SPRAY, SUSPENSION (ML) NASAL DAILY
Qty: 15.8 ML | Refills: 0 | Status: SHIPPED | OUTPATIENT
Start: 2018-12-22 | End: 2019-09-05

## 2018-12-22 RX ORDER — BENZONATATE 100 MG/1
100 CAPSULE ORAL EVERY 6 HOURS PRN
Qty: 30 CAPSULE | Refills: 1 | Status: SHIPPED | OUTPATIENT
Start: 2018-12-22 | End: 2019-09-05 | Stop reason: ALTCHOICE

## 2018-12-22 NOTE — PROGRESS NOTES
"Subjective:       Patient ID: Liang Monterroso Jr. is a 78 y.o. male.    Vitals:  height is 5' 8" (1.727 m) and weight is 83.9 kg (185 lb). His oral temperature is 97.9 °F (36.6 °C). His blood pressure is 124/83 and his pulse is 85. His respiration is 18 and oxygen saturation is 98%.     Chief Complaint: Sinus Problem    This is a 78 y.o. male who presents today with a chief complaint of   Sinus congestion, sore throat, cough moiz drainage. Not sleeping      Sinus Problem   This is a new problem. The current episode started in the past 7 days. The problem has been gradually worsening since onset. There has been no fever. His pain is at a severity of 0/10. He is experiencing no pain. Associated symptoms include chills, congestion, coughing, ear pain, headaches, shortness of breath, sinus pressure and a sore throat. Pertinent negatives include no diaphoresis. Treatments tried: allegra. The treatment provided no relief.       Constitution: Positive for chills. Negative for sweating, fatigue and fever.   HENT: Positive for ear pain, congestion, sinus pressure and sore throat. Negative for sinus pain and voice change.    Neck: Negative for painful lymph nodes.   Eyes: Negative for eye redness.   Respiratory: Positive for cough and shortness of breath. Negative for chest tightness, sputum production, bloody sputum, COPD, stridor, wheezing and asthma.    Gastrointestinal: Negative for nausea and vomiting.   Musculoskeletal: Negative for muscle ache.   Skin: Negative for rash.   Allergic/Immunologic: Negative for seasonal allergies and asthma.   Neurological: Positive for headaches.   Hematologic/Lymphatic: Negative for swollen lymph nodes.       Objective:      Physical Exam   Constitutional: He appears well-developed and well-nourished.   HENT:   Head: Normocephalic and atraumatic.   Eyes: EOM are normal. Pupils are equal, round, and reactive to light.   Neck: Normal range of motion. Neck supple.   Cardiovascular: Normal " rate, regular rhythm and normal heart sounds.   Pulmonary/Chest: Effort normal and breath sounds normal. No respiratory distress. He has no wheezes.   Nursing note and vitals reviewed.      Assessment:       1. Acute viral sinusitis        Plan:         Acute viral sinusitis  -     fluticasone (FLONASE) 50 mcg/actuation nasal spray; 1 spray (50 mcg total) by Each Nare route once daily.  Dispense: 15.8 mL; Refill: 0  -     benzonatate (TESSALON PERLES) 100 MG capsule; Take 1 capsule (100 mg total) by mouth every 6 (six) hours as needed for Cough.  Dispense: 30 capsule; Refill: 1

## 2018-12-22 NOTE — PATIENT INSTRUCTIONS

## 2019-01-06 RX ORDER — PRAVASTATIN SODIUM 20 MG/1
TABLET ORAL
Qty: 90 TABLET | Refills: 0 | Status: SHIPPED | OUTPATIENT
Start: 2019-01-06 | End: 2019-03-28 | Stop reason: SDUPTHER

## 2019-01-23 ENCOUNTER — OFFICE VISIT (OUTPATIENT)
Dept: INTERNAL MEDICINE | Facility: CLINIC | Age: 79
End: 2019-01-23
Payer: MEDICARE

## 2019-01-23 ENCOUNTER — HOSPITAL ENCOUNTER (OUTPATIENT)
Dept: RADIOLOGY | Facility: HOSPITAL | Age: 79
Discharge: HOME | End: 2019-01-23
Attending: INTERNAL MEDICINE
Payer: MEDICARE

## 2019-01-23 VITALS
DIASTOLIC BLOOD PRESSURE: 74 MMHG | WEIGHT: 190.5 LBS | BODY MASS INDEX: 28.87 KG/M2 | HEART RATE: 64 BPM | OXYGEN SATURATION: 98 % | SYSTOLIC BLOOD PRESSURE: 108 MMHG | HEIGHT: 68 IN

## 2019-01-23 DIAGNOSIS — M54.50 ACUTE RIGHT-SIDED LOW BACK PAIN WITHOUT SCIATICA: ICD-10-CM

## 2019-01-23 DIAGNOSIS — M54.31 SCIATICA OF RIGHT SIDE: Primary | ICD-10-CM

## 2019-01-23 DIAGNOSIS — I70.0 AORTIC ATHEROSCLEROSIS: ICD-10-CM

## 2019-01-23 DIAGNOSIS — R82.90 CLOUDY URINE: ICD-10-CM

## 2019-01-23 DIAGNOSIS — D32.9 MENINGIOMA: ICD-10-CM

## 2019-01-23 DIAGNOSIS — M54.31 SCIATICA OF RIGHT SIDE: ICD-10-CM

## 2019-01-23 PROCEDURE — 3078F DIAST BP <80 MM HG: CPT | Mod: HCNC,CPTII,S$GLB, | Performed by: INTERNAL MEDICINE

## 2019-01-23 PROCEDURE — 99499 RISK ADDL DX/OHS AUDIT: ICD-10-PCS | Mod: HCNC,S$GLB,, | Performed by: INTERNAL MEDICINE

## 2019-01-23 PROCEDURE — 3078F PR MOST RECENT DIASTOLIC BLOOD PRESSURE < 80 MM HG: ICD-10-PCS | Mod: HCNC,CPTII,S$GLB, | Performed by: INTERNAL MEDICINE

## 2019-01-23 PROCEDURE — 99499 UNLISTED E&M SERVICE: CPT | Mod: HCNC,S$GLB,, | Performed by: INTERNAL MEDICINE

## 2019-01-23 PROCEDURE — 3074F PR MOST RECENT SYSTOLIC BLOOD PRESSURE < 130 MM HG: ICD-10-PCS | Mod: HCNC,CPTII,S$GLB, | Performed by: INTERNAL MEDICINE

## 2019-01-23 PROCEDURE — 3074F SYST BP LT 130 MM HG: CPT | Mod: HCNC,CPTII,S$GLB, | Performed by: INTERNAL MEDICINE

## 2019-01-23 PROCEDURE — 72100 X-RAY EXAM L-S SPINE 2/3 VWS: CPT | Mod: TC,HCNC

## 2019-01-23 PROCEDURE — 72100 X-RAY EXAM L-S SPINE 2/3 VWS: CPT | Mod: 26,HCNC,, | Performed by: RADIOLOGY

## 2019-01-23 PROCEDURE — 1101F PT FALLS ASSESS-DOCD LE1/YR: CPT | Mod: HCNC,CPTII,S$GLB, | Performed by: INTERNAL MEDICINE

## 2019-01-23 PROCEDURE — 99999 PR PBB SHADOW E&M-EST. PATIENT-LVL III: CPT | Mod: PBBFAC,HCNC,, | Performed by: INTERNAL MEDICINE

## 2019-01-23 PROCEDURE — 99214 OFFICE O/P EST MOD 30 MIN: CPT | Mod: HCNC,S$GLB,, | Performed by: INTERNAL MEDICINE

## 2019-01-23 PROCEDURE — 72100 XR LUMBAR SPINE AP AND LATERAL: ICD-10-PCS | Mod: 26,HCNC,, | Performed by: RADIOLOGY

## 2019-01-23 PROCEDURE — 1101F PR PT FALLS ASSESS DOC 0-1 FALLS W/OUT INJ PAST YR: ICD-10-PCS | Mod: HCNC,CPTII,S$GLB, | Performed by: INTERNAL MEDICINE

## 2019-01-23 PROCEDURE — 99214 PR OFFICE/OUTPT VISIT, EST, LEVL IV, 30-39 MIN: ICD-10-PCS | Mod: HCNC,S$GLB,, | Performed by: INTERNAL MEDICINE

## 2019-01-23 PROCEDURE — 99999 PR PBB SHADOW E&M-EST. PATIENT-LVL III: ICD-10-PCS | Mod: PBBFAC,HCNC,, | Performed by: INTERNAL MEDICINE

## 2019-01-23 NOTE — PROGRESS NOTES
Subjective:       Patient ID: Liang Monterroso Jr. is a 78 y.o. male.    Chief Complaint: Sciatica    Sciatica, present off and on for years, but recently more frequent and intense and longer lasting. Tried some Aleve.  Says in the past he has had brief episodes, the pain shooting into the buttocks or down the leg but only lasting a few seconds.  Most recently he has had bouts lasting longer.  Usually they have been triggered in the shower but he is not sure if it is certain bending or twisting that is occurring.  He tried some Aleve with limited relief.  He may also be having recurrent urinary infection.  He sees Urology regularly but I will try placing an order for a culture if he can deliver.  He does sometimes have to self-catheterize.      Back Pain   This is a new problem. The current episode started 1 to 4 weeks ago. The problem occurs daily. The problem has been gradually worsening since onset. The pain is present in the sacro-iliac. The quality of the pain is described as stabbing. The pain does not radiate. The pain is at a severity of 10/10. The pain is severe. The symptoms are aggravated by twisting. Stiffness is present all day. Pertinent negatives include no abdominal pain, bladder incontinence, bowel incontinence, chest pain, dysuria, fever, headaches, leg pain, numbness, paresis, paresthesias, pelvic pain, perianal numbness, tingling, weakness or weight loss. He has tried home exercises for the symptoms. The treatment provided no relief.   Answers for HPI/ROS submitted by the patient on 1/23/2019   Back pain  genital pain: No    Review of Systems   Constitutional: Negative for chills, fatigue, fever, unexpected weight change and weight loss.   HENT: Negative for nosebleeds and trouble swallowing.    Eyes: Negative for pain and visual disturbance.   Respiratory: Negative for cough, shortness of breath and wheezing.    Cardiovascular: Negative for chest pain and palpitations.   Gastrointestinal: Negative  for abdominal pain, bowel incontinence, constipation, diarrhea, nausea and vomiting.   Genitourinary: Negative for bladder incontinence, difficulty urinating, dysuria, hematuria and pelvic pain.        Cloudy urine     Musculoskeletal: Positive for arthralgias, back pain and gait problem (When he has the severe bouts of sciatica). Negative for neck pain.   Skin: Negative for rash.   Neurological: Negative for dizziness, tingling, weakness, numbness, headaches and paresthesias.   Hematological: Does not bruise/bleed easily.   Psychiatric/Behavioral: Negative for dysphoric mood, sleep disturbance and suicidal ideas.       Objective:      Physical Exam   Constitutional: He is oriented to person, place, and time. He appears well-developed and well-nourished. No distress.   HENT:   Head: Normocephalic and atraumatic.   Mouth/Throat: Oropharynx is clear and moist. No oropharyngeal exudate.   TM's clear, pharynx clear   Eyes: Conjunctivae and EOM are normal. Pupils are equal, round, and reactive to light. No scleral icterus.   Neck: Normal range of motion. Neck supple. No thyromegaly present.   No supraclavicular nodes palpated   Cardiovascular: Normal rate, regular rhythm and normal heart sounds.   No murmur heard.  Pulmonary/Chest: Effort normal and breath sounds normal. He has no wheezes.   Abdominal: Soft. Bowel sounds are normal. He exhibits no mass. There is no tenderness.   Musculoskeletal: He exhibits tenderness (Minimal discomfort in the right lower buttocks with range motion the right leg but no definite straight leg raise positivity on exam.  Excellent equal strength bilaterally.  Reflexes are intact). He exhibits no edema or deformity.   Lymphadenopathy:     He has no cervical adenopathy.   Neurological: He is alert and oriented to person, place, and time. He displays normal reflexes.   Skin: No rash noted. No erythema. No pallor.   Psychiatric: He has a normal mood and affect. His behavior is normal.        Assessment:       1. Sciatica of right side    2. Acute right-sided low back pain without sciatica    3. Aortic atherosclerosis    4. Meningioma    5. Cloudy urine        Plan:       Liang was seen today for sciatica.    Diagnoses and all orders for this visit:    Sciatica of right side  -     X-Ray Lumbar Spine Ap And Lateral; Future  -     Ambulatory Referral to Physical/Occupational Therapy    Acute right-sided low back pain without sciatica  -     X-Ray Lumbar Spine Ap And Lateral; Future  -     Ambulatory Referral to Physical/Occupational Therapy    Aortic atherosclerosis    Meningioma  Comments:  Clinically stable. Will monitor for symptoms.     Cloudy urine  -     Urine culture; Future          Heat-stretching, continue anti-inflammatory.  Call if symptoms fail to improve or worsen.  Review x-ray.  Trial of PT.  Check urine  Answers for HPI/ROS submitted by the patient on 1/23/2019   Back pain  genital pain: No

## 2019-01-24 ENCOUNTER — PATIENT MESSAGE (OUTPATIENT)
Dept: INTERNAL MEDICINE | Facility: CLINIC | Age: 79
End: 2019-01-24

## 2019-01-25 ENCOUNTER — PATIENT MESSAGE (OUTPATIENT)
Dept: INTERNAL MEDICINE | Facility: CLINIC | Age: 79
End: 2019-01-25

## 2019-01-26 ENCOUNTER — PATIENT MESSAGE (OUTPATIENT)
Dept: INTERNAL MEDICINE | Facility: CLINIC | Age: 79
End: 2019-01-26

## 2019-01-26 RX ORDER — NITROFURANTOIN 25; 75 MG/1; MG/1
100 CAPSULE ORAL 2 TIMES DAILY
Qty: 20 CAPSULE | Refills: 0 | Status: SHIPPED | OUTPATIENT
Start: 2019-01-26 | End: 2019-02-05

## 2019-01-28 ENCOUNTER — PATIENT MESSAGE (OUTPATIENT)
Dept: INTERNAL MEDICINE | Facility: CLINIC | Age: 79
End: 2019-01-28

## 2019-02-05 ENCOUNTER — CLINICAL SUPPORT (OUTPATIENT)
Dept: REHABILITATION | Facility: HOSPITAL | Age: 79
End: 2019-02-05
Attending: INTERNAL MEDICINE
Payer: MEDICARE

## 2019-02-05 DIAGNOSIS — G89.29 CHRONIC BILATERAL LOW BACK PAIN WITH BILATERAL SCIATICA: Primary | ICD-10-CM

## 2019-02-05 DIAGNOSIS — M54.41 CHRONIC BILATERAL LOW BACK PAIN WITH BILATERAL SCIATICA: Primary | ICD-10-CM

## 2019-02-05 DIAGNOSIS — M54.42 CHRONIC BILATERAL LOW BACK PAIN WITH BILATERAL SCIATICA: Primary | ICD-10-CM

## 2019-02-05 PROCEDURE — 97161 PT EVAL LOW COMPLEX 20 MIN: CPT | Mod: HCNC

## 2019-02-05 PROCEDURE — 97110 THERAPEUTIC EXERCISES: CPT | Mod: HCNC

## 2019-02-05 NOTE — PROGRESS NOTES
[facility]  Physical Therapy Initial Evaluation    Name: Liang Monterroso Jr.  Clinic Number: [id number]    Therapy Diagnosis: No diagnosis found.  Physician: Corey Iqbal MD    Physician Orders: PT Eval and Treat   Medical Diagnosis:   M54.31 (ICD-10-CM) - Sciatica of right side   M54.5 (ICD-10-CM) - Acute right-sided low back pain without sciatica       Evaluation Date: 2/5/2019  Authorization Period Expiration: 12/31/19  Plan of Care Certification Period: 5/5/19  Visit # / Visits authorized: 1/ 20    Time In: 2:25  Time Out: 3:30  Total Billable Time: 60 minutes    Precautions: Standard    Subjective   Date of onset: Number of years   History of current condition - Liang reports: he would have pain near the piriformis last last number of the years. It had been on and off again but has just recently gotten worse. In the last week he has done a lot of work in the yard with heavy lifting and climbing ladders. States he also has some neck pain when he walks on the track. Last 2-3 weeks he notices it seems to bother him while standing in shower. He does have instances of spasms in the R piriformis where he feels debilitating which is somewhat random.        Past Medical History:   Diagnosis Date    Allergy     CKD (chronic kidney disease) stage 3, GFR 30-59 ml/min 6/15/2016    GERD (gastroesophageal reflux disease)     Hyperlipidemia     Hypertension     Hypospadias in male     Hypothyroidism     Sleep apnea     Thyroid disease     Urinary tract infection      Liang Monterroso Jr.  has a past surgical history that includes Appendectomy; Hernia repair; Tonsillectomy; Cataract extraction; and Eye surgery.    Liang has a current medication list which includes the following prescription(s): aspirin, benzonatate, econazole nitrate, famotidine, fexofenadine, fluticasone, levothyroxine, metronidazole, multivitamin with minerals, nitrofurantoin (macrocrystal-monohydrate), polyethylene glycol,  pravastatin, pravastatin, ramipril, ramipril, tamsulosin, and triamcinolone acetonide 0.1%.    Review of patient's allergies indicates:   Allergen Reactions    Contrast media Hives and Itching     Iv dye        Imaging, bone scan films:   Bones are slightly demineralized.  There may be sacralization of L5.  Otherwise vertebral body heights and alignment is satisfactory.  Significant calcified plaque in the aorta and branch vessels.  Facet arthropathy noted    Prior Therapy: None for current symptoms   Social History: 2 story home lives with their spouse  Occupation: Retired   Prior Level of Function: Able to complete ADL's   Current Level of Function: Random issues some issues with yard work     Pain:  Current 1/10, worst 10/10, best 1/10   Location: right back   Aggravating Factors: Standing  Easing Factors: rest and Hot tub    Pts goals: To not worry about instance       Objective     POSTURE  Posture Alignment :slouched posture  Postural examination/scapula alignment: Rounded shoulder and Head forward  Joint integrity: WNL  as seen through spring testing  Skin integrity: WNL   Edema: Not significant   Sitting: Poor  Standing: Poor    MOVEMENT LOSS    ROM Loss   Flexion within functional limits   Extension within functional limits   Side bending Right within functional limits   Side bending Left within functional limits   Rotation Right within functional limits   Rotation Left within functional limits     Lower Extremity Strength  Right LE  Left LE    Hip flexion: 4/5 Hip flexion: 4/5   Hip extension:  4/5 Hip extension: 4/5   Hip abduction: 4/5 Hip abduction: 4/5   Hip adduction:  4/5 Hip adduction:  4/5   Hip Internal rotation   4/5 Hip Internal rotation 4/5   Knee Flexion 4-/5 Knee Flexion 4-/5   Knee Extension 5/5 Knee Extension 5/5   Ankle dorsiflexion: 5/5 Ankle dorsiflexion: 5/5   Ankle plantarflexion: 5/5 Ankle plantarflexion: 5/5       GAIT:  Assistive Device used: None  Level of Assistance:  Independent  Patient displays the following gait deviations: Not significant      Special Tests:   Test Name  Test Result   Prone Instability Test (--)   SI Joint Provocation Test (--)   Straight Leg Raise (--)   Neural Tension Test (--)                   NEUROLOGICAL SCREENING     Sensory deficit:     Reflexes:    Left Right   Patella Tendon 2+ 2+   Achilles Tendon 2+ 2+   Babinski NT NT   Clonus - -     REPEATED TEST MOVEMENTS:  Repeated Flexion in Standing no effect   Repeated Extension in Standing no effect       Repeated Extension in lying  no effect        STATIC TESTS         CMS Impairment/Limitation/Restriction for FOTO  Survey    Therapist reviewed FOTO scores for Liang Monterroso  on 2/5/2019.   FOTO documents entered into [employer] - see Media section.    Limitation Score: 40%  Category: Mobility    Current : CK = at least 40% but < 60% impaired, limited or restricted  Goal: CJ = at least 20% but < 40% impaired, limited or restricted  Discharge: NA          TREATMENT   Treatment Time In: 2:30  Treatment Time Out: 3:30  Total Treatment time separate from Evaluation time:15    Liang received therapeutic exercises to develop strength, ROM, posture and core stabilization for 15 minutes including:  PPT with marches   Doorway stretch-HEP   Bridges   Prone press up      Home Exercises and Patient Education Provided    Education provided re: Importance of ice and use of HEP to manage symptoms.    Written Home Exercises Provided: .  Exercises were reviewed and Liang was able to demonstrate them prior to the end of the session.   Pt received a written copy of exercises to perform at home. Liang demonstrated good  understanding of the education provided.     See EMR under Patient Instructions for exercises provided prior visit.  Assessment   Liang is a 78 y.o. male referred to outpatient Physical Therapy with a medical diagnosis of low back pain. Pt presents with decreased strength in LE and poor posture/hip alignment.  Patient has adequate ROM and could not recreate symptoms based on movements. Minor TTP in the R SI joint. Patient seems to respond well with extension movements. Corrected hip alignment during evaluation. Will reassess on the next viist.     Pt prognosis is Good.   Pt will benefit from skilled outpatient Physical Therapy to address the deficits stated above and in the chart below, provide pt/family education, and to maximize pt's level of independence.     Plan of care discussed with patient: Yes  Pt's spiritual, cultural and educational needs considered and patient is agreeable to the plan of care and goals as stated below:     Anticipated Barriers for therapy: None     Medical Necessity is demonstrated by the following  History  Co-morbidities and personal factors that may impact the plan of care Co-morbidities:   advanced age    Personal Factors:   age     low   Examination  Body Structures and Functions, activity limitations and participation restrictions that may impact the plan of care Body Regions:   back    Body Systems:    ROM  strength  gait  transfers    Participation Restrictions:     Activity limitations:     Mobility  lifting and carrying objects  walking  shower    Self care  washing oneself (bathing, drying, washing hands)  caring for body parts (brushing teeth, shaving, grooming)    Domestic Life  doing house work (cleaning house, washing dishes, laundry)           low   Clinical Presentation stable and uncomplicated low   Decision Making/ Complexity Score: low     Goals:  Short Term Goals: 2-3 weeks   1) Patient will be compliant with HEP   2) Patient will demonstrate improved posture   3) Patient will be able to walk track for 30 min without pain in back or neck    Long Term Goals: 6-8 weeks   1) Patient will improve FOTO limitation to 24%  2) Patient will be able to complete all yard work without complaints of low back pain.   3) Patient will go one week without instance of lower back spasm.        Plan   Certification Period/Plan of care expiration: 2/5/2019 to 4/5/19.    Outpatient Physical Therapy 2 times weekly for 6 weeks to include the following interventions: Gait Training, Manual Therapy, Moist Heat/ Ice, Patient Education and Therapeutic Exercise.       Nik Jj, PT

## 2019-02-07 ENCOUNTER — CLINICAL SUPPORT (OUTPATIENT)
Dept: REHABILITATION | Facility: HOSPITAL | Age: 79
End: 2019-02-07
Attending: INTERNAL MEDICINE
Payer: MEDICARE

## 2019-02-07 DIAGNOSIS — M54.42 CHRONIC BILATERAL LOW BACK PAIN WITH BILATERAL SCIATICA: Primary | ICD-10-CM

## 2019-02-07 DIAGNOSIS — M54.41 CHRONIC BILATERAL LOW BACK PAIN WITH BILATERAL SCIATICA: Primary | ICD-10-CM

## 2019-02-07 DIAGNOSIS — G89.29 CHRONIC BILATERAL LOW BACK PAIN WITH BILATERAL SCIATICA: Primary | ICD-10-CM

## 2019-02-07 PROCEDURE — 97110 THERAPEUTIC EXERCISES: CPT | Mod: HCNC

## 2019-02-12 ENCOUNTER — CLINICAL SUPPORT (OUTPATIENT)
Dept: REHABILITATION | Facility: HOSPITAL | Age: 79
End: 2019-02-12
Attending: INTERNAL MEDICINE
Payer: MEDICARE

## 2019-02-12 PROCEDURE — 97110 THERAPEUTIC EXERCISES: CPT | Mod: HCNC

## 2019-02-12 NOTE — PROGRESS NOTES
"                                                    Physical Therapy Daily Note     Name: Liang Monterroso Jr.  Clinic Number: [id number]  Diagnosis:   No diagnosis found.  Physician: Corey Iqbal MD  Precautions: standard   Visit #: 3 of 20  PTA Visit #: 1  Time In: 2:34 pm   Time Out: 3:28 pm  Total Treatment Time 1:1: 54 minutes       Evaluation Date: 2/5/2019  Authorization Period Expiration: 12/31/19  Plan of Care Certification Period: 5/5/19  Visit # / Visits authorized: 3/ 20    MD referral:   M54.31 (ICD-10-CM) - Sciatica of right side   M54.5 (ICD-10-CM) - Acute right-sided low back pain without sciatica          Subjective     Pt reports: no pain right now, pain this am when performing stretches. Only lasted 20 s   Pain Scale: Liang rates pain on a scale of 0-10 to be 0 currently.    FOTO from evaluation:  Limitation Score: 40%  Category: Mobility    Objective     Liang received individual therapeutic exercises to develop strength, endurance, ROM and posture for 54 minutes including:  Nustep x 5 min  PPT with marches x15 B  Doorway stretch-HEP   Bridges 3x10   Prone press ups 20x  SL hip abd 3x10   Clamshell 2x10  SLR 3x10   B piriformis stretch 3x30"  Quadruped alt hip ext x5 B  Core walkouts 1x10 GTB  Shuttle 3 c w/ shoulder ext GTB 2x10    Written Home Exercises Provided: per eval. Standing repeated lumbar extensions added to HEP   Pt demo good understanding of the education provided. Liang demonstrated good return demonstration of activities.     Education provided re: performance of HEP three times per day to decrease symptoms.  Also educated patient on proper sitting and standing posture.   Liang verbalized good understanding of education provided.   No spiritual or educational barriers to learning provided    Assessment     Patient tolerated treatment well. Introduced new deniz above to challenge core and LE strength. VC needed to keep core activated w/ PPT march. Cont to progress as junior.     This is a 78 " y.o. male referred to outpatient physical therapy and presents with a medical diagnosis of (R) sciatica and demonstrates limitations as described in the problem list. Pt prognosis is Good. Pt will continue to benefit from skilled outpatient physical therapy to address the deficits listed in the problem list, provide pt/family education and to maximize pt's level of independence in the home and community environment.     Goals as follows:  Short Term Goals: 2-3 weeks   1) Patient will be compliant with HEP   2) Patient will demonstrate improved posture   3) Patient will be able to walk track for 30 min without pain in back or neck     Long Term Goals: 6-8 weeks   1) Patient will improve FOTO limitation to 24%  2) Patient will be able to complete all yard work without complaints of low back pain.   3) Patient will go one week without instance of lower back spasm.     Plan     Continue with established Plan of Care towards PT goals.     Therapist: Nyasia Hatfield, PTA  2/12/2019

## 2019-02-14 ENCOUNTER — CLINICAL SUPPORT (OUTPATIENT)
Dept: REHABILITATION | Facility: HOSPITAL | Age: 79
End: 2019-02-14
Attending: INTERNAL MEDICINE
Payer: MEDICARE

## 2019-02-14 DIAGNOSIS — G89.29 CHRONIC BILATERAL LOW BACK PAIN WITH BILATERAL SCIATICA: Primary | ICD-10-CM

## 2019-02-14 DIAGNOSIS — M54.41 CHRONIC BILATERAL LOW BACK PAIN WITH BILATERAL SCIATICA: Primary | ICD-10-CM

## 2019-02-14 DIAGNOSIS — M54.42 CHRONIC BILATERAL LOW BACK PAIN WITH BILATERAL SCIATICA: Primary | ICD-10-CM

## 2019-02-14 PROCEDURE — 97110 THERAPEUTIC EXERCISES: CPT | Mod: HCNC

## 2019-02-14 NOTE — PROGRESS NOTES
"                                                    Physical Therapy Daily Note     Name: Liang Monterroso Jr.  Clinic Number: [id number]  Diagnosis:   No diagnosis found.  Physician: Corey Iqbal MD  Precautions: standard   Visit #: 3 of 20  PTA Visit #: 1  Time In: 2:34 pm   Time Out: 3:22 pm  Total Treatment Time 1:1: 48 minutes       Evaluation Date: 2/5/2019  Authorization Period Expiration: 12/31/19  Plan of Care Certification Period: 5/5/19  Visit # / Visits authorized: 3/ 20    MD referral:   M54.31 (ICD-10-CM) - Sciatica of right side   M54.5 (ICD-10-CM) - Acute right-sided low back pain without sciatica          Subjective     Pt reports: no reports of pain today   Pain Scale: Liang rates pain on a scale of 0-10 to be 0 currently.    FOTO from evaluation:  Limitation Score: 40%  Category: Mobility    Objective     Liang received individual therapeutic exercises to develop strength, endurance, ROM and posture for 48 minutes including:  Nustep x 5 min  PPT with marches x15 B  Bridges 3x10   Prone press ups 20x  SL hip abd 3x10   Clamshell 3x10  SLR 3x10   B piriformis stretch 3x30"  Quadruped alt hip ext x5 B  Core walkouts 1x10 GTB  Shuttle 3 c w/ shoulder ext GTB 2x10    Written Home Exercises Provided: per eval. Standing repeated lumbar extensions added to HEP   Pt demo good understanding of the education provided. Liang demonstrated good return demonstration of activities.     Education provided re: performance of HEP three times per day to decrease symptoms.  Also educated patient on proper sitting and standing posture.   Liang verbalized good understanding of education provided.   No spiritual or educational barriers to learning provided    Assessment     Patient tolerated treatment well. Some LB discomfort w/ prone lumbar ext. D/C if cont to worsen symptoms.tactile cues needed for PPT. Cont to progress as junior.    This is a 78 y.o. male referred to outpatient physical therapy and presents with a medical " diagnosis of (R) sciatica and demonstrates limitations as described in the problem list. Pt prognosis is Good. Pt will continue to benefit from skilled outpatient physical therapy to address the deficits listed in the problem list, provide pt/family education and to maximize pt's level of independence in the home and community environment.     Goals as follows:  Short Term Goals: 2-3 weeks   1) Patient will be compliant with HEP   2) Patient will demonstrate improved posture   3) Patient will be able to walk track for 30 min without pain in back or neck     Long Term Goals: 6-8 weeks   1) Patient will improve FOTO limitation to 24%  2) Patient will be able to complete all yard work without complaints of low back pain.   3) Patient will go one week without instance of lower back spasm.     Plan     Continue with established Plan of Care towards PT goals.     Therapist: Nyasia Hatfield, PTA  2/14/2019

## 2019-02-19 ENCOUNTER — CLINICAL SUPPORT (OUTPATIENT)
Dept: REHABILITATION | Facility: HOSPITAL | Age: 79
End: 2019-02-19
Attending: INTERNAL MEDICINE
Payer: MEDICARE

## 2019-02-19 ENCOUNTER — OFFICE VISIT (OUTPATIENT)
Dept: UROLOGY | Facility: CLINIC | Age: 79
End: 2019-02-19
Payer: MEDICARE

## 2019-02-19 VITALS
HEART RATE: 98 BPM | SYSTOLIC BLOOD PRESSURE: 129 MMHG | HEIGHT: 68 IN | DIASTOLIC BLOOD PRESSURE: 90 MMHG | BODY MASS INDEX: 28.2 KG/M2 | WEIGHT: 186.06 LBS

## 2019-02-19 DIAGNOSIS — M54.42 CHRONIC BILATERAL LOW BACK PAIN WITH BILATERAL SCIATICA: Primary | ICD-10-CM

## 2019-02-19 DIAGNOSIS — G89.29 CHRONIC BILATERAL LOW BACK PAIN WITH BILATERAL SCIATICA: Primary | ICD-10-CM

## 2019-02-19 DIAGNOSIS — M54.41 CHRONIC BILATERAL LOW BACK PAIN WITH BILATERAL SCIATICA: Primary | ICD-10-CM

## 2019-02-19 DIAGNOSIS — N40.1 BPH WITH URINARY OBSTRUCTION: ICD-10-CM

## 2019-02-19 DIAGNOSIS — R33.9 INCOMPLETE EMPTYING OF BLADDER: Primary | ICD-10-CM

## 2019-02-19 DIAGNOSIS — N13.8 BPH WITH URINARY OBSTRUCTION: ICD-10-CM

## 2019-02-19 PROCEDURE — 3080F DIAST BP >= 90 MM HG: CPT | Mod: CPTII,S$GLB,, | Performed by: UROLOGY

## 2019-02-19 PROCEDURE — 99999 PR PBB SHADOW E&M-EST. PATIENT-LVL III: CPT | Mod: PBBFAC,HCNC,, | Performed by: UROLOGY

## 2019-02-19 PROCEDURE — 97110 THERAPEUTIC EXERCISES: CPT | Mod: HCNC

## 2019-02-19 PROCEDURE — 99213 PR OFFICE/OUTPT VISIT, EST, LEVL III, 20-29 MIN: ICD-10-PCS | Mod: S$GLB,,, | Performed by: UROLOGY

## 2019-02-19 PROCEDURE — 3074F PR MOST RECENT SYSTOLIC BLOOD PRESSURE < 130 MM HG: ICD-10-PCS | Mod: CPTII,S$GLB,, | Performed by: UROLOGY

## 2019-02-19 PROCEDURE — 1101F PR PT FALLS ASSESS DOC 0-1 FALLS W/OUT INJ PAST YR: ICD-10-PCS | Mod: CPTII,S$GLB,, | Performed by: UROLOGY

## 2019-02-19 PROCEDURE — 3080F PR MOST RECENT DIASTOLIC BLOOD PRESSURE >= 90 MM HG: ICD-10-PCS | Mod: CPTII,S$GLB,, | Performed by: UROLOGY

## 2019-02-19 PROCEDURE — 99999 PR PBB SHADOW E&M-EST. PATIENT-LVL III: ICD-10-PCS | Mod: PBBFAC,HCNC,, | Performed by: UROLOGY

## 2019-02-19 PROCEDURE — 3074F SYST BP LT 130 MM HG: CPT | Mod: CPTII,S$GLB,, | Performed by: UROLOGY

## 2019-02-19 PROCEDURE — 1101F PT FALLS ASSESS-DOCD LE1/YR: CPT | Mod: CPTII,S$GLB,, | Performed by: UROLOGY

## 2019-02-19 PROCEDURE — 99213 OFFICE O/P EST LOW 20 MIN: CPT | Mod: S$GLB,,, | Performed by: UROLOGY

## 2019-02-19 NOTE — PROGRESS NOTES
Subjective:       Patient ID: Liang Monterroso Jr. is a 78 y.o. male.    Chief Complaint: Follow-up    HPI   Patient reports increasing urinary tract infection most recently treated by PCP.   His postvoid residual was 320 cc. He has a relatively low pressure bladder on SUDS from 10/2018. He recently increased CIC to twice a day. However he is not catheterizing in the morning.   He is not symptomatic from bacteriuria. No fevers or chills, no dysuria or hematuria. Mostly just cloudy urine.      Past Medical History:   Diagnosis Date    Allergy     CKD (chronic kidney disease) stage 3, GFR 30-59 ml/min 6/15/2016    GERD (gastroesophageal reflux disease)     Hyperlipidemia     Hypertension     Hypospadias in male     Hypothyroidism     Sleep apnea     Thyroid disease     Urinary tract infection        Past Surgical History:   Procedure Laterality Date    APPENDECTOMY      CATARACT EXTRACTION      COLONOSCOPY N/A 4/15/2014    Performed by Gustavo Pfeiffer MD at [facility] ENDO (4TH FLR)    CRANIOTOMY WITH STEALTH/  Right Temporal Craniotomy Right 3/20/2017    Performed by Jameson Chen MD at [facility] OR 2ND FLR    EYE SURGERY      HERNIA REPAIR      TONSILLECTOMY      TURP W LASER N/A 7/21/2017    Performed by Anastacio Eid Jr., MD at [facility] OR 1ST FLR       Family History   Problem Relation Age of Onset    Diabetes Mother     Heart disease Mother     Hypertension Mother     Vision loss Mother     Dementia Mother     Alcohol abuse Father     Cancer Sister         lung with mets, was a heavy smoker    No Known Problems Daughter     No Known Problems Son     No Known Problems Daughter     No Known Problems Son     Amblyopia Neg Hx     Blindness Neg Hx     Cataracts Neg Hx     Glaucoma Neg Hx     Macular degeneration Neg Hx     Retinal detachment Neg Hx     Strabismus Neg Hx     Stroke Neg Hx     Thyroid disease Neg Hx        Social History     Socioeconomic History    Marital status:       Spouse name: parul    Number of children: 4    Years of education: Not on file    Highest education level: Not on file   Social Needs    Financial resource strain: Not on file    Food insecurity - worry: Not on file    Food insecurity - inability: Not on file    Transportation needs - medical: Not on file    Transportation needs - non-medical: Not on file   Occupational History    Occupation: retired   Tobacco Use    Smoking status: Never Smoker    Smokeless tobacco: Never Used   Substance and Sexual Activity    Alcohol use: Yes     Comment: 1-3 glasses wine weekly, 2-3 beers weekly    Drug use: No    Sexual activity: Yes     Partners: Female   Other Topics Concern    Not on file   Social History Narrative    Not on file       Allergies:  Contrast media    Medications:    Current Outpatient Medications:     aspirin (ECOTRIN) 81 MG EC tablet, Take 81 mg by mouth once daily., Disp: , Rfl:     benzonatate (TESSALON PERLES) 100 MG capsule, Take 1 capsule (100 mg total) by mouth every 6 (six) hours as needed for Cough., Disp: 30 capsule, Rfl: 1    econazole nitrate 1 % cream, USE TWICE DAILY (Patient taking differently: USE TWICE DAILY (prn)), Disp: 60 g, Rfl: 5    famotidine (PEPCID) 20 MG tablet, Take 20 mg by mouth nightly as needed. , Disp: , Rfl:     fluticasone (FLONASE) 50 mcg/actuation nasal spray, 1 spray (50 mcg total) by Each Nare route once daily., Disp: 15.8 mL, Rfl: 0    levothyroxine (SYNTHROID) 112 MCG tablet, Take 1 tablet (112 mcg total) by mouth before breakfast., Disp: 90 tablet, Rfl: 12    metronidazole (METROGEL) 0.75 % gel, Use hs, Disp: 45 g, Rfl: 3    multivitamin with minerals tablet, Take 1 tablet by mouth once daily.  , Disp: , Rfl:     polyethylene glycol (GLYCOLAX) 17 gram PwPk, Take 17 g by mouth once daily., Disp: 30 packet, Rfl: 0    pravastatin (PRAVACHOL) 20 MG tablet, TAKE ONE TABLET BY MOUTH ONE TIME DAILY, Disp: 90 tablet, Rfl: 0    pravastatin  (PRAVACHOL) 20 MG tablet, TAKE ONE TABLET BY MOUTH ONE TIME DAILY, Disp: 90 tablet, Rfl: 0    ramipril (ALTACE) 5 MG capsule, Take 1 capsule (5 mg total) by mouth once daily., Disp: 30 capsule, Rfl: 0    ramipril (ALTACE) 5 MG capsule, Take 1 capsule (5 mg total) by mouth once daily., Disp: 90 capsule, Rfl: 12    tamsulosin (FLOMAX) 0.4 mg Cap, Take 2 capsules (0.8 mg total) by mouth once daily., Disp: 180 capsule, Rfl: 2    triamcinolone acetonide 0.1% (KENALOG) 0.1 % cream, Use bid prn rash, Disp: 80 g, Rfl: 3    fexofenadine (ALLEGRA) 180 MG tablet, Take 1 tablet (180 mg total) by mouth once daily. (Patient taking differently: Take 180 mg by mouth daily as needed. ), Disp: 30 tablet, Rfl: 11    Review of Systems   Constitutional: Negative for activity change, appetite change, chills, diaphoresis, fatigue, fever and unexpected weight change.   HENT: Negative for congestion, dental problem, hearing loss, mouth sores, postnasal drip, rhinorrhea, sinus pressure and trouble swallowing.    Eyes: Negative for pain, discharge and itching.   Respiratory: Negative for apnea, cough, choking, chest tightness, shortness of breath and wheezing.    Cardiovascular: Negative for chest pain, palpitations and leg swelling.   Gastrointestinal: Negative for abdominal distention, abdominal pain, anal bleeding, blood in stool, constipation, diarrhea, nausea, rectal pain and vomiting.   Endocrine: Negative for polydipsia and polyuria.   Genitourinary: Negative for decreased urine volume, difficulty urinating, discharge, dysuria, enuresis, flank pain, frequency, genital sores, hematuria, penile pain, penile swelling, scrotal swelling, testicular pain and urgency.   Musculoskeletal: Negative for arthralgias, back pain and myalgias.   Skin: Negative for color change, rash and wound.   Neurological: Negative for dizziness, syncope, speech difficulty, light-headedness and headaches.   Hematological: Negative for adenopathy. Does not  bruise/bleed easily.   Psychiatric/Behavioral: Negative for behavioral problems, confusion, hallucinations and sleep disturbance.       Objective:      Physical Exam   Constitutional: He appears well-developed.   HENT:   Head: Normocephalic.   Cardiovascular: Normal rate.    Pulmonary/Chest: Effort normal.   Abdominal: Soft.   Neurological: He is alert.   Skin: Skin is warm.     Psychiatric: He has a normal mood and affect.       Assessment:       1. Incomplete emptying of bladder    2. BPH with urinary obstruction        Plan:       Liang was seen today for follow-up.    Diagnoses and all orders for this visit:    Incomplete emptying of bladder    BPH with urinary obstruction    Recommend patient increase CIC to TID, especially in the AM  Only treat symptomatic UTI due to known colonization from CIC  Recommended initiation of probiotic - align  RTC with Renal US in 6 months

## 2019-02-19 NOTE — PROGRESS NOTES
"                                                    Physical Therapy Daily Note     Name: Liang Monterroso Jr.  Clinic Number: [id number]  Diagnosis:   Encounter Diagnosis   Name Primary?    Chronic bilateral low back pain with bilateral sciatica Yes     Physician: Corey Iqbal MD  Precautions: standard   Visit #: 5 of 20  PTA Visit #: 0  Time In: 1:52PM  Time Out: 2:46 PM  Total Treatment Time 1:1: 38 minutes       Evaluation Date: 2/5/2019  Authorization Period Expiration: 12/31/19  Plan of Care Certification Period: 5/5/19  Visit # / Visits authorized: 5/ 20    MD referral:   M54.31 (ICD-10-CM) - Sciatica of right side   M54.5 (ICD-10-CM) - Acute right-sided low back pain without sciatica          Subjective     Pt reports: no reports of pain today, but had 2 incidents yesterday after bending forward to pick something up  Pain Scale: Liang rates pain on a scale of 0-10 to be 0 currently.    FOTO from evaluation:  Limitation Score: 40%  Category: Mobility    Objective     Liang received individual therapeutic exercises to develop strength, endurance, ROM and posture for 54 minutes including:    Nustep x 5 min -    Table  PPT with marches x15 B-  Bridges c/ ball squeeze 3x10 -  Prone press ups 20x-  SL hip abd 3x10 -NT  Clamshell c/ RTB 3x10-  SLR c/ 1# ankle weight 3x10 -  B piriformis stretch 3x30"-  Quadruped alt hip ext 2 x 15 B-    Standing  Core walkouts 1x10 GTB-  Shuttle 4c w/ shoulder ext GTB 3 x 10-  Rows c/ GTB 3 x 10-  Shoulder Extensions c/ GTB 3 x 10-      Written Home Exercises Provided: per eval. Standing repeated lumbar extensions added to HEP   Pt demo good understanding of the education provided. Liang demonstrated good return demonstration of activities.     Education provided re: performance of HEP three times per day to decrease symptoms.  Also educated patient on proper sitting and standing posture.   Liang verbalized good understanding of education provided.   No spiritual or educational " barriers to learning provided    Assessment     Patient tolerated treatment well. PT added new therex at today's session and also advanced resistance and or reps to multiple exercises . Cont to progress as junior.    This is a 78 y.o. male referred to outpatient physical therapy and presents with a medical diagnosis of (R) sciatica and demonstrates limitations as described in the problem list. Pt prognosis is Good. Pt will continue to benefit from skilled outpatient physical therapy to address the deficits listed in the problem list, provide pt/family education and to maximize pt's level of independence in the home and community environment.     Goals as follows:  Short Term Goals: 2-3 weeks   1) Patient will be compliant with HEP   2) Patient will demonstrate improved posture   3) Patient will be able to walk track for 30 min without pain in back or neck     Long Term Goals: 6-8 weeks   1) Patient will improve FOTO limitation to 24%  2) Patient will be able to complete all yard work without complaints of low back pain.   3) Patient will go one week without instance of lower back spasm.     Plan     Continue with established Plan of Care towards PT goals.     Therapist: Guevara West, PT,DPT  2/19/2019

## 2019-02-20 ENCOUNTER — PES CALL (OUTPATIENT)
Dept: ADMINISTRATIVE | Facility: CLINIC | Age: 79
End: 2019-02-20

## 2019-02-21 ENCOUNTER — CLINICAL SUPPORT (OUTPATIENT)
Dept: REHABILITATION | Facility: HOSPITAL | Age: 79
End: 2019-02-21
Attending: INTERNAL MEDICINE
Payer: MEDICARE

## 2019-02-21 DIAGNOSIS — M54.41 CHRONIC BILATERAL LOW BACK PAIN WITH BILATERAL SCIATICA: Primary | ICD-10-CM

## 2019-02-21 DIAGNOSIS — M54.42 CHRONIC BILATERAL LOW BACK PAIN WITH BILATERAL SCIATICA: Primary | ICD-10-CM

## 2019-02-21 DIAGNOSIS — G89.29 CHRONIC BILATERAL LOW BACK PAIN WITH BILATERAL SCIATICA: Primary | ICD-10-CM

## 2019-02-21 PROCEDURE — 97110 THERAPEUTIC EXERCISES: CPT

## 2019-02-21 NOTE — PROGRESS NOTES
"                                                    Physical Therapy Daily Note     Name: Liang Monterroso Jr.  Clinic Number: [id number]  Diagnosis:   No diagnosis found.  Physician: Corey Iqbal MD  Precautions: standard   Visit #: 6 of 20  PTA Visit #: 1  Time In: 2:00 PM  Time Out: 2:41 PM  Total Treatment Time 1:1: 41 minutes       Evaluation Date: 2/5/2019  Authorization Period Expiration: 12/31/19  Plan of Care Certification Period: 5/5/19  Visit # / Visits authorized: 5/ 20    MD referral:   M54.31 (ICD-10-CM) - Sciatica of right side   M54.5 (ICD-10-CM) - Acute right-sided low back pain without sciatica          Subjective     Pt reports: have not had an episode in a couple days  Pain Scale: Liang rates pain on a scale of 0-10 to be 0 currently.    FOTO from evaluation:  Limitation Score: 40%  Category: Mobility    Objective     Liang received individual therapeutic exercises to develop strength, endurance, ROM and posture for 36 minutes including:    Nustep x 5 min -(bike today)    Table  PPT with marches x15 B-  Bridges c/ ball squeeze 3x10 -  Prone press ups 20x-  SL hip abd 3x10 -NT  Clamshell c/ RTB 3x10-  SLR c/ 1# ankle weight 3x10 -  B piriformis stretch 3x30"-  Quadruped alt hip ext 2 x 15 B-    Standing  Core walkouts 2x10 GTB-  Shuttle 4c w/ shoulder ext GTB 3 x 10-  Rows c/ GTB 3 x 10-  Shoulder Extensions c/ GTB 3 x 10-    MT:  R PF x 5min    Written Home Exercises Provided: per eval. Standing repeated lumbar extensions added to HEP   Pt demo good understanding of the education provided. Liang demonstrated good return demonstration of activities.     Education provided re: performance of HEP three times per day to decrease symptoms.  Also educated patient on proper sitting and standing posture.   Liang verbalized good understanding of education provided.   No spiritual or educational barriers to learning provided    Assessment     Patient tolerated treatment well. Pt felt some crepitus w/ R SLR with " some pain but got better. Glut pain then got worse, MT applied. Decreased pain after MT. Cont to progress as junior.    This is a 78 y.o. male referred to outpatient physical therapy and presents with a medical diagnosis of (R) sciatica and demonstrates limitations as described in the problem list. Pt prognosis is Good. Pt will continue to benefit from skilled outpatient physical therapy to address the deficits listed in the problem list, provide pt/family education and to maximize pt's level of independence in the home and community environment.     Goals as follows:  Short Term Goals: 2-3 weeks   1) Patient will be compliant with HEP   2) Patient will demonstrate improved posture   3) Patient will be able to walk track for 30 min without pain in back or neck     Long Term Goals: 6-8 weeks   1) Patient will improve FOTO limitation to 24%  2) Patient will be able to complete all yard work without complaints of low back pain.   3) Patient will go one week without instance of lower back spasm.     Plan     Continue with established Plan of Care towards PT goals.     Therapist: Nyasia Hatfield, TULIO,DPT  2/21/2019

## 2019-02-25 ENCOUNTER — PATIENT MESSAGE (OUTPATIENT)
Dept: UROLOGY | Facility: CLINIC | Age: 79
End: 2019-02-25

## 2019-02-26 ENCOUNTER — CLINICAL SUPPORT (OUTPATIENT)
Dept: REHABILITATION | Facility: HOSPITAL | Age: 79
End: 2019-02-26
Attending: INTERNAL MEDICINE
Payer: MEDICARE

## 2019-02-26 DIAGNOSIS — G89.29 CHRONIC BILATERAL LOW BACK PAIN WITH BILATERAL SCIATICA: Primary | ICD-10-CM

## 2019-02-26 DIAGNOSIS — M54.41 CHRONIC BILATERAL LOW BACK PAIN WITH BILATERAL SCIATICA: Primary | ICD-10-CM

## 2019-02-26 DIAGNOSIS — M54.42 CHRONIC BILATERAL LOW BACK PAIN WITH BILATERAL SCIATICA: Primary | ICD-10-CM

## 2019-02-26 PROCEDURE — 97110 THERAPEUTIC EXERCISES: CPT

## 2019-02-26 NOTE — PROGRESS NOTES
"                                                    Physical Therapy Daily Note     Name: Liang Monterroso Jr.  Clinic Number: [id number]  Diagnosis:   No diagnosis found.  Physician: Corey Iqbal MD  Precautions: standard   Visit #: 7 of 20  PTA Visit #: 1  Time In: 2:30 PM  Time Out: : 3:20 PM  Total Treatment Time 1:1: 50 minutes       Evaluation Date: 2/5/2019  Authorization Period Expiration: 12/31/19  Plan of Care Certification Period: 4/5/19      MD referral:   M54.31 (ICD-10-CM) - Sciatica of right side   M54.5 (ICD-10-CM) - Acute right-sided low back pain without sciatica          Subjective     Pt reports: have not had an episode in a couple days  Pain Scale: Liang rates pain on a scale of 0-10 to be 0 currently.    FOTO from evaluation:  Limitation Score: 40%  Category: Mobility    Objective     Liang received individual therapeutic exercises to develop strength, endurance, ROM and posture for 36 minutes including:    Nustep x 5 min -(bike today)    Table  PPT with marches x15 B-  Bridges c/ ball squeeze 3x10 -  Prone press ups 20x-  SL hip abd 3x10 -NT  Clamshell c/ RTB 3x10-  SLR c/ 2# ankle weight 3x10 -  B piriformis stretch 3x30"-  Quadruped alt hip ext 2 x 15 B-  Dead bug 10x     Standing  Core walkouts 2x10 GTB-  Shuttle 4c w/ shoulder ext GTB 3 x 10-  Shuttle one leg 4c 2x10   Rows c/ GTB 3 x 10-  Shoulder Extensions c/ GTB 3 x 10-    MT:  R PF x 5min-NT  Sit and reach with overpressure on R SI joint 15x     Written Home Exercises Provided: per eval. Standing repeated lumbar extensions added to HEP   Pt demo good understanding of the education provided. Liang demonstrated good return demonstration of activities.     Education provided re: performance of HEP three times per day to decrease symptoms.  Also educated patient on proper sitting and standing posture.   Liang verbalized good understanding of education provided.   No spiritual or educational barriers to learning provided    Assessment "     Patient is having less pain in the R hip. Is demonstrating improved LE strength as seen with Single Leg Shuttle exercises. Required multiple cues to complete deadbug exercises today.     This is a 78 y.o. male referred to outpatient physical therapy and presents with a medical diagnosis of (R) sciatica and demonstrates limitations as described in the problem list. Pt prognosis is Good. Pt will continue to benefit from skilled outpatient physical therapy to address the deficits listed in the problem list, provide pt/family education and to maximize pt's level of independence in the home and community environment.     Goals as follows:  Short Term Goals: 2-3 weeks   1) Patient will be compliant with HEP   2) Patient will demonstrate improved posture   3) Patient will be able to walk track for 30 min without pain in back or neck     Long Term Goals: 6-8 weeks   1) Patient will improve FOTO limitation to 24%  2) Patient will be able to complete all yard work without complaints of low back pain.   3) Patient will go one week without instance of lower back spasm.     Plan     Continue with established Plan of Care towards PT goals.     Therapist: Nik Jj, PT,DPT  2/26/2019

## 2019-02-28 ENCOUNTER — CLINICAL SUPPORT (OUTPATIENT)
Dept: REHABILITATION | Facility: HOSPITAL | Age: 79
End: 2019-02-28
Attending: INTERNAL MEDICINE
Payer: MEDICARE

## 2019-02-28 PROCEDURE — 97110 THERAPEUTIC EXERCISES: CPT

## 2019-02-28 NOTE — PROGRESS NOTES
"                                                    Physical Therapy Daily Note     Name: Liang Monterroso Jr.  Clinic Number: [id number]  Diagnosis:   No diagnosis found.  Physician: Corey Iqbal MD  Precautions: standard   Visit #: 8 of 20  PTA Visit #: 2  Time In: 1:08 PM  Time Out: : 2:00PM  Total Treatment Time 1:1: 42 minutes       Evaluation Date: 2/5/2019  Authorization Period Expiration: 12/31/19  Plan of Care Certification Period: 4/5/19      MD referral:   M54.31 (ICD-10-CM) - Sciatica of right side   M54.5 (ICD-10-CM) - Acute right-sided low back pain without sciatica          Subjective     Pt reports: no complaints  Pain Scale: Liang rates pain on a scale of 0-10 to be 0 currently.    FOTO from evaluation:  Limitation Score: 40%  Category: Mobility    Objective     Liang received individual therapeutic exercises to develop strength, endurance, ROM and posture for 52 minutes including:    Nustep x 5 min -(bike today)    Table  PPT with marches 3x10 B-  Bridges c/ ball squeeze 3x10 -  Prone press ups 20x-  SL hip abd 3x10 -NT  Clamshell c/ RTB 3x10-  SLR c/ 2# ankle weight 3x10 -  B piriformis stretch 3x30"-  Quadruped alt hip ext 2 x 15 B-  Dead bug 10x     Standing  Core walkouts 2x10 GTB-  Shuttle 4c w/ shoulder ext GTB 3 x 10-  Shuttle one leg 4c 2x10   Rows c/ GTB 3 x 10-  Shoulder Extensions c/ GTB 3 x 10-    MT:  R PF x 5min-NT  Sit and reach with overpressure on R SI joint 15x     Written Home Exercises Provided: per eval. Standing repeated lumbar extensions added to HEP   Pt demo good understanding of the education provided. Liang demonstrated good return demonstration of activities.     Education provided re: performance of HEP three times per day to decrease symptoms.  Also educated patient on proper sitting and standing posture.   Liang verbalized good understanding of education provided.   No spiritual or educational barriers to learning provided    Assessment      pt junior te well w/o adverse " reaction. Cont to progress as junior    This is a 78 y.o. male referred to outpatient physical therapy and presents with a medical diagnosis of (R) sciatica and demonstrates limitations as described in the problem list. Pt prognosis is Good. Pt will continue to benefit from skilled outpatient physical therapy to address the deficits listed in the problem list, provide pt/family education and to maximize pt's level of independence in the home and community environment.     Goals as follows:  Short Term Goals: 2-3 weeks   1) Patient will be compliant with HEP   2) Patient will demonstrate improved posture   3) Patient will be able to walk track for 30 min without pain in back or neck     Long Term Goals: 6-8 weeks   1) Patient will improve FOTO limitation to 24%  2) Patient will be able to complete all yard work without complaints of low back pain.   3) Patient will go one week without instance of lower back spasm.     Plan     Continue with established Plan of Care towards PT goals.     Therapist: Nyasia Hatfield, PTA  2/28/2019

## 2019-03-12 ENCOUNTER — CLINICAL SUPPORT (OUTPATIENT)
Dept: REHABILITATION | Facility: HOSPITAL | Age: 79
End: 2019-03-12
Attending: INTERNAL MEDICINE
Payer: MEDICARE

## 2019-03-12 PROCEDURE — 97110 THERAPEUTIC EXERCISES: CPT | Mod: HCNC

## 2019-03-12 NOTE — PROGRESS NOTES
"                                                    Physical Therapy Daily Note     Name: Liang Monterroso Jr.  Clinic Number: [id number]  Diagnosis:   No diagnosis found.  Physician: Corey Iqbal MD  Precautions: standard   Visit #: 9 of 20  PTA Visit #: 3  Time In: 2:11 PM  Time Out: : 3:11PM  Total Treatment Time 1:1: 30 minutes       Evaluation Date: 2/5/2019  Authorization Period Expiration: 12/31/19  Plan of Care Certification Period: 4/5/19      MD referral:   M54.31 (ICD-10-CM) - Sciatica of right side   M54.5 (ICD-10-CM) - Acute right-sided low back pain without sciatica          Subjective     Pt reports: making progress  Pain Scale: Liagn rates pain on a scale of 0-10 to be 0 currently.    FOTO from evaluation:  Limitation Score: 40%  Category: Mobility    Objective     Liang received individual therapeutic exercises to develop strength, endurance, ROM and posture for 60 minutes including:    Nustep x 5 min     Table  PPT with heel tap 2x10 B-  Bridges c/ ball squeeze 3x10 -  Prone press ups 20x-  SL hip abd 3x10 -NT  Clamshell c/ RTB 3x10-  SLR c/ 2# ankle weight 3x10 -  B piriformis stretch 3x30"-  Quadruped alt hip ext 2 x 15 B-      Standing  Core walkouts 2x10 BTB-  Shuttle 4c w/ shoulder ext BTB 3 x 10-  Shuttle one leg 4c 3x10   Rows c/ BTB 3 x 10-  Shoulder Extensions c/ BTB 3 x 10-  Seated on SB marches 20x  diagonal lifts yellow med ball 20x standing on airex     MT:  R PF x 5min-NT  Sit and reach with overpressure on R SI joint 15x -NT    Written Home Exercises Provided: per eval. Standing repeated lumbar extensions added to HEP   Pt demo good understanding of the education provided. Liang demonstrated good return demonstration of activities.     Education provided re: performance of HEP three times per day to decrease symptoms.  Also educated patient on proper sitting and standing posture.   Liang verbalized good understanding of education provided.   No spiritual or educational barriers to " learning provided    Assessment      pt junior te well w/o adverse reaction. Pt cont to feel le freq of pain. States when he does feel the sciatic pain that it doesn't last long. Advanced pt as above.    This is a 78 y.o. male referred to outpatient physical therapy and presents with a medical diagnosis of (R) sciatica and demonstrates limitations as described in the problem list. Pt prognosis is Good. Pt will continue to benefit from skilled outpatient physical therapy to address the deficits listed in the problem list, provide pt/family education and to maximize pt's level of independence in the home and community environment.     Goals as follows:  Short Term Goals: 2-3 weeks   1) Patient will be compliant with HEP   2) Patient will demonstrate improved posture   3) Patient will be able to walk track for 30 min without pain in back or neck     Long Term Goals: 6-8 weeks   1) Patient will improve FOTO limitation to 24%  2) Patient will be able to complete all yard work without complaints of low back pain.   3) Patient will go one week without instance of lower back spasm.     Plan     Continue with established Plan of Care towards PT goals.     Therapist: Nyasia Hatfield, PTA  3/12/2019

## 2019-03-14 ENCOUNTER — CLINICAL SUPPORT (OUTPATIENT)
Dept: REHABILITATION | Facility: HOSPITAL | Age: 79
End: 2019-03-14
Attending: INTERNAL MEDICINE
Payer: MEDICARE

## 2019-03-14 DIAGNOSIS — M54.42 CHRONIC BILATERAL LOW BACK PAIN WITH BILATERAL SCIATICA: Primary | ICD-10-CM

## 2019-03-14 DIAGNOSIS — G89.29 CHRONIC BILATERAL LOW BACK PAIN WITH BILATERAL SCIATICA: Primary | ICD-10-CM

## 2019-03-14 DIAGNOSIS — M54.41 CHRONIC BILATERAL LOW BACK PAIN WITH BILATERAL SCIATICA: Primary | ICD-10-CM

## 2019-03-14 PROCEDURE — G8978 MOBILITY CURRENT STATUS: HCPCS | Mod: CK,HCNC

## 2019-03-14 PROCEDURE — G8979 MOBILITY GOAL STATUS: HCPCS | Mod: CJ,HCNC

## 2019-03-14 PROCEDURE — 97110 THERAPEUTIC EXERCISES: CPT | Mod: HCNC

## 2019-03-14 NOTE — PROGRESS NOTES
Physical Therapy Daily Note         Name: Liang Monterroso Jr.  Clinic Number: [id number]  Diagnosis:   Encounter Diagnosis   Name Primary?    Chronic bilateral low back pain with bilateral sciatica Yes     Physician: Corey Iqbal MD  Treatment Orders: PT Eval and Treat  Past Medical History:   Diagnosis Date    Allergy     CKD (chronic kidney disease) stage 3, GFR 30-59 ml/min 6/15/2016    GERD (gastroesophageal reflux disease)     Hyperlipidemia     Hypertension     Hypospadias in male     Hypothyroidism     Sleep apnea     Thyroid disease     Urinary tract infection      Current Outpatient Medications   Medication Sig    aspirin (ECOTRIN) 81 MG EC tablet Take 81 mg by mouth once daily.    benzonatate (TESSALON PERLES) 100 MG capsule Take 1 capsule (100 mg total) by mouth every 6 (six) hours as needed for Cough.    econazole nitrate 1 % cream USE TWICE DAILY (Patient taking differently: USE TWICE DAILY (prn))    famotidine (PEPCID) 20 MG tablet Take 20 mg by mouth nightly as needed.     fexofenadine (ALLEGRA) 180 MG tablet Take 1 tablet (180 mg total) by mouth once daily. (Patient taking differently: Take 180 mg by mouth daily as needed. )    fluticasone (FLONASE) 50 mcg/actuation nasal spray 1 spray (50 mcg total) by Each Nare route once daily.    levothyroxine (SYNTHROID) 112 MCG tablet Take 1 tablet (112 mcg total) by mouth before breakfast.    metronidazole (METROGEL) 0.75 % gel Use hs    multivitamin with minerals tablet Take 1 tablet by mouth once daily.      polyethylene glycol (GLYCOLAX) 17 gram PwPk Take 17 g by mouth once daily.    pravastatin (PRAVACHOL) 20 MG tablet TAKE ONE TABLET BY MOUTH ONE TIME DAILY    pravastatin (PRAVACHOL) 20 MG tablet TAKE ONE TABLET BY MOUTH ONE TIME DAILY    ramipril (ALTACE) 5 MG capsule Take 1 capsule (5 mg total) by mouth once daily.    ramipril (ALTACE) 5 MG capsule Take 1 capsule (5 mg total)  "by mouth once daily.    tamsulosin (FLOMAX) 0.4 mg Cap Take 2 capsules (0.8 mg total) by mouth once daily.    triamcinolone acetonide 0.1% (KENALOG) 0.1 % cream Use bid prn rash     No current facility-administered medications for this visit.      Review of patient's allergies indicates:   Allergen Reactions    Contrast media Hives and Itching     Iv dye       Precautions: standard             Visit #: 10 of 20  PTA Visit #: 3  Time In: 10:00  Time Out: : 11:05  Total Treatment Time 1:1: 30 minutes         Evaluation Date: 2/5/2019  Authorization Period Expiration: 12/31/19  Plan of Care Certification Period: 4/5/19        MD referral:   M54.31 (ICD-10-CM) - Sciatica of right side   M54.5 (ICD-10-CM) - Acute right-sided low back pain without sciatica         Subjective     Pt reports less freequent and less enduring pain since starting PT and due to time. Exercises going well at home  Pain Scale: Liang rates pain at back on a scale of 0-10 to be 0 currently.    Objective     Liang received individual therapeutic exercises to develop strength, endurance, ROM, flexibility, posture and core stabilization for 55 minutes including:  Nustep x 5 min      Table  PPT with heel tap 2x10 B-  Bridges c/ ball squeeze 3x10 -  Prone press ups 20x-  SL hip abd 3x10 -NT  Clamshell c/ GTB 3x10-  SLR c/ 2# ankle weight 3x10 -  B piriformis stretch 3x30"-  Quadruped alt hip ext 2 x 15 B-        Standing  Core walkouts 2x10 BTB-  Shuttle 4c w/ shoulder ext BTB 3 x 10-  Shuttle one leg 4c 3x10   Rows c/ BTB 3 x 10-  Shoulder Extensions c/ BTB 3 x 10-  Seated on SB marches 20x NP  diagonal lifts yellow med ball 20x standing on airex NP  Static lunges 2x10     Liang received the following manual therapy techniques: NT were applied for 00 minutes including:  R PF x 5min-NT  Sit and reach with overpressure on R SI joint 15x -NT    Written Home Exercises Provided: per eval. Standing repeated lumbar extensions added to HEP   Pt demo good " understanding of the education provided. Liang demonstrated good return demonstration of activities.     Education provided re: performance of HEP three times per day to decrease symptoms.  Also educated patient on proper sitting and standing posture.   Liang verbalized good understanding of education provided.   No spiritual or educational barriers to learning provided    Assessment     Patient tolerated treatment well.  Added static lunges between table for balance with good tolerance. Some cuing required for hip ext to prevent rotation. Pt is progressing well with other exercises. This is a 78 y.o. male referred to outpatient physical therapy and presents with a medical diagnosis of (R) sciatica and demonstrates limitations as described in the problem list. Pt prognosis is Good. Pt will continue to benefit from skilled outpatient physical therapy to address the deficits listed in the problem list, provide pt/family education and to maximize pt's level of independence in the home and community environment.     Goals as follows:  Short Term Goals: 2-3 weeks   1) Patient will be compliant with HEP   2) Patient will demonstrate improved posture   3) Patient will be able to walk track for 30 min without pain in back or neck     Long Term Goals: 6-8 weeks   1) Patient will improve FOTO limitation to 24%  2) Patient will be able to complete all yard work without complaints of low back pain.   3) Patient will go one week without instance of lower back spasm.         Plan     Continue with established Plan of Care towards PT goals.    Therapist: Laurie Petersen, PT, DPT  3/14/2019

## 2019-03-19 ENCOUNTER — CLINICAL SUPPORT (OUTPATIENT)
Dept: REHABILITATION | Facility: HOSPITAL | Age: 79
End: 2019-03-19
Attending: INTERNAL MEDICINE
Payer: MEDICARE

## 2019-03-19 DIAGNOSIS — M54.42 CHRONIC BILATERAL LOW BACK PAIN WITH BILATERAL SCIATICA: Primary | ICD-10-CM

## 2019-03-19 DIAGNOSIS — G89.29 CHRONIC BILATERAL LOW BACK PAIN WITH BILATERAL SCIATICA: Primary | ICD-10-CM

## 2019-03-19 DIAGNOSIS — M54.41 CHRONIC BILATERAL LOW BACK PAIN WITH BILATERAL SCIATICA: Primary | ICD-10-CM

## 2019-03-19 PROCEDURE — 97110 THERAPEUTIC EXERCISES: CPT | Mod: HCNC

## 2019-03-19 NOTE — PROGRESS NOTES
Physical Therapy Daily Note         Name: Liang Monterroso Jr.  Clinic Number: [id number]  Diagnosis:   No diagnosis found.  Physician: Corey Iqbal MD  Treatment Orders: PT Eval and Treat  Past Medical History:   Diagnosis Date    Allergy     CKD (chronic kidney disease) stage 3, GFR 30-59 ml/min 6/15/2016    GERD (gastroesophageal reflux disease)     Hyperlipidemia     Hypertension     Hypospadias in male     Hypothyroidism     Sleep apnea     Thyroid disease     Urinary tract infection      Current Outpatient Medications   Medication Sig    aspirin (ECOTRIN) 81 MG EC tablet Take 81 mg by mouth once daily.    benzonatate (TESSALON PERLES) 100 MG capsule Take 1 capsule (100 mg total) by mouth every 6 (six) hours as needed for Cough.    econazole nitrate 1 % cream USE TWICE DAILY (Patient taking differently: USE TWICE DAILY (prn))    famotidine (PEPCID) 20 MG tablet Take 20 mg by mouth nightly as needed.     fexofenadine (ALLEGRA) 180 MG tablet Take 1 tablet (180 mg total) by mouth once daily. (Patient taking differently: Take 180 mg by mouth daily as needed. )    fluticasone (FLONASE) 50 mcg/actuation nasal spray 1 spray (50 mcg total) by Each Nare route once daily.    levothyroxine (SYNTHROID) 112 MCG tablet Take 1 tablet (112 mcg total) by mouth before breakfast.    metronidazole (METROGEL) 0.75 % gel Use hs    multivitamin with minerals tablet Take 1 tablet by mouth once daily.      polyethylene glycol (GLYCOLAX) 17 gram PwPk Take 17 g by mouth once daily.    pravastatin (PRAVACHOL) 20 MG tablet TAKE ONE TABLET BY MOUTH ONE TIME DAILY    pravastatin (PRAVACHOL) 20 MG tablet TAKE ONE TABLET BY MOUTH ONE TIME DAILY    ramipril (ALTACE) 5 MG capsule Take 1 capsule (5 mg total) by mouth once daily.    ramipril (ALTACE) 5 MG capsule Take 1 capsule (5 mg total) by mouth once daily.    tamsulosin (FLOMAX) 0.4 mg Cap Take 2 capsules (0.8 mg  "total) by mouth once daily.    triamcinolone acetonide 0.1% (KENALOG) 0.1 % cream Use bid prn rash     No current facility-administered medications for this visit.      Review of patient's allergies indicates:   Allergen Reactions    Contrast media Hives and Itching     Iv dye       Precautions: standard             Visit #: 11 of 20  PTA Visit #: 1  Time In: 2:30  Time Out: : 3:25  Total Treatment Time 1:1: 25 minutes         Evaluation Date: 2/5/2019  Authorization Period Expiration: 12/31/19  Plan of Care Certification Period: 4/5/19        MD referral:   M54.31 (ICD-10-CM) - Sciatica of right side   M54.5 (ICD-10-CM) - Acute right-sided low back pain without sciatica         Subjective     Pt reports yesterday moved garbage can and got sharp pain in R LB but on lasted 30 sec  Pain Scale: Liang rates pain at back on a scale of 0-10 to be 0 currently.    Objective     Liang received individual therapeutic exercises to develop strength, endurance, ROM, flexibility, posture and core stabilization for 55 minutes including:  Nustep x 5 min      Table  PPT with heel tap 2x10 B-  Bridges c/ ball squeeze 3x10 -  Prone press ups 20x-  SL hip abd 3x10 -NT  Clamshell c/ GTB 3x10-  SLR c/ 2# ankle weight 3x10 -  B piriformis stretch 3x30"-  Quadruped alt hip ext 2 x 15 B-        Standing  Core walkouts 2x10 BTB-  Shuttle 4c w/ shoulder ext BTB 3 x 10-  Shuttle one leg 4c 3x10   Rows c/ BTB 3 x 10-  Shoulder Extensions c/ BTB 3 x 10-  Seated on SB marches 20x NP  diagonal lifts yellow med ball 20x standing on airex NP  Static lunges 2x10     Liang received the following manual therapy techniques: NT were applied for 00 minutes including:  R PF x 5min-NT  Sit and reach with overpressure on R SI joint 15x -NT    Written Home Exercises Provided: per eval. Standing repeated lumbar extensions added to HEP   Pt demo good understanding of the education provided. Liang demonstrated good return demonstration of activities.     Education " provided re: performance of HEP three times per day to decrease symptoms.  Also educated patient on proper sitting and standing posture.   Liang verbalized good understanding of education provided.   No spiritual or educational barriers to learning provided    Assessment     Patient tolerated treatment well.  Pt still gets sharp pains but is less intense then before PT. Cont to progress as to.This is a 78 y.o. male referred to outpatient physical therapy and presents with a medical diagnosis of (R) sciatica and demonstrates limitations as described in the problem list. Pt prognosis is Good. Pt will continue to benefit from skilled outpatient physical therapy to address the deficits listed in the problem list, provide pt/family education and to maximize pt's level of independence in the home and community environment.     Goals as follows:  Short Term Goals: 2-3 weeks   1) Patient will be compliant with HEP   2) Patient will demonstrate improved posture   3) Patient will be able to walk track for 30 min without pain in back or neck     Long Term Goals: 6-8 weeks   1) Patient will improve FOTO limitation to 24%  2) Patient will be able to complete all yard work without complaints of low back pain.   3) Patient will go one week without instance of lower back spasm.         Plan     Continue with established Plan of Care towards PT goals.    Therapist: Nyasia Hatfield, PTA, DPT  3/19/2019

## 2019-03-21 ENCOUNTER — CLINICAL SUPPORT (OUTPATIENT)
Dept: REHABILITATION | Facility: HOSPITAL | Age: 79
End: 2019-03-21
Attending: INTERNAL MEDICINE
Payer: MEDICARE

## 2019-03-21 DIAGNOSIS — G89.29 CHRONIC BILATERAL LOW BACK PAIN WITH BILATERAL SCIATICA: Primary | ICD-10-CM

## 2019-03-21 DIAGNOSIS — M54.42 CHRONIC BILATERAL LOW BACK PAIN WITH BILATERAL SCIATICA: Primary | ICD-10-CM

## 2019-03-21 DIAGNOSIS — M54.41 CHRONIC BILATERAL LOW BACK PAIN WITH BILATERAL SCIATICA: Primary | ICD-10-CM

## 2019-03-21 PROCEDURE — 97110 THERAPEUTIC EXERCISES: CPT | Mod: HCNC

## 2019-03-21 PROCEDURE — 97140 MANUAL THERAPY 1/> REGIONS: CPT | Mod: HCNC

## 2019-03-21 NOTE — PROGRESS NOTES
Physical Therapy Daily Note         Name: Liang Monterroso Jr.  Clinic Number: [id number]  Diagnosis:   Encounter Diagnosis   Name Primary?    Chronic bilateral low back pain with bilateral sciatica Yes     Physician: Corey Iqbal MD  Treatment Orders: PT Eval and Treat  Past Medical History:   Diagnosis Date    Allergy     CKD (chronic kidney disease) stage 3, GFR 30-59 ml/min 6/15/2016    GERD (gastroesophageal reflux disease)     Hyperlipidemia     Hypertension     Hypospadias in male     Hypothyroidism     Sleep apnea     Thyroid disease     Urinary tract infection      Current Outpatient Medications   Medication Sig    aspirin (ECOTRIN) 81 MG EC tablet Take 81 mg by mouth once daily.    benzonatate (TESSALON PERLES) 100 MG capsule Take 1 capsule (100 mg total) by mouth every 6 (six) hours as needed for Cough.    econazole nitrate 1 % cream USE TWICE DAILY (Patient taking differently: USE TWICE DAILY (prn))    famotidine (PEPCID) 20 MG tablet Take 20 mg by mouth nightly as needed.     fexofenadine (ALLEGRA) 180 MG tablet Take 1 tablet (180 mg total) by mouth once daily. (Patient taking differently: Take 180 mg by mouth daily as needed. )    fluticasone (FLONASE) 50 mcg/actuation nasal spray 1 spray (50 mcg total) by Each Nare route once daily.    levothyroxine (SYNTHROID) 112 MCG tablet Take 1 tablet (112 mcg total) by mouth before breakfast.    metronidazole (METROGEL) 0.75 % gel Use hs    multivitamin with minerals tablet Take 1 tablet by mouth once daily.      polyethylene glycol (GLYCOLAX) 17 gram PwPk Take 17 g by mouth once daily.    pravastatin (PRAVACHOL) 20 MG tablet TAKE ONE TABLET BY MOUTH ONE TIME DAILY    pravastatin (PRAVACHOL) 20 MG tablet TAKE ONE TABLET BY MOUTH ONE TIME DAILY    ramipril (ALTACE) 5 MG capsule Take 1 capsule (5 mg total) by mouth once daily.    ramipril (ALTACE) 5 MG capsule Take 1 capsule (5 mg total)  "by mouth once daily.    tamsulosin (FLOMAX) 0.4 mg Cap Take 2 capsules (0.8 mg total) by mouth once daily.    triamcinolone acetonide 0.1% (KENALOG) 0.1 % cream Use bid prn rash     No current facility-administered medications for this visit.      Review of patient's allergies indicates:   Allergen Reactions    Contrast media Hives and Itching     Iv dye       Precautions: standard             Visit #: 11 of 20  PTA Visit #: 1  Time In: 2:27  Time Out: : 3:25  Total Treatment Time 1:1: 25 minutes         Evaluation Date: 2/5/2019  Authorization Period Expiration: 12/31/19  Plan of Care Certification Period: 4/5/19      MD referral:   M54.31 (ICD-10-CM) - Sciatica of right side   M54.5 (ICD-10-CM) - Acute right-sided low back pain without sciatica             Subjective     Pt reports he's feeling ok right now. He has pain when going down small steps (3in or less).  Pain Scale: Liang rates pain at back on a scale of 0-10 to be 0 currently.    Objective     Liang received individual therapeutic exercises to develop strength, endurance, ROM, flexibility, posture and core stabilization for 50 minutes including:    Table  PPT with heel tap 2x10 B-  Bridges c/ ball squeeze 3x10 -  Prone press ups 20x- NP  SL hip abd 3x10 -NT  Clamshell c/ GTB 3x10-  SLR c/ 2# ankle weight 3x10 -  B piriformis stretch 3x30"-  Quadruped alt hip ext 2 x 15 B-  Multifidus isometric 2x10     Standing  Core walkouts 2x10 BTB-  Shuttle 4c w/ shoulder ext BTB 3 x 10-  Shuttle one leg 4c 3x10   Rows c/ BTB 3 x 10-  Shoulder Extensions c/ BTB 3 x 10-  Seated on SB marches 20x   diagonal lifts yellow med ball 20x standing on airex   Static lunges 2x10  Hip hike 2x10      Liang received the following manual therapy techniques: NT were applied for 8 minutes including:  R PF x 5min-NT  Sit and reach with overpressure on R SI joint 15x -NT  LAD SIJ emphasis and hip  Hip mobs    Written Home Exercises Provided: at eval, lumbar ext  Pt demo good " understanding of the education provided. Liang demonstrated good return demonstration of activities.     Education provided re: performance of HEP three times per day to decrease symptoms.  Also educated patient on proper sitting and standing posture.   Liang verbalized good understanding of education provided.   No spiritual or educational barriers to learning provided    Assessment     Patient tolerated treatment well without adverse effects. Pt demos decreased Hip IR R compared to L. MT provided to improve hip mobility with slight improvement. Added hip hikes to work on pelvic stability with small step. Pt making good progress towards goals. This is a 78 y.o. male referred to outpatient physical therapy and presents with a medical diagnosis of (R) sciatica  and demonstrates limitations as described in the problem list. Pt prognosis is Good. Pt will continue to benefit from skilled outpatient physical therapy to address the deficits listed in the problem list, provide pt/family education and to maximize pt's level of independence in the home and community environment.     Goals as follows:  Goals as follows:  Short Term Goals: 2-3 weeks   1) Patient will be compliant with HEP   2) Patient will demonstrate improved posture   3) Patient will be able to walk track for 30 min without pain in back or neck     Long Term Goals: 6-8 weeks   1) Patient will improve FOTO limitation to 24%  2) Patient will be able to complete all yard work without complaints of low back pain.   3) Patient will go one week without instance of lower back spasm.         Plan     Continue with established Plan of Care towards PT goals.    Therapist: Laurie Petersen, PT, DPT  3/21/2019

## 2019-03-26 ENCOUNTER — CLINICAL SUPPORT (OUTPATIENT)
Dept: REHABILITATION | Facility: HOSPITAL | Age: 79
End: 2019-03-26
Attending: INTERNAL MEDICINE
Payer: MEDICARE

## 2019-03-26 DIAGNOSIS — M54.42 CHRONIC BILATERAL LOW BACK PAIN WITH BILATERAL SCIATICA: Primary | ICD-10-CM

## 2019-03-26 DIAGNOSIS — G89.29 CHRONIC BILATERAL LOW BACK PAIN WITH BILATERAL SCIATICA: Primary | ICD-10-CM

## 2019-03-26 DIAGNOSIS — M54.41 CHRONIC BILATERAL LOW BACK PAIN WITH BILATERAL SCIATICA: Primary | ICD-10-CM

## 2019-03-26 PROCEDURE — 97110 THERAPEUTIC EXERCISES: CPT | Mod: HCNC

## 2019-03-26 NOTE — PROGRESS NOTES
Physical Therapy Daily Note         Name: Liang Monterroso Jr.  Clinic Number: [id number]  Diagnosis:   No diagnosis found.  Physician: Corey Iqbal MD  Treatment Orders: PT Eval and Treat  Past Medical History:   Diagnosis Date    Allergy     CKD (chronic kidney disease) stage 3, GFR 30-59 ml/min 6/15/2016    GERD (gastroesophageal reflux disease)     Hyperlipidemia     Hypertension     Hypospadias in male     Hypothyroidism     Sleep apnea     Thyroid disease     Urinary tract infection      Current Outpatient Medications   Medication Sig    aspirin (ECOTRIN) 81 MG EC tablet Take 81 mg by mouth once daily.    benzonatate (TESSALON PERLES) 100 MG capsule Take 1 capsule (100 mg total) by mouth every 6 (six) hours as needed for Cough.    econazole nitrate 1 % cream USE TWICE DAILY (Patient taking differently: USE TWICE DAILY (prn))    famotidine (PEPCID) 20 MG tablet Take 20 mg by mouth nightly as needed.     fexofenadine (ALLEGRA) 180 MG tablet Take 1 tablet (180 mg total) by mouth once daily. (Patient taking differently: Take 180 mg by mouth daily as needed. )    fluticasone (FLONASE) 50 mcg/actuation nasal spray 1 spray (50 mcg total) by Each Nare route once daily.    levothyroxine (SYNTHROID) 112 MCG tablet Take 1 tablet (112 mcg total) by mouth before breakfast.    metronidazole (METROGEL) 0.75 % gel Use hs    multivitamin with minerals tablet Take 1 tablet by mouth once daily.      polyethylene glycol (GLYCOLAX) 17 gram PwPk Take 17 g by mouth once daily.    pravastatin (PRAVACHOL) 20 MG tablet TAKE ONE TABLET BY MOUTH ONE TIME DAILY    pravastatin (PRAVACHOL) 20 MG tablet TAKE ONE TABLET BY MOUTH ONE TIME DAILY    ramipril (ALTACE) 5 MG capsule Take 1 capsule (5 mg total) by mouth once daily.    ramipril (ALTACE) 5 MG capsule Take 1 capsule (5 mg total) by mouth once daily.    tamsulosin (FLOMAX) 0.4 mg Cap Take 2 capsules (0.8 mg  "total) by mouth once daily.    triamcinolone acetonide 0.1% (KENALOG) 0.1 % cream Use bid prn rash     No current facility-administered medications for this visit.      Review of patient's allergies indicates:   Allergen Reactions    Contrast media Hives and Itching     Iv dye       Precautions: standard             Visit #: 12 of 20  PTA Visit #: 1  Time In: 2:01  Time Out: : 3:03  Total Treatment Time 1:1: 30 minutes         Evaluation Date: 2/5/2019  Authorization Period Expiration: 12/31/19  Plan of Care Certification Period: 4/5/19      MD referral:   M54.31 (ICD-10-CM) - Sciatica of right side   M54.5 (ICD-10-CM) - Acute right-sided low back pain without sciatica             Subjective     Pt reports he's feeling ok right now. He has pain when going down small steps (3in or less).  Pain Scale: Liang rates pain at back on a scale of 0-10 to be 0 currently.    Objective     Liang received individual therapeutic exercises to develop strength, endurance, ROM, flexibility, posture and core stabilization for 50 minutes including:    Table  PPT with heel tap 2x10 B-  Bridges c/ ball squeeze 3x10 -  Prone press ups 20x- NP  SL hip abd 3x10 -NT  Clamshell c/ GTB 3x10-  SLR c/ 2# ankle weight 3x10 -  B piriformis stretch 3x30"-  Quadruped alt hip ext 2 x 15 B-  Multifidus isometric 2x10     Standing  Core walkouts 2x10 BTB-  Shuttle 4c w/ shoulder ext BTB 3 x 10-  Shuttle one leg 4c 3x10   Rows c/ BTB 3 x 10-  Shoulder Extensions c/ BTB 3 x 10-  Seated on SB marches 20x   diagonal lifts yellow med ball 20x standing on airex   Static lunges 2x10  Hip hike 2x10      Liang received the following manual therapy techniques: NT were applied for 8 minutes including:  R PF x 5min-NT  Sit and reach with overpressure on R SI joint 15x -NT  LAD SIJ emphasis and hip  Hip mobs    Written Home Exercises Provided: at eval, lumbar ext  Pt demo good understanding of the education provided. Liang demonstrated good return demonstration of " activities.     Education provided re: performance of HEP three times per day to decrease symptoms.  Also educated patient on proper sitting and standing posture.   Liang verbalized good understanding of education provided.   No spiritual or educational barriers to learning provided    Assessment     Patient tolerated treatment well without adverse effects. Pt demos decreased Hip IR R compared to L. Pt making good progress towards goals. Educated pt on how to use leg press in fitness center. Possible DC next  This is a 78 y.o. male referred to outpatient physical therapy and presents with a medical diagnosis of (R) sciatica  and demonstrates limitations as described in the problem list. Pt prognosis is Good. Pt will continue to benefit from skilled outpatient physical therapy to address the deficits listed in the problem list, provide pt/family education and to maximize pt's level of independence in the home and community environment.     Goals as follows:  Goals as follows:  Short Term Goals: 2-3 weeks   1) Patient will be compliant with HEP   2) Patient will demonstrate improved posture   3) Patient will be able to walk track for 30 min without pain in back or neck     Long Term Goals: 6-8 weeks   1) Patient will improve FOTO limitation to 24%  2) Patient will be able to complete all yard work without complaints of low back pain.   3) Patient will go one week without instance of lower back spasm.         Plan     Continue with established Plan of Care towards PT goals. Possible DC next session     Therapist: Nik Jj, PT, DPT  3/26/2019

## 2019-03-28 ENCOUNTER — CLINICAL SUPPORT (OUTPATIENT)
Dept: REHABILITATION | Facility: HOSPITAL | Age: 79
End: 2019-03-28
Attending: INTERNAL MEDICINE
Payer: MEDICARE

## 2019-03-28 DIAGNOSIS — G89.29 CHRONIC BILATERAL LOW BACK PAIN WITH BILATERAL SCIATICA: Primary | ICD-10-CM

## 2019-03-28 DIAGNOSIS — M54.41 CHRONIC BILATERAL LOW BACK PAIN WITH BILATERAL SCIATICA: Primary | ICD-10-CM

## 2019-03-28 DIAGNOSIS — M54.42 CHRONIC BILATERAL LOW BACK PAIN WITH BILATERAL SCIATICA: Primary | ICD-10-CM

## 2019-03-28 PROCEDURE — 97110 THERAPEUTIC EXERCISES: CPT | Mod: HCNC

## 2019-03-28 RX ORDER — PRAVASTATIN SODIUM 20 MG/1
TABLET ORAL
Qty: 90 TABLET | Refills: 0 | Status: SHIPPED | OUTPATIENT
Start: 2019-03-28 | End: 2019-06-25 | Stop reason: SDUPTHER

## 2019-03-28 NOTE — PROGRESS NOTES
Physical Therapy Discharge Note         Name: Liang Monterroos Jr.  Clinic Number: [id number]  Diagnosis:   Encounter Diagnosis   Name Primary?    Chronic bilateral low back pain with bilateral sciatica Yes     Physician: Corey Iqbal MD  Treatment Orders: PT Eval and Treat  Past Medical History:   Diagnosis Date    Allergy     CKD (chronic kidney disease) stage 3, GFR 30-59 ml/min 6/15/2016    GERD (gastroesophageal reflux disease)     Hyperlipidemia     Hypertension     Hypospadias in male     Hypothyroidism     Sleep apnea     Thyroid disease     Urinary tract infection      Current Outpatient Medications   Medication Sig    aspirin (ECOTRIN) 81 MG EC tablet Take 81 mg by mouth once daily.    benzonatate (TESSALON PERLES) 100 MG capsule Take 1 capsule (100 mg total) by mouth every 6 (six) hours as needed for Cough.    econazole nitrate 1 % cream USE TWICE DAILY (Patient taking differently: USE TWICE DAILY (prn))    famotidine (PEPCID) 20 MG tablet Take 20 mg by mouth nightly as needed.     fexofenadine (ALLEGRA) 180 MG tablet Take 1 tablet (180 mg total) by mouth once daily. (Patient taking differently: Take 180 mg by mouth daily as needed. )    fluticasone (FLONASE) 50 mcg/actuation nasal spray 1 spray (50 mcg total) by Each Nare route once daily.    levothyroxine (SYNTHROID) 112 MCG tablet Take 1 tablet (112 mcg total) by mouth before breakfast.    metronidazole (METROGEL) 0.75 % gel Use hs    multivitamin with minerals tablet Take 1 tablet by mouth once daily.      polyethylene glycol (GLYCOLAX) 17 gram PwPk Take 17 g by mouth once daily.    pravastatin (PRAVACHOL) 20 MG tablet TAKE 1 TABLET BY MOUTH ONE TIME DAILY    ramipril (ALTACE) 5 MG capsule Take 1 capsule (5 mg total) by mouth once daily.    ramipril (ALTACE) 5 MG capsule Take 1 capsule (5 mg total) by mouth once daily.    tamsulosin (FLOMAX) 0.4 mg Cap Take 2 capsules (0.8 mg  total) by mouth once daily.    triamcinolone acetonide 0.1% (KENALOG) 0.1 % cream Use bid prn rash     No current facility-administered medications for this visit.      Review of patient's allergies indicates:   Allergen Reactions    Contrast media Hives and Itching     Iv dye       Precautions: standard             Visit #: 12 of 20  PTA Visit #: 1  Time In: 1:30  Time Out: :2:00  Total Treatment Time 1:1: 30 minutes         Evaluation Date: 2/5/2019  Authorization Period Expiration: 12/31/19  Plan of Care Certification Period: 4/5/19      MD referral:   M54.31 (ICD-10-CM) - Sciatica of right side   M54.5 (ICD-10-CM) - Acute right-sided low back pain without sciatica             Subjective     Pt reports he's feeling ok right now.   Pain Scale: Liang rates pain at back on a scale of 0-10 to be 0 currently.    Objective     Liang received individual therapeutic exercises to develop strength, endurance, ROM, flexibility, posture and core stabilization for 50 minutes including:      POSTURE  Posture Alignment :slouched posture  Postural examination/scapula alignment: Rounded shoulder and Head forward  Joint integrity: WNL  as seen through spring testing  Skin integrity: WNL   Edema: Not significant   Sitting: Poor  Standing: Poor     MOVEMENT LOSS     ROM Loss   Flexion within functional limits   Extension within functional limits   Side bending Right within functional limits   Side bending Left within functional limits   Rotation Right within functional limits   Rotation Left within functional limits      Lower Extremity Strength  Right LE   Left LE     Hip flexion: 4+/5 Hip flexion: 4+/5   Hip extension:  4+/5 Hip extension: 4+/5   Hip abduction: 4+/5 Hip abduction: 4+/5   Hip adduction:  5/5 Hip adduction:  5/5   Hip Internal rotation    4+/5 Hip Internal rotation 4+/5   Knee Flexion 4+/5 Knee Flexion 4+/5   Knee Extension 5/5 Knee Extension 5/5   Ankle dorsiflexion: 5/5 Ankle dorsiflexion: 5/5   Ankle  plantarflexion: 5/5 Ankle plantarflexion: 5/5         GAIT:  Assistive Device used: None  Level of Assistance: Independent  Patient displays the following gait deviations: Not significant       Special Tests:   Test Name  Test Result   Prone Instability Test (--)   SI Joint Provocation Test (--)   Straight Leg Raise (--)   Neural Tension Test (--)                           NEUROLOGICAL SCREENING      Sensory deficit:      Reflexes:     Left Right   Patella Tendon 2+ 2+   Achilles Tendon 2+ 2+   Babinski NT NT   Clonus - -      REPEATED TEST MOVEMENTS:  Repeated Flexion in Standing no effect   Repeated Extension in Standing no effect         Repeated Extension in lying  no effect        FOTO: 28% limited     Written Home Exercises Provided: yes   Pt demo good understanding of the education provided. Liang demonstrated good return demonstration of activities.     Education provided re: performance of HEP three times per day to decrease symptoms.  Also educated patient on proper sitting and standing posture.   Liang verbalized good understanding of education provided.   No spiritual or educational barriers to learning provided    Assessment     Patient has progressed with PT and has met all goals listed below. Will be DC to [facility] at this time     Short Term Goals: 2-3 weeks   1) Patient will be compliant with HEP   2) Patient will demonstrate improved posture   3) Patient will be able to walk track for 30 min without pain in back or neck     Long Term Goals: 6-8 weeks   1) Patient will improve FOTO limitation to 24%  2) Patient will be able to complete all yard work without complaints of low back pain.   3) Patient will go one week without instance of lower back spasm.         Plan     Discharge to [facility]     Therapist: Nik Jj, PT, DPT  3/28/2019

## 2019-05-28 ENCOUNTER — PATIENT MESSAGE (OUTPATIENT)
Dept: UROLOGY | Facility: CLINIC | Age: 79
End: 2019-05-28

## 2019-05-29 ENCOUNTER — OFFICE VISIT (OUTPATIENT)
Dept: UROLOGY | Facility: CLINIC | Age: 79
End: 2019-05-29
Payer: MEDICARE

## 2019-05-29 VITALS
SYSTOLIC BLOOD PRESSURE: 123 MMHG | WEIGHT: 182.13 LBS | TEMPERATURE: 98 F | DIASTOLIC BLOOD PRESSURE: 80 MMHG | HEART RATE: 91 BPM | BODY MASS INDEX: 27.6 KG/M2 | HEIGHT: 68 IN

## 2019-05-29 DIAGNOSIS — R33.9 INCOMPLETE EMPTYING OF BLADDER: Primary | ICD-10-CM

## 2019-05-29 DIAGNOSIS — N40.1 BENIGN PROSTATIC HYPERPLASIA WITH URINARY OBSTRUCTION: ICD-10-CM

## 2019-05-29 DIAGNOSIS — Z46.6: ICD-10-CM

## 2019-05-29 DIAGNOSIS — N13.8 BENIGN PROSTATIC HYPERPLASIA WITH URINARY OBSTRUCTION: ICD-10-CM

## 2019-05-29 DIAGNOSIS — N28.1 RENAL CYST, LEFT: ICD-10-CM

## 2019-05-29 PROCEDURE — 3074F PR MOST RECENT SYSTOLIC BLOOD PRESSURE < 130 MM HG: ICD-10-PCS | Mod: HCNC,CPTII,S$GLB, | Performed by: NURSE PRACTITIONER

## 2019-05-29 PROCEDURE — 81002 URINALYSIS NONAUTO W/O SCOPE: CPT | Mod: HCNC,S$GLB,, | Performed by: NURSE PRACTITIONER

## 2019-05-29 PROCEDURE — 99999 PR PBB SHADOW E&M-EST. PATIENT-LVL IV: CPT | Mod: PBBFAC,HCNC,, | Performed by: NURSE PRACTITIONER

## 2019-05-29 PROCEDURE — 3074F SYST BP LT 130 MM HG: CPT | Mod: HCNC,CPTII,S$GLB, | Performed by: NURSE PRACTITIONER

## 2019-05-29 PROCEDURE — 3079F PR MOST RECENT DIASTOLIC BLOOD PRESSURE 80-89 MM HG: ICD-10-PCS | Mod: HCNC,CPTII,S$GLB, | Performed by: NURSE PRACTITIONER

## 2019-05-29 PROCEDURE — 3079F DIAST BP 80-89 MM HG: CPT | Mod: HCNC,CPTII,S$GLB, | Performed by: NURSE PRACTITIONER

## 2019-05-29 PROCEDURE — 1101F PR PT FALLS ASSESS DOC 0-1 FALLS W/OUT INJ PAST YR: ICD-10-PCS | Mod: HCNC,CPTII,S$GLB, | Performed by: NURSE PRACTITIONER

## 2019-05-29 PROCEDURE — 99215 OFFICE O/P EST HI 40 MIN: CPT | Mod: HCNC,25,S$GLB, | Performed by: NURSE PRACTITIONER

## 2019-05-29 PROCEDURE — 99999 PR PBB SHADOW E&M-EST. PATIENT-LVL IV: ICD-10-PCS | Mod: PBBFAC,HCNC,, | Performed by: NURSE PRACTITIONER

## 2019-05-29 PROCEDURE — 51701 INSERT BLADDER CATHETER: CPT | Mod: HCNC,S$GLB,, | Performed by: NURSE PRACTITIONER

## 2019-05-29 PROCEDURE — 51701 PR INSERTION OF NON-INDWELLING BLADDER CATHETERIZATION FOR RESIDUAL UR: ICD-10-PCS | Mod: HCNC,S$GLB,, | Performed by: NURSE PRACTITIONER

## 2019-05-29 PROCEDURE — 51798 PR MEAS,POST-VOID RES,US,NON-IMAGING: ICD-10-PCS | Mod: HCNC,S$GLB,, | Performed by: NURSE PRACTITIONER

## 2019-05-29 PROCEDURE — 51798 US URINE CAPACITY MEASURE: CPT | Mod: HCNC,S$GLB,, | Performed by: NURSE PRACTITIONER

## 2019-05-29 PROCEDURE — 1101F PT FALLS ASSESS-DOCD LE1/YR: CPT | Mod: HCNC,CPTII,S$GLB, | Performed by: NURSE PRACTITIONER

## 2019-05-29 PROCEDURE — 81002 PR URINALYSIS NONAUTO W/O SCOPE: ICD-10-PCS | Mod: HCNC,S$GLB,, | Performed by: NURSE PRACTITIONER

## 2019-05-29 PROCEDURE — 99215 PR OFFICE/OUTPT VISIT, EST, LEVL V, 40-54 MIN: ICD-10-PCS | Mod: HCNC,25,S$GLB, | Performed by: NURSE PRACTITIONER

## 2019-05-29 NOTE — PROGRESS NOTES
CHIEF COMPLAINT:    Mr. Monterroso is a 78 y.o. male presenting for intermittent difficulty w/ CIC.    PRESENTING ILLNESS:    Liang Monterroso Jr. is a 78 y.o. male new patient to me (records of past medical, family and social history personally reviewed by me), w/ h/o HTN, HLD, incomplete bladder emptying, BPH w/ obstruction (CIC TID), recurrent UTIs, and L renal cysts.    Last seen in clinic 2/19/19 w/ Dr. Eid for BPH and incomplete.    Today pt returns to clinic reporting intermittent difficulty passing CIC x3-4 days. Reports has been using CIC x2-3 yrs. Recently increased to TID (6 months ago). He uses 14 Fr Bard straight tip. Denies GH, dysuria, f/c, n/v, abd/pelvic/flank pain.    UA dip in clinic today +leuks.     PVR in clinic today: 298mL  In/out cath PVR in clinic today: 250mL    Urine Culture, Routine ENTEROCOCCUS FAECALIS   >100,000 cfu/ml    Resulting Agency [facility]   Susceptibility      Enterococcus faecalis     CULTURE, URINE     Ampicillin <=2 mcg/mL Sensitive     Nitrofurantoin <=32 mcg/mL Sensitive     Tetracycline >8 mcg/mL Resistant     Vancomycin 2 mcg/mL Sensitive            Linear View         Specimen Collected: 01/23/19 14:32 Last Resulted: 01/26/19 11:20        REVIEW OF SYSTEMS:    Liang Monterroso Jr. denies headache, blurred vision, fever, nausea, vomiting, chills, abdominal pain, bleeding per rectum, cough, SOB, recent loss of consciousness, recent mental status changes, seizures, dizziness, or upper or lower extremity weakness.    PATIENT HISTORY:    Past Medical History:   Diagnosis Date    Allergy     CKD (chronic kidney disease) stage 3, GFR 30-59 ml/min 6/15/2016    GERD (gastroesophageal reflux disease)     Hyperlipidemia     Hypertension     Hypospadias in male     Hypothyroidism     Sleep apnea     Thyroid disease     Urinary tract infection        Past Surgical History:   Procedure Laterality Date    APPENDECTOMY      CATARACT EXTRACTION      COLONOSCOPY N/A 4/15/2014     Performed by Gustavo Pfeiffer MD at [facility] (4TH FLR)    CRANIOTOMY WITH STEALTH/  Right Temporal Craniotomy Right 3/20/2017    Performed by Jameson Chen MD at [facility] OR 2ND FLR    EYE SURGERY      HERNIA REPAIR      TONSILLECTOMY      TURP W LASER N/A 7/21/2017    Performed by Anastacio Eid Jr., MD at [facility] OR 1ST FLR       Family History   Problem Relation Age of Onset    Diabetes Mother     Heart disease Mother     Hypertension Mother     Vision loss Mother     Dementia Mother     Alcohol abuse Father     Cancer Sister         lung with mets, was a heavy smoker    No Known Problems Daughter     No Known Problems Son     No Known Problems Daughter     No Known Problems Son     Amblyopia Neg Hx     Blindness Neg Hx     Cataracts Neg Hx     Glaucoma Neg Hx     Macular degeneration Neg Hx     Retinal detachment Neg Hx     Strabismus Neg Hx     Stroke Neg Hx     Thyroid disease Neg Hx        Social History     Socioeconomic History    Marital status:      Spouse name: parul    Number of children: 4    Years of education: Not on file    Highest education level: Not on file   Occupational History    Occupation: retired   Social Needs    Financial resource strain: Not on file    Food insecurity:     Worry: Not on file     Inability: Not on file    Transportation needs:     Medical: Not on file     Non-medical: Not on file   Tobacco Use    Smoking status: Never Smoker    Smokeless tobacco: Never Used   Substance and Sexual Activity    Alcohol use: Yes     Comment: 1-3 glasses wine weekly, 2-3 beers weekly    Drug use: No    Sexual activity: Yes     Partners: Female   Lifestyle    Physical activity:     Days per week: Not on file     Minutes per session: Not on file    Stress: Not on file   Relationships    Social connections:     Talks on phone: Not on file     Gets together: Not on file     Attends [employer] service: Not on file     Active member of club or  organization: Not on file     Attends meetings of clubs or organizations: Not on file     Relationship status: Not on file   Other Topics Concern    Not on file   Social History Narrative    Not on file       Allergies:  Contrast media    Medications:    Current Outpatient Medications:     aspirin (ECOTRIN) 81 MG EC tablet, Take 81 mg by mouth once daily., Disp: , Rfl:     benzonatate (TESSALON PERLES) 100 MG capsule, Take 1 capsule (100 mg total) by mouth every 6 (six) hours as needed for Cough., Disp: 30 capsule, Rfl: 1    econazole nitrate 1 % cream, USE TWICE DAILY (Patient taking differently: USE TWICE DAILY (prn)), Disp: 60 g, Rfl: 5    famotidine (PEPCID) 20 MG tablet, Take 20 mg by mouth nightly as needed. , Disp: , Rfl:     fexofenadine (ALLEGRA) 180 MG tablet, Take 1 tablet (180 mg total) by mouth once daily. (Patient taking differently: Take 180 mg by mouth daily as needed. ), Disp: 30 tablet, Rfl: 11    fluticasone (FLONASE) 50 mcg/actuation nasal spray, 1 spray (50 mcg total) by Each Nare route once daily., Disp: 15.8 mL, Rfl: 0    levothyroxine (SYNTHROID) 112 MCG tablet, Take 1 tablet (112 mcg total) by mouth before breakfast., Disp: 90 tablet, Rfl: 12    metronidazole (METROGEL) 0.75 % gel, Use hs, Disp: 45 g, Rfl: 3    multivitamin with minerals tablet, Take 1 tablet by mouth once daily.  , Disp: , Rfl:     polyethylene glycol (GLYCOLAX) 17 gram PwPk, Take 17 g by mouth once daily., Disp: 30 packet, Rfl: 0    pravastatin (PRAVACHOL) 20 MG tablet, TAKE 1 TABLET BY MOUTH ONE TIME DAILY, Disp: 90 tablet, Rfl: 0    ramipril (ALTACE) 5 MG capsule, Take 1 capsule (5 mg total) by mouth once daily., Disp: 30 capsule, Rfl: 0    ramipril (ALTACE) 5 MG capsule, Take 1 capsule (5 mg total) by mouth once daily., Disp: 90 capsule, Rfl: 12    tamsulosin (FLOMAX) 0.4 mg Cap, Take 2 capsules (0.8 mg total) by mouth once daily., Disp: 180 capsule, Rfl: 2    triamcinolone acetonide 0.1% (KENALOG) 0.1  % cream, Use bid prn rash, Disp: 80 g, Rfl: 3    PHYSICAL EXAMINATION:    The patient generally appears in good health, is appropriately interactive, and is in no apparent distress.     Eyes: anicteric sclerae, moist conjunctivae; no lid-lag; PERRLA     HENT: Atraumatic; oropharynx clear with moist mucous membranes and no mucosal ulcerations;normal hard and soft palate.  No evidence of lymphadenopathy.    Neck: Trachea midline.  No thyromegaly.    Musculoskeletal: No abnormal gait.    Skin: No lesions.    Mental: Cooperative with normal affect.  Is oriented to time, place, and person.    Neuro: Grossly intact.    Chest: Normal inspiratory effort.   No accessory muscles.  No audible wheezes.  Respirations symmetric on inspiration and expiration.    Heart: Regular rhythm.      Abdomen:  Soft, non-tender. No masses or organomegaly. Bladder is not palpable. No evidence of flank discomfort. No evidence of inguinal hernia.    Genitourinary: The penis is uncircumcised with no evidence of plaques or induration. Hypospadias present. The urethral meatus is normal. The testes, epididymides, and cord structures are normal in size and contour bilaterally. The scrotum is normal in size and contour.    Extremities: No clubbing, cyanosis, or edema.    LABS:    Lab Results   Component Value Date    CREATININE 1.2 11/05/2018     Lab Results   Component Value Date    PSA 1.8 08/03/2018    PSA 0.91 12/17/2012    PSA 4.89 (H) 11/12/2012    PSADIAG 1.0 01/11/2016    PSADIAG 1.0 12/04/2014    PSADIAG 0.67 12/03/2013     Hemoglobin A1C   Date Value Ref Range Status   11/05/2018 5.4 4.0 - 5.6 % Final     Comment:     ADA Screening Guidelines:  5.7-6.4%  Consistent with prediabetes  >or=6.5%  Consistent with diabetes  High levels of fetal hemoglobin interfere with the HbA1C  assay. Heterozygous hemoglobin variants (HbS, HgC, etc)do  not significantly interfere with this assay.   However, presence of multiple variants may affect accuracy.      04/03/2018 5.1 4.0 - 5.6 % Final     Comment:     According to ADA guidelines, hemoglobin A1c <7.0% represents  optimal control in non-pregnant diabetic patients. Different  metrics may apply to specific patient populations.   Standards of Medical Care in Diabetes-2016.  For the purpose of screening for the presence of diabetes:  <5.7%     Consistent with the absence of diabetes  5.7-6.4%  Consistent with increasing risk for diabetes   (prediabetes)  >or=6.5%  Consistent with diabetes  Currently, no consensus exists for use of hemoglobin A1c  for diagnosis of diabetes for children.  This Hemoglobin A1c assay has significant interference with fetal   hemoglobin   (HbF). The results are invalid for patients with abnormal amounts of   HbF,   including those with known Hereditary Persistence   of Fetal Hemoglobin. Heterozygous hemoglobin variants (HbAS, HbAC,   HbAD, HbAE, HbA2) do not significantly interfere with this assay;   however, presence of multiple variants in a sample may impact the %   interference.     10/08/2009 5.5 4.0 - 6.2 % Final       Imaging:  EXAMINATION:  US RETROPERITONEAL COMPLETE (10/29/18)    CLINICAL HISTORY:  Benign prostatic hyperplasia with lower urinary tract symptoms    TECHNIQUE:  Ultrasound of the kidneys and urinary bladder was performed including color flow and Doppler evaluation of the kidneys.    COMPARISON:  CT abdomen pelvis 10/10/2014    FINDINGS:  Right kidney: The right kidney measures 11.1 cm. No significant cortical thinning or loss of corticomedullary distinction.  Perfusion mildly decreased with resistive index measuring 0.60.  No mass. No renal stone. No hydronephrosis.    Left kidney: The left kidney measures 11.4 cm. No significant cortical thinning or loss of corticomedullary distinction.  Good perfusion with resistive index measuring 0.67.  Mildly complex renal cyst with thin internal septation seen within the inferior pole of the left kidney measuring 2.4 x 2.2 x 2.4  cm.  Additional simple cyst within the inferior pole of the left kidney measuring 1.2 x 1.2 x 1.2 cm.  No solid mass.  No renal stone. No hydronephrosis.    The bladder is partially distended at the time of scanning and has an unremarkable appearance.    Prostate measures 2.4 x 2.3 x 4.1 cm.      Impression       Mildly decreased perfusion within the right kidney which may suggest some degree of medical renal disease.    Mildly complex renal cyst and simple renal cyst within the inferior pole of the left kidney measuring 2.4 cm and 1.2 cm respectively.    Electronically signed by resident: Quique Biggs  Date: 10/29/2018  Time: 13:33    Electronically signed by: Brii Saab MD  Date: 10/29/2018  Time: 14:24     IMPRESSION:    Encounter Diagnoses   Name Primary?    Incomplete emptying of bladder Yes    Benign prostatic hyperplasia with urinary obstruction     Renal cyst, left     Encounter for urinary catheterization      PLAN:    I spent 40 minutes with the patient of which more than half was spent in direct consultation with the patient in regards to our treatment and plan.    Discussed and reviewed catheterization. Catheterized pt in clinic w/ 12 Fr silicone w/o difficulty, had difficulty w/ 14 Fr red rubber d/t flexibility and hypospadias. Discussed potential challenges w/ CIC. Recommend continue w/ 14Fr Bards, but when encountering challenge may attempt 12 Fr.    Continue w/ tamsulosin as rx'd.    F/u w/ Dr. Eid as scheduled, and J[facility]ONY as scheduled (8/2019).    Patient verbalized and expressed understanding, and agree w/ plan.

## 2019-05-29 NOTE — PATIENT INSTRUCTIONS
Bladder Infection, Male (Adult)    You have a bladder infection.  Urine is normally free of bacteria. But bacteria can get into the urinary tract from the skin around the rectum or it may travel in the blood from elsewhere in the body.  This is called a urinary tract infection (UTI). An infection can occur anywhere in the urinary tract. It could be in a kidney (pyelonephritis)or in the bladder (cystitis) and urethra (urethritis). The urethra is the tube that drains the urine from the bladder through the tip of the penis.  The most common place for a UTI is in the bladder. This is called a bladder infection. Most bladder infections are easily treated. They are not serious unless the infection spreads up to the kidney.  The terms bladder infection, UTI, and cystitis are often used to describe the same thing, but they arent always the same. Cystitis is an inflammation of the bladder. The most common cause of cystitis is an infection.   Keep in mind:  · Infections in the urine are called UTIs.  · Cystitis is usually caused by a UTI.  · Not all UTIs and cases of cystitis are bladder infections.  · Bladder infections are the most common type of cystitis.  Symptoms of a bladder infection  The infection causes inflammation in the urethra and bladder. This inflammation causes many of the symptoms. The most common symptoms of a bladder infection are:  · Pain or burning when urinating  · Having to go more often than usual  · Feeling like you need to go right away  · Only a small amount comes out  · Blood in urine  · Discomfort in your belly (abdomen), usually in the lower abdomen, above the pubic bone  · Cloudy, strong, or bad smelling urine  · Unable to urinate (retention)  · Urinary incontinence  · Fever  · Loss of appetite  Older adults may also feel confused.  Causes of a bladder infection  Bladder infections are not contagious. You can't get one from someone else, from a toilet seat, or from sharing a bath.  The most  common cause of bladder infections is bacteria from the bowels. The bacteria get onto the skin around the opening of the urethra. From there they can get into the urine and travel up to the bladder. This causes inflammation and an infection. This usually happens because of:  · An enlarged prostate  · Poor cleaning of the genitals  · Procedures that put a tube in your bladder, like a Noble catheter  · Bowel incontinence  · Older age  · Not emptying your bladder (The urine stays there, giving the bacteria a chance to grow.)  · Dehydration (This allows urine to stay in the bladder longer.)  · Constipation (This can cause the bowels to push on the bladder or urethra and keep the bladder from emptying.)  Treatment  Bladder infections are treated with antibiotics. They usually clear up quickly without complications. Treatment helps prevent a more serious kidney infection.  Medicines  Medicines can help in the treatment of a bladder infection:  · You may have been given phenazopyridine to ease burning when you urinate. It will cause your urine to be bright orange. It can stain clothing.  · You may have been prescribed antibiotics. Take this medicine until you have finished it, even if you feel better. Taking all of the medicine will make sure the infection has cleared.  You can use acetaminophen or ibuprofen for pain, fever, or discomfort, unless another medicine was prescribed. You can also alternate them, or use both together. They work differently and are a different class of medicines, so taking them together is not an overdose. If you have chronic liver or kidney disease, talk with your healthcare provider before using these medicines. Also talk with your provider if youve had a stomach ulcer or GI bleeding or are taking blood thinner medicines.  Home care  Here are some guidelines to help you care for yourself at home:  · Drink plenty of fluids, unless your healthcare provider told you not to. Fluids will prevent  dehydration and flush out your bladder.  · Use good personal hygiene. Wipe from front to back after using the toilet, and clean your penis regularly. If you arent circumcised, retract the foreskin when cleaning.  · Urinate more frequently, and dont try to hold it in for long periods of time, if possible.  · Wear loose-fitting clothes and cotton underwear. Avoid tight-fitting pants. This helps keep you clean and dry.  · Change your diet to prevent constipation. This means eating more fresh foods and more fiber, and less junk and fatty foods.  · Avoid sex until your symptoms are gone.  · Avoid caffeine, alcohol, and spicy foods. These can irritate the bladder.  Follow-up care  Follow up with your healthcare provider, or as advised if all symptoms have not cleared up within 5 days. It is important to keep your follow-up appointment. You can talk with your provider to see if you need more tests of the urinary tract. This is especially important if you have infections that keep coming back.  If a culture was done, you will be told if your treatment needs to be changed. If directed, you can call to find out the results.  If X-rays were taken, you will be told of any findings that may affect your care.  Call 911  Call 911 if any of these occur:  · Trouble breathing  · Difficulty waking up  · Feeling confused  · Fainting or loss of consciousness  · Rapid heart rate  When to seek medical advice  Call your healthcare provider right away if any of these occur:  · Fever of 100.4ºF (38ºC) or higher, or as directed by your healthcare provider  · Your symptoms dont improve after 2 days of treatment  · Back or abdominal pain that gets worse  · Repeated vomiting, or you arent able to keep medicine down  · Weakness or dizziness  Date Last Reviewed: 10/1/2016  © 9704-6090 [employer]. [address] [place] [address], [place] [address]. All rights reserved. This information is not intended as a substitute for professional  medical care. Always follow your healthcare professional's instructions.        Discharge Instructions: Self-Catheterization for Men  Your doctor has prescribed self-catheterization for you because you are having trouble urinating naturally. This problem can be caused by injury, disease, infection, or other conditions. Self-catheterization simply means inserting a clean, thin, flexible tube (catheter) through the penis and into the bladder to empty urine. This helps you empty your bladder when it wont empty by itself or empty all the way. You were shown in the hospital how to do self-catheterization. The steps below should help you remember how to do it properly.     Lubricate catheter       Insert catheter       Empty urine      Gather your supplies  You will need:  · Soap and warm water or a moist towelette  · Clean catheter  · Water-soluble lubricating jelly (not petroleum jelly)  · Toilet or basin  Get ready  · Wash your hands and your penis. Use warm soapy water. You can also use a moist towelette.  · Lubricate the catheter with the water-soluble lubricating jelly:  ¨ Lubricate 2 to 4 inches of the catheter tip.  ¨ Place the other end of the catheter over the toilet or basin.  Empty your bladder  · Insert the catheter.  ¨ Grasp the tip of your penis, holding your penis horizontally from your body.  ¨ Slowly insert the catheter into your urethra. If it doesnt go in, do this: Take a deep breath and bear down as if you're trying to urinate.  ¨ If you feel a sharp pain, remove the catheter; then try again.  · Empty your bladder.  ¨ When the urine starts to flow, stop inserting the catheter.  ¨ Slightly lower your penis.  ¨ When the urine stops flowing, slowly remove the catheter.  Catheter care  If you use a disposable catheter, use a new one each time you empty your bladder. Throw the catheter away when youre done. If your catheters are reusable, do the following after each use:  · Wash your hands with soap and  warm water.  · Clean the catheter with soap and warm water.  · Rinse the catheter, making sure there is no soap left inside or on it.  · Dry the outside of the catheter.  · Store the catheter in a clean, dry container, such as a re-sealable plastic bag.  · Throw away a catheter if the plastic looks cloudy.  · Wash your hands again. If you used a basin, wash it out.  Follow-up care  Make a follow-up appointment as directed by our staff.  When to call your healthcare provider  Call your healthcare provider right away if you have any of the following:  · Fever above 100.4°F (38°C) or higher, or as directed by your healthcare provider  · Chills  · Burning in the urinary tract, penis, or pubic area  · Nausea and vomiting  · Aching in your lower back  · Cloudy urine, or sediment or mucus in the urine  · Bloody (pink or red) or foul-smelling urine   Date Last Reviewed: 1/1/2017  © 8931-5001 [employer]. [address] [place] [address]. All rights reserved. This information is not intended as a substitute for professional medical care. Always follow your healthcare professional's instructions.        BPH (Enlarged Prostate)  The prostate is a gland at the base of the bladder. As some men get older, the prostate may get bigger in size. This problem is called benign prostatic hyperplasia (BPH). BPH puts pressure on the urethra. This is the tube that carries urine from the bladder to the penis. It may interfere with the flow of urine. It may also keep the bladder from emptying fully.    Symptoms of BPH include trouble starting urination and feeling as though the bladder isnt emptying all the way. It also includes a weak urine stream, dribbling and leaking of urine, and frequent and urgent urination (especially at night). BPH can increase the risk of urinary infections. It can also block off urine flow completely. If this occurs, a thin tube (catheter) may be passed into the bladder to help drain  urine.  If symptoms are mild, no treatment may be needed right now. If symptoms are more severe, treatment is likely needed. The goal of treatment is to improve urine flow and reduce symptoms. Treatments can include medicine and procedures. Your healthcare provider will discuss treatment options with you as needed.  Home care  The following guidelines will help you care for yourself at home:  · Urinate as soon as you feel the urge. Don't try to hold your urine.  · Don't limit your fluid intake during the day. Drink 6 to 8 glasses of water or liquids a day. This prevents bacteria from building up in the bladder.  · Avoid drinking fluids after dinner to help reduce urination during the night.  · Avoid medicines that can worsen your symptoms. These include certain cold and allergy medicines and antidepressants. Diuretics used for high blood pressure can also worsen symptoms. Talk to your doctor about the medicines you take. Other choices may work better for you.  Prostate cancer screening  BPH does not increase the risk of prostate cancer. But because prostate cancer is a common cancer in men, screening is sometimes recommended. This may help detect the cancer in its early stages when treatment is most effective. Factors that can increase the risk of prostate cancer include being -American or having a father or brother who had prostate cancer. A high-fat diet may also increase the risk of prostate cancer. Talk to your healthcare provider to see whether you should be screened for prostate cancer.  Follow-up care  Follow up with your healthcare provider, or as advised  To learn more, go to:  · National Kidney & Urologic Diseases Information Clearinghouse  kidney.niddk.nih.gov, 659.379.3376  When to seek medical advice  Call your healthcare provider right away if any of these occur:  · Fever of 100.4°F (38.0°C) or higher, or as advised  · Unable to pass urine for 8 hours  · Increasing pressure or pain in your  bladder (lower abdomen)  · Blood in the urine  · Increasing low back pain, not related to injury  · Symptoms of urinary infection (increased urge to urinate, burning when passing urine, foul-smelling urine)  Date Last Reviewed: 7/1/2016  © 0235-3917 [employer]. [address] [place] [address]. All rights reserved. This information is not intended as a substitute for professional medical care. Always follow your healthcare professional's instructions.

## 2019-06-06 ENCOUNTER — OFFICE VISIT (OUTPATIENT)
Dept: UROLOGY | Facility: CLINIC | Age: 79
End: 2019-06-06
Payer: MEDICARE

## 2019-06-06 VITALS
WEIGHT: 183.88 LBS | DIASTOLIC BLOOD PRESSURE: 82 MMHG | SYSTOLIC BLOOD PRESSURE: 128 MMHG | HEIGHT: 68 IN | HEART RATE: 84 BPM | BODY MASS INDEX: 27.87 KG/M2

## 2019-06-06 DIAGNOSIS — N39.0 URINARY TRACT INFECTION WITHOUT HEMATURIA, SITE UNSPECIFIED: Primary | ICD-10-CM

## 2019-06-06 DIAGNOSIS — N31.9 NEUROGENIC BLADDER: ICD-10-CM

## 2019-06-06 PROCEDURE — 99214 PR OFFICE/OUTPT VISIT, EST, LEVL IV, 30-39 MIN: ICD-10-PCS | Mod: HCNC,S$GLB,, | Performed by: UROLOGY

## 2019-06-06 PROCEDURE — 87186 SC STD MICRODIL/AGAR DIL: CPT | Mod: HCNC

## 2019-06-06 PROCEDURE — 99214 OFFICE O/P EST MOD 30 MIN: CPT | Mod: HCNC,S$GLB,, | Performed by: UROLOGY

## 2019-06-06 PROCEDURE — 3079F DIAST BP 80-89 MM HG: CPT | Mod: HCNC,CPTII,S$GLB, | Performed by: UROLOGY

## 2019-06-06 PROCEDURE — 1101F PT FALLS ASSESS-DOCD LE1/YR: CPT | Mod: HCNC,CPTII,S$GLB, | Performed by: UROLOGY

## 2019-06-06 PROCEDURE — 87088 URINE BACTERIA CULTURE: CPT | Mod: HCNC

## 2019-06-06 PROCEDURE — 87086 URINE CULTURE/COLONY COUNT: CPT | Mod: HCNC

## 2019-06-06 PROCEDURE — 3074F PR MOST RECENT SYSTOLIC BLOOD PRESSURE < 130 MM HG: ICD-10-PCS | Mod: HCNC,CPTII,S$GLB, | Performed by: UROLOGY

## 2019-06-06 PROCEDURE — 1101F PR PT FALLS ASSESS DOC 0-1 FALLS W/OUT INJ PAST YR: ICD-10-PCS | Mod: HCNC,CPTII,S$GLB, | Performed by: UROLOGY

## 2019-06-06 PROCEDURE — 99999 PR PBB SHADOW E&M-EST. PATIENT-LVL III: ICD-10-PCS | Mod: PBBFAC,HCNC,, | Performed by: UROLOGY

## 2019-06-06 PROCEDURE — 99999 PR PBB SHADOW E&M-EST. PATIENT-LVL III: CPT | Mod: PBBFAC,HCNC,, | Performed by: UROLOGY

## 2019-06-06 PROCEDURE — 87077 CULTURE AEROBIC IDENTIFY: CPT | Mod: HCNC

## 2019-06-06 PROCEDURE — 3079F PR MOST RECENT DIASTOLIC BLOOD PRESSURE 80-89 MM HG: ICD-10-PCS | Mod: HCNC,CPTII,S$GLB, | Performed by: UROLOGY

## 2019-06-06 PROCEDURE — 3074F SYST BP LT 130 MM HG: CPT | Mod: HCNC,CPTII,S$GLB, | Performed by: UROLOGY

## 2019-06-06 RX ORDER — SULFAMETHOXAZOLE AND TRIMETHOPRIM 800; 160 MG/1; MG/1
1 TABLET ORAL 2 TIMES DAILY
Qty: 30 TABLET | Refills: 2 | Status: SHIPPED | OUTPATIENT
Start: 2019-06-06 | End: 2019-07-06

## 2019-06-06 NOTE — PROGRESS NOTES
Subjective:       Patient ID: Liang Monterroso Jr. is a 78 y.o. male.    Chief Complaint: Urinary Retention    HPI     Liang Monterroso Jr. is a 78 y.o. male with PMHx of HTN and neurogenic bladder who presents to the clinic for evaluation and management of incomplete bladder emptying. Does CIC TID. Patient was supposed to return with a renal US but has yet to complete this. Patient reports that up until 10 days ago he was urinating well in between catheterizing. He did explain that he had difficulty using his catheter until recently. Despite resolved catheter issues the patient does not feel that he is passing urine completely. Endorses urethra and sphincter discomfort.      Past Medical History:   Diagnosis Date    Allergy     CKD (chronic kidney disease) stage 3, GFR 30-59 ml/min 6/15/2016    GERD (gastroesophageal reflux disease)     Hyperlipidemia     Hypertension     Hypospadias in male     Hypothyroidism     Sleep apnea     Thyroid disease     Urinary tract infection        Past Surgical History:   Procedure Laterality Date    APPENDECTOMY      CATARACT EXTRACTION      COLONOSCOPY N/A 4/15/2014    Performed by Gustavo Pfeiffer MD at [facility] ENDO (4TH FLR)    CRANIOTOMY WITH STEALTH/  Right Temporal Craniotomy Right 3/20/2017    Performed by Jameson Chen MD at [facility] OR 2ND FLR    EYE SURGERY      HERNIA REPAIR      TONSILLECTOMY      TURP W LASER N/A 7/21/2017    Performed by Anastacio Eid Jr., MD at [facility] OR 1ST FLR       Family History   Problem Relation Age of Onset    Diabetes Mother     Heart disease Mother     Hypertension Mother     Vision loss Mother     Dementia Mother     Alcohol abuse Father     Cancer Sister         lung with mets, was a heavy smoker    No Known Problems Daughter     No Known Problems Son     No Known Problems Daughter     No Known Problems Son     Amblyopia Neg Hx     Blindness Neg Hx     Cataracts Neg Hx     Glaucoma Neg Hx     Macular degeneration Neg  Hx     Retinal detachment Neg Hx     Strabismus Neg Hx     Stroke Neg Hx     Thyroid disease Neg Hx        Social History     Socioeconomic History    Marital status:      Spouse name: parul    Number of children: 4    Years of education: Not on file    Highest education level: Not on file   Occupational History    Occupation: retired   Social Needs    Financial resource strain: Not on file    Food insecurity:     Worry: Not on file     Inability: Not on file    Transportation needs:     Medical: Not on file     Non-medical: Not on file   Tobacco Use    Smoking status: Never Smoker    Smokeless tobacco: Never Used   Substance and Sexual Activity    Alcohol use: Yes     Comment: 1-3 glasses wine weekly, 2-3 beers weekly    Drug use: No    Sexual activity: Yes     Partners: Female   Lifestyle    Physical activity:     Days per week: Not on file     Minutes per session: Not on file    Stress: Not on file   Relationships    Social connections:     Talks on phone: Not on file     Gets together: Not on file     Attends Latter day service: Not on file     Active member of club or organization: Not on file     Attends meetings of clubs or organizations: Not on file     Relationship status: Not on file   Other Topics Concern    Not on file   Social History Narrative    Not on file       Allergies:  Contrast media    Medications:    Current Outpatient Medications:     aspirin (ECOTRIN) 81 MG EC tablet, Take 81 mg by mouth once daily., Disp: , Rfl:     benzonatate (TESSALON PERLES) 100 MG capsule, Take 1 capsule (100 mg total) by mouth every 6 (six) hours as needed for Cough., Disp: 30 capsule, Rfl: 1    econazole nitrate 1 % cream, USE TWICE DAILY (Patient taking differently: USE TWICE DAILY (prn)), Disp: 60 g, Rfl: 5    famotidine (PEPCID) 20 MG tablet, Take 20 mg by mouth nightly as needed. , Disp: , Rfl:     fluticasone (FLONASE) 50 mcg/actuation nasal spray, 1 spray (50 mcg total) by  Each Nare route once daily., Disp: 15.8 mL, Rfl: 0    levothyroxine (SYNTHROID) 112 MCG tablet, Take 1 tablet (112 mcg total) by mouth before breakfast., Disp: 90 tablet, Rfl: 12    metronidazole (METROGEL) 0.75 % gel, Use hs, Disp: 45 g, Rfl: 3    multivitamin with minerals tablet, Take 1 tablet by mouth once daily.  , Disp: , Rfl:     polyethylene glycol (GLYCOLAX) 17 gram PwPk, Take 17 g by mouth once daily., Disp: 30 packet, Rfl: 0    pravastatin (PRAVACHOL) 20 MG tablet, TAKE 1 TABLET BY MOUTH ONE TIME DAILY, Disp: 90 tablet, Rfl: 0    ramipril (ALTACE) 5 MG capsule, Take 1 capsule (5 mg total) by mouth once daily., Disp: 30 capsule, Rfl: 0    ramipril (ALTACE) 5 MG capsule, Take 1 capsule (5 mg total) by mouth once daily., Disp: 90 capsule, Rfl: 12    tamsulosin (FLOMAX) 0.4 mg Cap, Take 2 capsules (0.8 mg total) by mouth once daily., Disp: 180 capsule, Rfl: 2    triamcinolone acetonide 0.1% (KENALOG) 0.1 % cream, Use bid prn rash, Disp: 80 g, Rfl: 3    fexofenadine (ALLEGRA) 180 MG tablet, Take 1 tablet (180 mg total) by mouth once daily. (Patient taking differently: Take 180 mg by mouth daily as needed. ), Disp: 30 tablet, Rfl: 11    sulfamethoxazole-trimethoprim 800-160mg (BACTRIM DS) 800-160 mg Tab, Take 1 tablet by mouth 2 (two) times daily., Disp: 30 tablet, Rfl: 2    Review of Systems   Constitutional: Negative for activity change, appetite change, chills, diaphoresis, fatigue, fever and unexpected weight change.   HENT: Negative for congestion, dental problem, hearing loss, mouth sores, postnasal drip, rhinorrhea, sinus pressure and trouble swallowing.    Eyes: Negative for pain, discharge and itching.   Respiratory: Negative for apnea, cough, choking, chest tightness, shortness of breath and wheezing.    Cardiovascular: Negative for chest pain, palpitations and leg swelling.   Gastrointestinal: Negative for abdominal distention, abdominal pain, anal bleeding, blood in stool, constipation,  diarrhea, nausea, rectal pain and vomiting.   Endocrine: Negative for polydipsia and polyuria.   Genitourinary: Positive for decreased urine volume and difficulty urinating. Negative for discharge, dysuria, enuresis, flank pain, frequency, genital sores, hematuria, penile pain, penile swelling and scrotal swelling.        Urethra and sphincter discomfort.   Musculoskeletal: Negative for arthralgias, back pain and myalgias.   Skin: Negative for color change, rash and wound.   Neurological: Negative for dizziness, syncope, speech difficulty, light-headedness and headaches.   Hematological: Negative for adenopathy. Does not bruise/bleed easily.   Psychiatric/Behavioral: Negative for behavioral problems, confusion and sleep disturbance.       Objective:      Physical Exam   Constitutional: He appears well-developed.   HENT:   Head: Normocephalic.   Neck: Neck supple.   Cardiovascular: Normal rate.    Pulmonary/Chest: Effort normal.   Abdominal: Soft.   Genitourinary:   Genitourinary Comments: Prostate was smooth without nodularity. No rectal masses.  40 grams.  Normal perineum.     Neurological: He is alert.   Skin: Skin is warm.     Psychiatric: He has a normal mood and affect.       Assessment:       1. Urinary tract infection without hematuria, site unspecified    2. Neurogenic bladder        Plan:       Liang was seen today for urinary retention.    Diagnoses and all orders for this visit:    Urinary tract infection without hematuria, site unspecified  -     Urine culture; Future    Neurogenic bladder  -     Cystoscopy; Future  -     US Retroperitoneal Complete (Kidney and; Future    Other orders  -     sulfamethoxazole-trimethoprim 800-160mg (BACTRIM DS) 800-160 mg Tab; Take 1 tablet by mouth 2 (two) times daily.         Urine culture  Start antibiotics above  Schedule Cystoscope and Renal US         Esequiel GREER, am acting as a scribe on this patient encounter in the presence and under the supervision of   Ragini.    I, Dr. Eid, personally performed the services described in this documentation.   All medical record entries made by the scribe were at my direction and in my presence.   I have reviewed the chart and agree that the record is accurate and complete.   Anastacio Eid MD.  8:53 AM 06/06/2019 06/06/2019 8:52 AM          06/06/2019 8:52 AM

## 2019-06-08 LAB — BACTERIA UR CULT: NORMAL

## 2019-06-09 ENCOUNTER — PATIENT MESSAGE (OUTPATIENT)
Dept: UROLOGY | Facility: CLINIC | Age: 79
End: 2019-06-09

## 2019-06-14 ENCOUNTER — HOSPITAL ENCOUNTER (OUTPATIENT)
Dept: RADIOLOGY | Facility: HOSPITAL | Age: 79
Discharge: HOME | End: 2019-06-14
Attending: UROLOGY
Payer: MEDICARE

## 2019-06-14 DIAGNOSIS — N31.9 NEUROGENIC BLADDER: ICD-10-CM

## 2019-06-14 PROCEDURE — 76770 US EXAM ABDO BACK WALL COMP: CPT | Mod: TC,HCNC

## 2019-06-14 PROCEDURE — 76770 US RETROPERITONEAL COMPLETE: ICD-10-PCS | Mod: 26,HCNC,, | Performed by: RADIOLOGY

## 2019-06-14 PROCEDURE — 76770 US EXAM ABDO BACK WALL COMP: CPT | Mod: 26,HCNC,, | Performed by: RADIOLOGY

## 2019-06-17 ENCOUNTER — HOSPITAL ENCOUNTER (OUTPATIENT)
Dept: UROLOGY | Facility: HOSPITAL | Age: 79
Discharge: HOME | End: 2019-06-17
Attending: UROLOGY
Payer: MEDICARE

## 2019-06-17 VITALS
BODY MASS INDEX: 27.87 KG/M2 | WEIGHT: 183.88 LBS | RESPIRATION RATE: 17 BRPM | DIASTOLIC BLOOD PRESSURE: 68 MMHG | SYSTOLIC BLOOD PRESSURE: 103 MMHG | TEMPERATURE: 98 F | HEIGHT: 68 IN | HEART RATE: 88 BPM

## 2019-06-17 DIAGNOSIS — N31.9 NEUROGENIC BLADDER: ICD-10-CM

## 2019-06-17 PROCEDURE — 52000 PR CYSTOURETHROSCOPY: ICD-10-PCS | Mod: HCNC,,, | Performed by: UROLOGY

## 2019-06-17 PROCEDURE — 52000 CYSTOURETHROSCOPY: CPT | Mod: HCNC,,, | Performed by: UROLOGY

## 2019-06-17 PROCEDURE — 52000 CYSTOURETHROSCOPY: CPT | Mod: HCNC

## 2019-06-17 RX ORDER — CIPROFLOXACIN 500 MG/1
500 TABLET ORAL ONCE
Status: DISCONTINUED | OUTPATIENT
Start: 2019-06-17 | End: 2022-05-12

## 2019-06-17 RX ORDER — LIDOCAINE HYDROCHLORIDE 20 MG/ML
JELLY TOPICAL ONCE
Status: COMPLETED | OUTPATIENT
Start: 2019-06-17 | End: 2019-06-17

## 2019-06-17 RX ADMIN — LIDOCAINE HYDROCHLORIDE: 20 JELLY TOPICAL at 12:06

## 2019-06-17 NOTE — PATIENT INSTRUCTIONS
What to Expect After a Cystoscopy  For the next 24-48 hours, you may feel a mild burning when you urinate. This burning is normal and expected. Drink plenty of water to dilute the urine to help relieve the burning sensation. You may also see a small amount of blood in your urine after the procedure.    Unless you are already taking antibiotics, you may be given an antibiotic after the test to prevent infection.    Signs and Symptoms to Report  Call the [facility] at [phone] if you develop any of the following:  · Fever of 101 degrees or higher  · Chills or persistent bleeding  · Inability to urinate

## 2019-06-17 NOTE — OP NOTE
DATE OF PROCEDURE:  06/17/2019.    PREOPERATIVE DIAGNOSIS:  Neurogenic bladder, meatal stenosis.    POSTOPERATIVE DIAGNOSIS:  Neurogenic bladder, meatal stenosis.    OPERATIONS PERFORMED:  Dilatation of meatal stenosis (hypospadiac), Flexible   cystoscopy.    PROCEDURE IN DETAIL:  This patient has neurogenic bladder.  He is on CIC 4 to 6   times a day.  He has been voiding in between.  It was felt cystoscopy was   indicated since he is having trouble passing the catheters on occasion.    The patient was prepped and draped in supine position.  Flexible cystoscopy was   attempted.  The patient had a hypospadiac first-degree meatus with stenosis.    This was dilated with 20-[place].  Flexible cystoscopy was performed.  The   patient had a couple of very mild strictures in the urethra, distal to the   sphincter.  These were negotiated easily.  Prostatic urethra was 4 cm with   mild-to-moderate lateral lobe enlargement.  Bladder was heavily trabeculated   with grade 3 trabeculations.  Both ureteral orifices were normal size, shape and   position with clear efflux.  There were no stones, tumors, foreign bodies or   active infections noted.    IMPRESSION:  Neurogenic bladder.    RECOMMENDATIONS:  1.  The patient will continue CIC and I may switch him to a coude tip catheter   p.r.n.  2.  He will return to clinic in 4 months for urinalysis and to check on his   progress and I will more than likely repeat urodynamics sometime after that in   the hopes that a TURP could help him.      TOSIN  dd: 06/17/2019 13:05:58 (CDT)  td: 06/17/2019 14:28:04 (CDT)  Doc ID   #[id number]  Job ID #[id number]    CC:

## 2019-06-25 RX ORDER — PRAVASTATIN SODIUM 20 MG/1
TABLET ORAL
Qty: 90 TABLET | Refills: 0 | Status: SHIPPED | OUTPATIENT
Start: 2019-06-25 | End: 2019-09-13 | Stop reason: SDUPTHER

## 2019-07-07 ENCOUNTER — PATIENT MESSAGE (OUTPATIENT)
Dept: UROLOGY | Facility: CLINIC | Age: 79
End: 2019-07-07

## 2019-07-17 ENCOUNTER — TELEPHONE (OUTPATIENT)
Dept: UROLOGY | Facility: CLINIC | Age: 79
End: 2019-07-17

## 2019-07-17 NOTE — TELEPHONE ENCOUNTER
----- Message from Jody Lund LPN sent at 7/15/2019  4:33 PM CDT -----  Contact: medical supply company      ----- Message -----  From: Maria C Hogan  Sent: 7/15/2019   3:13 PM  To: Ragini SANDOVAL  Staff    [phone] [id number] call  Medical liberator on above patient said they can not process request on above patient call number and ext in message thanks

## 2019-07-23 RX ORDER — TAMSULOSIN HYDROCHLORIDE 0.4 MG/1
CAPSULE ORAL
Qty: 180 CAPSULE | Refills: 0 | Status: SHIPPED | OUTPATIENT
Start: 2019-07-23 | End: 2019-10-18 | Stop reason: SDUPTHER

## 2019-08-02 ENCOUNTER — OFFICE VISIT (OUTPATIENT)
Dept: DERMATOLOGY | Facility: CLINIC | Age: 79
End: 2019-08-02
Payer: MEDICARE

## 2019-08-02 VITALS — BODY MASS INDEX: 27.83 KG/M2 | WEIGHT: 183 LBS

## 2019-08-02 DIAGNOSIS — L81.4 LENTIGINES: ICD-10-CM

## 2019-08-02 DIAGNOSIS — D22.9 NEVUS OF MULTIPLE SITES: ICD-10-CM

## 2019-08-02 DIAGNOSIS — L82.1 SEBORRHEIC KERATOSES: Primary | ICD-10-CM

## 2019-08-02 PROCEDURE — 99999 PR PBB SHADOW E&M-EST. PATIENT-LVL II: CPT | Mod: PBBFAC,HCNC,, | Performed by: DERMATOLOGY

## 2019-08-02 PROCEDURE — 1101F PR PT FALLS ASSESS DOC 0-1 FALLS W/OUT INJ PAST YR: ICD-10-PCS | Mod: HCNC,CPTII,S$GLB, | Performed by: DERMATOLOGY

## 2019-08-02 PROCEDURE — 1101F PT FALLS ASSESS-DOCD LE1/YR: CPT | Mod: HCNC,CPTII,S$GLB, | Performed by: DERMATOLOGY

## 2019-08-02 PROCEDURE — 99999 PR PBB SHADOW E&M-EST. PATIENT-LVL II: ICD-10-PCS | Mod: PBBFAC,HCNC,, | Performed by: DERMATOLOGY

## 2019-08-02 PROCEDURE — 99213 OFFICE O/P EST LOW 20 MIN: CPT | Mod: HCNC,S$GLB,, | Performed by: DERMATOLOGY

## 2019-08-02 PROCEDURE — 99213 PR OFFICE/OUTPT VISIT, EST, LEVL III, 20-29 MIN: ICD-10-PCS | Mod: HCNC,S$GLB,, | Performed by: DERMATOLOGY

## 2019-08-02 NOTE — PROGRESS NOTES
Subjective:       Patient ID:  Liang Monterroso Jr. is a 79 y.o. male who presents for   Chief Complaint   Patient presents with    Follow-up     skin check     Lesion     right ear, face      Lesion  - Initial  Affected locations: right ear, face and scalp  Signs / symptoms: asymptomatic  Aggravated by: nothing  Relieving factors/Treatments tried: nothing        Review of Systems   Constitutional: Negative for fever, chills, weight loss, weight gain, fatigue, night sweats and malaise.   Skin: Positive for wears hat. Negative for daily sunscreen use and activity-related sunscreen use.   Hematologic/Lymphatic: Does not bruise/bleed easily.        Objective:    Physical Exam   Constitutional: He appears well-developed and well-nourished. No distress.   Neurological: He is alert and oriented to person, place, and time. He is not disoriented.   Psychiatric: He has a normal mood and affect.   Skin:   Areas Examined (abnormalities noted in diagram):   Scalp / Hair Palpated and Inspected  Head / Face Inspection Performed  Neck Inspection Performed  Chest / Axilla Inspection Performed  Back Inspection Performed  RUE Inspected  LUE Inspection Performed                   Diagram Legend     Erythematous scaling macule/papule c/w actinic keratosis       Vascular papule c/w angioma      Pigmented verrucoid papule/plaque c/w seborrheic keratosis      Yellow umbilicated papule c/w sebaceous hyperplasia      Irregularly shaped tan macule c/w lentigo     1-2 mm smooth white papules consistent with Milia      Movable subcutaneous cyst with punctum c/w epidermal inclusion cyst      Subcutaneous movable cyst c/w pilar cyst      Firm pink to brown papule c/w dermatofibroma      Pedunculated fleshy papule(s) c/w skin tag(s)      Evenly pigmented macule c/w junctional nevus     Mildly variegated pigmented, slightly irregular-bordered macule c/w mildly atypical nevus      Flesh colored to evenly pigmented papule c/w intradermal nevus        "Pink pearly papule/plaque c/w basal cell carcinoma      Erythematous hyperkeratotic cursted plaque c/w SCC      Surgical scar with no sign of skin cancer recurrence      Open and closed comedones      Inflammatory papules and pustules      Verrucoid papule consistent consistent with wart     Erythematous eczematous patches and plaques     Dystrophic onycholytic nail with subungual debris c/w onychomycosis     Umbilicated papule    Erythematous-base heme-crusted tan verrucoid plaque consistent with inflamed seborrheic keratosis     Erythematous Silvery Scaling Plaque c/w Psoriasis     See annotation      Assessment / Plan:        Seborrheic keratoses  reassurance      Lentigines  The "ABCD" rules to observe pigmented lesions were reviewed.      Nevus of multiple sites  The "ABCD" rules to observe pigmented lesions were reviewed.             Follow up in about 1 year (around 8/2/2020).  "

## 2019-09-04 ENCOUNTER — PATIENT MESSAGE (OUTPATIENT)
Dept: INTERNAL MEDICINE | Facility: CLINIC | Age: 79
End: 2019-09-04

## 2019-09-05 ENCOUNTER — OFFICE VISIT (OUTPATIENT)
Dept: OPTOMETRY | Facility: CLINIC | Age: 79
End: 2019-09-05
Payer: COMMERCIAL

## 2019-09-05 DIAGNOSIS — H35.363 MACULAR DRUSEN, BILATERAL: Primary | ICD-10-CM

## 2019-09-05 DIAGNOSIS — Z98.41 S/P CATARACT SURGERY, RIGHT: ICD-10-CM

## 2019-09-05 DIAGNOSIS — H26.493 POSTERIOR CAPSULAR OPACIFICATION NON VISUALLY SIGNIFICANT OF BOTH EYES: ICD-10-CM

## 2019-09-05 DIAGNOSIS — Z96.1 PSEUDOPHAKIA: ICD-10-CM

## 2019-09-05 DIAGNOSIS — Z13.5 SCREENING FOR EYE CONDITION: ICD-10-CM

## 2019-09-05 DIAGNOSIS — H52.223 REGULAR ASTIGMATISM OF BOTH EYES: ICD-10-CM

## 2019-09-05 DIAGNOSIS — Z98.42 S/P CATARACT SURGERY, LEFT: ICD-10-CM

## 2019-09-05 PROCEDURE — 92015 DETERMINE REFRACTIVE STATE: CPT | Mod: S$GLB,,, | Performed by: OPTOMETRIST

## 2019-09-05 PROCEDURE — 92015 PR REFRACTION: ICD-10-PCS | Mod: S$GLB,,, | Performed by: OPTOMETRIST

## 2019-09-05 PROCEDURE — 99999 PR PBB SHADOW E&M-EST. PATIENT-LVL III: ICD-10-PCS | Mod: PBBFAC,,, | Performed by: OPTOMETRIST

## 2019-09-05 PROCEDURE — 99999 PR PBB SHADOW E&M-EST. PATIENT-LVL III: CPT | Mod: PBBFAC,,, | Performed by: OPTOMETRIST

## 2019-09-05 PROCEDURE — 92014 COMPRE OPH EXAM EST PT 1/>: CPT | Mod: S$GLB,,, | Performed by: OPTOMETRIST

## 2019-09-05 PROCEDURE — 92014 PR EYE EXAM, EST PATIENT,COMPREHESV: ICD-10-PCS | Mod: S$GLB,,, | Performed by: OPTOMETRIST

## 2019-09-05 NOTE — PROGRESS NOTES
"HPI     Concerns About Ocular Health      Additional comments: Pt states vision is "a little bit weaker" -   difficulty reading small print on TV screen.  Occasional itchy OU in the morning.  No other acute ocular/vision problems.                 Comments     Patient's age: 79 y.o. WM  Occupation: Retired   Approximate date of last eye examination:  07/02/2018  Name of last eye doctor seen: Dr. Gill  City/State: [place]  Wears glasses? Yes, OTC      If yes, wears  Full-time or part-time?  Read[employer] "occasionally"   Wears CLs?:  no  Headaches?  no  Eye pain/discomfort?  no                                                                                     Flashes?  no  Floaters?  no  Diplopia/Double vision?  no  Patient's Ocular History:         Any eye surgeries? PCIOL O[facility]         Any eye injury?  no         Any treatment for eye disease?  no  Family history of eye disease?  no  Significant patient medical history:         1. Diabetes?  no       If yes, IDDM or NIDDM? no   2. HBP?  Yes, controlled               3. Other (describe):  Prostate, Hyperipidemia   ! OTC eyedrops currently using:  AT's prn Optive   ! Prescription eye meds currently using:  no   ! Any history of allergy/adverse reaction to any eye meds used   previously?  no    ! Any history of allergy/adverse reaction to eyedrops used during prior   eye exam(s)? no    ! Any history of allergy/adverse reaction to Novacaine or similar meds?   no   ! Any history of allergy/adverse reaction to Epinephrine or similar meds?   no    ! Patient okay with use of anesthetic eyedrops to check eye pressure?    yes        ! Patient okay with use of eyedrops to dilate pupils today?  yes   !  Allergies/Medications/Medical History/Family History reviewed today?    yes      PD =   63/59  Desired reading distance =  17.5"                                                                        Last edited by Abdiaziz Gill, OD on 9/5/2019 10:43 AM. (History)    "         Assessment /Plan     For exam results, see Encounter Report.    1. Macular drusen, bilateral     2. Posterior capsular opacification non visually significant of both eyes     3. S/P cataract surgery, left     4. S/P cataract surgery, right     5. Pseudophakia - Both Eyes     6. Regular astigmatism of both eyes     7. Screening for eye condition                    Bilateral macular changes (drusen and pigmentary changes) consistent with age-related macular degeneration, as noted previously.    Continue/resume taking Ocuvite supplement by mouth (recommend AREDS-2) formuation.    S/P cataract surgery in both eyes, with bilateral posterior chamber intraocular lens.  Mild clouding/opacification of the posterior lens capsule in both eyes.   Still no need for YAG laser posterior capsulotomy at this time in either eye.      Astigmatic refractive error in each eye. Good best-corrected VA in each eye.  New spectacle lens Rx issued for use as desired.    Recheck in one year.

## 2019-09-05 NOTE — PATIENT INSTRUCTIONS
Bilateral macular changes (drusen and pigmentary changes) consistent with age-related macular degeneration, as noted previously.    Continue/resume taking Ocuvite supplement by mouth (recommend AREDS-2) formuation.    S/P cataract surgery in both eyes, with bilateral posterior chamber intraocular lens.  Mild clouding/opacification of the posterior lens capsule in both eyes.   Still no need for YAG laser posterior capsulotomy at this time in either eye.      Astigmatic refractive error in each eye. Good best-corrected VA in each eye.  New spectacle lens Rx issued for use as desired.    Recheck in one year.

## 2019-09-13 RX ORDER — PRAVASTATIN SODIUM 20 MG/1
TABLET ORAL
Qty: 90 TABLET | Refills: 0 | Status: SHIPPED | OUTPATIENT
Start: 2019-09-13 | End: 2019-12-03 | Stop reason: SDUPTHER

## 2019-09-20 NOTE — PLAN OF CARE
Problem: Fall Risk (Adult)  Goal: Identify Related Risk Factors and Signs and Symptoms  Related risk factors and signs and symptoms are identified upon initiation of Human Response Clinical Practice Guideline (CPG)   Outcome: Ongoing (interventions implemented as appropriate)  Plan of care reviewed with pt and family. Call bell within reach, side rails up X 2, bed in lowest position, bed alarm present. No distress noted, will continue to monitor.        100.3

## 2019-10-16 ENCOUNTER — PATIENT OUTREACH (OUTPATIENT)
Dept: ADMINISTRATIVE | Facility: OTHER | Age: 79
End: 2019-10-16

## 2019-10-16 ENCOUNTER — PES CALL (OUTPATIENT)
Dept: ADMINISTRATIVE | Facility: CLINIC | Age: 79
End: 2019-10-16

## 2019-10-17 ENCOUNTER — OFFICE VISIT (OUTPATIENT)
Dept: UROLOGY | Facility: CLINIC | Age: 79
End: 2019-10-17
Payer: MEDICARE

## 2019-10-17 VITALS — WEIGHT: 183 LBS | HEIGHT: 68 IN | BODY MASS INDEX: 27.74 KG/M2

## 2019-10-17 DIAGNOSIS — N31.9 NEUROGENIC BLADDER: Primary | ICD-10-CM

## 2019-10-17 PROCEDURE — 1101F PT FALLS ASSESS-DOCD LE1/YR: CPT | Mod: HCNC,CPTII,S$GLB, | Performed by: UROLOGY

## 2019-10-17 PROCEDURE — 99213 OFFICE O/P EST LOW 20 MIN: CPT | Mod: HCNC,S$GLB,, | Performed by: UROLOGY

## 2019-10-17 PROCEDURE — 1101F PR PT FALLS ASSESS DOC 0-1 FALLS W/OUT INJ PAST YR: ICD-10-PCS | Mod: HCNC,CPTII,S$GLB, | Performed by: UROLOGY

## 2019-10-17 PROCEDURE — 99999 PR PBB SHADOW E&M-EST. PATIENT-LVL III: ICD-10-PCS | Mod: PBBFAC,HCNC,, | Performed by: UROLOGY

## 2019-10-17 PROCEDURE — 99999 PR PBB SHADOW E&M-EST. PATIENT-LVL III: CPT | Mod: PBBFAC,HCNC,, | Performed by: UROLOGY

## 2019-10-17 PROCEDURE — 99213 PR OFFICE/OUTPT VISIT, EST, LEVL III, 20-29 MIN: ICD-10-PCS | Mod: HCNC,S$GLB,, | Performed by: UROLOGY

## 2019-10-17 NOTE — PROGRESS NOTES
Subjective:       Patient ID: Liang Monterroso Jr. is a 79 y.o. male.    Chief Complaint: No chief complaint on file.    HPI   Liang Monterroso Jr. is a 79 y.o. male with a PMHx of stage 3 CKD, UTI, and HTN who presents to the clinic s/p Cystoscopy for neurogenic bladder and meatal stenosis on 6/17/19 here for a 4 month evaluation. Patient performs CIC 3x a day. Patient explains that he is voiding well. He notes that if he voids before he catheterizes, the catheter goes in easier. He explains that his output of urine can range from  cc. Patient is s/p SUDS in 7/2017. He also notes that his first urination in the morning is cloudy but is clear the rest of the day. He has had no difficulties since the Cysto and has no significant complaints today.       Past Medical History:   Diagnosis Date    Allergy     CKD (chronic kidney disease) stage 3, GFR 30-59 ml/min 6/15/2016    GERD (gastroesophageal reflux disease)     Hx of colonic polyp     Hyperlipidemia     Hypertension     Hypospadias in male     Hypothyroidism     Sleep apnea     Thyroid disease     Urinary tract infection        Past Surgical History:   Procedure Laterality Date    APPENDECTOMY      CATARACT EXTRACTION      EYE SURGERY      HERNIA REPAIR      TONSILLECTOMY         Family History   Problem Relation Age of Onset    Diabetes Mother     Heart disease Mother     Hypertension Mother     Vision loss Mother     Dementia Mother     Alcohol abuse Father     Cancer Sister         lung with mets, was a heavy smoker    No Known Problems Daughter     No Known Problems Son     No Known Problems Daughter     No Known Problems Son     Amblyopia Neg Hx     Blindness Neg Hx     Cataracts Neg Hx     Glaucoma Neg Hx     Macular degeneration Neg Hx     Retinal detachment Neg Hx     Strabismus Neg Hx     Stroke Neg Hx     Thyroid disease Neg Hx        Social History     Socioeconomic History    Marital status:      Spouse name:  parul    Number of children: 4    Years of education: Not on file    Highest education level: Not on file   Occupational History    Occupation: retired   Social Needs    Financial resource strain: Not on file    Food insecurity:     Worry: Not on file     Inability: Not on file    Transportation needs:     Medical: Not on file     Non-medical: Not on file   Tobacco Use    Smoking status: Never Smoker    Smokeless tobacco: Never Used   Substance and Sexual Activity    Alcohol use: Yes     Comment: 1-3 glasses wine weekly, 2-3 beers weekly    Drug use: No    Sexual activity: Yes     Partners: Female   Lifestyle    Physical activity:     Days per week: Not on file     Minutes per session: Not on file    Stress: Not on file   Relationships    Social connections:     Talks on phone: Not on file     Gets together: Not on file     Attends Temple service: Not on file     Active member of club or organization: Not on file     Attends meetings of clubs or organizations: Not on file     Relationship status: Not on file   Other Topics Concern    Not on file   Social History Narrative    Not on file       Allergies:  Contrast media    Medications:    Current Outpatient Medications:     aspirin (ECOTRIN) 81 MG EC tablet, Take 81 mg by mouth once daily., Disp: , Rfl:     econazole nitrate 1 % cream, USE TWICE DAILY (Patient taking differently: USE TWICE DAILY (prn)), Disp: 60 g, Rfl: 5    fexofenadine (ALLEGRA) 180 MG tablet, Take 1 tablet (180 mg total) by mouth once daily. (Patient taking differently: Take 180 mg by mouth daily as needed. ), Disp: 30 tablet, Rfl: 11    levothyroxine (SYNTHROID) 112 MCG tablet, Take 1 tablet (112 mcg total) by mouth before breakfast., Disp: 90 tablet, Rfl: 12    metronidazole (METROGEL) 0.75 % gel, Use hs, Disp: 45 g, Rfl: 3    multivitamin with minerals tablet, Take 1 tablet by mouth once daily.  , Disp: , Rfl:     polyethylene glycol (GLYCOLAX) 17 gram PwPk, Take  17 g by mouth once daily., Disp: 30 packet, Rfl: 0    pravastatin (PRAVACHOL) 20 MG tablet, TAKE 1 TABLET BY MOUTH ONE TIME DAILY, Disp: 90 tablet, Rfl: 0    ramipril (ALTACE) 5 MG capsule, Take 1 capsule (5 mg total) by mouth once daily., Disp: 30 capsule, Rfl: 0    tamsulosin (FLOMAX) 0.4 mg Cap, TAKE 2 CAPSULES BY MOUTH EVERY DAY, Disp: 180 capsule, Rfl: 0    triamcinolone acetonide 0.1% (KENALOG) 0.1 % cream, Use bid prn rash, Disp: 80 g, Rfl: 3    Current Facility-Administered Medications:     ciprofloxacin HCl tablet 500 mg, 500 mg, Oral, Once, Anastacio Eid Jr., MD    Review of Systems   Constitutional: Negative for activity change, appetite change, chills, diaphoresis, fatigue, fever and unexpected weight change.   HENT: Negative for congestion, dental problem, hearing loss, mouth sores, postnasal drip, rhinorrhea, sinus pressure and trouble swallowing.    Eyes: Negative for pain, discharge and itching.   Respiratory: Negative for apnea, cough, choking, chest tightness, shortness of breath and wheezing.    Cardiovascular: Negative for chest pain, palpitations and leg swelling.   Gastrointestinal: Negative for abdominal distention, abdominal pain, anal bleeding, blood in stool, constipation, diarrhea, nausea, rectal pain and vomiting.   Endocrine: Negative for polydipsia and polyuria.   Genitourinary: Negative for decreased urine volume, difficulty urinating, discharge, dysuria, enuresis, flank pain, frequency, genital sores, hematuria, penile pain, penile swelling, scrotal swelling and urgency.   Musculoskeletal: Negative for arthralgias and back pain.   Skin: Negative for color change, rash and wound.   Neurological: Negative for dizziness, syncope, speech difficulty, light-headedness and headaches.   Hematological: Negative for adenopathy. Does not bruise/bleed easily.   Psychiatric/Behavioral: Negative for behavioral problems and confusion.       Objective:      Physical Exam   Constitutional: He  appears well-developed.   HENT:   Head: Normocephalic.   Neck: Neck supple.   Cardiovascular: Normal rate.    Pulmonary/Chest: Effort normal.   Abdominal: Soft.   Genitourinary:   Genitourinary Comments: Urine dip clear   Neurological: He is alert.   Skin: Skin is warm.     Psychiatric: He has a normal mood and affect.         Procedures  Cystoscopy 6/17/19  PREOPERATIVE DIAGNOSIS:  Neurogenic bladder, meatal stenosis.     POSTOPERATIVE DIAGNOSIS:  Neurogenic bladder, meatal stenosis.     OPERATIONS PERFORMED:  Dilatation of meatal stenosis (hypospadiac), Flexible   cystoscopy.     PROCEDURE IN DETAIL:  This patient has neurogenic bladder.  He is on CIC 4 to 6   times a day.  He has been voiding in between.  It was felt cystoscopy was   indicated since he is having trouble passing the catheters on occasion.     The patient was prepped and draped in supine position.  Flexible cystoscopy was   attempted.  The patient had a hypospadiac first-degree meatus with stenosis.    This was dilated with 20-Maori.  Flexible cystoscopy was performed.  The   patient had a couple of very mild strictures in the urethra, distal to the   sphincter.  These were negotiated easily.  Prostatic urethra was 4 cm with   mild-to-moderate lateral lobe enlargement.  Bladder was heavily trabeculated   with grade 3 trabeculations.  Both ureteral orifices were normal size, shape and   position with clear efflux.  There were no stones, tumors, foreign bodies or   active infections noted.     IMPRESSION:  Neurogenic bladder.     RECOMMENDATIONS:  1.  The patient will continue CIC and I may switch him to a coude tip catheter   p.r.n.  2.  He will return to clinic in 4 months for urinalysis and to check on his   progress and I will more than likely repeat urodynamics sometime after that in   the hopes that a TURP could help him.  Assessment:       No diagnosis found.    Plan:       There are no diagnoses linked to this encounter.      Schedule SUDS  to check for detrusor activity    I, Oneyda Xiao, am acting as a scribe on this patient encounter in the presence and under the supervision of Dr. Eid.    10/17/2019 9:43 AM    I, Dr. Eid, personally performed the services described in this documentation.   All medical record entries made by the scribe were at my direction and in my presence.   I have reviewed the chart and agree that the record is accurate and complete.   Anastacio Eid MD.  9:43 AM 10/17/2019

## 2019-10-18 RX ORDER — TAMSULOSIN HYDROCHLORIDE 0.4 MG/1
CAPSULE ORAL
Qty: 180 CAPSULE | Refills: 0 | Status: SHIPPED | OUTPATIENT
Start: 2019-10-18 | End: 2020-01-15

## 2019-10-21 ENCOUNTER — OFFICE VISIT (OUTPATIENT)
Dept: INTERNAL MEDICINE | Facility: CLINIC | Age: 79
End: 2019-10-21
Payer: MEDICARE

## 2019-10-21 ENCOUNTER — LAB VISIT (OUTPATIENT)
Dept: LAB | Facility: HOSPITAL | Age: 79
End: 2019-10-21
Attending: INTERNAL MEDICINE
Payer: MEDICARE

## 2019-10-21 VITALS
HEIGHT: 68 IN | BODY MASS INDEX: 27.1 KG/M2 | SYSTOLIC BLOOD PRESSURE: 102 MMHG | WEIGHT: 178.81 LBS | OXYGEN SATURATION: 97 % | DIASTOLIC BLOOD PRESSURE: 70 MMHG | HEART RATE: 67 BPM

## 2019-10-21 DIAGNOSIS — M72.0 DUPUYTREN'S CONTRACTURE OF RIGHT HAND: ICD-10-CM

## 2019-10-21 DIAGNOSIS — I10 ESSENTIAL HYPERTENSION: ICD-10-CM

## 2019-10-21 DIAGNOSIS — R73.9 ELEVATED BLOOD SUGAR: ICD-10-CM

## 2019-10-21 DIAGNOSIS — N40.1 BPH WITH URINARY OBSTRUCTION: ICD-10-CM

## 2019-10-21 DIAGNOSIS — G89.29 CHRONIC PAIN OF RIGHT THUMB: Primary | ICD-10-CM

## 2019-10-21 DIAGNOSIS — M65.311 TRIGGER FINGER OF RIGHT THUMB: ICD-10-CM

## 2019-10-21 DIAGNOSIS — M79.644 CHRONIC PAIN OF RIGHT THUMB: Primary | ICD-10-CM

## 2019-10-21 DIAGNOSIS — M79.644 CHRONIC PAIN OF RIGHT THUMB: ICD-10-CM

## 2019-10-21 DIAGNOSIS — R33.9 INCOMPLETE EMPTYING OF BLADDER: ICD-10-CM

## 2019-10-21 DIAGNOSIS — G89.29 CHRONIC PAIN OF RIGHT THUMB: ICD-10-CM

## 2019-10-21 DIAGNOSIS — N13.8 BPH WITH URINARY OBSTRUCTION: ICD-10-CM

## 2019-10-21 LAB
ALBUMIN SERPL BCP-MCNC: 3.8 G/DL (ref 3.5–5.2)
ALP SERPL-CCNC: 51 U/L (ref 55–135)
ALT SERPL W/O P-5'-P-CCNC: 14 U/L (ref 10–44)
ANION GAP SERPL CALC-SCNC: 9 MMOL/L (ref 8–16)
AST SERPL-CCNC: 16 U/L (ref 10–40)
BASOPHILS # BLD AUTO: 0.02 K/UL (ref 0–0.2)
BASOPHILS NFR BLD: 0.3 % (ref 0–1.9)
BILIRUB SERPL-MCNC: 0.3 MG/DL (ref 0.1–1)
BUN SERPL-MCNC: 18 MG/DL (ref 8–23)
CALCIUM SERPL-MCNC: 9.1 MG/DL (ref 8.7–10.5)
CHLORIDE SERPL-SCNC: 105 MMOL/L (ref 95–110)
CHOLEST SERPL-MCNC: 194 MG/DL (ref 120–199)
CHOLEST/HDLC SERPL: 3.9 {RATIO} (ref 2–5)
CO2 SERPL-SCNC: 26 MMOL/L (ref 23–29)
CREAT SERPL-MCNC: 1.2 MG/DL (ref 0.5–1.4)
DIFFERENTIAL METHOD: NORMAL
EOSINOPHIL # BLD AUTO: 0.1 K/UL (ref 0–0.5)
EOSINOPHIL NFR BLD: 1.7 % (ref 0–8)
ERYTHROCYTE [DISTWIDTH] IN BLOOD BY AUTOMATED COUNT: 14 % (ref 11.5–14.5)
EST. GFR  (AFRICAN AMERICAN): >60 ML/MIN/1.73 M^2
EST. GFR  (NON AFRICAN AMERICAN): 58 ML/MIN/1.73 M^2
ESTIMATED AVG GLUCOSE: 105 MG/DL (ref 68–131)
GLUCOSE SERPL-MCNC: 90 MG/DL (ref 70–110)
HBA1C MFR BLD HPLC: 5.3 % (ref 4–5.6)
HCT VFR BLD AUTO: 43 % (ref 40–54)
HDLC SERPL-MCNC: 50 MG/DL (ref 40–75)
HDLC SERPL: 25.8 % (ref 20–50)
HGB BLD-MCNC: 14.2 G/DL (ref 14–18)
LDLC SERPL CALC-MCNC: 106 MG/DL (ref 63–159)
LYMPHOCYTES # BLD AUTO: 3.1 K/UL (ref 1–4.8)
LYMPHOCYTES NFR BLD: 42.8 % (ref 18–48)
MCH RBC QN AUTO: 28.7 PG (ref 27–31)
MCHC RBC AUTO-ENTMCNC: 33 G/DL (ref 32–36)
MCV RBC AUTO: 87 FL (ref 82–98)
MONOCYTES # BLD AUTO: 0.3 K/UL (ref 0.3–1)
MONOCYTES NFR BLD: 4.3 % (ref 4–15)
NEUTROPHILS # BLD AUTO: 3.7 K/UL (ref 1.8–7.7)
NEUTROPHILS NFR BLD: 50.9 % (ref 38–73)
NONHDLC SERPL-MCNC: 144 MG/DL
PLATELET # BLD AUTO: 217 K/UL (ref 150–350)
PMV BLD AUTO: 10.5 FL (ref 9.2–12.9)
POTASSIUM SERPL-SCNC: 4.9 MMOL/L (ref 3.5–5.1)
PROT SERPL-MCNC: 7.1 G/DL (ref 6–8.4)
RBC # BLD AUTO: 4.94 M/UL (ref 4.6–6.2)
SODIUM SERPL-SCNC: 140 MMOL/L (ref 136–145)
T4 FREE SERPL-MCNC: 1.3 NG/DL (ref 0.71–1.51)
TRIGL SERPL-MCNC: 190 MG/DL (ref 30–150)
TSH SERPL DL<=0.005 MIU/L-ACNC: 0.15 UIU/ML (ref 0.4–4)
WBC # BLD AUTO: 7.18 K/UL (ref 3.9–12.7)

## 2019-10-21 PROCEDURE — 99999 PR PBB SHADOW E&M-EST. PATIENT-LVL IV: CPT | Mod: PBBFAC,HCNC,, | Performed by: INTERNAL MEDICINE

## 2019-10-21 PROCEDURE — 99999 PR PBB SHADOW E&M-EST. PATIENT-LVL IV: ICD-10-PCS | Mod: PBBFAC,HCNC,, | Performed by: INTERNAL MEDICINE

## 2019-10-21 PROCEDURE — 36415 COLL VENOUS BLD VENIPUNCTURE: CPT | Mod: HCNC

## 2019-10-21 PROCEDURE — 80053 COMPREHEN METABOLIC PANEL: CPT | Mod: HCNC

## 2019-10-21 PROCEDURE — 3074F SYST BP LT 130 MM HG: CPT | Mod: HCNC,CPTII,S$GLB, | Performed by: INTERNAL MEDICINE

## 2019-10-21 PROCEDURE — 83036 HEMOGLOBIN GLYCOSYLATED A1C: CPT | Mod: HCNC

## 2019-10-21 PROCEDURE — 99213 OFFICE O/P EST LOW 20 MIN: CPT | Mod: HCNC,S$GLB,, | Performed by: INTERNAL MEDICINE

## 2019-10-21 PROCEDURE — 99213 PR OFFICE/OUTPT VISIT, EST, LEVL III, 20-29 MIN: ICD-10-PCS | Mod: HCNC,S$GLB,, | Performed by: INTERNAL MEDICINE

## 2019-10-21 PROCEDURE — 3074F PR MOST RECENT SYSTOLIC BLOOD PRESSURE < 130 MM HG: ICD-10-PCS | Mod: HCNC,CPTII,S$GLB, | Performed by: INTERNAL MEDICINE

## 2019-10-21 PROCEDURE — 84443 ASSAY THYROID STIM HORMONE: CPT | Mod: HCNC

## 2019-10-21 PROCEDURE — 3078F PR MOST RECENT DIASTOLIC BLOOD PRESSURE < 80 MM HG: ICD-10-PCS | Mod: HCNC,CPTII,S$GLB, | Performed by: INTERNAL MEDICINE

## 2019-10-21 PROCEDURE — 85025 COMPLETE CBC W/AUTO DIFF WBC: CPT | Mod: HCNC

## 2019-10-21 PROCEDURE — 1101F PR PT FALLS ASSESS DOC 0-1 FALLS W/OUT INJ PAST YR: ICD-10-PCS | Mod: HCNC,CPTII,S$GLB, | Performed by: INTERNAL MEDICINE

## 2019-10-21 PROCEDURE — 80061 LIPID PANEL: CPT | Mod: HCNC

## 2019-10-21 PROCEDURE — 84439 ASSAY OF FREE THYROXINE: CPT | Mod: HCNC

## 2019-10-21 PROCEDURE — 1101F PT FALLS ASSESS-DOCD LE1/YR: CPT | Mod: HCNC,CPTII,S$GLB, | Performed by: INTERNAL MEDICINE

## 2019-10-21 PROCEDURE — 3078F DIAST BP <80 MM HG: CPT | Mod: HCNC,CPTII,S$GLB, | Performed by: INTERNAL MEDICINE

## 2019-10-21 NOTE — PROGRESS NOTES
Subjective:       Patient ID: Liang Monterroso Jr. is a 79 y.o. male.    Chief Complaint: Hand Pain (Right thumb)    HPI:  Patient had a right thumb trigger finger and 4th finger Dupuytren's contracture seen by orthopedics Dr. Walsh.  Patient states the injections lasted several months but now seemed to be wearing off.  He has pain and triggering at times.  He also wanted me to look in his ears.  No new injuries or falls relative to the hand.    Review of Systems   Constitutional: Negative for activity change and unexpected weight change.   HENT: Negative for hearing loss, rhinorrhea and trouble swallowing.    Eyes: Negative for discharge and visual disturbance.   Respiratory: Negative for chest tightness and wheezing.    Cardiovascular: Negative for chest pain.   Gastrointestinal: Negative for blood in stool, constipation, diarrhea and vomiting.   Endocrine: Negative for polydipsia and polyuria.   Genitourinary: Negative for difficulty urinating, hematuria and urgency.   Musculoskeletal: Positive for arthralgias (Right thumb). Negative for joint swelling and neck pain.   Neurological: Negative for weakness and headaches.   Psychiatric/Behavioral: Negative for confusion and dysphoric mood.       Objective:      Physical Exam   Constitutional: He appears well-developed and well-nourished.   HENT:   Head: Normocephalic and atraumatic.   Right Ear: External ear normal.   Left Ear: External ear normal.   Mouth/Throat: Oropharynx is clear and moist.   No wax in either ear canal   Cardiovascular: Intact distal pulses.   Musculoskeletal: He exhibits deformity (Dupuytren's contracture 4th finger right hand, trigger action right thumb).       Assessment:       1. Chronic pain of right thumb    2. Trigger finger of right thumb    3. Dupuytren's contracture of right hand    4. Incomplete emptying of bladder    5. BPH with urinary obstruction    6. Essential hypertension    7. Elevated blood sugar        Plan:       Liang was  seen today for hand pain.    Diagnoses and all orders for this visit:    Chronic pain of right thumb  -     Ambulatory referral/consult to Orthopedics; Future  -     Lipid panel; Future  -     TSH; Future  -     Hemoglobin A1c; Future  -     CBC auto differential; Future  -     Comprehensive metabolic panel; Future    Trigger finger of right thumb  -     Ambulatory referral/consult to Orthopedics; Future  -     Lipid panel; Future  -     TSH; Future  -     Hemoglobin A1c; Future  -     CBC auto differential; Future  -     Comprehensive metabolic panel; Future    Dupuytren's contracture of right hand  -     Ambulatory referral/consult to Orthopedics; Future  -     Lipid panel; Future  -     TSH; Future  -     Hemoglobin A1c; Future  -     CBC auto differential; Future  -     Comprehensive metabolic panel; Future    Incomplete emptying of bladder  -     Lipid panel; Future  -     TSH; Future  -     Hemoglobin A1c; Future  -     CBC auto differential; Future  -     Comprehensive metabolic panel; Future    BPH with urinary obstruction  -     Lipid panel; Future  -     TSH; Future  -     Hemoglobin A1c; Future  -     CBC auto differential; Future  -     Comprehensive metabolic panel; Future    Essential hypertension  -     Lipid panel; Future  -     TSH; Future  -     Hemoglobin A1c; Future  -     CBC auto differential; Future  -     Comprehensive metabolic panel; Future    Elevated blood sugar  -     Hemoglobin A1c; Future

## 2019-10-22 ENCOUNTER — PATIENT MESSAGE (OUTPATIENT)
Dept: INTERNAL MEDICINE | Facility: CLINIC | Age: 79
End: 2019-10-22

## 2019-11-11 ENCOUNTER — PROCEDURE VISIT (OUTPATIENT)
Dept: UROLOGY | Facility: CLINIC | Age: 79
End: 2019-11-11
Payer: MEDICARE

## 2019-11-11 VITALS
BODY MASS INDEX: 26.22 KG/M2 | HEART RATE: 77 BPM | TEMPERATURE: 98 F | HEIGHT: 68 IN | DIASTOLIC BLOOD PRESSURE: 79 MMHG | SYSTOLIC BLOOD PRESSURE: 123 MMHG | WEIGHT: 173 LBS

## 2019-11-11 DIAGNOSIS — N31.9 NEUROGENIC BLADDER: Primary | ICD-10-CM

## 2019-11-11 PROCEDURE — 51741 PR UROFLOWMETRY, COMPLEX: ICD-10-PCS | Mod: 26,HCNC,51,S$GLB | Performed by: UROLOGY

## 2019-11-11 PROCEDURE — 51728 CYSTOMETROGRAM W/VP: CPT | Mod: 26,HCNC,51,S$GLB | Performed by: UROLOGY

## 2019-11-11 PROCEDURE — 51784 ANAL/URINARY MUSCLE STUDY: CPT | Mod: 26,HCNC,S$GLB, | Performed by: UROLOGY

## 2019-11-11 PROCEDURE — 51784 PR ANAL/URINARY MUSCLE STUDY: ICD-10-PCS | Mod: 26,HCNC,S$GLB, | Performed by: UROLOGY

## 2019-11-11 PROCEDURE — 51728 PR COMPLEX CYSTOMETROGRAM VOIDING PRESSURE STUDIES: ICD-10-PCS | Mod: 26,HCNC,51,S$GLB | Performed by: UROLOGY

## 2019-11-11 PROCEDURE — 51741 ELECTRO-UROFLOWMETRY FIRST: CPT | Mod: 26,HCNC,51,S$GLB | Performed by: UROLOGY

## 2019-11-11 NOTE — PATIENT INSTRUCTIONS
SIMPLE URODYNAMIC STUDY (SUDS) DISCHARGE INSTRUCTIONS    You have had a procedure that will require time to properly heal. Follow the instructions you have been given on how to care for yourself once you are home. Below is additional information to help in your recovery.    ACTIVITY  · There are no restrictions in activity. Start doing again the things you did before the procedure.  · You may experience a slight burning sensation. You may notice a small amount of blood in your urine. This will clear up within a day. Call the clinic if this continues beyond 48 hours.     DIET  · Continue your normal diet. You may eat the same foods you ate before your procedure.  · Drink plenty of fluids during the first 24-48 hours following your procedure.     MEDICATIONS  · Resume all other previous medications from your prescribing physician.  · Continue any pre-procedure antibiotics until they are all gone.     SIGNS AND SYMPTOMS TO REPORT TO THE DOCTOR  · Chills or fever greater than 101° F within 24 hours of procedure.  · Changes in urination, such as increased bleeding, foul smell, cloudy urine, or painful urination.  · Call your doctor with any questions or concerns.     For any emergency situation, call [phone] immediately or go to your nearest emergency room.     [facility]  [phone]      _                                                                                                                                                                                             If any problems after hours or weekends, you may call [phone] and ask for the urology resident on call.

## 2019-11-14 NOTE — PROCEDURES
Simple Urodynamics  Date/Time: 11/11/2019 2:15 PM  Performed by: Anastacio Eid Jr., MD  Authorized by: Anastacio Eid Jr., MD   Preparation: Patient was prepped and draped in the usual sterile fashion.  Local anesthesia used: no    Anesthesia:  Local anesthesia used: no    Sedation:  Patient sedated: no    Patient tolerance: Patient tolerated the procedure well with no immediate complications       Preop diagnosis neurogenic bladder and BPH  Postop diagnosis same  Operation performed simple urodynamics  Surgeon Dr. Anastacio Eid  A 2 catheter CMG study was performed using subtracted of rectal pressure monitoring.  Sterile water was used as a medium at 30 cc/minute.  The patient voided upon entering the room the had a maximum flow rate of 26 cc/second and an average flow rate of 2.4 cc/second.  He voided 42 cc and his residual was 500 cc medium filled urodynamics were performed at 30 cc/minute.  First sensation was at 277 cc 1st desire at 552 cc capacity was 653 cc.  Patient then voided with a maximum flow rate of 32 cc/second and a detrusor pressure of 22 cm of water his postvoid residual was 700 cc.  There were no uninhibited bladder contractions and no bladder sphincter dyssynergia  Impression neurogenic bladder and mild to moderate BPH  Recommendations continue CIC and return to clinic 6 months with a renal ultrasound  Anastacio Eid MD

## 2019-11-15 ENCOUNTER — TELEPHONE (OUTPATIENT)
Dept: ORTHOPEDICS | Facility: CLINIC | Age: 79
End: 2019-11-15

## 2019-11-15 DIAGNOSIS — M79.641 RIGHT HAND PAIN: Primary | ICD-10-CM

## 2019-11-17 ENCOUNTER — PATIENT OUTREACH (OUTPATIENT)
Dept: ADMINISTRATIVE | Facility: OTHER | Age: 79
End: 2019-11-17

## 2019-11-18 ENCOUNTER — HOSPITAL ENCOUNTER (OUTPATIENT)
Dept: RADIOLOGY | Facility: HOSPITAL | Age: 79
Discharge: HOME | End: 2019-11-18
Attending: ORTHOPAEDIC SURGERY
Payer: MEDICARE

## 2019-11-18 DIAGNOSIS — M79.641 RIGHT HAND PAIN: ICD-10-CM

## 2019-11-18 PROCEDURE — 73130 X-RAY EXAM OF HAND: CPT | Mod: 26,HCNC,RT, | Performed by: RADIOLOGY

## 2019-11-18 PROCEDURE — 73130 X-RAY EXAM OF HAND: CPT | Mod: TC,HCNC,RT

## 2019-11-18 PROCEDURE — 73130 XR HAND COMPLETE 3 VIEW RIGHT: ICD-10-PCS | Mod: 26,HCNC,RT, | Performed by: RADIOLOGY

## 2019-11-19 ENCOUNTER — OFFICE VISIT (OUTPATIENT)
Dept: ORTHOPEDICS | Facility: CLINIC | Age: 79
End: 2019-11-19
Payer: MEDICARE

## 2019-11-19 VITALS — HEIGHT: 68 IN | WEIGHT: 173.06 LBS | BODY MASS INDEX: 26.23 KG/M2

## 2019-11-19 DIAGNOSIS — M65.311 TRIGGER FINGER OF RIGHT THUMB: ICD-10-CM

## 2019-11-19 DIAGNOSIS — M79.644 CHRONIC PAIN OF RIGHT THUMB: ICD-10-CM

## 2019-11-19 DIAGNOSIS — M72.0 DUPUYTREN'S CONTRACTURE OF RIGHT HAND: ICD-10-CM

## 2019-11-19 DIAGNOSIS — G89.29 CHRONIC PAIN OF RIGHT THUMB: ICD-10-CM

## 2019-11-19 DIAGNOSIS — M65.311 TRIGGER THUMB, RIGHT THUMB: Primary | ICD-10-CM

## 2019-11-19 PROCEDURE — 99203 OFFICE O/P NEW LOW 30 MIN: CPT | Mod: HCNC,25,S$GLB, | Performed by: ORTHOPAEDIC SURGERY

## 2019-11-19 PROCEDURE — 20550 NJX 1 TENDON SHEATH/LIGAMENT: CPT | Mod: HCNC,F5,S$GLB, | Performed by: ORTHOPAEDIC SURGERY

## 2019-11-19 PROCEDURE — 1101F PR PT FALLS ASSESS DOC 0-1 FALLS W/OUT INJ PAST YR: ICD-10-PCS | Mod: HCNC,CPTII,S$GLB, | Performed by: ORTHOPAEDIC SURGERY

## 2019-11-19 PROCEDURE — 1101F PT FALLS ASSESS-DOCD LE1/YR: CPT | Mod: HCNC,CPTII,S$GLB, | Performed by: ORTHOPAEDIC SURGERY

## 2019-11-19 PROCEDURE — 20550 TENDON SHEATH: R THUMB MCP: ICD-10-PCS | Mod: HCNC,F5,S$GLB, | Performed by: ORTHOPAEDIC SURGERY

## 2019-11-19 PROCEDURE — 99999 PR PBB SHADOW E&M-EST. PATIENT-LVL III: ICD-10-PCS | Mod: PBBFAC,HCNC,, | Performed by: ORTHOPAEDIC SURGERY

## 2019-11-19 PROCEDURE — 99203 PR OFFICE/OUTPT VISIT, NEW, LEVL III, 30-44 MIN: ICD-10-PCS | Mod: HCNC,25,S$GLB, | Performed by: ORTHOPAEDIC SURGERY

## 2019-11-19 PROCEDURE — 99999 PR PBB SHADOW E&M-EST. PATIENT-LVL III: CPT | Mod: PBBFAC,HCNC,, | Performed by: ORTHOPAEDIC SURGERY

## 2019-11-19 RX ORDER — DEXAMETHASONE SODIUM PHOSPHATE 4 MG/ML
4 INJECTION, SOLUTION INTRA-ARTICULAR; INTRALESIONAL; INTRAMUSCULAR; INTRAVENOUS; SOFT TISSUE
Status: DISCONTINUED | OUTPATIENT
Start: 2019-11-19 | End: 2019-11-19 | Stop reason: HOSPADM

## 2019-11-19 RX ADMIN — DEXAMETHASONE SODIUM PHOSPHATE 4 MG: 4 INJECTION, SOLUTION INTRA-ARTICULAR; INTRALESIONAL; INTRAMUSCULAR; INTRAVENOUS; SOFT TISSUE at 10:11

## 2019-11-19 NOTE — PROGRESS NOTES
[facility]  History & Physical  Orthopedics    SUBJECTIVE:      Chief Complaint:  Right thumb triggering    Referring Provider: Corey Iqbal MD     History of Present Illness:  Patient is a 79 y.o. right hand dominant male who presents today with complaints of triggering to the right thumb.  He notes that this occurred over a year ago at which point he saw Dr. Walsh at [facility] to performed a right trigger thumb injection. This resolved his symptoms for nearly a year and they recurred 2-3 weeks ago with some mechanical clicking and locking about the right thumb with range of motion. Pain is minimal.  He denies any other complaints today and presents for initial evaluation by myself.  The patient notes a history of a remote right index finger flexor tendon repair while he was in the [employer] a long time ago.    The patient is a/an retired  of a small business.    Onset of symptoms/DOI was over a year ago with recurrence to 3 weeks ago.    Symptoms are aggravated by activity and movement.    Symptoms are alleviated by rest.    Symptoms consist of pain and Clicking.    The patient rates their pain as mild    Attempted treatment(s) and/or interventions include rest, activity modification and steroid injection.     The patient denies any fevers, chills, N/V, D/C and presents for evaluation.       Past Medical History:   Diagnosis Date    Allergy     CKD (chronic kidney disease) stage 3, GFR 30-59 ml/min 6/15/2016    GERD (gastroesophageal reflux disease)     Hx of colonic polyp     Hyperlipidemia     Hypertension     Hypospadias in male     Hypothyroidism     Sleep apnea     Thyroid disease     Urinary tract infection      Past Surgical History:   Procedure Laterality Date    APPENDECTOMY      CATARACT EXTRACTION      EYE SURGERY      HERNIA REPAIR      TONSILLECTOMY       Review of patient's allergies indicates:   Allergen Reactions    Contrast media  Hives and Itching     Iv dye     Social History     Social History Narrative    Not on file     Family History   Problem Relation Age of Onset    Diabetes Mother     Heart disease Mother     Hypertension Mother     Vision loss Mother     Dementia Mother     Alcohol abuse Father     Cancer Sister         lung with mets, was a heavy smoker    No Known Problems Daughter     No Known Problems Son     No Known Problems Daughter     No Known Problems Son     Amblyopia Neg Hx     Blindness Neg Hx     Cataracts Neg Hx     Glaucoma Neg Hx     Macular degeneration Neg Hx     Retinal detachment Neg Hx     Strabismus Neg Hx     Stroke Neg Hx     Thyroid disease Neg Hx          Current Outpatient Medications:     aspirin (ECOTRIN) 81 MG EC tablet, Take 81 mg by mouth once daily., Disp: , Rfl:     econazole nitrate 1 % cream, USE TWICE DAILY (Patient taking differently: USE TWICE DAILY (prn)), Disp: 60 g, Rfl: 5    levothyroxine (SYNTHROID) 112 MCG tablet, Take 1 tablet (112 mcg total) by mouth before breakfast., Disp: 90 tablet, Rfl: 12    metronidazole (METROGEL) 0.75 % gel, Use hs, Disp: 45 g, Rfl: 3    multivitamin with minerals tablet, Take 1 tablet by mouth once daily.  , Disp: , Rfl:     polyethylene glycol (GLYCOLAX) 17 gram PwPk, Take 17 g by mouth once daily., Disp: 30 packet, Rfl: 0    pravastatin (PRAVACHOL) 20 MG tablet, TAKE 1 TABLET BY MOUTH ONE TIME DAILY, Disp: 90 tablet, Rfl: 0    ramipril (ALTACE) 5 MG capsule, Take 1 capsule (5 mg total) by mouth once daily., Disp: 30 capsule, Rfl: 0    tamsulosin (FLOMAX) 0.4 mg Cap, TAKE 2 CAPSULES BY MOUTH EVERY DAY, Disp: 180 capsule, Rfl: 0    triamcinolone acetonide 0.1% (KENALOG) 0.1 % cream, Use bid prn rash, Disp: 80 g, Rfl: 3    fexofenadine (ALLEGRA) 180 MG tablet, Take 1 tablet (180 mg total) by mouth once daily. (Patient taking differently: Take 180 mg by mouth daily as needed. ), Disp: 30 tablet, Rfl: 11    Current  "Facility-Administered Medications:     ciprofloxacin HCl tablet 500 mg, 500 mg, Oral, Once, Anastacio Eid Jr., MD      Review of Systems:  Constitutional: no fever or chills  Eyes: no visual changes  ENT: no nasal congestion or sore throat  Respiratory: no cough or shortness of breath  Cardiovascular: no chest pain  Gastrointestinal: no nausea or vomiting, tolerating diet  Musculoskeletal: pain and decreased ROM    OBJECTIVE:      Vital Signs (Most Recent):  Vitals:    11/19/19 1000   Weight: 78.5 kg (173 lb 1 oz)   Height: 5' 8" (1.727 m)     Body mass index is 26.31 kg/m².      Physical Exam:  Constitutional: The patient appears well-developed and well-nourished. No distress.   Head: Normocephalic and atraumatic.   Nose: Nose normal.   Eyes: Conjunctivae and EOM are normal.   Neck: No tracheal deviation present.   Cardiovascular: Normal rate and intact distal pulses.    Pulmonary/Chest: Effort normal. No respiratory distress.   Abdominal: There is no guarding.   Neurological: The patient is alert.   Psychiatric: The patient has a normal mood and affect.     Right Hand/Wrist Examination:    Observation/Inspection:  Swelling  none    Deformity  none  Discoloration  none     Scars   right index finger from prior surgery    Atrophy  None  Triggering and pain at the right thumb A1 pulley today    HAND/WRIST EXAMINATION:  Finkelstein's Test   Neg  WHAT Test    Neg  Snuff box tenderness   Neg  Nguyễn's Test    Neg  Hook of Hamate Tenderness  Neg  CMC grind    Neg  Circumduction test   Neg    Neurovascular Exam:  Digits WWP, brisk CR < 3s throughout  NVI motor/LTS to M/R/U nerves, radial pulse 2+  Tinel's Test - Carpal Tunnel  Neg  Tinel's Test - Cubital Tunnel  Neg  Phalen's Test    Neg  Median Nerve Compression Test Neg    ROM hand/wrist/elbow full, painless except for minimal DIP active flexion at the right index finger    Diagnostic Results:     Xray -  x-rays right hand demonstrate no acute fractures or " dislocations  EMG - none    ASSESSMENT/PLAN:      79 y.o. yo male with right trigger thumb  Plan:  Treatment options discussed.  He has responded well to a right trigger thumb injection previously and would like to attempt another 1 of these today.  Injection performed, recommend NSAIDs and activity modifications.  Follow up on an as-needed basis.          Jean-Paul Cummins M.D.     Please be aware that this note has been generated with the assistance of [employer] voice-to-text.  Please excuse any spelling or grammatical errors.

## 2019-11-19 NOTE — LETTER
November 19, 2019      Corey Iqbal MD  [address]  [facility] [address]           [facility] - Orthopedics  [address]  [facility] [address]  Phone: [phone]          Patient: Liang Monterroso Jr.   MR Number: [id number]   YOB: 1940   Date of Visit: 11/19/2019       Dear Dr. Corey Iqbal:    Thank you for referring Liang Monterroso to me for evaluation. Attached you will find relevant portions of my assessment and plan of care.    If you have questions, please do not hesitate to call me. I look forward to following Liang Monterroso along with you.    Sincerely,    Jean-Paul Cummins MD    Enclosure  CC:  No Recipients    If you would like to receive this communication electronically, please contact [email or web address] or [phone] to request more information on [employer] Link access.    For providers and/or their staff who would like to refer a patient to [facility], please contact us through our one-stop-shop provider referral line, [facility] , at [phone].    If you feel you have received this communication in error or would no longer like to receive these types of communications, please e-mail [email or web address]

## 2019-11-19 NOTE — PROCEDURES
Tendon Sheath: R thumb MCP  Date/Time: 11/19/2019 10:00 AM  Performed by: Jean-Paul Cummins MD  Authorized by: Jean-Paul Cummins MD     Consent Done?: Yes (Verbal)  Timeout: prior to procedure the correct patient, procedure, and site was verified   Indications:  Pain  Site marked: the procedure site was marked    Timeout: prior to procedure the correct patient, procedure, and site was verified    Location:  Thumb  Site:  R thumb MCP  Prep: patient was prepped and draped in usual sterile fashion    Ultrasonic guidance for needle placement?: No    Needle size:  25 G  Approach:  Volar  Medications:  4 mg dexamethasone 4 mg/mL  Patient tolerance:  Patient tolerated the procedure well with no immediate complications

## 2019-11-22 ENCOUNTER — OFFICE VISIT (OUTPATIENT)
Dept: URGENT CARE | Facility: CLINIC | Age: 79
End: 2019-11-22
Payer: MEDICARE

## 2019-11-22 VITALS
SYSTOLIC BLOOD PRESSURE: 134 MMHG | DIASTOLIC BLOOD PRESSURE: 85 MMHG | WEIGHT: 175 LBS | RESPIRATION RATE: 19 BRPM | TEMPERATURE: 97 F | OXYGEN SATURATION: 99 % | HEIGHT: 68 IN | BODY MASS INDEX: 26.52 KG/M2 | HEART RATE: 62 BPM

## 2019-11-22 DIAGNOSIS — J20.9 ACUTE BRONCHITIS, UNSPECIFIED ORGANISM: Primary | ICD-10-CM

## 2019-11-22 PROCEDURE — 1159F PR MEDICATION LIST DOCUMENTED IN MEDICAL RECORD: ICD-10-PCS | Mod: S$GLB,,, | Performed by: NURSE PRACTITIONER

## 2019-11-22 PROCEDURE — 1101F PR PT FALLS ASSESS DOC 0-1 FALLS W/OUT INJ PAST YR: ICD-10-PCS | Mod: CPTII,S$GLB,, | Performed by: NURSE PRACTITIONER

## 2019-11-22 PROCEDURE — 3079F PR MOST RECENT DIASTOLIC BLOOD PRESSURE 80-89 MM HG: ICD-10-PCS | Mod: CPTII,S$GLB,, | Performed by: NURSE PRACTITIONER

## 2019-11-22 PROCEDURE — 3079F DIAST BP 80-89 MM HG: CPT | Mod: CPTII,S$GLB,, | Performed by: NURSE PRACTITIONER

## 2019-11-22 PROCEDURE — 99214 OFFICE O/P EST MOD 30 MIN: CPT | Mod: S$GLB,,, | Performed by: NURSE PRACTITIONER

## 2019-11-22 PROCEDURE — 1159F MED LIST DOCD IN RCRD: CPT | Mod: S$GLB,,, | Performed by: NURSE PRACTITIONER

## 2019-11-22 PROCEDURE — 99214 PR OFFICE/OUTPT VISIT, EST, LEVL IV, 30-39 MIN: ICD-10-PCS | Mod: S$GLB,,, | Performed by: NURSE PRACTITIONER

## 2019-11-22 PROCEDURE — 3075F SYST BP GE 130 - 139MM HG: CPT | Mod: CPTII,S$GLB,, | Performed by: NURSE PRACTITIONER

## 2019-11-22 PROCEDURE — 1101F PT FALLS ASSESS-DOCD LE1/YR: CPT | Mod: CPTII,S$GLB,, | Performed by: NURSE PRACTITIONER

## 2019-11-22 PROCEDURE — 3075F PR MOST RECENT SYSTOLIC BLOOD PRESS GE 130-139MM HG: ICD-10-PCS | Mod: CPTII,S$GLB,, | Performed by: NURSE PRACTITIONER

## 2019-11-22 RX ORDER — FLUTICASONE PROPIONATE 50 MCG
1 SPRAY, SUSPENSION (ML) NASAL DAILY
Qty: 1 BOTTLE | Refills: 0 | Status: SHIPPED | OUTPATIENT
Start: 2019-11-22 | End: 2023-07-25

## 2019-11-22 RX ORDER — ALBUTEROL SULFATE 90 UG/1
2 AEROSOL, METERED RESPIRATORY (INHALATION) EVERY 6 HOURS PRN
Qty: 18 G | Refills: 0 | Status: SHIPPED | OUTPATIENT
Start: 2019-11-22 | End: 2021-12-22

## 2019-11-22 RX ORDER — METHYLPREDNISOLONE 4 MG/1
TABLET ORAL
Qty: 1 PACKAGE | Refills: 0 | Status: SHIPPED | OUTPATIENT
Start: 2019-11-22 | End: 2020-07-10

## 2019-11-22 NOTE — PROGRESS NOTES
"Subjective:       Patient ID: Liang Monterroso Jr. is a 79 y.o. male.    Vitals:  height is 5' 8" (1.727 m) and weight is 79.4 kg (175 lb). His oral temperature is 97.1 °F (36.2 °C). His blood pressure is 134/85 and his pulse is 62. His respiration is 19 and oxygen saturation is 99%.     Chief Complaint: Shortness of Breath    This is a 79 y.o. male who presents today with a chief complaint of   SOB and chest tightness, cough. Pt is about to go on vacation and wanted to be sure it was noting to bad. He had a flu shot. Pt stated a few weeks ago he was dealing with Sinus issues and he took Allegra for a few days. But nothing since then     Shortness of Breath   This is a new problem. The current episode started in the past 7 days. The problem occurs constantly. The problem has been gradually worsening. Pertinent negatives include no ear pain, fever, hemoptysis, rash, sore throat, sputum production, vomiting or wheezing. Nothing aggravates the symptoms. He has tried nothing for the symptoms. The treatment provided no relief.       Constitution: Negative for chills, sweating, fatigue and fever.   HENT: Negative for ear pain, congestion, sinus pain, sinus pressure, sore throat and voice change.    Neck: Negative for painful lymph nodes.   Eyes: Negative for eye redness.   Respiratory: Positive for chest tightness, cough and shortness of breath. Negative for sputum production, bloody sputum, COPD, stridor, wheezing and asthma.    Gastrointestinal: Negative for nausea and vomiting.   Musculoskeletal: Negative for muscle ache.   Skin: Negative for rash.   Allergic/Immunologic: Negative for seasonal allergies and asthma.   Hematologic/Lymphatic: Negative for swollen lymph nodes.       Objective:      Physical Exam   Constitutional: He is oriented to person, place, and time. He appears well-developed and well-nourished. He is cooperative.  Non-toxic appearance. He does not have a sickly appearance. He does not appear ill. No " distress.   HENT:   Head: Normocephalic and atraumatic.   Right Ear: Hearing, tympanic membrane, external ear and ear canal normal.   Left Ear: Hearing, tympanic membrane, external ear and ear canal normal.   Nose: Nose normal. No mucosal edema, rhinorrhea or nasal deformity. No epistaxis. Right sinus exhibits no maxillary sinus tenderness and no frontal sinus tenderness. Left sinus exhibits no maxillary sinus tenderness and no frontal sinus tenderness.   Mouth/Throat: Uvula is midline, oropharynx is clear and moist and mucous membranes are normal. No trismus in the jaw. Normal dentition. No uvula swelling. No oropharyngeal exudate, posterior oropharyngeal edema or posterior oropharyngeal erythema.   Eyes: Conjunctivae and lids are normal. No scleral icterus.   Neck: Trachea normal, full passive range of motion without pain and phonation normal. Neck supple. No neck rigidity. No edema and no erythema present.   Cardiovascular: Normal rate, regular rhythm, normal heart sounds, intact distal pulses and normal pulses.   Pulmonary/Chest: Effort normal and breath sounds normal. No accessory muscle usage or stridor. No respiratory distress. He has no decreased breath sounds. He has no wheezes. He has no rhonchi. He has no rales.   Abdominal: Normal appearance.   Musculoskeletal: Normal range of motion. He exhibits no edema or deformity.   Neurological: He is alert and oriented to person, place, and time. He exhibits normal muscle tone. Coordination normal.   Skin: Skin is warm, dry, intact, not diaphoretic, not pale and no rash.   Psychiatric: He has a normal mood and affect. His speech is normal and behavior is normal. Judgment and thought content normal. Cognition and memory are normal.   Nursing note and vitals reviewed.        Assessment:       1. Acute bronchitis, unspecified organism        Plan:         Acute bronchitis, unspecified organism  -     fluticasone propionate (FLONASE) 50 mcg/actuation nasal spray; 1  spray (50 mcg total) by Each Nostril route once daily.  Dispense: 1 Bottle; Refill: 0  -     albuterol (VENTOLIN HFA) 90 mcg/actuation inhaler; Inhale 2 puffs into the lungs every 6 (six) hours as needed. Rescue  Dispense: 18 g; Refill: 0  -     methylPREDNISolone (MEDROL DOSEPACK) 4 mg tablet; use as directed until gone  Dispense: 1 Package; Refill: 0

## 2019-11-22 NOTE — PATIENT INSTRUCTIONS
Return to Urgent Care or go to ER if symptoms worsen or fail to improve.  Follow up with PCP as recommended for further management.     Bronchitis, Viral (Adult)    You have a viral bronchitis. Bronchitis is inflammation and swelling of the lining of the lungs. This is often caused by an infection. Symptoms include a dry, hacking cough that is worse at night. The cough may bring up yellow-green mucus. You may also feel short of breath or wheeze. Other symptoms may include tiredness, chest discomfort, and chills.  Bronchitis that is caused by a virus is not treated with antibiotics. Instead, medicines may be given to help relieve symptoms. Symptoms can last up to 2 weeks, although the cough may last much longer.  This illness is contagious during the first few days and is spread through the air by coughing and sneezing, or by direct contact (touching the sick person and then touching your own eyes, nose, or mouth).  Most viral illnesses resolve within 10 to 14 days with rest and simple home remedies, although they may sometimes last for several weeks.  Home care  · If symptoms are severe, rest at home for the first 2 to 3 days. When you go back to your usual activities, don't let yourself get too tired.  · Do not smoke. Also avoid being exposed to secondhand smoke.  · You may use over-the-counter medicine to control fever or pain, unless another pain medicine was prescribed. (Note: If you have chronic liver or kidney disease or have ever had a stomach ulcer or gastrointestinal bleeding, talk with your healthcare provider before using these medicines. Also talk to your provider if you are taking medicine to prevent blood clots.) Aspirin should never be given to anyone younger than 18 years of age who is ill with a viral infection or fever. It may cause severe liver or brain damage.  · Your appetite may be poor, so a light diet is fine. Avoid dehydration by drinking 6 to 8 glasses of fluids per day (such as water, soft  drinks, sports drinks, juices, tea, or soup). Extra fluids will help loosen secretions in the nose and lungs.  · Over-the-counter cough, cold, and sore-throat medicines will not shorten the length of the illness, but they may help to reduce symptoms. (Note: Do not use decongestants if you have high blood pressure.)  Follow-up care  Follow up with your healthcare provider, or as advised. If you had an X-ray or ECG (electrocardiogram), a specialist will review it. You will be notified of any new findings that may affect your care.  Note: If you are age 65 or older, or if you have a chronic lung disease or condition that affects your immune system, or you smoke, talk to your healthcare provider about having pneumococcal vaccinations and a yearly influenza vaccination (flu shot).  When to seek medical advice  Call your healthcare provider right away if any of these occur:  · Fever of 100.4°F (38°C) or higher  · Coughing up increased amounts of colored sputum  · Weakness, drowsiness, headache, facial pain, ear pain, or a stiff neck  Call 911, or get immediate medical care  Contact emergency services right away if any of these occur:  · Coughing up blood  · Worsening weakness, drowsiness, headache, or stiff neck  · Trouble breathing, wheezing, or pain with breathing  Date Last Reviewed: 9/13/2015  © 0630-4648 [employer]. [address] [place] [address], [place] [address]. All rights reserved. This information is not intended as a substitute for professional medical care. Always follow your healthcare professional's instructions.

## 2019-12-03 RX ORDER — PRAVASTATIN SODIUM 20 MG/1
TABLET ORAL
Qty: 90 TABLET | Refills: 0 | Status: SHIPPED | OUTPATIENT
Start: 2019-12-03 | End: 2020-02-24

## 2019-12-11 ENCOUNTER — TELEPHONE (OUTPATIENT)
Dept: OTOLARYNGOLOGY | Facility: CLINIC | Age: 79
End: 2019-12-11

## 2019-12-11 ENCOUNTER — PATIENT MESSAGE (OUTPATIENT)
Dept: OTOLARYNGOLOGY | Facility: CLINIC | Age: 79
End: 2019-12-11

## 2019-12-11 ENCOUNTER — PATIENT OUTREACH (OUTPATIENT)
Dept: ADMINISTRATIVE | Facility: OTHER | Age: 79
End: 2019-12-11

## 2019-12-12 ENCOUNTER — OFFICE VISIT (OUTPATIENT)
Dept: URGENT CARE | Facility: CLINIC | Age: 79
End: 2019-12-12
Payer: MEDICARE

## 2019-12-12 VITALS
TEMPERATURE: 97 F | WEIGHT: 175 LBS | BODY MASS INDEX: 26.52 KG/M2 | DIASTOLIC BLOOD PRESSURE: 85 MMHG | HEIGHT: 68 IN | OXYGEN SATURATION: 99 % | HEART RATE: 66 BPM | SYSTOLIC BLOOD PRESSURE: 135 MMHG

## 2019-12-12 DIAGNOSIS — H61.23 BILATERAL IMPACTED CERUMEN: ICD-10-CM

## 2019-12-12 DIAGNOSIS — H60.512 ACUTE ACTINIC OTITIS EXTERNA OF LEFT EAR: Primary | ICD-10-CM

## 2019-12-12 PROCEDURE — 99214 PR OFFICE/OUTPT VISIT, EST, LEVL IV, 30-39 MIN: ICD-10-PCS | Mod: S$GLB,,, | Performed by: NURSE PRACTITIONER

## 2019-12-12 PROCEDURE — 99214 OFFICE O/P EST MOD 30 MIN: CPT | Mod: S$GLB,,, | Performed by: NURSE PRACTITIONER

## 2019-12-12 RX ORDER — NEOMYCIN SULFATE, POLYMYXIN B SULFATE AND HYDROCORTISONE 10; 3.5; 1 MG/ML; MG/ML; [USP'U]/ML
3 SUSPENSION/ DROPS AURICULAR (OTIC) 3 TIMES DAILY
Qty: 6 ML | Refills: 0 | Status: SHIPPED | OUTPATIENT
Start: 2019-12-12 | End: 2019-12-22

## 2019-12-12 NOTE — PROGRESS NOTES
"Subjective:       Patient ID: Liang Monterroso Jr. is a 79 y.o. male.    Vitals:  height is 5' 8" (1.727 m) and weight is 79.4 kg (175 lb). His temperature is 97.1 °F (36.2 °C). His blood pressure is 135/85 and his pulse is 66. His oxygen saturation is 99%.     Chief Complaint: Otalgia (both)    Pt states that he's been experiencing bilateral ear pain and congestion for 3 weeks. He noticed that the symptoms worsened on Monday and had loss of hearing particularly in the left ear.    Otalgia    There is pain in both ears. This is a new problem. The current episode started 1 to 4 weeks ago. The problem has been gradually worsening. Associated symptoms include hearing loss. Pertinent negatives include no coughing, diarrhea, headaches, rash, sore throat or vomiting. Treatments tried: allegra; flonase. The treatment provided no relief.       Constitution: Negative for chills, fatigue and fever.   HENT: Positive for ear pain, hearing loss and congestion. Negative for sore throat.    Neck: Negative for painful lymph nodes.   Cardiovascular: Negative for chest pain and leg swelling.   Eyes: Negative for double vision and blurred vision.   Respiratory: Negative for cough and shortness of breath.    Gastrointestinal: Negative for nausea, vomiting and diarrhea.   Genitourinary: Negative for dysuria, frequency and urgency.   Musculoskeletal: Negative for joint pain, joint swelling, muscle cramps and muscle ache.   Skin: Negative for color change, pale and rash.   Allergic/Immunologic: Negative for seasonal allergies.   Neurological: Negative for dizziness, history of vertigo, light-headedness, passing out and headaches.   Hematologic/Lymphatic: Negative for swollen lymph nodes, easy bruising/bleeding and history of blood clots. Does not bruise/bleed easily.   Psychiatric/Behavioral: Negative for nervous/anxious, sleep disturbance and depression. The patient is not nervous/anxious.        Objective:      Physical Exam "   Constitutional: He is oriented to person, place, and time. He appears well-developed and well-nourished. He is cooperative.  Non-toxic appearance. He does not have a sickly appearance. He does not appear ill. No distress.   HENT:   Head: Normocephalic and atraumatic.   Right Ear: Hearing, tympanic membrane, external ear and ear canal normal. There is cerumen present.   Left Ear: Hearing, tympanic membrane, external ear and ear canal normal. There is cerumen present.   Nose: Mucosal edema present. No rhinorrhea or nasal deformity. No epistaxis. Right sinus exhibits no maxillary sinus tenderness and no frontal sinus tenderness. Left sinus exhibits no maxillary sinus tenderness and no frontal sinus tenderness.   Mouth/Throat: Uvula is midline and mucous membranes are normal. No trismus in the jaw. Normal dentition. No uvula swelling. Posterior oropharyngeal erythema present. No oropharyngeal exudate or posterior oropharyngeal edema.   Eyes: Conjunctivae and lids are normal. No scleral icterus.   Neck: Trachea normal, full passive range of motion without pain and phonation normal. Neck supple. No neck rigidity. No edema and no erythema present.   Cardiovascular: Normal pulses.   Pulmonary/Chest: Effort normal. No respiratory distress. He has no decreased breath sounds. He has no rhonchi.   Abdominal: Normal appearance.   Musculoskeletal: Normal range of motion. He exhibits no edema or deformity.   Neurological: He is alert and oriented to person, place, and time. He exhibits normal muscle tone. Coordination normal.   Skin: Skin is warm, dry, intact, not diaphoretic and not pale.   Psychiatric: He has a normal mood and affect. His speech is normal and behavior is normal. Cognition and memory are normal.   Nursing note and vitals reviewed.        Assessment:       1. Acute actinic otitis externa of left ear    2. Bilateral impacted cerumen        Plan:         Acute actinic otitis externa of left ear  -      neomycin-polymyxin-hydrocortisone (CORTISPORIN) 3.5-10,000-1 mg/mL-unit/mL-% otic suspension; Place 3 drops into the right ear 3 (three) times daily. for 10 days  Dispense: 6 mL; Refill: 0    Bilateral impacted cerumen  -     Ear wax removal

## 2019-12-12 NOTE — PATIENT INSTRUCTIONS
Return to Urgent Care or go to ER if symptoms worsen or fail to improve.  Follow up with PCP as recommended for further management.       Earwax (Treated)    Everyone produces earwax from the lining of the ear canal. It lubricates and protects the ear. The wax that forms in the canal slowly moves toward the outside of the ear and falls out. Sometimes wax can build up in the ear canal. This can cause a blockage and loss of hearing. A buildup of earwax was removed from your ear today.  Home care  If you have a tendency to build up wax in the ear canal, you should clear the wax at home regularly, before it causes discomfort. This should be about once every six months.  · Unless a medicine was prescribed, you may use an over-the-counter product made for clearing earwax. These contain carbamide peroxide and are available over-the-counter in a kit with a small bulb syringe.  · Lie down with the blocked ear facing upward. Apply one dropper full of medicine and wait a few minutes. Grasp the outer ear and wiggle it to help the solution enter the canal.  · Lean over a sink or basin with the blocked ear turned downward. Use a rubber bulb syringe filled with warm (not hot or cold) water to rinse the ear several times. Use gentle pressure only. You may need to repeat the irrigation several times before the wax flows out.  · If you are having trouble draining all the water out of your ear canal, put a few drops of rubbing alcohol into the ear canal. This will help remove the remaining water.  Don'ts  · Dont use cold water to rinse the ear. This will make you dizzy.  · Dont do this procedure if you have an ear infection. Symptoms include ear pain, fever, or fluid draining from the ear.  · Dont do this procedure if you have a punctured eardrum.  · Dont use cotton swabs, matches, hairpins, keys, or other objects to clean the ear canal. This can cause infection of the ear canal or rupture of the eardrum. Because of their size  and shape, cotton swabs can push the earwax deeper into the ear canal instead of removing it.  Follow-up care  Follow up with your healthcare provider, or as advised.  When to seek medical advice  Call your healthcare provider right away if any of these occur:  · Worsening ear pain  · Fever of 100.4°F (38°C) or higher, or as directed by your healthcare provider  · Hearing does not return to normal after three days of treatment  · Fluid drainage or bleeding from the ear canal  · Swelling, redness, or tenderness of the outer ear  · Headache, neck pain, or stiff neck  Date Last Reviewed: 3/22/2015  © 1864-4698 [employer]. [address] [place] [address], [place] [address]. All rights reserved. This information is not intended as a substitute for professional medical care. Always follow your healthcare professional's instructions.        External Ear Infection (Adult)    External otitis (also called swimmers ear) is an infection in the ear canal. It is often caused by bacteria or fungus. It can occur a few days after water gets trapped in the ear canal (from swimming or bathing). It can also occur after cleaning too deeply in the ear canal with a cotton swab or other object. Sometimes, hair care products get into the ear canal and cause this problem.  Symptoms can include pain, fever, itching, redness, drainage, or swelling of the ear canal. Temporary hearing loss may also occur.  Home care  · Do not try to clean the ear canal. This can push pus and bacteria deeper into the canal.  · Use prescribed ear drops as directed. These help reduce swelling and fight the infection. If an ear wick was placed in the ear canal, apply drops right onto the end of the wick. The wick will draw the medication into the ear canal even if it is swollen closed.  · A cotton ball may be loosely placed in the outer ear to absorb any drainage.  · You may use acetaminophen or ibuprofen to control pain, unless another medication was  prescribed. Note: If you have chronic liver or kidney disease or ever had a stomach ulcer or GI bleeding, talk to your health care provider before taking any of these medications.  · Do not allow water to get into your ear when bathing. Also, avoid swimming until the infection has cleared.  Prevention  · Keep your ears dry. This helps lower the risk of infection. Dry your ears with a towel or hair dryer after getting wet. Also, use ear plugs when swimming.  · Do not stick any objects in the ear to remove wax.  · If you feel water trapped in your ear, use ear drops right away. You can get these drops over the counter at most drugstores. They work by removing water from the ear canal.  Follow-up care  Follow up with your health care provider in one week, or as advised.  When to seek medical advice  Call your health care provider right away if any of these occur:  · Ear pain becomes worse or doesnt improve after 3 days of treatment  · Redness or swelling of the outer ear occurs or gets worse  · Headache  · Painful or stiff neck  · Drowsiness or confusion  · Fever of 100.4ºF (38ºC) or higher, or as directed by your health care provider  · Seizure  Date Last Reviewed: 3/22/2015  © 4875-4954 [employer]. [address] [place] [address], [place] [address]. All rights reserved. This information is not intended as a substitute for professional medical care. Always follow your healthcare professional's instructions.

## 2019-12-17 ENCOUNTER — PATIENT OUTREACH (OUTPATIENT)
Dept: ADMINISTRATIVE | Facility: OTHER | Age: 79
End: 2019-12-17

## 2019-12-20 ENCOUNTER — CLINICAL SUPPORT (OUTPATIENT)
Dept: AUDIOLOGY | Facility: CLINIC | Age: 79
End: 2019-12-20
Payer: MEDICARE

## 2019-12-20 ENCOUNTER — OFFICE VISIT (OUTPATIENT)
Dept: OTOLARYNGOLOGY | Facility: CLINIC | Age: 79
End: 2019-12-20
Payer: MEDICARE

## 2019-12-20 VITALS
HEART RATE: 79 BPM | BODY MASS INDEX: 26.48 KG/M2 | WEIGHT: 174.19 LBS | SYSTOLIC BLOOD PRESSURE: 125 MMHG | DIASTOLIC BLOOD PRESSURE: 78 MMHG

## 2019-12-20 DIAGNOSIS — H93.12 TINNITUS OF LEFT EAR: ICD-10-CM

## 2019-12-20 DIAGNOSIS — H90.3 SENSORINEURAL HEARING LOSS (SNHL) OF BOTH EARS: Primary | ICD-10-CM

## 2019-12-20 DIAGNOSIS — H69.92 DYSFUNCTION OF LEFT EUSTACHIAN TUBE: ICD-10-CM

## 2019-12-20 PROCEDURE — 92557 COMPREHENSIVE HEARING TEST: CPT | Mod: HCNC,S$GLB,, | Performed by: AUDIOLOGIST

## 2019-12-20 PROCEDURE — 1159F PR MEDICATION LIST DOCUMENTED IN MEDICAL RECORD: ICD-10-PCS | Mod: HCNC,S$GLB,, | Performed by: OTOLARYNGOLOGY

## 2019-12-20 PROCEDURE — 1125F PR PAIN SEVERITY QUANTIFIED, PAIN PRESENT: ICD-10-PCS | Mod: HCNC,S$GLB,, | Performed by: OTOLARYNGOLOGY

## 2019-12-20 PROCEDURE — 99999 PR PBB SHADOW E&M-EST. PATIENT-LVL IV: ICD-10-PCS | Mod: PBBFAC,HCNC,, | Performed by: OTOLARYNGOLOGY

## 2019-12-20 PROCEDURE — 99999 PR PBB SHADOW E&M-EST. PATIENT-LVL IV: CPT | Mod: PBBFAC,HCNC,, | Performed by: OTOLARYNGOLOGY

## 2019-12-20 PROCEDURE — 3074F PR MOST RECENT SYSTOLIC BLOOD PRESSURE < 130 MM HG: ICD-10-PCS | Mod: HCNC,CPTII,S$GLB, | Performed by: OTOLARYNGOLOGY

## 2019-12-20 PROCEDURE — 1159F MED LIST DOCD IN RCRD: CPT | Mod: HCNC,S$GLB,, | Performed by: OTOLARYNGOLOGY

## 2019-12-20 PROCEDURE — 92567 TYMPANOMETRY: CPT | Mod: HCNC,S$GLB,, | Performed by: AUDIOLOGIST

## 2019-12-20 PROCEDURE — 3074F SYST BP LT 130 MM HG: CPT | Mod: HCNC,CPTII,S$GLB, | Performed by: OTOLARYNGOLOGY

## 2019-12-20 PROCEDURE — 1101F PR PT FALLS ASSESS DOC 0-1 FALLS W/OUT INJ PAST YR: ICD-10-PCS | Mod: HCNC,CPTII,S$GLB, | Performed by: OTOLARYNGOLOGY

## 2019-12-20 PROCEDURE — 92567 PR TYMPA2METRY: ICD-10-PCS | Mod: HCNC,S$GLB,, | Performed by: AUDIOLOGIST

## 2019-12-20 PROCEDURE — 92557 PR COMPREHENSIVE HEARING TEST: ICD-10-PCS | Mod: HCNC,S$GLB,, | Performed by: AUDIOLOGIST

## 2019-12-20 PROCEDURE — 1125F AMNT PAIN NOTED PAIN PRSNT: CPT | Mod: HCNC,S$GLB,, | Performed by: OTOLARYNGOLOGY

## 2019-12-20 PROCEDURE — 99204 PR OFFICE/OUTPT VISIT, NEW, LEVL IV, 45-59 MIN: ICD-10-PCS | Mod: 25,HCNC,S$GLB, | Performed by: OTOLARYNGOLOGY

## 2019-12-20 PROCEDURE — G0268 REMOVAL OF IMPACTED WAX MD: HCPCS | Mod: HCNC,S$GLB,, | Performed by: OTOLARYNGOLOGY

## 2019-12-20 PROCEDURE — 3078F DIAST BP <80 MM HG: CPT | Mod: HCNC,CPTII,S$GLB, | Performed by: OTOLARYNGOLOGY

## 2019-12-20 PROCEDURE — 99999 PR PBB SHADOW E&M-EST. PATIENT-LVL I: ICD-10-PCS | Mod: PBBFAC,HCNC,,

## 2019-12-20 PROCEDURE — 99999 PR PBB SHADOW E&M-EST. PATIENT-LVL I: CPT | Mod: PBBFAC,HCNC,,

## 2019-12-20 PROCEDURE — 3078F PR MOST RECENT DIASTOLIC BLOOD PRESSURE < 80 MM HG: ICD-10-PCS | Mod: HCNC,CPTII,S$GLB, | Performed by: OTOLARYNGOLOGY

## 2019-12-20 PROCEDURE — G0268 PR REMOVAL OF IMPACTED WAX MD: ICD-10-PCS | Mod: HCNC,S$GLB,, | Performed by: OTOLARYNGOLOGY

## 2019-12-20 PROCEDURE — 99204 OFFICE O/P NEW MOD 45 MIN: CPT | Mod: 25,HCNC,S$GLB, | Performed by: OTOLARYNGOLOGY

## 2019-12-20 PROCEDURE — 1101F PT FALLS ASSESS-DOCD LE1/YR: CPT | Mod: HCNC,CPTII,S$GLB, | Performed by: OTOLARYNGOLOGY

## 2019-12-20 NOTE — PROGRESS NOTES
OTOLARYNGOLOGY CLINIC    Subjective:       Patient ID: Liang Monterroso Jr. is a 79 y.o. male.    Chief Complaint: Ear Fullness; Cerumen Impaction; and Otitis Media    HPI  Patient is a 79 year old male with the history of Eustachian Tube dysfunction over several months.  Patient with t he symptoms of decrease hearing in both ears with the left worse then the right.  Patient has mild intermittent tinnitus in the left ear.  Patient with no pain in the ears, but has fullness ans clogged up feeling in the left ear.  Right ear doesn't seem to be clogged and has some minor hearing loss in the ear and no tinnitus.   Patient had noise exposure in the  in the [employer] with years exposed to aircraft but use ear muffs attached to a protective helmet he wore around aircraft and aircraft engines.      Past Medical History:   Diagnosis Date    Allergy     CKD (chronic kidney disease) stage 3, GFR 30-59 ml/min 6/15/2016    GERD (gastroesophageal reflux disease)     Hx of colonic polyp     Hyperlipidemia     Hypertension     Hypospadias in male     Hypothyroidism     Sleep apnea     Thyroid disease     Urinary tract infection        Past Surgical History:   Procedure Laterality Date    APPENDECTOMY      CATARACT EXTRACTION      EYE SURGERY      HERNIA REPAIR      TONSILLECTOMY           Current Outpatient Medications:     albuterol (VENTOLIN HFA) 90 mcg/actuation inhaler, Inhale 2 puffs into the lungs every 6 (six) hours as needed. Rescue, Disp: 18 g, Rfl: 0    aspirin (ECOTRIN) 81 MG EC tablet, Take 81 mg by mouth once daily., Disp: , Rfl:     econazole nitrate 1 % cream, USE TWICE DAILY (Patient taking differently: USE TWICE DAILY (prn)), Disp: 60 g, Rfl: 5    fexofenadine (ALLEGRA) 180 MG tablet, Take 1 tablet (180 mg total) by mouth once daily. (Patient taking differently: Take 180 mg by mouth daily as needed. ), Disp: 30 tablet, Rfl: 11    fluticasone propionate (FLONASE) 50 mcg/actuation nasal spray, 1  spray (50 mcg total) by Each Nostril route once daily., Disp: 1 Bottle, Rfl: 0    levothyroxine (SYNTHROID) 112 MCG tablet, Take 1 tablet (112 mcg total) by mouth before breakfast., Disp: 90 tablet, Rfl: 12    methylPREDNISolone (MEDROL DOSEPACK) 4 mg tablet, use as directed until gone, Disp: 1 Package, Rfl: 0    metronidazole (METROGEL) 0.75 % gel, Use hs, Disp: 45 g, Rfl: 3    multivitamin with minerals tablet, Take 1 tablet by mouth once daily.  , Disp: , Rfl:     neomycin-polymyxin-hydrocortisone (CORTISPORIN) 3.5-10,000-1 mg/mL-unit/mL-% otic suspension, Place 3 drops into the right ear 3 (three) times daily. for 10 days, Disp: 6 mL, Rfl: 0    polyethylene glycol (GLYCOLAX) 17 gram PwPk, Take 17 g by mouth once daily., Disp: 30 packet, Rfl: 0    pravastatin (PRAVACHOL) 20 MG tablet, TAKE 1 TABLET BY MOUTH ONE TIME DAILY, Disp: 90 tablet, Rfl: 0    ramipril (ALTACE) 5 MG capsule, Take 1 capsule (5 mg total) by mouth once daily., Disp: 30 capsule, Rfl: 0    tamsulosin (FLOMAX) 0.4 mg Cap, TAKE 2 CAPSULES BY MOUTH EVERY DAY, Disp: 180 capsule, Rfl: 0    triamcinolone acetonide 0.1% (KENALOG) 0.1 % cream, Use bid prn rash, Disp: 80 g, Rfl: 3    Current Facility-Administered Medications:     ciprofloxacin HCl tablet 500 mg, 500 mg, Oral, Once, Anastacio Eid Jr., MD    Review of patient's allergies indicates:   Allergen Reactions    Contrast media Hives and Itching     Iv dye       Social History     Socioeconomic History    Marital status:      Spouse name: parul    Number of children: 4    Years of education: Not on file    Highest education level: Not on file   Occupational History    Occupation: retired   Social Needs    Financial resource strain: Not hard at all    Food insecurity:     Worry: Never true     Inability: Never true    Transportation needs:     Medical: No     Non-medical: No   Tobacco Use    Smoking status: Never Smoker    Smokeless tobacco: Never Used   Substance  and Sexual Activity    Alcohol use: Yes     Frequency: 2-4 times a month     Drinks per session: 1 or 2     Binge frequency: Never     Comment: 1-3 glasses wine weekly, 2-3 beers weekly    Drug use: No    Sexual activity: Yes     Partners: Female   Lifestyle    Physical activity:     Days per week: 3 days     Minutes per session: 80 min    Stress: Not at all   Relationships    Social connections:     Talks on phone: Twice a week     Gets together: Once a week     Attends [employer] service: Not on file     Active member of club or organization: No     Attends meetings of clubs or organizations: Never     Relationship status:    Other Topics Concern    Not on file   Social History Narrative    Not on file       Family History   Problem Relation Age of Onset    Diabetes Mother     Heart disease Mother     Hypertension Mother     Vision loss Mother     Dementia Mother     Alcohol abuse Father     Cancer Sister         lung with mets, was a heavy smoker    No Known Problems Daughter     No Known Problems Son     No Known Problems Daughter     No Known Problems Son     Amblyopia Neg Hx     Blindness Neg Hx     Cataracts Neg Hx     Glaucoma Neg Hx     Macular degeneration Neg Hx     Retinal detachment Neg Hx     Strabismus Neg Hx     Stroke Neg Hx     Thyroid disease Neg Hx        Review of Systems   Constitutional: Negative for fatigue and fever.   HENT: Positive for hearing loss and sinus pressure. Negative for congestion, ear discharge, ear pain, facial swelling, nosebleeds, postnasal drip, rhinorrhea, sneezing, sore throat, tinnitus and voice change.    Eyes: Negative for discharge.   Respiratory: Negative for cough.    Cardiovascular: Negative for chest pain.   Gastrointestinal: Negative for vomiting.   Genitourinary: Negative for flank pain.   Musculoskeletal: Positive for arthralgias. Negative for neck pain.   Skin: Negative for rash.   Allergic/Immunologic: Negative for  environmental allergies.   Neurological: Negative for headaches.   Hematological: Negative for adenopathy.   Psychiatric/Behavioral: Negative for confusion and sleep disturbance.       Objective:     Physical Exam   Constitutional: He is oriented to person, place, and time. He appears well-developed and well-nourished.   HENT:   Head: Normocephalic.   Right Ear: Tympanic membrane, external ear and ear canal normal. No mastoid tenderness. Tympanic membrane is not scarred, not perforated, not erythematous and not retracted.   Left Ear: External ear normal. No mastoid tenderness. Tympanic membrane is not scarred, not perforated, not erythematous and not retracted.   Ears:    Nose: Nose normal.   Mouth/Throat: Uvula is midline, oropharynx is clear and moist and mucous membranes are normal.       Eyes: Pupils are equal, round, and reactive to light. EOM and lids are normal.   Neck: Trachea normal, normal range of motion and phonation normal. Neck supple. No thyroid mass and no thyromegaly present.   Pulmonary/Chest: Effort normal.   Musculoskeletal: Normal range of motion.   Neurological: He is alert and oriented to person, place, and time.   Skin: Skin is warm and dry.      Procedure:  Removal of cerumen, ear drops and debris from left EAC with suction and instrumentation.    Procedure:  Examination of Ears and EAC's with Microscope:  EAC's with no cerumen after removal with no dried exfoliated skin cells and no erythema, edema or lesions to EAC's.  TM's bilateral are normal with some minimal scaring to left TM.  Malleus is normal bilaterally.  No middle ear fluid seen.  No bulging of ear drum and no other pathology noted.          Assessment & Plan:         Problem List Items Addressed This Visit     None      Visit Diagnoses     Sensorineural hearing loss (SNHL) of both ears    -  Primary    Relevant Orders    Ambulatory consult to Audiology    Tinnitus of left ear        Relevant Orders    Ambulatory consult to  Audiology    Dysfunction of left eustachian tube            Patient Instructions   1.  Procedure:  Removal of cerumen and debris Left External Auditory Canal and Examination Microscopic of the Ears and EAC's.  Report given to patient.  2.  Keep ears dry.  3.  Complete ear drops.  4.  Audiogram/tympanogram today with results given to patient.  5.  Noise protection with exposure to noise with ear muffs/ear plugs.   6.  Audiogram/tympanogram as needed for change in symptoms and yearly.  7.  Return to ENT yearly and as needed for change in symptoms or failure to improve.  8.  Appointment with Dr. Fer Rice in 8-12 weeks for evaluation and further recommendations.  No return to my clinic.

## 2019-12-20 NOTE — PROGRESS NOTES
Liang Monterroso Jr. was seen today in the clinic for an audiologic evaluation.  Patients main complaint was decreased hearing and intermittent bilateral tinnitus.  Mr. Monterroso reported that he hears better out of his right ear. Patient denied dizziness today.    Tympanometry revealed Type A in the right ear and Type A in the left ear.  Audiogram results revealed normal hearing (250-500 Hz) to a mild to moderately severe sensorineural hearing loss (1760-1397 Hz) in the right ear and normal hearing (250 Hz) to a mild to moderate to severe to profound hearing loss (500-8000 Hz) in the left ear. Clinically significant asymmetry was noted between ears.  Speech reception thresholds were noted at 35 dB in the right ear and 10 dB in the left ear.  Speech discrimination scores were 84% in the right ear and 64% in the left ear.    Recommendations:  1. Otologic evaluation due to noted asymmetry between ears  2. Annual audiogram  3. Noise protection when in noise  4. Hearing aid consultation post medical clearance

## 2019-12-20 NOTE — PATIENT INSTRUCTIONS
1.  Procedure:  Removal of cerumen and debris Left External Auditory Canal and Examination Microscopic of the Ears and EAC's.  Report given to patient.  2.  Keep ears dry.  3.  Complete ear drops.  4.  Audiogram/tympanogram today with results given to patient.  5.  Noise protection with exposure to noise with ear muffs/ear plugs.   6.  Audiogram/tympanogram as needed for change in symptoms and yearly.  7.  Return to ENT yearly and as needed for change in symptoms or failure to improve.  8.  Appointment with Dr. Fer Rice in 8-12 weeks for evaluation and further recommendations.  No return to my clinic.

## 2020-01-15 DIAGNOSIS — I10 ESSENTIAL HYPERTENSION: ICD-10-CM

## 2020-01-15 RX ORDER — LEVOTHYROXINE SODIUM 112 UG/1
TABLET ORAL
Qty: 90 TABLET | Refills: 12 | Status: SHIPPED | OUTPATIENT
Start: 2020-01-15 | End: 2021-03-31 | Stop reason: SDUPTHER

## 2020-01-15 RX ORDER — RAMIPRIL 5 MG/1
5 CAPSULE ORAL DAILY
Qty: 90 CAPSULE | Refills: 0 | Status: SHIPPED | OUTPATIENT
Start: 2020-01-15 | End: 2020-04-11 | Stop reason: SDUPTHER

## 2020-01-15 RX ORDER — TAMSULOSIN HYDROCHLORIDE 0.4 MG/1
CAPSULE ORAL
Qty: 180 CAPSULE | Refills: 0 | Status: SHIPPED | OUTPATIENT
Start: 2020-01-15 | End: 2020-04-11 | Stop reason: SDUPTHER

## 2020-02-24 RX ORDER — PRAVASTATIN SODIUM 20 MG/1
TABLET ORAL
Qty: 90 TABLET | Refills: 0 | Status: SHIPPED | OUTPATIENT
Start: 2020-02-24 | End: 2020-05-18 | Stop reason: SDUPTHER

## 2020-03-08 ENCOUNTER — PATIENT OUTREACH (OUTPATIENT)
Dept: ADMINISTRATIVE | Facility: OTHER | Age: 80
End: 2020-03-08

## 2020-03-10 ENCOUNTER — OFFICE VISIT (OUTPATIENT)
Dept: OTOLARYNGOLOGY | Facility: CLINIC | Age: 80
End: 2020-03-10
Payer: MEDICARE

## 2020-03-10 ENCOUNTER — CLINICAL SUPPORT (OUTPATIENT)
Dept: AUDIOLOGY | Facility: CLINIC | Age: 80
End: 2020-03-10
Payer: MEDICARE

## 2020-03-10 ENCOUNTER — TELEPHONE (OUTPATIENT)
Dept: OTOLARYNGOLOGY | Facility: CLINIC | Age: 80
End: 2020-03-10

## 2020-03-10 VITALS — BODY MASS INDEX: 26.4 KG/M2 | HEIGHT: 68 IN | WEIGHT: 174.19 LBS

## 2020-03-10 DIAGNOSIS — H90.3 BILATERAL SENSORINEURAL HEARING LOSS: Primary | ICD-10-CM

## 2020-03-10 DIAGNOSIS — H91.90 HEARING LOSS, UNSPECIFIED HEARING LOSS TYPE, UNSPECIFIED LATERALITY: Primary | ICD-10-CM

## 2020-03-10 DIAGNOSIS — H90.3 SENSORINEURAL HEARING LOSS (SNHL) OF BOTH EARS: Primary | ICD-10-CM

## 2020-03-10 PROCEDURE — 92557 COMPREHENSIVE HEARING TEST: CPT | Mod: HCNC,S$GLB,, | Performed by: AUDIOLOGIST

## 2020-03-10 PROCEDURE — 1101F PT FALLS ASSESS-DOCD LE1/YR: CPT | Mod: HCNC,CPTII,S$GLB, | Performed by: OTOLARYNGOLOGY

## 2020-03-10 PROCEDURE — 92567 PR TYMPA2METRY: ICD-10-PCS | Mod: HCNC,S$GLB,, | Performed by: AUDIOLOGIST

## 2020-03-10 PROCEDURE — 1126F PR PAIN SEVERITY QUANTIFIED, NO PAIN PRESENT: ICD-10-PCS | Mod: HCNC,S$GLB,, | Performed by: OTOLARYNGOLOGY

## 2020-03-10 PROCEDURE — 92567 TYMPANOMETRY: CPT | Mod: HCNC,S$GLB,, | Performed by: AUDIOLOGIST

## 2020-03-10 PROCEDURE — 99999 PR PBB SHADOW E&M-EST. PATIENT-LVL I: ICD-10-PCS | Mod: PBBFAC,HCNC,,

## 2020-03-10 PROCEDURE — 1159F PR MEDICATION LIST DOCUMENTED IN MEDICAL RECORD: ICD-10-PCS | Mod: HCNC,S$GLB,, | Performed by: OTOLARYNGOLOGY

## 2020-03-10 PROCEDURE — 99999 PR PBB SHADOW E&M-EST. PATIENT-LVL III: ICD-10-PCS | Mod: PBBFAC,HCNC,, | Performed by: OTOLARYNGOLOGY

## 2020-03-10 PROCEDURE — 1101F PR PT FALLS ASSESS DOC 0-1 FALLS W/OUT INJ PAST YR: ICD-10-PCS | Mod: HCNC,CPTII,S$GLB, | Performed by: OTOLARYNGOLOGY

## 2020-03-10 PROCEDURE — 92557 PR COMPREHENSIVE HEARING TEST: ICD-10-PCS | Mod: HCNC,S$GLB,, | Performed by: AUDIOLOGIST

## 2020-03-10 PROCEDURE — 99213 OFFICE O/P EST LOW 20 MIN: CPT | Mod: HCNC,S$GLB,, | Performed by: OTOLARYNGOLOGY

## 2020-03-10 PROCEDURE — 99213 PR OFFICE/OUTPT VISIT, EST, LEVL III, 20-29 MIN: ICD-10-PCS | Mod: HCNC,S$GLB,, | Performed by: OTOLARYNGOLOGY

## 2020-03-10 PROCEDURE — 99999 PR PBB SHADOW E&M-EST. PATIENT-LVL I: CPT | Mod: PBBFAC,HCNC,,

## 2020-03-10 PROCEDURE — 1126F AMNT PAIN NOTED NONE PRSNT: CPT | Mod: HCNC,S$GLB,, | Performed by: OTOLARYNGOLOGY

## 2020-03-10 PROCEDURE — 99999 PR PBB SHADOW E&M-EST. PATIENT-LVL III: CPT | Mod: PBBFAC,HCNC,, | Performed by: OTOLARYNGOLOGY

## 2020-03-10 PROCEDURE — 1159F MED LIST DOCD IN RCRD: CPT | Mod: HCNC,S$GLB,, | Performed by: OTOLARYNGOLOGY

## 2020-03-10 NOTE — PROGRESS NOTES
Liang Monterroso  was seen today in the clinic for an audiologic evaluation.  Patient was seen by Dr. Marx in December and referred patient to see Dr. Jasso.  Mr. Monterroso reported that he has no true difficulty with his hearing, tinnitus, or dizziness at this time.    Tympanometry revealed Type C (clinically significant negative pressure) in the right ear and Type A in the left ear.  Audiogram results revealed normal hearing (250-500 Hz) to a mild to moderate sensorineural hearing loss (6054-0502 Hz) in the right ear and normal hearing (250 Hz) to a mild to moderate to severe sensorineural hearing loss (500-8000 Hz) in the left ear.  Speech reception thresholds were noted at 30 dB in the right ear and 40 dB in the left ear.  Speech discrimination scores were 76% in the right ear and 80% in the left ear.    No significant changes to pure tone thresholds compared to audio on 12/20/19.    Recommendations:  1. Otologic evaluation  2. Annual audiogram  3. Noise protection when in noise  4. Hearing aid consultation

## 2020-03-10 NOTE — LETTER
March 10, 2020      Derek Marx MD  [address]  [facility] [address]           [facility]  [address]  [facility] [address]  Phone: [phone]  Fax: [phone]          Patient: Liang Monterroso Jr.   MR Number: [id number]   YOB: 1940   Date of Visit: 3/10/2020       Dear Dr. Derek Marx:    Thank you for referring Liang Monterroso to me for evaluation. Attached you will find relevant portions of my assessment and plan of care.    If you have questions, please do not hesitate to call me. I look forward to following Liang Monterroso along with you.    Sincerely,    Fer Jasso MD    Enclosure  CC:  No Recipients    If you would like to receive this communication electronically, please contact [email or web address] or [phone] to request more information on [employer] Link access.    For providers and/or their staff who would like to refer a patient to [facility], please contact us through our one-stop-shop provider referral line, [facility], [place], at [phone].    If you feel you have received this communication in error or would no longer like to receive these types of communications, please e-mail [email or web address]

## 2020-03-16 ENCOUNTER — TELEPHONE (OUTPATIENT)
Dept: INFECTIOUS DISEASES | Facility: CLINIC | Age: 80
End: 2020-03-16

## 2020-03-16 NOTE — TELEPHONE ENCOUNTER
Called patient with no answer. Left voicemail of cancelling appointment due to traveling restrictions.

## 2020-04-10 ENCOUNTER — PATIENT MESSAGE (OUTPATIENT)
Dept: INTERNAL MEDICINE | Facility: CLINIC | Age: 80
End: 2020-04-10

## 2020-04-11 DIAGNOSIS — I10 ESSENTIAL HYPERTENSION: ICD-10-CM

## 2020-04-11 RX ORDER — RAMIPRIL 5 MG/1
CAPSULE ORAL
Qty: 90 CAPSULE | Refills: 0 | Status: SHIPPED | OUTPATIENT
Start: 2020-04-11 | End: 2020-07-10

## 2020-04-11 RX ORDER — TAMSULOSIN HYDROCHLORIDE 0.4 MG/1
CAPSULE ORAL
Qty: 180 CAPSULE | Refills: 0 | Status: SHIPPED | OUTPATIENT
Start: 2020-04-11 | End: 2020-07-10

## 2020-04-30 DIAGNOSIS — N31.9 NEUROGENIC BLADDER: Primary | ICD-10-CM

## 2020-05-15 ENCOUNTER — PATIENT MESSAGE (OUTPATIENT)
Dept: INTERNAL MEDICINE | Facility: CLINIC | Age: 80
End: 2020-05-15

## 2020-05-18 RX ORDER — PRAVASTATIN SODIUM 20 MG/1
TABLET ORAL
Qty: 90 TABLET | Refills: 4 | Status: SHIPPED | OUTPATIENT
Start: 2020-05-18 | End: 2021-06-24

## 2020-05-18 NOTE — TELEPHONE ENCOUNTER
Pharmacy stated they have been trying to get the rx refilled. Pt uses [facility] pharmacy in [facility].

## 2020-06-15 ENCOUNTER — PATIENT OUTREACH (OUTPATIENT)
Dept: ADMINISTRATIVE | Facility: OTHER | Age: 80
End: 2020-06-15

## 2020-06-15 ENCOUNTER — HOSPITAL ENCOUNTER (OUTPATIENT)
Dept: RADIOLOGY | Facility: HOSPITAL | Age: 80
Discharge: HOME | End: 2020-06-15
Attending: UROLOGY
Payer: MEDICARE

## 2020-06-15 DIAGNOSIS — N31.9 NEUROGENIC BLADDER: ICD-10-CM

## 2020-06-15 PROCEDURE — 76770 US RETROPERITONEAL COMPLETE: ICD-10-PCS | Mod: 26,HCNC,, | Performed by: RADIOLOGY

## 2020-06-15 PROCEDURE — 76770 US EXAM ABDO BACK WALL COMP: CPT | Mod: 26,HCNC,, | Performed by: RADIOLOGY

## 2020-06-15 PROCEDURE — 76770 US EXAM ABDO BACK WALL COMP: CPT | Mod: TC,HCNC

## 2020-06-16 ENCOUNTER — OFFICE VISIT (OUTPATIENT)
Dept: UROLOGY | Facility: CLINIC | Age: 80
End: 2020-06-16
Payer: MEDICARE

## 2020-06-16 VITALS
HEIGHT: 68 IN | DIASTOLIC BLOOD PRESSURE: 81 MMHG | WEIGHT: 179.88 LBS | SYSTOLIC BLOOD PRESSURE: 119 MMHG | HEART RATE: 80 BPM | BODY MASS INDEX: 27.26 KG/M2

## 2020-06-16 DIAGNOSIS — N31.9 NEUROGENIC BLADDER: Primary | ICD-10-CM

## 2020-06-16 PROCEDURE — 99999 PR PBB SHADOW E&M-EST. PATIENT-LVL IV: CPT | Mod: PBBFAC,HCNC,, | Performed by: UROLOGY

## 2020-06-16 PROCEDURE — 3074F PR MOST RECENT SYSTOLIC BLOOD PRESSURE < 130 MM HG: ICD-10-PCS | Mod: HCNC,CPTII,S$GLB, | Performed by: UROLOGY

## 2020-06-16 PROCEDURE — 99214 PR OFFICE/OUTPT VISIT, EST, LEVL IV, 30-39 MIN: ICD-10-PCS | Mod: HCNC,S$GLB,, | Performed by: UROLOGY

## 2020-06-16 PROCEDURE — 3079F DIAST BP 80-89 MM HG: CPT | Mod: HCNC,CPTII,S$GLB, | Performed by: UROLOGY

## 2020-06-16 PROCEDURE — 1159F PR MEDICATION LIST DOCUMENTED IN MEDICAL RECORD: ICD-10-PCS | Mod: HCNC,S$GLB,, | Performed by: UROLOGY

## 2020-06-16 PROCEDURE — 1101F PT FALLS ASSESS-DOCD LE1/YR: CPT | Mod: HCNC,CPTII,S$GLB, | Performed by: UROLOGY

## 2020-06-16 PROCEDURE — 3074F SYST BP LT 130 MM HG: CPT | Mod: HCNC,CPTII,S$GLB, | Performed by: UROLOGY

## 2020-06-16 PROCEDURE — 1101F PR PT FALLS ASSESS DOC 0-1 FALLS W/OUT INJ PAST YR: ICD-10-PCS | Mod: HCNC,CPTII,S$GLB, | Performed by: UROLOGY

## 2020-06-16 PROCEDURE — 99214 OFFICE O/P EST MOD 30 MIN: CPT | Mod: HCNC,S$GLB,, | Performed by: UROLOGY

## 2020-06-16 PROCEDURE — 3079F PR MOST RECENT DIASTOLIC BLOOD PRESSURE 80-89 MM HG: ICD-10-PCS | Mod: HCNC,CPTII,S$GLB, | Performed by: UROLOGY

## 2020-06-16 PROCEDURE — 1159F MED LIST DOCD IN RCRD: CPT | Mod: HCNC,S$GLB,, | Performed by: UROLOGY

## 2020-06-16 PROCEDURE — 99999 PR PBB SHADOW E&M-EST. PATIENT-LVL IV: ICD-10-PCS | Mod: PBBFAC,HCNC,, | Performed by: UROLOGY

## 2020-07-09 ENCOUNTER — TELEPHONE (OUTPATIENT)
Dept: INTERNAL MEDICINE | Facility: CLINIC | Age: 80
End: 2020-07-09

## 2020-07-09 NOTE — TELEPHONE ENCOUNTER
Pt c/o sore throat, right ear ache, sore scalp, and bronchial congestion for one week. No cough, fever, chills, or sob.   Pt states that he has gargled with Listerine but it is not helping.

## 2020-07-09 NOTE — TELEPHONE ENCOUNTER
----- Message from Oli Raya sent at 7/9/2020 12:06 PM CDT -----  Contact: Pat (wife) [phone]  Would like to get medical advice.  Symptoms (please be specific):  sore throat, ear ache  How long has patient had these symptoms:  a week  Pharmacy name and phone #:  [facility] [place], [place] - [address] AT [facility] [address] [phone] (Phone)  [phone] (Fax)  Any drug allergies (copy from chart):      Would the patient rather a call back or a response via [facility]?:  call back  Comments:

## 2020-07-10 ENCOUNTER — OFFICE VISIT (OUTPATIENT)
Dept: INTERNAL MEDICINE | Facility: CLINIC | Age: 80
End: 2020-07-10
Payer: MEDICARE

## 2020-07-10 VITALS
SYSTOLIC BLOOD PRESSURE: 110 MMHG | DIASTOLIC BLOOD PRESSURE: 65 MMHG | BODY MASS INDEX: 27.36 KG/M2 | OXYGEN SATURATION: 98 % | HEIGHT: 68 IN | WEIGHT: 180.56 LBS

## 2020-07-10 DIAGNOSIS — R05.9 COUGH: ICD-10-CM

## 2020-07-10 DIAGNOSIS — R09.89 CHEST CONGESTION: ICD-10-CM

## 2020-07-10 DIAGNOSIS — R51.9 NONINTRACTABLE HEADACHE, UNSPECIFIED CHRONICITY PATTERN, UNSPECIFIED HEADACHE TYPE: ICD-10-CM

## 2020-07-10 DIAGNOSIS — J02.9 SORE THROAT: Primary | ICD-10-CM

## 2020-07-10 LAB
CTP QC/QA: YES
MOLECULAR STREP A: NEGATIVE

## 2020-07-10 PROCEDURE — 1101F PT FALLS ASSESS-DOCD LE1/YR: CPT | Mod: HCNC,CPTII,S$GLB, | Performed by: INTERNAL MEDICINE

## 2020-07-10 PROCEDURE — 3074F SYST BP LT 130 MM HG: CPT | Mod: HCNC,CPTII,S$GLB, | Performed by: INTERNAL MEDICINE

## 2020-07-10 PROCEDURE — 1159F MED LIST DOCD IN RCRD: CPT | Mod: HCNC,S$GLB,, | Performed by: INTERNAL MEDICINE

## 2020-07-10 PROCEDURE — 99213 OFFICE O/P EST LOW 20 MIN: CPT | Mod: HCNC,S$GLB,, | Performed by: INTERNAL MEDICINE

## 2020-07-10 PROCEDURE — 99999 PR PBB SHADOW E&M-EST. PATIENT-LVL IV: ICD-10-PCS | Mod: PBBFAC,HCNC,, | Performed by: INTERNAL MEDICINE

## 2020-07-10 PROCEDURE — 1101F PR PT FALLS ASSESS DOC 0-1 FALLS W/OUT INJ PAST YR: ICD-10-PCS | Mod: HCNC,CPTII,S$GLB, | Performed by: INTERNAL MEDICINE

## 2020-07-10 PROCEDURE — 1125F PR PAIN SEVERITY QUANTIFIED, PAIN PRESENT: ICD-10-PCS | Mod: HCNC,S$GLB,, | Performed by: INTERNAL MEDICINE

## 2020-07-10 PROCEDURE — 99213 PR OFFICE/OUTPT VISIT, EST, LEVL III, 20-29 MIN: ICD-10-PCS | Mod: HCNC,S$GLB,, | Performed by: INTERNAL MEDICINE

## 2020-07-10 PROCEDURE — 3078F PR MOST RECENT DIASTOLIC BLOOD PRESSURE < 80 MM HG: ICD-10-PCS | Mod: HCNC,CPTII,S$GLB, | Performed by: INTERNAL MEDICINE

## 2020-07-10 PROCEDURE — 3078F DIAST BP <80 MM HG: CPT | Mod: HCNC,CPTII,S$GLB, | Performed by: INTERNAL MEDICINE

## 2020-07-10 PROCEDURE — 99999 PR PBB SHADOW E&M-EST. PATIENT-LVL IV: CPT | Mod: PBBFAC,HCNC,, | Performed by: INTERNAL MEDICINE

## 2020-07-10 PROCEDURE — 3074F PR MOST RECENT SYSTOLIC BLOOD PRESSURE < 130 MM HG: ICD-10-PCS | Mod: HCNC,CPTII,S$GLB, | Performed by: INTERNAL MEDICINE

## 2020-07-10 PROCEDURE — 1159F PR MEDICATION LIST DOCUMENTED IN MEDICAL RECORD: ICD-10-PCS | Mod: HCNC,S$GLB,, | Performed by: INTERNAL MEDICINE

## 2020-07-10 PROCEDURE — U0003 INFECTIOUS AGENT DETECTION BY NUCLEIC ACID (DNA OR RNA); SEVERE ACUTE RESPIRATORY SYNDROME CORONAVIRUS 2 (SARS-COV-2) (CORONAVIRUS DISEASE [COVID-19]), AMPLIFIED PROBE TECHNIQUE, MAKING USE OF HIGH THROUGHPUT TECHNOLOGIES AS DESCRIBED BY CMS-2020-01-R: HCPCS | Mod: HCNC

## 2020-07-10 PROCEDURE — 87651 STREP A DNA AMP PROBE: CPT | Mod: QW,HCNC,S$GLB, | Performed by: INTERNAL MEDICINE

## 2020-07-10 PROCEDURE — 87651 POCT STREP A MOLECULAR: ICD-10-PCS | Mod: QW,HCNC,S$GLB, | Performed by: INTERNAL MEDICINE

## 2020-07-10 PROCEDURE — 1125F AMNT PAIN NOTED PAIN PRSNT: CPT | Mod: HCNC,S$GLB,, | Performed by: INTERNAL MEDICINE

## 2020-07-10 RX ORDER — DOXYCYCLINE 100 MG/1
100 CAPSULE ORAL 2 TIMES DAILY
Qty: 20 CAPSULE | Refills: 0 | Status: SHIPPED | OUTPATIENT
Start: 2020-07-10 | End: 2020-07-20

## 2020-07-10 NOTE — PROGRESS NOTES
Subjective:       Patient ID: Liang Monterroso Jr. is a 79 y.o. male.    Chief Complaint: Sore Throat    Patient complains of 1 week of head and chest congestion, ear congestion sore throat and cough.  No fever.  No shortness of breath.  He has been primarily isolating and socially distance Ng but has had a few trips to the store with a mask and was exposed to kids and grandkids for father's Day which was now about 4 weeks ago.  He treated the symptoms with limited relief.  He denies any purulent or bloody sputum.  Although he has had no direct exposure to coronavirus he does have obvious concerns just because of the current pandemic    Sore Throat   This is a new problem. The current episode started 1 to 4 weeks ago. The problem has been waxing and waning. The pain is worse on the right side. There has been no fever. The pain is at a severity of 6/10. The pain is moderate. Associated symptoms include ear pain, a plugged ear sensation and neck pain. Pertinent negatives include no abdominal pain, congestion, coughing, diarrhea, drooling, ear discharge, headaches, hoarse voice, shortness of breath, stridor, swollen glands, trouble swallowing or vomiting. He has had no exposure to strep or mono. He has tried gargles for the symptoms. The treatment provided no relief.     Review of Systems   Constitutional: Negative for chills, fatigue, fever and unexpected weight change.   HENT: Positive for ear pain and sore throat. Negative for nasal congestion, drooling, ear discharge, hoarse voice, nosebleeds and trouble swallowing.    Eyes: Negative for pain and visual disturbance.   Respiratory: Negative for cough, shortness of breath, wheezing and stridor.    Cardiovascular: Negative for chest pain and palpitations.   Gastrointestinal: Negative for abdominal pain, constipation, diarrhea, nausea and vomiting.   Genitourinary: Negative for difficulty urinating and hematuria.   Musculoskeletal: Positive for neck pain.   Integumentary:   Negative for rash.   Neurological: Negative for dizziness and headaches.   Hematological: Does not bruise/bleed easily.   Psychiatric/Behavioral: Negative for dysphoric mood, sleep disturbance and suicidal ideas.         Objective:      Physical Exam  Constitutional:       General: He is not in acute distress.     Appearance: He is well-developed.   HENT:      Head: Normocephalic and atraumatic.      Right Ear: Tympanic membrane and ear canal normal. There is no impacted cerumen.      Left Ear: Tympanic membrane, ear canal and external ear normal. There is no impacted cerumen.      Nose: Rhinorrhea present.      Mouth/Throat:      Pharynx: Posterior oropharyngeal erythema present. No oropharyngeal exudate.   Eyes:      General: No scleral icterus.     Conjunctiva/sclera: Conjunctivae normal.      Pupils: Pupils are equal, round, and reactive to light.   Neck:      Musculoskeletal: Normal range of motion and neck supple.      Thyroid: No thyromegaly.      Comments: No supraclavicular nodes palpated  Cardiovascular:      Rate and Rhythm: Normal rate and regular rhythm.      Heart sounds: Normal heart sounds. No murmur.   Pulmonary:      Effort: Pulmonary effort is normal.      Breath sounds: Normal breath sounds. No wheezing.   Abdominal:      General: Bowel sounds are normal.      Palpations: Abdomen is soft. There is no mass.      Tenderness: There is no abdominal tenderness.   Lymphadenopathy:      Cervical: No cervical adenopathy.   Skin:     Coloration: Skin is not pale.   Neurological:      Mental Status: He is alert and oriented to person, place, and time.         Assessment:       1. Sore throat    2. Cough    3. Chest congestion    4. Head ache        Plan:       Liang was seen today for sore throat.    Diagnoses and all orders for this visit:    Sore throat  -     POCT Strep A, Molecular  -     COVID-19 Routine Screening    Cough  -     POCT Strep A, Molecular  -     COVID-19 Routine Screening    Chest  congestion  -     POCT Strep A, Molecular  -     COVID-19 Routine Screening    Head ache  -     COVID-19 Routine Screening    Other orders  -     doxycycline (VIBRAMYCIN) 100 MG Cap; Take 1 capsule (100 mg total) by mouth 2 (two) times daily. for 10 days          Strep test negative.  Will place on doxycycline to cover sinuses and chest but also arrange for COVID testing.

## 2020-07-13 LAB — SARS-COV-2 RNA RESP QL NAA+PROBE: NOT DETECTED

## 2020-07-23 DIAGNOSIS — N39.0 URINARY TRACT INFECTION WITHOUT HEMATURIA, SITE UNSPECIFIED: Primary | ICD-10-CM

## 2020-07-24 ENCOUNTER — LAB VISIT (OUTPATIENT)
Dept: LAB | Facility: HOSPITAL | Age: 80
End: 2020-07-24
Attending: UROLOGY
Payer: MEDICARE

## 2020-07-24 DIAGNOSIS — N39.0 URINARY TRACT INFECTION WITHOUT HEMATURIA, SITE UNSPECIFIED: ICD-10-CM

## 2020-07-24 PROCEDURE — 87086 URINE CULTURE/COLONY COUNT: CPT | Mod: HCNC

## 2020-07-24 PROCEDURE — 87186 SC STD MICRODIL/AGAR DIL: CPT | Mod: HCNC

## 2020-07-24 PROCEDURE — 87077 CULTURE AEROBIC IDENTIFY: CPT | Mod: HCNC

## 2020-07-24 PROCEDURE — 87088 URINE BACTERIA CULTURE: CPT | Mod: HCNC

## 2020-07-27 ENCOUNTER — PATIENT OUTREACH (OUTPATIENT)
Dept: ADMINISTRATIVE | Facility: OTHER | Age: 80
End: 2020-07-27

## 2020-07-27 DIAGNOSIS — N35.911 STRICTURE OF URETHRAL MEATUS IN MALE, UNSPECIFIED STRICTURE TYPE: Primary | ICD-10-CM

## 2020-07-27 LAB — BACTERIA UR CULT: ABNORMAL

## 2020-07-28 ENCOUNTER — OFFICE VISIT (OUTPATIENT)
Dept: UROLOGY | Facility: CLINIC | Age: 80
End: 2020-07-28
Payer: MEDICARE

## 2020-07-28 VITALS
HEART RATE: 113 BPM | WEIGHT: 181.19 LBS | BODY MASS INDEX: 27.55 KG/M2 | DIASTOLIC BLOOD PRESSURE: 90 MMHG | SYSTOLIC BLOOD PRESSURE: 133 MMHG

## 2020-07-28 DIAGNOSIS — A49.9 BACTERIAL UTI: Primary | ICD-10-CM

## 2020-07-28 DIAGNOSIS — R39.198 DIFFICULTY URINATING: ICD-10-CM

## 2020-07-28 DIAGNOSIS — N39.0 BACTERIAL UTI: Primary | ICD-10-CM

## 2020-07-28 DIAGNOSIS — Q54.9 HYPOSPADIAS, UNSPECIFIED HYPOSPADIAS TYPE: ICD-10-CM

## 2020-07-28 PROCEDURE — 3075F PR MOST RECENT SYSTOLIC BLOOD PRESS GE 130-139MM HG: ICD-10-PCS | Mod: HCNC,CPTII,S$GLB, | Performed by: PHYSICIAN ASSISTANT

## 2020-07-28 PROCEDURE — 1101F PT FALLS ASSESS-DOCD LE1/YR: CPT | Mod: HCNC,CPTII,S$GLB, | Performed by: PHYSICIAN ASSISTANT

## 2020-07-28 PROCEDURE — 3075F SYST BP GE 130 - 139MM HG: CPT | Mod: HCNC,CPTII,S$GLB, | Performed by: PHYSICIAN ASSISTANT

## 2020-07-28 PROCEDURE — 99214 PR OFFICE/OUTPT VISIT, EST, LEVL IV, 30-39 MIN: ICD-10-PCS | Mod: HCNC,S$GLB,, | Performed by: PHYSICIAN ASSISTANT

## 2020-07-28 PROCEDURE — 99999 PR PBB SHADOW E&M-EST. PATIENT-LVL III: ICD-10-PCS | Mod: PBBFAC,HCNC,, | Performed by: PHYSICIAN ASSISTANT

## 2020-07-28 PROCEDURE — 1101F PR PT FALLS ASSESS DOC 0-1 FALLS W/OUT INJ PAST YR: ICD-10-PCS | Mod: HCNC,CPTII,S$GLB, | Performed by: PHYSICIAN ASSISTANT

## 2020-07-28 PROCEDURE — 1159F MED LIST DOCD IN RCRD: CPT | Mod: HCNC,S$GLB,, | Performed by: PHYSICIAN ASSISTANT

## 2020-07-28 PROCEDURE — 1159F PR MEDICATION LIST DOCUMENTED IN MEDICAL RECORD: ICD-10-PCS | Mod: HCNC,S$GLB,, | Performed by: PHYSICIAN ASSISTANT

## 2020-07-28 PROCEDURE — 3080F PR MOST RECENT DIASTOLIC BLOOD PRESSURE >= 90 MM HG: ICD-10-PCS | Mod: HCNC,CPTII,S$GLB, | Performed by: PHYSICIAN ASSISTANT

## 2020-07-28 PROCEDURE — 1125F AMNT PAIN NOTED PAIN PRSNT: CPT | Mod: HCNC,S$GLB,, | Performed by: PHYSICIAN ASSISTANT

## 2020-07-28 PROCEDURE — 99214 OFFICE O/P EST MOD 30 MIN: CPT | Mod: HCNC,S$GLB,, | Performed by: PHYSICIAN ASSISTANT

## 2020-07-28 PROCEDURE — 1125F PR PAIN SEVERITY QUANTIFIED, PAIN PRESENT: ICD-10-PCS | Mod: HCNC,S$GLB,, | Performed by: PHYSICIAN ASSISTANT

## 2020-07-28 PROCEDURE — 3080F DIAST BP >= 90 MM HG: CPT | Mod: HCNC,CPTII,S$GLB, | Performed by: PHYSICIAN ASSISTANT

## 2020-07-28 PROCEDURE — 99999 PR PBB SHADOW E&M-EST. PATIENT-LVL III: CPT | Mod: PBBFAC,HCNC,, | Performed by: PHYSICIAN ASSISTANT

## 2020-07-28 RX ORDER — AMOXICILLIN AND CLAVULANATE POTASSIUM 875; 125 MG/1; MG/1
1 TABLET, FILM COATED ORAL 2 TIMES DAILY
Qty: 14 TABLET | Refills: 0 | Status: SHIPPED | OUTPATIENT
Start: 2020-07-28 | End: 2020-08-04

## 2020-07-28 RX ORDER — CIPROFLOXACIN 500 MG/1
500 TABLET ORAL 2 TIMES DAILY
Qty: 60 TABLET | Refills: 0 | Status: SHIPPED | OUTPATIENT
Start: 2020-07-28 | End: 2020-08-27

## 2020-07-28 NOTE — PROGRESS NOTES
CHIEF COMPLAINT:    Mr. Monterroso is a 80 y.o. male presenting for difficulty catheterizing.    PRESENTING ILLNESS:    Liang Monterroso Jr. is a 80 y.o. male with a PMH of meatal stenosis Peyronie's plaques and neurogenic bladder who presents for difficulty catheterizing.    Last Thursday he thought he had an UTI so he started taking bactrim.    He reports difficulty urinating, and dysuria.    The bactrim does not seem to be helping.  He usually catheterizes three times a day but lately has been having to catheterize four times a day.  The last few days he has had difficulty getting the catheter pass his sphincter.    He dropped off a urine specimen last week.  It is positive for e coli but resistant to bactrim.        REVIEW OF SYSTEMS:    Constitutional: Negative for fever and chills.   HENT: Negative for hearing loss.   Eyes: Negative for visual disturbance.   Respiratory: Negative for shortness of breath.   Cardiovascular: Negative for chest pain.   Gastrointestinal: Negative for vomiting, and constipation.   Genitourinary: See HPI  Neurological: Negative for dizziness.   Hematological: Does not bruise/bleed easily.   Psychiatric/Behavioral: Negative for confusion.       PATIENT HISTORY:    Past Medical History:   Diagnosis Date    Allergy     CKD (chronic kidney disease) stage 3, GFR 30-59 ml/min 6/15/2016    GERD (gastroesophageal reflux disease)     Hx of colonic polyp     Hyperlipidemia     Hypertension     Hypospadias in male     Hypothyroidism     Sleep apnea     Thyroid disease     Urinary tract infection        Past Surgical History:   Procedure Laterality Date    APPENDECTOMY      CATARACT EXTRACTION      EYE SURGERY      HERNIA REPAIR      TONSILLECTOMY         Family History   Problem Relation Age of Onset    Diabetes Mother     Heart disease Mother     Hypertension Mother     Vision loss Mother     Dementia Mother     Alcohol abuse Father     Cancer Sister         lung with mets, was a  heavy smoker    No Known Problems Daughter     No Known Problems Son     No Known Problems Daughter     No Known Problems Son     Amblyopia Neg Hx     Blindness Neg Hx     Cataracts Neg Hx     Glaucoma Neg Hx     Macular degeneration Neg Hx     Retinal detachment Neg Hx     Strabismus Neg Hx     Stroke Neg Hx     Thyroid disease Neg Hx        Social History     Socioeconomic History    Marital status:      Spouse name: parul    Number of children: 4    Years of education: Not on file    Highest education level: Not on file   Occupational History    Occupation: retired   Social Needs    Financial resource strain: Not hard at all    Food insecurity     Worry: Never true     Inability: Never true    Transportation needs     Medical: No     Non-medical: No   Tobacco Use    Smoking status: Never Smoker    Smokeless tobacco: Never Used   Substance and Sexual Activity    Alcohol use: Yes     Frequency: 2-4 times a month     Drinks per session: 1 or 2     Binge frequency: Never     Comment: 1-3 glasses wine weekly, 2-3 beers weekly    Drug use: No    Sexual activity: Yes     Partners: Female   Lifestyle    Physical activity     Days per week: 3 days     Minutes per session: 80 min    Stress: Not at all   Relationships    Social connections     Talks on phone: Twice a week     Gets together: Once a week     Attends Nondenominational service: Not on file     Active member of club or organization: No     Attends meetings of clubs or organizations: Never     Relationship status:    Other Topics Concern    Not on file   Social History Narrative    Not on file       Allergies:  Contrast media    Medications:    Current Outpatient Medications:     albuterol (VENTOLIN HFA) 90 mcg/actuation inhaler, Inhale 2 puffs into the lungs every 6 (six) hours as needed. Rescue, Disp: 18 g, Rfl: 0    aspirin (ECOTRIN) 81 MG EC tablet, Take 81 mg by mouth once daily., Disp: , Rfl:     econazole nitrate  1 % cream, USE TWICE DAILY (Patient taking differently: USE TWICE DAILY (prn)), Disp: 60 g, Rfl: 5    fluticasone propionate (FLONASE) 50 mcg/actuation nasal spray, 1 spray (50 mcg total) by Each Nostril route once daily., Disp: 1 Bottle, Rfl: 0    levothyroxine (SYNTHROID) 112 MCG tablet, TAKE 1 TABLET BY MOUTH BEFORE BREAKFAST, Disp: 90 tablet, Rfl: 12    metronidazole (METROGEL) 0.75 % gel, Use hs, Disp: 45 g, Rfl: 3    multivitamin with minerals tablet, Take 1 tablet by mouth once daily.  , Disp: , Rfl:     polyethylene glycol (GLYCOLAX) 17 gram PwPk, Take 17 g by mouth once daily., Disp: 30 packet, Rfl: 0    pravastatin (PRAVACHOL) 20 MG tablet, TAKE 1 TABLET BY MOUTH ONE TIME DAILY, Disp: 90 tablet, Rfl: 4    ramipriL (ALTACE) 5 MG capsule, TAKE 1 CAPSULE(5 MG) BY MOUTH EVERY DAY, Disp: 90 capsule, Rfl: 0    tamsulosin (FLOMAX) 0.4 mg Cap, TAKE 2 CAPSULES BY MOUTH EVERY DAY, Disp: 180 capsule, Rfl: 0    triamcinolone acetonide 0.1% (KENALOG) 0.1 % cream, Use bid prn rash, Disp: 80 g, Rfl: 3    amoxicillin-clavulanate 875-125mg (AUGMENTIN) 875-125 mg per tablet, Take 1 tablet by mouth 2 (two) times daily. for 7 days, Disp: 14 tablet, Rfl: 0    fexofenadine (ALLEGRA) 180 MG tablet, Take 1 tablet (180 mg total) by mouth once daily. (Patient taking differently: Take 180 mg by mouth daily as needed. ), Disp: 30 tablet, Rfl: 11    Current Facility-Administered Medications:     ciprofloxacin HCl tablet 500 mg, 500 mg, Oral, Once, Anastacio Eid Jr., MD    PHYSICAL EXAMINATION:    Constitutional: He appears well-developed and well-nourished.  He is in no apparent distress.    Eyes: No scleral icterus noted bilaterally. No discharge bilaterally.    Nose: No rhinorrhea    Cardiovascular: Normal rate.  No pitting edema noted in lower extremities bilaterally    Pulmonary/Chest: Effort normal. No respiratory distress.     Abdominal:  He exhibits no distension.      Neurological: He is alert and oriented to  person, place, and time.     Skin: Skin is warm and dry.     Psych: Cooperative with normal affect.    Genitourinary: Hypospadias noted   Physical Exam    Bladder scan performed by Nurse Marline.  PVR >387ml  LABS:    Lab Results   Component Value Date    PSA 1.8 08/03/2018    PSA 0.91 12/17/2012    PSA 4.89 (H) 11/12/2012    PSA 0.66 11/14/2011    PSA 0.81 11/03/2010    PSA 0.86 10/26/2009    PSA 0.59 10/14/2008    PSA 0.6 10/22/2007    PSA 0.9 10/23/2006    PSA 0.7 08/15/2005    PSADIAG 1.0 01/11/2016    PSADIAG 1.0 12/04/2014    PSADIAG 0.67 12/03/2013       IMPRESSION:    Encounter Diagnoses   Name Primary?    Bacterial UTI Yes    Hypospadias, unspecified hypospadias type     Difficulty urinating          PLAN:    Stop bactrim.    Augmenting sent to pharmacy.   Bladder scan showed >387ml.  I&O catheterization was done by me without difficulty using sterile technique.  450ml of yellow urine was drained from bladder.    He will continue to perform CIC      I spent 25 minutes with the patient of which more than half was spent in direct consultation with the patient in regards to our treatment and plan.      Jeannette Rocha PA-C

## 2020-08-04 ENCOUNTER — PROCEDURE VISIT (OUTPATIENT)
Dept: UROLOGY | Facility: CLINIC | Age: 80
End: 2020-08-04
Payer: MEDICARE

## 2020-08-04 VITALS
RESPIRATION RATE: 17 BRPM | SYSTOLIC BLOOD PRESSURE: 117 MMHG | WEIGHT: 177.88 LBS | HEIGHT: 68 IN | HEART RATE: 93 BPM | BODY MASS INDEX: 26.96 KG/M2 | TEMPERATURE: 98 F | DIASTOLIC BLOOD PRESSURE: 73 MMHG

## 2020-08-04 DIAGNOSIS — N35.911 STRICTURE OF URETHRAL MEATUS IN MALE, UNSPECIFIED STRICTURE TYPE: ICD-10-CM

## 2020-08-04 PROCEDURE — 52281 CYSTOSCOPY AND TREATMENT: CPT | Mod: HCNC,S$GLB,, | Performed by: UROLOGY

## 2020-08-04 PROCEDURE — 52281 PR CYSTOSCOPY,DIL URETHRAL STRICTURE: ICD-10-PCS | Mod: HCNC,S$GLB,, | Performed by: UROLOGY

## 2020-08-04 RX ORDER — LIDOCAINE HYDROCHLORIDE 20 MG/ML
JELLY TOPICAL
Status: COMPLETED | OUTPATIENT
Start: 2020-08-04 | End: 2020-08-04

## 2020-08-04 RX ADMIN — LIDOCAINE HYDROCHLORIDE: 20 JELLY TOPICAL at 02:08

## 2020-08-04 NOTE — PROCEDURES
Procedures   * No surgery found *     Procedures: Flexible cystourethroscopy      Pre Procedure Diagnosis: meatal stenosis, usd, neurogenic bladder    Post Procedure Diagnosis: same    Surgeon: Anastacio Eid MD    Anesthesia: 2% uro-jet lidocaine jelly for local analgesia   Meatus dilated easily to 24 f  Mild bulbar stricture  Flexible cysto-urethroscopy was performed after consent was obtained.  The risks and benefits were explained.    2% lidocaine urojet was used for local analgesia.  The genitalia was prepped and draped in the sterile fashion.  The flexible scope was advanced into the urethra and into the bladder.  Bilateral ureteral orifice were evaluated and noted to be normal with clear efflux.  The bladder was completely surveyed in a systematic fashion.   No bladder tumors or lesions were seen.  No strictures were noted.  The prostate showed No hypertrophy.  There was No median lobe present.    The patient tolerated the procedure well without complication.    They will follow up in prn sxs    Do CIC 4x per day w 14 F coudee tipped catheter indefinitely

## 2020-08-04 NOTE — PATIENT INSTRUCTIONS
What to Expect After a Cystoscopy  For the next 24-48 hours, you may feel a mild burning when you urinate. This burning is normal and expected. Drink plenty of water to dilute the urine to help relieve the burning sensation. You may also see a small amount of blood in your urine after the procedure.    Unless you are already taking antibiotics, you may be given an antibiotic after the test to prevent infection.    Signs and Symptoms to Report  Call the [facility] at [phone] if you develop any of the following:  · Fever of 101 degrees or higher  · Chills or persistent bleeding  · Inability to urinate

## 2020-09-04 DIAGNOSIS — R30.0 DYSURIA: Primary | ICD-10-CM

## 2020-09-05 ENCOUNTER — LAB VISIT (OUTPATIENT)
Dept: LAB | Facility: HOSPITAL | Age: 80
End: 2020-09-05
Payer: MEDICARE

## 2020-09-05 DIAGNOSIS — R30.0 DYSURIA: ICD-10-CM

## 2020-09-05 PROCEDURE — 87086 URINE CULTURE/COLONY COUNT: CPT | Mod: HCNC

## 2020-09-06 ENCOUNTER — PATIENT OUTREACH (OUTPATIENT)
Dept: ADMINISTRATIVE | Facility: OTHER | Age: 80
End: 2020-09-06

## 2020-09-06 LAB — BACTERIA UR CULT: NO GROWTH

## 2020-09-09 ENCOUNTER — OFFICE VISIT (OUTPATIENT)
Dept: DERMATOLOGY | Facility: CLINIC | Age: 80
End: 2020-09-09
Payer: MEDICARE

## 2020-09-09 VITALS — BODY MASS INDEX: 26.91 KG/M2 | WEIGHT: 177 LBS

## 2020-09-09 DIAGNOSIS — L81.4 LENTIGINES: ICD-10-CM

## 2020-09-09 DIAGNOSIS — L82.1 SEBORRHEIC KERATOSES: Primary | ICD-10-CM

## 2020-09-09 DIAGNOSIS — L72.0 EPIDERMAL INCLUSION CYST: ICD-10-CM

## 2020-09-09 PROCEDURE — 1101F PR PT FALLS ASSESS DOC 0-1 FALLS W/OUT INJ PAST YR: ICD-10-PCS | Mod: HCNC,CPTII,S$GLB, | Performed by: DERMATOLOGY

## 2020-09-09 PROCEDURE — 99213 OFFICE O/P EST LOW 20 MIN: CPT | Mod: HCNC,S$GLB,, | Performed by: DERMATOLOGY

## 2020-09-09 PROCEDURE — 1126F AMNT PAIN NOTED NONE PRSNT: CPT | Mod: HCNC,S$GLB,, | Performed by: DERMATOLOGY

## 2020-09-09 PROCEDURE — 99999 PR PBB SHADOW E&M-EST. PATIENT-LVL III: CPT | Mod: PBBFAC,HCNC,, | Performed by: DERMATOLOGY

## 2020-09-09 PROCEDURE — 1159F PR MEDICATION LIST DOCUMENTED IN MEDICAL RECORD: ICD-10-PCS | Mod: HCNC,S$GLB,, | Performed by: DERMATOLOGY

## 2020-09-09 PROCEDURE — 1126F PR PAIN SEVERITY QUANTIFIED, NO PAIN PRESENT: ICD-10-PCS | Mod: HCNC,S$GLB,, | Performed by: DERMATOLOGY

## 2020-09-09 PROCEDURE — 1159F MED LIST DOCD IN RCRD: CPT | Mod: HCNC,S$GLB,, | Performed by: DERMATOLOGY

## 2020-09-09 PROCEDURE — 1101F PT FALLS ASSESS-DOCD LE1/YR: CPT | Mod: HCNC,CPTII,S$GLB, | Performed by: DERMATOLOGY

## 2020-09-09 PROCEDURE — 99213 PR OFFICE/OUTPT VISIT, EST, LEVL III, 20-29 MIN: ICD-10-PCS | Mod: HCNC,S$GLB,, | Performed by: DERMATOLOGY

## 2020-09-09 PROCEDURE — 99999 PR PBB SHADOW E&M-EST. PATIENT-LVL III: ICD-10-PCS | Mod: PBBFAC,HCNC,, | Performed by: DERMATOLOGY

## 2020-09-09 NOTE — PROGRESS NOTES
Subjective:       Patient ID:  Liang Monterroso Jr. is a 80 y.o. male who presents for   Chief Complaint   Patient presents with    Skin Check     UBSE    Lesion     back, irritated     Spots on right chest and left ear which itch.  Also a cyst on his back not painful no tx.     Lesion - Initial  Affected locations: face, left arm, right arm, chest, torso, back and abdomen  Signs / symptoms: asymptomatic  Aggravated by: nothing  Relieving factors/Treatments tried: nothing        Review of Systems   Constitutional: Negative for fever, chills, weight loss, weight gain, fatigue, night sweats and malaise.   Skin: Negative for daily sunscreen use, activity-related sunscreen use and wears hat.   Hematologic/Lymphatic: Does not bruise/bleed easily.        Objective:    Physical Exam   Constitutional: He appears well-developed and well-nourished. No distress.   Neurological: He is alert and oriented to person, place, and time. He is not disoriented.   Psychiatric: He has a normal mood and affect.   Skin:   Areas Examined (abnormalities noted in diagram):   Scalp / Hair Palpated and Inspected  Head / Face Inspection Performed  Neck Inspection Performed  Chest / Axilla Inspection Performed  Back Inspection Performed  RUE Inspected  LUE Inspection Performed                   Diagram Legend     Erythematous scaling macule/papule c/w actinic keratosis       Vascular papule c/w angioma      Pigmented verrucoid papule/plaque c/w seborrheic keratosis      Yellow umbilicated papule c/w sebaceous hyperplasia      Irregularly shaped tan macule c/w lentigo     1-2 mm smooth white papules consistent with Milia      Movable subcutaneous cyst with punctum c/w epidermal inclusion cyst      Subcutaneous movable cyst c/w pilar cyst      Firm pink to brown papule c/w dermatofibroma      Pedunculated fleshy papule(s) c/w skin tag(s)      Evenly pigmented macule c/w junctional nevus     Mildly variegated pigmented, slightly irregular-bordered  "macule c/w mildly atypical nevus      Flesh colored to evenly pigmented papule c/w intradermal nevus       Pink pearly papule/plaque c/w basal cell carcinoma      Erythematous hyperkeratotic cursted plaque c/w SCC      Surgical scar with no sign of skin cancer recurrence      Open and closed comedones      Inflammatory papules and pustules      Verrucoid papule consistent consistent with wart     Erythematous eczematous patches and plaques     Dystrophic onycholytic nail with subungual debris c/w onychomycosis     Umbilicated papule    Erythematous-base heme-crusted tan verrucoid plaque consistent with inflamed seborrheic keratosis     Erythematous Silvery Scaling Plaque c/w Psoriasis     See annotation      Assessment / Plan:        Seborrheic keratoses  reassurance      Lentigines  The "ABCD" rules to observe pigmented lesions were reviewed.      Epidermal inclusion cyst  reassurance               Follow up in about 1 year (around 9/9/2021).  "

## 2020-09-21 ENCOUNTER — OFFICE VISIT (OUTPATIENT)
Dept: INTERNAL MEDICINE | Facility: CLINIC | Age: 80
End: 2020-09-21
Payer: MEDICARE

## 2020-09-21 VITALS
HEART RATE: 70 BPM | OXYGEN SATURATION: 98 % | BODY MASS INDEX: 27.59 KG/M2 | SYSTOLIC BLOOD PRESSURE: 122 MMHG | WEIGHT: 181.44 LBS | DIASTOLIC BLOOD PRESSURE: 86 MMHG

## 2020-09-21 DIAGNOSIS — L72.9 CUTANEOUS CYST: Primary | ICD-10-CM

## 2020-09-21 PROCEDURE — 1101F PR PT FALLS ASSESS DOC 0-1 FALLS W/OUT INJ PAST YR: ICD-10-PCS | Mod: HCNC,CPTII,S$GLB, | Performed by: INTERNAL MEDICINE

## 2020-09-21 PROCEDURE — 3079F DIAST BP 80-89 MM HG: CPT | Mod: HCNC,CPTII,S$GLB, | Performed by: INTERNAL MEDICINE

## 2020-09-21 PROCEDURE — 1126F AMNT PAIN NOTED NONE PRSNT: CPT | Mod: HCNC,S$GLB,, | Performed by: INTERNAL MEDICINE

## 2020-09-21 PROCEDURE — 3074F PR MOST RECENT SYSTOLIC BLOOD PRESSURE < 130 MM HG: ICD-10-PCS | Mod: HCNC,CPTII,S$GLB, | Performed by: INTERNAL MEDICINE

## 2020-09-21 PROCEDURE — 99213 PR OFFICE/OUTPT VISIT, EST, LEVL III, 20-29 MIN: ICD-10-PCS | Mod: HCNC,S$GLB,, | Performed by: INTERNAL MEDICINE

## 2020-09-21 PROCEDURE — 1101F PT FALLS ASSESS-DOCD LE1/YR: CPT | Mod: HCNC,CPTII,S$GLB, | Performed by: INTERNAL MEDICINE

## 2020-09-21 PROCEDURE — 1159F MED LIST DOCD IN RCRD: CPT | Mod: HCNC,S$GLB,, | Performed by: INTERNAL MEDICINE

## 2020-09-21 PROCEDURE — 3074F SYST BP LT 130 MM HG: CPT | Mod: HCNC,CPTII,S$GLB, | Performed by: INTERNAL MEDICINE

## 2020-09-21 PROCEDURE — 1159F PR MEDICATION LIST DOCUMENTED IN MEDICAL RECORD: ICD-10-PCS | Mod: HCNC,S$GLB,, | Performed by: INTERNAL MEDICINE

## 2020-09-21 PROCEDURE — 99999 PR PBB SHADOW E&M-EST. PATIENT-LVL III: CPT | Mod: PBBFAC,HCNC,, | Performed by: INTERNAL MEDICINE

## 2020-09-21 PROCEDURE — 99999 PR PBB SHADOW E&M-EST. PATIENT-LVL III: ICD-10-PCS | Mod: PBBFAC,HCNC,, | Performed by: INTERNAL MEDICINE

## 2020-09-21 PROCEDURE — 1126F PR PAIN SEVERITY QUANTIFIED, NO PAIN PRESENT: ICD-10-PCS | Mod: HCNC,S$GLB,, | Performed by: INTERNAL MEDICINE

## 2020-09-21 PROCEDURE — 99213 OFFICE O/P EST LOW 20 MIN: CPT | Mod: HCNC,S$GLB,, | Performed by: INTERNAL MEDICINE

## 2020-09-21 PROCEDURE — 3079F PR MOST RECENT DIASTOLIC BLOOD PRESSURE 80-89 MM HG: ICD-10-PCS | Mod: HCNC,CPTII,S$GLB, | Performed by: INTERNAL MEDICINE

## 2020-09-21 RX ORDER — CEPHALEXIN 500 MG/1
500 CAPSULE ORAL EVERY 8 HOURS
Qty: 30 CAPSULE | Refills: 0 | Status: SHIPPED | OUTPATIENT
Start: 2020-09-21 | End: 2020-10-01

## 2020-09-21 NOTE — PROGRESS NOTES
Subjective:       Patient ID: Liang Monterroso Jr. is a 80 y.o. male.    Chief Complaint: Cyst    Patient states he has had a cutaneous cyst on his back for quite some time, many years.  He actually recently showed his dermatologist a few weeks ago and at the time it was unremarkable and no treatment was needed.  In the last several days however it has become somewhat red and swollen and he used heat and a topical antibiotic and actually started draining.  He wanted me to take a look at it to determine if anything needed to be done.     Review of Systems   Constitutional: Negative for activity change and unexpected weight change.   HENT: Negative for hearing loss, rhinorrhea and trouble swallowing.    Eyes: Negative for discharge and visual disturbance.   Respiratory: Negative for chest tightness and wheezing.    Cardiovascular: Negative for chest pain and palpitations.   Gastrointestinal: Negative for blood in stool, constipation, diarrhea and vomiting.   Endocrine: Negative for polydipsia and polyuria.   Genitourinary: Negative for difficulty urinating, hematuria and urgency.   Musculoskeletal: Negative for arthralgias, joint swelling and neck pain.   Integumentary:  Positive for wound (Draining cyst lower back).   Neurological: Negative for weakness and headaches.   Psychiatric/Behavioral: Negative for confusion and dysphoric mood.         Objective:      Physical Exam  Constitutional:       Appearance: Normal appearance.   Skin:     Findings: Erythema and lesion present. No rash.      Comments: Mild tenderness in the center between small crust and the draining hole.  In palpating the area thick white cystic material was expressed.  No blood.  I was able to express for 1 cm long ribbons about 4 mm in diameter   Neurological:      General: No focal deficit present.      Mental Status: He is alert and oriented to person, place, and time.   Psychiatric:         Behavior: Behavior normal.                 Assessment:        1. Cutaneous cyst        Plan:       Liang was seen today for cyst.    Diagnoses and all orders for this visit:    Cutaneous cyst    Other orders  -     cephALEXin (KEFLEX) 500 MG capsule; Take 1 capsule (500 mg total) by mouth every 8 (eight) hours. for 10 days          Warm compresses, oral antibiotic.  May continue topical antibiotic.  Call if symptoms fail to improve or for spread of any redness or tenderness.  At which time we would have the patient consider seeing a surgeon for definitive incision and drainage

## 2020-09-29 ENCOUNTER — PATIENT MESSAGE (OUTPATIENT)
Dept: OTHER | Facility: OTHER | Age: 80
End: 2020-09-29

## 2020-10-01 ENCOUNTER — OFFICE VISIT (OUTPATIENT)
Dept: OPTOMETRY | Facility: CLINIC | Age: 80
End: 2020-10-01
Payer: MEDICARE

## 2020-10-01 DIAGNOSIS — Z96.1 PSEUDOPHAKIA: ICD-10-CM

## 2020-10-01 DIAGNOSIS — Z98.42 S/P CATARACT SURGERY, LEFT: ICD-10-CM

## 2020-10-01 DIAGNOSIS — H26.493 POSTERIOR CAPSULAR OPACIFICATION NON VISUALLY SIGNIFICANT OF BOTH EYES: ICD-10-CM

## 2020-10-01 DIAGNOSIS — H35.363 MACULAR DRUSEN, BILATERAL: Primary | ICD-10-CM

## 2020-10-01 DIAGNOSIS — H52.203 ASTIGMATISM OF BOTH EYES, UNSPECIFIED TYPE: ICD-10-CM

## 2020-10-01 DIAGNOSIS — Z98.41 S/P CATARACT SURGERY, RIGHT: ICD-10-CM

## 2020-10-01 PROCEDURE — 92015 DETERMINE REFRACTIVE STATE: CPT | Mod: HCNC,S$GLB,, | Performed by: OPTOMETRIST

## 2020-10-01 PROCEDURE — 99999 PR PBB SHADOW E&M-EST. PATIENT-LVL III: CPT | Mod: PBBFAC,HCNC,, | Performed by: OPTOMETRIST

## 2020-10-01 PROCEDURE — 92015 PR REFRACTION: ICD-10-PCS | Mod: HCNC,S$GLB,, | Performed by: OPTOMETRIST

## 2020-10-01 PROCEDURE — 99999 PR PBB SHADOW E&M-EST. PATIENT-LVL III: ICD-10-PCS | Mod: PBBFAC,HCNC,, | Performed by: OPTOMETRIST

## 2020-10-01 PROCEDURE — 92014 COMPRE OPH EXAM EST PT 1/>: CPT | Mod: HCNC,S$GLB,, | Performed by: OPTOMETRIST

## 2020-10-01 PROCEDURE — 92014 PR EYE EXAM, EST PATIENT,COMPREHESV: ICD-10-PCS | Mod: HCNC,S$GLB,, | Performed by: OPTOMETRIST

## 2020-10-01 NOTE — PROGRESS NOTES
"HPI     Annual Exam      Additional comments: Annual general eye examination and refraction  Pt states he noticed in his line of vision he see "hills or lines". Not   sure if OD or OS, or OU               Comments     Patient's age: 80 y.o. WM  Occupation: Retired   Approximate date of last eye examination:  09/05/2019  Name of last eye doctor seen: Dr. Gill  City/State: [place]  Wears glasses? Yes, OTC      If yes, wears  Full-time or part-time?  Part time SV  Wears CLs?:  no  Headaches?  no  Eye pain/discomfort?  no                                                                         Flashes?  no  Floaters?  Rarely   Diplopia/Double vision?  no  Patient's Ocular History:         Any eye surgeries? PCIOL O[facility]         Any eye injury?  no         Any treatment for eye disease?  no  Family history of eye disease?  no  Significant patient medical history:         1. Diabetes?  no       If yes, IDDM or NIDDM? no   2. HBP?  Yes, controlled with medication              3. Other (describe):  Prostate, Hyperipidemia   ! OTC eyedrops currently using:  AT's prn Optive   ! Prescription eye meds currently using:  no   ! Any history of allergy/adverse reaction to any eye meds used   previously?  no    ! Any history of allergy/adverse reaction to eyedrops used during prior   eye exam(s)? no    ! Any history of allergy/adverse reaction to Novacaine or similar meds?   no   ! Any history of allergy/adverse reaction to Epinephrine or similar meds?   no    ! Patient okay with use of anesthetic eyedrops to check eye pressure?    yes        ! Patient okay with use of eyedrops to dilate pupils today?  yes   !  Allergies/Medications/Medical History/Family History reviewed today?    yes      PD =   63/59  Desired reading distance =  17.5"                                                                        Last edited by Abdiaziz Gill, OD on 10/1/2020 10:21 AM. (History)            Assessment /Plan     For exam results, see " Encounter Report.    1. Macular drusen, bilateral     2. Posterior capsular opacification non visually significant of both eyes     3. S/P cataract surgery, left     4. S/P cataract surgery, right     5. Pseudophakia - Both Eyes     6. Astigmatism of both eyes, unspecified type                    Bilateral macular changes (drusen and pigmentary changes) consistent with age-related macular degeneration, as noted previously.  Confirmed with OCT of retina done today.     Continue/resume taking Ocuvite supplement by mouth (recommend AREDS-2) formuation.     S/P cataract surgery in both eyes, with bilateral posterior chamber intraocular lens.  Mild clouding/opacification of the posterior lens capsule in both eyes.   Still no need for YAG laser posterior capsulotomy at this time in either eye.       Astigmatic refractive error in each eye. Good best-corrected VA in each eye.  New spectacle lens Rx issued for use as desired.     Recheck in one year.

## 2020-10-08 ENCOUNTER — PATIENT MESSAGE (OUTPATIENT)
Dept: UROLOGY | Facility: CLINIC | Age: 80
End: 2020-10-08

## 2020-10-08 ENCOUNTER — OFFICE VISIT (OUTPATIENT)
Dept: UROLOGY | Facility: CLINIC | Age: 80
End: 2020-10-08
Payer: MEDICARE

## 2020-10-08 VITALS
HEART RATE: 75 BPM | SYSTOLIC BLOOD PRESSURE: 115 MMHG | DIASTOLIC BLOOD PRESSURE: 75 MMHG | WEIGHT: 179.88 LBS | BODY MASS INDEX: 27.35 KG/M2

## 2020-10-08 DIAGNOSIS — R33.9 INCOMPLETE BLADDER EMPTYING: ICD-10-CM

## 2020-10-08 DIAGNOSIS — R31.0 GROSS HEMATURIA: ICD-10-CM

## 2020-10-08 DIAGNOSIS — Z91.041 CONTRAST MEDIA ALLERGY: ICD-10-CM

## 2020-10-08 DIAGNOSIS — R35.0 FREQUENCY OF URINATION: ICD-10-CM

## 2020-10-08 DIAGNOSIS — R39.15 URGENCY OF URINATION: Primary | ICD-10-CM

## 2020-10-08 PROCEDURE — 51798 PR MEAS,POST-VOID RES,US,NON-IMAGING: ICD-10-PCS | Mod: HCNC,S$GLB,, | Performed by: PHYSICIAN ASSISTANT

## 2020-10-08 PROCEDURE — 99999 PR PBB SHADOW E&M-EST. PATIENT-LVL IV: CPT | Mod: PBBFAC,HCNC,, | Performed by: PHYSICIAN ASSISTANT

## 2020-10-08 PROCEDURE — 1101F PT FALLS ASSESS-DOCD LE1/YR: CPT | Mod: HCNC,CPTII,S$GLB, | Performed by: PHYSICIAN ASSISTANT

## 2020-10-08 PROCEDURE — 88112 PR  CYTOPATH, CELL ENHANCE TECH: ICD-10-PCS | Mod: 26,HCNC,, | Performed by: PATHOLOGY

## 2020-10-08 PROCEDURE — 99999 PR PBB SHADOW E&M-EST. PATIENT-LVL IV: ICD-10-PCS | Mod: PBBFAC,HCNC,, | Performed by: PHYSICIAN ASSISTANT

## 2020-10-08 PROCEDURE — 51798 US URINE CAPACITY MEASURE: CPT | Mod: HCNC,S$GLB,, | Performed by: PHYSICIAN ASSISTANT

## 2020-10-08 PROCEDURE — 99214 OFFICE O/P EST MOD 30 MIN: CPT | Mod: HCNC,S$GLB,, | Performed by: PHYSICIAN ASSISTANT

## 2020-10-08 PROCEDURE — 87086 URINE CULTURE/COLONY COUNT: CPT | Mod: HCNC

## 2020-10-08 PROCEDURE — 1159F MED LIST DOCD IN RCRD: CPT | Mod: HCNC,S$GLB,, | Performed by: PHYSICIAN ASSISTANT

## 2020-10-08 PROCEDURE — 1159F PR MEDICATION LIST DOCUMENTED IN MEDICAL RECORD: ICD-10-PCS | Mod: HCNC,S$GLB,, | Performed by: PHYSICIAN ASSISTANT

## 2020-10-08 PROCEDURE — 3074F SYST BP LT 130 MM HG: CPT | Mod: HCNC,CPTII,S$GLB, | Performed by: PHYSICIAN ASSISTANT

## 2020-10-08 PROCEDURE — 1101F PR PT FALLS ASSESS DOC 0-1 FALLS W/OUT INJ PAST YR: ICD-10-PCS | Mod: HCNC,CPTII,S$GLB, | Performed by: PHYSICIAN ASSISTANT

## 2020-10-08 PROCEDURE — 3078F DIAST BP <80 MM HG: CPT | Mod: HCNC,CPTII,S$GLB, | Performed by: PHYSICIAN ASSISTANT

## 2020-10-08 PROCEDURE — 3078F PR MOST RECENT DIASTOLIC BLOOD PRESSURE < 80 MM HG: ICD-10-PCS | Mod: HCNC,CPTII,S$GLB, | Performed by: PHYSICIAN ASSISTANT

## 2020-10-08 PROCEDURE — 88112 CYTOPATH CELL ENHANCE TECH: CPT | Mod: HCNC | Performed by: PATHOLOGY

## 2020-10-08 PROCEDURE — 3074F PR MOST RECENT SYSTOLIC BLOOD PRESSURE < 130 MM HG: ICD-10-PCS | Mod: HCNC,CPTII,S$GLB, | Performed by: PHYSICIAN ASSISTANT

## 2020-10-08 PROCEDURE — 88112 CYTOPATH CELL ENHANCE TECH: CPT | Mod: 26,HCNC,, | Performed by: PATHOLOGY

## 2020-10-08 PROCEDURE — 99214 PR OFFICE/OUTPT VISIT, EST, LEVL IV, 30-39 MIN: ICD-10-PCS | Mod: HCNC,S$GLB,, | Performed by: PHYSICIAN ASSISTANT

## 2020-10-08 RX ORDER — PREDNISONE 50 MG/1
TABLET ORAL
Qty: 3 TABLET | Refills: 0 | Status: SHIPPED | OUTPATIENT
Start: 2020-10-08 | End: 2021-12-22

## 2020-10-08 NOTE — PROGRESS NOTES
CHIEF COMPLAINT:    Mr. Monterroso is a 80 y.o. male presenting for UTI.  PRESENTING ILLNESS:    Liang Monterroso Jr. is a 80 y.o. male with a PMH of meatal stenosis Peyronie's plaques, neurogenic bladder, BPH, HTN, who presents for UTI.    He reports urgency, frequency, cloudy urine that started today.  The last time he performed CIC he reports gross hematuria.   He denies difficulty performing CIC.  He denies dysuria and incontinence.   A couple a nights ago after intercourse he experienced bladder pain.  This has resolved.   He had a TURP in 2017.  He has a history of neurogenic bladder and does CIC 3x/day.      Previous/Current Smoker: no  Radiation therapy to pelvis: no  Chemotherapy: no  Personal/ family history of bladder/ kidney cancer: no  Exposure to harmful chemicals: no  History of kidney stones: no    Renal u/s 6/15/20: Stable appearing left-sided simple and minimally complex cysts.    Cysto 8/4/20: Meatus dilated easily to 24 f  Mild bulbar stricture Bilateral ureteral orifice were evaluated and noted to be normal with clear efflux.  The bladder was completely surveyed in a systematic fashion.   No bladder tumors or lesions were seen.  No strictures were noted.  The prostate showed No hypertrophy.  There was No median lobe present.    REVIEW OF SYSTEMS:    Constitutional: Negative for fever and chills.   HENT: Negative for hearing loss.   Eyes: Negative for visual disturbance.   Respiratory: Negative for shortness of breath.   Cardiovascular: Negative for chest pain.   Gastrointestinal: Negative for vomiting, and constipation.   Genitourinary: See HPI  Neurological: Negative for dizziness.   Hematological: Does not bruise/bleed easily.   Psychiatric/Behavioral: Negative for confusion.       PATIENT HISTORY:    Past Medical History:   Diagnosis Date    Allergy     CKD (chronic kidney disease) stage 3, GFR 30-59 ml/min 6/15/2016    GERD (gastroesophageal reflux disease)     Hx of colonic polyp      Hyperlipidemia     Hypertension     Hypospadias in male     Hypothyroidism     Sleep apnea     Thyroid disease     Urinary tract infection        Past Surgical History:   Procedure Laterality Date    APPENDECTOMY      CATARACT EXTRACTION      EYE SURGERY      HERNIA REPAIR      TONSILLECTOMY         Family History   Problem Relation Age of Onset    Diabetes Mother     Heart disease Mother     Hypertension Mother     Vision loss Mother     Dementia Mother     Alcohol abuse Father     Cancer Sister         lung with mets, was a heavy smoker    No Known Problems Daughter     No Known Problems Son     No Known Problems Daughter     No Known Problems Son     Amblyopia Neg Hx     Blindness Neg Hx     Cataracts Neg Hx     Glaucoma Neg Hx     Macular degeneration Neg Hx     Retinal detachment Neg Hx     Strabismus Neg Hx     Stroke Neg Hx     Thyroid disease Neg Hx        Social History     Socioeconomic History    Marital status:      Spouse name: parul    Number of children: 4    Years of education: Not on file    Highest education level: Not on file   Occupational History    Occupation: retired   Social Needs    Financial resource strain: Not hard at all    Food insecurity     Worry: Never true     Inability: Never true    Transportation needs     Medical: No     Non-medical: No   Tobacco Use    Smoking status: Never Smoker    Smokeless tobacco: Never Used   Substance and Sexual Activity    Alcohol use: Yes     Frequency: 2-4 times a month     Drinks per session: 1 or 2     Binge frequency: Never     Comment: 1-3 glasses wine weekly, 2-3 beers weekly    Drug use: No    Sexual activity: Yes     Partners: Female   Lifestyle    Physical activity     Days per week: 3 days     Minutes per session: 80 min    Stress: Not at all   Relationships    Social connections     Talks on phone: Twice a week     Gets together: Once a week     Attends Restorationism service: Not on file      Active member of club or organization: No     Attends meetings of clubs or organizations: Never     Relationship status:    Other Topics Concern    Not on file   Social History Narrative    Not on file       Allergies:  Contrast media    Medications:    Current Outpatient Medications:     albuterol (VENTOLIN HFA) 90 mcg/actuation inhaler, Inhale 2 puffs into the lungs every 6 (six) hours as needed. Rescue, Disp: 18 g, Rfl: 0    aspirin (ECOTRIN) 81 MG EC tablet, Take 81 mg by mouth once daily., Disp: , Rfl:     econazole nitrate 1 % cream, USE TWICE DAILY (Patient taking differently: USE TWICE DAILY (prn)), Disp: 60 g, Rfl: 5    fluticasone propionate (FLONASE) 50 mcg/actuation nasal spray, 1 spray (50 mcg total) by Each Nostril route once daily., Disp: 1 Bottle, Rfl: 0    levothyroxine (SYNTHROID) 112 MCG tablet, TAKE 1 TABLET BY MOUTH BEFORE BREAKFAST, Disp: 90 tablet, Rfl: 12    metronidazole (METROGEL) 0.75 % gel, Use hs, Disp: 45 g, Rfl: 3    multivitamin with minerals tablet, Take 1 tablet by mouth once daily.  , Disp: , Rfl:     polyethylene glycol (GLYCOLAX) 17 gram PwPk, Take 17 g by mouth once daily., Disp: 30 packet, Rfl: 0    pravastatin (PRAVACHOL) 20 MG tablet, TAKE 1 TABLET BY MOUTH ONE TIME DAILY, Disp: 90 tablet, Rfl: 4    ramipriL (ALTACE) 5 MG capsule, TAKE 1 CAPSULE(5 MG) BY MOUTH EVERY DAY, Disp: 90 capsule, Rfl: 0    tamsulosin (FLOMAX) 0.4 mg Cap, TAKE 2 CAPSULES BY MOUTH EVERY DAY, Disp: 180 capsule, Rfl: 0    triamcinolone acetonide 0.1% (KENALOG) 0.1 % cream, Use bid prn rash, Disp: 80 g, Rfl: 3    fexofenadine (ALLEGRA) 180 MG tablet, Take 1 tablet (180 mg total) by mouth once daily. (Patient taking differently: Take 180 mg by mouth daily as needed. ), Disp: 30 tablet, Rfl: 11    predniSONE (DELTASONE) 50 MG Tab, Take 1 tablet 13 hours, 7 hours and 1 hour prior to CT scan., Disp: 3 tablet, Rfl: 0    Current Facility-Administered Medications:     ciprofloxacin  HCl tablet 500 mg, 500 mg, Oral, Once, Anastacio Eid Jr., MD    PHYSICAL EXAMINATION:    Constitutional: He appears well-developed and well-nourished.  He is in no apparent distress.    Eyes: No scleral icterus noted bilaterally. No discharge bilaterally.    Nose: No nasal congestion    Cardiovascular: Normal rate.  No pitting edema noted in lower extremities bilaterally    Pulmonary/Chest: Effort normal. No respiratory distress.     Abdominal:  He exhibits no distension.      Neurological: He is alert and oriented to person, place, and time.     Skin: Skin is warm and dry.     Psych: Cooperative with normal affect.    Genitourinary:   DARLYN done 6/16/2020.    Physical Exam    Bladder scan performed by Nurse Marie.  PVR 128ml  LABS:    U/a: 1.000, pH 7, + leuks, 250 blood otherwise negative.    Lab Results   Component Value Date    PSA 1.8 08/03/2018    PSA 0.91 12/17/2012    PSA 4.89 (H) 11/12/2012    PSA 0.66 11/14/2011    PSA 0.81 11/03/2010    PSA 0.86 10/26/2009    PSA 0.59 10/14/2008    PSA 0.6 10/22/2007    PSA 0.9 10/23/2006    PSA 0.7 08/15/2005    PSADIAG 1.0 01/11/2016    PSADIAG 1.0 12/04/2014    PSADIAG 0.67 12/03/2013       IMPRESSION:    Encounter Diagnoses   Name Primary?    Urgency of urination Yes    Gross hematuria     Frequency of urination     Contrast media allergy     Incomplete bladder emptying          PLAN:    Frequency, urgency, hematuria:  Urine culture (catheterized specimen)     Hematuria:   I explained to the patient that the causes of hematuria, whether it be gross hematuria or microhematuria, are many, including but not limited to  infections, kidney stones,  malignancy.  In patients with gross hematuria, the chances of an underlying  malignancy are higher but still low at 15-20%.    Nevertheless, I explained to the patient that the evaluation in both cases consists of upper tract imaging followed by flexible cystoscopy. I described the rationale and procedure for both  and answered all questions.    Hematuria work up discussed in detail.  Will proceed with hematuria work up:  Creatinine prior to CT urogram to ensure adequate kidney function.   CT urogram   Cysto- cysto recently done in 8/2020 with no masses seen.  Therefore, I will not repeat cysto unless abnormal cytology.    Urine cytology.      Incomplete bladder emptying:  Continue flomax and CIC     Contrast media allergy:  He has an allergy to media contrast.  He reports hives.  He denies an anaphylactic reaction.  Allergy prep given with instructions on how to take prior to imaging.      Follow up based on imaging.     I spent 25 minutes with the patient of which more than half was spent in direct consultation with the patient in regards to our treatment and plan.      Jeannette Rocha PA-C

## 2020-10-08 NOTE — PATIENT INSTRUCTIONS
Take 1 tablet of prednisone 50mg 13 hours, 7 hours, and 1 hour before CT scan.  Also take 1 tablet of benadryl 50mg 1 hour before CT scan.

## 2020-10-09 LAB — BACTERIA UR CULT: NO GROWTH

## 2020-10-10 ENCOUNTER — PATIENT MESSAGE (OUTPATIENT)
Dept: UROLOGY | Facility: CLINIC | Age: 80
End: 2020-10-10

## 2020-10-12 ENCOUNTER — TELEPHONE (OUTPATIENT)
Dept: UROLOGY | Facility: CLINIC | Age: 80
End: 2020-10-12

## 2020-10-12 ENCOUNTER — HOSPITAL ENCOUNTER (OUTPATIENT)
Dept: RADIOLOGY | Facility: HOSPITAL | Age: 80
Discharge: HOME | End: 2020-10-12
Attending: PHYSICIAN ASSISTANT
Payer: MEDICARE

## 2020-10-12 DIAGNOSIS — R31.0 GROSS HEMATURIA: ICD-10-CM

## 2020-10-12 LAB — FINAL PATHOLOGIC DIAGNOSIS: NORMAL

## 2020-10-12 PROCEDURE — 74178 CT UROGRAM ABD PELVIS W WO: ICD-10-PCS | Mod: 26,HCNC,, | Performed by: RADIOLOGY

## 2020-10-12 PROCEDURE — 74178 CT ABD&PLV WO CNTR FLWD CNTR: CPT | Mod: TC,HCNC

## 2020-10-12 PROCEDURE — 25500020 PHARM REV CODE 255: Mod: HCNC | Performed by: PHYSICIAN ASSISTANT

## 2020-10-12 PROCEDURE — 74178 CT ABD&PLV WO CNTR FLWD CNTR: CPT | Mod: 26,HCNC,, | Performed by: RADIOLOGY

## 2020-10-12 RX ADMIN — IOHEXOL 125 ML: 350 INJECTION, SOLUTION INTRAVENOUS at 02:10

## 2020-10-12 NOTE — TELEPHONE ENCOUNTER
I informed patient of his urine culture results.  He states that he is feeling better.  His urine is no longer cloudy and no longer having frequency and urgency.

## 2020-10-13 ENCOUNTER — PATIENT MESSAGE (OUTPATIENT)
Dept: INTERNAL MEDICINE | Facility: CLINIC | Age: 80
End: 2020-10-13

## 2020-10-13 DIAGNOSIS — I10 ESSENTIAL HYPERTENSION: ICD-10-CM

## 2020-10-13 RX ORDER — RAMIPRIL 5 MG/1
CAPSULE ORAL
Qty: 90 CAPSULE | Refills: 0 | Status: SHIPPED | OUTPATIENT
Start: 2020-10-13 | End: 2021-01-07 | Stop reason: SDUPTHER

## 2020-10-14 ENCOUNTER — TELEPHONE (OUTPATIENT)
Dept: UROLOGY | Facility: CLINIC | Age: 80
End: 2020-10-14

## 2020-10-14 ENCOUNTER — PATIENT MESSAGE (OUTPATIENT)
Dept: UROLOGY | Facility: CLINIC | Age: 80
End: 2020-10-14

## 2020-10-14 ENCOUNTER — PATIENT MESSAGE (OUTPATIENT)
Dept: INTERNAL MEDICINE | Facility: CLINIC | Age: 80
End: 2020-10-14

## 2020-10-14 NOTE — TELEPHONE ENCOUNTER
Discussed CT with patient.  Discussed bladder findings.  Findings appear stable from 2014.  Patient had a cysto 2 months ago with no lesions noted.   Recommend he follow up with his pcp for additional findings.    
[de-identified] : , Nontender, 3 cm indurated area in the right upper quadrant with central external opening consistent with chronically inflamed sinus tract.

## 2020-12-08 ENCOUNTER — PATIENT MESSAGE (OUTPATIENT)
Dept: INTERNAL MEDICINE | Facility: CLINIC | Age: 80
End: 2020-12-08

## 2020-12-11 ENCOUNTER — PATIENT MESSAGE (OUTPATIENT)
Dept: OTHER | Facility: OTHER | Age: 80
End: 2020-12-11

## 2020-12-20 ENCOUNTER — PATIENT MESSAGE (OUTPATIENT)
Dept: INTERNAL MEDICINE | Facility: CLINIC | Age: 80
End: 2020-12-20

## 2021-01-07 ENCOUNTER — PATIENT MESSAGE (OUTPATIENT)
Dept: INTERNAL MEDICINE | Facility: CLINIC | Age: 81
End: 2021-01-07

## 2021-01-07 DIAGNOSIS — I10 ESSENTIAL HYPERTENSION: ICD-10-CM

## 2021-01-07 RX ORDER — RAMIPRIL 5 MG/1
CAPSULE ORAL
Qty: 90 CAPSULE | Refills: 0 | Status: SHIPPED | OUTPATIENT
Start: 2021-01-07 | End: 2021-03-30 | Stop reason: SDUPTHER

## 2021-01-15 ENCOUNTER — IMMUNIZATION (OUTPATIENT)
Dept: OBSTETRICS AND GYNECOLOGY | Facility: CLINIC | Age: 81
End: 2021-01-15
Payer: MEDICARE

## 2021-01-15 DIAGNOSIS — Z23 NEED FOR VACCINATION: Primary | ICD-10-CM

## 2021-01-15 PROCEDURE — 91300 COVID-19, MRNA, LNP-S, PF, 30 MCG/0.3 ML DOSE VACCINE: CPT | Mod: PBBFAC | Performed by: FAMILY MEDICINE

## 2021-02-05 ENCOUNTER — IMMUNIZATION (OUTPATIENT)
Dept: OBSTETRICS AND GYNECOLOGY | Facility: CLINIC | Age: 81
End: 2021-02-05
Payer: MEDICARE

## 2021-02-05 DIAGNOSIS — Z23 NEED FOR VACCINATION: Primary | ICD-10-CM

## 2021-02-05 PROCEDURE — 91300 COVID-19, MRNA, LNP-S, PF, 30 MCG/0.3 ML DOSE VACCINE: CPT | Mod: PBBFAC | Performed by: FAMILY MEDICINE

## 2021-02-05 PROCEDURE — 0002A COVID-19, MRNA, LNP-S, PF, 30 MCG/0.3 ML DOSE VACCINE: CPT | Mod: PBBFAC | Performed by: FAMILY MEDICINE

## 2021-03-17 ENCOUNTER — OFFICE VISIT (OUTPATIENT)
Dept: UROLOGY | Facility: CLINIC | Age: 81
End: 2021-03-17
Payer: MEDICARE

## 2021-03-17 VITALS
HEIGHT: 68 IN | HEART RATE: 85 BPM | DIASTOLIC BLOOD PRESSURE: 73 MMHG | SYSTOLIC BLOOD PRESSURE: 115 MMHG | WEIGHT: 180.75 LBS | BODY MASS INDEX: 27.39 KG/M2

## 2021-03-17 DIAGNOSIS — Q64.33 CONGENITAL MEATAL STENOSIS: ICD-10-CM

## 2021-03-17 DIAGNOSIS — N40.0 BENIGN NON-NODULAR PROSTATIC HYPERPLASIA WITHOUT LOWER URINARY TRACT SYMPTOMS: Primary | ICD-10-CM

## 2021-03-17 PROCEDURE — 1159F MED LIST DOCD IN RCRD: CPT | Mod: S$GLB,,, | Performed by: UROLOGY

## 2021-03-17 PROCEDURE — 1159F PR MEDICATION LIST DOCUMENTED IN MEDICAL RECORD: ICD-10-PCS | Mod: S$GLB,,, | Performed by: UROLOGY

## 2021-03-17 PROCEDURE — 3078F DIAST BP <80 MM HG: CPT | Mod: CPTII,S$GLB,, | Performed by: UROLOGY

## 2021-03-17 PROCEDURE — 99999 PR PBB SHADOW E&M-EST. PATIENT-LVL III: CPT | Mod: PBBFAC,,, | Performed by: UROLOGY

## 2021-03-17 PROCEDURE — 1101F PT FALLS ASSESS-DOCD LE1/YR: CPT | Mod: CPTII,S$GLB,, | Performed by: UROLOGY

## 2021-03-17 PROCEDURE — 99999 PR PBB SHADOW E&M-EST. PATIENT-LVL III: ICD-10-PCS | Mod: PBBFAC,,, | Performed by: UROLOGY

## 2021-03-17 PROCEDURE — 3074F PR MOST RECENT SYSTOLIC BLOOD PRESSURE < 130 MM HG: ICD-10-PCS | Mod: CPTII,S$GLB,, | Performed by: UROLOGY

## 2021-03-17 PROCEDURE — 99214 PR OFFICE/OUTPT VISIT, EST, LEVL IV, 30-39 MIN: ICD-10-PCS | Mod: S$GLB,,, | Performed by: UROLOGY

## 2021-03-17 PROCEDURE — 3288F FALL RISK ASSESSMENT DOCD: CPT | Mod: CPTII,S$GLB,, | Performed by: UROLOGY

## 2021-03-17 PROCEDURE — 1126F AMNT PAIN NOTED NONE PRSNT: CPT | Mod: S$GLB,,, | Performed by: UROLOGY

## 2021-03-17 PROCEDURE — 3288F PR FALLS RISK ASSESSMENT DOCUMENTED: ICD-10-PCS | Mod: CPTII,S$GLB,, | Performed by: UROLOGY

## 2021-03-17 PROCEDURE — 99214 OFFICE O/P EST MOD 30 MIN: CPT | Mod: S$GLB,,, | Performed by: UROLOGY

## 2021-03-17 PROCEDURE — 3078F PR MOST RECENT DIASTOLIC BLOOD PRESSURE < 80 MM HG: ICD-10-PCS | Mod: CPTII,S$GLB,, | Performed by: UROLOGY

## 2021-03-17 PROCEDURE — 1126F PR PAIN SEVERITY QUANTIFIED, NO PAIN PRESENT: ICD-10-PCS | Mod: S$GLB,,, | Performed by: UROLOGY

## 2021-03-17 PROCEDURE — 3074F SYST BP LT 130 MM HG: CPT | Mod: CPTII,S$GLB,, | Performed by: UROLOGY

## 2021-03-17 PROCEDURE — 1101F PR PT FALLS ASSESS DOC 0-1 FALLS W/OUT INJ PAST YR: ICD-10-PCS | Mod: CPTII,S$GLB,, | Performed by: UROLOGY

## 2021-03-17 RX ORDER — CIPROFLOXACIN 500 MG/1
500 TABLET ORAL 2 TIMES DAILY
Qty: 60 TABLET | Refills: 0 | Status: SHIPPED | OUTPATIENT
Start: 2021-03-17 | End: 2021-04-16

## 2021-03-29 ENCOUNTER — PES CALL (OUTPATIENT)
Dept: ADMINISTRATIVE | Facility: CLINIC | Age: 81
End: 2021-03-29

## 2021-03-30 DIAGNOSIS — I10 ESSENTIAL HYPERTENSION: ICD-10-CM

## 2021-03-30 RX ORDER — RAMIPRIL 5 MG/1
CAPSULE ORAL
Qty: 90 CAPSULE | Refills: 0 | Status: SHIPPED | OUTPATIENT
Start: 2021-03-30 | End: 2021-06-26 | Stop reason: SDUPTHER

## 2021-03-31 ENCOUNTER — PATIENT MESSAGE (OUTPATIENT)
Dept: INTERNAL MEDICINE | Facility: CLINIC | Age: 81
End: 2021-03-31

## 2021-03-31 RX ORDER — LEVOTHYROXINE SODIUM 112 UG/1
112 TABLET ORAL
Qty: 90 TABLET | Refills: 0 | Status: SHIPPED | OUTPATIENT
Start: 2021-03-31 | End: 2021-04-04

## 2021-05-06 ENCOUNTER — PATIENT MESSAGE (OUTPATIENT)
Dept: UROLOGY | Facility: CLINIC | Age: 81
End: 2021-05-06

## 2021-05-18 ENCOUNTER — PATIENT OUTREACH (OUTPATIENT)
Dept: ADMINISTRATIVE | Facility: OTHER | Age: 81
End: 2021-05-18

## 2021-05-19 ENCOUNTER — OFFICE VISIT (OUTPATIENT)
Dept: DERMATOLOGY | Facility: CLINIC | Age: 81
End: 2021-05-19
Payer: MEDICARE

## 2021-05-19 VITALS — WEIGHT: 180 LBS | BODY MASS INDEX: 27.37 KG/M2

## 2021-05-19 DIAGNOSIS — L82.1 SEBORRHEIC KERATOSES: Primary | ICD-10-CM

## 2021-05-19 DIAGNOSIS — L81.4 LENTIGINES: ICD-10-CM

## 2021-05-19 DIAGNOSIS — Z12.83 SKIN EXAM, SCREENING FOR CANCER: ICD-10-CM

## 2021-05-19 DIAGNOSIS — D18.01 CHERRY ANGIOMA: ICD-10-CM

## 2021-05-19 PROCEDURE — 99213 OFFICE O/P EST LOW 20 MIN: CPT | Mod: S$GLB,,, | Performed by: DERMATOLOGY

## 2021-05-19 PROCEDURE — 1126F AMNT PAIN NOTED NONE PRSNT: CPT | Mod: S$GLB,,, | Performed by: DERMATOLOGY

## 2021-05-19 PROCEDURE — 1159F PR MEDICATION LIST DOCUMENTED IN MEDICAL RECORD: ICD-10-PCS | Mod: S$GLB,,, | Performed by: DERMATOLOGY

## 2021-05-19 PROCEDURE — 1101F PR PT FALLS ASSESS DOC 0-1 FALLS W/OUT INJ PAST YR: ICD-10-PCS | Mod: CPTII,S$GLB,, | Performed by: DERMATOLOGY

## 2021-05-19 PROCEDURE — 99999 PR PBB SHADOW E&M-EST. PATIENT-LVL III: ICD-10-PCS | Mod: PBBFAC,,, | Performed by: DERMATOLOGY

## 2021-05-19 PROCEDURE — 3288F FALL RISK ASSESSMENT DOCD: CPT | Mod: CPTII,S$GLB,, | Performed by: DERMATOLOGY

## 2021-05-19 PROCEDURE — 3288F PR FALLS RISK ASSESSMENT DOCUMENTED: ICD-10-PCS | Mod: CPTII,S$GLB,, | Performed by: DERMATOLOGY

## 2021-05-19 PROCEDURE — 99213 PR OFFICE/OUTPT VISIT, EST, LEVL III, 20-29 MIN: ICD-10-PCS | Mod: S$GLB,,, | Performed by: DERMATOLOGY

## 2021-05-19 PROCEDURE — 99999 PR PBB SHADOW E&M-EST. PATIENT-LVL III: CPT | Mod: PBBFAC,,, | Performed by: DERMATOLOGY

## 2021-05-19 PROCEDURE — 1159F MED LIST DOCD IN RCRD: CPT | Mod: S$GLB,,, | Performed by: DERMATOLOGY

## 2021-05-19 PROCEDURE — 1101F PT FALLS ASSESS-DOCD LE1/YR: CPT | Mod: CPTII,S$GLB,, | Performed by: DERMATOLOGY

## 2021-05-19 PROCEDURE — 1126F PR PAIN SEVERITY QUANTIFIED, NO PAIN PRESENT: ICD-10-PCS | Mod: S$GLB,,, | Performed by: DERMATOLOGY

## 2021-06-25 ENCOUNTER — OFFICE VISIT (OUTPATIENT)
Dept: INTERNAL MEDICINE | Facility: CLINIC | Age: 81
End: 2021-06-25
Payer: MEDICARE

## 2021-06-25 ENCOUNTER — HOSPITAL ENCOUNTER (OUTPATIENT)
Dept: RADIOLOGY | Facility: HOSPITAL | Age: 81
Discharge: HOME | End: 2021-06-25
Attending: INTERNAL MEDICINE
Payer: MEDICARE

## 2021-06-25 ENCOUNTER — PATIENT MESSAGE (OUTPATIENT)
Dept: INTERNAL MEDICINE | Facility: CLINIC | Age: 81
End: 2021-06-25

## 2021-06-25 VITALS
HEIGHT: 68 IN | BODY MASS INDEX: 26.8 KG/M2 | HEART RATE: 58 BPM | WEIGHT: 176.81 LBS | DIASTOLIC BLOOD PRESSURE: 68 MMHG | SYSTOLIC BLOOD PRESSURE: 134 MMHG | OXYGEN SATURATION: 98 %

## 2021-06-25 DIAGNOSIS — M79.672 LEFT FOOT PAIN: ICD-10-CM

## 2021-06-25 DIAGNOSIS — M79.672 LEFT FOOT PAIN: Primary | ICD-10-CM

## 2021-06-25 DIAGNOSIS — L84 CALLUS OF FOOT: ICD-10-CM

## 2021-06-25 DIAGNOSIS — R73.9 ELEVATED BLOOD SUGAR: ICD-10-CM

## 2021-06-25 DIAGNOSIS — Z00.00 ROUTINE PHYSICAL EXAMINATION: ICD-10-CM

## 2021-06-25 DIAGNOSIS — I10 ESSENTIAL HYPERTENSION: ICD-10-CM

## 2021-06-25 PROCEDURE — 1101F PT FALLS ASSESS-DOCD LE1/YR: CPT | Mod: CPTII,S$GLB,, | Performed by: INTERNAL MEDICINE

## 2021-06-25 PROCEDURE — 1159F MED LIST DOCD IN RCRD: CPT | Mod: S$GLB,,, | Performed by: INTERNAL MEDICINE

## 2021-06-25 PROCEDURE — 99213 OFFICE O/P EST LOW 20 MIN: CPT | Mod: S$GLB,,, | Performed by: INTERNAL MEDICINE

## 2021-06-25 PROCEDURE — 99999 PR PBB SHADOW E&M-EST. PATIENT-LVL IV: ICD-10-PCS | Mod: PBBFAC,,, | Performed by: INTERNAL MEDICINE

## 2021-06-25 PROCEDURE — 73630 X-RAY EXAM OF FOOT: CPT | Mod: TC,LT

## 2021-06-25 PROCEDURE — 3288F FALL RISK ASSESSMENT DOCD: CPT | Mod: CPTII,S$GLB,, | Performed by: INTERNAL MEDICINE

## 2021-06-25 PROCEDURE — 73630 XR FOOT COMPLETE 3 VIEW LEFT: ICD-10-PCS | Mod: 26,LT,, | Performed by: RADIOLOGY

## 2021-06-25 PROCEDURE — 1126F PR PAIN SEVERITY QUANTIFIED, NO PAIN PRESENT: ICD-10-PCS | Mod: S$GLB,,, | Performed by: INTERNAL MEDICINE

## 2021-06-25 PROCEDURE — 1126F AMNT PAIN NOTED NONE PRSNT: CPT | Mod: S$GLB,,, | Performed by: INTERNAL MEDICINE

## 2021-06-25 PROCEDURE — 99999 PR PBB SHADOW E&M-EST. PATIENT-LVL IV: CPT | Mod: PBBFAC,,, | Performed by: INTERNAL MEDICINE

## 2021-06-25 PROCEDURE — 3288F PR FALLS RISK ASSESSMENT DOCUMENTED: ICD-10-PCS | Mod: CPTII,S$GLB,, | Performed by: INTERNAL MEDICINE

## 2021-06-25 PROCEDURE — 99213 PR OFFICE/OUTPT VISIT, EST, LEVL III, 20-29 MIN: ICD-10-PCS | Mod: S$GLB,,, | Performed by: INTERNAL MEDICINE

## 2021-06-25 PROCEDURE — 1159F PR MEDICATION LIST DOCUMENTED IN MEDICAL RECORD: ICD-10-PCS | Mod: S$GLB,,, | Performed by: INTERNAL MEDICINE

## 2021-06-25 PROCEDURE — 73630 X-RAY EXAM OF FOOT: CPT | Mod: 26,LT,, | Performed by: RADIOLOGY

## 2021-06-25 PROCEDURE — 1101F PR PT FALLS ASSESS DOC 0-1 FALLS W/OUT INJ PAST YR: ICD-10-PCS | Mod: CPTII,S$GLB,, | Performed by: INTERNAL MEDICINE

## 2021-06-26 ENCOUNTER — PATIENT MESSAGE (OUTPATIENT)
Dept: INTERNAL MEDICINE | Facility: CLINIC | Age: 81
End: 2021-06-26

## 2021-06-26 DIAGNOSIS — I10 ESSENTIAL HYPERTENSION: ICD-10-CM

## 2021-06-28 RX ORDER — RAMIPRIL 5 MG/1
CAPSULE ORAL
Qty: 90 CAPSULE | Refills: 3 | Status: SHIPPED | OUTPATIENT
Start: 2021-06-28 | End: 2021-06-30 | Stop reason: SDUPTHER

## 2021-06-30 ENCOUNTER — PATIENT MESSAGE (OUTPATIENT)
Dept: INTERNAL MEDICINE | Facility: CLINIC | Age: 81
End: 2021-06-30

## 2021-06-30 DIAGNOSIS — I10 ESSENTIAL HYPERTENSION: ICD-10-CM

## 2021-06-30 RX ORDER — RAMIPRIL 5 MG/1
CAPSULE ORAL
Qty: 90 CAPSULE | Refills: 3 | Status: SHIPPED | OUTPATIENT
Start: 2021-06-30 | End: 2022-09-19

## 2021-07-08 ENCOUNTER — HOSPITAL ENCOUNTER (EMERGENCY)
Facility: HOSPITAL | Age: 81
Discharge: HOME | End: 2021-07-08
Attending: EMERGENCY MEDICINE
Payer: MEDICARE

## 2021-07-08 ENCOUNTER — PATIENT MESSAGE (OUTPATIENT)
Dept: INTERNAL MEDICINE | Facility: CLINIC | Age: 81
End: 2021-07-08

## 2021-07-08 VITALS
RESPIRATION RATE: 17 BRPM | SYSTOLIC BLOOD PRESSURE: 148 MMHG | TEMPERATURE: 98 F | DIASTOLIC BLOOD PRESSURE: 73 MMHG | HEIGHT: 68 IN | OXYGEN SATURATION: 98 % | BODY MASS INDEX: 26.52 KG/M2 | WEIGHT: 175 LBS | HEART RATE: 79 BPM

## 2021-07-08 DIAGNOSIS — I70.90 ATHEROSCLEROSIS: ICD-10-CM

## 2021-07-08 DIAGNOSIS — R42 DIZZINESS: Primary | ICD-10-CM

## 2021-07-08 LAB
ALBUMIN SERPL BCP-MCNC: 3.7 G/DL (ref 3.5–5.2)
ALP SERPL-CCNC: 57 U/L (ref 55–135)
ALT SERPL W/O P-5'-P-CCNC: 15 U/L (ref 10–44)
ANION GAP SERPL CALC-SCNC: 8 MMOL/L (ref 8–16)
AST SERPL-CCNC: 19 U/L (ref 10–40)
BASOPHILS # BLD AUTO: 0.04 K/UL (ref 0–0.2)
BASOPHILS NFR BLD: 0.6 % (ref 0–1.9)
BILIRUB SERPL-MCNC: 0.7 MG/DL (ref 0.1–1)
BUN SERPL-MCNC: 15 MG/DL (ref 8–23)
CALCIUM SERPL-MCNC: 9.4 MG/DL (ref 8.7–10.5)
CHLORIDE SERPL-SCNC: 105 MMOL/L (ref 95–110)
CHOLEST SERPL-MCNC: 196 MG/DL (ref 120–199)
CHOLEST/HDLC SERPL: 3.6 {RATIO} (ref 2–5)
CO2 SERPL-SCNC: 26 MMOL/L (ref 23–29)
CREAT SERPL-MCNC: 1.3 MG/DL (ref 0.5–1.4)
DIFFERENTIAL METHOD: NORMAL
EOSINOPHIL # BLD AUTO: 0.1 K/UL (ref 0–0.5)
EOSINOPHIL NFR BLD: 1.8 % (ref 0–8)
ERYTHROCYTE [DISTWIDTH] IN BLOOD BY AUTOMATED COUNT: 14.3 % (ref 11.5–14.5)
EST. GFR  (AFRICAN AMERICAN): 60 ML/MIN/1.73 M^2
EST. GFR  (NON AFRICAN AMERICAN): 51.9 ML/MIN/1.73 M^2
GLUCOSE SERPL-MCNC: 99 MG/DL (ref 70–110)
HCT VFR BLD AUTO: 44.8 % (ref 40–54)
HDLC SERPL-MCNC: 55 MG/DL (ref 40–75)
HDLC SERPL: 28.1 % (ref 20–50)
HGB BLD-MCNC: 14.6 G/DL (ref 14–18)
IMM GRANULOCYTES # BLD AUTO: 0.01 K/UL (ref 0–0.04)
IMM GRANULOCYTES NFR BLD AUTO: 0.1 % (ref 0–0.5)
LDLC SERPL CALC-MCNC: 122.4 MG/DL (ref 63–159)
LYMPHOCYTES # BLD AUTO: 2.9 K/UL (ref 1–4.8)
LYMPHOCYTES NFR BLD: 42.7 % (ref 18–48)
MCH RBC QN AUTO: 28.3 PG (ref 27–31)
MCHC RBC AUTO-ENTMCNC: 32.6 G/DL (ref 32–36)
MCV RBC AUTO: 87 FL (ref 82–98)
MONOCYTES # BLD AUTO: 0.4 K/UL (ref 0.3–1)
MONOCYTES NFR BLD: 5.6 % (ref 4–15)
NEUTROPHILS # BLD AUTO: 3.3 K/UL (ref 1.8–7.7)
NEUTROPHILS NFR BLD: 49.2 % (ref 38–73)
NONHDLC SERPL-MCNC: 141 MG/DL
NRBC BLD-RTO: 0 /100 WBC
PLATELET # BLD AUTO: 193 K/UL (ref 150–450)
PMV BLD AUTO: 10.8 FL (ref 9.2–12.9)
POCT GLUCOSE: 101 MG/DL (ref 70–110)
POTASSIUM SERPL-SCNC: 4.3 MMOL/L (ref 3.5–5.1)
PROT SERPL-MCNC: 7.2 G/DL (ref 6–8.4)
RBC # BLD AUTO: 5.16 M/UL (ref 4.6–6.2)
SODIUM SERPL-SCNC: 139 MMOL/L (ref 136–145)
T4 FREE SERPL-MCNC: 1.24 NG/DL (ref 0.71–1.51)
TRIGL SERPL-MCNC: 93 MG/DL (ref 30–150)
TROPONIN I SERPL DL<=0.01 NG/ML-MCNC: <0.006 NG/ML (ref 0–0.03)
TROPONIN I SERPL DL<=0.01 NG/ML-MCNC: <0.006 NG/ML (ref 0–0.03)
TSH SERPL DL<=0.005 MIU/L-ACNC: 0.14 UIU/ML (ref 0.4–4)
WBC # BLD AUTO: 6.74 K/UL (ref 3.9–12.7)

## 2021-07-08 PROCEDURE — 84443 ASSAY THYROID STIM HORMONE: CPT | Performed by: PHYSICIAN ASSISTANT

## 2021-07-08 PROCEDURE — 99285 PR EMERGENCY DEPT VISIT,LEVEL V: ICD-10-PCS | Mod: ,,, | Performed by: EMERGENCY MEDICINE

## 2021-07-08 PROCEDURE — 84484 ASSAY OF TROPONIN QUANT: CPT | Performed by: PHYSICIAN ASSISTANT

## 2021-07-08 PROCEDURE — 99285 EMERGENCY DEPT VISIT HI MDM: CPT | Mod: 25

## 2021-07-08 PROCEDURE — 99285 EMERGENCY DEPT VISIT HI MDM: CPT | Mod: ,,, | Performed by: EMERGENCY MEDICINE

## 2021-07-08 PROCEDURE — 82962 GLUCOSE BLOOD TEST: CPT

## 2021-07-08 PROCEDURE — 93010 ELECTROCARDIOGRAM REPORT: CPT | Mod: ,,, | Performed by: INTERNAL MEDICINE

## 2021-07-08 PROCEDURE — 84439 ASSAY OF FREE THYROXINE: CPT | Performed by: PHYSICIAN ASSISTANT

## 2021-07-08 PROCEDURE — 93005 ELECTROCARDIOGRAM TRACING: CPT

## 2021-07-08 PROCEDURE — 85025 COMPLETE CBC W/AUTO DIFF WBC: CPT | Performed by: PHYSICIAN ASSISTANT

## 2021-07-08 PROCEDURE — 93010 EKG 12-LEAD: ICD-10-PCS | Mod: ,,, | Performed by: INTERNAL MEDICINE

## 2021-07-08 PROCEDURE — 99283 PR EMERGENCY DEPT VISIT,LEVEL III: ICD-10-PCS | Mod: GC,,, | Performed by: PSYCHIATRY & NEUROLOGY

## 2021-07-08 PROCEDURE — 99283 EMERGENCY DEPT VISIT LOW MDM: CPT | Mod: GC,,, | Performed by: PSYCHIATRY & NEUROLOGY

## 2021-07-08 PROCEDURE — 80053 COMPREHEN METABOLIC PANEL: CPT | Performed by: PHYSICIAN ASSISTANT

## 2021-07-08 PROCEDURE — 80061 LIPID PANEL: CPT | Performed by: PHYSICIAN ASSISTANT

## 2021-07-09 ENCOUNTER — PES CALL (OUTPATIENT)
Dept: ADMINISTRATIVE | Facility: CLINIC | Age: 81
End: 2021-07-09

## 2021-07-13 ENCOUNTER — OFFICE VISIT (OUTPATIENT)
Dept: INTERNAL MEDICINE | Facility: CLINIC | Age: 81
End: 2021-07-13
Payer: MEDICARE

## 2021-07-13 VITALS
DIASTOLIC BLOOD PRESSURE: 60 MMHG | SYSTOLIC BLOOD PRESSURE: 100 MMHG | OXYGEN SATURATION: 98 % | WEIGHT: 178.56 LBS | BODY MASS INDEX: 27.06 KG/M2 | HEIGHT: 68 IN | HEART RATE: 79 BPM

## 2021-07-13 DIAGNOSIS — E03.9 ACQUIRED HYPOTHYROIDISM: ICD-10-CM

## 2021-07-13 DIAGNOSIS — I10 ESSENTIAL HYPERTENSION: ICD-10-CM

## 2021-07-13 DIAGNOSIS — R42 VERTIGO: ICD-10-CM

## 2021-07-13 DIAGNOSIS — R42 DIZZINESS: Primary | ICD-10-CM

## 2021-07-13 PROCEDURE — 1159F MED LIST DOCD IN RCRD: CPT | Mod: S$GLB,,, | Performed by: INTERNAL MEDICINE

## 2021-07-13 PROCEDURE — 99214 OFFICE O/P EST MOD 30 MIN: CPT | Mod: S$GLB,,, | Performed by: INTERNAL MEDICINE

## 2021-07-13 PROCEDURE — 99999 PR PBB SHADOW E&M-EST. PATIENT-LVL IV: ICD-10-PCS | Mod: PBBFAC,,, | Performed by: INTERNAL MEDICINE

## 2021-07-13 PROCEDURE — 1126F AMNT PAIN NOTED NONE PRSNT: CPT | Mod: S$GLB,,, | Performed by: INTERNAL MEDICINE

## 2021-07-13 PROCEDURE — 99214 PR OFFICE/OUTPT VISIT, EST, LEVL IV, 30-39 MIN: ICD-10-PCS | Mod: S$GLB,,, | Performed by: INTERNAL MEDICINE

## 2021-07-13 PROCEDURE — 3288F PR FALLS RISK ASSESSMENT DOCUMENTED: ICD-10-PCS | Mod: CPTII,S$GLB,, | Performed by: INTERNAL MEDICINE

## 2021-07-13 PROCEDURE — 3078F PR MOST RECENT DIASTOLIC BLOOD PRESSURE < 80 MM HG: ICD-10-PCS | Mod: CPTII,S$GLB,, | Performed by: INTERNAL MEDICINE

## 2021-07-13 PROCEDURE — 99999 PR PBB SHADOW E&M-EST. PATIENT-LVL IV: CPT | Mod: PBBFAC,,, | Performed by: INTERNAL MEDICINE

## 2021-07-13 PROCEDURE — 3074F PR MOST RECENT SYSTOLIC BLOOD PRESSURE < 130 MM HG: ICD-10-PCS | Mod: CPTII,S$GLB,, | Performed by: INTERNAL MEDICINE

## 2021-07-13 PROCEDURE — 1159F PR MEDICATION LIST DOCUMENTED IN MEDICAL RECORD: ICD-10-PCS | Mod: S$GLB,,, | Performed by: INTERNAL MEDICINE

## 2021-07-13 PROCEDURE — 3074F SYST BP LT 130 MM HG: CPT | Mod: CPTII,S$GLB,, | Performed by: INTERNAL MEDICINE

## 2021-07-13 PROCEDURE — 3288F FALL RISK ASSESSMENT DOCD: CPT | Mod: CPTII,S$GLB,, | Performed by: INTERNAL MEDICINE

## 2021-07-13 PROCEDURE — 1126F PR PAIN SEVERITY QUANTIFIED, NO PAIN PRESENT: ICD-10-PCS | Mod: S$GLB,,, | Performed by: INTERNAL MEDICINE

## 2021-07-13 PROCEDURE — 1101F PR PT FALLS ASSESS DOC 0-1 FALLS W/OUT INJ PAST YR: ICD-10-PCS | Mod: CPTII,S$GLB,, | Performed by: INTERNAL MEDICINE

## 2021-07-13 PROCEDURE — 1101F PT FALLS ASSESS-DOCD LE1/YR: CPT | Mod: CPTII,S$GLB,, | Performed by: INTERNAL MEDICINE

## 2021-07-13 PROCEDURE — 3078F DIAST BP <80 MM HG: CPT | Mod: CPTII,S$GLB,, | Performed by: INTERNAL MEDICINE

## 2021-08-03 ENCOUNTER — CLINICAL SUPPORT (OUTPATIENT)
Dept: AUDIOLOGY | Facility: CLINIC | Age: 81
End: 2021-08-03
Payer: MEDICARE

## 2021-08-03 ENCOUNTER — OFFICE VISIT (OUTPATIENT)
Dept: OTOLARYNGOLOGY | Facility: CLINIC | Age: 81
End: 2021-08-03
Payer: MEDICARE

## 2021-08-03 VITALS — WEIGHT: 178.81 LBS | BODY MASS INDEX: 27.19 KG/M2

## 2021-08-03 DIAGNOSIS — R42 VERTIGO: Primary | ICD-10-CM

## 2021-08-03 DIAGNOSIS — H90.3 ASYMMETRICAL SENSORINEURAL HEARING LOSS: Primary | ICD-10-CM

## 2021-08-03 DIAGNOSIS — H90.3 ASYMMETRIC SNHL (SENSORINEURAL HEARING LOSS): ICD-10-CM

## 2021-08-03 DIAGNOSIS — H61.23 BILATERAL IMPACTED CERUMEN: ICD-10-CM

## 2021-08-03 PROCEDURE — 1126F PR PAIN SEVERITY QUANTIFIED, NO PAIN PRESENT: ICD-10-PCS | Mod: CPTII,S$GLB,, | Performed by: OTOLARYNGOLOGY

## 2021-08-03 PROCEDURE — 1159F MED LIST DOCD IN RCRD: CPT | Mod: CPTII,S$GLB,, | Performed by: OTOLARYNGOLOGY

## 2021-08-03 PROCEDURE — 1160F RVW MEDS BY RX/DR IN RCRD: CPT | Mod: CPTII,S$GLB,, | Performed by: OTOLARYNGOLOGY

## 2021-08-03 PROCEDURE — 99999 PR PBB SHADOW E&M-EST. PATIENT-LVL III: CPT | Mod: PBBFAC,,, | Performed by: OTOLARYNGOLOGY

## 2021-08-03 PROCEDURE — 69210 PR REMOVAL IMPACTED CERUMEN REQUIRING INSTRUMENTATION, UNILATERAL: ICD-10-PCS | Mod: S$GLB,,, | Performed by: OTOLARYNGOLOGY

## 2021-08-03 PROCEDURE — 99213 PR OFFICE/OUTPT VISIT, EST, LEVL III, 20-29 MIN: ICD-10-PCS | Mod: 25,S$GLB,, | Performed by: OTOLARYNGOLOGY

## 2021-08-03 PROCEDURE — 99213 OFFICE O/P EST LOW 20 MIN: CPT | Mod: 25,S$GLB,, | Performed by: OTOLARYNGOLOGY

## 2021-08-03 PROCEDURE — 1126F AMNT PAIN NOTED NONE PRSNT: CPT | Mod: CPTII,S$GLB,, | Performed by: OTOLARYNGOLOGY

## 2021-08-03 PROCEDURE — 92567 PR TYMPA2METRY: ICD-10-PCS | Mod: S$GLB,,, | Performed by: AUDIOLOGIST

## 2021-08-03 PROCEDURE — 1160F PR REVIEW ALL MEDS BY PRESCRIBER/CLIN PHARMACIST DOCUMENTED: ICD-10-PCS | Mod: CPTII,S$GLB,, | Performed by: OTOLARYNGOLOGY

## 2021-08-03 PROCEDURE — 99999 PR PBB SHADOW E&M-EST. PATIENT-LVL III: ICD-10-PCS | Mod: PBBFAC,,, | Performed by: OTOLARYNGOLOGY

## 2021-08-03 PROCEDURE — 92567 TYMPANOMETRY: CPT | Mod: S$GLB,,, | Performed by: AUDIOLOGIST

## 2021-08-03 PROCEDURE — 92557 COMPREHENSIVE HEARING TEST: CPT | Mod: S$GLB,,, | Performed by: AUDIOLOGIST

## 2021-08-03 PROCEDURE — 1159F PR MEDICATION LIST DOCUMENTED IN MEDICAL RECORD: ICD-10-PCS | Mod: CPTII,S$GLB,, | Performed by: OTOLARYNGOLOGY

## 2021-08-03 PROCEDURE — 92557 PR COMPREHENSIVE HEARING TEST: ICD-10-PCS | Mod: S$GLB,,, | Performed by: AUDIOLOGIST

## 2021-08-03 PROCEDURE — 69210 REMOVE IMPACTED EAR WAX UNI: CPT | Mod: S$GLB,,, | Performed by: OTOLARYNGOLOGY

## 2021-08-03 RX ORDER — MELOXICAM 7.5 MG/1
7.5 TABLET ORAL DAILY
COMMUNITY
Start: 2021-05-05 | End: 2022-01-26 | Stop reason: SDUPTHER

## 2021-09-01 NOTE — PROGRESS NOTES
Otology/Neurotology Consultation Report       Chief Complaint: hearing loss    History of Present Illness: 79 y.o.  male presents with hearing loss. Previously seen by Dr. Quiroz with hearing loss and eustachian tube dysfunction. Also has history right temporal occipital craniotomy for meningioma resection 3/20/18 with Dr. Chen. He states he had left ear irritation that resolved after Dr. Hancock cleaned it out. He states he is no longer having any issues. He knows he has hearing loss but states he hears fine and is not interested in hearing aids. He states he has intermittent bilateral nonpulsatile tinnitus that is not bothersome to him. He denies any ear surgery or ear trauma.     ROS   General:  Denies fever, chills  HENT:  Denies rhinorrhea, sore throat, hearing loss   Eyes: Denies vision changes, red eyes  CVS:  Denies chest pain, pressure   Resp:  Denies shortness of breath, cough, sputum  GI:  Denies diarrhea, constipation, abdominal pain, nausea, vomiting  :  Denies dysuria, hematuria   MSK:  Denies myalgias, arthralgias  Skin:  Denies  bleeding, denies rashes  Neuro:  Denies headache, dizziness  All other systems otherwise negative          Past Medical History: Patient has a past medical history of Allergy, CKD (chronic kidney disease) stage 3, GFR 30-59 ml/min (6/15/2016), GERD (gastroesophageal reflux disease), colonic polyp, Hyperlipidemia, Hypertension, Hypospadias in male, Hypothyroidism, Sleep apnea, Thyroid disease, and Urinary tract infection.    Past Surgical History: Patient has a past surgical history that includes Appendectomy; Hernia repair; Tonsillectomy; Cataract extraction; and Eye surgery.    Social History: Patient reports that he has never smoked. He has never used smokeless tobacco. He reports that he drinks alcohol. He reports that he does not use drugs.    Family History: family history includes Alcohol abuse in his father; Cancer in his sister; Dementia in his mother;  Diabetes in his mother; Heart disease in his mother; Hypertension in his mother; No Known Problems in his daughter, daughter, son, and son; Vision loss in his mother.    Medications:   Current Outpatient Medications on File Prior to Visit   Medication Sig Dispense Refill    albuterol (VENTOLIN HFA) 90 mcg/actuation inhaler Inhale 2 puffs into the lungs every 6 (six) hours as needed. Rescue 18 g 0    aspirin (ECOTRIN) 81 MG EC tablet Take 81 mg by mouth once daily.      econazole nitrate 1 % cream USE TWICE DAILY (Patient taking differently: USE TWICE DAILY (prn)) 60 g 5    fluticasone propionate (FLONASE) 50 mcg/actuation nasal spray 1 spray (50 mcg total) by Each Nostril route once daily. 1 Bottle 0    levothyroxine (SYNTHROID) 112 MCG tablet TAKE 1 TABLET BY MOUTH BEFORE BREAKFAST 90 tablet 12    methylPREDNISolone (MEDROL DOSEPACK) 4 mg tablet use as directed until gone 1 Package 0    metronidazole (METROGEL) 0.75 % gel Use hs 45 g 3    multivitamin with minerals tablet Take 1 tablet by mouth once daily.        polyethylene glycol (GLYCOLAX) 17 gram PwPk Take 17 g by mouth once daily. 30 packet 0    pravastatin (PRAVACHOL) 20 MG tablet TAKE 1 TABLET BY MOUTH ONE TIME DAILY 90 tablet 0    ramipril (ALTACE) 5 MG capsule Take 1 capsule (5 mg total) by mouth once daily. 90 capsule 0    tamsulosin (FLOMAX) 0.4 mg Cap TAKE 2 CAPSULES BY MOUTH EVERY  capsule 0    triamcinolone acetonide 0.1% (KENALOG) 0.1 % cream Use bid prn rash 80 g 3    fexofenadine (ALLEGRA) 180 MG tablet Take 1 tablet (180 mg total) by mouth once daily. (Patient taking differently: Take 180 mg by mouth daily as needed. ) 30 tablet 11     Current Facility-Administered Medications on File Prior to Visit   Medication Dose Route Frequency Provider Last Rate Last Dose    ciprofloxacin HCl tablet 500 mg  500 mg Oral Once Anastacio Eid Jr., MD             Allergies: Patient is allergic to contrast media.    Physical  Exam    Constitutional: Well appearing / communicating.  NAD.  Eyes: EOM I Bilaterally  Head/Face: Normocephalic.  Negative paranasal sinus pressure/tenderness.  Salivary glands WNL.  House Brackmann I Bilaterally.    Right Ear: External Auditory Canal WNL,TM w/o masses/lesions/perforations.  Auricle WNL.  Left Ear: External Auditory Canal WNL,TM w/o masses/lesions/perforations. Auricle WNL.  Nose: No gross nasal septal deviation. Inferior Turbinates 2+ bilaterally. No septal perforation. No masses/lesions. External nasal skin without masses/lesions.  Oral Cavity: Gingiva/lips WNL.  FOM Soft, no masses palpated. Oral Tongue mobile. Hard Palate WNL.   Oropharynx: BOT WNL. No masses/lesions noted. Tonsillar fossa/pharyngeal wall without lesions. Posterior oropharynx WNL.  Soft palate without masses. Midline uvula.   Neck/Lymphatic: No LAD I-VI bilaterally.  No thyromegaly.  No masses noted on exam.  Neuro/Psychiatric: AOx3.  Normal mood and affect.   Cardiovascular: Normal carotid pulses bilaterally, no increasing jugular venous distention noted at cervical region bilaterally.            Respiratory: Normal respiratory effort, no stridor, no retractions noted.                   Diagnoses and all orders for this visit:    Sensorineural hearing loss (SNHL) of both ears           declines

## 2021-09-22 DIAGNOSIS — E78.5 HYPERLIPIDEMIA, UNSPECIFIED HYPERLIPIDEMIA TYPE: ICD-10-CM

## 2021-09-25 RX ORDER — LEVOTHYROXINE SODIUM 112 UG/1
TABLET ORAL
Qty: 90 TABLET | Refills: 3 | Status: SHIPPED | OUTPATIENT
Start: 2021-09-25 | End: 2022-09-19

## 2021-09-25 RX ORDER — PRAVASTATIN SODIUM 20 MG/1
TABLET ORAL
Qty: 90 TABLET | Refills: 3 | Status: SHIPPED | OUTPATIENT
Start: 2021-09-25 | End: 2022-09-19

## 2021-10-02 ENCOUNTER — IMMUNIZATION (OUTPATIENT)
Dept: INTERNAL MEDICINE | Facility: CLINIC | Age: 81
End: 2021-10-02
Payer: MEDICARE

## 2021-10-02 DIAGNOSIS — Z23 NEED FOR VACCINATION: Primary | ICD-10-CM

## 2021-10-02 PROCEDURE — 91300 COVID-19, MRNA, LNP-S, PF, 30 MCG/0.3 ML DOSE VACCINE: CPT | Mod: HCNC,PBBFAC | Performed by: INTERNAL MEDICINE

## 2021-10-02 PROCEDURE — 0003A COVID-19, MRNA, LNP-S, PF, 30 MCG/0.3 ML DOSE VACCINE: CPT | Mod: HCNC,CV19,PBBFAC | Performed by: INTERNAL MEDICINE

## 2021-11-10 ENCOUNTER — OFFICE VISIT (OUTPATIENT)
Dept: UROLOGY | Facility: CLINIC | Age: 81
End: 2021-11-10
Payer: MEDICARE

## 2021-11-10 VITALS
HEART RATE: 73 BPM | BODY MASS INDEX: 27.13 KG/M2 | SYSTOLIC BLOOD PRESSURE: 105 MMHG | HEIGHT: 68 IN | WEIGHT: 179 LBS | DIASTOLIC BLOOD PRESSURE: 63 MMHG

## 2021-11-10 DIAGNOSIS — N35.914 ANTERIOR URETHRAL STRICTURE: Primary | ICD-10-CM

## 2021-11-10 PROCEDURE — 3288F PR FALLS RISK ASSESSMENT DOCUMENTED: ICD-10-PCS | Mod: CPTII,S$GLB,, | Performed by: UROLOGY

## 2021-11-10 PROCEDURE — 1126F PR PAIN SEVERITY QUANTIFIED, NO PAIN PRESENT: ICD-10-PCS | Mod: CPTII,S$GLB,, | Performed by: UROLOGY

## 2021-11-10 PROCEDURE — 1159F MED LIST DOCD IN RCRD: CPT | Mod: CPTII,S$GLB,, | Performed by: UROLOGY

## 2021-11-10 PROCEDURE — 99213 OFFICE O/P EST LOW 20 MIN: CPT | Mod: S$GLB,,, | Performed by: UROLOGY

## 2021-11-10 PROCEDURE — 99999 PR PBB SHADOW E&M-EST. PATIENT-LVL IV: ICD-10-PCS | Mod: PBBFAC,,, | Performed by: UROLOGY

## 2021-11-10 PROCEDURE — 3078F PR MOST RECENT DIASTOLIC BLOOD PRESSURE < 80 MM HG: ICD-10-PCS | Mod: CPTII,S$GLB,, | Performed by: UROLOGY

## 2021-11-10 PROCEDURE — 1159F PR MEDICATION LIST DOCUMENTED IN MEDICAL RECORD: ICD-10-PCS | Mod: CPTII,S$GLB,, | Performed by: UROLOGY

## 2021-11-10 PROCEDURE — 3074F PR MOST RECENT SYSTOLIC BLOOD PRESSURE < 130 MM HG: ICD-10-PCS | Mod: CPTII,S$GLB,, | Performed by: UROLOGY

## 2021-11-10 PROCEDURE — 99999 PR PBB SHADOW E&M-EST. PATIENT-LVL IV: CPT | Mod: PBBFAC,,, | Performed by: UROLOGY

## 2021-11-10 PROCEDURE — 3078F DIAST BP <80 MM HG: CPT | Mod: CPTII,S$GLB,, | Performed by: UROLOGY

## 2021-11-10 PROCEDURE — 1101F PT FALLS ASSESS-DOCD LE1/YR: CPT | Mod: CPTII,S$GLB,, | Performed by: UROLOGY

## 2021-11-10 PROCEDURE — 1101F PR PT FALLS ASSESS DOC 0-1 FALLS W/OUT INJ PAST YR: ICD-10-PCS | Mod: CPTII,S$GLB,, | Performed by: UROLOGY

## 2021-11-10 PROCEDURE — 3288F FALL RISK ASSESSMENT DOCD: CPT | Mod: CPTII,S$GLB,, | Performed by: UROLOGY

## 2021-11-10 PROCEDURE — 3074F SYST BP LT 130 MM HG: CPT | Mod: CPTII,S$GLB,, | Performed by: UROLOGY

## 2021-11-10 PROCEDURE — 99213 PR OFFICE/OUTPT VISIT, EST, LEVL III, 20-29 MIN: ICD-10-PCS | Mod: S$GLB,,, | Performed by: UROLOGY

## 2021-11-10 PROCEDURE — 1126F AMNT PAIN NOTED NONE PRSNT: CPT | Mod: CPTII,S$GLB,, | Performed by: UROLOGY

## 2021-11-23 ENCOUNTER — PATIENT MESSAGE (OUTPATIENT)
Dept: UROLOGY | Facility: CLINIC | Age: 81
End: 2021-11-23
Payer: MEDICARE

## 2021-12-21 DIAGNOSIS — N13.8 BENIGN PROSTATIC HYPERPLASIA WITH URINARY OBSTRUCTION: ICD-10-CM

## 2021-12-21 DIAGNOSIS — N40.1 BENIGN PROSTATIC HYPERPLASIA WITH URINARY OBSTRUCTION: ICD-10-CM

## 2021-12-22 ENCOUNTER — OFFICE VISIT (OUTPATIENT)
Dept: OPTOMETRY | Facility: CLINIC | Age: 81
End: 2021-12-22
Payer: COMMERCIAL

## 2021-12-22 DIAGNOSIS — H26.493 POSTERIOR CAPSULAR OPACIFICATION NON VISUALLY SIGNIFICANT OF BOTH EYES: ICD-10-CM

## 2021-12-22 DIAGNOSIS — Z98.42 S/P CATARACT SURGERY, LEFT: ICD-10-CM

## 2021-12-22 DIAGNOSIS — Z98.41 S/P CATARACT SURGERY, RIGHT: ICD-10-CM

## 2021-12-22 DIAGNOSIS — H35.363 MACULAR DRUSEN, BILATERAL: Primary | ICD-10-CM

## 2021-12-22 DIAGNOSIS — Z96.1 PSEUDOPHAKIA: ICD-10-CM

## 2021-12-22 DIAGNOSIS — H52.203 ASTIGMATISM OF BOTH EYES, UNSPECIFIED TYPE: ICD-10-CM

## 2021-12-22 PROCEDURE — 92015 DETERMINE REFRACTIVE STATE: CPT | Mod: S$GLB,,, | Performed by: OPTOMETRIST

## 2021-12-22 PROCEDURE — 92015 PR REFRACTION: ICD-10-PCS | Mod: S$GLB,,, | Performed by: OPTOMETRIST

## 2021-12-22 PROCEDURE — 99999 PR PBB SHADOW E&M-EST. PATIENT-LVL III: ICD-10-PCS | Mod: PBBFAC,,, | Performed by: OPTOMETRIST

## 2021-12-22 PROCEDURE — 92014 PR EYE EXAM, EST PATIENT,COMPREHESV: ICD-10-PCS | Mod: S$GLB,,, | Performed by: OPTOMETRIST

## 2021-12-22 PROCEDURE — 92014 COMPRE OPH EXAM EST PT 1/>: CPT | Mod: S$GLB,,, | Performed by: OPTOMETRIST

## 2021-12-22 PROCEDURE — 99999 PR PBB SHADOW E&M-EST. PATIENT-LVL III: CPT | Mod: PBBFAC,,, | Performed by: OPTOMETRIST

## 2021-12-22 RX ORDER — INFLUENZA A VIRUS A/VICTORIA/2570/2019 IVR-215 (H1N1) ANTIGEN (FORMALDEHYDE INACTIVATED), INFLUENZA A VIRUS A/TASMANIA/503/2020 IVR-221 (H3N2) ANTIGEN (FORMALDEHYDE INACTIVATED), INFLUENZA B VIRUS B/PHUKET/3073/2013 ANTIGEN (FORMALDEHYDE INACTIVATED), AND INFLUENZA B VIRUS B/WASHINGTON/02/2019 ANTIGEN (FORMALDEHYDE INACTIVATED) 60; 60; 60; 60 UG/.7ML; UG/.7ML; UG/.7ML; UG/.7ML
INJECTION, SUSPENSION INTRAMUSCULAR
COMMUNITY
Start: 2021-10-05 | End: 2023-04-28

## 2021-12-27 ENCOUNTER — PATIENT MESSAGE (OUTPATIENT)
Dept: INTERNAL MEDICINE | Facility: CLINIC | Age: 81
End: 2021-12-27
Payer: MEDICARE

## 2021-12-27 DIAGNOSIS — N40.1 BENIGN PROSTATIC HYPERPLASIA WITH URINARY OBSTRUCTION: ICD-10-CM

## 2021-12-27 DIAGNOSIS — N13.8 BENIGN PROSTATIC HYPERPLASIA WITH URINARY OBSTRUCTION: ICD-10-CM

## 2021-12-27 RX ORDER — TAMSULOSIN HYDROCHLORIDE 0.4 MG/1
2 CAPSULE ORAL DAILY
Qty: 180 CAPSULE | Refills: 3 | Status: SHIPPED | OUTPATIENT
Start: 2021-12-27 | End: 2022-12-16

## 2021-12-27 NOTE — TELEPHONE ENCOUNTER
No new care gaps identified.  Powered by [employer]. Reference number: [id number].   12/27/2021 2:42:19 PM CST

## 2021-12-29 RX ORDER — TAMSULOSIN HYDROCHLORIDE 0.4 MG/1
CAPSULE ORAL
Qty: 180 CAPSULE | Refills: 3 | OUTPATIENT
Start: 2021-12-29

## 2022-01-18 ENCOUNTER — PROCEDURE VISIT (OUTPATIENT)
Dept: UROLOGY | Facility: CLINIC | Age: 82
End: 2022-01-18
Payer: MEDICARE

## 2022-01-18 VITALS
TEMPERATURE: 98 F | DIASTOLIC BLOOD PRESSURE: 74 MMHG | WEIGHT: 178 LBS | RESPIRATION RATE: 16 BRPM | SYSTOLIC BLOOD PRESSURE: 129 MMHG | BODY MASS INDEX: 26.98 KG/M2 | HEIGHT: 68 IN | HEART RATE: 69 BPM

## 2022-01-18 DIAGNOSIS — N35.914 ANTERIOR URETHRAL STRICTURE: Primary | ICD-10-CM

## 2022-01-18 PROCEDURE — 52000 CYSTOSCOPY: ICD-10-PCS | Mod: HCNC,S$GLB,, | Performed by: UROLOGY

## 2022-01-18 PROCEDURE — 52000 CYSTOURETHROSCOPY: CPT | Mod: HCNC,S$GLB,, | Performed by: UROLOGY

## 2022-01-18 RX ORDER — LIDOCAINE HYDROCHLORIDE 20 MG/ML
JELLY TOPICAL
Status: COMPLETED | OUTPATIENT
Start: 2022-01-18 | End: 2022-01-18

## 2022-01-18 RX ADMIN — LIDOCAINE HYDROCHLORIDE: 20 JELLY TOPICAL at 02:01

## 2022-01-18 NOTE — PATIENT INSTRUCTIONS
What to Expect After a Cystoscopy  For the next 24-48 hours, you may feel a mild burning when you urinate. This burning is normal and expected. Drink plenty of water to dilute the urine to help relieve the burning sensation. You may also see a small amount of blood in your urine after the procedure.    Unless you are already taking antibiotics, you may be given an antibiotic after the test to prevent infection.    Signs and Symptoms to Report  Call the [facility] at [phone] if you develop any of the following:  · Fever of 101 degrees or higher  · Chills or persistent bleeding  · Inability to urinate

## 2022-01-18 NOTE — PROCEDURES
"Cystoscopy    Date/Time: 1/18/2022 2:30 PM  Performed by: Anastacio Eid Jr., MD  Authorized by: Anastacio Eid Jr., MD     Consent Done?:  Yes (Written)  Time out: Immediately prior to procedure a "time out" was called to verify the correct patient, procedure, equipment, support staff and site/side marked as required.    Indications: urethral stricture    Position:  Supine  Anesthesia:  Lidocaine jelly  Patient sedated?: No    Preparation: Patient was prepped and draped in usual sterile fashion      Scope type:  Flexible cystoscope  Stent inserted: No    Stent removed: No    External exam normal: No         Circumcised: Yes         Urethral Meatus Normal: Yes    Meatal stenosis: No    Hypospadius: No    Condyloma: No    Urethra normal: mild usd  admitted cysto.    Prostate normal: Yes  Bladder neck normal: Bladder neck normal   Bladder normal: Yes      Patient tolerance:  Patient tolerated the procedure well with no immediate complications     CIC tid for ngb  RTC 1 yr w renal us      "

## 2022-01-25 ENCOUNTER — HOSPITAL ENCOUNTER (OUTPATIENT)
Dept: RADIOLOGY | Facility: HOSPITAL | Age: 82
Discharge: HOME | End: 2022-01-25
Attending: UROLOGY
Payer: MEDICARE

## 2022-01-25 DIAGNOSIS — N35.914 ANTERIOR URETHRAL STRICTURE: ICD-10-CM

## 2022-01-25 PROCEDURE — 76770 US RETROPERITONEAL COMPLETE: ICD-10-PCS | Mod: 26,HCNC,, | Performed by: RADIOLOGY

## 2022-01-25 PROCEDURE — 76770 US EXAM ABDO BACK WALL COMP: CPT | Mod: 26,HCNC,, | Performed by: RADIOLOGY

## 2022-01-25 PROCEDURE — 76770 US EXAM ABDO BACK WALL COMP: CPT | Mod: TC,HCNC

## 2022-01-26 ENCOUNTER — OFFICE VISIT (OUTPATIENT)
Dept: INTERNAL MEDICINE | Facility: CLINIC | Age: 82
End: 2022-01-26
Payer: MEDICARE

## 2022-01-26 ENCOUNTER — TELEPHONE (OUTPATIENT)
Dept: OTOLARYNGOLOGY | Facility: CLINIC | Age: 82
End: 2022-01-26
Payer: MEDICARE

## 2022-01-26 VITALS
SYSTOLIC BLOOD PRESSURE: 118 MMHG | HEIGHT: 68 IN | HEART RATE: 80 BPM | BODY MASS INDEX: 27.36 KG/M2 | WEIGHT: 180.56 LBS | OXYGEN SATURATION: 99 % | DIASTOLIC BLOOD PRESSURE: 74 MMHG

## 2022-01-26 DIAGNOSIS — R68.84 PAIN IN LOWER JAW: ICD-10-CM

## 2022-01-26 DIAGNOSIS — J35.1 ENLARGED TONSILS: ICD-10-CM

## 2022-01-26 DIAGNOSIS — M26.622 ARTHRALGIA OF LEFT TEMPOROMANDIBULAR JOINT: Primary | ICD-10-CM

## 2022-01-26 PROCEDURE — 1159F PR MEDICATION LIST DOCUMENTED IN MEDICAL RECORD: ICD-10-PCS | Mod: HCNC,CPTII,S$GLB, | Performed by: INTERNAL MEDICINE

## 2022-01-26 PROCEDURE — 1125F PR PAIN SEVERITY QUANTIFIED, PAIN PRESENT: ICD-10-PCS | Mod: HCNC,CPTII,S$GLB, | Performed by: INTERNAL MEDICINE

## 2022-01-26 PROCEDURE — 1101F PT FALLS ASSESS-DOCD LE1/YR: CPT | Mod: HCNC,CPTII,S$GLB, | Performed by: INTERNAL MEDICINE

## 2022-01-26 PROCEDURE — 3074F SYST BP LT 130 MM HG: CPT | Mod: HCNC,CPTII,S$GLB, | Performed by: INTERNAL MEDICINE

## 2022-01-26 PROCEDURE — 99214 OFFICE O/P EST MOD 30 MIN: CPT | Mod: HCNC,S$GLB,, | Performed by: INTERNAL MEDICINE

## 2022-01-26 PROCEDURE — 99999 PR PBB SHADOW E&M-EST. PATIENT-LVL IV: CPT | Mod: PBBFAC,HCNC,, | Performed by: INTERNAL MEDICINE

## 2022-01-26 PROCEDURE — 3288F PR FALLS RISK ASSESSMENT DOCUMENTED: ICD-10-PCS | Mod: HCNC,CPTII,S$GLB, | Performed by: INTERNAL MEDICINE

## 2022-01-26 PROCEDURE — 3078F DIAST BP <80 MM HG: CPT | Mod: HCNC,CPTII,S$GLB, | Performed by: INTERNAL MEDICINE

## 2022-01-26 PROCEDURE — 3074F PR MOST RECENT SYSTOLIC BLOOD PRESSURE < 130 MM HG: ICD-10-PCS | Mod: HCNC,CPTII,S$GLB, | Performed by: INTERNAL MEDICINE

## 2022-01-26 PROCEDURE — 1125F AMNT PAIN NOTED PAIN PRSNT: CPT | Mod: HCNC,CPTII,S$GLB, | Performed by: INTERNAL MEDICINE

## 2022-01-26 PROCEDURE — 3078F PR MOST RECENT DIASTOLIC BLOOD PRESSURE < 80 MM HG: ICD-10-PCS | Mod: HCNC,CPTII,S$GLB, | Performed by: INTERNAL MEDICINE

## 2022-01-26 PROCEDURE — 99999 PR PBB SHADOW E&M-EST. PATIENT-LVL IV: ICD-10-PCS | Mod: PBBFAC,HCNC,, | Performed by: INTERNAL MEDICINE

## 2022-01-26 PROCEDURE — 99214 PR OFFICE/OUTPT VISIT, EST, LEVL IV, 30-39 MIN: ICD-10-PCS | Mod: HCNC,S$GLB,, | Performed by: INTERNAL MEDICINE

## 2022-01-26 PROCEDURE — 1159F MED LIST DOCD IN RCRD: CPT | Mod: HCNC,CPTII,S$GLB, | Performed by: INTERNAL MEDICINE

## 2022-01-26 PROCEDURE — 3288F FALL RISK ASSESSMENT DOCD: CPT | Mod: HCNC,CPTII,S$GLB, | Performed by: INTERNAL MEDICINE

## 2022-01-26 PROCEDURE — 1101F PR PT FALLS ASSESS DOC 0-1 FALLS W/OUT INJ PAST YR: ICD-10-PCS | Mod: HCNC,CPTII,S$GLB, | Performed by: INTERNAL MEDICINE

## 2022-01-26 RX ORDER — MELOXICAM 7.5 MG/1
7.5 TABLET ORAL DAILY
Qty: 30 TABLET | Refills: 0 | Status: SHIPPED | OUTPATIENT
Start: 2022-01-26 | End: 2023-08-08

## 2022-01-26 NOTE — PROGRESS NOTES
Subjective:       Patient ID: Liang Monterroso Jr. is a 81 y.o. male.    Chief Complaint: Jaw Pain    Complains of left jaw pain for couple of months.  He says he has had this in the past but usually a day or 2 it goes away.  It hurts if he bites or choose on that side or if he opens the jaw it hurts as well.  No redness or other abnormalities of the skin.  No trauma.  No unusual foods or chewing.  Remote history of tonsil removal.  No sore throat or fever.  He has not taken anything at this recent episode.    Review of Systems   Constitutional: Negative for activity change and unexpected weight change.   HENT: Negative for hearing loss, rhinorrhea and trouble swallowing.    Eyes: Negative for discharge and visual disturbance.   Respiratory: Negative for chest tightness and wheezing.    Cardiovascular: Negative for chest pain and palpitations.   Gastrointestinal: Negative for blood in stool, constipation, diarrhea and vomiting.   Endocrine: Negative for polydipsia and polyuria.   Genitourinary: Negative for difficulty urinating, hematuria and urgency.   Musculoskeletal: Positive for arthralgias ( left jaw). Negative for joint swelling and neck pain.   Neurological: Negative for weakness and headaches.   Psychiatric/Behavioral: Negative for confusion and dysphoric mood.         Objective:      Physical Exam  Constitutional:       Appearance: Normal appearance.   HENT:      Head: Normocephalic and atraumatic.      Comments: He has tenderness to palpation over the TMJ on the left and tenderness just below this with opening and closing the jaw.  The ears are unremarkable.  No cervical lymphadenopathy and no tenderness to palpation over the bones of the jaw, sinuses or around the eyes.     Right Ear: Tympanic membrane, ear canal and external ear normal. There is no impacted cerumen.      Left Ear: Tympanic membrane, ear canal and external ear normal. There is no impacted cerumen.      Mouth/Throat:      Pharynx: No  oropharyngeal exudate or posterior oropharyngeal erythema.      Comments: Patient points to the area just below the left TMJ.  Said it sometimes radiates to the ear but no actual ear pain or throat pain.  Evaluating the mouth the left tonsil bed has a massive tissue occupying the bed.  It appears to be a tonsil with a small ulceration at the bottom.  There is nothing in the right tonsil bed.  Patient is not aware if he was ever told his tonsil may have grown back and I do not recall noting this on recent exam but I cannot be certain.  Cardiovascular:      Rate and Rhythm: Normal rate and regular rhythm.   Pulmonary:      Breath sounds: No wheezing.   Skin:     Findings: No erythema or rash.   Neurological:      General: No focal deficit present.      Mental Status: He is alert and oriented to person, place, and time.   Psychiatric:         Mood and Affect: Mood normal.         Behavior: Behavior normal.         Assessment:       Problem List Items Addressed This Visit    None     Visit Diagnoses     Arthralgia of left temporomandibular joint    -  Primary    Pain in lower jaw        Relevant Orders    Ambulatory referral/consult to ENT    Enlarged tonsils        Relevant Orders    Ambulatory referral/consult to ENT          Plan:       Liang was seen today for jaw pain.    Diagnoses and all orders for this visit:    Arthralgia of left temporomandibular joint    Pain in lower jaw  -     Ambulatory referral/consult to ENT; Future    Enlarged tonsils  -     Ambulatory referral/consult to ENT; Future    Other orders  -     meloxicam (MOBIC) 7.5 MG tablet; Take 1 tablet (7.5 mg total) by mouth once daily.        heat, avoid hard foods or over chewing.  Anti-inflammatory as above plus ENT evaluation for TMJ joint and tonsil bed findings.  This could be a recurrent tonsil after tonsillectomy but since it is on the same side as the pain and there is a small ulceration would like to get this evaluated.  Patient is in  agreement

## 2022-01-26 NOTE — PATIENT INSTRUCTIONS
Heat and Meloxicam (once daily) for 1-2 weeks.   Will also see ENT to look at the Tonsil area on the left.

## 2022-02-02 ENCOUNTER — OFFICE VISIT (OUTPATIENT)
Dept: OTOLARYNGOLOGY | Facility: CLINIC | Age: 82
End: 2022-02-02
Payer: MEDICARE

## 2022-02-02 VITALS
SYSTOLIC BLOOD PRESSURE: 119 MMHG | HEART RATE: 78 BPM | BODY MASS INDEX: 27.89 KG/M2 | TEMPERATURE: 98 F | WEIGHT: 183.44 LBS | DIASTOLIC BLOOD PRESSURE: 75 MMHG

## 2022-02-02 DIAGNOSIS — J35.1 ENLARGED TONSILS: ICD-10-CM

## 2022-02-02 DIAGNOSIS — R68.84 PAIN IN LOWER JAW: Primary | ICD-10-CM

## 2022-02-02 PROCEDURE — 3074F SYST BP LT 130 MM HG: CPT | Mod: HCNC,CPTII,S$GLB, | Performed by: OTOLARYNGOLOGY

## 2022-02-02 PROCEDURE — 3078F DIAST BP <80 MM HG: CPT | Mod: HCNC,CPTII,S$GLB, | Performed by: OTOLARYNGOLOGY

## 2022-02-02 PROCEDURE — 99213 PR OFFICE/OUTPT VISIT, EST, LEVL III, 20-29 MIN: ICD-10-PCS | Mod: HCNC,S$GLB,, | Performed by: OTOLARYNGOLOGY

## 2022-02-02 PROCEDURE — 1126F PR PAIN SEVERITY QUANTIFIED, NO PAIN PRESENT: ICD-10-PCS | Mod: HCNC,CPTII,S$GLB, | Performed by: OTOLARYNGOLOGY

## 2022-02-02 PROCEDURE — 1101F PR PT FALLS ASSESS DOC 0-1 FALLS W/OUT INJ PAST YR: ICD-10-PCS | Mod: HCNC,CPTII,S$GLB, | Performed by: OTOLARYNGOLOGY

## 2022-02-02 PROCEDURE — 3078F PR MOST RECENT DIASTOLIC BLOOD PRESSURE < 80 MM HG: ICD-10-PCS | Mod: HCNC,CPTII,S$GLB, | Performed by: OTOLARYNGOLOGY

## 2022-02-02 PROCEDURE — 99213 OFFICE O/P EST LOW 20 MIN: CPT | Mod: HCNC,S$GLB,, | Performed by: OTOLARYNGOLOGY

## 2022-02-02 PROCEDURE — 1159F MED LIST DOCD IN RCRD: CPT | Mod: HCNC,CPTII,S$GLB, | Performed by: OTOLARYNGOLOGY

## 2022-02-02 PROCEDURE — 1101F PT FALLS ASSESS-DOCD LE1/YR: CPT | Mod: HCNC,CPTII,S$GLB, | Performed by: OTOLARYNGOLOGY

## 2022-02-02 PROCEDURE — 99999 PR PBB SHADOW E&M-EST. PATIENT-LVL III: ICD-10-PCS | Mod: PBBFAC,HCNC,, | Performed by: OTOLARYNGOLOGY

## 2022-02-02 PROCEDURE — 1126F AMNT PAIN NOTED NONE PRSNT: CPT | Mod: HCNC,CPTII,S$GLB, | Performed by: OTOLARYNGOLOGY

## 2022-02-02 PROCEDURE — 99999 PR PBB SHADOW E&M-EST. PATIENT-LVL III: CPT | Mod: PBBFAC,HCNC,, | Performed by: OTOLARYNGOLOGY

## 2022-02-02 PROCEDURE — 3288F PR FALLS RISK ASSESSMENT DOCUMENTED: ICD-10-PCS | Mod: HCNC,CPTII,S$GLB, | Performed by: OTOLARYNGOLOGY

## 2022-02-02 PROCEDURE — 3074F PR MOST RECENT SYSTOLIC BLOOD PRESSURE < 130 MM HG: ICD-10-PCS | Mod: HCNC,CPTII,S$GLB, | Performed by: OTOLARYNGOLOGY

## 2022-02-02 PROCEDURE — 1159F PR MEDICATION LIST DOCUMENTED IN MEDICAL RECORD: ICD-10-PCS | Mod: HCNC,CPTII,S$GLB, | Performed by: OTOLARYNGOLOGY

## 2022-02-02 PROCEDURE — 3288F FALL RISK ASSESSMENT DOCD: CPT | Mod: HCNC,CPTII,S$GLB, | Performed by: OTOLARYNGOLOGY

## 2022-02-07 NOTE — PROGRESS NOTES
Chief Complaint   Patient presents with    consult/left side jaw pain, growth in throat         81 y.o. male presents with left TMJ pain. He reports that it is usually worse after eating certain chewy foods. He was recently seen by his dentist and advised to take NSAIDs as needed which does help. Also underwent a tonsillectomy at age 6 and has noticed that his left tonsil is still present. No associated throat pain. No tobacco history.       Review of Systems     Constitutional: Negative for fatigue and unexpected weight change.   HENT: per HPI.  Eyes: Negative for visual disturbance.   Respiratory: Negative for shortness of breath, hemoptysis  Cardiovascular: Negative for chest pain and palpitations.   Genitourinary: Negative for dysuria and difficulty urinating.   Musculoskeletal: Negative for decreased ROM, back pain.   Skin: Negative for rash, sunburn, itching.   Neurological: Negative for dizziness and seizures.   Hematological: Negative for adenopathy. Does not bruise/bleed easily.   Psychiatric/Behavioral: Negative for agitation. The patient is not nervous/anxious.   Endocrine: Negative for rapid weight loss/weight gain, heat/cold intolerance.     Past Medical History:   Diagnosis Date    Allergy     CKD (chronic kidney disease) stage 3, GFR 30-59 ml/min 6/15/2016    GERD (gastroesophageal reflux disease)     Hx of colonic polyp     Hyperlipidemia     Hypertension     Hypospadias in male     Hypothyroidism     Sleep apnea     Thyroid disease     Urinary tract infection        Past Surgical History:   Procedure Laterality Date    APPENDECTOMY      CATARACT EXTRACTION      CYSTOSCOPY      EYE SURGERY      HERNIA REPAIR      TONSILLECTOMY         family history includes Alcohol abuse in his father; Cancer in his sister; Dementia in his mother; Diabetes in his mother; Heart disease in his mother; Hypertension in his mother; No Known Problems in his daughter, daughter, son, and son; Vision loss  in his mother.    Pt  reports that he has never smoked. He has never used smokeless tobacco. He reports current alcohol use. He reports that he does not use drugs.    Review of patient's allergies indicates:   Allergen Reactions    Contrast media Hives and Itching     Iv dye        Physical Exam    Vitals:    02/02/22 1320   BP: 119/75   Pulse: 78   Temp: 97.5 °F (36.4 °C)     Body mass index is 27.89 kg/m².    Physical Exam  Vitals and nursing note reviewed.   Constitutional:       General: He is not in acute distress.     Appearance: He is well-developed and well-nourished. He is not diaphoretic.   HENT:      Head: Normocephalic and atraumatic.      Right Ear: Hearing, tympanic membrane, ear canal and external ear normal. No decreased hearing noted. No middle ear effusion. Tympanic membrane has normal mobility.      Left Ear: Hearing, tympanic membrane, ear canal and external ear normal. No decreased hearing noted.  No middle ear effusion. Tympanic membrane has normal mobility.      Nose: Nose normal.      Mouth/Throat:      Mouth: Oropharynx is clear and moist. No oral lesions.      Tongue: No lesions.      Palate: No mass and lesions.      Pharynx: Oropharynx is clear. Uvula midline.      Tonsils: 0 on the right. 1+ on the left.      Comments: Left tonsil with normal mucosa, soft to palpation.   Eyes:      General: Lids are normal.         Right eye: No discharge.         Left eye: No discharge.      Extraocular Movements: EOM normal.      Conjunctiva/sclera: Conjunctivae normal.      Pupils: Pupils are equal, round, and reactive to light.   Neck:      Thyroid: No thyroid mass or thyromegaly.      Trachea: Trachea and phonation normal. No tracheal tenderness or tracheal deviation.   Cardiovascular:      Heart sounds: Normal heart sounds.   Pulmonary:      Breath sounds: Normal breath sounds. No stridor.   Abdominal:      Palpations: Abdomen is soft.   Musculoskeletal:      Cervical back: Normal range of motion  and neck supple. No edema or erythema.   Lymphadenopathy:      Cervical: No cervical adenopathy.   Skin:     General: Skin is warm and dry.      Coloration: Skin is not pale.      Findings: No erythema or rash.   Neurological:      Mental Status: He is alert and oriented to person, place, and time.   Psychiatric:         Mood and Affect: Mood and affect normal.           Assessment     1. Pain in lower jaw    2. Enlarged tonsils          Plan  In summary, Mr. Monterroso is an 81 year old male with history of TMJ and improving left joint pain. Continue soft diet, NSAIDs as tolerated, and warm compresses. Advised to follow up with his dentist as needed for  evaluation. Also with regrowth of left tonsillar tissue. No concerning features on exam today, return to clinic if any new change or concerns occur for reevaluation. All questions were answered and he is in agreement with the plan.

## 2022-04-21 ENCOUNTER — PATIENT MESSAGE (OUTPATIENT)
Dept: INTERNAL MEDICINE | Facility: CLINIC | Age: 82
End: 2022-04-21
Payer: MEDICARE

## 2022-04-28 ENCOUNTER — IMMUNIZATION (OUTPATIENT)
Dept: INTERNAL MEDICINE | Facility: CLINIC | Age: 82
End: 2022-04-28
Payer: MEDICARE

## 2022-04-28 DIAGNOSIS — Z23 NEED FOR VACCINATION: Primary | ICD-10-CM

## 2022-04-28 PROCEDURE — 91300 COVID-19, MRNA, LNP-S, PF, 30 MCG/0.3 ML DOSE VACCINE: CPT | Mod: PBBFAC | Performed by: INTERNAL MEDICINE

## 2022-05-12 ENCOUNTER — OFFICE VISIT (OUTPATIENT)
Dept: INTERNAL MEDICINE | Facility: CLINIC | Age: 82
End: 2022-05-12
Payer: MEDICARE

## 2022-05-12 VITALS
DIASTOLIC BLOOD PRESSURE: 70 MMHG | WEIGHT: 174 LBS | HEIGHT: 68 IN | BODY MASS INDEX: 26.37 KG/M2 | SYSTOLIC BLOOD PRESSURE: 125 MMHG

## 2022-05-12 DIAGNOSIS — L98.9 SKIN LESION: ICD-10-CM

## 2022-05-12 DIAGNOSIS — Z86.010 PERSONAL HISTORY OF COLONIC POLYPS: ICD-10-CM

## 2022-05-12 DIAGNOSIS — L08.9 PUSTULE: Primary | ICD-10-CM

## 2022-05-12 DIAGNOSIS — I10 PRIMARY HYPERTENSION: ICD-10-CM

## 2022-05-12 PROBLEM — M54.2 CERVICAL PAIN: Status: RESOLVED | Noted: 2017-05-23 | Resolved: 2022-05-12

## 2022-05-12 PROBLEM — R59.0 INGUINAL LYMPHADENOPATHY: Status: RESOLVED | Noted: 2017-03-15 | Resolved: 2022-05-12

## 2022-05-12 PROBLEM — N41.9 PROSTATITIS: Status: RESOLVED | Noted: 2017-06-23 | Resolved: 2022-05-12

## 2022-05-12 PROBLEM — Z86.0100 PERSONAL HISTORY OF COLONIC POLYPS: Status: ACTIVE | Noted: 2022-05-12

## 2022-05-12 PROCEDURE — 3074F PR MOST RECENT SYSTOLIC BLOOD PRESSURE < 130 MM HG: ICD-10-PCS | Mod: CPTII,S$GLB,, | Performed by: INTERNAL MEDICINE

## 2022-05-12 PROCEDURE — 1126F AMNT PAIN NOTED NONE PRSNT: CPT | Mod: CPTII,S$GLB,, | Performed by: INTERNAL MEDICINE

## 2022-05-12 PROCEDURE — 1126F PR PAIN SEVERITY QUANTIFIED, NO PAIN PRESENT: ICD-10-PCS | Mod: CPTII,S$GLB,, | Performed by: INTERNAL MEDICINE

## 2022-05-12 PROCEDURE — 3288F FALL RISK ASSESSMENT DOCD: CPT | Mod: CPTII,S$GLB,, | Performed by: INTERNAL MEDICINE

## 2022-05-12 PROCEDURE — 3078F DIAST BP <80 MM HG: CPT | Mod: CPTII,S$GLB,, | Performed by: INTERNAL MEDICINE

## 2022-05-12 PROCEDURE — 1101F PT FALLS ASSESS-DOCD LE1/YR: CPT | Mod: CPTII,S$GLB,, | Performed by: INTERNAL MEDICINE

## 2022-05-12 PROCEDURE — 3074F SYST BP LT 130 MM HG: CPT | Mod: CPTII,S$GLB,, | Performed by: INTERNAL MEDICINE

## 2022-05-12 PROCEDURE — 1101F PR PT FALLS ASSESS DOC 0-1 FALLS W/OUT INJ PAST YR: ICD-10-PCS | Mod: CPTII,S$GLB,, | Performed by: INTERNAL MEDICINE

## 2022-05-12 PROCEDURE — 3078F PR MOST RECENT DIASTOLIC BLOOD PRESSURE < 80 MM HG: ICD-10-PCS | Mod: CPTII,S$GLB,, | Performed by: INTERNAL MEDICINE

## 2022-05-12 PROCEDURE — 99213 PR OFFICE/OUTPT VISIT, EST, LEVL III, 20-29 MIN: ICD-10-PCS | Mod: S$GLB,,, | Performed by: INTERNAL MEDICINE

## 2022-05-12 PROCEDURE — 99999 PR PBB SHADOW E&M-EST. PATIENT-LVL III: CPT | Mod: PBBFAC,,, | Performed by: INTERNAL MEDICINE

## 2022-05-12 PROCEDURE — 99999 PR PBB SHADOW E&M-EST. PATIENT-LVL III: ICD-10-PCS | Mod: PBBFAC,,, | Performed by: INTERNAL MEDICINE

## 2022-05-12 PROCEDURE — 3288F PR FALLS RISK ASSESSMENT DOCUMENTED: ICD-10-PCS | Mod: CPTII,S$GLB,, | Performed by: INTERNAL MEDICINE

## 2022-05-12 PROCEDURE — 99213 OFFICE O/P EST LOW 20 MIN: CPT | Mod: S$GLB,,, | Performed by: INTERNAL MEDICINE

## 2022-05-12 RX ORDER — CEPHALEXIN 500 MG/1
500 CAPSULE ORAL EVERY 8 HOURS
Qty: 30 CAPSULE | Refills: 0 | Status: SHIPPED | OUTPATIENT
Start: 2022-05-12 | End: 2022-06-10
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
## 2022-05-12 NOTE — Clinical Note
Josh Vargas:  Hope all is well with you!  Seeing him with a nonhealing lesion on his left arm.  It may be posttraumatic but he does have a lot of actinic damage.  He wants to see you sooner rather than later but your on line appointment availability was not until August.  Could your office reach out to offer him an appointment?  Thanks  Smitha Kimble

## 2022-05-12 NOTE — Clinical Note
FYI, he is going to try to see Dermatology sooner rather than later- can't tell if this is a pustule versus an early skin cancer.  He asked me whether he should have a colonoscopy, it looks like it is flagging that he needs one but I could not find any pathology G in the chart.

## 2022-05-31 ENCOUNTER — OFFICE VISIT (OUTPATIENT)
Dept: DERMATOLOGY | Facility: CLINIC | Age: 82
End: 2022-05-31
Payer: MEDICARE

## 2022-05-31 VITALS — BODY MASS INDEX: 26.46 KG/M2 | WEIGHT: 174 LBS

## 2022-05-31 DIAGNOSIS — Z12.83 SKIN EXAM, SCREENING FOR CANCER: ICD-10-CM

## 2022-05-31 DIAGNOSIS — L81.4 LENTIGINES: Primary | ICD-10-CM

## 2022-05-31 DIAGNOSIS — L82.1 SEBORRHEIC KERATOSES: ICD-10-CM

## 2022-05-31 DIAGNOSIS — L57.0 MULTIPLE ACTINIC KERATOSES: ICD-10-CM

## 2022-05-31 DIAGNOSIS — D22.9 NEVUS OF MULTIPLE SITES: ICD-10-CM

## 2022-05-31 DIAGNOSIS — D18.01 CHERRY ANGIOMA: ICD-10-CM

## 2022-05-31 PROCEDURE — 1160F RVW MEDS BY RX/DR IN RCRD: CPT | Mod: CPTII,S$GLB,, | Performed by: DERMATOLOGY

## 2022-05-31 PROCEDURE — 1101F PR PT FALLS ASSESS DOC 0-1 FALLS W/OUT INJ PAST YR: ICD-10-PCS | Mod: CPTII,S$GLB,, | Performed by: DERMATOLOGY

## 2022-05-31 PROCEDURE — 99999 PR PBB SHADOW E&M-EST. PATIENT-LVL III: ICD-10-PCS | Mod: PBBFAC,,, | Performed by: DERMATOLOGY

## 2022-05-31 PROCEDURE — 1159F PR MEDICATION LIST DOCUMENTED IN MEDICAL RECORD: ICD-10-PCS | Mod: CPTII,S$GLB,, | Performed by: DERMATOLOGY

## 2022-05-31 PROCEDURE — 99214 PR OFFICE/OUTPT VISIT, EST, LEVL IV, 30-39 MIN: ICD-10-PCS | Mod: 25,S$GLB,, | Performed by: DERMATOLOGY

## 2022-05-31 PROCEDURE — 1101F PT FALLS ASSESS-DOCD LE1/YR: CPT | Mod: CPTII,S$GLB,, | Performed by: DERMATOLOGY

## 2022-05-31 PROCEDURE — 17003 DESTRUCT PREMALG LES 2-14: CPT | Mod: S$GLB,,, | Performed by: DERMATOLOGY

## 2022-05-31 PROCEDURE — 99999 PR PBB SHADOW E&M-EST. PATIENT-LVL III: CPT | Mod: PBBFAC,,, | Performed by: DERMATOLOGY

## 2022-05-31 PROCEDURE — 17000 PR DESTRUCTION(LASER SURGERY,CRYOSURGERY,CHEMOSURGERY),PREMALIGNANT LESIONS,FIRST LESION: ICD-10-PCS | Mod: S$GLB,,, | Performed by: DERMATOLOGY

## 2022-05-31 PROCEDURE — 1126F AMNT PAIN NOTED NONE PRSNT: CPT | Mod: CPTII,S$GLB,, | Performed by: DERMATOLOGY

## 2022-05-31 PROCEDURE — 99214 OFFICE O/P EST MOD 30 MIN: CPT | Mod: 25,S$GLB,, | Performed by: DERMATOLOGY

## 2022-05-31 PROCEDURE — 3288F FALL RISK ASSESSMENT DOCD: CPT | Mod: CPTII,S$GLB,, | Performed by: DERMATOLOGY

## 2022-05-31 PROCEDURE — 1160F PR REVIEW ALL MEDS BY PRESCRIBER/CLIN PHARMACIST DOCUMENTED: ICD-10-PCS | Mod: CPTII,S$GLB,, | Performed by: DERMATOLOGY

## 2022-05-31 PROCEDURE — 1126F PR PAIN SEVERITY QUANTIFIED, NO PAIN PRESENT: ICD-10-PCS | Mod: CPTII,S$GLB,, | Performed by: DERMATOLOGY

## 2022-05-31 PROCEDURE — 1159F MED LIST DOCD IN RCRD: CPT | Mod: CPTII,S$GLB,, | Performed by: DERMATOLOGY

## 2022-05-31 PROCEDURE — 17003 DESTRUCTION, PREMALIGNANT LESIONS; SECOND THROUGH 14 LESIONS: ICD-10-PCS | Mod: S$GLB,,, | Performed by: DERMATOLOGY

## 2022-05-31 PROCEDURE — 3288F PR FALLS RISK ASSESSMENT DOCUMENTED: ICD-10-PCS | Mod: CPTII,S$GLB,, | Performed by: DERMATOLOGY

## 2022-05-31 PROCEDURE — 17000 DESTRUCT PREMALG LESION: CPT | Mod: S$GLB,,, | Performed by: DERMATOLOGY

## 2022-05-31 NOTE — PROGRESS NOTES
Subjective:       Patient ID:  Liang Monterroso Jr. is a 81 y.o. male who presents for   Chief Complaint   Patient presents with    Lesion     Left arm, scaly, raised    Skin Check     UBSE     Lesion on right arm for several months which had become irritated last month, took keflex and improved.  Also new spots on forehead and right cheek.       Review of Systems   Constitutional: Negative for fever, chills, weight loss, weight gain, fatigue and malaise.   Skin: Negative for daily sunscreen use, activity-related sunscreen use and wears hat.   Hematologic/Lymphatic: Does not bruise/bleed easily.        Objective:    Physical Exam   Constitutional: He appears well-developed and well-nourished. No distress.   Neurological: He is alert and oriented to person, place, and time. He is not disoriented.   Psychiatric: He has a normal mood and affect.   Skin:   Areas Examined (abnormalities noted in diagram):   Scalp / Hair Palpated and Inspected  Head / Face Inspection Performed  Neck Inspection Performed  Chest / Axilla Inspection Performed  Abdomen Inspection Performed  Back Inspection Performed  RUE Inspected  LUE Inspection Performed                   Diagram Legend     Erythematous scaling macule/papule c/w actinic keratosis       Vascular papule c/w angioma      Pigmented verrucoid papule/plaque c/w seborrheic keratosis      Yellow umbilicated papule c/w sebaceous hyperplasia      Irregularly shaped tan macule c/w lentigo     1-2 mm smooth white papules consistent with Milia      Movable subcutaneous cyst with punctum c/w epidermal inclusion cyst      Subcutaneous movable cyst c/w pilar cyst      Firm pink to brown papule c/w dermatofibroma      Pedunculated fleshy papule(s) c/w skin tag(s)      Evenly pigmented macule c/w junctional nevus     Mildly variegated pigmented, slightly irregular-bordered macule c/w mildly atypical nevus      Flesh colored to evenly pigmented papule c/w intradermal nevus       Pink pearly  "papule/plaque c/w basal cell carcinoma      Erythematous hyperkeratotic cursted plaque c/w SCC      Surgical scar with no sign of skin cancer recurrence      Open and closed comedones      Inflammatory papules and pustules      Verrucoid papule consistent consistent with wart     Erythematous eczematous patches and plaques     Dystrophic onycholytic nail with subungual debris c/w onychomycosis     Umbilicated papule    Erythematous-base heme-crusted tan verrucoid plaque consistent with inflamed seborrheic keratosis     Erythematous Silvery Scaling Plaque c/w Psoriasis     See annotation      Assessment / Plan:        Lentigines  The "ABCD" rules to observe pigmented lesions were reviewed.  sunscreen    Multiple actinic keratoses   Cryosurgery Procedure Note    Verbal consent from the patient is obtained and the patient is aware of the precancerous quality and need for treatment of these lesions. Liquid nitrogen cryosurgery is applied to the 3 actinic keratoses, as detailed in the physical exam, to produce a freeze injury.      Seborrheic keratoses  reassurance      Cherry angiomas  reassurance      Nevus of multiple sites  The "ABCD" rules to observe pigmented lesions were reviewed.  Brochure provided    Skin exam, screening for cancer  . No lesions suspicious for malignancy noted.    Recommend daily sun protection/avoidance and use of at least SPF 30, broad spectrum sunscreen (OTC drug).                Follow up in about 6 months (around 11/30/2022).  "

## 2022-06-09 ENCOUNTER — PATIENT MESSAGE (OUTPATIENT)
Dept: INTERNAL MEDICINE | Facility: CLINIC | Age: 82
End: 2022-06-09
Payer: MEDICARE

## 2022-06-09 ENCOUNTER — PATIENT MESSAGE (OUTPATIENT)
Dept: ADMINISTRATIVE | Facility: OTHER | Age: 82
End: 2022-06-09
Payer: MEDICARE

## 2022-06-09 DIAGNOSIS — U07.1 COVID-19: Primary | ICD-10-CM

## 2022-06-10 ENCOUNTER — PATIENT MESSAGE (OUTPATIENT)
Dept: INTERNAL MEDICINE | Facility: CLINIC | Age: 82
End: 2022-06-10
Payer: MEDICARE

## 2022-06-10 NOTE — TELEPHONE ENCOUNTER
5  Called and spoke to pt.   COVID Risk Score: 5  Onset symptoms 2 days  HA, runny nose, cough, sneezing, chest congestion.   Discussed paxlovid, pt desires Rx  Reviewed medications  Advised HOLD tamsulosin and pravastatin   Enrolled in covid home monitoring.   Discussed home isolation guidelines  Pt voiced understanding of above

## 2022-06-15 ENCOUNTER — NURSE TRIAGE (OUTPATIENT)
Dept: ADMINISTRATIVE | Facility: CLINIC | Age: 82
End: 2022-06-15
Payer: MEDICARE

## 2022-06-15 NOTE — TELEPHONE ENCOUNTER
Called pt regarding response to [facility] test message, states he thought he was responding to a message for his wife with her doctor. Pt states he feels much better since the paxlovid. Triage completed on pts wife. Denies any further questions or concerns at this time, advised to call back if they have any that come up.     Reason for Disposition   Information only question and nurse able to answer    Protocols used: INFORMATION ONLY CALL - NO TRIAGE-A-[place]

## 2022-07-19 ENCOUNTER — PATIENT MESSAGE (OUTPATIENT)
Dept: RESEARCH | Facility: CLINIC | Age: 82
End: 2022-07-19
Payer: MEDICARE

## 2022-08-15 ENCOUNTER — OFFICE VISIT (OUTPATIENT)
Dept: UROLOGY | Facility: CLINIC | Age: 82
End: 2022-08-15
Payer: MEDICARE

## 2022-08-15 DIAGNOSIS — N35.914 ANTERIOR URETHRAL STRICTURE: ICD-10-CM

## 2022-08-15 DIAGNOSIS — R33.9 INCOMPLETE BLADDER EMPTYING: ICD-10-CM

## 2022-08-15 DIAGNOSIS — N40.0 BENIGN NON-NODULAR PROSTATIC HYPERPLASIA WITHOUT LOWER URINARY TRACT SYMPTOMS: Primary | ICD-10-CM

## 2022-08-15 LAB
BILIRUB SERPL-MCNC: NORMAL MG/DL
BLOOD URINE, POC: NORMAL
CLARITY, POC UA: CLEAR
COLOR, POC UA: YELLOW
GLUCOSE UR QL STRIP: NORMAL
KETONES UR QL STRIP: NORMAL
LEUKOCYTE ESTERASE URINE, POC: NORMAL
NITRITE, POC UA: NORMAL
PH, POC UA: 6
POC RESIDUAL URINE VOLUME: 38 ML (ref 0–100)
PROTEIN, POC: NORMAL
SPECIFIC GRAVITY, POC UA: 1
UROBILINOGEN, POC UA: NORMAL

## 2022-08-15 PROCEDURE — 1159F PR MEDICATION LIST DOCUMENTED IN MEDICAL RECORD: ICD-10-PCS | Mod: CPTII,S$GLB,, | Performed by: NURSE PRACTITIONER

## 2022-08-15 PROCEDURE — 51798 US URINE CAPACITY MEASURE: CPT | Mod: S$GLB,,, | Performed by: NURSE PRACTITIONER

## 2022-08-15 PROCEDURE — 1160F PR REVIEW ALL MEDS BY PRESCRIBER/CLIN PHARMACIST DOCUMENTED: ICD-10-PCS | Mod: CPTII,S$GLB,, | Performed by: NURSE PRACTITIONER

## 2022-08-15 PROCEDURE — 51798 POCT BLADDER SCAN: ICD-10-PCS | Mod: S$GLB,,, | Performed by: NURSE PRACTITIONER

## 2022-08-15 PROCEDURE — 1159F MED LIST DOCD IN RCRD: CPT | Mod: CPTII,S$GLB,, | Performed by: NURSE PRACTITIONER

## 2022-08-15 PROCEDURE — 99214 PR OFFICE/OUTPT VISIT, EST, LEVL IV, 30-39 MIN: ICD-10-PCS | Mod: S$GLB,,, | Performed by: NURSE PRACTITIONER

## 2022-08-15 PROCEDURE — 99999 PR PBB SHADOW E&M-EST. PATIENT-LVL I: CPT | Mod: PBBFAC,,, | Performed by: NURSE PRACTITIONER

## 2022-08-15 PROCEDURE — 81002 POCT URINE DIPSTICK WITHOUT MICROSCOPE: ICD-10-PCS | Mod: S$GLB,,, | Performed by: NURSE PRACTITIONER

## 2022-08-15 PROCEDURE — 99214 OFFICE O/P EST MOD 30 MIN: CPT | Mod: S$GLB,,, | Performed by: NURSE PRACTITIONER

## 2022-08-15 PROCEDURE — 81002 URINALYSIS NONAUTO W/O SCOPE: CPT | Mod: S$GLB,,, | Performed by: NURSE PRACTITIONER

## 2022-08-15 PROCEDURE — 87086 URINE CULTURE/COLONY COUNT: CPT | Performed by: NURSE PRACTITIONER

## 2022-08-15 PROCEDURE — 99999 PR PBB SHADOW E&M-EST. PATIENT-LVL I: ICD-10-PCS | Mod: PBBFAC,,, | Performed by: NURSE PRACTITIONER

## 2022-08-15 PROCEDURE — 1160F RVW MEDS BY RX/DR IN RCRD: CPT | Mod: CPTII,S$GLB,, | Performed by: NURSE PRACTITIONER

## 2022-08-15 NOTE — PROGRESS NOTES
CHIEF COMPLAINT:    Mr. Monterroso is a 82 y.o. male presenting for difficulty urinating.      PRESENTING ILLNESS:    Liang Monterroso Jr. is a 82 y.o. male with a PMH of htn, hld, renal cyst(L), hypothyroid who presents for difficulty urinating.    Established patient to urology department. Last seen by Dr. Eid to undergo cystoscopy secondary anterior urethral stricture.  Has history of neurogenic bladder and performs CIC TID. However lately patient noted decrease in amount of voiding urine, so increased CIC.  Feels not emptying bladder, lower abdominal pressure, and lower back pain that is relieved with CIC.  Had cipro from previous treatment that he has been taking.  Cipro has not improved his symptoms.   Unsure if working outside dehydrated him, causing onset of symptoms.  Does report having some difficulty passing catheter through sphincter.  Believes this may be due to irritation secondary frequency of catheterizations.      REVIEW OF SYSTEMS:    Review of Systems   Constitutional: Negative for chills and fever.   Respiratory: Negative for shortness of breath.    Cardiovascular: Negative for chest pain.   Gastrointestinal: Negative for constipation and diarrhea.   Genitourinary: Negative for dysuria, flank pain, frequency, hematuria and urgency.   Neurological: Negative for dizziness and weakness.       PATIENT HISTORY:    Past Medical History:   Diagnosis Date    Allergy     CKD (chronic kidney disease) stage 3, GFR 30-59 ml/min 6/15/2016    GERD (gastroesophageal reflux disease)     Hx of colonic polyp     Hyperlipidemia     Hypertension     Hypospadias in male     Hypothyroidism     Sleep apnea     Thyroid disease     Urinary tract infection        Family History   Problem Relation Age of Onset    Diabetes Mother     Heart disease Mother     Hypertension Mother     Vision loss Mother     Dementia Mother     Alcohol abuse Father     Cancer Sister         lung with mets, was a heavy smoker    No  Known Problems Daughter     No Known Problems Son     No Known Problems Daughter     No Known Problems Son     Amblyopia Neg Hx     Blindness Neg Hx     Cataracts Neg Hx     Glaucoma Neg Hx     Macular degeneration Neg Hx     Retinal detachment Neg Hx     Strabismus Neg Hx     Stroke Neg Hx     Thyroid disease Neg Hx        Allergies:  Contrast media    Medications:    Current Outpatient Medications:     aspirin (ECOTRIN) 81 MG EC tablet, Take 81 mg by mouth once daily., Disp: , Rfl:     econazole nitrate 1 % cream, USE TWICE DAILY, Disp: 60 g, Rfl: 5    fexofenadine (ALLEGRA) 180 MG tablet, Take 1 tablet (180 mg total) by mouth once daily. (Patient taking differently: Take 180 mg by mouth daily as needed.), Disp: 30 tablet, Rfl: 11    fluticasone propionate (FLONASE) 50 mcg/actuation nasal spray, 1 spray (50 mcg total) by Each Nostril route once daily., Disp: 1 Bottle, Rfl: 0    FLUZONE HIGHDOSE QUAD 21-22  mcg/0.7 mL Syrg, , Disp: , Rfl:     levothyroxine (SYNTHROID) 112 MCG tablet, TAKE 1 TABLET BY MOUTH BEFORE BREAKFAST, Disp: 90 tablet, Rfl: 3    meloxicam (MOBIC) 7.5 MG tablet, Take 1 tablet (7.5 mg total) by mouth once daily., Disp: 30 tablet, Rfl: 0    metronidazole (METROGEL) 0.75 % gel, Use hs, Disp: 45 g, Rfl: 3    multivitamin with minerals tablet, Take 1 tablet by mouth once daily., Disp: , Rfl:     pravastatin (PRAVACHOL) 20 MG tablet, TAKE 1 TABLET BY MOUTH EVERY DAY, Disp: 90 tablet, Rfl: 3    ramipriL (ALTACE) 5 MG capsule, TAKE 1 CAPSULE(5 MG) BY MOUTH EVERY DAY, Disp: 90 capsule, Rfl: 3    tamsulosin (FLOMAX) 0.4 mg Cap, Take 2 capsules (0.8 mg total) by mouth once daily., Disp: 180 capsule, Rfl: 3    triamcinolone acetonide 0.1% (KENALOG) 0.1 % cream, Use bid prn rash (Patient not taking: Reported on 5/31/2022), Disp: 80 g, Rfl: 3    PHYSICAL EXAMINATION:    Physical Exam  Vitals and nursing note reviewed.   Constitutional:       Appearance: Normal appearance. He is  well-developed.   HENT:      Head: Normocephalic and atraumatic.   Eyes:      Pupils: Pupils are equal, round, and reactive to light.   Pulmonary:      Effort: Pulmonary effort is normal.   Musculoskeletal:         General: Normal range of motion.      Cervical back: Normal range of motion.   Skin:     General: Skin is warm and dry.   Neurological:      Mental Status: He is alert and oriented to person, place, and time.   Psychiatric:         Behavior: Behavior normal.           LABS:    U/a performed in office today: yellow, ph 6, 1.005, trace protein, + leukocytes, otherwise unremarkable  Bladder scan performed by Nurse Moyer.  PVR 38 ml    Lab Results   Component Value Date    PSA 1.8 08/03/2018    PSA 0.91 12/17/2012    PSA 4.89 (H) 11/12/2012    PSA 0.66 11/14/2011    PSA 0.81 11/03/2010    PSA 0.86 10/26/2009    PSA 0.59 10/14/2008    PSA 0.6 10/22/2007    PSA 0.9 10/23/2006    PSA 0.7 08/15/2005    PSADIAG 1.0 01/11/2016    PSADIAG 1.0 12/04/2014    PSADIAG 0.67 12/03/2013       IMPRESSION:  Encounter Diagnoses   Name Primary?    Benign non-nodular prostatic hyperplasia without lower urinary tract symptoms Yes    Anterior urethral stricture     Incomplete bladder emptying          PLAN:  Problem List Items Addressed This Visit    None     Visit Diagnoses     Benign non-nodular prostatic hyperplasia without lower urinary tract symptoms    -  Primary    Relevant Orders    POCT Bladder Scan (Completed)    POCT URINE DIPSTICK WITHOUT MICROSCOPE (Completed)    Urine culture    Anterior urethral stricture        Incomplete bladder emptying              1. Incomplete bladder emptying, neurogenic bladder   - continue CIC at least TID   - send urine for culture  2. Anterior urethral stricture   - if urine not infected and continued difficulty, will plan for cystoscopy  3. bph with luts   Continue flomax 0.8 mg  4.  rtc based on lab work     I spent 30 minutes with the patient. Over 50% of the visit was spent in  counseling.      Christine Walsh NP

## 2022-08-16 ENCOUNTER — PATIENT MESSAGE (OUTPATIENT)
Dept: UROLOGY | Facility: CLINIC | Age: 82
End: 2022-08-16
Payer: MEDICARE

## 2022-08-16 LAB — BACTERIA UR CULT: NO GROWTH

## 2022-08-17 ENCOUNTER — PATIENT MESSAGE (OUTPATIENT)
Dept: RESEARCH | Facility: CLINIC | Age: 82
End: 2022-08-17
Payer: MEDICARE

## 2022-08-22 ENCOUNTER — OFFICE VISIT (OUTPATIENT)
Dept: UROLOGY | Facility: CLINIC | Age: 82
End: 2022-08-22
Payer: MEDICARE

## 2022-08-22 DIAGNOSIS — N31.9 NEUROGENIC BLADDER: Primary | ICD-10-CM

## 2022-08-22 PROCEDURE — 1160F RVW MEDS BY RX/DR IN RCRD: CPT | Mod: CPTII,S$GLB,, | Performed by: UROLOGY

## 2022-08-22 PROCEDURE — 99213 PR OFFICE/OUTPT VISIT, EST, LEVL III, 20-29 MIN: ICD-10-PCS | Mod: S$GLB,,, | Performed by: UROLOGY

## 2022-08-22 PROCEDURE — 99999 PR PBB SHADOW E&M-EST. PATIENT-LVL II: CPT | Mod: PBBFAC,,, | Performed by: UROLOGY

## 2022-08-22 PROCEDURE — 1159F MED LIST DOCD IN RCRD: CPT | Mod: CPTII,S$GLB,, | Performed by: UROLOGY

## 2022-08-22 PROCEDURE — 1159F PR MEDICATION LIST DOCUMENTED IN MEDICAL RECORD: ICD-10-PCS | Mod: CPTII,S$GLB,, | Performed by: UROLOGY

## 2022-08-22 PROCEDURE — 99999 PR PBB SHADOW E&M-EST. PATIENT-LVL II: ICD-10-PCS | Mod: PBBFAC,,, | Performed by: UROLOGY

## 2022-08-22 PROCEDURE — 1160F PR REVIEW ALL MEDS BY PRESCRIBER/CLIN PHARMACIST DOCUMENTED: ICD-10-PCS | Mod: CPTII,S$GLB,, | Performed by: UROLOGY

## 2022-08-22 PROCEDURE — 99213 OFFICE O/P EST LOW 20 MIN: CPT | Mod: S$GLB,,, | Performed by: UROLOGY

## 2022-08-22 NOTE — PROGRESS NOTES
Subjective:       Patient ID: Liang Monterroso Jr. is a 82 y.o. male.    Chief Complaint: No chief complaint on file.  HPI.  Patient known to me with history of waxing and waning neurogenic bladder and urethral stricture.  He catheterizes easily 3 times a day.  Every now and then he has difficulty getting the catheter or an his last cysto was in January.  Patient voided and postvoid residual was 450 cc.  His prostate was open in the past    Past Medical History:   Diagnosis Date    Allergy     CKD (chronic kidney disease) stage 3, GFR 30-59 ml/min 6/15/2016    GERD (gastroesophageal reflux disease)     Hx of colonic polyp     Hyperlipidemia     Hypertension     Hypospadias in male     Hypothyroidism     Sleep apnea     Thyroid disease     Urinary tract infection        Past Surgical History:   Procedure Laterality Date    APPENDECTOMY      CATARACT EXTRACTION      CYSTOSCOPY      EYE SURGERY      HERNIA REPAIR      TONSILLECTOMY         Family History   Problem Relation Age of Onset    Diabetes Mother     Heart disease Mother     Hypertension Mother     Vision loss Mother     Dementia Mother     Alcohol abuse Father     Cancer Sister         lung with mets, was a heavy smoker    No Known Problems Daughter     No Known Problems Son     No Known Problems Daughter     No Known Problems Son     Amblyopia Neg Hx     Blindness Neg Hx     Cataracts Neg Hx     Glaucoma Neg Hx     Macular degeneration Neg Hx     Retinal detachment Neg Hx     Strabismus Neg Hx     Stroke Neg Hx     Thyroid disease Neg Hx        Social History     Socioeconomic History    Marital status:      Spouse name: parul    Number of children: 4   Occupational History    Occupation: retired   Tobacco Use    Smoking status: Never Smoker    Smokeless tobacco: Never Used   Substance and Sexual Activity    Alcohol use: Yes     Comment: 1-3 glasses wine weekly, 2-3 beers weekly    Drug use: No    Sexual  activity: Yes     Partners: Female     Social Determinants of Health     Financial Resource Strain: Low Risk     Difficulty of Paying Living Expenses: Not hard at all   Food Insecurity: No Food Insecurity    Worried About Running Out of Food in the Last Year: Never true    Ran Out of Food in the Last Year: Never true   Transportation Needs: No Transportation Needs    Lack of Transportation (Medical): No    Lack of Transportation (Non-Medical): No   Physical Activity: Insufficiently Active    Days of Exercise per Week: 4 days    Minutes of Exercise per Session: 30 min   Stress: No Stress Concern Present    Feeling of Stress : Only a little   Social Connections: Unknown    Frequency of Communication with Friends and Family: Once a week    Frequency of Social Gatherings with Friends and Family: Once a week    Active Member of Clubs or Organizations: No    Attends Club or Organization Meetings: Never    Marital Status:    Housing Stability: Low Risk     Unable to Pay for Housing in the Last Year: No    Number of Places Lived in the Last Year: 1    Unstable Housing in the Last Year: No       Allergies:  Contrast media    Medications:    Current Outpatient Medications:     aspirin (ECOTRIN) 81 MG EC tablet, Take 81 mg by mouth once daily., Disp: , Rfl:     econazole nitrate 1 % cream, USE TWICE DAILY, Disp: 60 g, Rfl: 5    fexofenadine (ALLEGRA) 180 MG tablet, Take 1 tablet (180 mg total) by mouth once daily. (Patient taking differently: Take 180 mg by mouth daily as needed.), Disp: 30 tablet, Rfl: 11    fluticasone propionate (FLONASE) 50 mcg/actuation nasal spray, 1 spray (50 mcg total) by Each Nostril route once daily., Disp: 1 Bottle, Rfl: 0    FLUZONE HIGHDOSE QUAD 21-22  mcg/0.7 mL Syrg, , Disp: , Rfl:     levothyroxine (SYNTHROID) 112 MCG tablet, TAKE 1 TABLET BY MOUTH BEFORE BREAKFAST, Disp: 90 tablet, Rfl: 3    meloxicam (MOBIC) 7.5 MG tablet, Take 1 tablet (7.5 mg total) by  mouth once daily., Disp: 30 tablet, Rfl: 0    metronidazole (METROGEL) 0.75 % gel, Use hs, Disp: 45 g, Rfl: 3    multivitamin with minerals tablet, Take 1 tablet by mouth once daily., Disp: , Rfl:     pravastatin (PRAVACHOL) 20 MG tablet, TAKE 1 TABLET BY MOUTH EVERY DAY, Disp: 90 tablet, Rfl: 3    ramipriL (ALTACE) 5 MG capsule, TAKE 1 CAPSULE(5 MG) BY MOUTH EVERY DAY, Disp: 90 capsule, Rfl: 3    tamsulosin (FLOMAX) 0.4 mg Cap, Take 2 capsules (0.8 mg total) by mouth once daily., Disp: 180 capsule, Rfl: 3    triamcinolone acetonide 0.1% (KENALOG) 0.1 % cream, Use bid prn rash (Patient not taking: Reported on 5/31/2022), Disp: 80 g, Rfl: 3    Review of Systems   Constitutional: Negative for activity change, appetite change, chills, diaphoresis, fatigue, fever and unexpected weight change.   HENT: Negative for congestion, dental problem, hearing loss, mouth sores, postnasal drip, rhinorrhea, sinus pressure and trouble swallowing.    Eyes: Negative for pain, discharge and itching.   Respiratory: Negative for apnea, cough, choking, chest tightness, shortness of breath and wheezing.    Cardiovascular: Negative for chest pain, palpitations and leg swelling.   Gastrointestinal: Negative for abdominal distention, abdominal pain, anal bleeding, blood in stool, constipation, diarrhea, nausea, rectal pain and vomiting.   Endocrine: Negative for polydipsia and polyuria.   Genitourinary: Positive for decreased urine volume and difficulty urinating. Negative for dysuria, enuresis, flank pain, frequency, genital sores, hematuria, penile discharge, penile pain, penile swelling, scrotal swelling, testicular pain and urgency.   Musculoskeletal: Negative for arthralgias, back pain and myalgias.   Skin: Negative for color change, rash and wound.   Neurological: Negative for dizziness, syncope, speech difficulty, light-headedness and headaches.   Hematological: Negative for adenopathy. Does not bruise/bleed easily.    Psychiatric/Behavioral: Negative for behavioral problems, confusion, hallucinations and sleep disturbance.       Objective:      Physical Exam  Constitutional:       Appearance: He is well-developed.   HENT:      Head: Normocephalic.   Cardiovascular:      Rate and Rhythm: Normal rate.   Pulmonary:      Effort: Pulmonary effort is normal.   Abdominal:      Palpations: Abdomen is soft.   Skin:     General: Skin is warm.   Neurological:      Mental Status: He is alert.         Assessment:       1. Neurogenic bladder        Plan:        will ask Dr. Scott quiroz to see him and consider fluoroscopic urodynamics.  May be patient is a candidate for a bladder stimulator.  I have recommended that he increase catheterizations to 4 to 5 times a day.  Will also get a renal ultrasound

## 2022-08-23 ENCOUNTER — OFFICE VISIT (OUTPATIENT)
Dept: OTOLARYNGOLOGY | Facility: CLINIC | Age: 82
End: 2022-08-23
Payer: MEDICARE

## 2022-08-23 VITALS — WEIGHT: 174 LBS | BODY MASS INDEX: 26.46 KG/M2

## 2022-08-23 DIAGNOSIS — H61.22 IMPACTED CERUMEN, LEFT EAR: Primary | ICD-10-CM

## 2022-08-23 PROCEDURE — 99999 PR PBB SHADOW E&M-EST. PATIENT-LVL III: CPT | Mod: PBBFAC,,, | Performed by: OTOLARYNGOLOGY

## 2022-08-23 PROCEDURE — 69210 REMOVE IMPACTED EAR WAX UNI: CPT | Mod: S$GLB,,, | Performed by: OTOLARYNGOLOGY

## 2022-08-23 PROCEDURE — 99499 UNLISTED E&M SERVICE: CPT | Mod: S$GLB,,, | Performed by: OTOLARYNGOLOGY

## 2022-08-23 PROCEDURE — 1101F PR PT FALLS ASSESS DOC 0-1 FALLS W/OUT INJ PAST YR: ICD-10-PCS | Mod: CPTII,S$GLB,, | Performed by: OTOLARYNGOLOGY

## 2022-08-23 PROCEDURE — 1101F PT FALLS ASSESS-DOCD LE1/YR: CPT | Mod: CPTII,S$GLB,, | Performed by: OTOLARYNGOLOGY

## 2022-08-23 PROCEDURE — 1126F AMNT PAIN NOTED NONE PRSNT: CPT | Mod: CPTII,S$GLB,, | Performed by: OTOLARYNGOLOGY

## 2022-08-23 PROCEDURE — 1159F MED LIST DOCD IN RCRD: CPT | Mod: CPTII,S$GLB,, | Performed by: OTOLARYNGOLOGY

## 2022-08-23 PROCEDURE — 99499 NO LOS: ICD-10-PCS | Mod: S$GLB,,, | Performed by: OTOLARYNGOLOGY

## 2022-08-23 PROCEDURE — 1126F PR PAIN SEVERITY QUANTIFIED, NO PAIN PRESENT: ICD-10-PCS | Mod: CPTII,S$GLB,, | Performed by: OTOLARYNGOLOGY

## 2022-08-23 PROCEDURE — 1159F PR MEDICATION LIST DOCUMENTED IN MEDICAL RECORD: ICD-10-PCS | Mod: CPTII,S$GLB,, | Performed by: OTOLARYNGOLOGY

## 2022-08-23 PROCEDURE — 69210 EAR CERUMEN REMOVAL: ICD-10-PCS | Mod: S$GLB,,, | Performed by: OTOLARYNGOLOGY

## 2022-08-23 PROCEDURE — 3288F PR FALLS RISK ASSESSMENT DOCUMENTED: ICD-10-PCS | Mod: CPTII,S$GLB,, | Performed by: OTOLARYNGOLOGY

## 2022-08-23 PROCEDURE — 99999 PR PBB SHADOW E&M-EST. PATIENT-LVL III: ICD-10-PCS | Mod: PBBFAC,,, | Performed by: OTOLARYNGOLOGY

## 2022-08-23 PROCEDURE — 3288F FALL RISK ASSESSMENT DOCD: CPT | Mod: CPTII,S$GLB,, | Performed by: OTOLARYNGOLOGY

## 2022-08-23 NOTE — PROGRESS NOTES
Answers for HPI/ROS submitted by the patient on 8/19/2022  None of these: Yes  None of these: Yes  None of these : Yes  None of these: Yes  None of these : Yes  None of these: Yes  Difficulty urinating?: Yes  Muscle aches / pain?: Yes  neck pain: Yes  None of these : Yes  None of these: Yes  None of these : Yes  None of these: Yes  None of these: Yes  None of these: Yes

## 2022-08-23 NOTE — PROCEDURES
Ear Cerumen Removal    Date/Time: 8/23/2022 10:30 AM  Performed by: Fer Jasso MD  Authorized by: Fer Jasso MD     Consent Done?:  Yes (Verbal)  Location details:  Left ear  Procedure type: curette    Cerumen  Removal Results:  Cerumen completely removed  Patient tolerance:  Patient tolerated the procedure well with no immediate complications     Binocular microscopy used to examine ear canal and tympanic membrane.  There was excess cerumen on the right side and an impaction of cerumen on the left.  This was removed using a curette and other microinstrumentation.

## 2022-08-30 ENCOUNTER — HOSPITAL ENCOUNTER (OUTPATIENT)
Dept: RADIOLOGY | Facility: HOSPITAL | Age: 82
Discharge: HOME | End: 2022-08-30
Attending: UROLOGY
Payer: MEDICARE

## 2022-08-30 DIAGNOSIS — N31.9 NEUROGENIC BLADDER: ICD-10-CM

## 2022-08-30 PROCEDURE — 76770 US RETROPERITONEAL COMPLETE: ICD-10-PCS | Mod: 26,,, | Performed by: STUDENT IN AN ORGANIZED HEALTH CARE EDUCATION/TRAINING PROGRAM

## 2022-08-30 PROCEDURE — 76770 US EXAM ABDO BACK WALL COMP: CPT | Mod: 26,,, | Performed by: STUDENT IN AN ORGANIZED HEALTH CARE EDUCATION/TRAINING PROGRAM

## 2022-08-30 PROCEDURE — 76770 US EXAM ABDO BACK WALL COMP: CPT | Mod: TC

## 2022-09-01 ENCOUNTER — HOSPITAL ENCOUNTER (EMERGENCY)
Facility: HOSPITAL | Age: 82
Discharge: HOME | End: 2022-09-01
Attending: STUDENT IN AN ORGANIZED HEALTH CARE EDUCATION/TRAINING PROGRAM
Payer: MEDICARE

## 2022-09-01 VITALS
HEIGHT: 68 IN | TEMPERATURE: 99 F | OXYGEN SATURATION: 99 % | HEART RATE: 70 BPM | BODY MASS INDEX: 28.04 KG/M2 | WEIGHT: 185 LBS | DIASTOLIC BLOOD PRESSURE: 81 MMHG | RESPIRATION RATE: 18 BRPM | SYSTOLIC BLOOD PRESSURE: 127 MMHG

## 2022-09-01 DIAGNOSIS — N30.91 HEMORRHAGIC CYSTITIS: Primary | ICD-10-CM

## 2022-09-01 DIAGNOSIS — R31.9 HEMATURIA, UNSPECIFIED TYPE: ICD-10-CM

## 2022-09-01 LAB
BACTERIA #/AREA URNS AUTO: ABNORMAL /HPF
BILIRUB UR QL STRIP: NEGATIVE
BUN SERPL-MCNC: 21 MG/DL (ref 6–30)
CHLORIDE SERPL-SCNC: 102 MMOL/L (ref 95–110)
CLARITY UR REFRACT.AUTO: ABNORMAL
COLOR UR AUTO: YELLOW
CREAT SERPL-MCNC: 1.1 MG/DL (ref 0.5–1.4)
GLUCOSE SERPL-MCNC: 92 MG/DL (ref 70–110)
GLUCOSE UR QL STRIP: NEGATIVE
HCT VFR BLD CALC: 47 %PCV (ref 36–54)
HGB UR QL STRIP: ABNORMAL
KETONES UR QL STRIP: NEGATIVE
LEUKOCYTE ESTERASE UR QL STRIP: ABNORMAL
MICROSCOPIC COMMENT: ABNORMAL
NITRITE UR QL STRIP: NEGATIVE
PH UR STRIP: 6 [PH] (ref 5–8)
POC IONIZED CALCIUM: 1.15 MMOL/L (ref 1.06–1.42)
POC TCO2 (MEASURED): 26 MMOL/L (ref 23–29)
POTASSIUM BLD-SCNC: 4.8 MMOL/L (ref 3.5–5.1)
PROT UR QL STRIP: ABNORMAL
RBC #/AREA URNS AUTO: 11 /HPF (ref 0–4)
SAMPLE: NORMAL
SODIUM BLD-SCNC: 138 MMOL/L (ref 136–145)
SP GR UR STRIP: 1 (ref 1–1.03)
SQUAMOUS #/AREA URNS AUTO: 0 /HPF
URN SPEC COLLECT METH UR: ABNORMAL
WBC #/AREA URNS AUTO: >100 /HPF (ref 0–5)
WBC CLUMPS UR QL AUTO: ABNORMAL

## 2022-09-01 PROCEDURE — 87086 URINE CULTURE/COLONY COUNT: CPT | Performed by: PHYSICIAN ASSISTANT

## 2022-09-01 PROCEDURE — 99284 PR EMERGENCY DEPT VISIT,LEVEL IV: ICD-10-PCS | Mod: ,,, | Performed by: PHYSICIAN ASSISTANT

## 2022-09-01 PROCEDURE — 63600175 PHARM REV CODE 636 W HCPCS: Performed by: PHYSICIAN ASSISTANT

## 2022-09-01 PROCEDURE — 99284 EMERGENCY DEPT VISIT MOD MDM: CPT | Mod: 25

## 2022-09-01 PROCEDURE — 96365 THER/PROPH/DIAG IV INF INIT: CPT

## 2022-09-01 PROCEDURE — 99284 EMERGENCY DEPT VISIT MOD MDM: CPT | Mod: ,,, | Performed by: PHYSICIAN ASSISTANT

## 2022-09-01 PROCEDURE — 81001 URINALYSIS AUTO W/SCOPE: CPT | Performed by: PHYSICIAN ASSISTANT

## 2022-09-01 RX ORDER — CEPHALEXIN 500 MG/1
500 CAPSULE ORAL EVERY 12 HOURS
Qty: 20 CAPSULE | Refills: 0 | Status: SHIPPED | OUTPATIENT
Start: 2022-09-01 | End: 2022-09-11

## 2022-09-01 RX ADMIN — CEFTRIAXONE 1 G: 1 INJECTION, SOLUTION INTRAVENOUS at 04:09

## 2022-09-01 NOTE — ED NOTES
Patient states blood in urine noted several hours ago, recently on Cipro, saw Neuro and Urology recently for same. States he voided on his own, reports blood in cath at 1230 today, states he has urinated since then with no noticeable blood, Aspirin 81 mg yesterday

## 2022-09-01 NOTE — FIRST PROVIDER EVALUATION
Emergency Department TeleTriage Encounter Note      CHIEF COMPLAINT    Chief Complaint   Patient presents with    Hematuria     Hx of a neurogenic bladder, was self cathing and noticed blood earlier today. Denies taking blood thinners       VITAL SIGNS   Initial Vitals   BP Pulse Resp Temp SpO2   09/01/22 1407 09/01/22 1407 09/01/22 1407 09/01/22 1409 09/01/22 1407   (!) 156/74 89 18 99 °F (37.2 °C) 99 %      MAP       --                   ALLERGIES    Review of patient's allergies indicates:   Allergen Reactions    Contrast media Hives and Itching     Iv dye       PROVIDER TRIAGE NOTE  This is a teletriage evaluation of a 82 y.o. male presenting to the ED complaining of hematuria - hx of neurogenic bladder.  Self caths multiple times a day - noticing quite a bit of blood in urine today.     Initial orders will be placed and care will be transferred to an alternate provider when patient is roomed for a full evaluation. Any additional orders and the final disposition will be determined by that provider.       ORDERS  Labs Reviewed   URINALYSIS, REFLEX TO URINE CULTURE       ED Orders (720h ago, onward)      Start Ordered     Status Ordering Provider    09/01/22 1437 09/01/22 1436  Urinalysis, Reflex to Urine Culture Urine, Clean Catch  STAT         Ordered FATOUMATA REESE              Virtual Visit Note: The provider triage portion of this emergency department evaluation and documentation was performed via [employer], a HIPAA-compliant telemedicine application, in concert with a tele-presenter in the room. A face to face patient evaluation with one of my colleagues will occur once the patient is placed in an emergency department room.      DISCLAIMER: This note was prepared with [employer] voice recognition transcription software. Garbled syntax, mangled pronouns, and other bizarre constructions may be attributed to that software system.

## 2022-09-01 NOTE — ED NOTES
I-STAT Chem-8+ Results:   Value Reference Range   Sodium 138 136-145 mmol/L   Potassium  4.8 3.5-5.1 mmol/L   Chloride 102  mmol/L   Ionized Calcium 1.15 1.06-1.42 mmol/L   CO2 (measured) 26 23-29 mmol/L   Glucose 94  mg/dL   BUN 21 6-30 mg/dL   Creatinine 1.1 0.5-1.4 mg/dL   Hematocrit 47 36-54%

## 2022-09-01 NOTE — ED NOTES
Patient identifiers verified and correct for Mr Monterroso  C/C: Blood in urine SEE NN  APPEARANCE: awake and alert in NAD.  SKIN: warm, dry and intact. No breakdown or bruising.  MUSCULOSKELETAL: Patient moving all extremities spontaneously, no obvious swelling or deformities noted. Ambulates independently.  RESPIRATORY: Denies shortness of breath.Respirations unlabored.   CARDIAC: Denies CP, 2+ distal pulses; no peripheral edema  ABDOMEN: S/ND/NT, Denies nausea  : voids spontaneousl y at times, self caths 4x/day  Neurologic: AAO x 4; follows commands equal strength in all extremities; denies numbness/tingling. Denies dizziness Denies weakness

## 2022-09-02 LAB — BACTERIA UR CULT: NORMAL

## 2022-09-02 NOTE — ED PROVIDER NOTES
Encounter Date: 9/1/2022       History     Chief Complaint   Patient presents with    Hematuria     Hx of a neurogenic bladder, was self cathing and noticed blood earlier today. Denies taking blood thinners     This is an 82 year old male with a PMH of neurogenic bladder presenting to the ED with a chief complaint of gross hematuria. Patient requires intermittent self catheterization for his neurogenic bladder associated urinary retention. He states today he developed urinary frequency, urgency, and suprapubic discomfort concerning for cystitis. He cathed and saw bloody urine, which concerned him and prompted him to come in. He takes a baby aspirin daily. Denies trauma, fever, chills, nausea/vomiting, abdominal pain, dysuria, testicular pain or scrotal swelling.    Review of patient's allergies indicates:   Allergen Reactions    Contrast media Hives and Itching     Iv dye     Past Medical History:   Diagnosis Date    Allergy     CKD (chronic kidney disease) stage 3, GFR 30-59 ml/min 6/15/2016    GERD (gastroesophageal reflux disease)     Hx of colonic polyp     Hyperlipidemia     Hypertension     Hypospadias in male     Hypothyroidism     Sleep apnea     Thyroid disease     Urinary tract infection      Past Surgical History:   Procedure Laterality Date    APPENDECTOMY      CATARACT EXTRACTION      CYSTOSCOPY      EYE SURGERY      HERNIA REPAIR      TONSILLECTOMY       Family History   Problem Relation Age of Onset    Diabetes Mother     Heart disease Mother     Hypertension Mother     Vision loss Mother     Dementia Mother     Alcohol abuse Father     Cancer Sister         lung with mets, was a heavy smoker    No Known Problems Daughter     No Known Problems Son     No Known Problems Daughter     No Known Problems Son     Amblyopia Neg Hx     Blindness Neg Hx     Cataracts Neg Hx     Glaucoma Neg Hx     Macular degeneration Neg Hx     Retinal detachment Neg Hx     Strabismus Neg Hx     Stroke Neg Hx     Thyroid  disease Neg Hx      Social History     Tobacco Use    Smoking status: Never    Smokeless tobacco: Never   Substance Use Topics    Alcohol use: Yes     Comment: 1-3 glasses wine weekly, 2-3 beers weekly    Drug use: No     Review of Systems   Constitutional:  Negative for chills and fever.   Respiratory:  Negative for shortness of breath.    Cardiovascular:  Negative for palpitations.   Gastrointestinal:  Negative for nausea and vomiting.   Genitourinary:  Positive for frequency, hematuria and urgency. Negative for dysuria.   Musculoskeletal:  Negative for back pain.   Skin:  Negative for rash.   Neurological:  Negative for dizziness, weakness and light-headedness.   Hematological:  Does not bruise/bleed easily.     Physical Exam     Initial Vitals   BP Pulse Resp Temp SpO2   09/01/22 1407 09/01/22 1407 09/01/22 1407 09/01/22 1409 09/01/22 1407   (!) 156/74 89 18 99 °F (37.2 °C) 99 %      MAP       --                Physical Exam    Constitutional: He appears well-developed and well-nourished. No distress.   HENT:   Head: Atraumatic.   Eyes: Conjunctivae and EOM are normal. Pupils are equal, round, and reactive to light.   Cardiovascular:  Normal rate, regular rhythm and normal heart sounds.           Pulmonary/Chest: Breath sounds normal. No respiratory distress. He has no wheezes. He has no rhonchi. He has no rales.   Abdominal: Abdomen is soft. Bowel sounds are normal. There is no abdominal tenderness.     Neurological: He is alert and oriented to person, place, and time.   Skin: Skin is warm and dry. No rash noted.       ED Course   Procedures  Labs Reviewed   URINALYSIS, REFLEX TO URINE CULTURE - Abnormal; Notable for the following components:       Result Value    Appearance, UA Hazy (*)     Protein, UA Trace (*)     Occult Blood UA 3+ (*)     Leukocytes, UA 3+ (*)     All other components within normal limits    Narrative:     Specimen Source->Urine   URINALYSIS MICROSCOPIC - Abnormal; Notable for the  following components:    RBC, UA 11 (*)     WBC, UA >100 (*)     WBC Clumps, UA Few (*)     All other components within normal limits    Narrative:     Specimen Source->Urine   CULTURE, URINE   ISTAT PROCEDURE          Imaging Results    None          Medications   cefTRIAXone (ROCEPHIN) 1 g/50 mL D5W IVPB (0 g Intravenous Stopped 9/1/22 8143)     Medical Decision Making:   Clinical Tests:   Lab Tests: Ordered and Reviewed     APC / Resident Notes:   82 y.o. year old male presenting with hematuria..    DDx includes but is not limited to hemorrhagic cystitis, traumatic catheterization, anemia, bladder ca.    ED workup reveals normal renal function and Hematocrit on istat chem 8. Urinalysis demonstrates 3+ leukocytes, > 100 WBCs, and 3+ blood.    Plan  Rocephin given in the ED, will discharge with Keflex pending culture  Advised Urology f/u    Discussed findings and plan with patient who verbalized understanding and agrees with the plan and course of treatment. Return to ED precautions discussed. Patient is stable for discharge. I discussed the care of this patient with my supervising physician.         Attending Attestation:     Physician Attestation Statement for NP/PA:       Other NP/PA Attestation Additions:      Medical Decision Making: I did not evaluate this patient at bedside.  The [facility] completed the face to face portion of the encounter.  I was available throughout this patient's ED course for immediate consultation.                        Clinical Impression:   Final diagnoses:  [R31.9] Hematuria, unspecified type  [N30.91] Hemorrhagic cystitis (Primary)        ED Disposition Condition    Discharge Stable          ED Prescriptions       Medication Sig Dispense Start Date End Date Auth. Provider    cephALEXin (KEFLEX) 500 MG capsule Take 1 capsule (500 mg total) by mouth every 12 (twelve) hours. for 10 days 20 capsule 9/1/2022 9/11/2022 Lala Lomax PA-C          Follow-up Information       Follow up With  Specialties Details Why Contact Info    Anastacio Eid Jr., MD Urology Schedule an appointment as soon as possible for a visit   [address] [facility] [address]  [phone]               Lala Lomax PA-C  09/01/22 1951       Miky Magdaleno DO  09/01/22 2123

## 2022-09-06 ENCOUNTER — PATIENT MESSAGE (OUTPATIENT)
Dept: UROLOGY | Facility: CLINIC | Age: 82
End: 2022-09-06
Payer: MEDICARE

## 2022-09-07 ENCOUNTER — OFFICE VISIT (OUTPATIENT)
Dept: UROLOGY | Facility: CLINIC | Age: 82
End: 2022-09-07
Payer: MEDICARE

## 2022-09-07 VITALS
SYSTOLIC BLOOD PRESSURE: 119 MMHG | WEIGHT: 174 LBS | DIASTOLIC BLOOD PRESSURE: 82 MMHG | BODY MASS INDEX: 26.37 KG/M2 | HEART RATE: 70 BPM | HEIGHT: 68 IN

## 2022-09-07 DIAGNOSIS — N31.9 NEUROGENIC BLADDER: ICD-10-CM

## 2022-09-07 PROCEDURE — 1159F MED LIST DOCD IN RCRD: CPT | Mod: CPTII,S$GLB,, | Performed by: UROLOGY

## 2022-09-07 PROCEDURE — 3074F PR MOST RECENT SYSTOLIC BLOOD PRESSURE < 130 MM HG: ICD-10-PCS | Mod: CPTII,S$GLB,, | Performed by: UROLOGY

## 2022-09-07 PROCEDURE — 1159F PR MEDICATION LIST DOCUMENTED IN MEDICAL RECORD: ICD-10-PCS | Mod: CPTII,S$GLB,, | Performed by: UROLOGY

## 2022-09-07 PROCEDURE — 99999 PR PBB SHADOW E&M-EST. PATIENT-LVL III: CPT | Mod: PBBFAC,,, | Performed by: UROLOGY

## 2022-09-07 PROCEDURE — 3079F DIAST BP 80-89 MM HG: CPT | Mod: CPTII,S$GLB,, | Performed by: UROLOGY

## 2022-09-07 PROCEDURE — 99214 PR OFFICE/OUTPT VISIT, EST, LEVL IV, 30-39 MIN: ICD-10-PCS | Mod: S$GLB,,, | Performed by: UROLOGY

## 2022-09-07 PROCEDURE — 99999 PR PBB SHADOW E&M-EST. PATIENT-LVL III: ICD-10-PCS | Mod: PBBFAC,,, | Performed by: UROLOGY

## 2022-09-07 PROCEDURE — 3074F SYST BP LT 130 MM HG: CPT | Mod: CPTII,S$GLB,, | Performed by: UROLOGY

## 2022-09-07 PROCEDURE — 99214 OFFICE O/P EST MOD 30 MIN: CPT | Mod: S$GLB,,, | Performed by: UROLOGY

## 2022-09-07 PROCEDURE — 3079F PR MOST RECENT DIASTOLIC BLOOD PRESSURE 80-89 MM HG: ICD-10-PCS | Mod: CPTII,S$GLB,, | Performed by: UROLOGY

## 2022-09-07 NOTE — H&P (VIEW-ONLY)
[facility]      New Urinary Retention Note    9/7/2022    Referred by:  Anastacio Eid Jr., MD    HPI: Liang Monterroso Jr. is a very pleasant 82 y.o. male referred for evaluation of chronic urinary retention of several years duration. He currently performs clean intermittent catheterization 3-4x per day.  He has been diagnosed with neurogenic bladder, though the underlying neurologic etiology is unclear. He has a history of hypospadias and meatal stenosis that was untreated for quite some time. He has a very trabeculated bladder and the history of obstruction may play a role in his current retention.  He has a history of wide-caliber urethral stricture disease, but was patent on recent cystoscopy by Dr. Eid.     He is interested in whether SNM would obviate the need for catheterization. He voids some on his own.     Relevant Medications:  No current medications that would be anticipated to impair voiding    Prior Therapies:  Alpha blockers - tamulosin (Flomax) - provided some but insufficient benefit    A review of 10+ systems was conducted with pertinent positive and negative findings documented in HPI with all other systems reviewed and negative.    Past medical, family, surgical and social history reviewed as documented in chart with pertinent positive medical, family, surgical and social history detailed in HPI.    Exam Findings:    Const: no acute distress, conversant and alert  Eyes: anicteric, extraocular muscles intact  ENMT: normocephalic, Nl oral membranes  Cardio: no cyanosis, nl cap refill  Pulm: no tachypnea; no resp distress  Musc: no laceration, no tenderness  Neuro: alert; oriented x 3  Skin: warm, dry; no petichiae  Psych: no anxiety; normal speech          Assessment/Plan:    Chronic Urinary Retention (new, addt'l workup): The patient has evidence of chronic urinary retention requiring catheterization.  This is most likely associated at least partially with neurogenic bladder  (unknown). The need for catheterization was assessed and the patient is likely to benefit from a regimen of clean intermittent catheterization. The patient has previously performed intermittent catheterization in the past and is comfortable performing this alone. The etiology for urinary retention in this patient is thought to be uncertain etiology. The anticipated duration of need is estimated to be indefinite. The patient will not require insertion supplies. The patient will not require a coude-tip catheter. The patient will need to catheterize 4x daily and will require a total of 120 catheters per month. .    Patient is scheduled for video urodynamic evaluation

## 2022-09-12 ENCOUNTER — PATIENT MESSAGE (OUTPATIENT)
Dept: UROLOGY | Facility: CLINIC | Age: 82
End: 2022-09-12
Payer: MEDICARE

## 2022-09-14 ENCOUNTER — TELEPHONE (OUTPATIENT)
Dept: UROLOGY | Facility: CLINIC | Age: 82
End: 2022-09-14
Payer: MEDICARE

## 2022-09-15 ENCOUNTER — TELEPHONE (OUTPATIENT)
Dept: UROLOGY | Facility: CLINIC | Age: 82
End: 2022-09-15

## 2022-09-15 NOTE — TELEPHONE ENCOUNTER
----- Message from Peg Post RN sent at 9/14/2022  5:18 PM CDT -----  Contact: Adelita    ----- Message -----  From: Alonzo Dawson  Sent: 9/14/2022   9:54 AM CDT  To: Laurie Laguerre Staff    Refill request. Pharmacy need script and chart notes Faxed request over toda    Catheter needed    Contact @ [phone]

## 2022-09-20 ENCOUNTER — TELEPHONE (OUTPATIENT)
Dept: UROLOGY | Facility: CLINIC | Age: 82
End: 2022-09-20
Payer: MEDICARE

## 2022-09-20 DIAGNOSIS — R39.15 URGENCY OF URINATION: Primary | ICD-10-CM

## 2022-09-26 ENCOUNTER — PATIENT MESSAGE (OUTPATIENT)
Dept: SURGERY | Facility: HOSPITAL | Age: 82
End: 2022-09-26
Payer: MEDICARE

## 2022-10-05 ENCOUNTER — TELEPHONE (OUTPATIENT)
Dept: UROLOGY | Facility: CLINIC | Age: 82
End: 2022-10-05
Payer: MEDICARE

## 2022-10-06 ENCOUNTER — HOSPITAL ENCOUNTER (OUTPATIENT)
Facility: HOSPITAL | Age: 82
Discharge: HOME | End: 2022-10-06
Attending: UROLOGY | Admitting: UROLOGY
Payer: MEDICARE

## 2022-10-06 VITALS
SYSTOLIC BLOOD PRESSURE: 113 MMHG | TEMPERATURE: 98 F | DIASTOLIC BLOOD PRESSURE: 74 MMHG | BODY MASS INDEX: 28.13 KG/M2 | WEIGHT: 185 LBS | OXYGEN SATURATION: 100 % | HEART RATE: 72 BPM | RESPIRATION RATE: 16 BRPM

## 2022-10-06 DIAGNOSIS — N32.9 BLADDER DISORDER: ICD-10-CM

## 2022-10-06 PROCEDURE — 51784 ANAL/URINARY MUSCLE STUDY: CPT | Mod: 26,51,, | Performed by: UROLOGY

## 2022-10-06 PROCEDURE — 27200973 HC CYSTO SUPPLY IV (URODYNAMICS): Performed by: UROLOGY

## 2022-10-06 PROCEDURE — 36000707: Performed by: UROLOGY

## 2022-10-06 PROCEDURE — 27200971 HC CYSTO SUPPLY II (SCOPE PRCDR.): Performed by: UROLOGY

## 2022-10-06 PROCEDURE — 36000706: Performed by: UROLOGY

## 2022-10-06 PROCEDURE — 51600 INJECTION FOR BLADDER X-RAY: CPT | Mod: 51,,, | Performed by: UROLOGY

## 2022-10-06 PROCEDURE — 51728 CYSTOMETROGRAM W/VP: CPT | Mod: 26,,, | Performed by: UROLOGY

## 2022-10-06 PROCEDURE — 51741 PR UROFLOWMETRY, COMPLEX: ICD-10-PCS | Mod: 26,51,, | Performed by: UROLOGY

## 2022-10-06 PROCEDURE — 51741 ELECTRO-UROFLOWMETRY FIRST: CPT | Mod: 26,51,, | Performed by: UROLOGY

## 2022-10-06 PROCEDURE — 51784 PR ANAL/URINARY MUSCLE STUDY: ICD-10-PCS | Mod: 26,51,, | Performed by: UROLOGY

## 2022-10-06 PROCEDURE — 51600 PR INJECTION FOR BLADDER X-RAY: ICD-10-PCS | Mod: 51,,, | Performed by: UROLOGY

## 2022-10-06 PROCEDURE — 51728 PR COMPLEX CYSTOMETROGRAM VOIDING PRESSURE STUDIES: ICD-10-PCS | Mod: 26,,, | Performed by: UROLOGY

## 2022-10-06 PROCEDURE — 51797 PR VOIDING PRESS STUDY INTRA-ABDOMINAL VOID: ICD-10-PCS | Mod: 26,,, | Performed by: UROLOGY

## 2022-10-06 PROCEDURE — 51797 INTRAABDOMINAL PRESSURE TEST: CPT | Mod: 26,,, | Performed by: UROLOGY

## 2022-10-06 NOTE — OP NOTE
Urodynamic Report    [facility]       Referring Physician:  No ref. provider found    YOB: 1940  Date of Exam: 10/6/2022    HPI: Liang Monterroso Jr. is a very pleasant 82 y.o. male referred for evaluation of chronic urinary retention of several years duration. He currently performs clean intermittent catheterization 3-4x per day.  He has been diagnosed with neurogenic bladder, though the underlying neurologic etiology is unclear. He has a history of hypospadias and meatal stenosis that was untreated for quite some time. He has a very trabeculated bladder and the history of obstruction may play a role in his current retention.  He has a history of wide-caliber urethral stricture disease, but was patent on recent cystoscopy by Dr. Eid.      He is interested in whether SNM would obviate the need for catheterization. He voids some on his own.      Cystometrogram:    Position: Sitting  Filling Rate: 50 mL/sec   Catheter: 7F  Fluid:Conray       Cath PVR prior: 280 mL Voiding Diary Capacity: Not Available   First Sensation: 160 mL First Desire: 286 mL   Strong Desire: 468 mL Cystometric Capacity: 468 mL       Pdet at long term: 3 cm H2O Compliance: Normal       Detrusor Overactivity (DO): Absent  Volume first DO: No DO   Max DO Pressure: No DO Urgency Incontinence: Absent        Leak Point Pressure Testing:        Volume Tested: 250 mL  Abdominal Leak Point Pressure: No Leak   Stress Induced DO: Absent          Voiding Study:        Voided Volume: 226 mL PVR: 242 mL   Maximum Flow: 6 mL/sec Average Flow 2 mL/sec   Max Pdet: 26 cmH2O  Pdet at Max Flow: 20 cmH2O   EMG Storage: Normal Recruitment  EMG Voiding: Normal quiescence           Impression:        Sensation:Delayed    Capacity: Normal    Compliance:Normal    Detrusor Overactivity:Absent    Continence: No Incontinence    Contractility: Hypocontractility    Emptying:Unsatisfactory - Hypocontractility    Coordination:Coordinated Voiding           Summary:  This study was performed in accordance with the current AUA/SUFU Guideline on Adult Urodynamics. On filling phase he demonstrates delayed first and strong desire with a normal bladder capacity. There is no detrusor overactivity (DO) demonstrated on this exam (though absence of DO on urodynamic evaluation does not exclude it as causative agent of urgency symptoms). Bladder compliance at capacity is normal.     On stress testing, with adequate cough and valsalva effort, there is no ISRA[place] demonstrated and patient reports no clinical history of [place].    On voiding phase, he mounted a low-amplitude detrusor contraction that was inadequate for complete emptying. He did not demonstrate evidence of obstruction (BOOI = 8) but did demonstrate inadequate contractility (BCI = 50).     He may benefit from SNM to treat non-obstructive urinary retention.

## 2022-12-05 ENCOUNTER — PATIENT MESSAGE (OUTPATIENT)
Dept: INTERNAL MEDICINE | Facility: CLINIC | Age: 82
End: 2022-12-05
Payer: MEDICARE

## 2022-12-22 DIAGNOSIS — N40.1 BENIGN PROSTATIC HYPERPLASIA WITH URINARY OBSTRUCTION: ICD-10-CM

## 2022-12-22 DIAGNOSIS — N13.8 BENIGN PROSTATIC HYPERPLASIA WITH URINARY OBSTRUCTION: ICD-10-CM

## 2022-12-22 RX ORDER — TAMSULOSIN HYDROCHLORIDE 0.4 MG/1
CAPSULE ORAL
Qty: 180 CAPSULE | Refills: 0 | OUTPATIENT
Start: 2022-12-22

## 2022-12-22 NOTE — TELEPHONE ENCOUNTER
Refill Routing Note   Medication(s) are not appropriate for processing by [facility] for the following reason(s):      - Patient has been seen in the ED/Hospital since the last PCP visit    [facility] action(s):                   Appointments  past 12m or future 3m with PCP    Date Provider   Last Visit   1/26/2022 Corey Iqbal MD   Next Visit   2/8/2023 Corey Iqbal MD   ED visits in past 90 days: 0        Note composed:12:42 PM 12/22/2022

## 2022-12-22 NOTE — TELEPHONE ENCOUNTER
No new care gaps identified.  [facility] Embedded Care Gaps. Reference number: [id number]. 12/22/2022   11:06:42 AM CST

## 2023-02-07 DIAGNOSIS — Z00.00 ENCOUNTER FOR MEDICARE ANNUAL WELLNESS EXAM: ICD-10-CM

## 2023-02-08 ENCOUNTER — OFFICE VISIT (OUTPATIENT)
Dept: INTERNAL MEDICINE | Facility: CLINIC | Age: 83
End: 2023-02-08
Payer: MEDICARE

## 2023-02-08 ENCOUNTER — PATIENT MESSAGE (OUTPATIENT)
Dept: INTERNAL MEDICINE | Facility: CLINIC | Age: 83
End: 2023-02-08

## 2023-02-08 VITALS
BODY MASS INDEX: 27.23 KG/M2 | SYSTOLIC BLOOD PRESSURE: 110 MMHG | DIASTOLIC BLOOD PRESSURE: 62 MMHG | HEART RATE: 65 BPM | WEIGHT: 179.69 LBS | OXYGEN SATURATION: 99 % | HEIGHT: 68 IN

## 2023-02-08 DIAGNOSIS — M25.551 CHRONIC PAIN OF BOTH HIPS: ICD-10-CM

## 2023-02-08 DIAGNOSIS — I10 ESSENTIAL HYPERTENSION: ICD-10-CM

## 2023-02-08 DIAGNOSIS — R73.09 ELEVATED GLUCOSE LEVEL: ICD-10-CM

## 2023-02-08 DIAGNOSIS — E78.5 HYPERLIPIDEMIA, UNSPECIFIED HYPERLIPIDEMIA TYPE: ICD-10-CM

## 2023-02-08 DIAGNOSIS — E03.9 ACQUIRED HYPOTHYROIDISM: ICD-10-CM

## 2023-02-08 DIAGNOSIS — M25.552 CHRONIC PAIN OF BOTH HIPS: ICD-10-CM

## 2023-02-08 DIAGNOSIS — N13.8 BENIGN PROSTATIC HYPERPLASIA WITH URINARY OBSTRUCTION: ICD-10-CM

## 2023-02-08 DIAGNOSIS — G89.29 CHRONIC PAIN OF BOTH HIPS: ICD-10-CM

## 2023-02-08 DIAGNOSIS — I10 PRIMARY HYPERTENSION: Primary | ICD-10-CM

## 2023-02-08 DIAGNOSIS — N40.1 BENIGN PROSTATIC HYPERPLASIA WITH URINARY OBSTRUCTION: ICD-10-CM

## 2023-02-08 DIAGNOSIS — R42 DIZZINESS: ICD-10-CM

## 2023-02-08 DIAGNOSIS — Z01.810 PREOP CARDIOVASCULAR EXAM: ICD-10-CM

## 2023-02-08 PROCEDURE — 99214 OFFICE O/P EST MOD 30 MIN: CPT | Mod: HCNC,S$GLB,, | Performed by: INTERNAL MEDICINE

## 2023-02-08 PROCEDURE — 1126F AMNT PAIN NOTED NONE PRSNT: CPT | Mod: HCNC,CPTII,S$GLB, | Performed by: INTERNAL MEDICINE

## 2023-02-08 PROCEDURE — 3288F PR FALLS RISK ASSESSMENT DOCUMENTED: ICD-10-PCS | Mod: HCNC,CPTII,S$GLB, | Performed by: INTERNAL MEDICINE

## 2023-02-08 PROCEDURE — 93010 EKG 12-LEAD: ICD-10-PCS | Mod: HCNC,S$GLB,, | Performed by: INTERNAL MEDICINE

## 2023-02-08 PROCEDURE — 3078F PR MOST RECENT DIASTOLIC BLOOD PRESSURE < 80 MM HG: ICD-10-PCS | Mod: HCNC,CPTII,S$GLB, | Performed by: INTERNAL MEDICINE

## 2023-02-08 PROCEDURE — 3074F SYST BP LT 130 MM HG: CPT | Mod: HCNC,CPTII,S$GLB, | Performed by: INTERNAL MEDICINE

## 2023-02-08 PROCEDURE — 1126F PR PAIN SEVERITY QUANTIFIED, NO PAIN PRESENT: ICD-10-PCS | Mod: HCNC,CPTII,S$GLB, | Performed by: INTERNAL MEDICINE

## 2023-02-08 PROCEDURE — 99999 PR PBB SHADOW E&M-EST. PATIENT-LVL IV: ICD-10-PCS | Mod: PBBFAC,HCNC,, | Performed by: INTERNAL MEDICINE

## 2023-02-08 PROCEDURE — 1101F PT FALLS ASSESS-DOCD LE1/YR: CPT | Mod: HCNC,CPTII,S$GLB, | Performed by: INTERNAL MEDICINE

## 2023-02-08 PROCEDURE — 3288F FALL RISK ASSESSMENT DOCD: CPT | Mod: HCNC,CPTII,S$GLB, | Performed by: INTERNAL MEDICINE

## 2023-02-08 PROCEDURE — 3074F PR MOST RECENT SYSTOLIC BLOOD PRESSURE < 130 MM HG: ICD-10-PCS | Mod: HCNC,CPTII,S$GLB, | Performed by: INTERNAL MEDICINE

## 2023-02-08 PROCEDURE — 1159F PR MEDICATION LIST DOCUMENTED IN MEDICAL RECORD: ICD-10-PCS | Mod: HCNC,CPTII,S$GLB, | Performed by: INTERNAL MEDICINE

## 2023-02-08 PROCEDURE — 99214 PR OFFICE/OUTPT VISIT, EST, LEVL IV, 30-39 MIN: ICD-10-PCS | Mod: HCNC,S$GLB,, | Performed by: INTERNAL MEDICINE

## 2023-02-08 PROCEDURE — 93005 EKG 12-LEAD: ICD-10-PCS | Mod: HCNC,S$GLB,, | Performed by: INTERNAL MEDICINE

## 2023-02-08 PROCEDURE — 99999 PR PBB SHADOW E&M-EST. PATIENT-LVL IV: CPT | Mod: PBBFAC,HCNC,, | Performed by: INTERNAL MEDICINE

## 2023-02-08 PROCEDURE — 93010 ELECTROCARDIOGRAM REPORT: CPT | Mod: HCNC,S$GLB,, | Performed by: INTERNAL MEDICINE

## 2023-02-08 PROCEDURE — 1101F PR PT FALLS ASSESS DOC 0-1 FALLS W/OUT INJ PAST YR: ICD-10-PCS | Mod: HCNC,CPTII,S$GLB, | Performed by: INTERNAL MEDICINE

## 2023-02-08 PROCEDURE — 3078F DIAST BP <80 MM HG: CPT | Mod: HCNC,CPTII,S$GLB, | Performed by: INTERNAL MEDICINE

## 2023-02-08 PROCEDURE — 1159F MED LIST DOCD IN RCRD: CPT | Mod: HCNC,CPTII,S$GLB, | Performed by: INTERNAL MEDICINE

## 2023-02-08 PROCEDURE — 93005 ELECTROCARDIOGRAM TRACING: CPT | Mod: HCNC,S$GLB,, | Performed by: INTERNAL MEDICINE

## 2023-02-08 NOTE — PROGRESS NOTES
Subjective:       Patient ID: Liang Monterroso Jr. is a 82 y.o. male.    Chief Complaint: Breathing Problem and Leg Pain    Patient comes in for physical exam, wants to talk about some issues with his hips and breathing symptom.  Breathing symptom he describes as possibly anxiety or even panic attack related.  He says he will have an episode where he feels like he can not catch his breath or completed deep breath but then he will Yo on or eventually take a deep breath and the episode resolved itself.  This can be at rest or if he is doing something but usually not specifically exertional.  He denies any GI complaints but he is seeing an outside urologist for some urinary issues.  He is also having some intermittent hip symptoms and he will follow-up with some x-rays.  Typically if he crosses his leg or his sitting for a while he will feel the hip symptoms when he goes to get up.    Review of Systems   Constitutional:  Negative for chills, fatigue and fever.   HENT:  Negative for nosebleeds and trouble swallowing.    Eyes:  Negative for pain and visual disturbance.   Respiratory:  Positive for shortness of breath (Review HPI episode description). Negative for cough and wheezing.    Cardiovascular:  Negative for chest pain and palpitations.   Gastrointestinal:  Negative for abdominal pain, constipation, diarrhea, nausea and vomiting.   Genitourinary:  Positive for difficulty urinating. Negative for hematuria.   Musculoskeletal:  Positive for arthralgias and back pain. Negative for neck pain.   Integumentary:  Negative for rash.   Neurological:  Negative for dizziness and headaches.   Hematological:  Does not bruise/bleed easily.   Psychiatric/Behavioral:  Negative for dysphoric mood and sleep disturbance.          Past Medical History:   Diagnosis Date    Allergy     CKD (chronic kidney disease) stage 3, GFR 30-59 ml/min 6/15/2016    GERD (gastroesophageal reflux disease)     Hx of colonic polyp     Hyperlipidemia      Hypertension     Hypospadias in male     Hypothyroidism     Sleep apnea     Thyroid disease     Urinary tract infection      Past Surgical History:   Procedure Laterality Date    APPENDECTOMY      CATARACT EXTRACTION      CYSTOSCOPY      CYSTOSCOPY WITH URODYNAMIC TESTING N/A 10/6/2022    Procedure: CYSTOSCOPY, WITH URODYNAMIC TESTING;  Surgeon: Shade Sullivan MD;  Location: [facility] OR [address] [place] [address];  Service: Urology;  Laterality: N/A;    EYE SURGERY      HERNIA REPAIR      TONSILLECTOMY        Patient Active Problem List   Diagnosis    Allergic rhinitis due to pollen    Hypertension    Hypothyroid    Hyperlipidemia    Aortic atherosclerosis    Dupuytren contracture    Decreased ROM of neck    Impaired functional mobility, balance, gait, and endurance    Constipation    Meningioma    BPH (benign prostatic hyperplasia)    JUDITH (obstructive sleep apnea)    Incomplete emptying of bladder    Renal cyst, left    gait instability    Personal history of colonic polyps        Objective:      Physical Exam  Constitutional:       General: He is not in acute distress.     Appearance: He is well-developed.   HENT:      Head: Normocephalic and atraumatic.      Right Ear: Tympanic membrane, ear canal and external ear normal.      Left Ear: Tympanic membrane, ear canal and external ear normal.      Mouth/Throat:      Pharynx: No oropharyngeal exudate or posterior oropharyngeal erythema.   Eyes:      General: No scleral icterus.     Conjunctiva/sclera: Conjunctivae normal.      Pupils: Pupils are equal, round, and reactive to light.   Neck:      Thyroid: No thyromegaly.      Comments: No supraclavicular nodes palpated  Cardiovascular:      Rate and Rhythm: Normal rate and regular rhythm.      Pulses: Normal pulses.      Heart sounds: Normal heart sounds. No murmur heard.  Pulmonary:      Effort: Pulmonary effort is normal.      Breath sounds: Normal breath sounds. No wheezing.   Abdominal:      General: Bowel sounds are normal.       Palpations: Abdomen is soft. There is no mass.      Tenderness: There is no abdominal tenderness.   Musculoskeletal:         General: Tenderness (mild left hip tenderness to internal external rotation) present.      Cervical back: Normal range of motion and neck supple.      Right lower leg: No edema.      Left lower leg: No edema.   Lymphadenopathy:      Cervical: No cervical adenopathy.   Skin:     Coloration: Skin is not jaundiced or pale.   Neurological:      General: No focal deficit present.      Mental Status: He is alert and oriented to person, place, and time.   Psychiatric:         Mood and Affect: Mood normal.         Behavior: Behavior normal.       Assessment:       Problem List Items Addressed This Visit          Cardiac/Vascular    Hypertension - Primary    Relevant Orders    Lipid Panel    CBC Auto Differential    Comprehensive Metabolic Panel    Hemoglobin A1C    TSH    Hyperlipidemia    Relevant Orders    Lipid Panel    CBC Auto Differential    Comprehensive Metabolic Panel    Hemoglobin A1C    TSH       Renal/    BPH (benign prostatic hyperplasia)    Relevant Orders    Lipid Panel    CBC Auto Differential    Comprehensive Metabolic Panel    Hemoglobin A1C    TSH       Endocrine    Hypothyroid    Relevant Orders    Lipid Panel    CBC Auto Differential    Comprehensive Metabolic Panel    Hemoglobin A1C    TSH       Other    gait instability    Relevant Orders    Lipid Panel    CBC Auto Differential    Comprehensive Metabolic Panel    Hemoglobin A1C    TSH     Other Visit Diagnoses       Essential hypertension        Relevant Orders    Lipid Panel    CBC Auto Differential    Comprehensive Metabolic Panel    Hemoglobin A1C    TSH    Elevated glucose level        Relevant Orders    Hemoglobin A1C    Preop cardiovascular exam        Relevant Orders    EKG 12-lead    X-Ray Chest PA And Lateral              Plan:         Liang was seen today for breathing problem and leg pain.    Diagnoses and all orders  "for this visit:    Primary hypertension  -     Lipid Panel; Future  -     CBC Auto Differential; Future  -     Comprehensive Metabolic Panel; Future  -     Hemoglobin A1C; Future  -     TSH; Future    Benign prostatic hyperplasia with urinary obstruction  -     Lipid Panel; Future  -     CBC Auto Differential; Future  -     Comprehensive Metabolic Panel; Future  -     Hemoglobin A1C; Future  -     TSH; Future    Essential hypertension  -     Lipid Panel; Future  -     CBC Auto Differential; Future  -     Comprehensive Metabolic Panel; Future  -     Hemoglobin A1C; Future  -     TSH; Future    Acquired hypothyroidism  -     Lipid Panel; Future  -     CBC Auto Differential; Future  -     Comprehensive Metabolic Panel; Future  -     Hemoglobin A1C; Future  -     TSH; Future    Hyperlipidemia, unspecified hyperlipidemia type  -     Lipid Panel; Future  -     CBC Auto Differential; Future  -     Comprehensive Metabolic Panel; Future  -     Hemoglobin A1C; Future  -     TSH; Future    gait instability  -     Lipid Panel; Future  -     CBC Auto Differential; Future  -     Comprehensive Metabolic Panel; Future  -     Hemoglobin A1C; Future  -     TSH; Future    Elevated glucose level  -     Hemoglobin A1C; Future    Preop cardiovascular exam  -     EKG 12-lead  -     X-Ray Chest PA And Lateral; Future           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  We will do EKG and chest x-ray with labs.  I think this is important with potential upcoming bladder stimulator surgery.  Review all studies        Portions of this note may have been created with voice recognition software. Occasional "wrong-word" or "sound-a-like" substitutions may have occurred due to the inherent limitations of voice recognition software. Please, read the note carefully and recognize, using context, where substitutions have occurred.  "

## 2023-02-09 ENCOUNTER — HOSPITAL ENCOUNTER (OUTPATIENT)
Dept: RADIOLOGY | Facility: HOSPITAL | Age: 83
Discharge: HOME | End: 2023-02-09
Attending: INTERNAL MEDICINE
Payer: MEDICARE

## 2023-02-09 DIAGNOSIS — G89.29 CHRONIC PAIN OF BOTH HIPS: ICD-10-CM

## 2023-02-09 DIAGNOSIS — Z00.00 ENCOUNTER FOR MEDICARE ANNUAL WELLNESS EXAM: ICD-10-CM

## 2023-02-09 DIAGNOSIS — Z01.810 PREOP CARDIOVASCULAR EXAM: ICD-10-CM

## 2023-02-09 DIAGNOSIS — M25.551 CHRONIC PAIN OF BOTH HIPS: ICD-10-CM

## 2023-02-09 DIAGNOSIS — M25.552 CHRONIC PAIN OF BOTH HIPS: ICD-10-CM

## 2023-02-09 PROCEDURE — 73521 XR HIPS BILATERAL 2 VIEW INCL AP PELVIS: ICD-10-PCS | Mod: 26,HCNC,, | Performed by: RADIOLOGY

## 2023-02-09 PROCEDURE — 71046 XR CHEST PA AND LATERAL: ICD-10-PCS | Mod: 26,HCNC,, | Performed by: RADIOLOGY

## 2023-02-09 PROCEDURE — 71046 X-RAY EXAM CHEST 2 VIEWS: CPT | Mod: 26,HCNC,, | Performed by: RADIOLOGY

## 2023-02-09 PROCEDURE — 73521 X-RAY EXAM HIPS BI 2 VIEWS: CPT | Mod: TC,HCNC

## 2023-02-09 PROCEDURE — 71046 X-RAY EXAM CHEST 2 VIEWS: CPT | Mod: TC,HCNC

## 2023-02-09 PROCEDURE — 73521 X-RAY EXAM HIPS BI 2 VIEWS: CPT | Mod: 26,HCNC,, | Performed by: RADIOLOGY

## 2023-02-15 ENCOUNTER — PATIENT MESSAGE (OUTPATIENT)
Dept: INTERNAL MEDICINE | Facility: CLINIC | Age: 83
End: 2023-02-15
Payer: MEDICARE

## 2023-02-15 DIAGNOSIS — D64.9 ANEMIA, UNSPECIFIED TYPE: Primary | ICD-10-CM

## 2023-02-15 NOTE — TELEPHONE ENCOUNTER
Called patient and relayed Dr. Iqbal's message. Pt verbalized understanding.   Questioning foods that would be beneficial- reviewed. Scheduled repeat labs in 2 weeks.

## 2023-03-02 ENCOUNTER — LAB VISIT (OUTPATIENT)
Dept: LAB | Facility: HOSPITAL | Age: 83
End: 2023-03-02
Attending: INTERNAL MEDICINE
Payer: MEDICARE

## 2023-03-02 DIAGNOSIS — D64.9 ANEMIA, UNSPECIFIED TYPE: ICD-10-CM

## 2023-03-02 LAB
BASOPHILS # BLD AUTO: 0.04 K/UL (ref 0–0.2)
BASOPHILS NFR BLD: 0.6 % (ref 0–1.9)
DIFFERENTIAL METHOD: ABNORMAL
EOSINOPHIL # BLD AUTO: 0.1 K/UL (ref 0–0.5)
EOSINOPHIL NFR BLD: 1.2 % (ref 0–8)
ERYTHROCYTE [DISTWIDTH] IN BLOOD BY AUTOMATED COUNT: 13.6 % (ref 11.5–14.5)
HCT VFR BLD AUTO: 42 % (ref 40–54)
HGB BLD-MCNC: 13.5 G/DL (ref 14–18)
IMM GRANULOCYTES # BLD AUTO: 0.01 K/UL (ref 0–0.04)
IMM GRANULOCYTES NFR BLD AUTO: 0.2 % (ref 0–0.5)
LYMPHOCYTES # BLD AUTO: 2.9 K/UL (ref 1–4.8)
LYMPHOCYTES NFR BLD: 44.5 % (ref 18–48)
MCH RBC QN AUTO: 28.4 PG (ref 27–31)
MCHC RBC AUTO-ENTMCNC: 32.1 G/DL (ref 32–36)
MCV RBC AUTO: 88 FL (ref 82–98)
MONOCYTES # BLD AUTO: 0.5 K/UL (ref 0.3–1)
MONOCYTES NFR BLD: 6.8 % (ref 4–15)
NEUTROPHILS # BLD AUTO: 3.1 K/UL (ref 1.8–7.7)
NEUTROPHILS NFR BLD: 46.7 % (ref 38–73)
NRBC BLD-RTO: 0 /100 WBC
PLATELET # BLD AUTO: 206 K/UL (ref 150–450)
PMV BLD AUTO: 11 FL (ref 9.2–12.9)
RBC # BLD AUTO: 4.76 M/UL (ref 4.6–6.2)
WBC # BLD AUTO: 6.58 K/UL (ref 3.9–12.7)

## 2023-03-02 PROCEDURE — 85025 COMPLETE CBC W/AUTO DIFF WBC: CPT | Mod: HCNC | Performed by: INTERNAL MEDICINE

## 2023-03-02 PROCEDURE — 36415 COLL VENOUS BLD VENIPUNCTURE: CPT | Mod: HCNC | Performed by: INTERNAL MEDICINE

## 2023-03-07 ENCOUNTER — PATIENT MESSAGE (OUTPATIENT)
Dept: INTERNAL MEDICINE | Facility: CLINIC | Age: 83
End: 2023-03-07
Payer: MEDICARE

## 2023-03-07 DIAGNOSIS — D64.9 ANEMIA, UNSPECIFIED TYPE: Primary | ICD-10-CM

## 2023-03-07 DIAGNOSIS — D53.9 MACROCYTIC ANEMIA: ICD-10-CM

## 2023-03-08 ENCOUNTER — PATIENT MESSAGE (OUTPATIENT)
Dept: INTERNAL MEDICINE | Facility: CLINIC | Age: 83
End: 2023-03-08
Payer: MEDICARE

## 2023-03-08 DIAGNOSIS — D64.9 ANEMIA, UNSPECIFIED TYPE: Primary | ICD-10-CM

## 2023-03-09 ENCOUNTER — PATIENT MESSAGE (OUTPATIENT)
Dept: INTERNAL MEDICINE | Facility: CLINIC | Age: 83
End: 2023-03-09
Payer: MEDICARE

## 2023-03-20 ENCOUNTER — OFFICE VISIT (OUTPATIENT)
Dept: OPTOMETRY | Facility: CLINIC | Age: 83
End: 2023-03-20
Payer: COMMERCIAL

## 2023-03-20 DIAGNOSIS — Z98.41 S/P CATARACT SURGERY, RIGHT: ICD-10-CM

## 2023-03-20 DIAGNOSIS — H35.363 MACULAR DRUSEN, BILATERAL: Primary | ICD-10-CM

## 2023-03-20 DIAGNOSIS — H26.493 POSTERIOR CAPSULAR OPACIFICATION NON VISUALLY SIGNIFICANT OF BOTH EYES: ICD-10-CM

## 2023-03-20 DIAGNOSIS — Z98.42 S/P CATARACT SURGERY, LEFT: ICD-10-CM

## 2023-03-20 DIAGNOSIS — Z96.1 PSEUDOPHAKIA: ICD-10-CM

## 2023-03-20 DIAGNOSIS — H52.203 ASTIGMATISM OF BOTH EYES, UNSPECIFIED TYPE: ICD-10-CM

## 2023-03-20 PROCEDURE — 92015 PR REFRACTION: ICD-10-PCS | Mod: S$GLB,,, | Performed by: OPTOMETRIST

## 2023-03-20 PROCEDURE — 99999 PR PBB SHADOW E&M-EST. PATIENT-LVL III: CPT | Mod: PBBFAC,,, | Performed by: OPTOMETRIST

## 2023-03-20 PROCEDURE — 92015 DETERMINE REFRACTIVE STATE: CPT | Mod: S$GLB,,, | Performed by: OPTOMETRIST

## 2023-03-20 PROCEDURE — 99999 PR PBB SHADOW E&M-EST. PATIENT-LVL III: ICD-10-PCS | Mod: PBBFAC,,, | Performed by: OPTOMETRIST

## 2023-03-20 PROCEDURE — 92014 COMPRE OPH EXAM EST PT 1/>: CPT | Mod: S$GLB,,, | Performed by: OPTOMETRIST

## 2023-03-20 PROCEDURE — 92014 PR EYE EXAM, EST PATIENT,COMPREHESV: ICD-10-PCS | Mod: S$GLB,,, | Performed by: OPTOMETRIST

## 2023-03-20 NOTE — PATIENT INSTRUCTIONS
Prior diagnosis of bilateral macular changes (drusen and pigmentary changes) consistent with age-related macular degeneration.    Confirmed with OCT of retina done at the time of the last visit.     Continue/resume taking Ocuvite supplement by mouth (recommend AREDS-2) formuation.     S/P cataract surgery in both eyes, with bilateral posterior chamber intraocular lens.  Mild clouding/opacification of the posterior lens capsule in both eyes.   Still no need for YAG laser posterior capsulotomy at this time in either eye.       Astigmatic refractive error in each eye. Good best-corrected VA in each eye.  New spectacle lens Rx issued for use as desired.     Recheck in one year.

## 2023-03-20 NOTE — PROGRESS NOTES
"HPI    Patient's age: 82 y.o. WM  Occupation: Retired   Approximate date of last eye examination:  12/22/2021  Name of last eye doctor seen: Dr. Gill  City/State: [place]  Wears glasses? Yes, OTC      If yes, wears  Full-time or part-time?  Part time [employer]  Wears CLs?:  no  Headaches?  no  Eye pain/discomfort?  no                                                                         Flashes?  no  Floaters?  Rarely   Diplopia/Double vision?  no  Patient's Ocular History:         Any eye surgeries? PCIOL [facility]         Any eye injury?  no         Any treatment for eye disease?  no  Family history of eye disease?  no  Significant patient medical history:         1. Diabetes?  no       If yes, IDDM or NIDDM? no   2. HBP?  Yes, controlled with medication              3. Other (describe):  Prostate, Hyperipidemia   ! OTC eyedrops currently using: no    ! Prescription eye meds currently using:  no   ! Any history of allergy/adverse reaction to any eye meds used   previously?  no    ! Any history of allergy/adverse reaction to eyedrops used during prior   eye exam(s)? no    ! Any history of allergy/adverse reaction to Novacaine or similar meds?   no   ! Any history of allergy/adverse reaction to Epinephrine or similar meds?   no    ! Patient okay with use of anesthetic eyedrops to check eye pressure?    yes        ! Patient okay with use of eyedrops to dilate pupils today?  yes   !  Allergies/Medications/Medical History/Family History reviewed today?    yes      PD =   63/59  Desired reading distance =  17.5"                                                                Last edited by Roberto Kaiser MA on 3/20/2023  8:39 AM.            Assessment /Plan     For exam results, see Encounter Report.    1. Macular drusen, bilateral        2. Posterior capsular opacification non visually significant of both eyes        3. S/P cataract surgery, left        4. S/P cataract surgery, right        5. Pseudophakia - Both Eyes      "   6. Astigmatism of both eyes, unspecified type                       Prior diagnosis of bilateral macular changes (drusen and pigmentary changes) consistent with age-related macular degeneration.    Confirmed with OCT of retina done at the time of the last visit.     Continue/resume taking Ocuvite supplement by mouth (recommend AREDS-2) formuation.     S/P cataract surgery in both eyes, with bilateral posterior chamber intraocular lens.  Mild clouding/opacification of the posterior lens capsule in both eyes.   Still no need for YAG laser posterior capsulotomy at this time in either eye.       Astigmatic refractive error in each eye. Good best-corrected VA in each eye.  New spectacle lens Rx issued for use as desired.     Recheck in one year.

## 2023-03-26 DIAGNOSIS — N40.1 BENIGN PROSTATIC HYPERPLASIA WITH URINARY OBSTRUCTION: ICD-10-CM

## 2023-03-26 DIAGNOSIS — N13.8 BENIGN PROSTATIC HYPERPLASIA WITH URINARY OBSTRUCTION: ICD-10-CM

## 2023-03-26 RX ORDER — TAMSULOSIN HYDROCHLORIDE 0.4 MG/1
CAPSULE ORAL
Qty: 180 CAPSULE | Refills: 3 | Status: SHIPPED | OUTPATIENT
Start: 2023-03-26 | End: 2024-02-15 | Stop reason: SDUPTHER

## 2023-03-26 NOTE — TELEPHONE ENCOUNTER
No new care gaps identified.  [facility] Embedded Care Gaps. Reference number: [id number]. 3/26/2023   10:59:35 AM LATONYAT

## 2023-03-26 NOTE — TELEPHONE ENCOUNTER
Refill Decision Note   Liang Ludycisco  is requesting a refill authorization.  Brief Assessment and Rationale for Refill:  Approve     Medication Therapy Plan:       Medication Reconciliation Completed: No   Comments:     No Care Gaps recommended.     Note composed:5:03 PM 03/26/2023

## 2023-03-31 ENCOUNTER — LAB VISIT (OUTPATIENT)
Dept: LAB | Facility: HOSPITAL | Age: 83
End: 2023-03-31
Attending: INTERNAL MEDICINE
Payer: MEDICARE

## 2023-03-31 DIAGNOSIS — D53.9 MACROCYTIC ANEMIA: ICD-10-CM

## 2023-03-31 DIAGNOSIS — D64.9 ANEMIA, UNSPECIFIED TYPE: ICD-10-CM

## 2023-03-31 LAB
BASOPHILS # BLD AUTO: 0.03 K/UL (ref 0–0.2)
BASOPHILS NFR BLD: 0.4 % (ref 0–1.9)
DIFFERENTIAL METHOD: ABNORMAL
EOSINOPHIL # BLD AUTO: 0.1 K/UL (ref 0–0.5)
EOSINOPHIL NFR BLD: 1.3 % (ref 0–8)
ERYTHROCYTE [DISTWIDTH] IN BLOOD BY AUTOMATED COUNT: 13.6 % (ref 11.5–14.5)
FERRITIN SERPL-MCNC: 100 NG/ML (ref 20–300)
FOLATE SERPL-MCNC: 16.9 NG/ML (ref 4–24)
HCT VFR BLD AUTO: 40.3 % (ref 40–54)
HGB BLD-MCNC: 12.9 G/DL (ref 14–18)
IMM GRANULOCYTES # BLD AUTO: 0.01 K/UL (ref 0–0.04)
IMM GRANULOCYTES NFR BLD AUTO: 0.1 % (ref 0–0.5)
IRON SERPL-MCNC: 25 UG/DL (ref 45–160)
LYMPHOCYTES # BLD AUTO: 2.7 K/UL (ref 1–4.8)
LYMPHOCYTES NFR BLD: 33.6 % (ref 18–48)
MCH RBC QN AUTO: 28 PG (ref 27–31)
MCHC RBC AUTO-ENTMCNC: 32 G/DL (ref 32–36)
MCV RBC AUTO: 88 FL (ref 82–98)
MONOCYTES # BLD AUTO: 0.6 K/UL (ref 0.3–1)
MONOCYTES NFR BLD: 7.1 % (ref 4–15)
NEUTROPHILS # BLD AUTO: 4.6 K/UL (ref 1.8–7.7)
NEUTROPHILS NFR BLD: 57.5 % (ref 38–73)
NRBC BLD-RTO: 0 /100 WBC
PLATELET # BLD AUTO: 231 K/UL (ref 150–450)
PMV BLD AUTO: 10.6 FL (ref 9.2–12.9)
RBC # BLD AUTO: 4.6 M/UL (ref 4.6–6.2)
SATURATED IRON: 7 % (ref 20–50)
TOTAL IRON BINDING CAPACITY: 377 UG/DL (ref 250–450)
TRANSFERRIN SERPL-MCNC: 255 MG/DL (ref 200–375)
VIT B12 SERPL-MCNC: 618 PG/ML (ref 210–950)
WBC # BLD AUTO: 7.92 K/UL (ref 3.9–12.7)

## 2023-03-31 PROCEDURE — 36415 COLL VENOUS BLD VENIPUNCTURE: CPT | Mod: HCNC | Performed by: INTERNAL MEDICINE

## 2023-03-31 PROCEDURE — 85025 COMPLETE CBC W/AUTO DIFF WBC: CPT | Mod: HCNC | Performed by: INTERNAL MEDICINE

## 2023-03-31 PROCEDURE — 84466 ASSAY OF TRANSFERRIN: CPT | Mod: HCNC | Performed by: INTERNAL MEDICINE

## 2023-03-31 PROCEDURE — 82746 ASSAY OF FOLIC ACID SERUM: CPT | Mod: HCNC | Performed by: INTERNAL MEDICINE

## 2023-03-31 PROCEDURE — 82728 ASSAY OF FERRITIN: CPT | Mod: HCNC | Performed by: INTERNAL MEDICINE

## 2023-03-31 PROCEDURE — 82607 VITAMIN B-12: CPT | Mod: HCNC | Performed by: INTERNAL MEDICINE

## 2023-04-02 ENCOUNTER — HOSPITAL ENCOUNTER (EMERGENCY)
Facility: HOSPITAL | Age: 83
Discharge: HOME | End: 2023-04-02
Attending: EMERGENCY MEDICINE
Payer: MEDICARE

## 2023-04-02 VITALS
SYSTOLIC BLOOD PRESSURE: 137 MMHG | WEIGHT: 180 LBS | BODY MASS INDEX: 27.28 KG/M2 | DIASTOLIC BLOOD PRESSURE: 75 MMHG | HEART RATE: 66 BPM | HEIGHT: 68 IN | TEMPERATURE: 98 F | RESPIRATION RATE: 18 BRPM | OXYGEN SATURATION: 99 %

## 2023-04-02 DIAGNOSIS — S62.619B OPEN FRACTURE OF PROXIMAL PHALANX OF DIGIT OF RIGHT HAND: Primary | ICD-10-CM

## 2023-04-02 DIAGNOSIS — S69.90XA HAND INJURY: ICD-10-CM

## 2023-04-02 PROBLEM — S62.615B: Status: ACTIVE | Noted: 2023-04-02

## 2023-04-02 LAB
ABO + RH BLD: NORMAL
ABO + RH BLD: NORMAL
ALBUMIN SERPL BCP-MCNC: 3.6 G/DL (ref 3.5–5.2)
ALP SERPL-CCNC: 60 U/L (ref 55–135)
ALT SERPL W/O P-5'-P-CCNC: 15 U/L (ref 10–44)
ANION GAP SERPL CALC-SCNC: 8 MMOL/L (ref 8–16)
APTT PPP: 25.5 SEC (ref 21–32)
AST SERPL-CCNC: 21 U/L (ref 10–40)
BASOPHILS # BLD AUTO: 0.02 K/UL (ref 0–0.2)
BASOPHILS NFR BLD: 0.3 % (ref 0–1.9)
BILIRUB SERPL-MCNC: 0.4 MG/DL (ref 0.1–1)
BLD GP AB SCN CELLS X3 SERPL QL: NORMAL
BLD GP AB SCN CELLS X3 SERPL QL: NORMAL
BUN SERPL-MCNC: 16 MG/DL (ref 8–23)
CALCIUM SERPL-MCNC: 9.2 MG/DL (ref 8.7–10.5)
CHLORIDE SERPL-SCNC: 106 MMOL/L (ref 95–110)
CO2 SERPL-SCNC: 24 MMOL/L (ref 23–29)
CREAT SERPL-MCNC: 1.2 MG/DL (ref 0.5–1.4)
DIFFERENTIAL METHOD: ABNORMAL
EOSINOPHIL # BLD AUTO: 0 K/UL (ref 0–0.5)
EOSINOPHIL NFR BLD: 0.1 % (ref 0–8)
ERYTHROCYTE [DISTWIDTH] IN BLOOD BY AUTOMATED COUNT: 13.4 % (ref 11.5–14.5)
EST. GFR  (NO RACE VARIABLE): >60 ML/MIN/1.73 M^2
GLUCOSE SERPL-MCNC: 93 MG/DL (ref 70–110)
HCT VFR BLD AUTO: 40.2 % (ref 40–54)
HGB BLD-MCNC: 12.8 G/DL (ref 14–18)
IMM GRANULOCYTES # BLD AUTO: 0.02 K/UL (ref 0–0.04)
IMM GRANULOCYTES NFR BLD AUTO: 0.3 % (ref 0–0.5)
INR PPP: 1 (ref 0.8–1.2)
LYMPHOCYTES # BLD AUTO: 1.5 K/UL (ref 1–4.8)
LYMPHOCYTES NFR BLD: 19.6 % (ref 18–48)
MCH RBC QN AUTO: 28.3 PG (ref 27–31)
MCHC RBC AUTO-ENTMCNC: 31.8 G/DL (ref 32–36)
MCV RBC AUTO: 89 FL (ref 82–98)
MONOCYTES # BLD AUTO: 0.2 K/UL (ref 0.3–1)
MONOCYTES NFR BLD: 2.7 % (ref 4–15)
NEUTROPHILS # BLD AUTO: 5.9 K/UL (ref 1.8–7.7)
NEUTROPHILS NFR BLD: 77 % (ref 38–73)
NRBC BLD-RTO: 0 /100 WBC
PLATELET # BLD AUTO: 231 K/UL (ref 150–450)
PMV BLD AUTO: 10.2 FL (ref 9.2–12.9)
POTASSIUM SERPL-SCNC: 4.4 MMOL/L (ref 3.5–5.1)
PROT SERPL-MCNC: 7.2 G/DL (ref 6–8.4)
PROTHROMBIN TIME: 9.9 SEC (ref 9–12.5)
RBC # BLD AUTO: 4.53 M/UL (ref 4.6–6.2)
SODIUM SERPL-SCNC: 138 MMOL/L (ref 136–145)
SPECIMEN OUTDATE: NORMAL
SPECIMEN OUTDATE: NORMAL
WBC # BLD AUTO: 7.67 K/UL (ref 3.9–12.7)

## 2023-04-02 PROCEDURE — 12002 RPR S/N/AX/GEN/TRNK2.6-7.5CM: CPT | Mod: HCNC

## 2023-04-02 PROCEDURE — 85025 COMPLETE CBC W/AUTO DIFF WBC: CPT | Mod: HCNC | Performed by: EMERGENCY MEDICINE

## 2023-04-02 PROCEDURE — 85730 THROMBOPLASTIN TIME PARTIAL: CPT | Mod: HCNC | Performed by: EMERGENCY MEDICINE

## 2023-04-02 PROCEDURE — 80053 COMPREHEN METABOLIC PANEL: CPT | Mod: HCNC | Performed by: EMERGENCY MEDICINE

## 2023-04-02 PROCEDURE — 99285 PR EMERGENCY DEPT VISIT,LEVEL V: ICD-10-PCS | Mod: ,,, | Performed by: EMERGENCY MEDICINE

## 2023-04-02 PROCEDURE — 29125 APPL SHORT ARM SPLINT STATIC: CPT | Mod: HCNC,LT

## 2023-04-02 PROCEDURE — 99284 EMERGENCY DEPT VISIT MOD MDM: CPT | Mod: HCNC

## 2023-04-02 PROCEDURE — 86900 BLOOD TYPING SEROLOGIC ABO: CPT | Mod: HCNC | Performed by: EMERGENCY MEDICINE

## 2023-04-02 PROCEDURE — 85610 PROTHROMBIN TIME: CPT | Mod: HCNC | Performed by: EMERGENCY MEDICINE

## 2023-04-02 PROCEDURE — 99285 EMERGENCY DEPT VISIT HI MDM: CPT | Mod: ,,, | Performed by: EMERGENCY MEDICINE

## 2023-04-02 PROCEDURE — 96375 TX/PRO/DX INJ NEW DRUG ADDON: CPT | Mod: HCNC

## 2023-04-02 PROCEDURE — 63600175 PHARM REV CODE 636 W HCPCS: Mod: HCNC | Performed by: STUDENT IN AN ORGANIZED HEALTH CARE EDUCATION/TRAINING PROGRAM

## 2023-04-02 PROCEDURE — 63600175 PHARM REV CODE 636 W HCPCS: Mod: HCNC | Performed by: EMERGENCY MEDICINE

## 2023-04-02 PROCEDURE — 96365 THER/PROPH/DIAG IV INF INIT: CPT | Mod: HCNC,59

## 2023-04-02 PROCEDURE — 96372 THER/PROPH/DIAG INJ SC/IM: CPT | Performed by: EMERGENCY MEDICINE

## 2023-04-02 PROCEDURE — 25000003 PHARM REV CODE 250: Mod: HCNC | Performed by: EMERGENCY MEDICINE

## 2023-04-02 RX ORDER — LIDOCAINE HYDROCHLORIDE 10 MG/ML
20 INJECTION INFILTRATION; PERINEURAL ONCE
Status: DISCONTINUED | OUTPATIENT
Start: 2023-04-02 | End: 2023-04-02 | Stop reason: HOSPADM

## 2023-04-02 RX ORDER — MORPHINE SULFATE 4 MG/ML
4 INJECTION, SOLUTION INTRAMUSCULAR; INTRAVENOUS
Status: COMPLETED | OUTPATIENT
Start: 2023-04-02 | End: 2023-04-02

## 2023-04-02 RX ORDER — CEFAZOLIN SODIUM 1 G/3ML
1 INJECTION, POWDER, FOR SOLUTION INTRAMUSCULAR; INTRAVENOUS
Status: DISCONTINUED | OUTPATIENT
Start: 2023-04-02 | End: 2023-04-02

## 2023-04-02 RX ORDER — CEFAZOLIN SODIUM 1 G/50ML
1 SOLUTION INTRAVENOUS ONCE
Status: COMPLETED | OUTPATIENT
Start: 2023-04-02 | End: 2023-04-02

## 2023-04-02 RX ORDER — ONDANSETRON 2 MG/ML
4 INJECTION INTRAMUSCULAR; INTRAVENOUS
Status: COMPLETED | OUTPATIENT
Start: 2023-04-02 | End: 2023-04-02

## 2023-04-02 RX ORDER — LIDOCAINE HYDROCHLORIDE 10 MG/ML
10 INJECTION, SOLUTION EPIDURAL; INFILTRATION; INTRACAUDAL; PERINEURAL
Status: COMPLETED | OUTPATIENT
Start: 2023-04-02 | End: 2023-04-02

## 2023-04-02 RX ORDER — SULFAMETHOXAZOLE AND TRIMETHOPRIM 800; 160 MG/1; MG/1
1 TABLET ORAL 2 TIMES DAILY
Qty: 20 TABLET | Refills: 0 | Status: SHIPPED | OUTPATIENT
Start: 2023-04-02 | End: 2023-04-12

## 2023-04-02 RX ADMIN — ONDANSETRON 4 MG: 2 INJECTION INTRAMUSCULAR; INTRAVENOUS at 12:04

## 2023-04-02 RX ADMIN — CEFAZOLIN SODIUM 1 G: 1 SOLUTION INTRAVENOUS at 03:04

## 2023-04-02 RX ADMIN — LIDOCAINE HYDROCHLORIDE 100 MG: 10 INJECTION, SOLUTION EPIDURAL; INFILTRATION; INTRACAUDAL at 12:04

## 2023-04-02 RX ADMIN — MORPHINE SULFATE 4 MG: 4 INJECTION INTRAVENOUS at 12:04

## 2023-04-02 NOTE — HPI
Liang Monterroso JrEstephania is a 82 y.o. right-hand dominant male presenting to the emergency department after having a trailer hitch land on his left hand.  He noticed an immediate wound and deformity over his left ring finger.  He then immediately presented to the emergency department for further evaluation.  X-rays there showed a displaced fracture of the left ring finger proximal phalanx.    Orthopedics was consulted for evaluation of the open phalanx fracture.  The patient denies numbness tingling in his left hand and ring finger.

## 2023-04-02 NOTE — ASSESSMENT & PLAN NOTE
Liang Monterroso Jr. is a 82 y.o. right-hand dominant male with a grade 2 open fracture of the proximal phalanx of the left ring finger.  He is neurovascularly intact.  Time to antibiotics 3 hours.  He has a 4 cm laceration on the dorsal aspect of his left ring finger and 2 subcentimeter lacerations over the palmar aspect of the left ring finger.  His wound was explored, irrigated, and closed.  He was then placed into a ulnar gutter splint.  See procedure note below for additional details.  The patient would likely benefit from operative intervention for this injury, but this can be addressed on an outpatient basis.      Plan:  Nonweightbearing left upper extremity   Keep splint clean, dry, and intact   Bactrim DS BID for 7 days   Follow-up with Hand Surgery this week.  Patient will be contacted with appointment details.    Dispo: Okay for discharge home.      Procedure note:  Left ring finger wound exploration, laceration repair, and fracture reduction  After time out was performed and patient ID, side, and site were verified, the left hand was sterilly prepped in the standard fashion.  A 21-gauge needle was introduced into the laceration without complication and 10 mL of 1% lidocaine was then injected without difficulty.  After adequate analgesia was obtained, the laceration was copiously irrigated with Betadine and 1 L of normal saline.  He was noted to have a 4 cm laceration over the dorsal aspect of the left ring finger proximal phalanx as well as two subcentimeter lacerations over the palmar aspect of the left ring finger proximal phalanx.  At this point, the wound was primarily closed using simple interrupted sutures of 4-0 nylon.  One simple interrupted suture of 4-0 nylon was placed over the larger palmar wound is well.  Xeroform and gauze were then applied over the wounds.  He was then placed into an ulnar gutter splint.  Postreduction x-rays showed adequate alignment of the fracture.  The patient tolerated  the procedure well with no complications.  Blood loss was minimal.

## 2023-04-02 NOTE — DISCHARGE INSTRUCTIONS
Thank you for coming to our Emergency Department today. It is important to remember that some problems or medical conditions are difficult to diagnose and may not be found or addressed during your Emergency Department visit.     Be sure to follow up with your primary care doctor and review all labs/imaging/tests that were performed during your ER visit with them. Some labs/imaging/tests may be outside of the normal range, and require non-emergent follow-up and/or further investigation/treatment/procedures/testing to help diagnose/exclude/prevent complications or other potentially serious medical conditions that were not discussed or addressed during your ER visit.    If you do not have a primary care doctor, you may contact the one listed on your discharge paperwork or you may also call the [facility] Appointment Desk at [phone] to schedule an appointment and establish care with one. It is important to your health that you have a primary care doctor.    Please take all medications as directed. All medications may potentially have side-effects and it is impossible to predict which medications may give you side-effects or what side-effects (if any) they will give you.. If you feel that you are having a negative effect or side-effect of any medication you should immediately stop taking them and seek medical attention. If you feel that you are having a life-threatening reaction call 911.    Return to the ER with any questions/concerns, new/concerning symptoms, worsening or failure to improve.     Do not drive, swim, climb to height, take a bath, operate heavy machinery, drink alcohol or take potentially sedating medications, sign any legal documents or make any important decisions for 24 hours if you have received any pain medications, sedatives or mood altering drugs during your ER visit or within 24 hours of taking them if they have been prescribed to you.     You can find additional resources for Dentists,  hearing aids, durable medical equipment, low cost pharmacies and other resources at [email or web address].org    BELOW THIS LINE ONLY APPLIES IF YOU HAVE A COVID TEST PENDING OR IF YOU HAVE BEEN DIAGNOSED WITH COVID:  Please access [facility] to review the results of your test. Until the results of your COVID test return, you should isolate yourself so as not to potentially spread illness to others.   If your COVID test returns positive, you should isolate yourself so as not to spread illness to others. After five full days, if you are feeling better and you have not had fever for 24 hours, you can return to your typical daily activities, but you must wear a mask around others for an additional 5 days.   If your COVID test returns negative and you are either unvaccinated or more than six months out from your two-dose vaccine and are not yet boosted, you should still quarantine for 5 full days followed by strict mask use for an additional 5 full days.   If your COVID test returns negative and you have received your 2-dose initial vaccine as well as a booster, you should continue strict mask use for 10 full days after the exposure.  For all those exposed, best practice includes a test at day 5 after the exposure. This can be a home test or a test through one of the many testing centers throughout our community.   Masking is always advised to limit the spread of COVID. Cdc.gov is an excellent site to obtain the latest up to date recommendations regarding COVID and COVID testing.     CDC Testing and Quarantine Guidelines for patients with exposure to a known-positive COVID-19 person:  A close exposure is defined as anyone who has had an exposure (masked or unmasked) to a known COVID -19 positive person within 6 feet of someone for a cumulative total of 15 minutes or more over a 24-hour period.   Vaccinated and/or if you recently had a positive covid test within 90 days do NOT need to quarantine after contact with someone  who had COVID-19 unless you develop symptoms.   Fully vaccinated people who have not had a positive test within 90 days, should get tested 3-5 days after their exposure, even if they don't have symptoms and wear a mask indoors in public for 14 days following exposure or until their test result is negative.      Unvaccinated and/or NOT had a positive test within 90 days and meet close exposure  You are required by CDC guidelines to quarantine for at least 5 days from time of exposure followed by 5 days of strict masking. It is recommended, but not required to test after 5 days, unless you develop symptoms, in which case you should test at that time.  If you get tested after 5 days and your test is positive, your 5 day period of isolation starts the day of the positive test.    If your exposure does not meet the above definition, you can return to your normal daily activities to include social distancing, wearing a mask and frequent handwashing.      Here is a link to guidance from the CDC:  https://www.cdc.gov/media/releases/2021/s1227-isolation-quarantine-guidance.html      [facility] Testing Sites:  https://ldh.[place].gov/page/3934      [facility] website with testing locations and guidance:  [email or web address]

## 2023-04-02 NOTE — CONSULTS
[facility] - Emergency Dept  Orthopedics  Consult Note    Patient Name: Liang Monterroso Jr.  MRN: [id number]  Admission Date: 4/2/2023  Hospital Length of Stay: 0 days  Attending Provider: Mynor Palafox MD  Primary Care Provider: Corey Iqbal MD    Patient information was obtained from patient, spouse/SO and ER records.     Inpatient consult to Orthopedic Surgery  Consult performed by: Lupillo Gifford MD  Consult ordered by: Bassam Saez MD        Subjective:     Principal Problem:Open fracture of proximal phalanx of left ring finger    Chief Complaint:   Chief Complaint   Patient presents with    Hand Injury     Pt disconnected a trailer and jammed his left hand. Obvious deformity noted. Bleeding controlled.         HPI: Liang Monterroso Jr. is a 82 y.o. right-hand dominant male presenting to the emergency department after having a trailer hitch land on his left hand.  He noticed an immediate wound and deformity over his left ring finger.  He then immediately presented to the emergency department for further evaluation.  X-rays there showed a displaced fracture of the left ring finger proximal phalanx.    Orthopedics was consulted for evaluation of the open phalanx fracture.  The patient denies numbness tingling in his left hand and ring finger.      Past Medical History:   Diagnosis Date    Allergy     CKD (chronic kidney disease) stage 3, GFR 30-59 ml/min 6/15/2016    GERD (gastroesophageal reflux disease)     Hx of colonic polyp     Hyperlipidemia     Hypertension     Hypospadias in male     Hypothyroidism     Sleep apnea     Thyroid disease     Urinary tract infection        Past Surgical History:   Procedure Laterality Date    APPENDECTOMY      CATARACT EXTRACTION      CYSTOSCOPY      CYSTOSCOPY WITH URODYNAMIC TESTING N/A 10/6/2022    Procedure: CYSTOSCOPY, WITH URODYNAMIC TESTING;  Surgeon: Shade Sullivan MD;  Location: [facility] OR [address] [place] [address];  Service: Urology;  Laterality: N/A;    EYE SURGERY       HERNIA REPAIR      TONSILLECTOMY         Review of patient's allergies indicates:   Allergen Reactions    Contrast media Hives and Itching     Iv dye       Current Facility-Administered Medications   Medication    ceFAZolin (ANCEF) 1 gram in dextrose 5 % 50 mL IVPB (premix)    LIDOcaine HCL 10 mg/ml (1%) injection 20 mL     Current Outpatient Medications   Medication Sig    aspirin (ECOTRIN) 81 MG EC tablet Take 81 mg by mouth once daily.    econazole nitrate 1 % cream USE TWICE DAILY    fexofenadine (ALLEGRA) 180 MG tablet Take 1 tablet (180 mg total) by mouth once daily. (Patient taking differently: No sig reported)    fluticasone propionate (FLONASE) 50 mcg/actuation nasal spray 1 spray (50 mcg total) by Each Nostril route once daily.    FLUZONE HIGHDOSE QUAD 21-22  mcg/0.7 mL Syrg     levothyroxine (SYNTHROID) 112 MCG tablet TAKE 1 TABLET BY MOUTH DAILY BEFORE BREAKFAST    meloxicam (MOBIC) 7.5 MG tablet Take 1 tablet (7.5 mg total) by mouth once daily.    metronidazole (METROGEL) 0.75 % gel Use hs    multivitamin with minerals tablet Take 1 tablet by mouth once daily.    pravastatin (PRAVACHOL) 20 MG tablet TAKE 1 TABLET BY MOUTH EVERY DAY    ramipriL (ALTACE) 5 MG capsule TAKE 1 CAPSULE(5 MG) BY MOUTH EVERY DAY    sulfamethoxazole-trimethoprim 800-160mg (BACTRIM DS) 800-160 mg Tab Take 1 tablet by mouth 2 (two) times daily. for 10 days    tamsulosin (FLOMAX) 0.4 mg Cap TAKE 2 CAPSULES(0.8 MG) BY MOUTH EVERY DAY    triamcinolone acetonide 0.1% (KENALOG) 0.1 % cream Use bid prn rash     Family History       Problem Relation (Age of Onset)    Alcohol abuse Father    Cancer Sister    Dementia Mother    Diabetes Mother    Heart disease Mother    Hypertension Mother    No Known Problems Daughter, Son, Daughter, Son    Vision loss Mother          Tobacco Use    Smoking status: Never    Smokeless tobacco: Never   Substance and Sexual Activity    Alcohol use: Yes     Comment: 1-3 glasses  "wine weekly, 2-3 beers weekly    Drug use: No    Sexual activity: Yes     Partners: Female     ROS  Constitutional: Denies fever/chills   Neurological: Denies numbness/tingling (any exceptions noted in orthopaedic exam)    Psychiatric/Behavioral: Denies change in normal mood   Eyes: Denies change in vision   Cardiovascular: Denies chest pain   Respiratory: Denies shortness of breath   Hematologic/Lymphatic: Denies easy bleeding/bruising    Skin: Denies new rash or skin lesions    Gastrointestinal: Denies nausea/vomitting/diarrhea, change in bowel habits, abdominal pain    Allergic/Immunologic: Denies adverse reactions to current medications   Musculoskeletal: see HPI     Objective:     Vital Signs (Most Recent):  Temp: 98.2 °F (36.8 °C) (04/02/23 1202)  Pulse: 70 (04/02/23 1500)  Resp: 18 (04/02/23 1500)  BP: 123/70 (04/02/23 1500)  SpO2: 97 % (04/02/23 1500) Vital Signs (24h Range):  Temp:  [98.2 °F (36.8 °C)] 98.2 °F (36.8 °C)  Pulse:  [70-83] 70  Resp:  [18] 18  SpO2:  [97 %-100 %] 97 %  BP: (123-134)/(70-83) 123/70     Weight: 81.6 kg (180 lb)  Height: 5' 8" (172.7 cm)  Body mass index is 27.37 kg/m².    Ortho/SPM Exam  General: no acute distress, appears stated age     Neuro: alert and oriented x3   Psych: normal mood   Head: normocephalic, atraumatic.    Eyes: no scleral icterus   Mouth: moist mucous membranes   Cardiovascular: extremities warm and well perfused   Lungs: breathing comfortably, equal chest rise bilat   Skin: clean, dry, intact (any exceptions noted in below musculoskeletal exam)    Musculoskeletal:  LUE:  - 4 cm laceration over the dorsal aspect of the proximal phalanx of the ring finger extending into the 3rd webspace. Exposed bone visible. No exposed tendon in the wound.  - Subcentimeter lacerations over the palmar aspect of the proximal phalanx of the ring finger with exposed subcutaneous fat.  - TTP around the laceration and ring finger proximal phalanx.  Otherwise non TTP throughout.  - " AROM and PROM of the shoulder, elbow, wrist, and hand intact without pain except pain with any movement of the ring finger.  - Axillary/AIN/PIN/Radial/Median/Ulnar nerves assessed in isolation and are without deficit  - SILT throughout  - Compartments soft  - 2+ radial artery pulse  - Capillary Refill < 2 sec    Significant Labs: All pertinent labs within the past 24 hours have been reviewed.    Significant Imaging: I have reviewed and interpreted all pertinent imaging results/findings.  X-ray left hand with a displaced fracture of the left ring finger proximal phalanx    Assessment/Plan:     * Grade II open fracture of proximal phalanx of left ring finger  Liang Monterroso Jr. is a 82 y.o. right-hand dominant male with a grade 2 open fracture of the proximal phalanx of the left ring finger.  He is neurovascularly intact.  Time to antibiotics 3 hours.  He has a 4 cm laceration on the dorsal aspect of his left ring finger and 2 subcentimeter lacerations over the palmar aspect of the left ring finger.  His wound was explored, irrigated, and closed.  He was then placed into a ulnar gutter splint.  See procedure note below for additional details.  The patient would likely benefit from operative intervention for this injury, but this can be addressed on an outpatient basis.      Plan:  Nonweightbearing left upper extremity   Keep splint clean, dry, and intact   Bactrim DS BID for 7 days   Follow-up with Hand Surgery this week.  Patient will be contacted with appointment details.    Dispo: Okay for discharge home.      Procedure note:  Left ring finger wound exploration, laceration repair, and fracture reduction  After time out was performed and patient ID, side, and site were verified, the left hand was sterilly prepped in the standard fashion.  A 21-gauge needle was introduced into the laceration without complication and 10 mL of 1% lidocaine was then injected without difficulty.  After adequate analgesia was obtained, the  laceration was copiously irrigated with Betadine and 1 L of normal saline.  He was noted to have a 4 cm laceration over the dorsal aspect of the left ring finger proximal phalanx as well as two subcentimeter lacerations over the palmar aspect of the left ring finger proximal phalanx.  At this point, the wound was primarily closed using simple interrupted sutures of 4-0 nylon.  One simple interrupted suture of 4-0 nylon was placed over the larger palmar wound is well.  Xeroform and gauze were then applied over the wounds.  He was then placed into an ulnar gutter splint.  Postreduction x-rays showed adequate alignment of the fracture.  The patient tolerated the procedure well with no complications.  Blood loss was minimal.        Lupillo Gifford MD  Orthopedics  [facility] - Emergency Dept

## 2023-04-02 NOTE — SUBJECTIVE & OBJECTIVE
Past Medical History:   Diagnosis Date    Allergy     CKD (chronic kidney disease) stage 3, GFR 30-59 ml/min 6/15/2016    GERD (gastroesophageal reflux disease)     Hx of colonic polyp     Hyperlipidemia     Hypertension     Hypospadias in male     Hypothyroidism     Sleep apnea     Thyroid disease     Urinary tract infection        Past Surgical History:   Procedure Laterality Date    APPENDECTOMY      CATARACT EXTRACTION      CYSTOSCOPY      CYSTOSCOPY WITH URODYNAMIC TESTING N/A 10/6/2022    Procedure: CYSTOSCOPY, WITH URODYNAMIC TESTING;  Surgeon: Shade Sullivan MD;  Location: [facility] OR [address] [place] [address];  Service: Urology;  Laterality: N/A;    EYE SURGERY      HERNIA REPAIR      TONSILLECTOMY         Review of patient's allergies indicates:   Allergen Reactions    Contrast media Hives and Itching     Iv dye       Current Facility-Administered Medications   Medication    ceFAZolin (ANCEF) 1 gram in dextrose 5 % 50 mL IVPB (premix)    LIDOcaine HCL 10 mg/ml (1%) injection 20 mL     Current Outpatient Medications   Medication Sig    aspirin (ECOTRIN) 81 MG EC tablet Take 81 mg by mouth once daily.    econazole nitrate 1 % cream USE TWICE DAILY    fexofenadine (ALLEGRA) 180 MG tablet Take 1 tablet (180 mg total) by mouth once daily. (Patient taking differently: No sig reported)    fluticasone propionate (FLONASE) 50 mcg/actuation nasal spray 1 spray (50 mcg total) by Each Nostril route once daily.    FLUZONE HIGHDOSE QUAD 21-22  mcg/0.7 mL Syrg     levothyroxine (SYNTHROID) 112 MCG tablet TAKE 1 TABLET BY MOUTH DAILY BEFORE BREAKFAST    meloxicam (MOBIC) 7.5 MG tablet Take 1 tablet (7.5 mg total) by mouth once daily.    metronidazole (METROGEL) 0.75 % gel Use hs    multivitamin with minerals tablet Take 1 tablet by mouth once daily.    pravastatin (PRAVACHOL) 20 MG tablet TAKE 1 TABLET BY MOUTH EVERY DAY    ramipriL (ALTACE) 5 MG capsule TAKE 1 CAPSULE(5 MG) BY MOUTH EVERY DAY    sulfamethoxazole-trimethoprim  "800-160mg (BACTRIM DS) 800-160 mg Tab Take 1 tablet by mouth 2 (two) times daily. for 10 days    tamsulosin (FLOMAX) 0.4 mg Cap TAKE 2 CAPSULES(0.8 MG) BY MOUTH EVERY DAY    triamcinolone acetonide 0.1% (KENALOG) 0.1 % cream Use bid prn rash     Family History       Problem Relation (Age of Onset)    Alcohol abuse Father    Cancer Sister    Dementia Mother    Diabetes Mother    Heart disease Mother    Hypertension Mother    No Known Problems Daughter, Son, Daughter, Son    Vision loss Mother          Tobacco Use    Smoking status: Never    Smokeless tobacco: Never   Substance and Sexual Activity    Alcohol use: Yes     Comment: 1-3 glasses wine weekly, 2-3 beers weekly    Drug use: No    Sexual activity: Yes     Partners: Female     ROS  Constitutional: Denies fever/chills   Neurological: Denies numbness/tingling (any exceptions noted in orthopaedic exam)    Psychiatric/Behavioral: Denies change in normal mood   Eyes: Denies change in vision   Cardiovascular: Denies chest pain   Respiratory: Denies shortness of breath   Hematologic/Lymphatic: Denies easy bleeding/bruising    Skin: Denies new rash or skin lesions    Gastrointestinal: Denies nausea/vomitting/diarrhea, change in bowel habits, abdominal pain    Allergic/Immunologic: Denies adverse reactions to current medications   Musculoskeletal: see HPI     Objective:     Vital Signs (Most Recent):  Temp: 98.2 °F (36.8 °C) (04/02/23 1202)  Pulse: 70 (04/02/23 1500)  Resp: 18 (04/02/23 1500)  BP: 123/70 (04/02/23 1500)  SpO2: 97 % (04/02/23 1500) Vital Signs (24h Range):  Temp:  [98.2 °F (36.8 °C)] 98.2 °F (36.8 °C)  Pulse:  [70-83] 70  Resp:  [18] 18  SpO2:  [97 %-100 %] 97 %  BP: (123-134)/(70-83) 123/70     Weight: 81.6 kg (180 lb)  Height: 5' 8" (172.7 cm)  Body mass index is 27.37 kg/m².    Ortho/SPM Exam  General: no acute distress, appears stated age     Neuro: alert and oriented x3   Psych: normal mood   Head: normocephalic, atraumatic.    Eyes: no scleral " icterus   Mouth: moist mucous membranes   Cardiovascular: extremities warm and well perfused   Lungs: breathing comfortably, equal chest rise bilat   Skin: clean, dry, intact (any exceptions noted in below musculoskeletal exam)    Musculoskeletal:  LUE:  - 4 cm laceration over the dorsal aspect of the proximal phalanx of the ring finger extending into the 3rd webspace. Exposed bone visible. No exposed tendon in the wound.  - Subcentimeter lacerations over the palmar aspect of the proximal phalanx of the ring finger with exposed subcutaneous fat.  - TTP around the laceration and ring finger proximal phalanx.  Otherwise non TTP throughout.  - AROM and PROM of the shoulder, elbow, wrist, and hand intact without pain except pain with any movement of the ring finger.  - Axillary/AIN/PIN/Radial/Median/Ulnar nerves assessed in isolation and are without deficit  - SILT throughout  - Compartments soft  - 2+ radial artery pulse  - Capillary Refill < 2 sec    Significant Labs: All pertinent labs within the past 24 hours have been reviewed.    Significant Imaging: I have reviewed and interpreted all pertinent imaging results/findings.  X-ray left hand with a displaced fracture of the left ring finger proximal phalanx

## 2023-04-02 NOTE — ED TRIAGE NOTES
Liang Monterroso Jr., a 82 y.o. male presents to the ED w/ complaint of hand injury    Triage note:  Chief Complaint   Patient presents with    Hand Injury     Pt disconnected a trailer and jammed his left hand. Obvious deformity noted. Bleeding controlled.      Review of patient's allergies indicates:   Allergen Reactions    Contrast media Hives and Itching     Iv dye     Past Medical History:   Diagnosis Date    Allergy     CKD (chronic kidney disease) stage 3, GFR 30-59 ml/min 6/15/2016    GERD (gastroesophageal reflux disease)     Hx of colonic polyp     Hyperlipidemia     Hypertension     Hypospadias in male     Hypothyroidism     Sleep apnea     Thyroid disease     Urinary tract infection

## 2023-04-02 NOTE — ED PROVIDER NOTES
Encounter Date: 4/2/2023       History     Chief Complaint   Patient presents with    Hand Injury     Pt disconnected a trailer and jammed his left hand. Obvious deformity noted. Bleeding controlled.      82 y.o. M, with history of CKD, GERD, hyperlipidemia, hypertension, hypothyroidism, presents to the ED for left hand injury.  Patient is right-handed.  Patient reports that he was moving a boat trailer into his garage, his left hand got jammed between the trailer and a metal pole of his garage.  Patient reports pain to his left ring finger, bleeding has been controlled with direct pressure.  Denies head injury or fall. Her last tetanus shot was a year ago.      Review of patient's allergies indicates:   Allergen Reactions    Contrast media Hives and Itching     Iv dye     Past Medical History:   Diagnosis Date    Allergy     CKD (chronic kidney disease) stage 3, GFR 30-59 ml/min 6/15/2016    GERD (gastroesophageal reflux disease)     Hx of colonic polyp     Hyperlipidemia     Hypertension     Hypospadias in male     Hypothyroidism     Sleep apnea     Thyroid disease     Urinary tract infection      Past Surgical History:   Procedure Laterality Date    APPENDECTOMY      CATARACT EXTRACTION      CYSTOSCOPY      CYSTOSCOPY WITH URODYNAMIC TESTING N/A 10/6/2022    Procedure: CYSTOSCOPY, WITH URODYNAMIC TESTING;  Surgeon: Shade Sullivan MD;  Location: [facility] OR [address] [place] [address];  Service: Urology;  Laterality: N/A;    EYE SURGERY      HERNIA REPAIR      TONSILLECTOMY       Family History   Problem Relation Age of Onset    Diabetes Mother     Heart disease Mother     Hypertension Mother     Vision loss Mother     Dementia Mother     Alcohol abuse Father     Cancer Sister         lung with mets, was a heavy smoker    No Known Problems Daughter     No Known Problems Son     No Known Problems Daughter     No Known Problems Son     Amblyopia Neg Hx     Blindness Neg Hx     Cataracts Neg Hx     Glaucoma Neg Hx     Macular  degeneration Neg Hx     Retinal detachment Neg Hx     Strabismus Neg Hx     Stroke Neg Hx     Thyroid disease Neg Hx      Social History     Tobacco Use    Smoking status: Never    Smokeless tobacco: Never   Substance Use Topics    Alcohol use: Yes     Comment: 1-3 glasses wine weekly, 2-3 beers weekly    Drug use: No     Review of Systems   Musculoskeletal:  Positive for arthralgias (left hand).   Skin:  Positive for wound (right hand).   Neurological:  Negative for syncope.     Physical Exam     Initial Vitals [04/02/23 1202]   BP Pulse Resp Temp SpO2   132/75 83 18 98.2 °F (36.8 °C) 97 %      MAP       --         Physical Exam    Nursing note and vitals reviewed.  Constitutional: He appears well-developed. No distress.   HENT:   Head: Normocephalic and atraumatic.   Nose: Nose normal.   Eyes: Conjunctivae and EOM are normal. Pupils are equal, round, and reactive to light.   Neck: No JVD present.   Normal range of motion.  Cardiovascular:  Normal rate, regular rhythm and normal heart sounds.           Pulmonary/Chest: Breath sounds normal. No respiratory distress.   Abdominal: Abdomen is soft. He exhibits no distension. There is no abdominal tenderness.   Musculoskeletal:         General: Tenderness (Left hand with 4th digit deformity, with skin laceration, and skin discoloration over his 4th mid metacarpal.  Distal perfusion and sensation intact.  DIP with normal range of motion) present. No edema.      Cervical back: Normal range of motion.     Neurological: He is alert and oriented to person, place, and time. He has normal strength. No cranial nerve deficit.   Skin: Skin is warm and dry. Capillary refill takes less than 2 seconds.         ED Course   Procedures  Labs Reviewed   CBC W/ AUTO DIFFERENTIAL - Abnormal; Notable for the following components:       Result Value    RBC 4.53 (*)     Hemoglobin 12.8 (*)     MCHC 31.8 (*)     Mono # 0.2 (*)     Gran % 77.0 (*)     Mono % 2.7 (*)     All other components  within normal limits   COMPREHENSIVE METABOLIC PANEL   APTT   PROTIME-INR   TYPE & SCREEN          Imaging Results              X-Ray Wrist Complete Left (Final result)  Result time 04/02/23 12:51:05      Final result by Joseph Gallagher MD (04/02/23 12:51:05)                   Impression:      1. Fourth digit fracture as above.      Electronically signed by: Joseph Gallagher MD  Date:    04/02/2023  Time:    12:51               Narrative:    EXAMINATION:  XR HAND COMPLETE 3 VIEW LEFT; XR WRIST COMPLETE 3 VIEWS LEFT    CLINICAL HISTORY:  hand injury;. Unspecified injury of unspecified wrist, hand and finger(s), initial encounter    TECHNIQUE:  PA, lateral, and oblique views of the left hand were performed.  Three views left wrist.    COMPARISON:  None    FINDINGS:  Three views left hand, three views left wrist.    There is osteopenia.  There are degenerative changes of the PIP and D IP joints.  There is an acute angulated and comminuted fracture involving the mid aspect of the proximal phalanx of the 4th digit.  No definite fracture plane extension to the articular surfaces.  No dislocation.  Edema is noted about the fracture site.  No radiopaque foreign body.  No acute displaced fracture or dislocation of the wrist.                                       X-Ray Hand 3 View Left (Final result)  Result time 04/02/23 12:51:05      Final result by Joseph Gallagher MD (04/02/23 12:51:05)                   Impression:      1. Fourth digit fracture as above.      Electronically signed by: Joseph Gallagher MD  Date:    04/02/2023  Time:    12:51               Narrative:    EXAMINATION:  XR HAND COMPLETE 3 VIEW LEFT; XR WRIST COMPLETE 3 VIEWS LEFT    CLINICAL HISTORY:  hand injury;. Unspecified injury of unspecified wrist, hand and finger(s), initial encounter    TECHNIQUE:  PA, lateral, and oblique views of the left hand were performed.  Three views left wrist.    COMPARISON:  None    FINDINGS:  Three views left hand,  three views left wrist.    There is osteopenia.  There are degenerative changes of the PIP and D IP joints.  There is an acute angulated and comminuted fracture involving the mid aspect of the proximal phalanx of the 4th digit.  No definite fracture plane extension to the articular surfaces.  No dislocation.  Edema is noted about the fracture site.  No radiopaque foreign body.  No acute displaced fracture or dislocation of the wrist.                                       Medications   LIDOcaine (PF) 10 mg/ml (1%) injection 100 mg (has no administration in time range)   LIDOcaine HCL 10 mg/ml (1%) injection 20 mL (has no administration in time range)   ceFAZolin (ANCEF) 1 gram in dextrose 5 % 50 mL IVPB (premix) (has no administration in time range)   morphine injection 4 mg (4 mg Intravenous Given 4/2/23 1253)   ondansetron injection 4 mg (4 mg Intravenous Given 4/2/23 1253)     Medical Decision Making:   History:   Old Medical Records: I decided to obtain old medical records.  Initial Assessment:   82 y.o. M, with history of CKD, GERD, hyperlipidemia, hypertension, hypothyroidism, presents to the ED for left hand injury.   Differential Diagnosis:   Proximal phalanx open fracture, dislocation, laceration, metacarpal fracture, tendon injury.  Clinical Tests:   Lab Tests: Ordered and Reviewed  Radiological Study: Ordered and Reviewed           ED Course as of 04/02/23 1426   Sun Apr 02, 2023   1251 Left 4th digit fracture with overlying laceration, concerning for open fracture, will start IV antibiotics [NC]   1420  Fourth digit fracture, concerning for open fracture [NC]   1421 Discussed with ortho, will evaluate patient at bedside [NC]   1422 Sign out to incoming team, pending ortho recommendation. [NC]      ED Course User Index  [NC] Bassam Saez MD                 Clinical Impression:   Final diagnoses:  [S69.90XA] Hand injury  [S62.619B] Open fracture of proximal phalanx of digit of right hand (Primary)                Bassam Saez MD  Resident  04/02/23 8299

## 2023-04-03 ENCOUNTER — PATIENT MESSAGE (OUTPATIENT)
Dept: ADMINISTRATIVE | Facility: OTHER | Age: 83
End: 2023-04-03
Payer: MEDICARE

## 2023-04-03 ENCOUNTER — OFFICE VISIT (OUTPATIENT)
Dept: ORTHOPEDICS | Facility: CLINIC | Age: 83
End: 2023-04-03
Payer: MEDICARE

## 2023-04-03 DIAGNOSIS — S62.615B OPEN DISPLACED FRACTURE OF PROXIMAL PHALANX OF LEFT RING FINGER, INITIAL ENCOUNTER: Primary | ICD-10-CM

## 2023-04-03 PROCEDURE — 3288F PR FALLS RISK ASSESSMENT DOCUMENTED: ICD-10-PCS | Mod: HCNC,CPTII,S$GLB, | Performed by: PHYSICIAN ASSISTANT

## 2023-04-03 PROCEDURE — 1101F PT FALLS ASSESS-DOCD LE1/YR: CPT | Mod: HCNC,CPTII,S$GLB, | Performed by: PHYSICIAN ASSISTANT

## 2023-04-03 PROCEDURE — 99204 OFFICE O/P NEW MOD 45 MIN: CPT | Mod: HCNC,S$GLB,, | Performed by: PHYSICIAN ASSISTANT

## 2023-04-03 PROCEDURE — 1101F PR PT FALLS ASSESS DOC 0-1 FALLS W/OUT INJ PAST YR: ICD-10-PCS | Mod: HCNC,CPTII,S$GLB, | Performed by: PHYSICIAN ASSISTANT

## 2023-04-03 PROCEDURE — 1125F AMNT PAIN NOTED PAIN PRSNT: CPT | Mod: HCNC,CPTII,S$GLB, | Performed by: PHYSICIAN ASSISTANT

## 2023-04-03 PROCEDURE — 99999 PR PBB SHADOW E&M-EST. PATIENT-LVL IV: CPT | Mod: PBBFAC,HCNC,, | Performed by: PHYSICIAN ASSISTANT

## 2023-04-03 PROCEDURE — 1159F MED LIST DOCD IN RCRD: CPT | Mod: HCNC,CPTII,S$GLB, | Performed by: PHYSICIAN ASSISTANT

## 2023-04-03 PROCEDURE — 99999 PR PBB SHADOW E&M-EST. PATIENT-LVL IV: ICD-10-PCS | Mod: PBBFAC,HCNC,, | Performed by: PHYSICIAN ASSISTANT

## 2023-04-03 PROCEDURE — 1125F PR PAIN SEVERITY QUANTIFIED, PAIN PRESENT: ICD-10-PCS | Mod: HCNC,CPTII,S$GLB, | Performed by: PHYSICIAN ASSISTANT

## 2023-04-03 PROCEDURE — 1159F PR MEDICATION LIST DOCUMENTED IN MEDICAL RECORD: ICD-10-PCS | Mod: HCNC,CPTII,S$GLB, | Performed by: PHYSICIAN ASSISTANT

## 2023-04-03 PROCEDURE — 99204 PR OFFICE/OUTPT VISIT, NEW, LEVL IV, 45-59 MIN: ICD-10-PCS | Mod: HCNC,S$GLB,, | Performed by: PHYSICIAN ASSISTANT

## 2023-04-03 PROCEDURE — 3288F FALL RISK ASSESSMENT DOCD: CPT | Mod: HCNC,CPTII,S$GLB, | Performed by: PHYSICIAN ASSISTANT

## 2023-04-03 RX ORDER — MUPIROCIN 20 MG/G
OINTMENT TOPICAL
Status: CANCELLED | OUTPATIENT
Start: 2023-04-03

## 2023-04-03 NOTE — H&P (VIEW-ONLY)
[facility]  History & Physical  Orthopedics    SUBJECTIVE:      Chief Complaint: Left ring finger    Referring Provider: Lupillo Gifford MD Dr. Dunbar is the supervising physician for this encounter/patient    History of Present Illness:  Patient is a 82 y.o. right hand dominant male who presents today with complaints of left ring finger crush injury occurred on 4/2/23 when his hand got caught between a boat trailer. He was seen in [facility] [place] for open proximal phalanx fracture, seen and evaluated by orthopedic resident. He reports 5-6/10 pain, denies any numbness/tingling. Has maintained the ulnar gutter splint.     History or Right index finger flexor tendon repair 40 years ago.     Onset of symptoms/DOI was 4/2/23.    Symptoms are aggravated by activity and movement.    Symptoms are alleviated by rest and immobilization.    Symptoms consist of pain, laceration, and decreased ROM.    The patient rates their pain as a 5/10.    Attempted treatment(s) and/or interventions include activity modifications, rest, closed reduction in the emergency department and splinting/casting.     The patient denies any fevers, chills, N/V, D/C and presents for evaluation.       Past Medical History:   Diagnosis Date    Allergy     CKD (chronic kidney disease) stage 3, GFR 30-59 ml/min 6/15/2016    GERD (gastroesophageal reflux disease)     Grade II open fracture of proximal phalanx of left ring finger 4/2/2023    Hx of colonic polyp     Hyperlipidemia     Hypertension     Hypospadias in male     Hypothyroidism     Sleep apnea     Thyroid disease     Urinary tract infection      Past Surgical History:   Procedure Laterality Date    APPENDECTOMY      CATARACT EXTRACTION      CYSTOSCOPY      CYSTOSCOPY WITH URODYNAMIC TESTING N/A 10/6/2022    Procedure: CYSTOSCOPY, WITH URODYNAMIC TESTING;  Surgeon: Shade Sullivan MD;  Location: [facility] OR [address] [place] [address];  Service: Urology;  Laterality: N/A;    EYE SURGERY      HERNIA  REPAIR      TONSILLECTOMY       Review of patient's allergies indicates:   Allergen Reactions    Contrast media Hives and Itching     Iv dye     Social History     Social History Narrative    Not on file     Family History   Problem Relation Age of Onset    Diabetes Mother     Heart disease Mother     Hypertension Mother     Vision loss Mother     Dementia Mother     Alcohol abuse Father     Cancer Sister         lung with mets, was a heavy smoker    No Known Problems Daughter     No Known Problems Son     No Known Problems Daughter     No Known Problems Son     Amblyopia Neg Hx     Blindness Neg Hx     Cataracts Neg Hx     Glaucoma Neg Hx     Macular degeneration Neg Hx     Retinal detachment Neg Hx     Strabismus Neg Hx     Stroke Neg Hx     Thyroid disease Neg Hx          Current Outpatient Medications:     aspirin (ECOTRIN) 81 MG EC tablet, Take 81 mg by mouth once daily., Disp: , Rfl:     econazole nitrate 1 % cream, USE TWICE DAILY, Disp: 60 g, Rfl: 5    fexofenadine (ALLEGRA) 180 MG tablet, Take 1 tablet (180 mg total) by mouth once daily. (Patient taking differently: No sig reported), Disp: 30 tablet, Rfl: 11    fluticasone propionate (FLONASE) 50 mcg/actuation nasal spray, 1 spray (50 mcg total) by Each Nostril route once daily., Disp: 1 Bottle, Rfl: 0    FLUZONE HIGHDOSE QUAD 21-22  mcg/0.7 mL Syrg, , Disp: , Rfl:     levothyroxine (SYNTHROID) 112 MCG tablet, TAKE 1 TABLET BY MOUTH DAILY BEFORE BREAKFAST, Disp: 90 tablet, Rfl: 3    meloxicam (MOBIC) 7.5 MG tablet, Take 1 tablet (7.5 mg total) by mouth once daily., Disp: 30 tablet, Rfl: 0    metronidazole (METROGEL) 0.75 % gel, Use hs, Disp: 45 g, Rfl: 3    multivitamin with minerals tablet, Take 1 tablet by mouth once daily., Disp: , Rfl:     pravastatin (PRAVACHOL) 20 MG tablet, TAKE 1 TABLET BY MOUTH EVERY DAY, Disp: 90 tablet, Rfl: 3    ramipriL (ALTACE) 5 MG capsule, TAKE 1 CAPSULE(5 MG) BY MOUTH EVERY DAY, Disp: 90 capsule, Rfl: 3     sulfamethoxazole-trimethoprim 800-160mg (BACTRIM DS) 800-160 mg Tab, Take 1 tablet by mouth 2 (two) times daily. for 10 days, Disp: 20 tablet, Rfl: 0    tamsulosin (FLOMAX) 0.4 mg Cap, TAKE 2 CAPSULES(0.8 MG) BY MOUTH EVERY DAY, Disp: 180 capsule, Rfl: 3    triamcinolone acetonide 0.1% (KENALOG) 0.1 % cream, Use bid prn rash, Disp: 80 g, Rfl: 3  No current facility-administered medications for this visit.      Review of Systems:  Constitutional: no fever or chills  Eyes: no visual changes  ENT: no nasal congestion or sore throat  Respiratory: no cough or shortness of breath  Cardiovascular: no chest pain  Gastrointestinal: no nausea or vomiting, tolerating diet  Musculoskeletal: pain, soreness, decreased ROM, and wound    OBJECTIVE:      Vital Signs (Most Recent):  There were no vitals filed for this visit.  There is no height or weight on file to calculate BMI.      Physical Exam:  Constitutional: The patient appears well-developed and well-nourished. No distress.   Skin: No lesions appreciated  Head: Normocephalic and atraumatic.   Nose: Nose normal.   Ears: No deformities seen  Eyes: Conjunctivae and EOM are normal.   Neck: No tracheal deviation present.   Cardiovascular: Normal rate and intact distal pulses.    Pulmonary/Chest: Effort normal. No respiratory distress.   Abdominal: There is no guarding.   Neurological: The patient is alert.   Psychiatric: The patient has a normal mood and affect.     Left Hand/Wrist Examination:    Observation/Inspection:  Swelling  Generalized hand    Deformity  Ring finger  Discoloration  Generalized hand and palm    Scars   none    Atrophy  none  Lacerations noted to the dorsal ring MCP/proximal phalanx and to the volar proximal phalanx. No concern for infection.    HAND/WRIST EXAMINATION:  Finkelstein's Test   Neg  WHAT Test    Neg  Snuff box tenderness   Neg  Nguyễn's Test    Neg  Hook of Hamate Tenderness  Neg  CMC grind    Neg  Circumduction test   Neg    Neurovascular  Exam:  Digits WWP, brisk CR < 3s throughout  NVI motor/LTS to M/R/U nerves, radial pulse 2+  Tinel's Test - Carpal Tunnel  Neg  Tinel's Test - Cubital Tunnel  Neg  Phalen's Test    Neg  Median Nerve Compression Test Neg    ROM hand: he has decreased extension of the MCP. Active flexion noted at the DIP, I can feel him fire at the PIP. Sensation is intact throughout the ring finger.    ROM wrist full, painless    ROM elbow full, painless    Abdomen not guarded  Respirations nonlabored  Perfusion intact    Diagnostic Results:     Imaging - I independently viewed the patient's imaging as well as the radiology report.      FINDINGS:  There has been interval adjustment of the overlying cast in the left hand.  There is improved in the degree of angulation of the comminuted fracture of the 4th proximal phalanx.  No new fracture is identified.  The joint spaces are stable.     Impression:     As above.    ASSESSMENT/PLAN:      82 y.o. yo male with Left 4th open proximal phalanx fracture    Plan: The patient and I had a thorough discussion today.  We discussed the working diagnosis as well as several other potential alternative diagnoses.  Treatment options were discussed, both conservative and surgical, along with risks and benefits of both options. Surgery is recommended for this, to which he agrees. His is dressed and placed in a TKO brace.    He is consented for Left ORIF 4th proximal phalanx fracture, possible tendon/nerve repair with Dr. Cummins on 4/5/23. Case ordered for [place]. OT ordered for postop rehab.    The patient has not responded to adequate non operative treatment at this time and/or non operative treatment is not indicated. Thus, the risks, benefits and alternatives to surgery were discussed with the patient in detail.  Specific risks include but are not limited to bleeding, infection, vessel and/or nerve damage, pain, numbness, tingling, compartment syndrome, need for additional surgery, failure to  return to pre-injury and/or preoperative functional status, inability to return to work, scar sensitivity, delayed healing, complex regional pain syndrome, weakness, pulley injury, tendon injury, bowstringing, partial and/or incomplete relief of symptoms, persistence of and/or worsening of symptoms, hardware and/or surgical failure, prominent and/or symptomatic hardware possibly necessitating future removal, osteomyelitis, amputation, loss of function, stiffness, rotational malalignment, functional debility, dysfunction, decreased  strength, need for prolonged postoperative rehabilitation, malunion, nonunion, deep venous thrombosis, pulmonary embolism, arthritis and death.  The patient states an understanding and wishes to proceed with surgery.   All questions were answered.  No guarantees were implied or stated.  Written informed consent was obtained.      Should the patient's symptoms worsen, persist, or fail to improve they should return for reevaluation and I would be happy to see them back anytime.           Please do not hesitate to reach out to us via email, phone, or MyChart with any questions, concerns, or feedback.

## 2023-04-03 NOTE — PROGRESS NOTES
[facility]  History & Physical  Orthopedics    SUBJECTIVE:      Chief Complaint: Left ring finger    Referring Provider: Lupillo Gifford MD Dr. Dunbar is the supervising physician for this encounter/patient    History of Present Illness:  Patient is a 82 y.o. right hand dominant male who presents today with complaints of left ring finger crush injury occurred on 4/2/23 when his hand got caught between a boat trailer. He was seen in [facility] [place] for open proximal phalanx fracture, seen and evaluated by orthopedic resident. He reports 5-6/10 pain, denies any numbness/tingling. Has maintained the ulnar gutter splint.     History or Right index finger flexor tendon repair 40 years ago.     Onset of symptoms/DOI was 4/2/23.    Symptoms are aggravated by activity and movement.    Symptoms are alleviated by rest and immobilization.    Symptoms consist of pain, laceration, and decreased ROM.    The patient rates their pain as a 5/10.    Attempted treatment(s) and/or interventions include activity modifications, rest, closed reduction in the emergency department and splinting/casting.     The patient denies any fevers, chills, N/V, D/C and presents for evaluation.       Past Medical History:   Diagnosis Date    Allergy     CKD (chronic kidney disease) stage 3, GFR 30-59 ml/min 6/15/2016    GERD (gastroesophageal reflux disease)     Grade II open fracture of proximal phalanx of left ring finger 4/2/2023    Hx of colonic polyp     Hyperlipidemia     Hypertension     Hypospadias in male     Hypothyroidism     Sleep apnea     Thyroid disease     Urinary tract infection      Past Surgical History:   Procedure Laterality Date    APPENDECTOMY      CATARACT EXTRACTION      CYSTOSCOPY      CYSTOSCOPY WITH URODYNAMIC TESTING N/A 10/6/2022    Procedure: CYSTOSCOPY, WITH URODYNAMIC TESTING;  Surgeon: Shade Sullivan MD;  Location: [facility] OR [address] [place] [address];  Service: Urology;  Laterality: N/A;    EYE SURGERY      HERNIA  REPAIR      TONSILLECTOMY       Review of patient's allergies indicates:   Allergen Reactions    Contrast media Hives and Itching     Iv dye     Social History     Social History Narrative    Not on file     Family History   Problem Relation Age of Onset    Diabetes Mother     Heart disease Mother     Hypertension Mother     Vision loss Mother     Dementia Mother     Alcohol abuse Father     Cancer Sister         lung with mets, was a heavy smoker    No Known Problems Daughter     No Known Problems Son     No Known Problems Daughter     No Known Problems Son     Amblyopia Neg Hx     Blindness Neg Hx     Cataracts Neg Hx     Glaucoma Neg Hx     Macular degeneration Neg Hx     Retinal detachment Neg Hx     Strabismus Neg Hx     Stroke Neg Hx     Thyroid disease Neg Hx          Current Outpatient Medications:     aspirin (ECOTRIN) 81 MG EC tablet, Take 81 mg by mouth once daily., Disp: , Rfl:     econazole nitrate 1 % cream, USE TWICE DAILY, Disp: 60 g, Rfl: 5    fexofenadine (ALLEGRA) 180 MG tablet, Take 1 tablet (180 mg total) by mouth once daily. (Patient taking differently: No sig reported), Disp: 30 tablet, Rfl: 11    fluticasone propionate (FLONASE) 50 mcg/actuation nasal spray, 1 spray (50 mcg total) by Each Nostril route once daily., Disp: 1 Bottle, Rfl: 0    FLUZONE HIGHDOSE QUAD 21-22  mcg/0.7 mL Syrg, , Disp: , Rfl:     levothyroxine (SYNTHROID) 112 MCG tablet, TAKE 1 TABLET BY MOUTH DAILY BEFORE BREAKFAST, Disp: 90 tablet, Rfl: 3    meloxicam (MOBIC) 7.5 MG tablet, Take 1 tablet (7.5 mg total) by mouth once daily., Disp: 30 tablet, Rfl: 0    metronidazole (METROGEL) 0.75 % gel, Use hs, Disp: 45 g, Rfl: 3    multivitamin with minerals tablet, Take 1 tablet by mouth once daily., Disp: , Rfl:     pravastatin (PRAVACHOL) 20 MG tablet, TAKE 1 TABLET BY MOUTH EVERY DAY, Disp: 90 tablet, Rfl: 3    ramipriL (ALTACE) 5 MG capsule, TAKE 1 CAPSULE(5 MG) BY MOUTH EVERY DAY, Disp: 90 capsule, Rfl: 3     sulfamethoxazole-trimethoprim 800-160mg (BACTRIM DS) 800-160 mg Tab, Take 1 tablet by mouth 2 (two) times daily. for 10 days, Disp: 20 tablet, Rfl: 0    tamsulosin (FLOMAX) 0.4 mg Cap, TAKE 2 CAPSULES(0.8 MG) BY MOUTH EVERY DAY, Disp: 180 capsule, Rfl: 3    triamcinolone acetonide 0.1% (KENALOG) 0.1 % cream, Use bid prn rash, Disp: 80 g, Rfl: 3  No current facility-administered medications for this visit.      Review of Systems:  Constitutional: no fever or chills  Eyes: no visual changes  ENT: no nasal congestion or sore throat  Respiratory: no cough or shortness of breath  Cardiovascular: no chest pain  Gastrointestinal: no nausea or vomiting, tolerating diet  Musculoskeletal: pain, soreness, decreased ROM, and wound    OBJECTIVE:      Vital Signs (Most Recent):  There were no vitals filed for this visit.  There is no height or weight on file to calculate BMI.      Physical Exam:  Constitutional: The patient appears well-developed and well-nourished. No distress.   Skin: No lesions appreciated  Head: Normocephalic and atraumatic.   Nose: Nose normal.   Ears: No deformities seen  Eyes: Conjunctivae and EOM are normal.   Neck: No tracheal deviation present.   Cardiovascular: Normal rate and intact distal pulses.    Pulmonary/Chest: Effort normal. No respiratory distress.   Abdominal: There is no guarding.   Neurological: The patient is alert.   Psychiatric: The patient has a normal mood and affect.     Left Hand/Wrist Examination:    Observation/Inspection:  Swelling  Generalized hand    Deformity  Ring finger  Discoloration  Generalized hand and palm    Scars   none    Atrophy  none  Lacerations noted to the dorsal ring MCP/proximal phalanx and to the volar proximal phalanx. No concern for infection.    HAND/WRIST EXAMINATION:  Finkelstein's Test   Neg  WHAT Test    Neg  Snuff box tenderness   Neg  Nguyễn's Test    Neg  Hook of Hamate Tenderness  Neg  CMC grind    Neg  Circumduction test   Neg    Neurovascular  Exam:  Digits WWP, brisk CR < 3s throughout  NVI motor/LTS to M/R/U nerves, radial pulse 2+  Tinel's Test - Carpal Tunnel  Neg  Tinel's Test - Cubital Tunnel  Neg  Phalen's Test    Neg  Median Nerve Compression Test Neg    ROM hand: he has decreased extension of the MCP. Active flexion noted at the DIP, I can feel him fire at the PIP. Sensation is intact throughout the ring finger.    ROM wrist full, painless    ROM elbow full, painless    Abdomen not guarded  Respirations nonlabored  Perfusion intact    Diagnostic Results:     Imaging - I independently viewed the patient's imaging as well as the radiology report.      FINDINGS:  There has been interval adjustment of the overlying cast in the left hand.  There is improved in the degree of angulation of the comminuted fracture of the 4th proximal phalanx.  No new fracture is identified.  The joint spaces are stable.     Impression:     As above.    ASSESSMENT/PLAN:      82 y.o. yo male with Left 4th open proximal phalanx fracture    Plan: The patient and I had a thorough discussion today.  We discussed the working diagnosis as well as several other potential alternative diagnoses.  Treatment options were discussed, both conservative and surgical, along with risks and benefits of both options. Surgery is recommended for this, to which he agrees. His is dressed and placed in a TKO brace.    He is consented for Left ORIF 4th proximal phalanx fracture, possible tendon/nerve repair with Dr. Cummins on 4/5/23. Case ordered for [place]. OT ordered for postop rehab.    The patient has not responded to adequate non operative treatment at this time and/or non operative treatment is not indicated. Thus, the risks, benefits and alternatives to surgery were discussed with the patient in detail.  Specific risks include but are not limited to bleeding, infection, vessel and/or nerve damage, pain, numbness, tingling, compartment syndrome, need for additional surgery, failure to  return to pre-injury and/or preoperative functional status, inability to return to work, scar sensitivity, delayed healing, complex regional pain syndrome, weakness, pulley injury, tendon injury, bowstringing, partial and/or incomplete relief of symptoms, persistence of and/or worsening of symptoms, hardware and/or surgical failure, prominent and/or symptomatic hardware possibly necessitating future removal, osteomyelitis, amputation, loss of function, stiffness, rotational malalignment, functional debility, dysfunction, decreased  strength, need for prolonged postoperative rehabilitation, malunion, nonunion, deep venous thrombosis, pulmonary embolism, arthritis and death.  The patient states an understanding and wishes to proceed with surgery.   All questions were answered.  No guarantees were implied or stated.  Written informed consent was obtained.      Should the patient's symptoms worsen, persist, or fail to improve they should return for reevaluation and I would be happy to see them back anytime.           Please do not hesitate to reach out to us via email, phone, or MyChart with any questions, concerns, or feedback.

## 2023-04-04 ENCOUNTER — ANESTHESIA EVENT (OUTPATIENT)
Dept: SURGERY | Facility: HOSPITAL | Age: 83
End: 2023-04-04
Payer: MEDICARE

## 2023-04-04 ENCOUNTER — PATIENT MESSAGE (OUTPATIENT)
Dept: INTERNAL MEDICINE | Facility: CLINIC | Age: 83
End: 2023-04-04
Payer: MEDICARE

## 2023-04-04 ENCOUNTER — TELEPHONE (OUTPATIENT)
Dept: ORTHOPEDICS | Facility: CLINIC | Age: 83
End: 2023-04-04
Payer: MEDICARE

## 2023-04-04 DIAGNOSIS — D64.9 ANEMIA, UNSPECIFIED TYPE: Primary | ICD-10-CM

## 2023-04-04 RX ORDER — FERROUS SULFATE 325(65) MG
325 TABLET, DELAYED RELEASE (ENTERIC COATED) ORAL DAILY
Qty: 30 TABLET | Refills: 2 | Status: SHIPPED | OUTPATIENT
Start: 2023-04-04 | End: 2023-06-19 | Stop reason: SDUPTHER

## 2023-04-04 RX ORDER — OXYCODONE AND ACETAMINOPHEN 5; 325 MG/1; MG/1
1 TABLET ORAL EVERY 4 HOURS PRN
Qty: 10 TABLET | Refills: 0 | Status: SHIPPED | OUTPATIENT
Start: 2023-04-05 | End: 2023-04-05 | Stop reason: SDUPTHER

## 2023-04-04 RX ORDER — IBUPROFEN 600 MG/1
600 TABLET ORAL 3 TIMES DAILY
Qty: 90 TABLET | Refills: 0 | Status: SHIPPED | OUTPATIENT
Start: 2023-04-04 | End: 2023-04-05 | Stop reason: SDUPTHER

## 2023-04-04 RX ORDER — ACETAMINOPHEN 500 MG
1000 TABLET ORAL 2 TIMES DAILY
Qty: 60 TABLET | Refills: 0 | Status: SHIPPED | OUTPATIENT
Start: 2023-04-07 | End: 2023-04-05 | Stop reason: SDUPTHER

## 2023-04-04 NOTE — TELEPHONE ENCOUNTER
Spoke with the patient. Notified of 8 AM arrival time to the [facility].  Informed of current visitor policy.  Reminded of NPO and need for transportation. Patient verbalized understanding to all.    Jacey Sheldon MS, OTC  Clinical Assistant to Dr. Ross Dunbar [facility]

## 2023-04-05 ENCOUNTER — ANESTHESIA (OUTPATIENT)
Dept: SURGERY | Facility: HOSPITAL | Age: 83
End: 2023-04-05
Payer: MEDICARE

## 2023-04-05 ENCOUNTER — TELEPHONE (OUTPATIENT)
Dept: INTERNAL MEDICINE | Facility: CLINIC | Age: 83
End: 2023-04-05
Payer: MEDICARE

## 2023-04-05 ENCOUNTER — HOSPITAL ENCOUNTER (OUTPATIENT)
Facility: HOSPITAL | Age: 83
Discharge: HOME | End: 2023-04-05
Attending: ORTHOPAEDIC SURGERY | Admitting: ORTHOPAEDIC SURGERY
Payer: MEDICARE

## 2023-04-05 VITALS
WEIGHT: 180 LBS | BODY MASS INDEX: 27.28 KG/M2 | OXYGEN SATURATION: 92 % | DIASTOLIC BLOOD PRESSURE: 51 MMHG | HEART RATE: 72 BPM | HEIGHT: 68 IN | SYSTOLIC BLOOD PRESSURE: 92 MMHG | TEMPERATURE: 98 F | RESPIRATION RATE: 25 BRPM

## 2023-04-05 DIAGNOSIS — S62.615B OPEN DISPLACED FRACTURE OF PROXIMAL PHALANX OF LEFT RING FINGER, INITIAL ENCOUNTER: ICD-10-CM

## 2023-04-05 DIAGNOSIS — I48.91 ATRIAL FIBRILLATION, UNSPECIFIED TYPE: Primary | ICD-10-CM

## 2023-04-05 DIAGNOSIS — I48.91 NEW ONSET A-FIB: ICD-10-CM

## 2023-04-05 DIAGNOSIS — S63.408A TRAUMATIC RUPTURE OF FLEXOR SHEATH PULLEY OF FINGER: Primary | ICD-10-CM

## 2023-04-05 PROCEDURE — 94640 AIRWAY INHALATION TREATMENT: CPT

## 2023-04-05 PROCEDURE — 71000033 HC RECOVERY, INTIAL HOUR: Performed by: ORTHOPAEDIC SURGERY

## 2023-04-05 PROCEDURE — 63600175 PHARM REV CODE 636 W HCPCS: Performed by: STUDENT IN AN ORGANIZED HEALTH CARE EDUCATION/TRAINING PROGRAM

## 2023-04-05 PROCEDURE — 36000708 HC OR TIME LEV III 1ST 15 MIN: Performed by: ORTHOPAEDIC SURGERY

## 2023-04-05 PROCEDURE — 25000003 PHARM REV CODE 250: Performed by: NURSE ANESTHETIST, CERTIFIED REGISTERED

## 2023-04-05 PROCEDURE — 63600175 PHARM REV CODE 636 W HCPCS: Performed by: PHYSICIAN ASSISTANT

## 2023-04-05 PROCEDURE — 26735 PR OPEN TX PHALANGEAL SHAFT FRACTURE PROX/MIDDLE EA: ICD-10-PCS | Mod: F3,HCNC,, | Performed by: ORTHOPAEDIC SURGERY

## 2023-04-05 PROCEDURE — 64415 NJX AA&/STRD BRCH PLXS IMG: CPT | Performed by: STUDENT IN AN ORGANIZED HEALTH CARE EDUCATION/TRAINING PROGRAM

## 2023-04-05 PROCEDURE — D9220A PRA ANESTHESIA: ICD-10-PCS | Mod: CRNA,,, | Performed by: NURSE ANESTHETIST, CERTIFIED REGISTERED

## 2023-04-05 PROCEDURE — D9220A PRA ANESTHESIA: Mod: ANES,,, | Performed by: STUDENT IN AN ORGANIZED HEALTH CARE EDUCATION/TRAINING PROGRAM

## 2023-04-05 PROCEDURE — 11042 PR DEBRIDEMENT, SKIN, SUB-Q TISSUE,=<20 SQ CM: ICD-10-PCS | Mod: 59,51,HCNC, | Performed by: ORTHOPAEDIC SURGERY

## 2023-04-05 PROCEDURE — 94761 N-INVAS EAR/PLS OXIMETRY MLT: CPT

## 2023-04-05 PROCEDURE — 71000039 HC RECOVERY, EACH ADD'L HOUR: Performed by: ORTHOPAEDIC SURGERY

## 2023-04-05 PROCEDURE — 27200750 HC INSULATED NEEDLE/ STIMUPLEX: Performed by: STUDENT IN AN ORGANIZED HEALTH CARE EDUCATION/TRAINING PROGRAM

## 2023-04-05 PROCEDURE — 37000008 HC ANESTHESIA 1ST 15 MINUTES: Performed by: ORTHOPAEDIC SURGERY

## 2023-04-05 PROCEDURE — 36000709 HC OR TIME LEV III EA ADD 15 MIN: Performed by: ORTHOPAEDIC SURGERY

## 2023-04-05 PROCEDURE — D9220A PRA ANESTHESIA: ICD-10-PCS | Mod: ANES,,, | Performed by: STUDENT IN AN ORGANIZED HEALTH CARE EDUCATION/TRAINING PROGRAM

## 2023-04-05 PROCEDURE — 63600175 PHARM REV CODE 636 W HCPCS: Performed by: NURSE ANESTHETIST, CERTIFIED REGISTERED

## 2023-04-05 PROCEDURE — 26735 TREAT FINGER FRACTURE EACH: CPT | Mod: F3,HCNC,, | Performed by: ORTHOPAEDIC SURGERY

## 2023-04-05 PROCEDURE — D9220A PRA ANESTHESIA: Mod: CRNA,,, | Performed by: NURSE ANESTHETIST, CERTIFIED REGISTERED

## 2023-04-05 PROCEDURE — 11010 PR DEBRIDE ASSOC OPEN FX/DISLOC SKIN/SUBQ: ICD-10-PCS | Mod: 51,HCNC,, | Performed by: ORTHOPAEDIC SURGERY

## 2023-04-05 PROCEDURE — 25000242 PHARM REV CODE 250 ALT 637 W/ HCPCS: Performed by: STUDENT IN AN ORGANIZED HEALTH CARE EDUCATION/TRAINING PROGRAM

## 2023-04-05 PROCEDURE — 25000003 PHARM REV CODE 250: Performed by: STUDENT IN AN ORGANIZED HEALTH CARE EDUCATION/TRAINING PROGRAM

## 2023-04-05 PROCEDURE — 25000003 PHARM REV CODE 250: Performed by: PHYSICIAN ASSISTANT

## 2023-04-05 PROCEDURE — 64415 SUPRACLAVICULAR SINGLE SHOT: ICD-10-PCS | Mod: 59,LT,, | Performed by: STUDENT IN AN ORGANIZED HEALTH CARE EDUCATION/TRAINING PROGRAM

## 2023-04-05 PROCEDURE — 11010 DEBRIDE SKIN AT FX SITE: CPT | Mod: 51,HCNC,, | Performed by: ORTHOPAEDIC SURGERY

## 2023-04-05 PROCEDURE — 25000003 PHARM REV CODE 250: Performed by: ANESTHESIOLOGY

## 2023-04-05 PROCEDURE — C1713 ANCHOR/SCREW BN/BN,TIS/BN: HCPCS | Performed by: ORTHOPAEDIC SURGERY

## 2023-04-05 PROCEDURE — 11042 DBRDMT SUBQ TIS 1ST 20SQCM/<: CPT | Mod: 59,51,HCNC, | Performed by: ORTHOPAEDIC SURGERY

## 2023-04-05 PROCEDURE — 71000015 HC POSTOP RECOV 1ST HR: Performed by: ORTHOPAEDIC SURGERY

## 2023-04-05 PROCEDURE — 99900035 HC TECH TIME PER 15 MIN (STAT)

## 2023-04-05 PROCEDURE — 37000009 HC ANESTHESIA EA ADD 15 MINS: Performed by: ORTHOPAEDIC SURGERY

## 2023-04-05 PROCEDURE — 27201423 OPTIME MED/SURG SUP & DEVICES STERILE SUPPLY: Performed by: ORTHOPAEDIC SURGERY

## 2023-04-05 DEVICE — IMPLANTABLE DEVICE: Type: IMPLANTABLE DEVICE | Site: HAND | Status: FUNCTIONAL

## 2023-04-05 RX ORDER — IBUPROFEN 600 MG/1
600 TABLET ORAL 3 TIMES DAILY
Qty: 90 TABLET | Refills: 0 | Status: SHIPPED | OUTPATIENT
Start: 2023-04-05 | End: 2023-05-05

## 2023-04-05 RX ORDER — PROPOFOL 10 MG/ML
VIAL (ML) INTRAVENOUS
Status: DISCONTINUED | OUTPATIENT
Start: 2023-04-05 | End: 2023-04-05

## 2023-04-05 RX ORDER — HALOPERIDOL 5 MG/ML
0.5 INJECTION INTRAMUSCULAR EVERY 10 MIN PRN
Status: DISCONTINUED | OUTPATIENT
Start: 2023-04-05 | End: 2023-04-05 | Stop reason: HOSPADM

## 2023-04-05 RX ORDER — MIDAZOLAM HYDROCHLORIDE 1 MG/ML
.5-4 INJECTION INTRAMUSCULAR; INTRAVENOUS
Status: DISCONTINUED | OUTPATIENT
Start: 2023-04-05 | End: 2023-04-05 | Stop reason: HOSPADM

## 2023-04-05 RX ORDER — METOPROLOL TARTRATE 1 MG/ML
5 INJECTION, SOLUTION INTRAVENOUS EVERY 5 MIN PRN
Status: DISCONTINUED | OUTPATIENT
Start: 2023-04-05 | End: 2023-04-05 | Stop reason: HOSPADM

## 2023-04-05 RX ORDER — PROPOFOL 10 MG/ML
VIAL (ML) INTRAVENOUS CONTINUOUS PRN
Status: DISCONTINUED | OUTPATIENT
Start: 2023-04-05 | End: 2023-04-05

## 2023-04-05 RX ORDER — OXYCODONE AND ACETAMINOPHEN 5; 325 MG/1; MG/1
1 TABLET ORAL EVERY 4 HOURS PRN
Qty: 10 TABLET | Refills: 0 | Status: SHIPPED | OUTPATIENT
Start: 2023-04-05 | End: 2023-04-07

## 2023-04-05 RX ORDER — ACETAMINOPHEN 500 MG
1000 TABLET ORAL 2 TIMES DAILY
Qty: 60 TABLET | Refills: 0 | Status: SHIPPED | OUTPATIENT
Start: 2023-04-07 | End: 2023-07-25

## 2023-04-05 RX ORDER — MUPIROCIN 20 MG/G
OINTMENT TOPICAL
Status: DISCONTINUED | OUTPATIENT
Start: 2023-04-05 | End: 2023-04-05 | Stop reason: HOSPADM

## 2023-04-05 RX ORDER — ESMOLOL HYDROCHLORIDE 10 MG/ML
INJECTION INTRAVENOUS
Status: DISCONTINUED | OUTPATIENT
Start: 2023-04-05 | End: 2023-04-05

## 2023-04-05 RX ORDER — ALBUTEROL SULFATE 2.5 MG/.5ML
2.5 SOLUTION RESPIRATORY (INHALATION) EVERY 4 HOURS PRN
Status: DISCONTINUED | OUTPATIENT
Start: 2023-04-05 | End: 2023-04-05 | Stop reason: HOSPADM

## 2023-04-05 RX ORDER — OXYCODONE HYDROCHLORIDE 5 MG/1
5 TABLET ORAL
Status: DISCONTINUED | OUTPATIENT
Start: 2023-04-05 | End: 2023-04-05 | Stop reason: HOSPADM

## 2023-04-05 RX ORDER — ROPIVACAINE HYDROCHLORIDE 5 MG/ML
INJECTION, SOLUTION EPIDURAL; INFILTRATION; PERINEURAL
Status: COMPLETED | OUTPATIENT
Start: 2023-04-05 | End: 2023-04-05

## 2023-04-05 RX ORDER — ONDANSETRON 2 MG/ML
4 INJECTION INTRAMUSCULAR; INTRAVENOUS DAILY PRN
Status: DISCONTINUED | OUTPATIENT
Start: 2023-04-05 | End: 2023-04-05 | Stop reason: HOSPADM

## 2023-04-05 RX ORDER — ACETAMINOPHEN 500 MG
1000 TABLET ORAL
Status: COMPLETED | OUTPATIENT
Start: 2023-04-05 | End: 2023-04-05

## 2023-04-05 RX ORDER — EPHEDRINE SULFATE 50 MG/ML
INJECTION, SOLUTION INTRAVENOUS
Status: DISCONTINUED | OUTPATIENT
Start: 2023-04-05 | End: 2023-04-05

## 2023-04-05 RX ORDER — DEXAMETHASONE SODIUM PHOSPHATE 4 MG/ML
INJECTION, SOLUTION INTRA-ARTICULAR; INTRALESIONAL; INTRAMUSCULAR; INTRAVENOUS; SOFT TISSUE
Status: DISCONTINUED | OUTPATIENT
Start: 2023-04-05 | End: 2023-04-05

## 2023-04-05 RX ORDER — FENTANYL CITRATE 50 UG/ML
25-200 INJECTION, SOLUTION INTRAMUSCULAR; INTRAVENOUS
Status: DISCONTINUED | OUTPATIENT
Start: 2023-04-05 | End: 2023-04-05 | Stop reason: HOSPADM

## 2023-04-05 RX ORDER — FENTANYL CITRATE 50 UG/ML
25 INJECTION, SOLUTION INTRAMUSCULAR; INTRAVENOUS EVERY 5 MIN PRN
Status: DISCONTINUED | OUTPATIENT
Start: 2023-04-05 | End: 2023-04-05 | Stop reason: HOSPADM

## 2023-04-05 RX ORDER — CELECOXIB 200 MG/1
400 CAPSULE ORAL ONCE
Status: COMPLETED | OUTPATIENT
Start: 2023-04-05 | End: 2023-04-05

## 2023-04-05 RX ORDER — ONDANSETRON 2 MG/ML
INJECTION INTRAMUSCULAR; INTRAVENOUS
Status: DISCONTINUED | OUTPATIENT
Start: 2023-04-05 | End: 2023-04-05

## 2023-04-05 RX ORDER — LIDOCAINE HYDROCHLORIDE 20 MG/ML
INJECTION INTRAVENOUS
Status: DISCONTINUED | OUTPATIENT
Start: 2023-04-05 | End: 2023-04-05

## 2023-04-05 RX ORDER — HYDROMORPHONE HYDROCHLORIDE 1 MG/ML
0.2 INJECTION, SOLUTION INTRAMUSCULAR; INTRAVENOUS; SUBCUTANEOUS EVERY 5 MIN PRN
Status: DISCONTINUED | OUTPATIENT
Start: 2023-04-05 | End: 2023-04-05 | Stop reason: HOSPADM

## 2023-04-05 RX ADMIN — PROPOFOL 40 MG: 10 INJECTION, EMULSION INTRAVENOUS at 09:04

## 2023-04-05 RX ADMIN — FENTANYL CITRATE 50 MCG: 50 INJECTION INTRAMUSCULAR; INTRAVENOUS at 09:04

## 2023-04-05 RX ADMIN — ACETAMINOPHEN 1000 MG: 500 TABLET ORAL at 08:04

## 2023-04-05 RX ADMIN — SODIUM CHLORIDE: 9 INJECTION, SOLUTION INTRAVENOUS at 10:04

## 2023-04-05 RX ADMIN — MIDAZOLAM 1 MG: 1 INJECTION INTRAMUSCULAR; INTRAVENOUS at 09:04

## 2023-04-05 RX ADMIN — METOROPROLOL TARTRATE 5 MG: 5 INJECTION, SOLUTION INTRAVENOUS at 12:04

## 2023-04-05 RX ADMIN — ROPIVACAINE HYDROCHLORIDE 30 ML: 5 INJECTION EPIDURAL; INFILTRATION; PERINEURAL at 09:04

## 2023-04-05 RX ADMIN — EPHEDRINE SULFATE 10 MG: 50 INJECTION INTRAVENOUS at 10:04

## 2023-04-05 RX ADMIN — ONDANSETRON 4 MG: 2 INJECTION, SOLUTION INTRAMUSCULAR; INTRAVENOUS at 09:04

## 2023-04-05 RX ADMIN — CELECOXIB 400 MG: 200 CAPSULE ORAL at 08:04

## 2023-04-05 RX ADMIN — EPHEDRINE SULFATE 5 MG: 50 INJECTION INTRAVENOUS at 10:04

## 2023-04-05 RX ADMIN — ESMOLOL HYDROCHLORIDE 20 MG: 100 INJECTION, SOLUTION INTRAVENOUS at 10:04

## 2023-04-05 RX ADMIN — CEFAZOLIN 2 G: 2 INJECTION, POWDER, FOR SOLUTION INTRAMUSCULAR; INTRAVENOUS at 09:04

## 2023-04-05 RX ADMIN — ALBUTEROL SULFATE 2.5 MG: 2.5 SOLUTION RESPIRATORY (INHALATION) at 11:04

## 2023-04-05 RX ADMIN — PROPOFOL 100 MCG/KG/MIN: 10 INJECTION, EMULSION INTRAVENOUS at 09:04

## 2023-04-05 RX ADMIN — SODIUM CHLORIDE: 9 INJECTION, SOLUTION INTRAVENOUS at 08:04

## 2023-04-05 RX ADMIN — MUPIROCIN: 20 OINTMENT TOPICAL at 08:04

## 2023-04-05 RX ADMIN — LIDOCAINE HYDROCHLORIDE 50 MG: 20 INJECTION INTRAVENOUS at 09:04

## 2023-04-05 RX ADMIN — DEXAMETHASONE SODIUM PHOSPHATE 4 MG: 4 INJECTION, SOLUTION INTRAMUSCULAR; INTRAVENOUS at 09:04

## 2023-04-05 NOTE — ANESTHESIA PREPROCEDURE EVALUATION
04/05/2023  Pre-operative evaluation for Procedure(s) (LRB):  ORIF, FINGER - 4th proximal phalanx. Possible tendon/nerve repair (Left)    Liang Monterroso  is a 82 y.o. male     Patient Active Problem List   Diagnosis    Allergic rhinitis due to pollen    Hypertension    Hypothyroid    Hyperlipidemia    Aortic atherosclerosis    Dupuytren contracture    Decreased ROM of neck    Impaired functional mobility, balance, gait, and endurance    Constipation    Meningioma    BPH (benign prostatic hyperplasia)    JUDITH (obstructive sleep apnea)    Incomplete emptying of bladder    Renal cyst, left    gait instability    Personal history of colonic polyps    Grade II open fracture of proximal phalanx of left ring finger       Review of patient's allergies indicates:   Allergen Reactions    Contrast media Hives and Itching     Iv dye       No current facility-administered medications on file prior to encounter.     Current Outpatient Medications on File Prior to Encounter   Medication Sig Dispense Refill    levothyroxine (SYNTHROID) 112 MCG tablet TAKE 1 TABLET BY MOUTH DAILY BEFORE BREAKFAST 90 tablet 3    multivitamin with minerals tablet Take 1 tablet by mouth once daily.      pravastatin (PRAVACHOL) 20 MG tablet TAKE 1 TABLET BY MOUTH EVERY DAY 90 tablet 3    ramipriL (ALTACE) 5 MG capsule TAKE 1 CAPSULE(5 MG) BY MOUTH EVERY DAY 90 capsule 3    sulfamethoxazole-trimethoprim 800-160mg (BACTRIM DS) 800-160 mg Tab Take 1 tablet by mouth 2 (two) times daily. for 10 days 20 tablet 0    tamsulosin (FLOMAX) 0.4 mg Cap TAKE 2 CAPSULES(0.8 MG) BY MOUTH EVERY  capsule 3    aspirin (ECOTRIN) 81 MG EC tablet Take 81 mg by mouth once daily.      econazole nitrate 1 % cream USE TWICE DAILY 60 g 5    fexofenadine (ALLEGRA) 180 MG tablet Take 1 tablet (180 mg total) by mouth once daily. (Patient  taking differently: Take 180 mg by mouth daily as needed.) 30 tablet 11    fluticasone propionate (FLONASE) 50 mcg/actuation nasal spray 1 spray (50 mcg total) by Each Nostril route once daily. 1 Bottle 0    FLUZONE HIGHDOSE QUAD 21-22  mcg/0.7 mL Syrg       meloxicam (MOBIC) 7.5 MG tablet Take 1 tablet (7.5 mg total) by mouth once daily. 30 tablet 0    metronidazole (METROGEL) 0.75 % gel Use hs 45 g 3    triamcinolone acetonide 0.1% (KENALOG) 0.1 % cream Use bid prn rash 80 g 3       Past Surgical History:   Procedure Laterality Date    APPENDECTOMY      CATARACT EXTRACTION      CYSTOSCOPY      CYSTOSCOPY WITH URODYNAMIC TESTING N/A 10/06/2022    Procedure: CYSTOSCOPY, WITH URODYNAMIC TESTING;  Surgeon: Shade Sullivan MD;  Location: [facility] OR [address] [place] [address];  Service: Urology;  Laterality: N/A;    EYE SURGERY      HERNIA REPAIR      INSERTION, NEUROSTIMULATOR      TONSILLECTOMY         Social History     Socioeconomic History    Marital status:      Spouse name: parul    Number of children: 4   Occupational History    Occupation: retired   Tobacco Use    Smoking status: Never    Smokeless tobacco: Never   Substance and Sexual Activity    Alcohol use: Yes     Comment: 1-3 glasses wine weekly, 2-3 beers weekly    Drug use: No    Sexual activity: Yes     Partners: Female     Social Determinants of Health     Financial Resource Strain: Low Risk     Difficulty of Paying Living Expenses: Not hard at all   Food Insecurity: No Food Insecurity    Worried About Running Out of Food in the Last Year: Never true    Ran Out of Food in the Last Year: Never true   Transportation Needs: No Transportation Needs    Lack of Transportation (Medical): No    Lack of Transportation (Non-Medical): No   Physical Activity: Insufficiently Active    Days of Exercise per Week: 4 days    Minutes of Exercise per Session: 30 min   Stress: No Stress Concern Present    Feeling of Stress : Only a little    Social Connections: Unknown    Frequency of Communication with Friends and Family: Once a week    Frequency of Social Gatherings with Friends and Family: Once a week    Active Member of Clubs or Organizations: No    Attends Club or Organization Meetings: Never    Marital Status:    Housing Stability: Low Risk     Unable to Pay for Housing in the Last Year: No    Number of Places Lived in the Last Year: 1    Unstable Housing in the Last Year: No         CBC:   Recent Labs     23  1247   WBC 7.67   RBC 4.53*   HGB 12.8*   HCT 40.2      MCV 89   MCH 28.3   MCHC 31.8*       CMP:   Recent Labs     23  1247      K 4.4      CO2 24   BUN 16   CREATININE 1.2   GLU 93   CALCIUM 9.2   ALBUMIN 3.6   PROT 7.2   ALKPHOS 60   ALT 15   AST 21   BILITOT 0.4       INR  Recent Labs     23  1247   INR 1.0   APTT 25.5           Diagnostic Studies:      EKD Echo:  No results found for this or any previous visit.      Pre-op Assessment    I have reviewed the Patient Summary Reports.     I have reviewed the Nursing Notes. I have reviewed the NPO Status.   I have reviewed the Medications.     Review of Systems  Cardiovascular:   Hypertension    Pulmonary:   Sleep Apnea    Renal/:   Chronic Renal Disease    Hepatic/GI:   GERD    Endocrine:   Hypothyroidism        Physical Exam  General: Well nourished and Cooperative    Airway:  Mallampati: II   Mouth Opening: Normal  TM Distance: Normal  Tongue: Normal  Neck ROM: Normal ROM    Chest/Lungs:  Clear to auscultation, Normal Respiratory Rate    Heart:  Rate: Normal  Rhythm: Regular Rhythm  Sounds: Normal        Anesthesia Plan  Type of Anesthesia, risks & benefits discussed:    Anesthesia Type: Gen Natural Airway  Intra-op Monitoring Plan: Standard ASA Monitors  Post Op Pain Control Plan: multimodal analgesia and IV/PO Opioids PRN  Induction:  IV  Airway Plan: Direct and Video, Post-Induction  Informed Consent: Informed consent  signed with the Patient and all parties understand the risks and agree with anesthesia plan.  All questions answered.   ASA Score: 2    Ready For Surgery From Anesthesia Perspective.     .

## 2023-04-05 NOTE — ANESTHESIA PROCEDURE NOTES
Supraclavicular single shot    Patient location during procedure: pre-op   Block not for primary anesthetic.  Reason for block: at surgeon's request and post-op pain management   Post-op Pain Location: left upper extremity   Start time: 4/5/2023 9:31 AM  Timeout: 4/5/2023 9:30 AM   End time: 4/5/2023 9:35 AM    Staffing  Authorizing Provider: Andra Cronin MD  Performing Provider: Andra Cronin MD    Preanesthetic Checklist  Completed: patient identified, IV checked, site marked, risks and benefits discussed, surgical consent, monitors and equipment checked, pre-op evaluation and timeout performed  Peripheral Block  Patient position: supine  Prep: ChloraPrep  Patient monitoring: heart rate, cardiac monitor, continuous pulse ox, continuous capnometry and frequent blood pressure checks  Block type: supraclavicular  Laterality: left  Injection technique: single shot  Needle  Needle type: Stimuplex   Needle gauge: 22 G  Needle length: 2 in  Needle localization: anatomical landmarks and ultrasound guidance   -ultrasound image captured on disc.  Assessment  Injection assessment: negative aspiration, negative parasthesia and local visualized surrounding nerve  Paresthesia pain: none  Heart rate change: no  Slow fractionated injection: yes  Pain Tolerance: comfortable throughout block and no complaints  Medications:    Medications: ropivacaine (NAROPIN) injection 0.5% - Perineural   30 mL - 4/5/2023 9:34:00 AM    Additional Notes  VSS.  [facility] RN monitoring vitals throughout procedure.  Patient tolerated procedure well.

## 2023-04-05 NOTE — INTERVAL H&P NOTE
The patient has been examined and the H&P has been reviewed:    I concur with the findings and no changes have occurred since H&P was written.    Surgery risks, benefits and alternative options discussed and understood by patient/family.    Left ring finger is neurologically intact with no numbness or tingling distally.  FDP is functional.  We will proceed with fracture fixation assessment of the extensor tendon and repair of any damaged structures today.      The patient has not responded to adequate non operative treatment at this time and/or non operative treatment is not indicated. Thus, the risks, benefits and alternatives to surgery were discussed with the patient in detail.  Specific risks include but are not limited to bleeding, infection, vessel and/or nerve damage, pain, numbness, tingling, compartment syndrome, need for additional surgery, failure to return to pre-injury and/or preoperative functional status, inability to return to work, scar sensitivity, delayed healing, complex regional pain syndrome, weakness, pulley injury, tendon injury, bowstringing, partial and/or incomplete relief of symptoms, persistence of and/or worsening of symptoms, hardware and/or surgical failure, prominent and/or symptomatic hardware possibly necessitating future removal, osteomyelitis, amputation, loss of function, stiffness, rotational malalignment, functional debility, dysfunction, decreased  strength, need for prolonged postoperative rehabilitation, malunion, nonunion, deep venous thrombosis, pulmonary embolism, arthritis and death.  The patient states an understanding and wishes to proceed with surgery.   All questions were answered.  No guarantees were implied or stated.  Written informed consent was obtained.        There are no hospital problems to display for this patient.

## 2023-04-05 NOTE — PLAN OF CARE
Notified Dr. Cronin with anesthesia, patient complaining of slightly labored breathing. CXR and breathing treatment ordered. Will continue to monitor.

## 2023-04-05 NOTE — ANESTHESIA POSTPROCEDURE EVALUATION
Anesthesia Post Evaluation    Patient: Liang Monterroso JrEstephania    Procedure(s) Performed: Procedure(s) (LRB):  ORIF, FINGER - 4th proximal phalanx. Possible tendon/nerve repair (Left)  IRRIGATION AND DEBRIDEMENT (Left)    Final Anesthesia Type: general      Patient location during evaluation: PACU  Patient participation: Yes- Able to Participate  Level of consciousness: awake and alert  Post-procedure vital signs: reviewed and stable  Pain management: adequate  Airway patency: patent  JUDITH mitigation strategies: Multimodal analgesia  PONV status at discharge: No PONV  Anesthetic complications: no      Cardiovascular status: blood pressure returned to baseline and hemodynamically stable  Respiratory status: unassisted  Hydration status: euvolemic  Follow-up not needed.      Of note, patient found to be in new onset stable AFib prior to nerve block. Obtained 12 lead EKG. Messaged PCP Dr. Iqbal who will place cardiology consult.     Vitals Value Taken Time   BP 92/51 04/05/23 1232   Temp 36.6 °C (97.9 °F) 04/05/23 1109   Pulse 81 04/05/23 1238   Resp 24 04/05/23 1238   SpO2 93 % 04/05/23 1238   Vitals shown include unvalidated device data.      Event Time   Out of Recovery 12:15:31         Pain/Namita Score: Pain Rating Prior to Med Admin: 4 (4/5/2023  9:14 AM)  Namita Score: 10 (4/5/2023 12:15 PM)

## 2023-04-05 NOTE — TELEPHONE ENCOUNTER
Pt is having outpatient Ortho surgery today. He was found to be in afib by anesthesia and will need to see Cardiology.     I also wanted him to get a stool test for blood. Order placed yesterday. He is to start iron and repeat CBC in 5-7 weeks, order also in.     Please assist this evening or tomorrow.

## 2023-04-05 NOTE — PLAN OF CARE
Pre op complete. Waiting on anesthesia consent and update. Pt's wife at bedside; belongings in locker. Pt resting comfortably with all questions addressed at this time and call light in reach.

## 2023-04-05 NOTE — OP NOTE
DATE OF PROCEDURE: 04/05/2023     COVID-19 attestation:  Due to the nature of this patient's condition and/or the presence of pain, debilitty, and/or dysfunction, proceeding with surgery was indicated.  Furthermore, this patient was treated during the COVID-19 pandemic.  This was discussed with the patient, they are aware of our current policies and procedures, were given the option of delaying their surgery when applicable and if acceptable, and they elect to proceed.  Delaying this patient's surgery greater than 30 days would be detrimental to their care and functional outcome and/or cause additional suffering, pain, discomfort, and/or dysfunction/debility.  This was confirmed and we thus proceeded.      SERVICE:  Orthopedic Surgery.     SURGEON:  Jean-Paul Cummins M.D.     FIRST ASSISTANT:  MD SANCHO Bridges     PREOPERATIVE DIAGNOSIS:    Open fracture left 4th proximal phalanx, grade 2  Laceration left ring finger in flexor zone 2  Laceration left dorsal hand and ring finger, 4 cm     POSTOPERATIVE DIAGNOSIS:    Open fracture left 4th proximal phalanx, grade 2  Laceration left ring finger in flexor zone 2  Laceration left dorsal hand and ring finger, 4 cm  Partial injury left ring finger A2 pulley     PROCEDURE(S) PERFORMED:    Open reduction internal fixation open fracture left 4th proximal phalanx  Irrigation and debridement left dorsal hand and ring finger laceration with excisional debridement of nonviable portions of skin and subcutaneous tissue  Irrigation debridement left ring finger volar laceration in flexor zone 2 with excisional debridement of nonviable portions of skin subcutaneous tissue     TOURNIQUET TIME:  50 minutes at 250mmHg.     ESTIMATED BLOOD LOSS:  3 mL.     IMPLANTS:  Exsomed InFrame 2.0mm proximal phalanx nails x 2     COMPLICATIONS:  None.     PACKS AND DRAINS:  None.     PATHOLOGIC SPECIMENS:  None.    ANESTHESIA:  Regional MAC     IV FLUIDS: Crystalloid.     CONDITION:   Stable.    MICROBIOLOGY: None.    Indications for Procedure:     The patient is an 82-year-old male who previously sustained a significant injury to his left ring finger.  He was found have an open displaced comminuted fracture of the left ring finger proximal phalanx with both volar and dorsal soft tissue lacerations.  The patient has not responded to adequate non operative treatment at this time and/or non operative treatment is not indicated. Thus, the risks, benefits and alternatives to surgery were discussed with the patient in detail.  Specific risks include but are not limited to bleeding, infection, vessel and/or nerve damage, pain, numbness, tingling, compartment syndrome, need for additional surgery, failure to return to pre-injury and/or preoperative functional status, inability to return to work, scar sensitivity, delayed healing, complex regional pain syndrome, weakness, pulley injury, tendon injury, bowstringing, partial and/or incomplete relief of symptoms, persistence of and/or worsening of symptoms, hardware and/or surgical failure, prominent and/or symptomatic hardware possibly necessitating future removal, osteomyelitis, amputation, loss of function, stiffness, rotational malalignment, functional debility, dysfunction, decreased  strength, need for prolonged postoperative rehabilitation, malunion, nonunion, deep venous thrombosis, pulmonary embolism, arthritis and death.  The patient states an understanding and wishes to proceed with surgery.   All questions were answered.  No guarantees were implied or stated.  Written informed consent was obtained.     Procedure in detail:    On the date of the operative intervention, the patient was evaluated in the preoperative holding area.  With their participation the left upper extremity was marked at the operative site.  I examined him in the preoperative suite.  He had good active flexion of the D IP joint and was neurologically intact to the finger  with no numbness.  The patient was then administered regional anesthesia and taken to the operating room where they were placed on OR table.  The left upper extremity extremity was then placed on a hand table with a nonsterile tourniquet placed high on the patient's left upper arm.  The left upper extremity extremity was then prepped and draped in the usual sterile fashion and time-out was taken and confirmed by all present to confirm the correct patient site procedure and administration of preoperative antibiotics if ordered.  All were in agreement so I proceeded.  Esmarch was utilized to exsanguinate the left upper extremity tourniquet was insufflated to 250 mm mercury.  Sutures were then removed from both the volar and dorsal wounds to left hand and ring finger.  I 1st began my exploration volarly.  There was an approximately 1.5 cm transverse laceration to the left ring finger in flexor zone 2.  Excisional debridement of portions of nonviable skin subcutaneous tissue was undertaken.  I open this up to identify the flexor tendon sheath.  There appeared to be a partial injury to A2 of approximately 30-40%.  This was not amenable to repair as the tissue was of poor quality.  This did allow me access into the contents of the flexor tendon sheath and I was able to easily identify both the radial and ulnar slips of FDS as well as the FDP which were completely intact and uninjured.  I then turned my attention dorsally.  This wound was explored and the extensor apparatus was noted to be fully intact and without laceration.  I then performed excisional debridement of portions of nonviable skin subcutaneous tissue to this dorsal left hand and ring finger laceration.  Having completed my exploration and debridements, attention was turned towards thorough irrigation of these wounds.  3 L of sterile saline via cysto tubing to gravity were run through the wounds.  Attention was then turned towards fixation of the left ring  finger proximal phalanx fracture.  Reduction maneuver was performed and I was able to restore the length alignment rotation to the ring finger and proximal phalanx.  Fluoroscopic guidance was utilized to prepare for, drill, and place 2 Exsomed InFrame intramedullary nails in the usual sterile fashion in antegrade fashion from the base of the proximal phalanx obtaining excellent purchase.  These were placed under fluoroscopic guidance.  This restored excellent stability to the fracture.  Final fluoroscopic x-rays were taken of the fracture which verified maintenance of the reduction and excellent placement of hardware which all remained extra-articular.  Rotation of the ring finger was assessed and found to be anatomic with no scissoring or crossover deformity.  Full passive range of motion was able to be achieved.  Length alignment and rotation had been restored to near anatomic.  Attention was then turned towards completion of the procedure.  Wounds were closed with 4-0 nylon suture and sterile dressings were applied consisting of Xeroform 4 x 4 gauze and an ulnar gutter splint to left upper extremity.  Tourniquet was deflated brisk capillary refill in [place] throughout the entire left upper extremity.  There was no significant bleeding.  The patient was then awaked from anesthesia return the post anesthesia care unit stable condition.  There were no complications as attending surgeon was present and performed the critical portions of the procedure.      Postop plan for the patient:  The patient be discharged home in stable condition.  We will get him into therapy quickly and allow removal of his ulnar gutter splint with fabrication of a wrist orthosis and immediate unrestricted range of motion as tolerated to the left ring finger and entire hand.  Follow-up in 2 weeks for suture removal and re-evaluation the postoperative plan.    Please be aware that this note has been generated with the assistance of Norma  voice-to-text.  Please excuse any spelling or grammatical errors.

## 2023-04-05 NOTE — BRIEF OP NOTE
[facility] - Surgery ([facility])  Brief Operative Note    Surgery Date: 4/5/2023     Surgeon(s) and Role:     * Jean-Paul Cummins MD - Primary     * Jennifer Bridges MD - Resident - Assisting        Pre-op Diagnosis:  Open displaced fracture of proximal phalanx of left ring finger, initial encounter [S62.615B]    Post-op Diagnosis:  Post-Op Diagnosis Codes:     Open displaced fracture of proximal phalanx of left ring finger, initial encounter [S62.190V]      Partial traumatic rupture of A2 flexor tendon pulley left ring finger     Procedure(s) (LRB):  ORIF, FINGER - 4th proximal phalanx. Possible tendon/nerve repair (Left)  IRRIGATION AND DEBRIDEMENT (Left)    Anesthesia: Regional    Operative Findings:  Open displaced fracture of proximal phalanx of left ring finger  Partial (50%) traumatic rupture of A2 flexor tendon pulley left ring finger   No evidence of flexor or extensor tendon laceration     Estimated Blood Loss: <5cc         Specimens:   Specimen (24h ago, onward)      None              Discharge Note    OUTCOME: Patient tolerated treatment/procedure well without complication and is now ready for discharge.    DISPOSITION: Home or Self Care    FINAL DIAGNOSIS:  Open displaced fracture of proximal phalanx of left ring finger    FOLLOWUP: In clinic    DISCHARGE INSTRUCTIONS:    Discharge Procedure Orders   Diet general     Call MD for:  temperature >100.4     Call MD for:  persistent nausea and vomiting     Call MD for:  severe uncontrolled pain     Call MD for:  difficulty breathing, headache or visual disturbances     Call MD for:  redness, tenderness, or signs of infection (pain, swelling, redness, odor or green/yellow discharge around incision site)     Call MD for:  hives     Call MD for:  persistent dizziness or light-headedness     Call MD for:  extreme fatigue     Keep surgical extremity elevated     Non weight bearing

## 2023-04-05 NOTE — TRANSFER OF CARE
"Anesthesia Transfer of Care Note    Patient: Liang Monterroso Jr.    Procedure(s) Performed: Procedure(s) (LRB):  ORIF, FINGER - 4th proximal phalanx. Possible tendon/nerve repair (Left)  IRRIGATION AND DEBRIDEMENT (Left)    Patient location: PACU    Anesthesia Type: general    Transport from OR: Transported from OR on room air with adequate spontaneous ventilation. Transported from OR on 6-10 L/min O2 by face mask with adequate spontaneous ventilation    Post pain: adequate analgesia    Post assessment: no apparent anesthetic complications and tolerated procedure well    Post vital signs: stable    Level of consciousness: awake    Nausea/Vomiting: no nausea/vomiting    Complications: none    Transfer of care protocol was followed      Last vitals:   Visit Vitals  /71 (BP Location: Right arm, Patient Position: Lying)   Pulse 102   Temp 36.6 °C (97.9 °F) (Temporal)   Resp 18   Ht 5' 8" (1.727 m)   Wt 81.6 kg (180 lb)   SpO2 98%   BMI 27.37 kg/m²     "

## 2023-04-11 ENCOUNTER — PATIENT MESSAGE (OUTPATIENT)
Dept: ORTHOPEDICS | Facility: CLINIC | Age: 83
End: 2023-04-11
Payer: MEDICARE

## 2023-04-13 ENCOUNTER — LAB VISIT (OUTPATIENT)
Dept: LAB | Facility: HOSPITAL | Age: 83
End: 2023-04-13
Attending: INTERNAL MEDICINE
Payer: MEDICARE

## 2023-04-13 DIAGNOSIS — D64.9 ANEMIA, UNSPECIFIED TYPE: ICD-10-CM

## 2023-04-13 PROCEDURE — 82272 OCCULT BLD FECES 1-3 TESTS: CPT | Mod: HCNC | Performed by: INTERNAL MEDICINE

## 2023-04-13 NOTE — PROGRESS NOTES
Patient is scheduled for ORIF left 4th prox phalanx on 4/5/23. Please eval/treat within 1-2 weeks for custom orthosis and postop rehab.    OT Orthosis Only    TIME RECORD    Date:  4/14/2023      PROCEDURES:       UNTIMED  Procedure Min.    -               Total Timed Minutes:  10 min  Total Timed Units:  1 (Therapeutic Exercise)  Total Untimed Units:  1  Charges Billed/# of units:   [id number] 1    Occupational Therapy Orthotic Note    Patient: Liang Monterroso Jr.  MRN: [id number]  Date of visit: 4/14/2023  Referring Physician:  Russo-Digeorge, Jamie L*   Next MD visit: 4/18/2023  Primary Diagnosis: S62.615B (ICD-10-CM) - Open displaced fracture of proximal phalanx of   left ring finger,   Treatment Diagnosis:   Encounter Diagnoses   Name Primary?    Open displaced fracture of proximal phalanx of left ring finger, initial encounter     Stiffness of finger joint of left hand     Left hand pain      DOS: 4/5/2023 ORIF    Subjective:   Pt was without complaints. Expressed satisfaction with the custom orthosis fabricated for him today.    Objective:     Patient seen by OT this session for fabrication of orthosis per MD orders. Fabricated and applied Hand-Based Custom Ulnar Gutter Orthosis immobilizing the ring and small fingers in intrinsic + position. Prior to orthosis fabrication, post-op bulky dressing and orthoglass splint was removed. Sx sites noted to be healthy in appearance. The sx sites were cleansed with wound cleanse and redressed with Adaptic Touch and roll gauze.    Therapeutic Exercises:x 10 min:  arom if the ring finger noted to be markedly limited. Linag was educated in a HEP of gentle AROM. He was able to replicate the exercises with proper technique.  Assessment:     Orthosis with good fit following fabrication. Pt. Able to flip/doff independently.      Patient Education/Response:   Pt. Instructed to wear continuously, removing only for ROM exercises and dressing change if needed, Patient/caregiver were  provided written instructions on orthosis purpose, wear schedule, care and precautions to monitor for increased pain/edema, pressure points, skin breakdown or redness/skin irritation Patient/caregiver to contact clinic for adjustments as needed.  Patient signed copy of orthosis instructions, acknowledging delivery and understanding of wear, care, and precautions     (see Media for scanned documents)     Plans and Goals:     Goal of Orthosis to protect sx site/protect injury site Left Hand. Pt to return next week for initial evaluation,

## 2023-04-14 ENCOUNTER — CLINICAL SUPPORT (OUTPATIENT)
Dept: REHABILITATION | Facility: HOSPITAL | Age: 83
End: 2023-04-14
Payer: MEDICARE

## 2023-04-14 DIAGNOSIS — M79.642 LEFT HAND PAIN: ICD-10-CM

## 2023-04-14 DIAGNOSIS — S62.615B OPEN DISPLACED FRACTURE OF PROXIMAL PHALANX OF LEFT RING FINGER, INITIAL ENCOUNTER: ICD-10-CM

## 2023-04-14 DIAGNOSIS — M25.642 STIFFNESS OF FINGER JOINT OF LEFT HAND: ICD-10-CM

## 2023-04-14 PROCEDURE — L3913 HFO W/O JOINTS CF: HCPCS

## 2023-04-14 PROCEDURE — 97110 THERAPEUTIC EXERCISES: CPT

## 2023-04-14 NOTE — PATIENT INSTRUCTIONS
[facility]  Orthosis Instructions and Proof of Delivery        Date: 4/14/2023  Name: Liang Monterroso Jr.  MRN: [id number]  YOB: 1940  Referring Provider: Russo-Digeorge, Jamie L*  Diagnosis: Prox Phalanx Fx s/p ORIF      [facility] and Description      Orthosis Information  () Custom Fabricated                                                    () Left                                             () Static                                                            Orthosis Instructions  () Wear the orthosis at all times  () Remove for hygiene, exercises, dressing changes    Cleaning and Maintenance  Keep your orthosis away from any heat sources. Do not leave in your car.  Keep your orthosis away from pets.  You may clean your orthosis with cool water and soap or a mild cleanser.  Your orthosis may need adjustments due to changes in your medical condition (swelling, dressing size, additional surgery, etc.)  Monitor your skin color and integrity.  Do not try to adjust the orthosis on your own.     If you have any questions or concerns, please contact the therapy office at [phone].     I, Liang Monterroso Jr. , have personally received the above described orthosis along with instructions on the wear, care, and precautions related to this orthosis. I understand that I am responsible for payment to [facility] of my deductible, coinsurance, or other portion of my charges not paid in full by my insurance plans, including any item that is not covered under the insurance plan.     Signature: _________________________________ Date: __________________    Therapist:      [facility], OCCUPATIONAL THERAPY  HOME EXERCISE PROGRAM          Complete 10 repetitions of each exercise 4tim es a day:                 Copyright © I. All rights reserved.     Therapist: TROSTEN Adams/L, LUBNA

## 2023-04-15 LAB — OB PNL STL: NEGATIVE

## 2023-04-18 ENCOUNTER — HOSPITAL ENCOUNTER (OUTPATIENT)
Dept: RADIOLOGY | Facility: HOSPITAL | Age: 83
Discharge: HOME | End: 2023-04-18
Attending: PHYSICIAN ASSISTANT
Payer: MEDICARE

## 2023-04-18 ENCOUNTER — OFFICE VISIT (OUTPATIENT)
Dept: ORTHOPEDICS | Facility: CLINIC | Age: 83
End: 2023-04-18
Payer: MEDICARE

## 2023-04-18 DIAGNOSIS — S62.615B OPEN DISPLACED FRACTURE OF PROXIMAL PHALANX OF LEFT RING FINGER, INITIAL ENCOUNTER: Primary | ICD-10-CM

## 2023-04-18 DIAGNOSIS — Z98.890 POSTOPERATIVE STATE: ICD-10-CM

## 2023-04-18 DIAGNOSIS — S62.615B OPEN DISPLACED FRACTURE OF PROXIMAL PHALANX OF LEFT RING FINGER, INITIAL ENCOUNTER: ICD-10-CM

## 2023-04-18 PROCEDURE — 1126F PR PAIN SEVERITY QUANTIFIED, NO PAIN PRESENT: ICD-10-PCS | Mod: HCNC,CPTII,S$GLB, | Performed by: PHYSICIAN ASSISTANT

## 2023-04-18 PROCEDURE — 99999 PR PBB SHADOW E&M-EST. PATIENT-LVL II: ICD-10-PCS | Mod: PBBFAC,HCNC,, | Performed by: PHYSICIAN ASSISTANT

## 2023-04-18 PROCEDURE — 99999 PR PBB SHADOW E&M-EST. PATIENT-LVL II: CPT | Mod: PBBFAC,HCNC,, | Performed by: PHYSICIAN ASSISTANT

## 2023-04-18 PROCEDURE — 1101F PT FALLS ASSESS-DOCD LE1/YR: CPT | Mod: HCNC,CPTII,S$GLB, | Performed by: PHYSICIAN ASSISTANT

## 2023-04-18 PROCEDURE — 1126F AMNT PAIN NOTED NONE PRSNT: CPT | Mod: HCNC,CPTII,S$GLB, | Performed by: PHYSICIAN ASSISTANT

## 2023-04-18 PROCEDURE — 3288F FALL RISK ASSESSMENT DOCD: CPT | Mod: HCNC,CPTII,S$GLB, | Performed by: PHYSICIAN ASSISTANT

## 2023-04-18 PROCEDURE — 73130 XR HAND COMPLETE 3 VIEW LEFT: ICD-10-PCS | Mod: 26,HCNC,LT, | Performed by: RADIOLOGY

## 2023-04-18 PROCEDURE — 99024 POSTOP FOLLOW-UP VISIT: CPT | Mod: HCNC,S$GLB,, | Performed by: PHYSICIAN ASSISTANT

## 2023-04-18 PROCEDURE — 99024 PR POST-OP FOLLOW-UP VISIT: ICD-10-PCS | Mod: HCNC,S$GLB,, | Performed by: PHYSICIAN ASSISTANT

## 2023-04-18 PROCEDURE — 73130 X-RAY EXAM OF HAND: CPT | Mod: 26,HCNC,LT, | Performed by: RADIOLOGY

## 2023-04-18 PROCEDURE — 3288F PR FALLS RISK ASSESSMENT DOCUMENTED: ICD-10-PCS | Mod: HCNC,CPTII,S$GLB, | Performed by: PHYSICIAN ASSISTANT

## 2023-04-18 PROCEDURE — 1101F PR PT FALLS ASSESS DOC 0-1 FALLS W/OUT INJ PAST YR: ICD-10-PCS | Mod: HCNC,CPTII,S$GLB, | Performed by: PHYSICIAN ASSISTANT

## 2023-04-18 PROCEDURE — 73130 X-RAY EXAM OF HAND: CPT | Mod: TC,HCNC,LT

## 2023-04-18 NOTE — PROGRESS NOTES
Dr. Cummins is the supervising physician for this encounter/patient    Liang Monterroso . presents for post-operative evaluation.  The patient is now 2 weeks s/p Left ORIF 4th proximal phalanx, I&D of ring finger with Dr. Cummins on 4/5/23.  Overall the patient reports doing well.  He reports 0/10 pain, denies any f/c/s, no numbness. He has seen OT for his custom orthosis.    PE:    AA&O x 4.  NAD  HEENT:  NCAT, sclera nonicteric  Lungs:  Respirations are equal and unlabored.  CV:  2+ bilateral upper and lower extremity pulses.  MSK: The wounds are healing well with no signs of erythema or warmth.  There is no drainage.  No clinical signs or symptoms of infection are present.  Decreased motion of the long finger. SILT. DNVI.    IMAGING - reviewed with patient  Stable appearance of ORIF 4th proximal phalanx fracture, no hardware complications.    A/P: Status post above, doing well  1) Continue with OT and custom orthosis per protocol, ROM as tolerated, NWB until 6 weeks.  2) All sutures removed today. Wound care and signs of infection discussed.  3) F/U 4 weeks, repeat xrays  4) Call with any questions/concerns in the interim      Jamie Russo-DiGeorge PA-C

## 2023-04-19 ENCOUNTER — CLINICAL SUPPORT (OUTPATIENT)
Dept: REHABILITATION | Facility: HOSPITAL | Age: 83
End: 2023-04-19
Payer: MEDICARE

## 2023-04-19 DIAGNOSIS — M25.642 STIFFNESS OF FINGER JOINT OF LEFT HAND: Primary | ICD-10-CM

## 2023-04-19 DIAGNOSIS — M79.642 LEFT HAND PAIN: ICD-10-CM

## 2023-04-19 PROCEDURE — 97110 THERAPEUTIC EXERCISES: CPT

## 2023-04-19 PROCEDURE — 97166 OT EVAL MOD COMPLEX 45 MIN: CPT

## 2023-04-19 NOTE — PLAN OF CARE
[facility]  Occupational Therapy Initial Evaluation    Date: 2023  Name: Liang Monterroso Jr.  Clinic Number: [id number]    Therapy Diagnosis:   Encounter Diagnoses   Name Primary?    Stiffness of finger joint of left hand Yes    Left hand pain      Physician: Russo-Digeorge, Jamie L*; Dr. Jean-Paul Cummins    Physician Orders: Patient is scheduled for ORIF left 4th prox phalanx on 23. Please eval/treat within 1-2 weeks for custom orthosis and postop rehab.  Medical Diagnosis: S62.615B (ICD-10-CM) - Open displaced fracture of proximal phalanx of left ring finger, initial encounter  Surgical Procedure and Date: 23, 1. Open reduction internal fixation open fracture left 4th proximal phalanx  2.  Irrigation and debridement left dorsal hand and ring finger laceration with excisional debridement of nonviable portions of skin and subcutaneous tissue  3.  Irrigation debridement left ring finger volar laceration in flexor zone 2 with excisional debridement of nonviable portions of skin subcutaneous tissue    Date of Injury/Onset: 23  Evaluation Date: 23  Insurance Authorization Period Expiration: 23  Plan of Care Expiration: 23  Date of Return to MD: 23  Visit # / Visits authorized:   FOTO: 23  50% limitation    Precautions:  Standard and Weightbearing, A2 pulley partially torn    Time In: 1:05  Time Out: 2:05  Total Appointment Time (timed & untimed codes): 60 minutes      SUBJECTIVE         History of Current Condition/Mechanism of Injury: Liang reports: crush injury when hand got caught between trailer hitch and steel pole. He has been wearing his custom orthosis and presents today for initial OT evaluation.     Falls: did not report    Involved Side: left  Dominant Side: Right  Imagin23 x-rays indicate:  Cast material has been removed.  Interval postoperative changes of ORIF at the proximal phalanx of the 4th digit.  Two screws are in place.   Position and alignment appear satisfactory.  Hardware appears intact.  No other fracture or dislocation is seen.  Degenerative changes at some of the interphalangeal joints.  Mild soft tissue swelling at the base of the ring finger.  No other soft tissue abnormality appreciated.  Prior Therapy: 1 visit for orthosis  Occupation:  retired  Working presently: retired  Duties: n/a    Functional Limitations/Social History:    Previous functional status includes: Independent with all ADLs.     Current Functional Status   Home/Living environment: lives with their spouse      Limitation of Functional Status as follows:   ADLs/IADLs:     - Feeding: gets assist with cutting food    - Bathing: min A, using mainly 1 hand    - Dressing/Grooming: min A, some difficulty with donning socks and shoes. Able to tie shoes    - Driving: mod I    -gets occasional A with opening things, not yet lifting or carrying things        Leisure: did not report    Pain:  Functional Pain Scale Rating 0-10: Current 0/10, worst 3/10, best 0/10   Location: left RF proximal phalanx  Description: numb  Aggravating Factors: exercises, trying to bend it  Easing Factors:  heat pad      Patient's Goals for Therapy: to get full use of his hand    Medical History:   Past Medical History:   Diagnosis Date    Allergy     CKD (chronic kidney disease) stage 3, GFR 30-59 ml/min 6/15/2016    GERD (gastroesophageal reflux disease)     Grade II open fracture of proximal phalanx of left ring finger 4/2/2023    Hx of colonic polyp     Hyperlipidemia     Hypertension     Hypospadias in male     Hypothyroidism     Open displaced fracture of proximal phalanx of left ring finger     Sleep apnea     Thyroid disease     Urinary tract infection        Surgical History:    has a past surgical history that includes Appendectomy; Hernia repair; Tonsillectomy; Cataract extraction; Eye surgery; Cystoscopy; Cystoscopy with urodynamic testing (N/A, 10/06/2022); insertion,  neurostimulator; Open reduction and internal fixation (ORIF) of injury of finger (Left, 4/5/2023); and irrigation and debridement (Left, 4/5/2023).    Medications:   has a current medication list which includes the following prescription(s): acetaminophen, aspirin, econazole nitrate, ferrous sulfate, fexofenadine, fluticasone propionate, fluzone highdose quad 21-22 pf, ibuprofen, levothyroxine, meloxicam, metronidazole, multivitamin with minerals, pravastatin, ramipril, tamsulosin, and triamcinolone acetonide 0.1%.    Allergies:   Review of patient's allergies indicates:   Allergen Reactions    Contrast media Hives and Itching     Iv dye          OBJECTIVE     Observation/Appearance: moderate swelling in L hand, and in RF. Steristrips noted on dorsal finger incision. Healing incisions noted at dorsal hand and RF, and at volar RF. Markedly limited ROM of RF, especially at PIP    Edema. Measured in centimeters.   4/19/2023 4/19/2023    Left Right   Wrist Crease 17.9 17.2   DPC 22.9 22.2   MCPs 22.8 22     Edema. Measured in centimeters.   4/19/2023 4/19/2023    Left Right   Ring:       P1            9 6.6    PIP            8.8 6.4   P2             7.2 5.7    DIP 6.1 5.2   P3 5.4 4.8     Elbow and Wrist ROM. Measured in degrees.   4/19/2023 4/19/2023    Left Right   Wrist Ext/Flex 65/80 50/80   Wrist RD/UD WFL WFL     Hand ROM. Measured in degrees.  R hand WFL, but limited with PIP and DIP flexion due to past flexor tendon repair.    4/19/2023    Left       Index: MP                PIP                   DIP               PRETTY WFL       Long:  MP 13/82              PIP 92              DIP 15              PRETTY 176       Ring:   MP 16/65              PIP 13/30              DIP 5/15              PRETTY 76       Small:  MP 15/82               PIP 40               DIP 27              PRETTY 134       Thumb: MP WFL                IP WFL       Rad ADD/ABD WFL       Pal ADD/ABD WFL          Opposition WFL      Strength  (Dynamometer) and Pinch Strength (Pinch Gauge)  Measured in pounds.   4/19/2023 4/19/2023    Left Right   Rung II deferred deferred   Key Pinch     3pt Pinch     2pt Pinch       Sensation: reports constant numbness at PIPand P1 of RF, reports coldness in RF tip. Numbness reported in SF.    4/19/2023 4/19/2023    Left Right   [place]     Normal 1.65-2.83     Diminished Light Touch 3.22-3.61     Diminished Protective 3.84-4.31     Loss of Protective 4.56-6.65     Untestable >6.65     2 Point Discrimination     Static     Dynamic       Manual Muscle Test   4/19/2023 4/19/2023    Left Right   Wrist Extension      Wrist Flexion     Radial Deviation     Ulnar Deviation     Supination     Pronation     Elbow Extension     Elbow Flexion             Limitation/Restriction for FOTO Hand Survey    FOTO scores for Liang KOEHLER [place]  on 4/19/2023.   FOTO documents entered into [employer] - see Media section.    Limitation Score: 50%         Treatment   Total Treatment time (time-based codes) separate from Evaluation: 30 minutes    Liang received the treatments listed below:     Supervised modalities after being cleared for contradictions: Hot Pack -   Patient received MH x 10 min to L hand to increase blood flow, circulation and tissue elasticity prior to therex        Manual therapy techniques: Manual Lymphatic Drainage were applied to the: L RF and hand for 5 minutes, including:  Performed retrograde massage to Left RF and hand to decrease edema, improve joint mobility, decrease pain and improve lymphatic drainage to increase hand function.       Therapeutic exercises to develop ROM and flexibility for 15 minutes, including:    PROM of digits 2,3, 5, and of DIP of RF X 10 reps each with 10 sec hold   AROM   DIP blocking  PIP blocking   X 10 reps each    Wave  Hook  straight fist, composite fist finger spreads finger lifts (over towel roll)     X 10 reps each    Towel walks X 2'           Patient Education and Home Exercises       Education provided:   - reviewed HEP and incision care. Cont to defer soaking hand in water.  -educated on retrograde massage for edema management  -pt declined finger edema sleeve at this time.    Written Home Exercises Provided: yes.  Exercises were reviewed and Liang was able to demonstrate them prior to the end of the session.  Liang demonstrated good  understanding of the education provided. See EMR under Patient Instructions for exercises provided during therapy sessions.     Pt was advised to perform these exercises free of pain, and to stop performing them if pain occurs.    Patient/Family Education: role of OT, goals for OT, scheduling/cancellations - pt verbalized understanding. Discussed insurance limitations with patient.    ASSESSMENT     Liang Monterroso Jr. is a 82 y.o. male referred to outpatient occupational therapy and presents with a medical diagnosis of s/p ORIF of left RF proximal phalanx.  Patient presents with the following therapy deficits: Decreased ROM, Decreased  strength, Decreased muscle strength, Decreased functional hand use, Increased pain, Edema, Joint Stiffness, Scar Adhesions, and Diminished/Impaired Sensation and demonstrates limitations as described in the chart below. Following medical record review it is determined that pt will benefit from occupational therapy services in order to maximize pain free and/or functional use of left hand. The following goals were discussed with the patient and patient is in agreement with them as to be addressed in the treatment plan. The patient's rehab potential is Good.     Anticipated barriers to occupational therapy: swelling, stiffness  Pt has no cultural, educational or language barriers to learning provided.    Profile and History Assessment of Occupational Performance Level of Clinical Decision Making Complexity Score   Occupational Profile:   Liang Monterroso Jr. is a 82 y.o. male who lives with their spouse and is retired Liang Monterroso   has difficulty with  ADLs and IADLs as listed previously, which  Affecting hisdaily functional abilities.      Comorbidities:    has a past medical history of Allergy, CKD (chronic kidney disease) stage 3, GFR 30-59 ml/min, GERD (gastroesophageal reflux disease), Grade II open fracture of proximal phalanx of left ring finger, colonic polyp, Hyperlipidemia, Hypertension, Hypospadias in male, Hypothyroidism, Open displaced fracture of proximal phalanx of left ring finger, Sleep apnea, Thyroid disease, and Urinary tract infection.    Medical and Therapy History Review:   Expanded               Performance Deficits    Physical:  Joint Mobility  Muscle Power/Strength  Muscle Endurance  Skin Integrity/Scar Formation  Edema   Strength  Fine Motor Coordination  Tactile Functions  Pain    Cognitive:  No Deficits    Psychosocial:    Social Interaction  Habits  Routines     Clinical Decision Making:  moderate    Assessment Process:  Comprehensive Assessments    Modification/Need for Assistance:  Minimal-Moderate Modifications/Assistance    Intervention Selection:  Multiple Treatment Options       moderate  Based on PMHX, co morbidities , data from assessments and functional level of assistance required with task and clinical presentation directly impacting function.         Goals:   The following goals were discussed with the patient and patient is in agreement with them as to be addressed in the treatment plan.   Long Term Goals (LTGs); to be met by discharge.  1) Pt will have an improved FOTO score by at least 20 points  2) Pt will report improved pain no higher than 1/10 with ADLs and daily tasks  3) Pt will report return to prior level of function  4) Pt will demonstrate improved left RF AROM WFL making a composite fist for improved function with daily tasks  5) Pt will demonstrate improved edema in left RF by at least 0.4 cm for improved motion and functional use  6)  and pinch strength to be assessed when  appropriate, and set goal    Short Term Goals (STGs); to be met within 4 weeks (5/19/23).  1) Pt will report pain no higher than 3/10 with ADLs  2)Pt will report or demonstrate independence with HEP and orthosis wear  3) Pt will be independent with dressing tasks  4) Pt will demonstrate improved L digits 3-5 AROM by at least 30 degrees each for improved grasp and function.     PLAN   Plan of Care Certification: 4/19/2023 to 6/30/23.     Outpatient Occupational Therapy 2-3 times weekly for 10 weeks to include the following interventions: Paraffin, Fluidotherapy, Manual therapy/joint mobilizations, Modalities for pain management, US 3 mhz, Therapeutic exercises/activities., Strengthening, Orthotic Fabrication/Fit/Training, Edema Control, Scar Management, Wound Care, Electrical Modalities, Joint Protection, and Energy Conservation.      BETINA Shah, T      I CERTIFY THE NEED FOR THESE SERVICES FURNISHED UNDER THIS PLAN OF TREATMENT AND WHILE UNDER MY CARE  Physician's comments:      Physician's Signature: ___________________________________________________

## 2023-04-20 DIAGNOSIS — I49.9 IRREGULAR HEART BEAT: Primary | ICD-10-CM

## 2023-04-20 DIAGNOSIS — M25.552 BILATERAL HIP PAIN: Primary | ICD-10-CM

## 2023-04-20 DIAGNOSIS — M25.551 BILATERAL HIP PAIN: Primary | ICD-10-CM

## 2023-04-20 PROBLEM — I25.10 CORONARY ARTERY DISEASE DUE TO CALCIFIED CORONARY LESION: Status: ACTIVE | Noted: 2023-04-20

## 2023-04-20 PROBLEM — I25.84 CORONARY ARTERY DISEASE DUE TO CALCIFIED CORONARY LESION: Status: ACTIVE | Noted: 2023-04-20

## 2023-04-20 NOTE — PROGRESS NOTES
Cardiology Clinic Note  Reason for Visit: afib    HPI:     Liang Monterroso Jr. is a 82 y.o. M, who presents for afib.    He had pre-op visit for L hand surgery.  Anesthesiologist noted an irregular rhythm, believed to be afib.  It did stabilize and become regular, so surgery proceeded.    He presents for possible afib.  He feels well with exertion.   No symptoms of angina, HF, arrhythmia, claudication.    Medical: HTN, HLD, JUDITH, CKD 3, CAD due to calcified coronary lesion (CT), aortic atherosclerosis (CT)  Surgical: Reviewed, as below.  Family: Reviewed, as below. Mother with heart disease.  Social: Reviewed, as below. Never smoked.  Allergies/side effects: iodinated contrast (hives)    ROS:    Pertinent ROS included in HPI and below.  PMH:     Past Medical History:   Diagnosis Date    Allergy     CKD (chronic kidney disease) stage 3, GFR 30-59 ml/min 6/15/2016    GERD (gastroesophageal reflux disease)     Grade II open fracture of proximal phalanx of left ring finger 4/2/2023    Hx of colonic polyp     Hyperlipidemia     Hypertension     Hypospadias in male     Hypothyroidism     Open displaced fracture of proximal phalanx of left ring finger     Sleep apnea     Thyroid disease     Urinary tract infection      Past Surgical History:   Procedure Laterality Date    APPENDECTOMY      CATARACT EXTRACTION      CYSTOSCOPY      CYSTOSCOPY WITH URODYNAMIC TESTING N/A 10/06/2022    Procedure: CYSTOSCOPY, WITH URODYNAMIC TESTING;  Surgeon: Shade Sullivan MD;  Location: [address];  Service: Urology;  Laterality: N/A;    EYE SURGERY      HERNIA REPAIR      INSERTION, NEUROSTIMULATOR      IRRIGATION AND DEBRIDEMENT Left 4/5/2023    Procedure: IRRIGATION AND DEBRIDEMENT;  Surgeon: Jean-Paul Cummins MD;  Location: [facility];  Service: Orthopedics;  Laterality: Left;    OPEN REDUCTION AND INTERNAL FIXATION (ORIF) OF INJURY OF FINGER Left 4/5/2023    Procedure: ORIF, FINGER - 4th proximal phalanx. Possible tendon/nerve  repair;  Surgeon: Jean-Paul Cummins MD;  Location: [facility];  Service: Orthopedics;  Laterality: Left;    TONSILLECTOMY       Allergies:     Review of patient's allergies indicates:   Allergen Reactions    Contrast media Hives and Itching     Iv dye     Medications:     Current Outpatient Medications:     acetaminophen (TYLENOL) 500 MG tablet, Take 2 tablets (1,000 mg total) by mouth 2 (two) times a day., Disp: 60 tablet, Rfl: 0    aspirin (ECOTRIN) 81 MG EC tablet, Take 81 mg by mouth once daily., Disp: , Rfl:     econazole nitrate 1 % cream, USE TWICE DAILY, Disp: 60 g, Rfl: 5    ferrous sulfate 325 (65 FE) MG EC tablet, Take 1 tablet (325 mg total) by mouth once daily., Disp: 30 tablet, Rfl: 2    fexofenadine (ALLEGRA) 180 MG tablet, Take 1 tablet (180 mg total) by mouth once daily. (Patient taking differently: Take 180 mg by mouth daily as needed.), Disp: 30 tablet, Rfl: 11    fluticasone propionate (FLONASE) 50 mcg/actuation nasal spray, 1 spray (50 mcg total) by Each Nostril route once daily., Disp: 1 Bottle, Rfl: 0    FLUZONE HIGHDOSE QUAD 21-22  mcg/0.7 mL Syrg, , Disp: , Rfl:     ibuprofen (ADVIL,MOTRIN) 600 MG tablet, Take 1 tablet (600 mg total) by mouth 3 (three) times daily., Disp: 90 tablet, Rfl: 0    levothyroxine (SYNTHROID) 112 MCG tablet, TAKE 1 TABLET BY MOUTH DAILY BEFORE BREAKFAST, Disp: 90 tablet, Rfl: 3    meloxicam (MOBIC) 7.5 MG tablet, Take 1 tablet (7.5 mg total) by mouth once daily., Disp: 30 tablet, Rfl: 0    metronidazole (METROGEL) 0.75 % gel, Use hs, Disp: 45 g, Rfl: 3    multivitamin with minerals tablet, Take 1 tablet by mouth once daily., Disp: , Rfl:     pravastatin (PRAVACHOL) 20 MG tablet, TAKE 1 TABLET BY MOUTH EVERY DAY, Disp: 90 tablet, Rfl: 3    ramipriL (ALTACE) 5 MG capsule, TAKE 1 CAPSULE(5 MG) BY MOUTH EVERY DAY, Disp: 90 capsule, Rfl: 3    tamsulosin (FLOMAX) 0.4 mg Cap, TAKE 2 CAPSULES(0.8 MG) BY MOUTH EVERY DAY, Disp: 180 capsule, Rfl: 3    triamcinolone acetonide  "0.1% (KENALOG) 0.1 % cream, Use bid prn rash, Disp: 80 g, Rfl: 3   Social History:     Social History     Tobacco Use    Smoking status: Never    Smokeless tobacco: Never   Substance Use Topics    Alcohol use: Yes     Comment: 1-3 glasses wine weekly, 2-3 beers weekly     Family History:     Family History   Problem Relation Age of Onset    Diabetes Mother     Heart disease Mother     Hypertension Mother     Vision loss Mother     Dementia Mother     Alcohol abuse Father     Cancer Sister         lung with mets, was a heavy smoker    No Known Problems Daughter     No Known Problems Son     No Known Problems Daughter     No Known Problems Son     Amblyopia Neg Hx     Blindness Neg Hx     Cataracts Neg Hx     Glaucoma Neg Hx     Macular degeneration Neg Hx     Retinal detachment Neg Hx     Strabismus Neg Hx     Stroke Neg Hx     Thyroid disease Neg Hx      Physical Exam:   /72 (BP Location: Left arm, Patient Position: Sitting, BP Method: Medium (Automatic))   Pulse 69   Ht 5' 8" (1.727 m)   Wt 79.9 kg (176 lb 2.4 oz)   SpO2 100%   BMI 26.78 kg/m²      Constitutional: No apparent distress, conversant  Neck: No jugular venous distension, no carotid bruits  CV: Regular rate and rhythm, no murmurs, normal S1/S2  Pulm: Clear to auscultation bilaterally  Extremities: No lower extremity edema, warm with palpable pulses    Labs:     Blood Tests:  Lab Results   Component Value Date    BNP 43 04/03/2018     04/02/2023    K 4.4 04/02/2023     04/02/2023    CO2 24 04/02/2023    BUN 16 04/02/2023    CREATININE 1.2 04/02/2023    GLU 93 04/02/2023    HGBA1C 5.3 02/09/2023    MG 2.4 04/02/2004    AST 21 04/02/2023    ALT 15 04/02/2023    ALBUMIN 3.6 04/02/2023    PROT 7.2 04/02/2023    BILITOT 0.4 04/02/2023    WBC 7.67 04/02/2023    HGB 12.8 (L) 04/02/2023    HCT 40.2 04/02/2023    HCT 47 09/01/2022    MCV 89 04/02/2023     04/02/2023    INR 1.0 04/02/2023    PSA 1.8 08/03/2018    TSH 0.229 (L) " 2023       Lab Results   Component Value Date    CHOL 191 2023    HDL 52 2023    TRIG 85 2023       Lab Results   Component Value Date    LDLCALC 122.0 2023       Urine Tests:  Lab Results   Component Value Date    COLORU Yellow 2022    APPEARANCEUA Hazy (A) 2022    PHUR 6.0 2022    SPECGRAV 1.005 2022    PROTEINUA Trace (A) 2022    GLUCUA Negative 2022    KETONESU Negative 2022    BILIRUBINUA Negative 2022    OCCULTUA 3+ (A) 2022    NITRITE Negative 2022    UROBILINOGEN normal 08/15/2022    UROBILINOGEN Negative 10/10/2018    LEUKOCYTESUR 3+ (A) 2022       Imaging:     Echocardiogram  None    Stress testing  VINOD 10/17/17  CONCLUSIONS     1 - Normal left ventricular systolic function (EF 55-60%).     2 - Normal right ventricular systolic function .     3 - Normal left ventricular diastolic function.     4 - Mildly enlarged ascending aorta.     No evidence of stress induced myocardial ischemia.     Cath Lab  None    Other  CT uro 10/12/20  Heart: No cardiomegaly or pericardial effusion.  Aortic valve and multivessel coronary artery calcific atherosclerosis.  Vasculature: Abdominal aorta is normal in caliber with significant calcific atherosclerosis.     EK/8/23 - sinus latesha with 1st deg, PACs, LAFB (personally reviewed)  23 - sinus rhythm with 1st deg AVB, PAC, PVCs, LAD (personally reviewed)    Assessment:     1. Primary hypertension    2. Aortic atherosclerosis    3. Hyperlipidemia, unspecified hyperlipidemia type    4. Coronary artery disease due to calcified coronary lesion    5. Atrial fibrillation, unspecified type    6. Irregular cardiac rhythm    7. Palpitations        Plan:     Irregular cardiac rhythm  Obtain event monitor    Primary hypertension  BP at goal  Continue meds    Aortic atherosclerosis  Hyperlipidemia, unspecified hyperlipidemia type  Coronary artery disease due to calcified coronary  lesion  LDL above goal  Stop pravastatin  Start rosuvastatin 20mg qd    Lipid panel in 3 months    Signed:  John Calhoun MD  Cardiology     Follow-up:     Future Appointments   Date Time Provider Department Center   4/21/2023  9:30 AM EKG, APPT [facility] EKG [facility]   4/21/2023 10:00 AM Samir Calhoun III, MD [facility] CARDIO [facility]   4/21/2023  2:00 PM Mercedes Nedra, OT [facility] OP RHB2 [place] 2 fl   4/25/2023 10:00 AM [facility] [id number] 485 LB LIMIT [facility] XRAYORT [facility] Or   4/25/2023 10:45 AM Parviz Neville MD [facility] ORTHO [facility]   4/26/2023 11:00 AM Mercedes Nedra, OT [facility] OP RHB2 [place] 2 fl   4/28/2023  1:30 PM Mercedes Nedra, OT [facility] OP RHB2 [place] 2 fl   5/2/2023 11:00 AM BETINA Shah, CHT [facility] OP RHB2 [place] 2 fl   5/4/2023 11:00 AM Mercedes Nedra, OT [facility] OP RHB2 [place] 2 fl   5/8/2023 11:00 AM Mercedes Nedra, OT [facility] OP RHB2 [place] 2 fl   5/10/2023 11:00 AM Mercedes Nedra, OT [facility] OP RHB2 [place] 2 fl   5/22/2023 11:00 AM Mercedes Nedra, OT [facility] OP RHB2 [place] 2 fl   5/24/2023 11:00 AM Mercedes Nedra, OT [facility] OP RHB2 [place] 2 fl   5/26/2023 10:00 AM Jamie L. Russo-Digeorge, PA-C [facility] ORTHO [facility] Ort   5/26/2023 11:30 AM LAB, APPOINTMENT [facility] INTJUDE [facility] LAB IM Rajiv [facility] PCW   5/29/2023 11:00 AM Mercedes Nedra, OT [facility] OP RHB2 [place] 2 fl   5/31/2023 11:00 AM Mercedes Nedra, OT [facility] OP RHB2 [place] 2 fl

## 2023-04-21 ENCOUNTER — HOSPITAL ENCOUNTER (OUTPATIENT)
Dept: CARDIOLOGY | Facility: CLINIC | Age: 83
Discharge: HOME | End: 2023-04-21
Payer: MEDICARE

## 2023-04-21 ENCOUNTER — OFFICE VISIT (OUTPATIENT)
Dept: CARDIOLOGY | Facility: CLINIC | Age: 83
End: 2023-04-21
Payer: MEDICARE

## 2023-04-21 ENCOUNTER — CLINICAL SUPPORT (OUTPATIENT)
Dept: REHABILITATION | Facility: HOSPITAL | Age: 83
End: 2023-04-21
Payer: MEDICARE

## 2023-04-21 VITALS
SYSTOLIC BLOOD PRESSURE: 131 MMHG | HEART RATE: 69 BPM | WEIGHT: 176.13 LBS | BODY MASS INDEX: 26.69 KG/M2 | DIASTOLIC BLOOD PRESSURE: 72 MMHG | HEIGHT: 68 IN | OXYGEN SATURATION: 100 %

## 2023-04-21 DIAGNOSIS — I48.91 ATRIAL FIBRILLATION, UNSPECIFIED TYPE: ICD-10-CM

## 2023-04-21 DIAGNOSIS — M25.642 STIFFNESS OF FINGER JOINT OF LEFT HAND: Primary | ICD-10-CM

## 2023-04-21 DIAGNOSIS — E78.5 HYPERLIPIDEMIA, UNSPECIFIED HYPERLIPIDEMIA TYPE: ICD-10-CM

## 2023-04-21 DIAGNOSIS — R00.2 PALPITATIONS: ICD-10-CM

## 2023-04-21 DIAGNOSIS — I25.84 CORONARY ARTERY DISEASE DUE TO CALCIFIED CORONARY LESION: ICD-10-CM

## 2023-04-21 DIAGNOSIS — I49.9 IRREGULAR CARDIAC RHYTHM: ICD-10-CM

## 2023-04-21 DIAGNOSIS — I49.9 IRREGULAR HEART BEAT: ICD-10-CM

## 2023-04-21 DIAGNOSIS — I10 PRIMARY HYPERTENSION: Primary | ICD-10-CM

## 2023-04-21 DIAGNOSIS — I70.0 AORTIC ATHEROSCLEROSIS: ICD-10-CM

## 2023-04-21 DIAGNOSIS — I25.10 CORONARY ARTERY DISEASE DUE TO CALCIFIED CORONARY LESION: ICD-10-CM

## 2023-04-21 DIAGNOSIS — M79.642 LEFT HAND PAIN: ICD-10-CM

## 2023-04-21 PROCEDURE — 3288F PR FALLS RISK ASSESSMENT DOCUMENTED: ICD-10-PCS | Mod: HCNC,CPTII,S$GLB, | Performed by: INTERNAL MEDICINE

## 2023-04-21 PROCEDURE — 99204 OFFICE O/P NEW MOD 45 MIN: CPT | Mod: HCNC,S$GLB,, | Performed by: INTERNAL MEDICINE

## 2023-04-21 PROCEDURE — 93005 EKG 12-LEAD: ICD-10-PCS | Mod: HCNC,S$GLB,, | Performed by: INTERNAL MEDICINE

## 2023-04-21 PROCEDURE — 1126F PR PAIN SEVERITY QUANTIFIED, NO PAIN PRESENT: ICD-10-PCS | Mod: HCNC,CPTII,S$GLB, | Performed by: INTERNAL MEDICINE

## 2023-04-21 PROCEDURE — 1159F MED LIST DOCD IN RCRD: CPT | Mod: HCNC,CPTII,S$GLB, | Performed by: INTERNAL MEDICINE

## 2023-04-21 PROCEDURE — 99204 PR OFFICE/OUTPT VISIT, NEW, LEVL IV, 45-59 MIN: ICD-10-PCS | Mod: HCNC,S$GLB,, | Performed by: INTERNAL MEDICINE

## 2023-04-21 PROCEDURE — 93010 ELECTROCARDIOGRAM REPORT: CPT | Mod: HCNC,S$GLB,, | Performed by: INTERNAL MEDICINE

## 2023-04-21 PROCEDURE — 1101F PR PT FALLS ASSESS DOC 0-1 FALLS W/OUT INJ PAST YR: ICD-10-PCS | Mod: HCNC,CPTII,S$GLB, | Performed by: INTERNAL MEDICINE

## 2023-04-21 PROCEDURE — 3075F PR MOST RECENT SYSTOLIC BLOOD PRESS GE 130-139MM HG: ICD-10-PCS | Mod: HCNC,CPTII,S$GLB, | Performed by: INTERNAL MEDICINE

## 2023-04-21 PROCEDURE — 3078F PR MOST RECENT DIASTOLIC BLOOD PRESSURE < 80 MM HG: ICD-10-PCS | Mod: HCNC,CPTII,S$GLB, | Performed by: INTERNAL MEDICINE

## 2023-04-21 PROCEDURE — 99999 PR PBB SHADOW E&M-EST. PATIENT-LVL V: ICD-10-PCS | Mod: PBBFAC,HCNC,, | Performed by: INTERNAL MEDICINE

## 2023-04-21 PROCEDURE — 1126F AMNT PAIN NOTED NONE PRSNT: CPT | Mod: HCNC,CPTII,S$GLB, | Performed by: INTERNAL MEDICINE

## 2023-04-21 PROCEDURE — 3078F DIAST BP <80 MM HG: CPT | Mod: HCNC,CPTII,S$GLB, | Performed by: INTERNAL MEDICINE

## 2023-04-21 PROCEDURE — 3075F SYST BP GE 130 - 139MM HG: CPT | Mod: HCNC,CPTII,S$GLB, | Performed by: INTERNAL MEDICINE

## 2023-04-21 PROCEDURE — 93005 ELECTROCARDIOGRAM TRACING: CPT | Mod: HCNC,S$GLB,, | Performed by: INTERNAL MEDICINE

## 2023-04-21 PROCEDURE — 97110 THERAPEUTIC EXERCISES: CPT

## 2023-04-21 PROCEDURE — 93010 EKG 12-LEAD: ICD-10-PCS | Mod: HCNC,S$GLB,, | Performed by: INTERNAL MEDICINE

## 2023-04-21 PROCEDURE — 1101F PT FALLS ASSESS-DOCD LE1/YR: CPT | Mod: HCNC,CPTII,S$GLB, | Performed by: INTERNAL MEDICINE

## 2023-04-21 PROCEDURE — 3288F FALL RISK ASSESSMENT DOCD: CPT | Mod: HCNC,CPTII,S$GLB, | Performed by: INTERNAL MEDICINE

## 2023-04-21 PROCEDURE — 99999 PR PBB SHADOW E&M-EST. PATIENT-LVL V: CPT | Mod: PBBFAC,HCNC,, | Performed by: INTERNAL MEDICINE

## 2023-04-21 PROCEDURE — 1159F PR MEDICATION LIST DOCUMENTED IN MEDICAL RECORD: ICD-10-PCS | Mod: HCNC,CPTII,S$GLB, | Performed by: INTERNAL MEDICINE

## 2023-04-21 RX ORDER — ROSUVASTATIN CALCIUM 20 MG/1
20 TABLET, COATED ORAL DAILY
Qty: 90 TABLET | Refills: 3 | Status: SHIPPED | OUTPATIENT
Start: 2023-04-21 | End: 2023-08-08 | Stop reason: SDUPTHER

## 2023-04-21 NOTE — PROGRESS NOTES
[facility]  Occupational Therapy Treatment Note    Date: 4/21/2023  Name: Liang Monterroso Jr.  Clinic Number: [id number]    Therapy Diagnosis:   Encounter Diagnoses   Name Primary?    Stiffness of finger joint of left hand Yes    Left hand pain      Physician: Russo-Digeorge, Jamie L*    Physician Orders: Patient is scheduled for ORIF left 4th prox phalanx on 4/5/23. Please eval/treat within 1-2 weeks for custom orthosis and postop rehab.  Medical Diagnosis: S62.615B (ICD-10-CM) - Open displaced fracture of proximal phalanx of left ring finger, initial encounter   Surgical Procedure and Date: 4/5/2023  Open reduction internal fixation open fracture left 4th proximal phalanx  Irrigation and debridement left dorsal hand and ring finger laceration with excisional debridement of nonviable portions of skin and subcutaneous tissue  Irrigation debridement left ring finger volar laceration in flexor zone 2 with excisional debridement of nonviable portions of skin subcutaneous tissue,   Evaluation Date: 4/19/2023  Insurance Authorization Period Expiration: 8/30/2023  Plan of Care Expiration:  6/30/23  Progress Note Due: 5/26/2023   Date of Return to MD: 5/26/2023  Visit # / Visits authorized: 2 / 20  FOTO: 4/14/23  50% limitation        Precautions:   Standard and Weightbearing, A2 pulley partially torn    Time In: 2:00 pm  Time Out: 2:45 pm  Total Billable Time: 45 minutes      SUBJECTIVE     Pt reports: continued stiffness as expected at this time.  He was compliant with home exercise program given last session.   Response to previous treatment:increase in rom  Functional change: NA    Pain: NR/10  Location: left ring finger    OBJECTIVE   Objective Measures updated at progress report unless specified.    Observation/Appearance: moderate swelling in L hand, and in RF. Loose necrotic tissue noted dorsal aspect of ring finger proximal phal sx site.  This was easily debrided using sterile instruments.  Sx site noted to be healed below. Volar scar tender upon palpation.  Edema. Measured in centimeters.    4/19/2023 4/19/2023     Left Right   Wrist Crease 17.9 17.2   DPC 22.9 22.2   MCPs 22.8 22      Edema. Measured in centimeters.    4/19/2023 4/19/2023     Left Right   Ring:         P1            9 6.6    PIP            8.8 6.4   P2             7.2 5.7    DIP 6.1 5.2   P3 5.4 4.8      Elbow and Wrist ROM. Measured in degrees.    4/19/2023 4/19/2023     Left Right   Wrist Ext/Flex 65/80 50/80   Wrist RD/UD WFL WFL      Hand ROM. Measured in degrees.  R hand WFL, but limited with PIP and DIP flexion due to past flexor tendon repair.     4/19/2023     Left         Index: MP                 PIP                    DIP                PRETTY WFL         Long:  MP 13/82              PIP 92              DIP 15              PRETTY 176         Ring:   MP 16/65              PIP 13/30              DIP 5/15              PRETTY 76         Small:  MP 15/82               PIP 40               DIP 27              PRETTY 134         Thumb: MP WFL                IP WFL       Rad ADD/ABD WFL       Pal ADD/ABD WFL          Opposition WFL       Strength (Dynamometer) and Pinch Strength (Pinch Gauge)  Measured in pounds.    4/19/2023 4/19/2023     Left Right   Rung II deferred deferred   Key Pinch       3pt Pinch       2pt Pinch          Sensation: reports constant numbness at PIPand P1 of RF, reports coldness in RF tip. Numbness reported in SF.     4/19/2023 4/19/2023     Left Right   [place]       Normal 1.65-2.83       Diminished Light Touch 3.22-3.61       Diminished Protective 3.84-4.31       Loss of Protective 4.56-6.65       Untestable >6.65       2 Point Discrimination       Static       Dynamic          Manual Muscle Test    4/19/2023 4/19/2023     Left Right   Wrist Extension        Wrist Flexion       Radial Deviation       Ulnar Deviation       Supination       Pronation       Elbow Extension       Elbow Flexion                    Limitation/Restriction for FOTO Hand Survey     FOTO scores for Liang Monterroso Jr. on 4/19/2023.   FOTO documents entered into EPIC - see Media section.     Limitation Score: 50%            Treatment     Direct contact modalities after being cleared for contraindications:None     Supervised Modalities: Patient received MH x 10 min to left hand to increase blood flow, circulation and tissue elasticity prior to therex   Manual therapy techniques: Deep Friction Massage and retrograde massage were applied to the: left ring finger for 3 minutes  :  Therapeutic exercises to develop ROM and flexibility for 25  minutes, including:     PROM of digits 2,3, 5, and of DIP of RF Hook, Straight Fist, Full Fist X 10 +reps each    AROM   DIP blocking  PIP blocking    X 10 reps each    Wave  Hook  straight fist, composite fist, finger lifts      X 10+ reps each    Towel walks X 2'   Isospheres for arom X 3 min    Education Deep friction massage and retrograde massages         Patient Education and Home Exercises      Education provided:   - how to obtain isospheres to add to HEP  -DFM and retrograd massages added to HEP  - Progress towards goals     Written Home Exercises Provided: Patient instructed to cont prior HEP.  Exercises were reviewed and Liang was able to demonstrate them prior to the end of the session.  Liang demonstrated good  understanding of the HEP provided. See EMR under Patient Instructions for exercises provided during therapy sessions.      ASSESSMENT      Liang is participating well in therapy.     Liang is progressing well towards his goals and there are no updates to goals at this time. Pt prognosis is Excellent.     Pt will continue to benefit from skilled outpatient occupational therapy to address the deficits listed in the problem list on initial evaluation, provide pt/family education and to maximize pt's level of independence in the home and community environment.     Pt's spiritual, cultural and  educational needs considered and pt agreeable to plan of care and goals.    Anticipated barriers to occupational therapy: none    Goals:  Goals:   The following goals were discussed with the patient and patient is in agreement with them as to be addressed in the treatment plan.   Long Term Goals (LTGs); to be met by discharge.  1) Pt will have an improved FOTO score by at least 20 points  2) Pt will report improved pain no higher than 1/10 with ADLs and daily tasks  3) Pt will report return to prior level of function  4) Pt will demonstrate improved left RF AROM WFL making a composite fist for improved function with daily tasks  5) Pt will demonstrate improved edema in left RF by at least 0.4 cm for improved motion and functional use  6)  and pinch strength to be assessed when appropriate, and set goal     Short Term Goals (STGs); to be met within 4 weeks (5/19/23).  1) Pt will report pain no higher than 3/10 with ADLs  2)Pt will report or demonstrate independence with HEP and orthosis wear  3) Pt will be independent with dressing tasks  4) Pt will demonstrate improved L digits 3-5 AROM by at least 30 degrees each for improved grasp and function.     PLAN     Continue skilled occupational therapy with individualized plan of care focusing on rom, scar mobilization, light therapeutic activities.     Updates/Grading for next session: Continue to progress as tolerated and appropriate.    Mercedes Sneed OT

## 2023-04-21 NOTE — PATIENT INSTRUCTIONS
[facility], OCCUPATIONAL THERAPY  HOME EXERCISE PROGRAM               Complete massage 2-3 minutes 4 times a day:        Complete 10 repetitions of each exercise 4-6 times a day:                 AROM: DIP Flexion / Extension  Pinch middle knuckle to prevent bending.   Bend end knuckle until stretch is felt.   Hold 3 seconds. Relax. Straighten finger as far as possible.    AROM: PIP Flexion / Extension  Pinch bottom knuckle to prevent bending.   Actively bend middle knuckle until stretch is felt.   Hold 3 seconds. Relax. Straighten finger as far as possible.    Squeeze sponge x 20 reps    Copyright © [employer]. All rights reserved.     Therapist: Mercedes Mathews, TORSTEN/L, CHT

## 2023-04-23 NOTE — PROGRESS NOTES
"                                                    Physical Therapy Daily Note     Name: Liang Monterroso Jr.  Clinic Number: [id number]  Diagnosis:   Encounter Diagnosis   Name Primary?    Chronic bilateral low back pain with bilateral sciatica Yes     Physician: Corey Iqbal MD  Precautions: standard   Visit #: 2 of 20  PTA Visit #: 0  Time In: 1:55 pm   Time Out: 2:50pm  Total Treatment Time 1:1: 30minutes       Evaluation Date: 2/5/2019  Authorization Period Expiration: 12/31/19  Plan of Care Certification Period: 5/5/19  Visit # / Visits authorized: 1/ 20    MD referral:   M54.31 (ICD-10-CM) - Sciatica of right side   M54.5 (ICD-10-CM) - Acute right-sided low back pain without sciatica          Subjective     Pt reports: Patient reports R lumbar pain.  Patient has  been compliant with HEP.   Pain Scale: Liang rates pain on a scale of 0-10 to be 2 currently.    FOTO from evaluation:  Limitation Score: 40%  Category: Mobility    Objective     Liang received individual therapeutic exercises to develop strength, endurance, ROM and posture for 55 minutes including:    PPT with marches x15 B  Doorway stretch-HEP   Bridges 2x10   Prone press ups  SL hip abd 2x10   SLR 2x10   B piriformis stretch 3x30"  Quadruped alt hip ext x5 B    Written Home Exercises Provided: per eval. Standing repeated lumbar extensions added to HEP   Pt demo good understanding of the education provided. Liang demonstrated good return demonstration of activities.     Education provided re: performance of HEP three times per day to decrease symptoms.  Also educated patient on proper sitting and standing posture.   Liang verbalized good understanding of education provided.   No spiritual or educational barriers to learning provided    Assessment     Patient tolerated treatment well.  Introduced new exercises with good response. Pt reported reduced lumbar pain /p Veronica (MDT) lumbar extension exercises. Progressed core stabilization  Patient is a " good candidate for therapy and is making good  progress towards goals.  Patient reported decreased  symptoms upon leaving.     This is a 78 y.o. male referred to outpatient physical therapy and presents with a medical diagnosis of (R) sciatica and demonstrates limitations as described in the problem list. Pt prognosis is Good. Pt will continue to benefit from skilled outpatient physical therapy to address the deficits listed in the problem list, provide pt/family education and to maximize pt's level of independence in the home and community environment.     Goals as follows:  Short Term Goals: 2-3 weeks   1) Patient will be compliant with HEP   2) Patient will demonstrate improved posture   3) Patient will be able to walk track for 30 min without pain in back or neck     Long Term Goals: 6-8 weeks   1) Patient will improve FOTO limitation to 24%  2) Patient will be able to complete all yard work without complaints of low back pain.   3) Patient will go one week without instance of lower back spasm.     Plan     Continue with established Plan of Care towards PT goals.     Therapist: Marleny Funez, PT  2/7/2019         Unknown

## 2023-04-25 ENCOUNTER — HOSPITAL ENCOUNTER (OUTPATIENT)
Dept: RADIOLOGY | Facility: HOSPITAL | Age: 83
Discharge: HOME | End: 2023-04-25
Attending: ORTHOPAEDIC SURGERY
Payer: MEDICARE

## 2023-04-25 ENCOUNTER — OFFICE VISIT (OUTPATIENT)
Dept: ORTHOPEDICS | Facility: CLINIC | Age: 83
End: 2023-04-25
Payer: MEDICARE

## 2023-04-25 VITALS — BODY MASS INDEX: 27.33 KG/M2 | WEIGHT: 180.31 LBS | HEIGHT: 68 IN

## 2023-04-25 DIAGNOSIS — M25.552 BILATERAL HIP PAIN: ICD-10-CM

## 2023-04-25 DIAGNOSIS — M25.551 BILATERAL HIP PAIN: ICD-10-CM

## 2023-04-25 DIAGNOSIS — M16.12 PRIMARY OSTEOARTHRITIS OF LEFT HIP: Primary | ICD-10-CM

## 2023-04-25 PROCEDURE — 1101F PT FALLS ASSESS-DOCD LE1/YR: CPT | Mod: HCNC,CPTII,S$GLB, | Performed by: ORTHOPAEDIC SURGERY

## 2023-04-25 PROCEDURE — 3288F FALL RISK ASSESSMENT DOCD: CPT | Mod: HCNC,CPTII,S$GLB, | Performed by: ORTHOPAEDIC SURGERY

## 2023-04-25 PROCEDURE — 73521 X-RAY EXAM HIPS BI 2 VIEWS: CPT | Mod: 26,HCNC,, | Performed by: RADIOLOGY

## 2023-04-25 PROCEDURE — 1101F PR PT FALLS ASSESS DOC 0-1 FALLS W/OUT INJ PAST YR: ICD-10-PCS | Mod: HCNC,CPTII,S$GLB, | Performed by: ORTHOPAEDIC SURGERY

## 2023-04-25 PROCEDURE — 1125F AMNT PAIN NOTED PAIN PRSNT: CPT | Mod: HCNC,CPTII,S$GLB, | Performed by: ORTHOPAEDIC SURGERY

## 2023-04-25 PROCEDURE — 1160F PR REVIEW ALL MEDS BY PRESCRIBER/CLIN PHARMACIST DOCUMENTED: ICD-10-PCS | Mod: HCNC,CPTII,S$GLB, | Performed by: ORTHOPAEDIC SURGERY

## 2023-04-25 PROCEDURE — 99213 OFFICE O/P EST LOW 20 MIN: CPT | Mod: HCNC,S$GLB,, | Performed by: ORTHOPAEDIC SURGERY

## 2023-04-25 PROCEDURE — 99213 PR OFFICE/OUTPT VISIT, EST, LEVL III, 20-29 MIN: ICD-10-PCS | Mod: HCNC,S$GLB,, | Performed by: ORTHOPAEDIC SURGERY

## 2023-04-25 PROCEDURE — 73521 XR HIPS BILATERAL 2 VIEW INCL AP PELVIS: ICD-10-PCS | Mod: 26,HCNC,, | Performed by: RADIOLOGY

## 2023-04-25 PROCEDURE — 1125F PR PAIN SEVERITY QUANTIFIED, PAIN PRESENT: ICD-10-PCS | Mod: HCNC,CPTII,S$GLB, | Performed by: ORTHOPAEDIC SURGERY

## 2023-04-25 PROCEDURE — 1159F PR MEDICATION LIST DOCUMENTED IN MEDICAL RECORD: ICD-10-PCS | Mod: HCNC,CPTII,S$GLB, | Performed by: ORTHOPAEDIC SURGERY

## 2023-04-25 PROCEDURE — 99999 PR PBB SHADOW E&M-EST. PATIENT-LVL III: CPT | Mod: PBBFAC,HCNC,, | Performed by: ORTHOPAEDIC SURGERY

## 2023-04-25 PROCEDURE — 1160F RVW MEDS BY RX/DR IN RCRD: CPT | Mod: HCNC,CPTII,S$GLB, | Performed by: ORTHOPAEDIC SURGERY

## 2023-04-25 PROCEDURE — 1159F MED LIST DOCD IN RCRD: CPT | Mod: HCNC,CPTII,S$GLB, | Performed by: ORTHOPAEDIC SURGERY

## 2023-04-25 PROCEDURE — 73521 X-RAY EXAM HIPS BI 2 VIEWS: CPT | Mod: TC,HCNC

## 2023-04-25 PROCEDURE — 99999 PR PBB SHADOW E&M-EST. PATIENT-LVL III: ICD-10-PCS | Mod: PBBFAC,HCNC,, | Performed by: ORTHOPAEDIC SURGERY

## 2023-04-25 PROCEDURE — 3288F PR FALLS RISK ASSESSMENT DOCUMENTED: ICD-10-PCS | Mod: HCNC,CPTII,S$GLB, | Performed by: ORTHOPAEDIC SURGERY

## 2023-04-25 NOTE — PROGRESS NOTES
CC:  Left hip pain      HISTORY       HPI:  82-year-old male, left hip pain x3 months, atraumatic.      Describes pain as occasional/intermittent, 2/10, dull achy, occasionally sharp and stabbing.  Worse when he is trying to raise his hip, and when he coughs or sneezes.  States that the pain starts in his abdomen radiates to his right anterior hip.      No prior left hip surgeries, injuries, or injections.      Does have a history of a bladder stimulator placed in his left posterior pelvis, by outside urology.  Does have a history of umbilical hernia multiple years ago treated surgically    Retired   Lives at home with family   Independent community ambulator   Coronary artery disease   Denies history of diabetes, cancer, blood clot  Denies tobacco use      ROS:  Constitutional: Denies fever/chills  Neurological: Denies numbness/tingling (any exceptions noted in orthopaedic exam)   Psychiatric/Behavioral: Denies change in normal mood  Eyes: Denies change in vision  Cardiovascular: Denies chest pain  Respiratory: Denies shortness of breath  Hematologic/Lymphatic: Denies easy bleeding/bruising   Skin: Denies new rash or skin lesions   Gastrointestinal: Denies nausea/vomitting/diarrhea, change in bowel habits, abdominal pain   Allergic/Immunologic: Denies adverse reactions to current medications  Musculoskeletal: see HPI    PAST MEDICAL HISTORY:   Past Medical History:   Diagnosis Date    Allergy     CKD (chronic kidney disease) stage 3, GFR 30-59 ml/min 6/15/2016    Coronary artery disease due to calcified coronary lesion 4/20/2023    GERD (gastroesophageal reflux disease)     Grade II open fracture of proximal phalanx of left ring finger 4/2/2023    Hx of colonic polyp     Hyperlipidemia     Hypertension     Hypospadias in male     Hypothyroidism     Open displaced fracture of proximal phalanx of left ring finger     Sleep apnea     Thyroid disease     Urinary tract infection      PAST SURGICAL HISTORY:   Past  Surgical History:   Procedure Laterality Date    APPENDECTOMY      CATARACT EXTRACTION      CYSTOSCOPY      CYSTOSCOPY WITH URODYNAMIC TESTING N/A 10/06/2022    Procedure: CYSTOSCOPY, WITH URODYNAMIC TESTING;  Surgeon: hSade Sullivan MD;  Location: [address];  Service: Urology;  Laterality: N/A;    EYE SURGERY      HERNIA REPAIR      INSERTION, NEUROSTIMULATOR      IRRIGATION AND DEBRIDEMENT Left 4/5/2023    Procedure: IRRIGATION AND DEBRIDEMENT;  Surgeon: Jean-Paul Cummins MD;  Location: [facility];  Service: Orthopedics;  Laterality: Left;    OPEN REDUCTION AND INTERNAL FIXATION (ORIF) OF INJURY OF FINGER Left 4/5/2023    Procedure: ORIF, FINGER - 4th proximal phalanx. Possible tendon/nerve repair;  Surgeon: Jean-Paul Cummins MD;  Location: [facility];  Service: Orthopedics;  Laterality: Left;    TONSILLECTOMY       FAMILY HISTORY:   Family History   Problem Relation Age of Onset    Diabetes Mother     Heart disease Mother     Hypertension Mother     Vision loss Mother     Dementia Mother     Alcohol abuse Father     Cancer Sister         lung with mets, was a heavy smoker    No Known Problems Daughter     No Known Problems Son     No Known Problems Daughter     No Known Problems Son     Amblyopia Neg Hx     Blindness Neg Hx     Cataracts Neg Hx     Glaucoma Neg Hx     Macular degeneration Neg Hx     Retinal detachment Neg Hx     Strabismus Neg Hx     Stroke Neg Hx     Thyroid disease Neg Hx      SOCIAL HISTORY:   Social History     Socioeconomic History    Marital status:      Spouse name: parul    Number of children: 4   Occupational History    Occupation: retired   Tobacco Use    Smoking status: Never    Smokeless tobacco: Never   Substance and Sexual Activity    Alcohol use: Yes     Comment: 1-3 glasses wine weekly, 2-3 beers weekly    Drug use: No    Sexual activity: Yes     Partners: Female     Social Determinants of Health     Financial Resource Strain: Low Risk     Difficulty of Paying Living  Expenses: Not hard at all   Food Insecurity: No Food Insecurity    Worried About Running Out of Food in the Last Year: Never true    Ran Out of Food in the Last Year: Never true   Transportation Needs: No Transportation Needs    Lack of Transportation (Medical): No    Lack of Transportation (Non-Medical): No   Physical Activity: Insufficiently Active    Days of Exercise per Week: 2 days    Minutes of Exercise per Session: 30 min   Stress: No Stress Concern Present    Feeling of Stress : Only a little   Social Connections: Unknown    Frequency of Communication with Friends and Family: Once a week    Frequency of Social Gatherings with Friends and Family: Once a week    Active Member of Clubs or Organizations: No    Attends Club or Organization Meetings: Never    Marital Status:    Housing Stability: Low Risk     Unable to Pay for Housing in the Last Year: No    Number of Places Lived in the Last Year: 2    Unstable Housing in the Last Year: No     MEDICATIONS:   Current Outpatient Medications:     acetaminophen (TYLENOL) 500 MG tablet, Take 2 tablets (1,000 mg total) by mouth 2 (two) times a day., Disp: 60 tablet, Rfl: 0    aspirin (ECOTRIN) 81 MG EC tablet, Take 81 mg by mouth once daily., Disp: , Rfl:     econazole nitrate 1 % cream, USE TWICE DAILY, Disp: 60 g, Rfl: 5    ferrous sulfate 325 (65 FE) MG EC tablet, Take 1 tablet (325 mg total) by mouth once daily., Disp: 30 tablet, Rfl: 2    fluticasone propionate (FLONASE) 50 mcg/actuation nasal spray, 1 spray (50 mcg total) by Each Nostril route once daily., Disp: 1 Bottle, Rfl: 0    FLUZONE HIGHDOSE QUAD 21-22  mcg/0.7 mL Syrg, , Disp: , Rfl:     ibuprofen (ADVIL,MOTRIN) 600 MG tablet, Take 1 tablet (600 mg total) by mouth 3 (three) times daily., Disp: 90 tablet, Rfl: 0    levothyroxine (SYNTHROID) 112 MCG tablet, TAKE 1 TABLET BY MOUTH DAILY BEFORE BREAKFAST, Disp: 90 tablet, Rfl: 3    meloxicam (MOBIC) 7.5 MG tablet, Take 1 tablet (7.5 mg total) by  "mouth once daily., Disp: 30 tablet, Rfl: 0    metronidazole (METROGEL) 0.75 % gel, Use hs, Disp: 45 g, Rfl: 3    multivitamin with minerals tablet, Take 1 tablet by mouth once daily., Disp: , Rfl:     ramipriL (ALTACE) 5 MG capsule, TAKE 1 CAPSULE(5 MG) BY MOUTH EVERY DAY, Disp: 90 capsule, Rfl: 3    rosuvastatin (CRESTOR) 20 MG tablet, Take 1 tablet (20 mg total) by mouth once daily., Disp: 90 tablet, Rfl: 3    tamsulosin (FLOMAX) 0.4 mg Cap, TAKE 2 CAPSULES(0.8 MG) BY MOUTH EVERY DAY, Disp: 180 capsule, Rfl: 3    triamcinolone acetonide 0.1% (KENALOG) 0.1 % cream, Use bid prn rash, Disp: 80 g, Rfl: 3    fexofenadine (ALLEGRA) 180 MG tablet, Take 1 tablet (180 mg total) by mouth once daily. (Patient taking differently: Take 180 mg by mouth daily as needed.), Disp: 30 tablet, Rfl: 11  ALLERGIES:   Review of patient's allergies indicates:   Allergen Reactions    Contrast media Hives and Itching     Iv dye         EXAM      VITAL SIGNS:   Ht 5' 8" (1.727 m)   Wt 81.8 kg (180 lb 5.4 oz)   BMI 27.42 kg/m²       PE:  General:  no acute distress, appears stated age    Neuro: alert and oriented x3  Psych: normal mood  Head: normocephalic, atraumatic.   Eyes: no scleral icterus  Mouth: moist mucous membranes  Cardiovascular: extremities warm and well perfused  Lungs: breathing comfortably, equal chest rise bilat  Skin: clean, dry, intact (any exceptions noted in below musculoskeletal exam)    Musculoskeletal:  Gait normal without limp or Trendelenburg    Left lower extremity  No areas of bony tenderness   No hip pain with axial loading or log roll   Minimal hip pain with impingement testing   No hip flexion contracture  Motor function intact hip flexion, quad, hamstring, gastroc, tibialis anterior, peroneal, EHL, FHL  Sensation intact to light touch saphenous, sural, deep peroneal, superficial peroneal, tibial nerves.  Palpable DP/PT pulse, brisk cap refill        XRAYS:  X-ray left hip demonstrate mild degenerative " changes, no fracture  (I independently reviewed and interpreted the above imaging)    MEDICAL DECISION MAKING       Encounter Diagnosis   Name Primary?    Primary osteoarthritis of left hip Yes       82-year-old male, left hip pain, x-rays demonstrate mild arthritis, exam consistent to some degree with degenerative changes left hip, however given these are exacerbated by coughing and sneezing, concern for potential other sources of pain including hernia or referred pain from previous bladder stimulator.      Recommend follow-up with primary care doc/urology/general surgery for further evaluation of other sources of groin pain.      In reference to mild hip osteoarthritis, recommend physical therapy, over-the-counter analgesics, avoiding activities that cause pain.      If he still has pain in 2-3 months, recommend follow-up with me p.r.n..  At that time would consider a left hip injection with Interventional Radiology pending repeat physical exam and clinical symptoms.      If continues to follow the path of left hip osteoarthritis, we will talk further the future about possible total hip arthroplasty    --weight bearing:  WBAT  --followup:  P.r.n.  --XR at next visit:  None      =====================  Parviz Neville MD  Orthopaedic Surgery

## 2023-04-26 ENCOUNTER — CLINICAL SUPPORT (OUTPATIENT)
Dept: REHABILITATION | Facility: HOSPITAL | Age: 83
End: 2023-04-26
Payer: MEDICARE

## 2023-04-26 DIAGNOSIS — M79.642 LEFT HAND PAIN: ICD-10-CM

## 2023-04-26 DIAGNOSIS — M25.642 STIFFNESS OF FINGER JOINT OF LEFT HAND: Primary | ICD-10-CM

## 2023-04-26 PROCEDURE — 97022 WHIRLPOOL THERAPY: CPT

## 2023-04-26 PROCEDURE — 97530 THERAPEUTIC ACTIVITIES: CPT

## 2023-04-26 PROCEDURE — 97110 THERAPEUTIC EXERCISES: CPT

## 2023-04-26 NOTE — PROGRESS NOTES
[facility]  Occupational Therapy Treatment Note    Date: 4/26/2023  Name: Liang Monterroso Jr.  Clinic Number: [id number]    Therapy Diagnosis:   Encounter Diagnoses   Name Primary?    Stiffness of finger joint of left hand Yes    Left hand pain      Physician: Russo-Digeorge, Jamie L*    Physician Orders: Patient is scheduled for ORIF left 4th prox phalanx on 4/5/23. Please eval/treat within 1-2 weeks for custom orthosis and postop rehab.  Medical Diagnosis: S62.615B (ICD-10-CM) - Open displaced fracture of proximal phalanx of left ring finger, initial encounter   Surgical Procedure and Date: 4/5/2023  Open reduction internal fixation open fracture left 4th proximal phalanx  Irrigation and debridement left dorsal hand and ring finger laceration with excisional debridement of nonviable portions of skin and subcutaneous tissue  Irrigation debridement left ring finger volar laceration in flexor zone 2 with excisional debridement of nonviable portions of skin subcutaneous tissue,   Evaluation Date: 4/19/2023  Insurance Authorization Period Expiration: 8/30/2023  Plan of Care Expiration:  6/30/23  Progress Note Due: 5/26/2023   Date of Return to MD: 5/26/2023  Visit # / Visits authorized: 3 / 20  FOTO: 4/14/23  50% limitation        Precautions:   Standard and Weightbearing, A2 pulley partially torn    Time In: 11:00 pm  Time Out: 11:45 pm  Total Billable Time: 43 minutes      SUBJECTIVE     Pt reports progress with ROM and   He was compliant with home exercise program given last session.   Response to previous treatment:increase in rom  Functional change: NA    Pain: NR/10  Location: left ring finger    OBJECTIVE   Objective Measures updated at progress report unless specified.    Observation/Appearance: moderate swelling mainly in only RF now. Volar scar noted to be less tender today.  Edema. Measured in centimeters.    4/19/2023 4/19/2023     Left Right   Wrist Crease 17.9 17.2   DPC 22.9 22.2    MCPs 22.8 22      Edema. Measured in centimeters.    4/19/2023 4/19/2023     Left Right   Ring:         P1            9 6.6    PIP            8.8 6.4   P2             7.2 5.7    DIP 6.1 5.2   P3 5.4 4.8      Elbow and Wrist ROM. Measured in degrees.    4/19/2023 4/19/2023 4/26/2023     Left Right Left   Wrist Ext/Flex 50/80 65/80 59/71   Wrist RD/UD WFL WFL       Hand ROM. Measured in degrees.  R hand WFL, but limited with PIP and DIP flexion due to past flexor tendon repair.     4/19/2023 4/26/2023     Left Left          Index: MP                  PIP                     DIP                 PRETTY WFL           Long:  MP 13/82 WNL              PIP 92               DIP 15               PRETTY 176           Ring:   MP 16/65 8/77              PIP 13/30 0/48              DIP 5/15 0/26              PRETTY 76           Small:  MP 15/82 WNL               PIP 40                DIP 27               PRETTY 134           Thumb: MP WFL                 IP WFL        Rad ADD/ABD WFL        Pal ADD/ABD WFL           Opposition WFL        Strength (Dynamometer) and Pinch Strength (Pinch Gauge)  Measured in pounds.    4/19/2023 4/19/2023     Left Right   Rung II deferred deferred   Key Pinch       3pt Pinch       2pt Pinch          Sensation: reports constant numbness at PIPand P1 of RF, reports coldness in RF tip. Numbness reported in SF.     4/19/2023 4/19/2023     Left Right   Severance Naty       Normal 1.65-2.83       Diminished Light Touch 3.22-3.61       Diminished Protective 3.84-4.31       Loss of Protective 4.56-6.65       Untestable >6.65       2 Point Discrimination       Static       Dynamic          Manual Muscle Test    4/19/2023 4/19/2023     Left Right   Wrist Extension        Wrist Flexion       Radial Deviation       Ulnar Deviation       Supination       Pronation       Elbow Extension       Elbow Flexion                   Limitation/Restriction for FOTO Hand Survey     FOTO scores for Liang Monterroso Jr. on 4/19/2023.    FOTO documents entered into [employer] - see Media section.     Limitation Score: 50%            Treatment     Direct contact modalities after being cleared for contraindications:None   Supervised Modalities: Patient received MH x 10 min to left hand to increase blood flow, circulation and tissue elasticity prior to therex   Manual therapy techniques: Deep Friction Massage was applied to the: left ring finger for 5 minutes  :  Therapeutic exercises to develop ROM and flexibility for 25  minutes, including:     PROM of digits 2,3, 5, and of DIP of RF Hook, Straight Fist, Full Fist X 10 +reps each    AROM   DIP blocking  PIP blocking    X 10 reps each    Wave  Hook  straight fist, composite fist, finger lifts      X 10+ reps each    Towel walks X 2'(NT)   Isospheres for arom X 3 min                 Therapeutic Activities 8 min  Octy Manipulation X 3 min   Pompom for finger flexion and extension Rolling for flexion and IP kicks for extension x 2 min   Med/large pompom in hand manipulation X 3 min       Patient Education and Home Exercises      Education provided:   - how to obtain isospheres to add to HEP  -DFM and retrograd massages added to HEP  - Progress towards goals     Written Home Exercises Provided: Patient instructed to cont prior HEP.  Exercises were reviewed and Liang was able to demonstrate them prior to the end of the session.  Liang demonstrated good  understanding of the HEP provided. See EMR under Patient Instructions for exercises provided during therapy sessions.      ASSESSMENT      Liang is participating well in therapy.     Liang is progressing well towards his goals and there are no updates to goals at this time. Pt prognosis is Excellent.     Pt will continue to benefit from skilled outpatient occupational therapy to address the deficits listed in the problem list on initial evaluation, provide pt/family education and to maximize pt's level of independence in the home and community environment.     Pt's  spiritual, cultural and educational needs considered and pt agreeable to plan of care and goals.    Anticipated barriers to occupational therapy: none      Goals:   The following goals were discussed with the patient and patient is in agreement with them as to be addressed in the treatment plan.   Long Term Goals (LTGs); to be met by discharge.  1) Pt will have an improved FOTO score by at least 20 points  2) Pt will report improved pain no higher than 1/10 with ADLs and daily tasks  3) Pt will report return to prior level of function  4) Pt will demonstrate improved left RF AROM WFL making a composite fist for improved function with daily tasks  5) Pt will demonstrate improved edema in left RF by at least 0.4 cm for improved motion and functional use  6)  and pinch strength to be assessed when appropriate, and set goal     Short Term Goals (STGs); to be met within 4 weeks (5/19/23).  1) Pt will report pain no higher than 3/10 with ADLs  2)Pt will report or demonstrate independence with HEP and orthosis wear  3) Pt will be independent with dressing tasks  4) Pt will demonstrate improved L digits 3-5 AROM by at least 30 degrees each for improved grasp and function.     PLAN     Continue skilled occupational therapy with individualized plan of care focusing on rom and scar maturation    Updates/Grading for next session: Progress therapeutis activities as tolerated and appropriate.    Mercedes Sneed OT

## 2023-04-27 ENCOUNTER — PATIENT MESSAGE (OUTPATIENT)
Dept: INTERNAL MEDICINE | Facility: CLINIC | Age: 83
End: 2023-04-27
Payer: MEDICARE

## 2023-04-28 ENCOUNTER — CLINICAL SUPPORT (OUTPATIENT)
Dept: REHABILITATION | Facility: HOSPITAL | Age: 83
End: 2023-04-28
Payer: MEDICARE

## 2023-04-28 ENCOUNTER — OFFICE VISIT (OUTPATIENT)
Dept: INTERNAL MEDICINE | Facility: CLINIC | Age: 83
End: 2023-04-28
Payer: MEDICARE

## 2023-04-28 VITALS
WEIGHT: 177 LBS | HEART RATE: 71 BPM | OXYGEN SATURATION: 98 % | DIASTOLIC BLOOD PRESSURE: 70 MMHG | HEIGHT: 68 IN | SYSTOLIC BLOOD PRESSURE: 100 MMHG | BODY MASS INDEX: 26.83 KG/M2

## 2023-04-28 DIAGNOSIS — E03.9 ACQUIRED HYPOTHYROIDISM: ICD-10-CM

## 2023-04-28 DIAGNOSIS — S62.615B OPEN DISPLACED FRACTURE OF PROXIMAL PHALANX OF LEFT RING FINGER, INITIAL ENCOUNTER: ICD-10-CM

## 2023-04-28 DIAGNOSIS — D64.9 ANEMIA, UNSPECIFIED TYPE: ICD-10-CM

## 2023-04-28 DIAGNOSIS — R10.32 LEFT GROIN PAIN: Primary | ICD-10-CM

## 2023-04-28 DIAGNOSIS — M25.642 STIFFNESS OF FINGER JOINT OF LEFT HAND: Primary | ICD-10-CM

## 2023-04-28 DIAGNOSIS — M79.642 LEFT HAND PAIN: ICD-10-CM

## 2023-04-28 PROCEDURE — 1125F PR PAIN SEVERITY QUANTIFIED, PAIN PRESENT: ICD-10-PCS | Mod: HCNC,CPTII,S$GLB, | Performed by: INTERNAL MEDICINE

## 2023-04-28 PROCEDURE — 3288F PR FALLS RISK ASSESSMENT DOCUMENTED: ICD-10-PCS | Mod: HCNC,CPTII,S$GLB, | Performed by: INTERNAL MEDICINE

## 2023-04-28 PROCEDURE — 99214 PR OFFICE/OUTPT VISIT, EST, LEVL IV, 30-39 MIN: ICD-10-PCS | Mod: HCNC,S$GLB,, | Performed by: INTERNAL MEDICINE

## 2023-04-28 PROCEDURE — 1159F PR MEDICATION LIST DOCUMENTED IN MEDICAL RECORD: ICD-10-PCS | Mod: HCNC,CPTII,S$GLB, | Performed by: INTERNAL MEDICINE

## 2023-04-28 PROCEDURE — 3074F SYST BP LT 130 MM HG: CPT | Mod: HCNC,CPTII,S$GLB, | Performed by: INTERNAL MEDICINE

## 2023-04-28 PROCEDURE — 3288F FALL RISK ASSESSMENT DOCD: CPT | Mod: HCNC,CPTII,S$GLB, | Performed by: INTERNAL MEDICINE

## 2023-04-28 PROCEDURE — 97140 MANUAL THERAPY 1/> REGIONS: CPT

## 2023-04-28 PROCEDURE — 97022 WHIRLPOOL THERAPY: CPT

## 2023-04-28 PROCEDURE — 3078F DIAST BP <80 MM HG: CPT | Mod: HCNC,CPTII,S$GLB, | Performed by: INTERNAL MEDICINE

## 2023-04-28 PROCEDURE — 1159F MED LIST DOCD IN RCRD: CPT | Mod: HCNC,CPTII,S$GLB, | Performed by: INTERNAL MEDICINE

## 2023-04-28 PROCEDURE — 99214 OFFICE O/P EST MOD 30 MIN: CPT | Mod: HCNC,S$GLB,, | Performed by: INTERNAL MEDICINE

## 2023-04-28 PROCEDURE — 3078F PR MOST RECENT DIASTOLIC BLOOD PRESSURE < 80 MM HG: ICD-10-PCS | Mod: HCNC,CPTII,S$GLB, | Performed by: INTERNAL MEDICINE

## 2023-04-28 PROCEDURE — 99999 PR PBB SHADOW E&M-EST. PATIENT-LVL IV: ICD-10-PCS | Mod: PBBFAC,HCNC,, | Performed by: INTERNAL MEDICINE

## 2023-04-28 PROCEDURE — 1101F PR PT FALLS ASSESS DOC 0-1 FALLS W/OUT INJ PAST YR: ICD-10-PCS | Mod: HCNC,CPTII,S$GLB, | Performed by: INTERNAL MEDICINE

## 2023-04-28 PROCEDURE — 1125F AMNT PAIN NOTED PAIN PRSNT: CPT | Mod: HCNC,CPTII,S$GLB, | Performed by: INTERNAL MEDICINE

## 2023-04-28 PROCEDURE — 99999 PR PBB SHADOW E&M-EST. PATIENT-LVL IV: CPT | Mod: PBBFAC,HCNC,, | Performed by: INTERNAL MEDICINE

## 2023-04-28 PROCEDURE — 97530 THERAPEUTIC ACTIVITIES: CPT

## 2023-04-28 PROCEDURE — 3074F PR MOST RECENT SYSTOLIC BLOOD PRESSURE < 130 MM HG: ICD-10-PCS | Mod: HCNC,CPTII,S$GLB, | Performed by: INTERNAL MEDICINE

## 2023-04-28 PROCEDURE — 1101F PT FALLS ASSESS-DOCD LE1/YR: CPT | Mod: HCNC,CPTII,S$GLB, | Performed by: INTERNAL MEDICINE

## 2023-04-28 PROCEDURE — 97110 THERAPEUTIC EXERCISES: CPT

## 2023-04-28 NOTE — PROGRESS NOTES
[facility]  Occupational Therapy Treatment Note    Date: 4/28/2023  Name: Liang Monterroso Jr.  Clinic Number: [id number]    Therapy Diagnosis:   Encounter Diagnoses   Name Primary?    Stiffness of finger joint of left hand Yes    Left hand pain      Physician: Russo-Digeorge, Jamie L*    Physician Orders: Patient is scheduled for ORIF left 4th prox phalanx on 4/5/23. Please eval/treat within 1-2 weeks for custom orthosis and postop rehab.  Medical Diagnosis: S62.615B (ICD-10-CM) - Open displaced fracture of proximal phalanx of left ring finger, initial encounter   Surgical Procedure and Date: 4/5/2023  Open reduction internal fixation open fracture left 4th proximal phalanx  Irrigation and debridement left dorsal hand and ring finger laceration with excisional debridement of nonviable portions of skin and subcutaneous tissue  Irrigation debridement left ring finger volar laceration in flexor zone 2 with excisional debridement of nonviable portions of skin subcutaneous tissue,   Evaluation Date: 4/19/2023  Insurance Authorization Period Expiration: 8/30/2023  Plan of Care Expiration:  6/30/23  Progress Note Due: 5/26/2023   Date of Return to MD: 5/26/2023  Visit # / Visits authorized: 4 / 20  FOTO: 4/14/23  50% limitation        Precautions:   Standard and Weightbearing, A2 pulley partially torn    Time In: 1:30 pm  Time Out: 2:20 pm  Total Billable Time: 40 minutes      SUBJECTIVE     Pt reports progress with ROM. He reports he is compliant with HEP and towel walks at home.   He was compliant with home exercise program given last session.   Response to previous treatment:increase in rom  Functional change: tolerating HEP, but limited with functional use    Pain: NR/10  Location: left ring finger    OBJECTIVE   Objective Measures updated at progress report unless specified.    Observation/Appearance: moderate swelling mainly in only RF now. Volar scar noted to be less tender today.  Edema.  Measured in centimeters.    4/19/2023 4/19/2023     Left Right   Wrist Crease 17.9 17.2   DPC 22.9 22.2   MCPs 22.8 22      Edema. Measured in centimeters.    4/19/2023 4/19/2023     Left Right   Ring:         P1            9 6.6    PIP            8.8 6.4   P2             7.2 5.7    DIP 6.1 5.2   P3 5.4 4.8      Elbow and Wrist ROM. Measured in degrees.    4/19/2023 4/19/2023 4/26/2023     Left Right Left   Wrist Ext/Flex 50/80 65/80 59/71   Wrist RD/UD WFL WFL       Hand ROM. Measured in degrees.  R hand WFL, but limited with PIP and DIP flexion due to past flexor tendon repair.     4/19/2023 4/26/2023     Left Left          Index: MP                  PIP                     DIP                 PRETTY WFL           Long:  MP 13/82 WNL              PIP 92               DIP 15               PRETTY 176           Ring:   MP 16/65 8/77              PIP 13/30 0/48              DIP 5/15 0/26              PRETTY 76 143          Small:  MP 15/82 WNL               PIP 40                DIP 27               PRETTY 134           Thumb: MP WFL                 IP WFL        Rad ADD/ABD WFL        Pal ADD/ABD WFL           Opposition WFL        Strength (Dynamometer) and Pinch Strength (Pinch Gauge)  Measured in pounds.    4/19/2023 4/19/2023     Left Right   Rung II deferred deferred   Key Pinch       3pt Pinch       2pt Pinch          Sensation: reports constant numbness at PIPand P1 of RF, reports coldness in RF tip. Numbness reported in SF.     4/19/2023 4/19/2023     Left Right   [place] Naty       Normal 1.65-2.83       Diminished Light Touch 3.22-3.61       Diminished Protective 3.84-4.31       Loss of Protective 4.56-6.65       Untestable >6.65       2 Point Discrimination       Static       Dynamic          Manual Muscle Test    4/19/2023 4/19/2023     Left Right   Wrist Extension        Wrist Flexion       Radial Deviation       Ulnar Deviation       Supination       Pronation       Elbow Extension       Elbow Flexion                    Limitation/Restriction for FOTO Hand Survey     FOTO scores for Liang Monterroso Jr. on 4/19/2023.   FOTO documents entered into [employer] - see Media section.     Limitation Score: 50%            Treatment     Direct contact modalities after being cleared for contraindications:None   Supervised Modalities:   Patient received fluidotherapy to L hand(s) for 10 minutes to increase blood flow, circulation, desensitization, sensory re-education and for pain management.     Manual therapy techniques: Deep Friction Massage and retrograde massage was applied to the: left ring finger for 8 minutes  :  Therapeutic exercises to develop ROM and flexibility for 24  minutes, including:     PROM of digits 2,3, 5, and of DIP of RF Hook, Straight Fist, Full Fist X 10 +reps each    AROM   DIP blocking  PIP blocking    X 10 reps each    Wave  Hook  straight fist, composite fist, finger lifts      X 10+ reps each    Towel walks X 3'   Isospheres for arom X 3 min                 Therapeutic Activities 8 min  Octy Manipulation X 3 min, 1/2 in hand and 1/2 with assist on table   Pompom for finger flexion and extension Rolling for flexion and IP kicks for extension x 2 min   Large/x-large pompom in hand manipulation X 3 min       Patient Education and Home Exercises      Education provided:   - cont HEP  -cont DFM and retrograde massages       Written Home Exercises Provided: Patient instructed to cont prior HEP.  Exercises were reviewed and Liang was able to demonstrate them prior to the end of the session.  Liang demonstrated good  understanding of the HEP provided. See EMR under Patient Instructions for exercises provided during therapy sessions.      ASSESSMENT      Liang is participating well in therapy. He tolerated tx well. Pt ROM appears improved today from observation. Will cont to address ROM as able.     Liang is progressing well towards his goals and there are no updates to goals at this time. Pt prognosis is Excellent.      Pt will continue to benefit from skilled outpatient occupational therapy to address the deficits listed in the problem list on initial evaluation, provide pt/family education and to maximize pt's level of independence in the home and community environment.     Pt's spiritual, cultural and educational needs considered and pt agreeable to plan of care and goals.    Anticipated barriers to occupational therapy: none      Goals:   The following goals were discussed with the patient and patient is in agreement with them as to be addressed in the treatment plan.   Long Term Goals (LTGs); to be met by discharge.  1) Pt will have an improved FOTO score by at least 20 points ---progressing  2) Pt will report improved pain no higher than 1/10 with ADLs and daily tasks---progressing  3) Pt will report return to prior level of function---progressing  4) Pt will demonstrate improved left RF AROM WFL making a composite fist for improved function with daily tasks---progressing  5) Pt will demonstrate improved edema in left RF by at least 0.4 cm for improved motion and functional use---progressing  6)  and pinch strength to be assessed when appropriate, and set goal---progressing     Short Term Goals (STGs); to be met within 4 weeks (5/19/23).  1) Pt will report pain no higher than 3/10 with ADLs---progressing  2)Pt will report or demonstrate independence with HEP and orthosis wear---progressing  3) Pt will be independent with dressing tasks---progressing  4) Pt will demonstrate improved L digits 3-5 AROM by at least 30 degrees each for improved grasp and function. ---progressing    PLAN     Continue skilled occupational therapy with individualized plan of care focusing on rom and scar maturation    Updates/Grading for next session: Progress therapeutis activities as tolerated and appropriate.    BETINA Shah, CHT

## 2023-04-28 NOTE — PROGRESS NOTES
Subjective:       Patient ID: Liang Monterroso Jr. is a 82 y.o. male.    Chief Complaint: Follow-up (Poss hernia in groin)    Left groin pain, Came on in last several weeks.   Had it on the right and it got better.   Then left came on.   Concerned about possible left groin hernia. No bulge noted but his Ortho felt that needed to be evaluated.   He previously had an umbilical hernia that was surgically repaired.  We reviewed his anemia.  Iron was slightly low.  Stool studies negative for blood.  He is taking iron and we will repeat the labs in about 4 weeks.  He is recovering from left 4th finger fracture  Review of Systems   Constitutional:  Negative for fever.   Gastrointestinal:  Positive for constipation.   Musculoskeletal:  Positive for arthralgias.        Left groin pain         Past Medical History:   Diagnosis Date    Allergy     CKD (chronic kidney disease) stage 3, GFR 30-59 ml/min 6/15/2016    Coronary artery disease due to calcified coronary lesion 4/20/2023    GERD (gastroesophageal reflux disease)     Grade II open fracture of proximal phalanx of left ring finger 4/2/2023    Hx of colonic polyp     Hyperlipidemia     Hypertension     Hypospadias in male     Hypothyroidism     Open displaced fracture of proximal phalanx of left ring finger     Sleep apnea     Thyroid disease     Urinary tract infection      Past Surgical History:   Procedure Laterality Date    APPENDECTOMY      CATARACT EXTRACTION      CYSTOSCOPY      CYSTOSCOPY WITH URODYNAMIC TESTING N/A 10/06/2022    Procedure: CYSTOSCOPY, WITH URODYNAMIC TESTING;  Surgeon: Shade Sullivan MD;  Location: [address];  Service: Urology;  Laterality: N/A;    EYE SURGERY      HERNIA REPAIR      INSERTION, NEUROSTIMULATOR      IRRIGATION AND DEBRIDEMENT Left 4/5/2023    Procedure: IRRIGATION AND DEBRIDEMENT;  Surgeon: Jean-Paul Cummins MD;  Location: [facility] OR;  Service: Orthopedics;  Laterality: Left;    OPEN REDUCTION AND INTERNAL FIXATION (ORIF) OF  INJURY OF FINGER Left 4/5/2023    Procedure: ORIF, FINGER - 4th proximal phalanx. Possible tendon/nerve repair;  Surgeon: Jean-Paul Cummins MD;  Location: [facility];  Service: Orthopedics;  Laterality: Left;    TONSILLECTOMY        Patient Active Problem List   Diagnosis    Allergic rhinitis due to pollen    Hypertension    Hypothyroid    Hyperlipidemia    Aortic atherosclerosis    Dupuytren contracture    Decreased ROM of neck    Impaired functional mobility, balance, gait, and endurance    Constipation    Meningioma    BPH (benign prostatic hyperplasia)    JUDITH (obstructive sleep apnea)    Incomplete emptying of bladder    Renal cyst, left    gait instability    Personal history of colonic polyps    Open displaced fracture of proximal phalanx of left ring finger    Traumatic rupture of flexor sheath pulley of finger - 50% rupture of left ring finger A2 pulley    Stiffness of finger joint of left hand    Left hand pain    Coronary artery disease due to calcified coronary lesion        Objective:      Physical Exam  Constitutional:       Appearance: Normal appearance.   Cardiovascular:      Rate and Rhythm: Normal rate.   Genitourinary:     Penis: Normal.       Comments: I did not detect any hernia bulge or mass in the right or left groin  Musculoskeletal:         General: Tenderness (with left hip ROM) present.      Comments: Brace on the left 4th and 5th fingers   Neurological:      Mental Status: He is alert.       Assessment:       Problem List Items Addressed This Visit          Endocrine    Hypothyroid       Orthopedic    Open displaced fracture of proximal phalanx of left ring finger     Other Visit Diagnoses       Left groin pain    -  Primary    Relevant Orders    US Abdomen Limited    Anemia, unspecified type                Plan:         Liang was seen today for follow-up.    Diagnoses and all orders for this visit:    Left groin pain  -     US Abdomen Limited; Future    Open displaced fracture of proximal  "phalanx of left ring finger, initial encounter    Anemia, unspecified type    Acquired hypothyroidism           Continue with plan for hip physical therapy depending on results of ultrasound          Portions of this note may have been created with voice recognition software. Occasional "wrong-word" or "sound-a-like" substitutions may have occurred due to the inherent limitations of voice recognition software. Please, read the note carefully and recognize, using context, where substitutions have occurred.  "

## 2023-05-02 ENCOUNTER — CLINICAL SUPPORT (OUTPATIENT)
Dept: REHABILITATION | Facility: HOSPITAL | Age: 83
End: 2023-05-02
Attending: ORTHOPAEDIC SURGERY
Payer: MEDICARE

## 2023-05-02 ENCOUNTER — CLINICAL SUPPORT (OUTPATIENT)
Dept: REHABILITATION | Facility: HOSPITAL | Age: 83
End: 2023-05-02
Payer: MEDICARE

## 2023-05-02 DIAGNOSIS — M16.12 PRIMARY OSTEOARTHRITIS OF LEFT HIP: ICD-10-CM

## 2023-05-02 DIAGNOSIS — M79.642 LEFT HAND PAIN: ICD-10-CM

## 2023-05-02 DIAGNOSIS — M25.552 PAIN OF LEFT HIP: ICD-10-CM

## 2023-05-02 DIAGNOSIS — M25.642 STIFFNESS OF FINGER JOINT OF LEFT HAND: Primary | ICD-10-CM

## 2023-05-02 DIAGNOSIS — R29.898 WEAKNESS OF LEFT HIP: ICD-10-CM

## 2023-05-02 PROCEDURE — 97140 MANUAL THERAPY 1/> REGIONS: CPT

## 2023-05-02 PROCEDURE — 97161 PT EVAL LOW COMPLEX 20 MIN: CPT

## 2023-05-02 PROCEDURE — 97110 THERAPEUTIC EXERCISES: CPT

## 2023-05-02 PROCEDURE — 97022 WHIRLPOOL THERAPY: CPT

## 2023-05-02 PROCEDURE — 97530 THERAPEUTIC ACTIVITIES: CPT

## 2023-05-02 NOTE — PROGRESS NOTES
[facility]  Occupational Therapy Treatment Note    Date: 5/2/2023  Name: Liang Monterroso Jr.  Clinic Number: [id number]    Therapy Diagnosis:   Encounter Diagnoses   Name Primary?    Stiffness of finger joint of left hand Yes    Left hand pain      Physician: Russo-Digeorge, Jamie L*    Physician Orders: Patient is scheduled for ORIF left 4th prox phalanx on 4/5/23. Please eval/treat within 1-2 weeks for custom orthosis and postop rehab.  Medical Diagnosis: S62.615B (ICD-10-CM) - Open displaced fracture of proximal phalanx of left ring finger, initial encounter   Surgical Procedure and Date: 4/5/2023  Open reduction internal fixation open fracture left 4th proximal phalanx  Irrigation and debridement left dorsal hand and ring finger laceration with excisional debridement of nonviable portions of skin and subcutaneous tissue  Irrigation debridement left ring finger volar laceration in flexor zone 2 with excisional debridement of nonviable portions of skin subcutaneous tissue,   Evaluation Date: 4/19/2023  Insurance Authorization Period Expiration: 8/30/2023  Plan of Care Expiration:  6/30/23  Progress Note Due: 5/26/2023   Date of Return to MD: 5/26/2023  Visit # / Visits authorized: 5 / 20  FOTO: 4/14/23  50% limitation        Precautions:   Standard and Weightbearing, A2 pulley partially torn    Time In: 10:45 am  Time Out: 11:40 am  Total Billable Time: 55 minutes      SUBJECTIVE     Pt reports he feels it is doing better. He cont to have swelling and stiffness in his RF.   He was compliant with home exercise program given last session.   Response to previous treatment:increase in rom  Functional change: tolerating HEP, but limited with functional use    Pain: 0/10  Location: left ring finger    OBJECTIVE   Objective Measures updated at progress report unless specified.    Observation/Appearance: moderate swelling mainly in only RF now. Volar scar noted to be less tender today.  Edema.  Measured in centimeters.    4/19/2023 4/19/2023     Left Right   Wrist Crease 17.9 17.2   DPC 22.9 22.2   MCPs 22.8 22      Edema. Measured in centimeters.    4/19/2023 4/19/2023     Left Right   Ring:         P1            9 6.6    PIP            8.8 6.4   P2             7.2 5.7    DIP 6.1 5.2   P3 5.4 4.8      Elbow and Wrist ROM. Measured in degrees.    4/19/2023 4/19/2023 4/26/2023     Left Right Left   Wrist Ext/Flex 50/80 65/80 59/71   Wrist RD/UD WFL WFL       Hand ROM. Measured in degrees.  R hand WFL, but limited with PIP and DIP flexion due to past flexor tendon repair.     4/19/2023 4/26/2023     Left Left          Index: MP                  PIP                     DIP                 PRETTY WFL           Long:  MP 13/82 WNL              PIP 92               DIP 15               PRETTY 176           Ring:   MP 16/65 8/77              PIP 13/30 0/48              DIP 5/15 0/26              PRETTY 76 143          Small:  MP 15/82 WNL               PIP 40                DIP 27               PRETTY 134           Thumb: MP WFL                 IP WFL        Rad ADD/ABD WFL        Pal ADD/ABD WFL           Opposition WFL        Strength (Dynamometer) and Pinch Strength (Pinch Gauge)  Measured in pounds.    4/19/2023 4/19/2023     Left Right   Rung II deferred deferred   Key Pinch       3pt Pinch       2pt Pinch          Sensation: reports constant numbness at PIPand P1 of RF, reports coldness in RF tip. Numbness reported in SF.     4/19/2023 4/19/2023     Left Right   [place] Naty       Normal 1.65-2.83       Diminished Light Touch 3.22-3.61       Diminished Protective 3.84-4.31       Loss of Protective 4.56-6.65       Untestable >6.65       2 Point Discrimination       Static       Dynamic          Manual Muscle Test    4/19/2023 4/19/2023     Left Right   Wrist Extension        Wrist Flexion       Radial Deviation       Ulnar Deviation       Supination       Pronation       Elbow Extension       Elbow Flexion                    Limitation/Restriction for FOTO Hand Survey     FOTO scores for Liang Monterroso Jr. on 4/19/2023.   FOTO documents entered into [employer] - see Media section.     Limitation Score: 50%            Treatment     Direct contact modalities after being cleared for contraindications:None   Supervised Modalities:   Patient received fluidotherapy to L hand(s) for 10 minutes to increase blood flow, circulation, desensitization, sensory re-education and for pain management.     Manual therapy techniques: Deep Friction Massage and retrograde massage was applied to the: left ring finger for 8 minutes  :  Therapeutic exercises to develop ROM and flexibility for 25  minutes, including:     PROM of digits 2,3, 5, and of DIP of RF Hook, Straight Fist, Full Fist X 10 +reps each    AROM   DIP blocking  PIP blocking    X 10 reps each    Wave  Hook  straight fist, composite fist, finger lifts      X 10+ reps each    Towel walks X 3'   Isospheres for arom X 3 min   Gentle finger adduction, yellow sponges X 15 reps             Therapeutic Activities 12 min  Octy Manipulation X 3 min, 1/2 in hand and 1/2 with assist on table   Pompom for finger flexion and extension Rolling for flexion and IP kicks for extension x 2 min   Large pompom in hand manipulation X 2 sets   RF pom pom scooping to palm X 2 sets, ex-large poms       Patient Education and Home Exercises      Education provided:   - cont HEP  -cont DFM and retrograde massages       Written Home Exercises Provided: Patient instructed to cont prior HEP.  Exercises were reviewed and Liang was able to demonstrate them prior to the end of the session.  Liang demonstrated good  understanding of the HEP provided. See EMR under Patient Instructions for exercises provided during therapy sessions.      ASSESSMENT      Liang is participating well in therapy. He tolerated tx well. Recommended compression glove or digit edema sleeve, and discussed benefits of it. He declined it at this time.  Pt ROM appears improved today from observation, but still limited mainly at PIP.  Will cont to address ROM as able.     Liang is progressing fairly well towards his goals and there are no updates to goals at this time. Pt prognosis is Excellent.     Pt will continue to benefit from skilled outpatient occupational therapy to address the deficits listed in the problem list on initial evaluation, provide pt/family education and to maximize pt's level of independence in the home and community environment.     Pt's spiritual, cultural and educational needs considered and pt agreeable to plan of care and goals.    Anticipated barriers to occupational therapy: none      Goals:   The following goals were discussed with the patient and patient is in agreement with them as to be addressed in the treatment plan.   Long Term Goals (LTGs); to be met by discharge.  1) Pt will have an improved FOTO score by at least 20 points ---progressing  2) Pt will report improved pain no higher than 1/10 with ADLs and daily tasks---progressing  3) Pt will report return to prior level of function---progressing  4) Pt will demonstrate improved left RF AROM WFL making a composite fist for improved function with daily tasks---progressing  5) Pt will demonstrate improved edema in left RF by at least 0.4 cm for improved motion and functional use---progressing  6)  and pinch strength to be assessed when appropriate, and set goal---progressing     Short Term Goals (STGs); to be met within 4 weeks (5/19/23).  1) Pt will report pain no higher than 3/10 with ADLs---progressing  2)Pt will report or demonstrate independence with HEP and orthosis wear---progressing  3) Pt will be independent with dressing tasks---progressing  4) Pt will demonstrate improved L digits 3-5 AROM by at least 30 degrees each for improved grasp and function. ---progressing    PLAN     Continue skilled occupational therapy with individualized plan of care focusing on rom and  scar maturation    Updates/Grading for next session: Progress therapeutis activities as tolerated and appropriate. Take measurements and do FOTO next session.     BETINA Shah, IQRAT

## 2023-05-02 NOTE — PLAN OF CARE
[facility]   Physical Therapy Initial Evaluation     Date: 5/2/2023   Name: Liang Monterroso Jr.  Clinic Number: [id number]    Therapy Diagnosis:   Encounter Diagnoses   Name Primary?    Primary osteoarthritis of left hip     Pain of left hip     Weakness of left hip      Physician: Parviz Neville*    Physician Orders: PT Eval and Treat   Medical Diagnosis from Referral: M16.12 (ICD-10-CM) - Primary osteoarthritis of left hip  L hip OA. PT eval/treat. WBAT, ROMAT, modalities prn. HEP  Evaluation Date: 5/2/2023  Authorization Period Expiration: 12/31/23  Plan of Care Expiration: 8/2/23  Progress Note Due: 6/2/23  Visit # / Visits authorized: 1/ 1   FOTO: 1/5    Precautions:  HTN      Time In: 9am  Time Out: 945am  Total Appointment Time (timed & untimed codes): 45 minutes      SUBJECTIVE   Date of onset: 3 months ago     History of current condition - Liang reports: 3 months ago his hip started hurting when he crosses his legs. This progressed to L groin pain and inability to lift his leg off the ground. He started having an excruciating pain when he coughed or sneezed. His orthopedist didn't initially see any OA, but thought it might be a hernia. He received a bladder implant, but this pain started before that procedure. His PCP has him scheduled for an ultrasound to detect a hernia. He is currently in OT for a hand injury.     Falls: none recently     Imaging, Xray: Bones of the hips and pelvis are intact with mild degenerative changes present.  Inter stem devices in place.  No bony destruction.    Prior Therapy: PT years ago for back pain   Social History:  lives with their spouse in 2 story house & many stairs at his vacation home  Occupation: n/a  Prior Level of Function: (I)  Current Level of Function: (I); difficulty with car transfers, lifting his leg into the sofa/bed     Pain:  Current 1/10, worst 10/10, best 0/10   Location: left groin    Description: Aching and  Sharp  Aggravating Factors: Coughing/Sneezing, Walking, Lifting, and car transfer, increased physical activity   Easing Factors: rest    Patients goals: relieve hip pain      Medical History:   Past Medical History:   Diagnosis Date    Allergy     CKD (chronic kidney disease) stage 3, GFR 30-59 ml/min 6/15/2016    Coronary artery disease due to calcified coronary lesion 4/20/2023    GERD (gastroesophageal reflux disease)     Grade II open fracture of proximal phalanx of left ring finger 4/2/2023    Hx of colonic polyp     Hyperlipidemia     Hypertension     Hypospadias in male     Hypothyroidism     Open displaced fracture of proximal phalanx of left ring finger     Sleep apnea     Thyroid disease     Urinary tract infection        Surgical History:   Liang Monterroso Jr.  has a past surgical history that includes Appendectomy; Hernia repair; Tonsillectomy; Cataract extraction; Eye surgery; Cystoscopy; Cystoscopy with urodynamic testing (N/A, 10/06/2022); insertion, neurostimulator; Open reduction and internal fixation (ORIF) of injury of finger (Left, 4/5/2023); and irrigation and debridement (Left, 4/5/2023).    Medications:   Liang has a current medication list which includes the following prescription(s): acetaminophen, aspirin, econazole nitrate, ferrous sulfate, fexofenadine, fluticasone propionate, ibuprofen, levothyroxine, meloxicam, metronidazole, multivitamin with minerals, ramipril, rosuvastatin, tamsulosin, and triamcinolone acetonide 0.1%.    Allergies:   Review of patient's allergies indicates:   Allergen Reactions    Contrast media Hives and Itching     Iv dye        Objective     Range of Motion/Strength:         Hip Right MMT Left MMT Pain/Dysfunction with Movement (pain=!)   AROM        flexion  WFL 4+/5  WFL 3+/5 L groin pain with AROM and MMT   extension      Able to perform bridge without pain    abduction  WFL 3+/5  WFL 3+/5    Internal rotation  WFL 4/5  WFL 3+/5 Mild L groin pain with PROM    External rotation  WFL 4/5  WFL 3+/5      Knee Right MMT Left MMT Pain/Dysfunction with Movement (pain=!)   AROM        flexion  WFL 4+/5  WFL 4-/5    extension  WFL 4/5  WFL 3/5 L groin pain with MMT     Joint Mobility:   - L glenohumeral: WFL     Flexibility: hamstring 90/90: R lacking 10 deg, L lacking 25 deg       Gait Analysis:Without AD  Deviations: steady, non-antalgic       CMS Impairment/Limitation/Restriction for FOTO Hip Survey    Therapist reviewed FOTO scores for Liang Monterroso Jr. on 5/2/2023.   FOTO documents entered into [employer] - see Media section.    Limitation Score: 37%  Category: Mobility         TREATMENT     Total Treatment time (time-based codes) separate from Evaluation: 10 minutes      Liang received the treatments listed below:      therapeutic exercises to develop strength, endurance, ROM, flexibility, posture, and core stabilization for 10 minutes including:    HEP instruction & return demonstration:  Seated hamstring stretch  Bridge        PATIENT EDUCATION AND HOME EXERCISES     Education provided:   - HEP    Written Home Exercises Provided: yes. Exercises were reviewed and Liang was able to demonstrate them prior to the end of the session.  Liang demonstrated good  understanding of the education provided. See EMR under Patient Instructions for exercises provided during therapy sessions.    ASSESSMENT     Liang is a 82 y.o. male referred to outpatient Physical Therapy with a medical diagnosis of L hip OA. Patient presents with weakness of trunk and L hip with hip/groin pain affecting his functional mobility.     Patient prognosis is Good.   Patientt will benefit from skilled outpatient Physical Therapy to address the deficits stated above and in the chart below, provide patient /family education, and to maximize patientt's level of independence.     Plan of care discussed with patient: Yes  Patient's spiritual, cultural and educational needs considered and patient is agreeable to the plan of  care and goals as stated below:     Anticipated Barriers for therapy: none    Medical Necessity is demonstrated by the following  History  Co-morbidities and personal factors that may impact the plan of care Co-morbidities:   See EMR    Personal Factors:   no deficits     low   Examination  Body Structures and Functions, activity limitations and participation restrictions that may impact the plan of care Body Regions:   lower extremities  trunk    Body Systems:    strength  gross coordinated movement  balance  transitions  motor control    Participation Restrictions:   none    Activity limitations:   Learning and applying knowledge  no deficits    General Tasks and Commands  no deficits    Communication  no deficits    Mobility  walking  using transportation (bus, train, plane, car)    Self care  caring for body parts (brushing teeth, shaving, grooming)  dressing  looking after one's health    Domestic Life  shopping  cooking  doing house work (cleaning house, washing dishes, laundry)    Interactions/Relationships  no deficits    Life Areas  no deficits    Community and Social Life  recreation and leisure         low   Clinical Presentation stable and uncomplicated low   Decision Making/ Complexity Score: low     Goals:  Short Term Goals: 6 visits  1. Pt will be compliant /c HEP to supplement PT in order to improve functional tasks  2. Pt will improve B glute medius MMTs to 4-/5 grossly to improve strength for functional activities    Long Term Goals: 12 visits   1. Pt will improve L hip flexor MMTs to 4-/5 to improve strength for functional activities  2. Pt will improve FOTO score to </= 29% limitation to reduce perceived pain with mobility   3. Pt will be able to perform car transfers pain free with out using BUE to raise L leg      PLAN   Plan of care Certification: 5/2/2023 to 8/2/23.    Outpatient Physical Therapy 2 times weekly for 12 visits to include the following interventions: Manual Therapy, Moist Heat/  Ice, Neuromuscular Re-ed, Patient Education, Therapeutic Activities, and Therapeutic Exercise, E-stim & Dry Needling as needed.     Marleny Funez, PT      I CERTIFY THE NEED FOR THESE SERVICES FURNISHED UNDER THIS PLAN OF TREATMENT AND WHILE UNDER MY CARE   Physician's comments:     Physician's Signature: ___________________________________________________

## 2023-05-03 ENCOUNTER — HOSPITAL ENCOUNTER (OUTPATIENT)
Dept: RADIOLOGY | Facility: HOSPITAL | Age: 83
Discharge: HOME | End: 2023-05-03
Attending: INTERNAL MEDICINE
Payer: MEDICARE

## 2023-05-03 DIAGNOSIS — R10.32 LEFT GROIN PAIN: ICD-10-CM

## 2023-05-03 PROCEDURE — 76705 US ABDOMEN LIMITED: ICD-10-PCS | Mod: 26,,, | Performed by: STUDENT IN AN ORGANIZED HEALTH CARE EDUCATION/TRAINING PROGRAM

## 2023-05-03 PROCEDURE — 76705 ECHO EXAM OF ABDOMEN: CPT | Mod: 26,,, | Performed by: STUDENT IN AN ORGANIZED HEALTH CARE EDUCATION/TRAINING PROGRAM

## 2023-05-03 PROCEDURE — 76705 ECHO EXAM OF ABDOMEN: CPT | Mod: TC

## 2023-05-03 NOTE — PROGRESS NOTES
[facility]  Occupational Therapy Treatment Note    Date: 5/4/2023  Name: Liang Monterroso Jr.  Clinic Number: [id number]    Therapy Diagnosis:   Encounter Diagnoses   Name Primary?    Stiffness of finger joint of left hand Yes    Left hand pain        Physician: Russo-Digeorge, Jamie L*    Physician Orders: Patient is scheduled for ORIF left 4th prox phalanx on 4/5/23. Please eval/treat within 1-2 weeks for custom orthosis and postop rehab.  Medical Diagnosis: S62.615B (ICD-10-CM) - Open displaced fracture of proximal phalanx of left ring finger, initial encounter   Surgical Procedure and Date: 4/5/2023  Open reduction internal fixation open fracture left 4th proximal phalanx  Irrigation and debridement left dorsal hand and ring finger laceration with excisional debridement of nonviable portions of skin and subcutaneous tissue  Irrigation debridement left ring finger volar laceration in flexor zone 2 with excisional debridement of nonviable portions of skin subcutaneous tissue,   Evaluation Date: 4/19/2023  Insurance Authorization Period Expiration: 8/30/2023  Plan of Care Expiration:  6/30/23  Progress Note Due: 5/26/2023   Date of Return to MD: 5/26/2023  Visit # / Visits authorized: 6 / 20  FOTO: 4/14/23  50% limitation        Precautions:   Standard and Weightbearing, A2 pulley partially torn    Time In: 11:00 am  Time Out: 12:00 pm  Total Billable Time: 55 minutes      SUBJECTIVE     Pt reports he feels it is doing better. He cont to have swelling and stiffness in his RF.   He was compliant with home exercise program given last session.   Response to previous treatment:increase in rom  Functional change: tolerating HEP, but limited with functional use    Pain: 0/10  Location: left ring finger    OBJECTIVE   Objective Measures updated at progress report unless specified.    Observation/Appearance: moderate swelling mainly in only RF now. Volar scar noted to be less tender today.  Edema.  Measured in centimeters.    4/19/2023 4/19/2023     Left Right   Wrist Crease 17.9 17.2   DPC 22.9 22.2   MCPs 22.8 22      Edema. Measured in centimeters.    4/19/2023 4/19/2023     Left Right   Ring:         P1            9 6.6    PIP            8.8 6.4   P2             7.2 5.7    DIP 6.1 5.2   P3 5.4 4.8      Elbow and Wrist ROM. Measured in degrees.    4/19/2023 4/19/2023 4/26/2023 5/4/2023     Left Right Left Left   Wrist Ext/Flex 50/80 65/80 59/71 64/80   Wrist RD/UD WFL WFL        Hand ROM. Measured in degrees.  R hand WFL, but limited with PIP and DIP flexion due to past flexor tendon repair.     4/19/2023 4/26/2023 5/4/2023     Left Left            Index: MP                   PIP                      DIP                  PRETTY WFL             Long:  MP 13/82 WNL               PIP 92                DIP 15                PRETTY 176             Ring:   MP 16/65 8/77 12/78              PIP 13/30 0/48 0/51              DIP 5/15 0/26 0/34              PRETTY 76 143            Small:  MP 15/82 WNL                PIP 40                 DIP 27                PRETTY 134             Thumb: MP WFL                  IP WFL         Rad ADD/ABD WFL         Pal ADD/ABD WFL            Opposition WFL         Strength (Dynamometer) and Pinch Strength (Pinch Gauge)  Measured in pounds.    4/19/2023 4/19/2023     Left Right   Rung II deferred deferred   Key Pinch       3pt Pinch       2pt Pinch          Sensation: reports constant numbness at PIPand P1 of RF, reports coldness in RF tip. Numbness reported in SF.     4/19/2023 4/19/2023     Left Right   [place]       Normal 1.65-2.83       Diminished Light Touch 3.22-3.61       Diminished Protective 3.84-4.31       Loss of Protective 4.56-6.65       Untestable >6.65       2 Point Discrimination       Static       Dynamic          Manual Muscle Test    4/19/2023 4/19/2023     Left Right   Wrist Extension        Wrist Flexion       Radial Deviation       Ulnar Deviation        Supination       Pronation       Elbow Extension       Elbow Flexion                   Limitation/Restriction for FOTO Hand Survey     FOTO scores for Liang Monterroso Jr. on 4/19/2023.   FOTO documents entered into [employer] - see Media section.     Limitation Score: 50%            Treatment     Direct contact modalities after being cleared for contraindications:None   Supervised Modalities:   Patient received fluidotherapy to L hand(s) for 10 minutes to increase blood flow, circulation, desensitization, sensory re-education and for pain management.     Manual therapy techniques: 25 min  Deep Friction Massage to the: left ring finger   Gentle PROM Ring Finger Isolated IP flex, Hook, Full Fist,  MP ext with long holds      :  Therapeutic exercises to develop ROM and flexibility for 20 minutes, including reassessment of AROM:     exercise Reps/ TIme   Gentle AAROM:  Wave  Hook  straight fist, composite fist, 10 reps ea   AROM: wave, finger lifts and spreads   X 10 reps each    Towel walks with matheus strap in place X 3'   Isospheres for arom with matheus strap in place X 3 min   Issued matheus strap  Be worn during the day when at home and orthosis is removed             Therapeutic Activities NT min  Octy Manipulation X 3 min, 1/2 in hand and 1/2 with assist on table   Pompom for finger flexion and extension Rolling for flexion and IP kicks for extension x 2 min   Large pompom in hand manipulation X 2 sets   RF pom pom scooping to palm X 2 sets, ex-large poms       Patient Education and Home Exercises      Education provided:   - cont HEP  -cont DFM and retrograde massages  -Use of matheus strap      Written Home Exercises Provided: Patient instructed to cont prior HEP.  Exercises were reviewed and Liang was able to demonstrate them prior to the end of the session.  Liang demonstrated good  understanding of the HEP provided. See EMR under Patient Instructions for exercises provided during therapy sessions.      ASSESSMENT      Liang  is showing slight progress in rom of the ring finger. Significant IP flexion deficits remain. Progresses to gentle P/AAROM on the ring finger and he tolerated this without report of pain. Issued matheus strap as well to have the long finger provide gentle AAROM as well.     Liang is progressing fairly well towards his goals and there are no updates to goals at this time. Pt prognosis is Excellent.     Pt will continue to benefit from skilled outpatient occupational therapy to address the deficits listed in the problem list on initial evaluation, provide pt/family education and to maximize pt's level of independence in the home and community environment.     Pt's spiritual, cultural and educational needs considered and pt agreeable to plan of care and goals.    Anticipated barriers to occupational therapy: none      Goals:   The following goals were discussed with the patient and patient is in agreement with them as to be addressed in the treatment plan.   Long Term Goals (LTGs); to be met by discharge.  1) Pt will have an improved FOTO score by at least 20 points ---progressing  2) Pt will report improved pain no higher than 1/10 with ADLs and daily tasks---progressing  3) Pt will report return to prior level of function---progressing  4) Pt will demonstrate improved left RF AROM WFL making a composite fist for improved function with daily tasks---progressing  5) Pt will demonstrate improved edema in left RF by at least 0.4 cm for improved motion and functional use---progressing  6)  and pinch strength to be assessed when appropriate, and set goal---progressing     Short Term Goals (STGs); to be met within 4 weeks (5/19/23).  1) Pt will report pain no higher than 3/10 with ADLs---progressing  2)Pt will report or demonstrate independence with HEP and orthosis wear---progressing  3) Pt will be independent with dressing tasks---progressing  4) Pt will demonstrate improved L digits 3-5 AROM by at least 30 degrees each  for improved grasp and function. ---progressing    PLAN     Continue skilled occupational therapy with individualized plan of care focusing on rom and scar maturation    Updates/Grading for next session: Progress therapeutis activities as tolerated and appropriate. .     Mercedes Sneed OT

## 2023-05-04 ENCOUNTER — CLINICAL SUPPORT (OUTPATIENT)
Dept: REHABILITATION | Facility: HOSPITAL | Age: 83
End: 2023-05-04
Payer: MEDICARE

## 2023-05-04 DIAGNOSIS — M79.642 LEFT HAND PAIN: ICD-10-CM

## 2023-05-04 DIAGNOSIS — M25.642 STIFFNESS OF FINGER JOINT OF LEFT HAND: Primary | ICD-10-CM

## 2023-05-04 PROCEDURE — 97140 MANUAL THERAPY 1/> REGIONS: CPT

## 2023-05-04 PROCEDURE — 97110 THERAPEUTIC EXERCISES: CPT

## 2023-05-04 PROCEDURE — 97022 WHIRLPOOL THERAPY: CPT

## 2023-05-04 NOTE — PROGRESS NOTES
"[facility]   Physical Therapy Treatment Note     Name: Liang Monterroso Jr.  Clinic Number: [id number]    Therapy Diagnosis:   Encounter Diagnoses   Name Primary?    Pain of left hip Yes    Weakness of left hip      Physician: Russo-Digeorge, Jamie L*    Visit Date: 5/5/2023    Physician Orders: PT Eval and Treat   Medical Diagnosis from Referral: M16.12 (ICD-10-CM) - Primary osteoarthritis of left hip  L hip OA. PT eval/treat. WBAT, ROMAT, modalities prn. HEP  Evaluation Date: 5/2/2023  Authorization Period Expiration: 12/31/23  Plan of Care Expiration: 8/2/23  Progress Note Due: 6/2/23  Visit # / Visits authorized: 1/ 1 , 1/20  FOTO: 2/5     Precautions:  HTN      PTA Visit #: 0/5   Date of last FOTO:5/2/23    Time In: 830am  Time Out: 915am  Total Billable Time: 45 minutes    SUBJECTIVE     Pt reports: he walked on the track at the gym yesterday and hip hip felt ok.  He was compliant with home exercise program.  Response to previous treatment: last session was initial eval  Functional change: none    Pain: 2/10  Location: left groin      OBJECTIVE     Objective Measures updated at progress report unless specified.     Treatment     Liang received the treatments listed below:  (new treatments are indicated in bold)    therapeutic exercises to develop strength, endurance, ROM, flexibility, posture, and core stabilization for 12 minutes including:    Recumbent bike 6' - BLE strength & endurance   Seated hamstring stretch 30" x3 B   Bridge 2x10   Shuttle DLP 2.5c 2x10      neuromuscular re-education activities to improve: Balance, Coordination, Kinesthetic, Sense, Proprioception, and Posture for 23 minutes. The following activities were included:    Short arc quad into straight leg raise 2x10   Supine isometric hip abd with belt 5" 2x10   Left lower extremity LAQ 3# 3" 2x10   Standing hip abd x15 B    SLS on airex with opposite toe down   -SLS on airex without BUE support       therapeutic " activities to improve functional performance for 10  minutes, including:  Lateral hurdles (ylw) x15 B         Patient Education and Home Exercises     Home Exercises Provided and Patient Education Provided     Education provided:   - HEP    Written Home Exercises Provided: yes. Exercises were reviewed and Liang was able to demonstrate them prior to the end of the session.  Liang demonstrated good  understanding of the education provided. See EMR under Patient Instructions for exercises provided during therapy sessions    ASSESSMENT     Pt tolerated first full treatment well. PT added single leg balance training with hip strategy noted. PT added isometric and standing hip exercises for glute medius strength and stability in single leg stance. Short arc quad and LAQ added with tactile cues to facilitate quad motor control. Lateral hurdles added for dynamic balance and control with stepping over obstacles in community. Pt reported some discomfort in L hip/groin during straight leg raise, but no increased pain after session.     Liang Is progressing well towards his goals.   Pt prognosis is Good.     Pt will continue to benefit from skilled outpatient physical therapy to address the deficits listed in the problem list box on initial evaluation, provide pt/family education and to maximize pt's level of independence in the home and community environment.     Pt's spiritual, cultural and educational needs considered and pt agreeable to plan of care and goals.     Anticipated barriers to physical therapy: none    Goals: Short Term Goals: 6 visits  1. Pt will be compliant /c HEP to supplement PT in order to improve functional tasks  2. Pt will improve B glute medius MMTs to 4-/5 grossly to improve strength for functional activities     Long Term Goals: 12 visits   1. Pt will improve L hip flexor MMTs to 4-/5 to improve strength for functional activities  2. Pt will improve FOTO score to </= 29% limitation to reduce perceived  pain with mobility   3. Pt will be able to perform car transfers pain free with out using BUE to raise L leg    PLAN     Plan of care Certification: 5/2/2023 to 8/2/23.     Outpatient Physical Therapy 2 times weekly for 12 visits to include the following interventions: Manual Therapy, Moist Heat/ Ice, Neuromuscular Re-ed, Patient Education, Therapeutic Activities, and Therapeutic Exercise, E-stim & Dry Needling as needed.     Marleny Funez, PT

## 2023-05-05 ENCOUNTER — CLINICAL SUPPORT (OUTPATIENT)
Dept: REHABILITATION | Facility: HOSPITAL | Age: 83
End: 2023-05-05
Payer: MEDICARE

## 2023-05-05 DIAGNOSIS — R29.898 WEAKNESS OF LEFT HIP: ICD-10-CM

## 2023-05-05 DIAGNOSIS — M25.552 PAIN OF LEFT HIP: Primary | ICD-10-CM

## 2023-05-05 PROCEDURE — 97530 THERAPEUTIC ACTIVITIES: CPT

## 2023-05-05 PROCEDURE — 97112 NEUROMUSCULAR REEDUCATION: CPT

## 2023-05-08 ENCOUNTER — CLINICAL SUPPORT (OUTPATIENT)
Dept: REHABILITATION | Facility: HOSPITAL | Age: 83
End: 2023-05-08
Payer: MEDICARE

## 2023-05-08 ENCOUNTER — PATIENT MESSAGE (OUTPATIENT)
Dept: INTERNAL MEDICINE | Facility: CLINIC | Age: 83
End: 2023-05-08
Payer: MEDICARE

## 2023-05-08 DIAGNOSIS — M25.552 PAIN OF LEFT HIP: Primary | ICD-10-CM

## 2023-05-08 DIAGNOSIS — M79.642 LEFT HAND PAIN: ICD-10-CM

## 2023-05-08 DIAGNOSIS — M25.642 STIFFNESS OF FINGER JOINT OF LEFT HAND: Primary | ICD-10-CM

## 2023-05-08 DIAGNOSIS — R29.898 WEAKNESS OF LEFT HIP: ICD-10-CM

## 2023-05-08 PROCEDURE — 97022 WHIRLPOOL THERAPY: CPT

## 2023-05-08 PROCEDURE — 97110 THERAPEUTIC EXERCISES: CPT

## 2023-05-08 PROCEDURE — 97140 MANUAL THERAPY 1/> REGIONS: CPT

## 2023-05-08 PROCEDURE — 97112 NEUROMUSCULAR REEDUCATION: CPT | Mod: CQ

## 2023-05-08 PROCEDURE — 97530 THERAPEUTIC ACTIVITIES: CPT

## 2023-05-08 PROCEDURE — 97530 THERAPEUTIC ACTIVITIES: CPT | Mod: CQ

## 2023-05-08 NOTE — PROGRESS NOTES
"[facility]   Physical Therapy Treatment Note     Name: Liang Monterroso Jr.  Clinic Number: [id number]    Therapy Diagnosis:   Encounter Diagnoses   Name Primary?    Pain of left hip Yes    Weakness of left hip      Physician: Russo-Digeorge, Jamie L*    Visit Date: 5/8/2023    Physician Orders: PT Eval and Treat   Medical Diagnosis from Referral: M16.12 (ICD-10-CM) - Primary osteoarthritis of left hip  L hip OA. PT eval/treat. WBAT, ROMAT, modalities prn. HEP  Evaluation Date: 5/2/2023  Authorization Period Expiration: 12/31/23  Plan of Care Expiration: 8/2/23  Progress Note Due: 6/2/23  Visit # / Visits authorized: 1/ 1 , 2/20  FOTO: 2/5     Precautions:  HTN      PTA Visit #: 1/5   Date of last FOTO:5/2/23    Time In: 9:15 am  Time Out: 10:05 am  Total Billable Time: 50 minutes    SUBJECTIVE     Pt reports: he does have some pain if he lies on the L hip, but is able to sleep on the L side.   He was compliant with home exercise program.  Response to previous treatment: no adverse affect  Functional change: none    Pain: 2/10  Location: left groin      OBJECTIVE     Objective Measures updated at progress report unless specified.     Treatment     Liang received the treatments listed below:  (new treatments are indicated in bold)    therapeutic exercises to develop strength, endurance, ROM, flexibility, posture, and core stabilization for 12 minutes including:    Recumbent bike 6' - BLE strength & endurance   Seated hamstring stretch 30" x3 B   Bridge 2x10   Shuttle DLP 2.5c 2x10      neuromuscular re-education activities to improve: Balance, Coordination, Kinesthetic, Sense, Proprioception, and Posture for 23 minutes. The following activities were included:    Short arc quad into straight leg raise 2x10   Supine isometric hip abd with belt 5" 2x10   Left lower extremity LAQ 3# 3" 2x10   Standing hip abd x15 B    SLS on airex with opposite toe down (no toe support)   -SLS on airex without BUE " support (intermittent UE)      therapeutic activities to improve functional performance for 10  minutes, including:  Lateral hurdles (ylw) x15 B         Patient Education and Home Exercises     Home Exercises Provided and Patient Education Provided     Education provided:   - HEP    Written Home Exercises Provided: yes. Exercises were reviewed and Liang was able to demonstrate them prior to the end of the session.  Liang demonstrated good  understanding of the education provided. See EMR under Patient Instructions for exercises provided during therapy sessions    ASSESSMENT   Pt did well with continued PT interventions. No progression this date. He did not use opposite foot for support during SLS on airex but opted for intermittent MAURA support with SBA. Patient externally rotated lead extremity with side stepping hurdles. Patient reported increased discomfort with L HS stretch.    Liang Is progressing well towards his goals.   Pt prognosis is Good.     Pt will continue to benefit from skilled outpatient physical therapy to address the deficits listed in the problem list box on initial evaluation, provide pt/family education and to maximize pt's level of independence in the home and community environment.     Pt's spiritual, cultural and educational needs considered and pt agreeable to plan of care and goals.     Anticipated barriers to physical therapy: none    Goals: Short Term Goals: 6 visits  1. Pt will be compliant /c HEP to supplement PT in order to improve functional tasks  2. Pt will improve B glute medius MMTs to 4-/5 grossly to improve strength for functional activities     Long Term Goals: 12 visits   1. Pt will improve L hip flexor MMTs to 4-/5 to improve strength for functional activities  2. Pt will improve FOTO score to </= 29% limitation to reduce perceived pain with mobility   3. Pt will be able to perform car transfers pain free with out using BUE to raise L leg    PLAN     Plan of care Certification:  5/2/2023 to 8/2/23.     Outpatient Physical Therapy 2 times weekly for 12 visits to include the following interventions: Manual Therapy, Moist Heat/ Ice, Neuromuscular Re-ed, Patient Education, Therapeutic Activities, and Therapeutic Exercise, E-stim & Dry Needling as needed.     Ricardo Hdez, PTA

## 2023-05-08 NOTE — PROGRESS NOTES
"  [facility]  Occupational Therapy Treatment Note    Date: 5/8/2023  Name: Liang Monterroso Jr.  Clinic Number: [id number]    Therapy Diagnosis:   Encounter Diagnoses   Name Primary?    Stiffness of finger joint of left hand Yes    Left hand pain          Physician: Russo-Digeorge, Jamie L*    Physician Orders: Patient is scheduled for ORIF left 4th prox phalanx on 4/5/23. Please eval/treat within 1-2 weeks for custom orthosis and postop rehab.  Medical Diagnosis: S62.615B (ICD-10-CM) - Open displaced fracture of proximal phalanx of left ring finger, initial encounter   Surgical Procedure and Date: 4/5/2023  Open reduction internal fixation open fracture left 4th proximal phalanx  Irrigation and debridement left dorsal hand and ring finger laceration with excisional debridement of nonviable portions of skin and subcutaneous tissue  Irrigation debridement left ring finger volar laceration in flexor zone 2 with excisional debridement of nonviable portions of skin subcutaneous tissue,   Evaluation Date: 4/19/2023  Insurance Authorization Period Expiration: 8/30/2023  Plan of Care Expiration:  6/30/23  Progress Note Due: 5/26/2023   Date of Return to MD: 5/26/2023  Visit # / Visits authorized: 6 / 20  FOTO: 4/14/23  50% limitation        Precautions:   Standard and Weightbearing, A2 pulley partially torn    Time In: 10:30 am  Time Out: 10:55 am  Total Billable Time: 55 mintues    SUBJECTIVE     Pt reports he feels it is progressing.  He was compliant with home exercise program given last session.   Response to previous treatment:increase in rom  Functional change: tolerating HEP, but limited with functional use    Pain: 0/10 , however, occasional "irritation" of the scar dorsal MP  Location: left ring finger    OBJECTIVE   Objective Measures updated at progress report unless specified.    Observation/Appearance: moderate swelling mainly in only RF now. Volar scar noted to be less tender " today.  Edema. Measured in centimeters.    4/19/2023 4/19/2023     Left Right   Wrist Crease 17.9 17.2   DPC 22.9 22.2   MCPs 22.8 22      Edema. Measured in centimeters.    4/19/2023 4/19/2023     Left Right   Ring:         P1            9 6.6    PIP            8.8 6.4   P2             7.2 5.7    DIP 6.1 5.2   P3 5.4 4.8      Elbow and Wrist ROM. Measured in degrees.    4/19/2023 4/19/2023 4/26/2023 5/4/2023     Left Right Left Left   Wrist Ext/Flex 50/80 65/80 59/71 64/80   Wrist RD/UD WFL WFL        Hand ROM. Measured in degrees.  R hand WFL, but limited with PIP and DIP flexion due to past flexor tendon repair.     4/19/2023 4/26/2023 5/4/2023     Left Left            Index: MP                   PIP                      DIP                  PRETTY WFL             Long:  MP 13/82 WNL               PIP 92                DIP 15                PRETTY 176             Ring:   MP 16/65 8/77 12/78              PIP 13/30 0/48 0/51              DIP 5/15 0/26 0/34              PRETTY 76 143            Small:  MP 15/82 WNL                PIP 40                 DIP 27                PRETTY 134             Thumb: MP WFL                  IP WFL         Rad ADD/ABD WFL         Pal ADD/ABD WFL            Opposition WFL         Strength (Dynamometer) and Pinch Strength (Pinch Gauge)  Measured in pounds.    4/19/2023 4/19/2023     Left Right   Rung II deferred deferred   Key Pinch       3pt Pinch       2pt Pinch          Sensation: reports constant numbness at PIPand P1 of RF, reports coldness in RF tip. Numbness reported in SF.     4/19/2023 4/19/2023     Left Right   [place]       Normal 1.65-2.83       Diminished Light Touch 3.22-3.61       Diminished Protective 3.84-4.31       Loss of Protective 4.56-6.65       Untestable >6.65       2 Point Discrimination       Static       Dynamic          Manual Muscle Test    4/19/2023 4/19/2023     Left Right   Wrist Extension        Wrist Flexion       Radial Deviation       Ulnar  Deviation       Supination       Pronation       Elbow Extension       Elbow Flexion                   Limitation/Restriction for FOTO Hand Survey     FOTO scores for Liang Monterroso . on 4/19/2023.   FOTO documents entered into EPIC - see Media section.     Limitation Score: 50%            Treatment     Direct contact modalities after being cleared for contraindications:None   Supervised Modalities:   Patient received fluidotherapy to L hand(s) for 10 minutes to increase blood flow, circulation, desensitization, sensory re-education and for pain management.     Manual therapy techniques: 20 min  Deep Friction Massage to the: left ring finger   Gentle PROM Ring Finger Isolated IP flex, Hook, Full Fist,  MP ext and adduction with long holds      :  Therapeutic exercises to develop ROM and flexibility for 15 minutes, including      Exercise  Reps/ TIme   AROM:  IP joint blocking ring finger 10 reps each   Gentle AAROM:  Wave  Hook  straight fist, composite fist, 15 + reps ea with place-hold for the fist at times   AROM: wave, finger lifts and spreads   X 10+ reps each    Towel walks with matheus strap in place X 3' min (NT)   Isospheres for arom with matheus strap in place X 3 min (NT)                 Therapeutic Activities 10 min  Octy Manipulation X 3 min, palm up on table   Pompom for finger flexion and extension Rolling for flexion and IP kicks for extension x 3 min   Medium/Small pompom in hand manipulation X  3 min   RF pom pom scooping to palm X 1 min, Medium /Small poms       Patient Education and Home Exercises      Education provided:   - cont HEP  -cont DFM and retrograde massages  -Use of matheus strap      Written Home Exercises Provided: Patient instructed to cont prior HEP.  Exercises were reviewed and Liang was able to demonstrate them prior to the end of the session.  Liang demonstrated good  understanding of the HEP provided. See EMR under Patient Instructions for exercises provided during therapy sessions.       ASSESSMENT      Liang is showing an increase in composite active and passive flexion, as noted upon clinical observation. Able to passively flex to @ 1.5 cm of the palm.  Liang is progressing fairly well towards his goals and there are no updates to goals at this time. Pt prognosis is Excellent.     Pt will continue to benefit from skilled outpatient occupational therapy to address the deficits listed in the problem list on initial evaluation, provide pt/family education and to maximize pt's level of independence in the home and community environment.     Pt's spiritual, cultural and educational needs considered and pt agreeable to plan of care and goals.    Anticipated barriers to occupational therapy: none      Goals:   The following goals were discussed with the patient and patient is in agreement with them as to be addressed in the treatment plan.   Long Term Goals (LTGs); to be met by discharge.  1) Pt will have an improved FOTO score by at least 20 points ---progressing  2) Pt will report improved pain no higher than 1/10 with ADLs and daily tasks---progressing  3) Pt will report return to prior level of function---progressing  4) Pt will demonstrate improved left RF AROM WFL making a composite fist for improved function with daily tasks---progressing  5) Pt will demonstrate improved edema in left RF by at least 0.4 cm for improved motion and functional use---progressing  6)  and pinch strength to be assessed when appropriate, and set goal---progressing     Short Term Goals (STGs); to be met within 4 weeks (5/19/23).  1) Pt will report pain no higher than 3/10 with ADLs---progressing  2)Pt will report or demonstrate independence with HEP and orthosis wear---progressing  3) Pt will be independent with dressing tasks---progressing  4) Pt will demonstrate improved L digits 3-5 AROM by at least 30 degrees each for improved grasp and function. ---progressing    PLAN     Continue skilled occupational therapy  with individualized plan of care focusing on rom and scar maturation    Updates/Grading for next session: Progress therapeutis activities as tolerated and appropriate. .     Mercedes Sneed, OT

## 2023-05-10 ENCOUNTER — CLINICAL SUPPORT (OUTPATIENT)
Dept: REHABILITATION | Facility: HOSPITAL | Age: 83
End: 2023-05-10
Payer: MEDICARE

## 2023-05-10 DIAGNOSIS — R29.898 WEAKNESS OF LEFT HIP: ICD-10-CM

## 2023-05-10 DIAGNOSIS — M79.642 LEFT HAND PAIN: ICD-10-CM

## 2023-05-10 DIAGNOSIS — M25.642 STIFFNESS OF FINGER JOINT OF LEFT HAND: Primary | ICD-10-CM

## 2023-05-10 DIAGNOSIS — M25.552 PAIN OF LEFT HIP: Primary | ICD-10-CM

## 2023-05-10 PROCEDURE — 97530 THERAPEUTIC ACTIVITIES: CPT

## 2023-05-10 PROCEDURE — 97110 THERAPEUTIC EXERCISES: CPT

## 2023-05-10 PROCEDURE — 97112 NEUROMUSCULAR REEDUCATION: CPT | Mod: CQ

## 2023-05-10 PROCEDURE — 97140 MANUAL THERAPY 1/> REGIONS: CPT

## 2023-05-10 PROCEDURE — 97022 WHIRLPOOL THERAPY: CPT

## 2023-05-10 PROCEDURE — 97530 THERAPEUTIC ACTIVITIES: CPT | Mod: CQ

## 2023-05-10 NOTE — PROGRESS NOTES
[facility]  Occupational Therapy Treatment Note    Date: 5/10/2023  Name: Liang Monterroso Jr.  Clinic Number: [id number]    Therapy Diagnosis:   Encounter Diagnoses   Name Primary?    Stiffness of finger joint of left hand Yes    Left hand pain            Physician: Russo-Digeorge, Jamie L*    Physician Orders: Patient is scheduled for ORIF left 4th prox phalanx on 4/5/23. Please eval/treat within 1-2 weeks for custom orthosis and postop rehab.  Medical Diagnosis: S62.615B (ICD-10-CM) - Open displaced fracture of proximal phalanx of left ring finger, initial encounter   Surgical Procedure and Date: 4/5/2023  Open reduction internal fixation open fracture left 4th proximal phalanx  Irrigation and debridement left dorsal hand and ring finger laceration with excisional debridement of nonviable portions of skin and subcutaneous tissue  Irrigation debridement left ring finger volar laceration in flexor zone 2 with excisional debridement of nonviable portions of skin subcutaneous tissue,   Evaluation Date: 4/19/2023  Insurance Authorization Period Expiration: 8/30/2023  Plan of Care Expiration:  6/30/23  Progress Note Due: 5/26/2023   Date of Return to MD: 5/26/2023  Visit # / Visits authorized: 7/ 20  FOTO: 4/14/23  50% limitation        Precautions:   Standard and Weightbearing, A2 pulley partially torn    Time In: 11:00 am  Time Out: 12:00 pm  Total Billable Time: 60 mintues    SUBJECTIVE     Pt reports he feels it is progressing.  He was compliant with home exercise program given last session.   Response to previous treatment:increase in rom  Functional change: tolerating HEP, but limited with functional use    Pain: 0/10   Location: left ring finger    OBJECTIVE   Objective Measures updated at progress report unless specified.    Observation/Appearance: moderate swelling mainly in only RF now. Volar scar noted to be less tender today.  Edema. Measured in centimeters.    4/19/2023 4/19/2023      Left Right   Wrist Crease 17.9 17.2   DPC 22.9 22.2   MCPs 22.8 22      Edema. Measured in centimeters.    4/19/2023 4/19/2023     Left Right   Ring:         P1            9 6.6    PIP            8.8 6.4   P2             7.2 5.7    DIP 6.1 5.2   P3 5.4 4.8      Elbow and Wrist ROM. Measured in degrees.    4/19/2023 4/19/2023 4/26/2023 5/4/2023     Left Right Left Left   Wrist Ext/Flex 50/80 65/80 59/71 64/80   Wrist RD/UD WFL WFL        Hand ROM. Measured in degrees.  R hand WFL, but limited with PIP and DIP flexion due to past flexor tendon repair.     4/19/2023 4/26/2023 5/4/2023     Left Left            Index: MP                   PIP                      DIP                  PRETTY WFL             Long:  MP 13/82 WNL               PIP 92                DIP 15                PRETTY 176             Ring:   MP 16/65 8/77 12/78              PIP 13/30 0/48 0/51              DIP 5/15 0/26 0/34              PRETTY 76 143            Small:  MP 15/82 WNL                PIP 40                 DIP 27                PRETTY 134             Thumb: MP WFL                  IP WFL         Rad ADD/ABD WFL         Pal ADD/ABD WFL            Opposition WFL         Strength (Dynamometer) and Pinch Strength (Pinch Gauge)  Measured in pounds.    4/19/2023 4/19/2023     Left Right   Rung II deferred deferred   Key Pinch       3pt Pinch       2pt Pinch          Sensation: reports constant numbness at PIPand P1 of RF, reports coldness in RF tip. Numbness reported in SF.     4/19/2023 4/19/2023     Left Right   [place]       Normal 1.65-2.83       Diminished Light Touch 3.22-3.61       Diminished Protective 3.84-4.31       Loss of Protective 4.56-6.65       Untestable >6.65       2 Point Discrimination       Static       Dynamic          Manual Muscle Test    4/19/2023 4/19/2023     Left Right   Wrist Extension        Wrist Flexion       Radial Deviation       Ulnar Deviation       Supination       Pronation       Elbow Extension        Elbow Flexion                   Limitation/Restriction for FOTO Hand Survey     FOTO scores for Liang Monterroso Jr. on 4/19/2023.   FOTO documents entered into EPIC - see Media section.     Limitation Score: 50%            Treatment     Direct contact modalities after being cleared for contraindications:None   Supervised Modalities:   Patient received fluidotherapy to L hand(s) for 10 minutes to increase blood flow, circulation, desensitization, sensory re-education and for pain management.     Manual therapy techniques: 20 min  Deep Friction Massage to the: left ring finger   Gentle PROM Ring Finger Isolated IP flex, Hook, Full Fist,  MP ext and adduction with long holds      :  Therapeutic exercises to develop ROM and flexibility for 20 minutes, including      Exercise  Reps/ TIme   AROM:  IP joint blocking ring finger 10 reps each   Gentle AAROM:  Wave  Hook  straight fist, composite fist, 15 + reps ea with place-hold for the fist at times   AROM: wave, finger lifts and spreads   X 10+ reps each    Paper towel X 3' min  scrunching and flattening   Isospheres for arom  X 3 min                  Therapeutic Activities 10 min  Octy Manipulation X 3 min, palm up on table   Pompom for finger flexion and extension Rolling for flexion and IP kicks for extension x 3 min   Medium/Small pompom in hand manipulation X  3 min   RF pom pom scooping to palm X 1 min, Medium /Small poms       Patient Education and Home Exercises      Education provided:   - cont HEP  -cont DFM and retrograde massages  -Use of matheus strap      Written Home Exercises Provided: Patient instructed to cont prior HEP.  Exercises were reviewed and Liang was able to demonstrate them prior to the end of the session.  Liang demonstrated good  understanding of the HEP provided. See EMR under Patient Instructions for exercises provided during therapy sessions.      ASSESSMENT      Liang continues to show progress in rom and is without complaints of pain. Able to  actively flex  to @ 1.5 cm of the palm.  Liang is progressing fairly well towards his goals and there are no updates to goals at this time. Pt prognosis is Excellent.     Pt will continue to benefit from skilled outpatient occupational therapy to address the deficits listed in the problem list on initial evaluation, provide pt/family education and to maximize pt's level of independence in the home and community environment.     Pt's spiritual, cultural and educational needs considered and pt agreeable to plan of care and goals.    Anticipated barriers to occupational therapy: none      Goals:   The following goals were discussed with the patient and patient is in agreement with them as to be addressed in the treatment plan.   Long Term Goals (LTGs); to be met by discharge.  1) Pt will have an improved FOTO score by at least 20 points ---progressing  2) Pt will report improved pain no higher than 1/10 with ADLs and daily tasks---progressing  3) Pt will report return to prior level of function---progressing  4) Pt will demonstrate improved left RF AROM WFL making a composite fist for improved function with daily tasks---progressing  5) Pt will demonstrate improved edema in left RF by at least 0.4 cm for improved motion and functional use---progressing  6)  and pinch strength to be assessed when appropriate, and set goal---progressing     Short Term Goals (STGs); to be met within 4 weeks (5/19/23).  1) Pt will report pain no higher than 3/10 with ADLs---Progressing  2)Pt will report or demonstrate independence with HEP and orthosis wear---progressing  3) Pt will be independent with dressing tasks---progressing  4) Pt will demonstrate improved L digits 3-5 AROM by at least 30 degrees each for improved grasp and function. ---progressing (Met)    PLAN     Continue skilled occupational therapy with individualized plan of care focusing on rom and scar maturation    Updates/Grading for next session: Progress therapeutis  activities as tolerated and appropriate. .   Reassess ROM and reissue FOTO.  Mercedes Sneed, OT

## 2023-05-10 NOTE — PROGRESS NOTES
"[facility]   Physical Therapy Treatment Note     Name: Liang Monterroso Jr.  Clinic Number: [id number]    Therapy Diagnosis:   Encounter Diagnoses   Name Primary?    Pain of left hip Yes    Weakness of left hip      Physician: Russo-Digeorge, Jamie L*    Visit Date: 5/10/2023    Physician Orders: PT Eval and Treat   Medical Diagnosis from Referral: M16.12 (ICD-10-CM) - Primary osteoarthritis of left hip  L hip OA. PT eval/treat. WBAT, ROMAT, modalities prn. HEP  Evaluation Date: 5/2/2023  Authorization Period Expiration: 12/31/23  Plan of Care Expiration: 8/2/23  Progress Note Due: 6/2/23  Visit # / Visits authorized: 1/ 1 , 3/20  FOTO: 2/5     Precautions:  HTN      PTA Visit #: 2/5   Date of last FOTO:5/2/23    Time In: 12:47 pm  Time Out: 1:38 pm  Total Billable Time: 51 minutes    SUBJECTIVE     Pt reports: no issues, he is about the same.  He was compliant with home exercise program.  Response to previous treatment: no adverse affect  Functional change: none    Pain: 2/10  Location: left groin      OBJECTIVE     Objective Measures updated at progress report unless specified.     Treatment     Liang received the treatments listed below:  (new treatments are indicated in bold)    therapeutic exercises to develop strength, endurance, ROM, flexibility, posture, and core stabilization for 12 minutes including:    Recumbent bike 6' - BLE strength & endurance   Seated hamstring stretch 30" x3 B   Bridge 2x10   Shuttle DLP 3c 2x10      neuromuscular re-education activities to improve: Balance, Coordination, Kinesthetic, Sense, Proprioception, and Posture for 23 minutes. The following activities were included:    Short arc quad into straight leg raise 2x10   Supine isometric hip abd with belt 5" 2x10   Left lower extremity LAQ 3# 3" 2x10   Standing hip abd 2# 2x15 B  Standing marches 2# 2x15 B  SLS on airex without BUE support (intermittent UE) 2x30"      therapeutic activities to improve functional " performance for 10  minutes, including:  Latera/Fwdl hurdles (ylw) x 3 laps        Patient Education and Home Exercises     Home Exercises Provided and Patient Education Provided     Education provided:   - HEP    Written Home Exercises Provided: yes. Exercises were reviewed and Liang was able to demonstrate them prior to the end of the session.  Liang demonstrated good  understanding of the education provided. See EMR under Patient Instructions for exercises provided during therapy sessions    ASSESSMENT   Pt presents with mild L hip discomfort. Able to increase resistance today with standing hip abduction without adverse affect. Improved SLS on airex as evidenced by decreased need for UE support t/o activity.  Liang Is progressing well towards his goals.   Pt prognosis is Good.     Pt will continue to benefit from skilled outpatient physical therapy to address the deficits listed in the problem list box on initial evaluation, provide pt/family education and to maximize pt's level of independence in the home and community environment.     Pt's spiritual, cultural and educational needs considered and pt agreeable to plan of care and goals.     Anticipated barriers to physical therapy: none    Goals: Short Term Goals: 6 visits  1. Pt will be compliant /c HEP to supplement PT in order to improve functional tasks  2. Pt will improve B glute medius MMTs to 4-/5 grossly to improve strength for functional activities     Long Term Goals: 12 visits   1. Pt will improve L hip flexor MMTs to 4-/5 to improve strength for functional activities  2. Pt will improve FOTO score to </= 29% limitation to reduce perceived pain with mobility   3. Pt will be able to perform car transfers pain free with out using BUE to raise L leg    PLAN     Plan of care Certification: 5/2/2023 to 8/2/23.     Outpatient Physical Therapy 2 times weekly for 12 visits to include the following interventions: Manual Therapy, Moist Heat/ Ice, Neuromuscular  Re-ed, Patient Education, Therapeutic Activities, and Therapeutic Exercise, E-stim & Dry Needling as needed.     Ricardo Hdez, PTA

## 2023-05-11 ENCOUNTER — CLINICAL SUPPORT (OUTPATIENT)
Dept: REHABILITATION | Facility: HOSPITAL | Age: 83
End: 2023-05-11
Payer: MEDICARE

## 2023-05-11 DIAGNOSIS — M25.642 STIFFNESS OF FINGER JOINT OF LEFT HAND: Primary | ICD-10-CM

## 2023-05-11 DIAGNOSIS — M79.642 LEFT HAND PAIN: ICD-10-CM

## 2023-05-11 PROCEDURE — 97530 THERAPEUTIC ACTIVITIES: CPT

## 2023-05-11 PROCEDURE — 97022 WHIRLPOOL THERAPY: CPT

## 2023-05-11 PROCEDURE — 97140 MANUAL THERAPY 1/> REGIONS: CPT

## 2023-05-11 PROCEDURE — 97110 THERAPEUTIC EXERCISES: CPT

## 2023-05-11 NOTE — PROGRESS NOTES
[facility]  Occupational Therapy Treatment Note    Date: 5/11/2023  Name: Liang Monterroso Jr.  Clinic Number: [id number]    Therapy Diagnosis:   Encounter Diagnoses   Name Primary?    Stiffness of finger joint of left hand Yes    Left hand pain              Physician: Russo-Digeorge, Jamie L*    Physician Orders: Patient is scheduled for ORIF left 4th prox phalanx on 4/5/23. Please eval/treat within 1-2 weeks for custom orthosis and postop rehab.  Medical Diagnosis: S62.615B (ICD-10-CM) - Open displaced fracture of proximal phalanx of left ring finger, initial encounter   Surgical Procedure and Date: 4/5/2023  Open reduction internal fixation open fracture left 4th proximal phalanx  Irrigation and debridement left dorsal hand and ring finger laceration with excisional debridement of nonviable portions of skin and subcutaneous tissue  Irrigation debridement left ring finger volar laceration in flexor zone 2 with excisional debridement of nonviable portions of skin subcutaneous tissue,   Evaluation Date: 4/19/2023  Insurance Authorization Period Expiration: 8/30/2023  Plan of Care Expiration:  6/30/23  Progress Note Due: 5/26/2023   Date of Return to MD: 5/26/2023  Visit # / Visits authorized: 7/ 20  FOTO: 4/14/23  50% limitation        Precautions:   Standard and Weightbearing, A2 pulley partially torn    Time In: 10:30 am  Time Out: 11:45 am  Total Billable Time: 70 mintues    SUBJECTIVE     Pt reports he feels it is progressing.  He was compliant with home exercise program given last session.   Response to previous treatment:increase in rom  Functional change: nothing new reported    Pain: 0/10   Location: left ring finger    OBJECTIVE   Objective Measures updated at progress report unless specified.    Observation/Appearance: moderate swelling mainly in only RF now. Volar scar noted to be less tender today.  Edema. Measured in centimeters.    4/19/2023 4/19/2023     Left Right   Wrist  Crease 17.9 17.2   DPC 22.9 22.2   MCPs 22.8 22      Edema. Measured in centimeters.    4/19/2023 4/19/2023     Left Right   Ring:         P1            9 6.6    PIP            8.8 6.4   P2             7.2 5.7    DIP 6.1 5.2   P3 5.4 4.8      Elbow and Wrist ROM. Measured in degrees.    4/19/2023 4/19/2023 4/26/2023 5/4/2023      Left Right Left Left    Wrist Ext/Flex 50/80 65/80 59/71 64/80    Wrist RD/UD WFL WFL         Hand ROM. Measured in degrees.  R hand WFL, but limited with PIP and DIP flexion due to past flexor tendon repair.     4/19/2023 4/26/2023 5/4/2023 5/11/2023     Left Left              Index: MP                    PIP                       DIP                   PRETTY WFL               Long:  MP 13/82 WNL                PIP 92                 DIP 15                 PRETTY 176               Ring:   MP 16/65 8/77 12/78 14/83              PIP 13/30 0/48 0/51 0/63              DIP 5/15 0/26 0/34 0/33              PRETTY 76 143              Small:  MP 15/82 WNL                 PIP 40                  DIP 27                 PRETTY 134               Thumb: MP WFL                   IP WFL          Rad ADD/ABD WFL          Pal ADD/ABD WFL             Opposition WFL          Strength (Dynamometer) and Pinch Strength (Pinch Gauge)  Measured in pounds.    4/19/2023 4/19/2023     Left Right   Rung II deferred deferred   Key Pinch       3pt Pinch       2pt Pinch          Sensation: reports constant numbness at PIPand P1 of RF, reports coldness in RF tip. Numbness reported in SF.     4/19/2023 4/19/2023     Left Right   [place] Naty       Normal 1.65-2.83       Diminished Light Touch 3.22-3.61       Diminished Protective 3.84-4.31       Loss of Protective 4.56-6.65       Untestable >6.65       2 Point Discrimination       Static       Dynamic          Manual Muscle Test    4/19/2023 4/19/2023     Left Right   Wrist Extension        Wrist Flexion       Radial Deviation       Ulnar Deviation       Supination        Pronation       Elbow Extension       Elbow Flexion                   Limitation/Restriction for FOTO Hand Survey     FOTO scores for Liang Monterroso . on 4/19/2023.   FOTO documents entered into [employer] - see Media section.     Limitation Score: 50%            Treatment     Direct contact modalities after being cleared for contraindications:None   Supervised Modalities:   Patient received fluidotherapy to L hand(s) for 10 minutes to increase blood flow, circulation, desensitization, sensory re-education and for pain management.     Manual therapy techniques: 20 min  Deep Friction Massage to the: left ring finger   Gentle PROM Ring Finger Isolated IP flex, Hook, Full Fist,  MP ext and adduction with long holds      :  Therapeutic exercises to develop ROM and flexibility for 40 minutes, including    reassessment of arom  Fabrication of a relative motion splint to be worn during exercises to promote increase in IP flex and MP ext    Exercise  Reps/ TIme   AROM:  IP joint blocking ring finger 10 reps each   Gentle AAROM:  Wave  Hook  straight fist, composite fist, 15 + reps ea with place-hold for the fist at times   AROM: wave, finger lifts and spreads   X 10+ reps each (NT)   Paper towel X 3' min  scrunching and flattening (NT)   Isospheres for arom  X 3 min                  Therapeutic Activities 9 min  Octy Manipulation X 3 min, palm up on table (NT)   Pompom for finger flexion and extension Rolling for flexion and IP kicks for extension x 3 min   Medium/Small pompom in hand manipulation X  3 min   RF pom pom scooping to palm X 3 min, Medium /Small poms       Patient Education and Home Exercises      Education provided:   - cont HEP  -cont DFM and retrograde massages  -Use of matheus strap  - use of relative motion exercise orthosis    Written Home Exercises Provided: Patient instructed to cont prior HEP.  Exercises were reviewed and Liang was able to demonstrate them prior to the end of the session.  Liang demonstrated  good  understanding of the HEP provided. See EMR under Patient Instructions for exercises provided during therapy sessions.      ASSESSMENT     Upon formal reassessment Liang shows further progress in MP and PIP flexion. MP flexion contracture persists. Volar scarring appears to contribute to this, He is without complaints of pain. Fabricated a yoke splint to promote increase in IP flexion and MP ext through the principles of relative motion. Post heat and rom, he was able to maintain tip of DIP to the palm.  Liang is progressing  well towards his goals and there are no updates to goals at this time. Pt prognosis is Excellent. He is going out of town for a couple of weeks and will resume formal therapy at that time. He is to continue with where of the protective orthosis. Liang reports that GILL instructed him to do so when out of the house and while sleeping as instructed by GILL.     Pt will continue to benefit from skilled outpatient occupational therapy to address the deficits listed in the problem list on initial evaluation, provide pt/family education and to maximize pt's level of independence in the home and community environment.     Pt's spiritual, cultural and educational needs considered and pt agreeable to plan of care and goals.    Anticipated barriers to occupational therapy: none      Goals:   The following goals were discussed with the patient and patient is in agreement with them as to be addressed in the treatment plan.   Long Term Goals (LTGs); to be met by discharge.  1) Pt will have an improved FOTO score by at least 20 points ---progressing  2) Pt will report improved pain no higher than 1/10 with ADLs and daily tasks---progressing  3) Pt will report return to prior level of function---progressing  4) Pt will demonstrate improved left RF AROM WFL making a composite fist for improved function with daily tasks---progressing  5) Pt will demonstrate improved edema in left RF by at least 0.4 cm for  improved motion and functional use---progressing  6)  and pinch strength to be assessed when appropriate, and set goal---progressing     Short Term Goals (STGs); to be met within 4 weeks (5/19/23).  1) Pt will report pain no higher than 3/10 with ADLs---Progressing  2)Pt will report or demonstrate independence with HEP and orthosis wear---progressing  3) Pt will be independent with dressing tasks---progressing  4) Pt will demonstrate improved L digits 3-5 AROM by at least 30 degrees each for improved grasp and function. ---progressing (Met)    PLAN     Continue skilled occupational therapy with individualized plan of care focusing on rom and scar maturation    Updates/Grading for next session: Progress therapeutis activities as tolerated and appropriate. .   Reassess ROM and reissue FOTO.  Mercedes Sneed, OT

## 2023-05-22 ENCOUNTER — CLINICAL SUPPORT (OUTPATIENT)
Dept: REHABILITATION | Facility: HOSPITAL | Age: 83
End: 2023-05-22
Payer: MEDICARE

## 2023-05-22 DIAGNOSIS — M25.552 PAIN OF LEFT HIP: Primary | ICD-10-CM

## 2023-05-22 DIAGNOSIS — M79.642 LEFT HAND PAIN: ICD-10-CM

## 2023-05-22 DIAGNOSIS — M25.642 STIFFNESS OF FINGER JOINT OF LEFT HAND: Primary | ICD-10-CM

## 2023-05-22 DIAGNOSIS — R29.898 WEAKNESS OF LEFT HIP: ICD-10-CM

## 2023-05-22 PROCEDURE — 97112 NEUROMUSCULAR REEDUCATION: CPT

## 2023-05-22 PROCEDURE — 97110 THERAPEUTIC EXERCISES: CPT

## 2023-05-22 PROCEDURE — 97140 MANUAL THERAPY 1/> REGIONS: CPT

## 2023-05-22 PROCEDURE — 97530 THERAPEUTIC ACTIVITIES: CPT

## 2023-05-22 PROCEDURE — 97022 WHIRLPOOL THERAPY: CPT | Mod: 59

## 2023-05-22 NOTE — PROGRESS NOTES
[facility]  Occupational Therapy Treatment Note    Date: 5/22/2023  Name: Liang Monterroso Jr.  Clinic Number: [id number]    Therapy Diagnosis:   Encounter Diagnoses   Name Primary?    Stiffness of finger joint of left hand Yes    Left hand pain                Physician: Russo-Digeorge, Jamie L*    Physician Orders: Patient is scheduled for ORIF left 4th prox phalanx on 4/5/23. Please eval/treat within 1-2 weeks for custom orthosis and postop rehab.  Medical Diagnosis: S62.615B (ICD-10-CM) - Open displaced fracture of proximal phalanx of left ring finger, initial encounter   Surgical Procedure and Date: 4/5/2023  Open reduction internal fixation open fracture left 4th proximal phalanx  Irrigation and debridement left dorsal hand and ring finger laceration with excisional debridement of nonviable portions of skin and subcutaneous tissue  Irrigation debridement left ring finger volar laceration in flexor zone 2 with excisional debridement of nonviable portions of skin subcutaneous tissue,   Evaluation Date: 4/19/2023  Insurance Authorization Period Expiration: 8/30/2023  Plan of Care Expiration:  6/30/23  Progress Note Due: 5/26/2023   Date of Return to MD: 5/26/2023  Visit # / Visits authorized: 7/ 20  FOTO: 4/14/23  50% limitation        Precautions:   Standard and Weightbearing, A2 pulley partially torn    Time In: 11:07 am  Time Out: 1213 pm  Total Billable Time: 61 minutes    SUBJECTIVE     Pt reports he feels that the finger has regressed. Stated that he was compliant with HEP and use of yoke splint as prescribed. He has also continued to wear protective orthosis. He is 6 weeks and 5 days post sx.  He was compliant with home exercise program given last session.   Response to previous treatment:increase in rom  Functional change: nothing new reported    Pain: 0/10   Location: left ring finger    OBJECTIVE   Objective Measures updated at progress report unless  specified.    Observation/Appearance: moderate swelling mainly in only RF now. Volar scar noted to be less tender today.  Edema. Measured in centimeters.    4/19/2023 4/19/2023     Left Right   Wrist Crease 17.9 17.2   DPC 22.9 22.2   MCPs 22.8 22      Edema. Measured in centimeters.    4/19/2023 4/19/2023     Left Right   Ring:         P1            9 6.6    PIP            8.8 6.4   P2             7.2 5.7    DIP 6.1 5.2   P3 5.4 4.8      Elbow and Wrist ROM. Measured in degrees.    4/19/2023 4/19/2023 4/26/2023 5/4/2023      Left Right Left Left    Wrist Ext/Flex 50/80 65/80 59/71 64/80    Wrist RD/UD WFL WFL      Elbow           Hand ROM. Measured in degrees.  R hand WFL, but limited with PIP and DIP flexion due to past flexor tendon repair.     4/19/2023 4/26/2023 5/4/2023 5/11/2023 5/26/2023 5/26/2023     Left Left   Left Pre tx Left Post tx              Index: MP                      PIP                         DIP                     PRETTY WFL                   Long:  MP 13/82 WNL                  PIP 92                   DIP 15                   PRETTY 176                   Ring:   MP 16/65 8/77 12/78 14/83 16/79 10/87              PIP 13/30 0/48 0/51 0/63 0/43 /67              DIP 5/15 0/26 0/34 0/33 0/16 /29              PRETTY 76 143                  Small:  MP 15/82 WNL                   PIP 40                    DIP 27                   PRETTY 134                   Thumb: MP WFL                     IP WFL            Rad ADD/ABD WFL            Pal ADD/ABD WFL               Opposition WFL            Strength (Dynamometer) and Pinch Strength (Pinch Gauge)  Measured in pounds.    4/19/2023 4/19/2023     Left Right   Rung II deferred deferred   Key Pinch       3pt Pinch       2pt Pinch          Sensation: reports constant numbness at PIPand P1 of RF, reports coldness in RF tip. Numbness reported in SF.     4/19/2023 4/19/2023     Left Right   [place] Naty       Normal 1.65-2.83       Diminished Light Touch  3.22-3.61       Diminished Protective 3.84-4.31       Loss of Protective 4.56-6.65       Untestable >6.65       2 Point Discrimination       Static       Dynamic                   Limitation/Restriction for FOTO Hand Survey     FOTO scores for Liang Monterroso  on 4/19/2023.   FOTO documents entered into [employer] - see Media section.     Limitation Score: 50%            Treatment     Direct contact modalities after being cleared for contraindications:None   Supervised Modalities:   Patient received fluidotherapy to L hand with ring and small finger taped into flexion for 10 minutes to increase blood flow, circulation, and increase soft tissue extensibility.    Manual therapy techniques: 20 min  Deep Friction Scar Massage to the: left ring finger   Gentle PROM Ring Fing: Hook, Full Fist,  MP ext and adduction with long holds      :  Therapeutic exercises to develop ROM and flexibility for 25 minutes, including    reassessment of arom  Fabrication of a relative motion splint to be worn during exercises to promote increase in IP flex and MP ext    Exercise  Reps/ TIme   AROM:  IP joint blocking ring finger 10 reps each   Gentle AAROM:  Wave  Hook  straight fist, composite fist, 15 + reps ea with place-hold for the fist at times   AROM: wave, finger lifts and spreads   X 10+ reps each (NT)   Paper towel X 3' min  scrunching and flattening (NT)   Isospheres for arom  X 3 min                  Therapeutic Activities 6 min  Octy Manipulation X 3 min, palm up on table (NT)   Pompom for finger flexion and extension Rolling for flexion and IP kicks for extension x 3 min   Medium/Small pompom in hand manipulation X  3 min           Patient Education and Home Exercises      Education provided:   - cont HEP  -cont DFM and retrograde massages  -Use of matheus strap  - use of relative motion exercise orthosis    Written Home Exercises Provided: Patient instructed to cont prior HEP.  Exercises were reviewed and Liang was able to demonstrate  them prior to the end of the session.  Liang demonstrated good  understanding of the HEP provided. See EMR under Patient Instructions for exercises provided during therapy sessions.      ASSESSMENT     Upon formal reassessment Liang shows regression in ring finger rom as compared to prior to his recent vacation. He stated that he has been compliant with HEP and all indtuctions. MP flexion contracture persists. Volar scarring appears to contribute to this, He is without complaints of pain.  Post heat, reassessment showed a very good response to heat and rom.   Liang has had a set back towards his goals and there are no updates to goals at this time. Pt prognosis is Good He is going out of town for a couple of weeks and will resume formal therapy at that time. He is to continue with where of the protective orthosis. Liang reports that GILL instructed him to do so when out of the house and while sleeping as instructed by GILL.     Pt will continue to benefit from skilled outpatient occupational therapy to address the deficits listed in the problem list on initial evaluation, provide pt/family education and to maximize pt's level of independence in the home and community environment.     Pt's spiritual, cultural and educational needs considered and pt agreeable to plan of care and goals.    Anticipated barriers to occupational therapy: none      Goals:   The following goals were discussed with the patient and patient is in agreement with them as to be addressed in the treatment plan.   Long Term Goals (LTGs); to be met by discharge.  1) Pt will have an improved FOTO score by at least 20 points ---progressing  2) Pt will report improved pain no higher than 1/10 with ADLs and daily tasks---progressing  3) Pt will report return to prior level of function---progressing  4) Pt will demonstrate improved left RF AROM WFL making a composite fist for improved function with daily tasks---progressing  5) Pt will demonstrate improved  edema in left RF by at least 0.4 cm for improved motion and functional use---progressing  6)  and pinch strength to be assessed when appropriate, and set goal---progressing     Short Term Goals (STGs); to be met within 4 weeks (5/19/23).  1) Pt will report pain no higher than 3/10 with ADLs---Progressing  2)Pt will report or demonstrate independence with HEP and orthosis wear---MET 5/22/23  3) Pt will be independent with dressing tasks---MET 5/22/23  4) Pt will demonstrate improved L digits 3-5 AROM by at least 30 degrees each for improved grasp and function. ---progressing (Met)    PLAN     Continue skilled occupational therapy with individualized plan of care focusing on rom and scar maturation    Updates/Grading for next session: Progress therapeutis activities as tolerated and appropriate. .   Reassess ROM and reissue FOTO.  Mercedes Sneed, OT

## 2023-05-22 NOTE — PROGRESS NOTES
"[facility]   Physical Therapy Treatment Note     Name: Liang Monterroso Jr.  Clinic Number: [id number]    Therapy Diagnosis:   Encounter Diagnoses   Name Primary?    Pain of left hip Yes    Weakness of left hip        Physician: Russo-Digeorge, Jamie L*    Visit Date: 5/22/2023    Physician Orders: PT Eval and Treat   Medical Diagnosis from Referral: M16.12 (ICD-10-CM) - Primary osteoarthritis of left hip  L hip OA. PT eval/treat. WBAT, ROMAT, modalities prn. HEP  Evaluation Date: 5/2/2023  Authorization Period Expiration: 12/31/23  Plan of Care Expiration: 8/2/23  Progress Note Due: 6/2/23  Visit # / Visits authorized: 1/ 1 , 4/20  FOTO: 5/5     Precautions:  HTN      PTA Visit #: 2/5   Date of last FOTO:5/2/23    Time In: 8:20m  Time Out: 9:15am  Total Billable Time: 55minutes    SUBJECTIVE     Pt reports: he is still having some groin pain with sneezing and crossing his legs, but it is much less severe than it used to be     He was compliant with home exercise program.  Response to previous treatment: no adverse affect  Functional change: none    Pain: 2/10  Location: left groin      OBJECTIVE     Objective Measures updated at progress report unless specified.     Treatment     Liang received the treatments listed below:  (new treatments are indicated in bold)    therapeutic exercises to develop strength, endurance, ROM, flexibility, posture, and core stabilization for 15 minutes including:    Recumbent bike 6' Lvl 2- BLE strength & endurance   Seated hamstring stretch 30" x3 B   B piriformis stretch 30" x1      neuromuscular re-education activities to improve: Balance, Coordination, Kinesthetic, Sense, Proprioception, and Posture for 15 minutes. The following activities were included:    Short arc quad into straight leg raise 2x10  not performed    Straight leg raise BLE x10   Supine isometric hip abd with pilates ring 5" 2x10   Left lower extremity LAQ 3# 3" 2x10 not performed    Standing " "hip abd Green TB 2x15 B   -standing hip Ext Green TB 2x10   Standing marches 2# 2x15 B not performed    SLS on airex without BUE support (intermittent UE) 2x30" not performed        therapeutic activities to improve functional performance for 25 minutes, including:    Bridge 3x10   Shuttle DLP 3c 2x10   -SLP 2c x20 B   Lateral step ups Lvl 3 step x20 B   Lateral hurdles orange  x 2 laps    Patient Education and Home Exercises     Home Exercises Provided and Patient Education Provided     Education provided:   - HEP    Written Home Exercises Provided: yes. Exercises were reviewed and Liang was able to demonstrate them prior to the end of the session.  Liang demonstrated good  understanding of the education provided. See EMR under Patient Instructions for exercises provided during therapy sessions    ASSESSMENT   Pt demonstrated improving L hip flexion/quad strength with ability to perform straight leg raise today, although fatigued after 10 reps and mild extensor lag noted. PT progressed functional BLE strength training with single leg shuttle and increased reps with bridging for ease with sit<>stand transfers. PT added piriformis stretching for improved hip mobility with ADLs.     Liang Is progressing well towards his goals.   Pt prognosis is Good.     Pt will continue to benefit from skilled outpatient physical therapy to address the deficits listed in the problem list box on initial evaluation, provide pt/family education and to maximize pt's level of independence in the home and community environment.     Pt's spiritual, cultural and educational needs considered and pt agreeable to plan of care and goals.     Anticipated barriers to physical therapy: none    Goals: Short Term Goals: 6 visits  1. Pt will be compliant /c HEP to supplement PT in order to improve functional tasks (progressing)   2. Pt will improve B glute medius MMTs to 4-/5 grossly to improve strength for functional activities (progressing)      Long " Term Goals: 12 visits   1. Pt will improve L hip flexor MMTs to 4-/5 to improve strength for functional activities (progressing)   2. Pt will improve FOTO score to </= 29% limitation to reduce perceived pain with mobility  (progressing)   3. Pt will be able to perform car transfers pain free with out using BUE to raise L leg (progressing)     PLAN     Plan of care Certification: 5/2/2023 to 8/2/23.     Outpatient Physical Therapy 2 times weekly for 12 visits to include the following interventions: Manual Therapy, Moist Heat/ Ice, Neuromuscular Re-ed, Patient Education, Therapeutic Activities, and Therapeutic Exercise, E-stim & Dry Needling as needed.     Marleny Funez, PT

## 2023-05-23 NOTE — PROGRESS NOTES
[facility]  Occupational Therapy Treatment Note    Date: 5/24/2023  Name: Liang Monterroso Jr.  Clinic Number: [id number]    Therapy Diagnosis:   Encounter Diagnoses   Name Primary?    Stiffness of finger joint of left hand Yes    Left hand pain          Physician: Russo-Digeorge, Jamie L*    Physician Orders: Patient is scheduled for ORIF left 4th prox phalanx on 4/5/23. Please eval/treat within 1-2 weeks for custom orthosis and postop rehab.  Medical Diagnosis: S62.615B (ICD-10-CM) - Open displaced fracture of proximal phalanx of left ring finger, initial encounter   Surgical Procedure and Date: 4/5/2023  Open reduction internal fixation open fracture left 4th proximal phalanx  Irrigation and debridement left dorsal hand and ring finger laceration with excisional debridement of nonviable portions of skin and subcutaneous tissue  Irrigation debridement left ring finger volar laceration in flexor zone 2 with excisional debridement of nonviable portions of skin subcutaneous tissue,   Evaluation Date: 4/19/2023  Insurance Authorization Period Expiration: 8/30/2023  Plan of Care Expiration:  6/30/23  Progress Note Due: 5/26/2023   Date of Return to MD: 5/26/2023  Visit # / Visits authorized: 11/ 20  FOTO: 4/14/23  50% limitation               5/24 23 50% limitation     Precautions:   Standard and Weightbearing, A2 pulley partially torn    Time In: 10:40 am  Time Out: 11:45 am  Total Billable Time: 60 minutes    SUBJECTIVE     Pt reports his fingers are stiffer in morning after wearing protective  orthosis. He is scheduled to see his surgeon on Friday. Anticipate discharge of orthosis. He is 7 weeks and 1 days post sx.  He was compliant with home exercise program given last session.   Response to previous treatment:increase in rom  Functional change: nothing new reported    Pain: 0/10   Location: left ring finger    OBJECTIVE   Objective Measures updated at progress report unless  specified.    Observation/Appearance: moderate swelling mainly in only RF now. Volar scar noted to be less tender today.  Edema. Measured in centimeters.    4/19/2023 4/19/2023     Left Right   Wrist Crease 17.9 17.2   DPC 22.9 22.2   MCPs 22.8 22      Edema. Measured in centimeters.    4/19/2023 4/19/2023     Left Right   Ring:         P1            9 6.6    PIP            8.8 6.4   P2             7.2 5.7    DIP 6.1 5.2   P3 5.4 4.8      Elbow and Wrist ROM. Measured in degrees.    4/19/2023 4/19/2023 4/26/2023 5/4/2023      Left Right Left Left    Wrist Ext/Flex 50/80 65/80 59/71 64/80    Wrist RD/UD WFL WFL      Elbow           Hand ROM. Measured in degrees.  R hand WFL, but limited with PIP and DIP flexion due to past flexor tendon repair.     4/19/2023 4/26/2023 5/4/2023 5/11/2023 5/26/2023 5/26/2023     Left Left   Left Pre tx Left Post tx              Index: MP                      PIP                         DIP                     PRETTY WFL                   Long:  MP 13/82 WNL                  PIP 92                   DIP 15                   PRETTY 176                   Ring:   MP 16/65 8/77 12/78 14/83 16/79 10/87              PIP 13/30 0/48 0/51 0/63 0/43 /67              DIP 5/15 0/26 0/34 0/33 0/16 /29              PRETTY 76 143                  Small:  MP 15/82 WNL                   PIP 40                    DIP 27                   PRETTY 134                   Thumb: MP WFL                     IP WFL            Rad ADD/ABD WFL            Pal ADD/ABD WFL               Opposition WFL            Strength (Dynamometer) and Pinch Strength (Pinch Gauge)  Measured in pounds.    4/19/2023 4/19/2023     Left Right   Rung II deferred deferred   Key Pinch       3pt Pinch       2pt Pinch          Sensation: reports constant numbness at PIPand P1 of RF, reports coldness in RF tip. Numbness reported in SF.     4/19/2023 4/19/2023     Left Right   Erath Naty       Normal 1.65-2.83       Diminished Light Touch  3.22-3.61       Diminished Protective 3.84-4.31       Loss of Protective 4.56-6.65       Untestable >6.65       2 Point Discrimination       Static       Dynamic                   Limitation/Restriction for FOTO Hand Survey     FOTO scores for Liang Monterroso Jr. on 4/19/2023.   FOTO documents entered into [facility] - see Media section.     Limitation Score: 50%            Treatment     Direct contact modalities after being cleared for contraindications:None   Supervised Modalities:   Patient received fluidotherapy to L hand with ring and small finger taped into flexion for 10 minutes to increase blood flow, circulation, and increase soft tissue extensibility.    Manual therapy techniques: 20 min  Deep Friction Scar Massage to the: left ring finger   Gentle PROM Ring Fing: Hook, Full Fist,  MP ext and adduction with long holds      :  Therapeutic exercises to develop ROM and flexibility for 18 minutes, including     Exercise  Reps/ TIme   AROM:  IP joint blocking, add ring finger 10 reps each,   Gentle AAROM:  Wave  Hook  straight fist, composite fist 15 + reps ea    AROM: wave, finger lifts and spreads   X 10+ reps each (NT)   adduction Pressing pink sponge squares x 10 reps. Added to HEP     Therapeutic Activities 12 min  Octy Manipulation X 3 min, palm up on table   Pompom for finger flexion and extension Rolling for flexion and IP kicks for extension x 3 min   Medium/Small pompom in hand manipulation X  3 min   Pompom pickup with ring and palm Medium pompoms x 2 min       Patient Education and Home Exercises      Education provided:   - cont HEP  -cont DFM and retrograde massages  -Use of matheus strap  - use of relative motion exercise orthosis    Written Home Exercises Provided: Patient instructed to cont prior HEP.  Exercises were reviewed and Liang was able to demonstrate them prior to the end of the session.  Liang demonstrated good  understanding of the HEP provided. See EMR under Patient Instructions for exercises  provided during therapy sessions.      ASSESSMENT     FOTO was readministered today. No change noted in score. This is likley due to continued wear of protective orthosis when sleeping and when out of the house.  MP flexion contracture persists. Volar scarring appears to contribute to this. IP flexion deficits persists, however, he responds well to heat and stretch. He is without complaints of pain.      Liang is working hard towards his goals and there are no updates to goals at this time. Pt prognosis is Good. Liang reports that GILL instructed him to do so when out of the house and while sleeping as instructed by GILL.     Pt will continue to benefit from skilled outpatient occupational therapy to address the deficits listed in the problem list on initial evaluation, provide pt/family education and to maximize pt's level of independence in the home and community environment.     Pt's spiritual, cultural and educational needs considered and pt agreeable to plan of care and goals.    Anticipated barriers to occupational therapy: none      Goals:   The following goals were discussed with the patient and patient is in agreement with them as to be addressed in the treatment plan.   Long Term Goals (LTGs); to be met by discharge.  1) Pt will have an improved FOTO score by at least 20 points ---progressing  2) Pt will report improved pain no higher than 1/10 with ADLs and daily tasks---progressing  3) Pt will report return to prior level of function---progressing  4) Pt will demonstrate improved left RF AROM WFL making a composite fist for improved function with daily tasks---progressing  5) Pt will demonstrate improved edema in left RF by at least 0.4 cm for improved motion and functional use---progressing  6)  and pinch strength to be assessed when appropriate, and set goal---progressing     Short Term Goals (STGs); to be met within 4 weeks (5/19/23).  1) Pt will report pain no higher than 3/10 with  ADLs---Progressing  2)Pt will report or demonstrate independence with HEP and orthosis wear---MET 5/22/23  3) Pt will be independent with dressing tasks---MET 5/22/23  4) Pt will demonstrate improved L digits 3-5 AROM by at least 30 degrees each for improved grasp and function. ---progressing (Met)    PLAN     Continue skilled occupational therapy with individualized plan of care focusing on rom and scar maturation    Updates/Grading for next session: Progress therapeutis activities as tolerated and appropriate. .   Reassess ROM and reissue FOTO.  Mercedes Sneed, OT

## 2023-05-23 NOTE — PROGRESS NOTES
"[facility]   Physical Therapy Treatment Note     Name: Liang Monterroso Jr.  Clinic Number: [id number]    Therapy Diagnosis:   Encounter Diagnoses   Name Primary?    Pain of left hip Yes    Weakness of left hip          Physician: Russo-Digeorge, Jamie L*    Visit Date: 5/24/2023    Physician Orders: PT Eval and Treat   Medical Diagnosis from Referral: M16.12 (ICD-10-CM) - Primary osteoarthritis of left hip  L hip OA. PT eval/treat. WBAT, ROMAT, modalities prn. HEP  Evaluation Date: 5/2/2023  Authorization Period Expiration: 12/31/23  Plan of Care Expiration: 8/2/23  Progress Note Due: 6/2/23  Visit # / Visits authorized: 1/ 1 , 5/20  FOTO: 5/5     Precautions:  HTN      PTA Visit #: 2/5   Date of last FOTO:5/2/23    Time In: 1pm  Time Out: 1:45pm  Total Billable Time: 45 minutes    SUBJECTIVE     Pt reports: he is not having much pain today     He was compliant with home exercise program.  Response to previous treatment: no adverse affect  Functional change: none    Pain: 2/10  Location: left groin      OBJECTIVE     Objective Measures updated at progress report unless specified.     Treatment     Liang received the treatments listed below:  (new treatments are indicated in bold)    therapeutic exercises to develop strength, endurance, ROM, flexibility, posture, and core stabilization for 15 minutes including:    Recumbent bike 6' Lvl 2- BLE strength & endurance   Seated hamstring stretch 30" x3 B   B piriformis stretch 30" x2 B       manual therapy techniques: were applied to the: left lower extremity  for 8 minutes, including:    Long axis distraction left lower extremity  L hip internal rotation joint mobs with belt        neuromuscular re-education activities to improve: Balance, Coordination, Kinesthetic, Sense, Proprioception, and Posture for 11 minutes. The following activities were included:    Step ups BOSU with contralateral hip flex x20 ea  Standing marches airex (single UE " "support)  SLS on airex without BUE support (intermittent UE) 2x30"       Short arc quad into straight leg raise 2x10  not performed    Straight leg raise BLE x10   Supine isometric hip abd with pilates ring 5" 2x10   Left lower extremity LAQ 3# 3" 2x10 not performed    Standing hip abd Green TB 2x15 B   -standing hip Ext Green TB 2x10         therapeutic activities to improve functional performance for 11 minutes, including:    Lateral stepping 2 laps B Green TB   Shuttle DLP 3c 3x10   -SLP 2c 3x10 B     Bridge 3x10 not performed    Lateral step ups Lvl 3 step x20 B not performed    Lateral hurdles orange  x 2 laps not performed      Patient Education and Home Exercises     Home Exercises Provided and Patient Education Provided     Education provided:   - HEP    Written Home Exercises Provided: yes. Exercises were reviewed and Liang was able to demonstrate them prior to the end of the session.  Liang demonstrated good  understanding of the education provided. See EMR under Patient Instructions for exercises provided during therapy sessions    ASSESSMENT   No change in symptoms reported with long axis hip distraction, mild relief in pain with L hip internal rotation following internal rotation joint mobs. Pt continues to have some pain with L straight leg raise. Glute medius strength training progressed with resisted lateral stepping for improved hip stability and reduced pain with community ambulation.     Liang Is progressing well towards his goals.   Pt prognosis is Good.     Pt will continue to benefit from skilled outpatient physical therapy to address the deficits listed in the problem list box on initial evaluation, provide pt/family education and to maximize pt's level of independence in the home and community environment.     Pt's spiritual, cultural and educational needs considered and pt agreeable to plan of care and goals.     Anticipated barriers to physical therapy: none    Goals: Short Term Goals: 6 " visits  1. Pt will be compliant /c HEP to supplement PT in order to improve functional tasks (progressing)   2. Pt will improve B glute medius MMTs to 4-/5 grossly to improve strength for functional activities (progressing)      Long Term Goals: 12 visits   1. Pt will improve L hip flexor MMTs to 4-/5 to improve strength for functional activities (progressing)   2. Pt will improve FOTO score to </= 29% limitation to reduce perceived pain with mobility  (progressing)   3. Pt will be able to perform car transfers pain free with out using BUE to raise L leg (progressing)     PLAN     Plan of care Certification: 5/2/2023 to 8/2/23.     Outpatient Physical Therapy 2 times weekly for 12 visits to include the following interventions: Manual Therapy, Moist Heat/ Ice, Neuromuscular Re-ed, Patient Education, Therapeutic Activities, and Therapeutic Exercise, E-stim & Dry Needling as needed.     Marleny Funez, PT

## 2023-05-24 ENCOUNTER — CLINICAL SUPPORT (OUTPATIENT)
Dept: REHABILITATION | Facility: HOSPITAL | Age: 83
End: 2023-05-24
Payer: MEDICARE

## 2023-05-24 DIAGNOSIS — M25.552 PAIN OF LEFT HIP: Primary | ICD-10-CM

## 2023-05-24 DIAGNOSIS — R29.898 WEAKNESS OF LEFT HIP: ICD-10-CM

## 2023-05-24 DIAGNOSIS — M25.642 STIFFNESS OF FINGER JOINT OF LEFT HAND: Primary | ICD-10-CM

## 2023-05-24 DIAGNOSIS — M79.642 LEFT HAND PAIN: ICD-10-CM

## 2023-05-24 PROCEDURE — 97140 MANUAL THERAPY 1/> REGIONS: CPT

## 2023-05-24 PROCEDURE — 97530 THERAPEUTIC ACTIVITIES: CPT

## 2023-05-24 PROCEDURE — 97110 THERAPEUTIC EXERCISES: CPT

## 2023-05-24 PROCEDURE — 97112 NEUROMUSCULAR REEDUCATION: CPT

## 2023-05-24 PROCEDURE — 97022 WHIRLPOOL THERAPY: CPT | Mod: 59

## 2023-05-25 ENCOUNTER — PATIENT MESSAGE (OUTPATIENT)
Dept: ORTHOPEDICS | Facility: CLINIC | Age: 83
End: 2023-05-25
Payer: MEDICARE

## 2023-05-25 DIAGNOSIS — S62.615B OPEN DISPLACED FRACTURE OF PROXIMAL PHALANX OF LEFT RING FINGER, INITIAL ENCOUNTER: Primary | ICD-10-CM

## 2023-05-25 NOTE — PROGRESS NOTES
[facility]  Occupational Therapy Treatment Note    Date: 5/29/2023  Name: Laing Monterroso Jr.  Clinic Number: [id number]    Therapy Diagnosis:   Encounter Diagnoses   Name Primary?    Stiffness of finger joint of left hand Yes    Left hand pain            Physician: Russo-Digeorge, Jamie L*    Physician Orders: Continue with OT for aggressive ROM of the finger. Discontinue orthosis. He may have WBAT for his ADLs, but avoid unnecessary WB at this time.  Medical Diagnosis: S62.615B (ICD-10-CM) - Open displaced fracture of proximal phalanx of left ring finger, initial encounter   Surgical Procedure and Date: 4/5/2023  Open reduction internal fixation open fracture left 4th proximal phalanx  Irrigation and debridement left dorsal hand and ring finger laceration with excisional debridement of nonviable portions of skin and subcutaneous tissue  Irrigation debridement left ring finger volar laceration in flexor zone 2 with excisional debridement of nonviable portions of skin subcutaneous tissue,   Evaluation Date: 4/19/2023  Insurance Authorization Period Expiration: 8/30/2023  Plan of Care Expiration:  6/30/23  Progress Note Due: 5/26/2023   Date of Return to MD: 5/26/2023  Visit # / Visits authorized: 11/ 20  FOTO: 4/14/23  50% limitation               5/24 23 50% limitation     Precautions:   Standard and Weightbearing, A2 pulley partially torn    Time In: 11:00 am  Time Out: 12:09  pm  Total Billable Time: 65 minutes    SUBJECTIVE     Pt reports his orthosis was discharged by Quentin last Friday. Stated that he was told that therapy will focus on flexibility and strengthening.  Strengthening is not on the order so I sent a secure chat message to [facility] to clarify this. Will await response.  He was compliant with home exercise program given last session.   Response to previous treatment: nothing reported  Functional change: nothing new reported    Pain: 0/10   Location: left ring  finger    OBJECTIVE   Objective Measures updated at progress report unless specified.    Observation/Appearance: moderate swelling mainly in only RF now. Edema. Measured in centimeters.    4/19/2023 4/19/2023     Left Right   Wrist Crease 17.9 17.2   DPC 22.9 22.2   MCPs 22.8 22      Edema. Measured in centimeters.    4/19/2023 4/19/2023     Left Right   Ring:         P1            9 6.6    PIP            8.8 6.4   P2             7.2 5.7    DIP 6.1 5.2   P3 5.4 4.8      Elbow and Wrist ROM. Measured in degrees.    4/19/2023 4/19/2023 4/26/2023 5/4/2023      Left Right Left Left    Wrist Ext/Flex 50/80 65/80 59/71 64/80    Wrist RD/UD WFL WFL      Elbow           Hand ROM. Measured in degrees.  R hand WFL, but limited with PIP and DIP flexion due to past flexor tendon repair.     4/19/2023 4/26/2023 5/4/2023 5/11/2023 5/26/2023 5/26/2023     Left Left   Left Pre tx Left Post tx              Index: MP                      PIP                         DIP                     PRETTY WFL                   Long:  MP 13/82 WNL                  PIP 92                   DIP 15                   PRETTY 176                   Ring:   MP 16/65 8/77 12/78 14/83 16/79 10/87              PIP 13/30 0/48 0/51 0/63 0/43 /67              DIP 5/15 0/26 0/34 0/33 0/16 /29              PRETTY 76 143                  Small:  MP 15/82 WNL                   PIP 40                    DIP 27                   PRETTY 134                   Thumb: MP WFL                     IP WFL            Rad ADD/ABD WFL            Pal ADD/ABD WFL               Opposition WFL            Strength (Dynamometer) and Pinch Strength (Pinch Gauge)  Measured in pounds.    4/19/2023 4/19/2023     Left Right   Rung II deferred deferred   Key Pinch       3pt Pinch       2pt Pinch          Sensation: reports constant numbness at PIPand P1 of RF, reports coldness in RF tip. Numbness reported in SF.     4/19/2023 4/19/2023     Left Right   Nursery Naty       Normal 1.65-2.83        Diminished Light Touch 3.22-3.61       Diminished Protective 3.84-4.31       Loss of Protective 4.56-6.65       Untestable >6.65       2 Point Discrimination       Static       Dynamic                   Limitation/Restriction for FOTO Hand Survey     FOTO scores for Liang Monterroso JrEstephania on 4/19/2023.   FOTO documents entered into [employer] - see Media section.     Limitation Score: 50%            Treatment     Direct contact modalities after being cleared for contraindications:None   Supervised Modalities:   Patient received fluidotherapy to L hand with ring and small finger taped into flexion for 10 minutes to increase blood flow, circulation, and increase soft tissue extensibility.    Manual therapy techniques: 25  min  Deep Friction Scar Massage to the: left ring finger   Gentle PROM Ring Fing: Hook, Full Fist,  MP ext and adduction with long holds, gentle PIP joint mobilization for PIP flexion      :  Therapeutic exercises to develop ROM and flexibility for 15 minutes, including     Exercise  Reps/ TIme   AROM:  IP joint blocking, add ring finger 10 reps each,   Gentle AAROM:  Hook  straight fist, composite fist 15 + reps ea    AROM: wave, finger lifts and spreads   X 10+ reps each (NT)   adduction Pressing pink sponge squares x 10 reps. Added to HEP (NT)     Therapeutic Activities 10 min  Octy Manipulation X 3 min, palm up on table       Medium/Small pompom in hand manipulation X  1 container of many pompoms of multiple sizes   Pompom pickup with ring and palm X  1 container of many pompoms of multiple sizes       Patient Education and Home Exercises      Education provided:   - cont HEP  -cont DFM and retrograde massages  -Use of matheus strap  - use of relative motion exercise orthosis    Written Home Exercises Provided: Patient instructed to cont prior HEP.  Exercises were reviewed and Liang was able to demonstrate them prior to the end of the session.  Liang demonstrated good  understanding of the HEP provided. See  EMR under Patient Instructions for exercises provided during therapy sessions.      ASSESSMENT     Liang presents today with no new reports other than that his protective orthosis was discharged by Quentin last Friday.. He is without complaints of pain.  IP flexion remains his biggest deficit. Added very gentle PIP joint mob to PIP to address continued flexion deficits. Pt's new orders for aggressive ROM and WBAT and to avoid full WB. Liang stated that he was told that therapy will focus on flexibility and strengthening. Strengthening is not on the order so I sent a secure chat message to [facility] to clarify this. Will await response.    Liang is working hard towards his goals and there are no updates to goals at this time. Pt prognosis is Good. Liang reports that GILL instructed him to do so when out of the house and while sleeping as instructed by GILL.     Pt will continue to benefit from skilled outpatient occupational therapy to address the deficits listed in the problem list on initial evaluation, provide pt/family education and to maximize pt's level of independence in the home and community environment.     Pt's spiritual, cultural and educational needs considered and pt agreeable to plan of care and goals.    Anticipated barriers to occupational therapy: none      Goals:   The following goals were discussed with the patient and patient is in agreement with them as to be addressed in the treatment plan.   Long Term Goals (LTGs); to be met by discharge.  1) Pt will have an improved FOTO score by at least 20 points ---progressing  2) Pt will report improved pain no higher than 1/10 with ADLs and daily tasks---progressing  3) Pt will report return to prior level of function---progressing  4) Pt will demonstrate improved left RF AROM WFL making a composite fist for improved function with daily tasks---progressing  5) Pt will demonstrate improved edema in left RF by at least 0.4 cm for improved motion and functional  use---progressing  6)  and pinch strength to be assessed when appropriate, and set goal---progressing     Short Term Goals (STGs); to be met within 4 weeks (5/19/23).  1) Pt will report pain no higher than 3/10 with ADLs---Progressing  2)Pt will report or demonstrate independence with HEP and orthosis wear---MET 5/22/23  3) Pt will be independent with dressing tasks---MET 5/22/23  4) Pt will demonstrate improved L digits 3-5 AROM by at least 30 degrees each for improved grasp and function. ---progressing (Met)    PLAN     Continue skilled occupational therapy with individualized plan of care focusing on rom and scar maturation    Updates/Grading for next session: Progress therapeutis activities as tolerated and appropriate. .   Reassess ROM and reissue FOTO.  Mercedes Sneed, OT

## 2023-05-26 ENCOUNTER — HOSPITAL ENCOUNTER (OUTPATIENT)
Dept: RADIOLOGY | Facility: HOSPITAL | Age: 83
Discharge: HOME | End: 2023-05-26
Attending: PHYSICIAN ASSISTANT
Payer: MEDICARE

## 2023-05-26 ENCOUNTER — OFFICE VISIT (OUTPATIENT)
Dept: ORTHOPEDICS | Facility: CLINIC | Age: 83
End: 2023-05-26
Payer: MEDICARE

## 2023-05-26 DIAGNOSIS — S62.615B OPEN DISPLACED FRACTURE OF PROXIMAL PHALANX OF LEFT RING FINGER, INITIAL ENCOUNTER: Primary | ICD-10-CM

## 2023-05-26 DIAGNOSIS — S62.615B OPEN DISPLACED FRACTURE OF PROXIMAL PHALANX OF LEFT RING FINGER, INITIAL ENCOUNTER: ICD-10-CM

## 2023-05-26 PROCEDURE — 99024 POSTOP FOLLOW-UP VISIT: CPT | Mod: S$GLB,,, | Performed by: PHYSICIAN ASSISTANT

## 2023-05-26 PROCEDURE — 99999 PR PBB SHADOW E&M-EST. PATIENT-LVL III: ICD-10-PCS | Mod: PBBFAC,,, | Performed by: PHYSICIAN ASSISTANT

## 2023-05-26 PROCEDURE — 73130 XR HAND COMPLETE 3 VIEW LEFT: ICD-10-PCS | Mod: 26,LT,, | Performed by: RADIOLOGY

## 2023-05-26 PROCEDURE — 73130 X-RAY EXAM OF HAND: CPT | Mod: 26,LT,, | Performed by: RADIOLOGY

## 2023-05-26 PROCEDURE — 1101F PT FALLS ASSESS-DOCD LE1/YR: CPT | Mod: CPTII,S$GLB,, | Performed by: PHYSICIAN ASSISTANT

## 2023-05-26 PROCEDURE — 1159F MED LIST DOCD IN RCRD: CPT | Mod: CPTII,S$GLB,, | Performed by: PHYSICIAN ASSISTANT

## 2023-05-26 PROCEDURE — 73130 X-RAY EXAM OF HAND: CPT | Mod: TC,LT

## 2023-05-26 PROCEDURE — 3288F PR FALLS RISK ASSESSMENT DOCUMENTED: ICD-10-PCS | Mod: CPTII,S$GLB,, | Performed by: PHYSICIAN ASSISTANT

## 2023-05-26 PROCEDURE — 3288F FALL RISK ASSESSMENT DOCD: CPT | Mod: CPTII,S$GLB,, | Performed by: PHYSICIAN ASSISTANT

## 2023-05-26 PROCEDURE — 1126F AMNT PAIN NOTED NONE PRSNT: CPT | Mod: CPTII,S$GLB,, | Performed by: PHYSICIAN ASSISTANT

## 2023-05-26 PROCEDURE — 99999 PR PBB SHADOW E&M-EST. PATIENT-LVL III: CPT | Mod: PBBFAC,,, | Performed by: PHYSICIAN ASSISTANT

## 2023-05-26 PROCEDURE — 1101F PR PT FALLS ASSESS DOC 0-1 FALLS W/OUT INJ PAST YR: ICD-10-PCS | Mod: CPTII,S$GLB,, | Performed by: PHYSICIAN ASSISTANT

## 2023-05-26 PROCEDURE — 1159F PR MEDICATION LIST DOCUMENTED IN MEDICAL RECORD: ICD-10-PCS | Mod: CPTII,S$GLB,, | Performed by: PHYSICIAN ASSISTANT

## 2023-05-26 PROCEDURE — 1126F PR PAIN SEVERITY QUANTIFIED, NO PAIN PRESENT: ICD-10-PCS | Mod: CPTII,S$GLB,, | Performed by: PHYSICIAN ASSISTANT

## 2023-05-26 PROCEDURE — 99024 PR POST-OP FOLLOW-UP VISIT: ICD-10-PCS | Mod: S$GLB,,, | Performed by: PHYSICIAN ASSISTANT

## 2023-05-26 NOTE — PROGRESS NOTES
Dr. Cummins is the supervising physician for this encounter/patient    Liang Monterroso Jr. presents for post-operative evaluation.  The patient is now 7 weeks s/p Left ORIF 4th proximal phalanx, I&D of ring finger with Dr. Cummins on 4/5/23.  Overall the patient reports doing well.  He reports 0/10 pain, denies any f/c/s, no numbness. He has been working with OT on motion and wearing his orthosis. He finds the orthosis is doing more harm than good, finds he has more swelling and stiffness in the morning which does improve with motion. He continues to use heat to the hand and finds this helpful.    PE:    AA&O x 4.  NAD  HEENT:  NCAT, sclera nonicteric  Lungs:  Respirations are equal and unlabored.  CV:  2+ bilateral upper and lower extremity pulses.  MSK: The wounds are well healed without any concern for infection. NTTP along the proximal phalanx. Full extension is decreased due to Dupuytrens nodules.  Decreased flexion of the long finger due to stiffness. SILT. DNVI.    IMAGING - reviewed with patient  Stable appearance of ORIF 4th proximal phalanx fracture, no hardware complications, no obvious callus formation present.    A/P: Status post above, doing well  1) Continue with OT for aggressive ROM of the finger. Discontinue orthosis. He may have WBAT for his ADLs, but avoid unnecessary WB at this time.  2) Aggressive scar massage discussed. Voltaren gel massage and ice/heat for lingering edema.  3) F/U 4 weeks, repeat xrays  4) Call with any questions/concerns in the interim      Jamie Russo-DiGeorge PA-C

## 2023-05-29 ENCOUNTER — CLINICAL SUPPORT (OUTPATIENT)
Dept: REHABILITATION | Facility: HOSPITAL | Age: 83
End: 2023-05-29
Payer: MEDICARE

## 2023-05-29 DIAGNOSIS — M79.642 LEFT HAND PAIN: ICD-10-CM

## 2023-05-29 DIAGNOSIS — M25.552 PAIN OF LEFT HIP: Primary | ICD-10-CM

## 2023-05-29 DIAGNOSIS — R29.898 WEAKNESS OF LEFT HIP: ICD-10-CM

## 2023-05-29 DIAGNOSIS — M25.642 STIFFNESS OF FINGER JOINT OF LEFT HAND: Primary | ICD-10-CM

## 2023-05-29 PROCEDURE — 97110 THERAPEUTIC EXERCISES: CPT | Mod: CQ

## 2023-05-29 PROCEDURE — 97530 THERAPEUTIC ACTIVITIES: CPT

## 2023-05-29 PROCEDURE — 97110 THERAPEUTIC EXERCISES: CPT

## 2023-05-29 PROCEDURE — 97530 THERAPEUTIC ACTIVITIES: CPT | Mod: CQ

## 2023-05-29 PROCEDURE — 97140 MANUAL THERAPY 1/> REGIONS: CPT

## 2023-05-29 PROCEDURE — 97112 NEUROMUSCULAR REEDUCATION: CPT | Mod: CQ

## 2023-05-29 PROCEDURE — 97022 WHIRLPOOL THERAPY: CPT

## 2023-05-29 NOTE — PROGRESS NOTES
"[facility]   Physical Therapy Treatment Note     Name: Liang Monterroso Jr.  Clinic Number: [id number]    Therapy Diagnosis:   Encounter Diagnoses   Name Primary?    Pain of left hip Yes    Weakness of left hip            Physician: Russo-Digeorge, Jamie L*    Visit Date: 5/29/2023    Physician Orders: PT Eval and Treat   Medical Diagnosis from Referral: M16.12 (ICD-10-CM) - Primary osteoarthritis of left hip  L hip OA. PT eval/treat. WBAT, ROMAT, modalities prn. HEP  Evaluation Date: 5/2/2023  Authorization Period Expiration: 12/31/23  Plan of Care Expiration: 8/2/23  Progress Note Due: 6/2/23  Visit # / Visits authorized: 1/ 1 , 7/20  FOTO: 5/5     Precautions:  HTN      PTA Visit #: 1/5   Date of last FOTO:5/2/23    Time In: 8:30 am  Time Out: 9:13 am  Total Billable Time: 43 minutes    SUBJECTIVE     Pt reports: he's getting better, still gets L hip pain when he sneezes or coughs hard.    He was compliant with home exercise program.  Response to previous treatment: no adverse affect  Functional change: none    Pain: 2/10  Location: left groin      OBJECTIVE     Objective Measures updated at progress report unless specified.     Treatment     Liang received the treatments listed below:  (new treatments are indicated in bold)    therapeutic exercises to develop strength, endurance, ROM, flexibility, posture, and core stabilization for 31 minutes including:    Recumbent bike 6' Lvl 2- BLE strength & endurance   Seated hamstring stretch 30" x3 B   B piriformis stretch 30" x2 B   Standing hip abd Green TB 2x15 B   -standing hip Ext Green TB 2x10   Straight leg raise BLE x10   Supine isometric hip abd with pilates ring 5" 2x10     manual therapy techniques: were applied to the: left lower extremity  for 00 minutes, including:    Long axis distraction left lower extremity  L hip internal rotation joint mobs with belt        neuromuscular re-education activities to improve: Balance, Coordination, " "Kinesthetic, Sense, Proprioception, and Posture for 11 minutes. The following activities were included:    Step ups BOSU with contralateral hip flex x20 ea  Standing marches airex (single UE support)  SLS on airex without BUE support (intermittent UE) 2x30"       Short arc quad into straight leg raise 2x10  not performed      Left lower extremity LAQ 3# 3" 2x10 not performed          therapeutic activities to improve functional performance for 13 minutes, including:    Lateral stepping 2 laps B Green TB   Shuttle DLP 3c 3x10   -SLP 2c 3x10 B   Shuttle hip ext. .5 cord B 2x10    Bridge 3x10 not performed    Lateral step ups Lvl 3 step x20 B not performed    Lateral hurdles orange  x 2 laps not performed      Patient Education and Home Exercises     Home Exercises Provided and Patient Education Provided     Education provided:   - HEP    Written Home Exercises Provided: yes. Exercises were reviewed and Liang was able to demonstrate them prior to the end of the session.  Liang demonstrated good  understanding of the education provided. See EMR under Patient Instructions for exercises provided during therapy sessions    ASSESSMENT   Patient presents with slightly improved symptoms since last visit. Progressed patient with hip extension on shuttle for improved glute max strength to help with hip stability during ambulation. Patient verbalized decreased (L) hip strength as he was challenged by SLR and hip extension on shuttle. Appropriate muscle fatigue noted t/o session due to condition and age factors.    Liang Is progressing well towards his goals.   Pt prognosis is Good.     Pt will continue to benefit from skilled outpatient physical therapy to address the deficits listed in the problem list box on initial evaluation, provide pt/family education and to maximize pt's level of independence in the home and community environment.     Pt's spiritual, cultural and educational needs considered and pt agreeable to plan of care " and goals.     Anticipated barriers to physical therapy: none    Goals: Short Term Goals: 6 visits  1. Pt will be compliant /c HEP to supplement PT in order to improve functional tasks (progressing)   2. Pt will improve B glute medius MMTs to 4-/5 grossly to improve strength for functional activities (progressing)      Long Term Goals: 12 visits   1. Pt will improve L hip flexor MMTs to 4-/5 to improve strength for functional activities (progressing)   2. Pt will improve FOTO score to </= 29% limitation to reduce perceived pain with mobility  (progressing)   3. Pt will be able to perform car transfers pain free with out using BUE to raise L leg (progressing)     PLAN     Plan of care Certification: 5/2/2023 to 8/2/23.     Outpatient Physical Therapy 2 times weekly for 12 visits to include the following interventions: Manual Therapy, Moist Heat/ Ice, Neuromuscular Re-ed, Patient Education, Therapeutic Activities, and Therapeutic Exercise, E-stim & Dry Needling as needed.     Ricardo Hdez, PTA

## 2023-05-30 ENCOUNTER — CLINICAL SUPPORT (OUTPATIENT)
Dept: CARDIOLOGY | Facility: HOSPITAL | Age: 83
End: 2023-05-30
Attending: INTERNAL MEDICINE
Payer: MEDICARE

## 2023-05-30 DIAGNOSIS — R00.2 PALPITATIONS: ICD-10-CM

## 2023-05-30 DIAGNOSIS — I49.9 IRREGULAR CARDIAC RHYTHM: ICD-10-CM

## 2023-05-30 PROCEDURE — 93272 ECG/REVIEW INTERPRET ONLY: CPT | Mod: HCNC,,, | Performed by: INTERNAL MEDICINE

## 2023-05-30 PROCEDURE — 93270 REMOTE 30 DAY ECG REV/REPORT: CPT

## 2023-05-30 PROCEDURE — 93272 CARDIAC EVENT MONITOR (CUPID ONLY): ICD-10-PCS | Mod: HCNC,,, | Performed by: INTERNAL MEDICINE

## 2023-05-31 ENCOUNTER — CLINICAL SUPPORT (OUTPATIENT)
Dept: REHABILITATION | Facility: HOSPITAL | Age: 83
End: 2023-05-31
Payer: MEDICARE

## 2023-05-31 DIAGNOSIS — M25.552 PAIN OF LEFT HIP: Primary | ICD-10-CM

## 2023-05-31 DIAGNOSIS — Z98.890 POSTOPERATIVE STATE: ICD-10-CM

## 2023-05-31 DIAGNOSIS — M25.642 STIFFNESS OF FINGER JOINT OF LEFT HAND: Primary | ICD-10-CM

## 2023-05-31 DIAGNOSIS — S62.615B OPEN DISPLACED FRACTURE OF PROXIMAL PHALANX OF LEFT RING FINGER, INITIAL ENCOUNTER: Primary | ICD-10-CM

## 2023-05-31 DIAGNOSIS — R29.898 WEAKNESS OF LEFT HIP: ICD-10-CM

## 2023-05-31 DIAGNOSIS — M79.642 LEFT HAND PAIN: ICD-10-CM

## 2023-05-31 PROCEDURE — 97530 THERAPEUTIC ACTIVITIES: CPT | Mod: CQ

## 2023-05-31 PROCEDURE — 97110 THERAPEUTIC EXERCISES: CPT

## 2023-05-31 PROCEDURE — 97022 WHIRLPOOL THERAPY: CPT

## 2023-05-31 PROCEDURE — 97110 THERAPEUTIC EXERCISES: CPT | Mod: CQ

## 2023-05-31 PROCEDURE — 97112 NEUROMUSCULAR REEDUCATION: CPT | Mod: CQ

## 2023-05-31 PROCEDURE — 97140 MANUAL THERAPY 1/> REGIONS: CPT

## 2023-05-31 NOTE — PROGRESS NOTES
"[facility]   Physical Therapy Treatment Note     Name: Liang Monterroso Jr.  Clinic Number: [id number]    Therapy Diagnosis:   Encounter Diagnoses   Name Primary?    Pain of left hip Yes    Weakness of left hip            Physician: Russo-Digeorge, Jamie L*    Visit Date: 5/31/2023    Physician Orders: PT Eval and Treat   Medical Diagnosis from Referral: M16.12 (ICD-10-CM) - Primary osteoarthritis of left hip  L hip OA. PT eval/treat. WBAT, ROMAT, modalities prn. HEP  Evaluation Date: 5/2/2023  Authorization Period Expiration: 12/31/23  Plan of Care Expiration: 8/2/23  Progress Note Due: 6/2/23  Visit # / Visits authorized: 1/ 1 , 7/20  FOTO: 5/5     Precautions:  HTN      PTA Visit #: 1/5   Date of last FOTO:5/2/23    Time In: 9:45 am  Time Out: 10:25 am  Total Billable Time: 40 minutes    SUBJECTIVE     Pt reports: he's getting better, still gets L hip pain when he sneezes or coughs hard.    He was compliant with home exercise program.  Response to previous treatment: no adverse affect  Functional change: none    Pain: 2/10  Location: left groin      OBJECTIVE     Objective Measures updated at progress report unless specified.     Treatment     Liang received the treatments listed below:  (new treatments are indicated in bold)    therapeutic exercises to develop strength, endurance, ROM, flexibility, posture, and core stabilization for 10 minutes including:  Nu Step 6' Lvl 2- BLE strength & endurance   Seated hamstring stretch 30" x3 B NP  B piriformis stretch 30" x2 B NP  Standing hip abd Green TB 2x15 B   -standing hip Ext Green TB 2x10   Straight leg raise BLE 2x10   Supine isometric hip abd with pilates ring 5" 2x10 NP    manual therapy techniques: were applied to the: left lower extremity  for 00 minutes, including:    Long axis distraction left lower extremity  L hip internal rotation joint mobs with belt        neuromuscular re-education activities to improve: Balance, Coordination, " "Kinesthetic, Sense, Proprioception, and Posture for 15 minutes. The following activities were included:    Step ups BOSU with contralateral hip flex x20 ea  Standing marches airex (single UE support)  SLS on airex without BUE support (intermittent UE) 2x30"   Hip abd iso /c belt 20 5"  Bridge on BOSU 3x10  Short arc quad into straight leg raise 2x10  not performed    Left lower extremity LAQ 3# 3" 2x10 not performed          therapeutic activities to improve functional performance for 10 minutes, including:    Lateral stepping 2 laps B Green TB   Shuttle DLP 3c 3x10   -SLP 2c 3x10 B   Shuttle hip ext. .5 cord B 2x10  Touch and go squats plyobox 2x10    Lateral step ups Lvl 3 step x20 B not performed    Lateral hurdles orange  x 2 laps not performed      Patient Education and Home Exercises     Home Exercises Provided and Patient Education Provided     Education provided:   - HEP    Written Home Exercises Provided: yes. Exercises were reviewed and Liang was able to demonstrate them prior to the end of the session.  Liang demonstrated good  understanding of the education provided. See EMR under Patient Instructions for exercises provided during therapy sessions    ASSESSMENT   Patient tolerated session w/o adverse affect. Progressed patient with BOSU bridges and squats for functional task improvements. Patient with decreased c/o symptoms reproduction t/o session.  Liang Is progressing well towards his goals.   Pt prognosis is Good.     Pt will continue to benefit from skilled outpatient physical therapy to address the deficits listed in the problem list box on initial evaluation, provide pt/family education and to maximize pt's level of independence in the home and community environment.     Pt's spiritual, cultural and educational needs considered and pt agreeable to plan of care and goals.     Anticipated barriers to physical therapy: none    Goals: Short Term Goals: 6 visits  1. Pt will be compliant /c HEP to " supplement PT in order to improve functional tasks (progressing)   2. Pt will improve B glute medius MMTs to 4-/5 grossly to improve strength for functional activities (progressing)      Long Term Goals: 12 visits   1. Pt will improve L hip flexor MMTs to 4-/5 to improve strength for functional activities (progressing)   2. Pt will improve FOTO score to </= 29% limitation to reduce perceived pain with mobility  (progressing)   3. Pt will be able to perform car transfers pain free with out using BUE to raise L leg (progressing)     PLAN     Plan of care Certification: 5/2/2023 to 8/2/23.     Outpatient Physical Therapy 2 times weekly for 12 visits to include the following interventions: Manual Therapy, Moist Heat/ Ice, Neuromuscular Re-ed, Patient Education, Therapeutic Activities, and Therapeutic Exercise, E-stim & Dry Needling as needed.     Ricardo Hdez, PTA

## 2023-05-31 NOTE — PATIENT INSTRUCTIONS
[facility]  OCCUPATIONAL THERAPY  HOME EXERCISE PROGRAM     Complete the following strengthening program 2x/day.            3 min            3 min               _      *Use ring and small instead of index and long. Perform for 5 logs.                  10 reps    BETINA Siegel, IQRAT  Certified Hand Therapist  Occupational Therapist

## 2023-05-31 NOTE — PROGRESS NOTES
[facility]  Occupational Therapy Treatment Note    Date: 5/31/2023  Name: Liang Monterroso Jr.  Clinic Number: [id number]    Therapy Diagnosis:   Encounter Diagnoses   Name Primary?    Stiffness of finger joint of left hand Yes    Left hand pain              Physician: Russo-Digeorge, Jamie L*    Physician Orders: Continue with OT for aggressive ROM of the finger. Discontinue orthosis. He may have WBAT for his ADLs, but avoid unnecessary WB at this time.  Medical Diagnosis: S62.615B (ICD-10-CM) - Open displaced fracture of proximal phalanx of left ring finger, initial encounter   Surgical Procedure and Date: 4/5/2023  Open reduction internal fixation open fracture left 4th proximal phalanx  Irrigation and debridement left dorsal hand and ring finger laceration with excisional debridement of nonviable portions of skin and subcutaneous tissue  Irrigation debridement left ring finger volar laceration in flexor zone 2 with excisional debridement of nonviable portions of skin subcutaneous tissue,   Evaluation Date: 4/19/2023  Insurance Authorization Period Expiration: 8/30/2023  Plan of Care Expiration:  6/30/23  Progress Note Due: 5/26/2023   Date of Return to MD: 5/26/2023  Visit # / Visits authorized: 13/ 20  FOTO: 4/14/23  50% limitation               5/24 23 50% limitation     Precautions:   Standard and Weightbearing, A2 pulley partially torn    Time In: 10:40 am  Time Out: 12:00  pm  Total Billable Time: 75 minutes    SUBJECTIVE     Pt reports his orthosis was discharged by Quentin last Friday. Stated that he was told that therapy will focus on flexibility and strengthening.  Strengthening is not on the order so I sent a secure chat message to Quentin to clarify this. She confirmed that we are to start strengthening and will put a referral in for  it to be added.   He was compliant with home exercise program given last session.   Response to previous treatment: nothing  reported  Functional change: nothing new reported    Pain: 0/10   Location: left ring finger    OBJECTIVE   Objective Measures updated at progress report unless specified.    Observation/Appearance: moderate swelling mainly in only RF now. Edema. Measured in centimeters.    4/19/2023 4/19/2023     Left Right   Wrist Crease 17.9 17.2   DPC 22.9 22.2   MCPs 22.8 22      Edema. Measured in centimeters.    4/19/2023 4/19/2023     Left Right   Ring:         P1            9 6.6    PIP            8.8 6.4   P2             7.2 5.7    DIP 6.1 5.2   P3 5.4 4.8      Elbow and Wrist ROM. Measured in degrees.    4/19/2023 4/19/2023 4/26/2023 5/4/2023      Left Right Left Left    Wrist Ext/Flex 50/80 65/80 59/71 64/80    Wrist RD/UD WFL WFL      Elbow           Hand ROM. Measured in degrees.  R hand WFL, but limited with PIP and DIP flexion due to past flexor tendon repair.     4/19/2023 4/26/2023 5/4/2023 5/11/2023 5/26/2023 5/26/2023     Left Left   Left Pre tx Left Post tx              Index: MP                      PIP                         DIP                     PRETTY WFL                   Long:  MP 13/82 WNL                  PIP 92                   DIP 15                   PRETTY 176                   Ring:   MP 16/65 8/77 12/78 14/83 16/79 10/87              PIP 13/30 0/48 0/51 0/63 0/43 /67              DIP 5/15 0/26 0/34 0/33 0/16 /29              PRETTY 76 143                  Small:  MP 15/82 WNL                   PIP 40                    DIP 27                   PRETTY 134                   Thumb: MP WFL                     IP WFL            Rad ADD/ABD WFL            Pal ADD/ABD WFL               Opposition WFL            Strength (Dynamometer) and Pinch Strength (Pinch Gauge)  Measured in pounds.    4/19/2023 4/19/2023     Left Right   Rung II deferred deferred   Key Pinch       3pt Pinch       2pt Pinch          Sensation: reports constant numbness at PIPand P1 of RF, reports coldness in RF tip. Numbness reported in  SF.     4/19/2023 4/19/2023     Left Right   [place]       Normal 1.65-2.83       Diminished Light Touch 3.22-3.61       Diminished Protective 3.84-4.31       Loss of Protective 4.56-6.65       Untestable >6.65       2 Point Discrimination       Static       Dynamic                   Limitation/Restriction for FOTO Hand Survey     FOTO scores for Liang Monterroso Jr. on 4/19/2023.   FOTO documents entered into [employer] - see Media section.     Limitation Score: 50%            Treatment     Direct contact modalities after being cleared for contraindications:None   Supervised Modalities:   Patient received fluidotherapy to L hand with ring and small finger taped into flexion for 10 minutes to increase blood flow, circulation, and increase soft tissue extensibility.    Manual therapy techniques: 25  min  Deep Friction Scar Massage to the: left ring finger   Gentle PROM Ring Fing: Hook, Full Fist,  MP ext and adduction with long holds    :  Therapeutic exercises to develop ROM and flexibility for 35 minutes, including     Exercise  Reps/ TIme   AROM:  IP joint blocking, add ring finger 10 reps each,   Gentle AAROM:  Hook  straight fist, composite fist 15 + reps ea    AROM: wave, finger lifts and spreads   X 10+ reps each (NT)   adduction Pressing pink sponge squares x 10 reps. Added to HEP (NT)   Fabricated Thermoplastic Exercise Device X 15 min for use during active hook to divert flexion force to the IP's as opposed to the MP. Pt was instructed to also wear it periodically though the day during light hand use for same purpose   Theraputty Exercises Yellow putty: twirling x 3 min, squeezing x 3 secs, x 20 reps, log pinching using ring, small, and thumb x 5 logs. Educated in putty HEP and issued yellow putty for home use.             Therapeutic Activities NT min  Octy Manipulation X 3 min, palm up on table           Medium/Small pompom in hand manipulation X  1 container of many pompoms of multiple sizes   Pompom  pickup with ring and palm X  1 container of many pompoms of multiple sizes       Patient Education and Home Exercises      Education provided:   - cont HEP  -cont DFM and retrograde massages  -Use of matheus strap  - use of relative motion exercise orthosis    Written Home Exercises Provided: Patient instructed to cont prior HEP.  Exercises were reviewed and Liang was able to demonstrate them prior to the end of the session.  Liang demonstrated good  understanding of the HEP provided. See EMR under Patient Instructions for exercises provided during therapy sessions.      ASSESSMENT    IP flexion remains his biggest deficit.  Pt's latest orders are for aggressive ROM, strengthening, and WBAT and to avoid full WB. Quentin confirmed that strengthening is to be included in his treatment now. Theraputty exercises performed today and issued as part of HEP.    Liang is working hard towards his goals and there are no updates to goals at this time. Pt prognosis is Good. Liang reports that GILL instructed him to do so when out of the house and while sleeping as instructed by GILL.     Pt will continue to benefit from skilled outpatient occupational therapy to address the deficits listed in the problem list on initial evaluation, provide pt/family education and to maximize pt's level of independence in the home and community environment.     Pt's spiritual, cultural and educational needs considered and pt agreeable to plan of care and goals.    Anticipated barriers to occupational therapy: none      Goals:   The following goals were discussed with the patient and patient is in agreement with them as to be addressed in the treatment plan.   Long Term Goals (LTGs); to be met by discharge.  1) Pt will have an improved FOTO score by at least 20 points ---progressing  2) Pt will report improved pain no higher than 1/10 with ADLs and daily tasks---progressing  3) Pt will report return to prior level of function---progressing  4) Pt will  demonstrate improved left RF AROM WFL making a composite fist for improved function with daily tasks---progressing  5) Pt will demonstrate improved edema in left RF by at least 0.4 cm for improved motion and functional use---progressing  6)  and pinch strength to be assessed when appropriate, and set goal---progressing     Short Term Goals (STGs); to be met within 4 weeks (5/19/23).  1) Pt will report pain no higher than 3/10 with ADLs---Progressing  2)Pt will report or demonstrate independence with HEP and orthosis wear---MET 5/22/23  3) Pt will be independent with dressing tasks---MET 5/22/23  4) Pt will demonstrate improved L digits 3-5 AROM by at least 30 degrees each for improved grasp and function. ---progressing (Met)    PLAN     Continue skilled occupational therapy with individualized plan of care focusing on rom and scar maturation    Updates/Grading for next session: Progress therapeutis activities as tolerated and appropriate. .   Reassess ROM and reissue FOTO.  Mercedes Sneed, OT

## 2023-06-05 ENCOUNTER — CLINICAL SUPPORT (OUTPATIENT)
Dept: REHABILITATION | Facility: HOSPITAL | Age: 83
End: 2023-06-05
Payer: MEDICARE

## 2023-06-05 ENCOUNTER — TELEPHONE (OUTPATIENT)
Dept: ELECTROPHYSIOLOGY | Facility: CLINIC | Age: 83
End: 2023-06-05
Payer: MEDICARE

## 2023-06-05 ENCOUNTER — DOCUMENTATION ONLY (OUTPATIENT)
Dept: CARDIOLOGY | Facility: HOSPITAL | Age: 83
End: 2023-06-05
Payer: MEDICARE

## 2023-06-05 DIAGNOSIS — M79.642 LEFT HAND PAIN: ICD-10-CM

## 2023-06-05 DIAGNOSIS — I48.0 PAROXYSMAL ATRIAL FIBRILLATION: Primary | ICD-10-CM

## 2023-06-05 DIAGNOSIS — R29.898 WEAKNESS OF LEFT HIP: ICD-10-CM

## 2023-06-05 DIAGNOSIS — M25.642 STIFFNESS OF FINGER JOINT OF LEFT HAND: Primary | ICD-10-CM

## 2023-06-05 DIAGNOSIS — M25.552 PAIN OF LEFT HIP: Primary | ICD-10-CM

## 2023-06-05 PROCEDURE — 97530 THERAPEUTIC ACTIVITIES: CPT | Mod: CQ

## 2023-06-05 PROCEDURE — 97112 NEUROMUSCULAR REEDUCATION: CPT | Mod: CQ

## 2023-06-05 PROCEDURE — 97140 MANUAL THERAPY 1/> REGIONS: CPT | Mod: CO

## 2023-06-05 PROCEDURE — 97022 WHIRLPOOL THERAPY: CPT | Mod: CO

## 2023-06-05 PROCEDURE — 97530 THERAPEUTIC ACTIVITIES: CPT | Mod: CO

## 2023-06-05 RX ORDER — METOPROLOL SUCCINATE 25 MG/1
25 TABLET, EXTENDED RELEASE ORAL DAILY
Qty: 90 TABLET | Refills: 3 | Status: SHIPPED | OUTPATIENT
Start: 2023-06-05 | End: 2023-08-08 | Stop reason: SDUPTHER

## 2023-06-05 NOTE — PROGRESS NOTES
I was notified of results of event monitor, currently being worn by Mr Monterroso.  The patient denies symptoms at time of recorded afib.  Discussed risks/benefits of chronic anticoagulation. [id number] Score is 4.    Plan:  - start eliquis 5mg BID (age >80, Cr <1.6, weight >60kg)  - start toprol 25mg qd  - obtain echo prior to f/u clinic visit

## 2023-06-05 NOTE — TELEPHONE ENCOUNTER
Patient wearing 30 day event monitor for diagnosis cardiac arrhythmia, unspecified.      Received auto-triggered alert notification for new onset AF on June 2, 2023 at 8: 14 PM.       Called patient to assess for symptoms, left VM requesting callback. Strips placed under this encounter for review. Message sent to ordering provider. Will continue to monitor until 6/29/23.

## 2023-06-05 NOTE — TELEPHONE ENCOUNTER
Patient called back and stated he doesn't remember what he was doing Friday evening, but does not remember any symptoms that night. Denies lightheadedness, palpitations, shortness of breath. Will forward to ordering provider.

## 2023-06-05 NOTE — PROGRESS NOTES
"  [facility]  Occupational Therapy Treatment Note    Date: 6/5/2023  Name: Liang Monterroso Jr.  Clinic Number: [id number]    Therapy Diagnosis:   Encounter Diagnoses   Name Primary?    Stiffness of finger joint of left hand Yes    Left hand pain                Physician: Russo-Digeorge, Jamie L*    Physician Orders: Continue with OT for aggressive ROM of the finger. Discontinue orthosis. He may have WBAT for his ADLs, but avoid unnecessary WB at this time.  Medical Diagnosis: S62.615B (ICD-10-CM) - Open displaced fracture of proximal phalanx of left ring finger, initial encounter   Surgical Procedure and Date: 4/5/2023  Open reduction internal fixation open fracture left 4th proximal phalanx  Irrigation and debridement left dorsal hand and ring finger laceration with excisional debridement of nonviable portions of skin and subcutaneous tissue  Irrigation debridement left ring finger volar laceration in flexor zone 2 with excisional debridement of nonviable portions of skin subcutaneous tissue,   Evaluation Date: 4/19/2023  Insurance Authorization Period Expiration: 8/30/2023  Plan of Care Expiration:  6/30/23  Progress Note Due: 5/26/2023   Date of Return to MD: 5/26/2023  Visit # / Visits authorized: 14/ 20  FOTO: 4/14/23  50% limitation               5/24 23 50% limitation     Precautions:   Standard and Weightbearing, A2 pulley partially torn    Time In: 10:22 am  Time Out: 11:20 pm  Total Billable Time: 58 minutes    SUBJECTIVE     "It's getting a little better."  He was compliant with home exercise program given last session.   Response to previous treatment: nothing reported  Functional change: nothing new reported    Pain: 0/10   Location: left ring finger    OBJECTIVE   Objective Measures updated at progress report unless specified.    Observation/Appearance: moderate swelling mainly in only RF now. Edema. Measured in centimeters.    4/19/2023 4/19/2023     Left Right   Wrist Crease " 17.9 17.2   DPC 22.9 22.2   MCPs 22.8 22      Edema. Measured in centimeters.    4/19/2023 4/19/2023     Left Right   Ring:         P1            9 6.6    PIP            8.8 6.4   P2             7.2 5.7    DIP 6.1 5.2   P3 5.4 4.8      Elbow and Wrist ROM. Measured in degrees.    4/19/2023 4/19/2023 4/26/2023 5/4/2023      Left Right Left Left    Wrist Ext/Flex 50/80 65/80 59/71 64/80    Wrist RD/UD WFL WFL      Elbow           Hand ROM. Measured in degrees.  R hand WFL, but limited with PIP and DIP flexion due to past flexor tendon repair.     4/19/2023 4/26/2023 5/4/2023 5/11/2023 5/26/2023 5/26/2023     Left Left   Left Pre tx Left Post tx              Index: MP                      PIP                         DIP                     PRETTY WFL                   Long:  MP 13/82 WNL                  PIP 92                   DIP 15                   PRETTY 176                   Ring:   MP 16/65 8/77 12/78 14/83 16/79 10/87              PIP 13/30 0/48 0/51 0/63 0/43 /67              DIP 5/15 0/26 0/34 0/33 0/16 /29              PRETTY 76 143                  Small:  MP 15/82 WNL                   PIP 40                    DIP 27                   PRETTY 134                   Thumb: MP WFL                     IP WFL            Rad ADD/ABD WFL            Pal ADD/ABD WFL               Opposition WFL            Strength (Dynamometer) and Pinch Strength (Pinch Gauge)  Measured in pounds.    4/19/2023 4/19/2023     Left Right   Rung II deferred deferred   Key Pinch       3pt Pinch       2pt Pinch          Sensation: reports constant numbness at PIPand P1 of RF, reports coldness in RF tip. Numbness reported in SF.     4/19/2023 4/19/2023     Left Right   [place] Naty       Normal 1.65-2.83       Diminished Light Touch 3.22-3.61       Diminished Protective 3.84-4.31       Loss of Protective 4.56-6.65       Untestable >6.65       2 Point Discrimination       Static       Dynamic                   Limitation/Restriction for FOTO  Hand Survey     FOTO scores for Liang Monterroso Jr. on 4/19/2023.   FOTO documents entered into [employer] - see Media section.     Limitation Score: 50%            Treatment     Direct contact modalities after being cleared for contraindications:None   Supervised Modalities:   Patient received fluidotherapy to L hand with ring and small finger taped into flexion for 10 minutes to increase blood flow, circulation, and increase soft tissue extensibility.    Manual therapy techniques: 20  min  Retrograde massage   Deep Friction Scar Massage to the: left ring finger   Gentle PROM Ring Fing: Hook, Full Fist,  MP ext and adduction with long holds    :  Therapeutic exercises to develop ROM and flexibility for 18 minutes, including     Exercise  Reps/ TIme   AROM:  IP joint blocking, add ring finger 10 reps each,   Gentle AAROM:  Hook  straight fist, composite fist 15 + reps ea    AROM: wave, finger lifts and spreads  (with yellow RB) X 10+ reps each   adduction Pressing pink sponge squares x 10 reps.    Hand gripper 3 yellow RB X 30                 Therapeutic Activities 10 min  Octy Manipulation X 3 min, palm up on table           Medium/Small pompom in hand manipulation X  1 container of many pompoms of multiple sizes   Pompom pickup with ring and palm X  1 container of many pompoms of multiple sizes       Patient Education and Home Exercises      Education provided:   - cont HEP  -cont DFM and retrograde massages  -Use of matheus strap  - use of relative motion exercise orthosis    Written Home Exercises Provided: Patient instructed to cont prior HEP.  Exercises were reviewed and Liang was able to demonstrate them prior to the end of the session.  Liang demonstrated good  understanding of the HEP provided. See EMR under Patient Instructions for exercises provided during therapy sessions.      ASSESSMENT   Pt tolerated session well. Continues to have limited IP flexion however able to to bring his finger nearly touching palm at end  of session today.     Liang is working hard towards his goals and there are no updates to goals at this time. Pt prognosis is Good.     Pt will continue to benefit from skilled outpatient occupational therapy to address the deficits listed in the problem list on initial evaluation, provide pt/family education and to maximize pt's level of independence in the home and community environment.     Pt's spiritual, cultural and educational needs considered and pt agreeable to plan of care and goals.    Anticipated barriers to occupational therapy: none      Goals:   The following goals were discussed with the patient and patient is in agreement with them as to be addressed in the treatment plan.   Long Term Goals (LTGs); to be met by discharge.  1) Pt will have an improved FOTO score by at least 20 points ---progressing  2) Pt will report improved pain no higher than 1/10 with ADLs and daily tasks---progressing  3) Pt will report return to prior level of function---progressing  4) Pt will demonstrate improved left RF AROM WFL making a composite fist for improved function with daily tasks---progressing  5) Pt will demonstrate improved edema in left RF by at least 0.4 cm for improved motion and functional use---progressing  6)  and pinch strength to be assessed when appropriate, and set goal---progressing     Short Term Goals (STGs); to be met within 4 weeks (5/19/23).  1) Pt will report pain no higher than 3/10 with ADLs---Progressing  2)Pt will report or demonstrate independence with HEP and orthosis wear---MET 5/22/23  3) Pt will be independent with dressing tasks---MET 5/22/23  4) Pt will demonstrate improved L digits 3-5 AROM by at least 30 degrees each for improved grasp and function. ---progressing (Met)    PLAN     Continue skilled occupational therapy with individualized plan of care focusing on rom and scar maturation    Updates/Grading for next session: Progress therapeutis activities as tolerated and  appropriate. .   Reassess ROM and reissue FOTO.  JAMMIE Rudolph/L      Client Care conference completed with evaluating therapist in regards to this patients POC as evidenced by co signature of supervising therapist.

## 2023-06-05 NOTE — PROGRESS NOTES
Patient wearing 30 day event monitor for diagnosis of unspecified cardiac arrhythmia     Received patient-triggered alert notification for new onset AF on June 2, 2023 at 8:14 pm.     Patient symptoms reported were:     Strips placed under this encounter for review. Will continue to monitor until 6/29/23.

## 2023-06-05 NOTE — PROGRESS NOTES
"[facility]   Physical Therapy Treatment Note     Name: Liang Monterroso Jr.  Clinic Number: [id number]    Therapy Diagnosis:   Encounter Diagnoses   Name Primary?    Pain of left hip Yes    Weakness of left hip            Physician: Russo-Digeorge, Jamie L*    Visit Date: 6/5/2023    Physician Orders: PT Eval and Treat   Medical Diagnosis from Referral: M16.12 (ICD-10-CM) - Primary osteoarthritis of left hip  L hip OA. PT eval/treat. WBAT, ROMAT, modalities prn. HEP  Evaluation Date: 5/2/2023  Authorization Period Expiration: 12/31/23  Plan of Care Expiration: 8/2/23  Progress Note Due: 6/2/23  Visit # / Visits authorized: 1/ 1 , 8/20  FOTO: 5/5     Precautions:  HTN      PTA Visit #: 3/5   Date of last FOTO: 5/2/23    Time In: 9:45 am  Time Out: 10:30 am  Total Billable Time: 45 minutes    SUBJECTIVE     Pt reports: mild (L) groin pain typically when he is lying in bed and lifts the leg with it straight.    He was compliant with home exercise program.  Response to previous treatment: no adverse affect  Functional change: none    Pain: 2/10  Location: left groin      OBJECTIVE     Objective Measures updated at progress report unless specified.     Treatment     Liang received the treatments listed below:  (new treatments are indicated in bold)    therapeutic exercises to develop strength, endurance, ROM, flexibility, posture, and core stabilization for 10 minutes including:  Nu Step 6' Lvl 2- BLE strength & endurance   Seated hamstring stretch 30" x3 B NP  B piriformis stretch 30" x2 B NP  Standing hip abd Green TB 2x15 B   -standing hip Ext Green TB 2x10   Straight leg raise BLE 2x10 2#  Supine isometric hip abd with pilates ring 5" 2x10 NP    manual therapy techniques: were applied to the: left lower extremity  for 00 minutes, including:    Long axis distraction left lower extremity  L hip internal rotation joint mobs with belt        neuromuscular re-education activities to improve: Balance, " "Coordination, Kinesthetic, Sense, Proprioception, and Posture for 25 minutes. The following activities were included:    Step ups BOSU with contralateral hip flex x20 ea  Standing marches airex (single UE support) 1'  - RTB marches B 20x airex  SLS on airex without BUE support (intermittent UE) 2x30"   Hip abd iso /c belt 20 5"  Bridge on BOSU 3x10  Short arc quad into straight leg raise 2x10  not performed    Left lower extremity LAQ 3# 3" 2x10 not performed          therapeutic activities to improve functional performance for 10 minutes, including:    Lateral stepping 2 laps B Green TB   Shuttle DLP 3c 3x10   -SLP 2c 3x10 B   Shuttle hip ext. .5 cord B 2x10  Touch and go squats plyobox 20' 2x10    Lateral step ups Lvl 3 step x20 B not performed    Lateral hurdles orange  x 2 laps not performed      Patient Education and Home Exercises     Home Exercises Provided and Patient Education Provided     Education provided:   - HEP    Written Home Exercises Provided: yes. Exercises were reviewed and Liang was able to demonstrate them prior to the end of the session.  Liang demonstrated good  understanding of the education provided. See EMR under Patient Instructions for exercises provided during therapy sessions    ASSESSMENT   Patient tolerated session w/o adverse affect. Progressed patient with resistive SLR for improved LE strength. Patient challenged with standing resistive marches. Plan to continue with program to improve (L) hip deficits.  Liang Is progressing well towards his goals.   Pt prognosis is Good.     Pt will continue to benefit from skilled outpatient physical therapy to address the deficits listed in the problem list box on initial evaluation, provide pt/family education and to maximize pt's level of independence in the home and community environment.     Pt's spiritual, cultural and educational needs considered and pt agreeable to plan of care and goals.     Anticipated barriers to physical therapy: " none    Goals: Short Term Goals: 6 visits  1. Pt will be compliant /c HEP to supplement PT in order to improve functional tasks (progressing)   2. Pt will improve B glute medius MMTs to 4-/5 grossly to improve strength for functional activities (progressing)      Long Term Goals: 12 visits   1. Pt will improve L hip flexor MMTs to 4-/5 to improve strength for functional activities (progressing)   2. Pt will improve FOTO score to </= 29% limitation to reduce perceived pain with mobility  (progressing)   3. Pt will be able to perform car transfers pain free with out using BUE to raise L leg (progressing)     PLAN     Plan of care Certification: 5/2/2023 to 8/2/23.     Outpatient Physical Therapy 2 times weekly for 12 visits to include the following interventions: Manual Therapy, Moist Heat/ Ice, Neuromuscular Re-ed, Patient Education, Therapeutic Activities, and Therapeutic Exercise, E-stim & Dry Needling as needed.     Ricardo Hdez, PTA

## 2023-06-06 ENCOUNTER — PATIENT MESSAGE (OUTPATIENT)
Dept: CARDIOLOGY | Facility: CLINIC | Age: 83
End: 2023-06-06
Payer: MEDICARE

## 2023-06-07 ENCOUNTER — CLINICAL SUPPORT (OUTPATIENT)
Dept: REHABILITATION | Facility: HOSPITAL | Age: 83
End: 2023-06-07
Payer: MEDICARE

## 2023-06-07 DIAGNOSIS — M79.642 LEFT HAND PAIN: ICD-10-CM

## 2023-06-07 DIAGNOSIS — R29.898 WEAKNESS OF LEFT HIP: ICD-10-CM

## 2023-06-07 DIAGNOSIS — M25.642 STIFFNESS OF FINGER JOINT OF LEFT HAND: Primary | ICD-10-CM

## 2023-06-07 DIAGNOSIS — M25.552 PAIN OF LEFT HIP: Primary | ICD-10-CM

## 2023-06-07 PROCEDURE — 97110 THERAPEUTIC EXERCISES: CPT

## 2023-06-07 PROCEDURE — 97112 NEUROMUSCULAR REEDUCATION: CPT | Mod: CQ

## 2023-06-07 PROCEDURE — 97140 MANUAL THERAPY 1/> REGIONS: CPT

## 2023-06-07 PROCEDURE — 97022 WHIRLPOOL THERAPY: CPT | Mod: 59

## 2023-06-07 PROCEDURE — 97530 THERAPEUTIC ACTIVITIES: CPT

## 2023-06-07 PROCEDURE — 97530 THERAPEUTIC ACTIVITIES: CPT | Mod: CQ

## 2023-06-07 NOTE — PROGRESS NOTES
"[facility]   Physical Therapy Treatment Note     Name: Liang Monterroso Jr.  Clinic Number: [id number]    Therapy Diagnosis:   Encounter Diagnoses   Name Primary?    Pain of left hip Yes    Weakness of left hip            Physician: Russo-Digeorge, Jamie L*    Visit Date: 6/7/2023    Physician Orders: PT Eval and Treat   Medical Diagnosis from Referral: M16.12 (ICD-10-CM) - Primary osteoarthritis of left hip  L hip OA. PT eval/treat. WBAT, ROMAT, modalities prn. HEP  Evaluation Date: 5/2/2023  Authorization Period Expiration: 12/31/23  Plan of Care Expiration: 8/2/23  Progress Note Due: 6/2/23  Visit # / Visits authorized: 1/ 1 , 9/10  FOTO: 5/5     Precautions:  HTN      PTA Visit #: 4/5   Date of last FOTO: 5/2/23    Time In: 9:45 am  Time Out: 10:35 am  Total Billable Time: 50 minutes    SUBJECTIVE     Pt reports: hip continues to get better.    He was compliant with home exercise program.  Response to previous treatment: no adverse affect  Functional change: none    Pain: 2/10  Location: left groin      OBJECTIVE     Objective Measures updated at progress report unless specified.     Treatment     Liang received the treatments listed below:  (new treatments are indicated in bold)    therapeutic exercises to develop strength, endurance, ROM, flexibility, posture, and core stabilization for 10 minutes including:  Nu Step 6' Lvl 2- BLE strength & endurance   Seated hamstring stretch 30" x3 B NP  B piriformis stretch 30" x2 B NP  Standing hip abd Green TB 2x15 B   -standing hip Ext Green TB 2x10   Straight leg raise BLE 2x10 2#  Supine isometric hip abd with pilates ring 5" 2x10 NP    manual therapy techniques: were applied to the: left lower extremity  for 00 minutes, including:    Long axis distraction left lower extremity  L hip internal rotation joint mobs with belt        neuromuscular re-education activities to improve: Balance, Coordination, Kinesthetic, Sense, Proprioception, and Posture " "for 25 minutes. The following activities were included:    Step ups BOSU with contralateral hip flex x20 ea  Standing marches airex (single UE support) 1'  - RTB marches B 20x airex  SLS on airex without BUE support (intermittent UE) x30"   - no airex B x 30  Hip abd iso /c belt 20 5"  Bridge on BOSU 3x10  Short arc quad into straight leg raise 2x10  not performed    Left lower extremity LAQ 3# 3" 2x10 not performed          therapeutic activities to improve functional performance for 10 minutes, including:    Lateral stepping 2 laps B Green TB   Shuttle DLP 3c till fatigue x 2   -SLP 2c till fatigue x 2  Shuttle hip ext. .5 cord B 2x10  Touch and go squats plyobox 20' 2x10    Lateral step ups Lvl 3 step x20 B not performed    Lateral hurdles orange  x 2 laps not performed      Patient Education and Home Exercises     Home Exercises Provided and Patient Education Provided     Education provided:   - HEP    Written Home Exercises Provided: yes. Exercises were reviewed and Liang was able to demonstrate them prior to the end of the session.  Liang demonstrated good  understanding of the education provided. See EMR under Patient Instructions for exercises provided during therapy sessions    ASSESSMENT   Patient tolerated session w/o adverse affect. Progressed patient with Box SLR for improved LE strength. Plan to continue with program to improve (L) hip deficits. No difference with SLS with out airex as he had just as many LOB episodes.  Liang Is progressing well towards his goals.   Pt prognosis is Good.     Pt will continue to benefit from skilled outpatient physical therapy to address the deficits listed in the problem list box on initial evaluation, provide pt/family education and to maximize pt's level of independence in the home and community environment.     Pt's spiritual, cultural and educational needs considered and pt agreeable to plan of care and goals.     Anticipated barriers to physical therapy: " none    Goals: Short Term Goals: 6 visits  1. Pt will be compliant /c HEP to supplement PT in order to improve functional tasks (progressing)   2. Pt will improve B glute medius MMTs to 4-/5 grossly to improve strength for functional activities (progressing)      Long Term Goals: 12 visits   1. Pt will improve L hip flexor MMTs to 4-/5 to improve strength for functional activities (progressing)   2. Pt will improve FOTO score to </= 29% limitation to reduce perceived pain with mobility  (progressing)   3. Pt will be able to perform car transfers pain free with out using BUE to raise L leg (progressing)     PLAN     Plan of care Certification: 5/2/2023 to 8/2/23.     Outpatient Physical Therapy 2 times weekly for 12 visits to include the following interventions: Manual Therapy, Moist Heat/ Ice, Neuromuscular Re-ed, Patient Education, Therapeutic Activities, and Therapeutic Exercise, E-stim & Dry Needling as needed.     Ricardo Hdez, PTA

## 2023-06-07 NOTE — PROGRESS NOTES
"  [facility]  Occupational Therapy Treatment Note    Date: 6/7/2023  Name: Liang Monterroso Jr.  Clinic Number: [id number]    Therapy Diagnosis:   Encounter Diagnoses   Name Primary?    Stiffness of finger joint of left hand Yes    Left hand pain                  Physician: Russo-Digeorge, Jamie L*    Physician Orders: Continue with OT for aggressive ROM of the finger. Discontinue orthosis. He may have WBAT for his ADLs, but avoid unnecessary WB at this time.  Medical Diagnosis: S62.615B (ICD-10-CM) - Open displaced fracture of proximal phalanx of left ring finger, initial encounter   Surgical Procedure and Date: 4/5/2023  Open reduction internal fixation open fracture left 4th proximal phalanx  Irrigation and debridement left dorsal hand and ring finger laceration with excisional debridement of nonviable portions of skin and subcutaneous tissue  Irrigation debridement left ring finger volar laceration in flexor zone 2 with excisional debridement of nonviable portions of skin subcutaneous tissue,   Evaluation Date: 4/19/2023  Insurance Authorization Period Expiration: 8/30/2023  Plan of Care Expiration:  6/30/23  Progress Note Due: 5/26/2023   Date of Return to MD: 5/26/2023  Visit # / Visits authorized: 14/ 20  FOTO: 4/14/23  50% limitation               5/24 23 50% limitation     Precautions:   Standard and Weightbearing, A2 pulley partially torn    Time In: 10:50 pm  Time Out: 11:55 pm  Total Billable Time: 62 minutes    SUBJECTIVE     Liang reports continued progress with ROM.  He was compliant with home exercise program given last session.   Response to previous treatment: nothing reported  Functional change: nothing new reported    Pain: 0/10 "Occasionally I get some pain. At base of the thumb.  Location: left ring finger    OBJECTIVE   Objective Measures updated at progress report unless specified.    Observation/Appearance: moderate swelling mainly in only RF now. Edema. Measured in " centimeters.    4/19/2023 4/19/2023     Left Right   Wrist Crease 17.9 17.2   DPC 22.9 22.2   MCPs 22.8 22      Edema. Measured in centimeters.    4/19/2023 4/19/2023     Left Right   Ring:         P1            9 6.6    PIP            8.8 6.4   P2             7.2 5.7    DIP 6.1 5.2   P3 5.4 4.8      Elbow and Wrist ROM. Measured in degrees.    4/19/2023 4/19/2023 4/26/2023 5/4/2023     Left Right Left Left   Wrist Ext/Flex 50/80 65/80 59/71 64/80   Wrist RD/UD WFL WFL     Elbow          Hand ROM. Measured in degrees.  R hand WFL, but limited with PIP and DIP flexion due to past flexor tendon repair.     4/19/2023 4/26/2023 5/4/2023 5/11/2023 5/26/2023 5/26/2023 6/7/2023     Left Left   Left Pre tx Left Post tx Left Pre tx               Index: MP                       PIP                          DIP                      PRETTY WFL                     Long:  MP 13/82 WNL                   PIP 92                    DIP 15                    PRETTY 176                     Ring:   MP 16/65 8/77 12/78 14/83 16/79 10/87 13/89              PIP 13/30 0/48 0/51 0/63 0/43 /67 /77              DIP 5/15 0/26 0/34 0/33 0/16 /29 /38              PRETTY 76 143                    Small:  MP 15/82 WNL                    PIP 40                     DIP 27                    PRETTY 134                     Thumb: MP WFL                      IP WFL             Rad ADD/ABD WFL             Pal ADD/ABD WFL                Opposition WFL             Strength (Dynamometer) and Pinch Strength (Pinch Gauge)  Measured in pounds.    4/19/2023 4/19/2023     Left Right   Rung II deferred deferred   Key Pinch       3pt Pinch       2pt Pinch          Sensation: reports constant numbness at PIPand P1 of RF, reports coldness in RF tip. Numbness reported in SF.     4/19/2023 4/19/2023     Left Right   [place] Nat[place]       Normal 1.65-2.83       Diminished Light Touch 3.22-3.61       Diminished Protective 3.84-4.31       Loss of Protective 4.56-6.65        Untestable >6.65       2 Point Discrimination       Static       Dynamic                   Limitation/Restriction for FOTO Hand Survey     FOTO scores for Liang Monterroso  on 4/19/2023.   FOTO documents entered into [employer] - see Media section.     Limitation Score: 50%            Treatment     Direct contact modalities after being cleared for contraindications:None   Supervised Modalities:   Patient received fluidotherapy to L hand with ring and small finger taped into flexion for 10 minutes to increase blood flow, circulation, and increase soft tissue extensibility.    Manual therapy techniques: 20  min  Retrograde massage   Deep Friction Scar Massage to the: left ring finger   Gentle PROM Ring Fing: Hook, Full Fist,  MP ext and adduction with long holds    :  Therapeutic exercises to develop ROM and flexibility for 20 minutes, including   Reassessment of arom and the following:  Exercise  Reps/ TIme   AROM:  IP joint blocking, add ring finger 10 reps each,   Gentle AAROM:  Hook  straight fist, composite fist 15 + reps ea    AROM: wave, finger lifts and spreads  (with yellow RB) X 10+ reps each (NT)   adduction Pressing pink sponge squares x 10 reps. (NT)   Hand gripper 3 yellow RB X 30 (NT)   Digi Flex Yellow x 20 reps   Calibrated Gripper Rung II, silver spring x 20 reps         Therapeutic Activities 12 min  Octy Manipulation X 3 min, palm up on table   Small pompom in hand manipulation X  1 container of many pompoms of multiple sizes   Pompom pickup with ring and palm X  1 container of many pompoms of multiple sizes (NT)   Hammer rotation Holding on end x 20 reps       Patient Education and Home Exercises      Education provided:   - cont HEP  -cont DFM and retrograde massages  -Use of matheus strap  - use of relative motion exercise orthosis    Written Home Exercises Provided: Patient instructed to cont prior HEP.  Exercises were reviewed and Liang was able to demonstrate them prior to the end of the session.   Liang demonstrated good  understanding of the HEP provided. See EMR under Patient Instructions for exercises provided during therapy sessions.      ASSESSMENT   Upon clinical observation, Liang shows good increase in IP flexion    Liang is working hard towards his goals and there are no updates to goals at this time. Pt prognosis is Good.     Pt will continue to benefit from skilled outpatient occupational therapy to address the deficits listed in the problem list on initial evaluation, provide pt/family education and to maximize pt's level of independence in the home and community environment.     Pt's spiritual, cultural and educational needs considered and pt agreeable to plan of care and goals.    Anticipated barriers to occupational therapy: none      Goals:   The following goals were discussed with the patient and patient is in agreement with them as to be addressed in the treatment plan.   Long Term Goals (LTGs); to be met by discharge.  1) Pt will have an improved FOTO score by at least 20 points ---progressing  2) Pt will report improved pain no higher than 1/10 with ADLs and daily tasks---progressing  3) Pt will report return to prior level of function---progressing  4) Pt will demonstrate improved left RF AROM WFL making a composite fist for improved function with daily tasks---progressing  5) Pt will demonstrate improved edema in left RF by at least 0.4 cm for improved motion and functional use---progressing  6)  and pinch strength to be assessed when appropriate, and set goal---progressing     Short Term Goals (STGs); to be met within 4 weeks (5/19/23).  1) Pt will report pain no higher than 3/10 with ADLs---MET 6/7/2023  2)Pt will report or demonstrate independence with HEP and orthosis wear---MET 5/22/23  3) Pt will be independent with dressing tasks---MET 5/22/23  4) Pt will demonstrate improved L digits 3-5 AROM by at least 30 degrees each for improved grasp and function. ---progressing  (Met)    PLAN     Continue skilled occupational therapy with individualized plan of care focusing on rom and scar maturation    Updates/Grading for next session: Progress therapeutis activities as tolerated and appropriate. .   Reassess ROM and reissue FOTO.  Mercedes Sneed OT      Client Care conference completed with evaluating therapist in regards to this patients POC as evidenced by co signature of supervising therapist.

## 2023-06-09 ENCOUNTER — CLINICAL SUPPORT (OUTPATIENT)
Dept: REHABILITATION | Facility: HOSPITAL | Age: 83
End: 2023-06-09
Payer: MEDICARE

## 2023-06-09 DIAGNOSIS — M25.642 STIFFNESS OF FINGER JOINT OF LEFT HAND: Primary | ICD-10-CM

## 2023-06-09 DIAGNOSIS — M79.642 LEFT HAND PAIN: ICD-10-CM

## 2023-06-09 PROCEDURE — 97140 MANUAL THERAPY 1/> REGIONS: CPT

## 2023-06-09 PROCEDURE — 97530 THERAPEUTIC ACTIVITIES: CPT

## 2023-06-09 PROCEDURE — 97110 THERAPEUTIC EXERCISES: CPT

## 2023-06-09 PROCEDURE — 97022 WHIRLPOOL THERAPY: CPT | Mod: 59

## 2023-06-09 NOTE — PROGRESS NOTES
"  [facility]  Occupational Therapy Treatment Note    Date: 6/9/2023  Name: Liang Monterroso Jr.  Clinic Number: [id number]    Therapy Diagnosis:   Encounter Diagnoses   Name Primary?    Stiffness of finger joint of left hand Yes    Left hand pain          Physician: Russo-Digeorge, Jamie L*    Physician Orders: Continue with OT for aggressive ROM of the finger. Discontinue orthosis. He may have WBAT for his ADLs, but avoid unnecessary WB at this time.  Medical Diagnosis: S62.615B (ICD-10-CM) - Open displaced fracture of proximal phalanx of left ring finger, initial encounter   Surgical Procedure and Date: 4/5/2023  Open reduction internal fixation open fracture left 4th proximal phalanx  Irrigation and debridement left dorsal hand and ring finger laceration with excisional debridement of nonviable portions of skin and subcutaneous tissue  Irrigation debridement left ring finger volar laceration in flexor zone 2 with excisional debridement of nonviable portions of skin subcutaneous tissue,   Evaluation Date: 4/19/2023  Insurance Authorization Period Expiration: 8/30/2023  Plan of Care Expiration:  6/30/23  Progress Note Due: 5/26/2023   Date of Return to MD: 5/26/2023  Visit # / Visits authorized: 14/ 20  FOTO: 4/14/23  50% limitation               5/24 23 50% limitation     Precautions:   Standard and Weightbearing, A2 pulley partially torn    Time In: 11:00 am  Time Out: 12:00 pm  Total Billable Time: 55 minutes    SUBJECTIVE     Liang reports continued progress with ROM.  He was compliant with home exercise program given last session.   Response to previous treatment: nothing reported  Functional change: nothing new reported    Pain: 0/10 "Occasionally I get some pain. At base of the thumb.  Location: left ring finger    OBJECTIVE   Objective Measures updated at progress report unless specified.    Observation/Appearance: moderate swelling mainly in only RF now. Edema. Measured in " centimeters.    4/19/2023 4/19/2023     Left Right   Wrist Crease 17.9 17.2   DPC 22.9 22.2   MCPs 22.8 22      Edema. Measured in centimeters.    4/19/2023 4/19/2023     Left Right   Ring:         P1            9 6.6    PIP            8.8 6.4   P2             7.2 5.7    DIP 6.1 5.2   P3 5.4 4.8      Elbow and Wrist ROM. Measured in degrees.    4/19/2023 4/19/2023 4/26/2023 5/4/2023     Left Right Left Left   Wrist Ext/Flex 50/80 65/80 59/71 64/80   Wrist RD/UD WFL WFL     Elbow          Hand ROM. Measured in degrees.  R hand WFL, but limited with PIP and DIP flexion due to past flexor tendon repair.     4/19/2023 4/26/2023 5/4/2023 5/11/2023 5/26/2023 5/26/2023 6/7/2023     Left Left   Left Pre tx Left Post tx Left Pre tx               Index: MP                       PIP                          DIP                      PRETTY WFL                     Long:  MP 13/82 WNL                   PIP 92                    DIP 15                    PRETTY 176                     Ring:   MP 16/65 8/77 12/78 14/83 16/79 10/87 13/89              PIP 13/30 0/48 0/51 0/63 0/43 /67 /77              DIP 5/15 0/26 0/34 0/33 0/16 /29 /38              PRETTY 76 143                    Small:  MP 15/82 WNL                    PIP 40                     DIP 27                    PRETTY 134                     Thumb: MP WFL                      IP WFL             Rad ADD/ABD WFL             Pal ADD/ABD WFL                Opposition WFL             Strength (Dynamometer) and Pinch Strength (Pinch Gauge)  Measured in pounds.    4/19/2023 4/19/2023     Left Right   Rung II deferred deferred   Key Pinch       3pt Pinch       2pt Pinch          Sensation: reports constant numbness at PIPand P1 of RF, reports coldness in RF tip. Numbness reported in SF.     4/19/2023 4/19/2023     Left Right   [place] Naty       Normal 1.65-2.83       Diminished Light Touch 3.22-3.61       Diminished Protective 3.84-4.31       Loss of Protective 4.56-6.65        Untestable >6.65       2 Point Discrimination       Static       Dynamic                   Limitation/Restriction for FOTO Hand Survey     FOTO scores for Liang KOEHLER Loc Neely on 4/19/2023.   FOTO documents entered into [employer] - see Media section.     Limitation Score: 50%            Treatment     Direct contact modalities after being cleared for contraindications:None   Supervised Modalities:   Patient received fluidotherapy to L hand with ring and small finger taped into flexion for 10 minutes to increase blood flow, circulation, and increase soft tissue extensibility.    Manual therapy techniques: 10  min  Retrograde massage (NT)  Deep Friction Scar Massage to the: left ring finger (NT)  Gentle PROM Ring Fing: Hook, Full Fist,  MP ext and adduction with long holds    :  Therapeutic exercises to develop ROM and flexibility for 15 minutes, including   :  Exercise  Reps/ TIme   AROM:  IP joint blocking, add ring finger 10 reps each,   Gentle AAROM:  Hook  straight fist, composite fist 15 + reps ea    AROM: wave, finger lifts and spreads  (with yellow RB) X 10+ reps each (NT)   adduction Pressing pink sponge squares x 10 reps. (NT)   Yellow putty Digging x 3 min, pulling with thumb, ring, and small x 3 min   Digi Flex Yellow x 20 reps    Calibrated Gripper Rung II, silver spring x 20 reps         Therapeutic Activities 10 min  Octy Manipulation X 3 min, palm up on table (NT)   Small pompom in hand manipulation X  1 container of many pompoms of multiple sizes   Pompom pickup  using yellow clothespins with ring and palm X  1 container of many pompoms of multiple sizes   Hammer rotation Holding on end x 20 reps       Patient Education and Home Exercises      Education provided:   - cont HEP  -cont DFM and retrograde massages  -Use of matheus strap  - use of relative motion exercise orthosis    Written Home Exercises Provided: Patient instructed to cont prior HEP.  Exercises were reviewed and Liang was able to demonstrate them  prior to the end of the session.  Liang demonstrated good  understanding of the HEP provided. See EMR under Patient Instructions for exercises provided during therapy sessions.      ASSESSMENT   Liang tolerated therapy well. H continues to show progress upon clinical observation.    Liang is working hard towards his goals and there are no updates to goals at this time. Pt prognosis is Good.     Pt will continue to benefit from skilled outpatient occupational therapy to address the deficits listed in the problem list on initial evaluation, provide pt/family education and to maximize pt's level of independence in the home and community environment.     Pt's spiritual, cultural and educational needs considered and pt agreeable to plan of care and goals.    Anticipated barriers to occupational therapy: none      Goals:   The following goals were discussed with the patient and patient is in agreement with them as to be addressed in the treatment plan.   Long Term Goals (LTGs); to be met by discharge.  1) Pt will have an improved FOTO score by at least 20 points ---progressing  2) Pt will report improved pain no higher than 1/10 with ADLs and daily tasks---progressing  3) Pt will report return to prior level of function---progressing  4) Pt will demonstrate improved left RF AROM WFL making a composite fist for improved function with daily tasks---progressing  5) Pt will demonstrate improved edema in left RF by at least 0.4 cm for improved motion and functional use---progressing  6)  and pinch strength to be assessed when appropriate, and set goal---progressing     Short Term Goals (STGs); to be met within 4 weeks (5/19/23).  1) Pt will report pain no higher than 3/10 with ADLs---MET 6/7/2023  2)Pt will report or demonstrate independence with HEP and orthosis wear---MET 5/22/23  3) Pt will be independent with dressing tasks---MET 5/22/23  4) Pt will demonstrate improved L digits 3-5 AROM by at least 30 degrees each for  improved grasp and function. ---progressing (Met)    PLAN     Continue skilled occupational therapy with individualized plan of care focusing on rom and scar maturation    Updates/Grading for next session: Progress therapeutis activities as tolerated and appropriate. .   Reassess ROM and reissue FOTO.  Mercedes Sneed OT      Client Care conference completed with evaluating therapist in regards to this patients POC as evidenced by co signature of supervising therapist.

## 2023-06-12 ENCOUNTER — CLINICAL SUPPORT (OUTPATIENT)
Dept: REHABILITATION | Facility: HOSPITAL | Age: 83
End: 2023-06-12
Payer: MEDICARE

## 2023-06-12 DIAGNOSIS — M79.642 LEFT HAND PAIN: ICD-10-CM

## 2023-06-12 DIAGNOSIS — R29.898 WEAKNESS OF LEFT HIP: ICD-10-CM

## 2023-06-12 DIAGNOSIS — M25.552 PAIN OF LEFT HIP: Primary | ICD-10-CM

## 2023-06-12 DIAGNOSIS — M25.642 STIFFNESS OF FINGER JOINT OF LEFT HAND: Primary | ICD-10-CM

## 2023-06-12 PROCEDURE — 97140 MANUAL THERAPY 1/> REGIONS: CPT

## 2023-06-12 PROCEDURE — 97022 WHIRLPOOL THERAPY: CPT

## 2023-06-12 PROCEDURE — 97530 THERAPEUTIC ACTIVITIES: CPT

## 2023-06-12 PROCEDURE — 97110 THERAPEUTIC EXERCISES: CPT

## 2023-06-12 PROCEDURE — 97112 NEUROMUSCULAR REEDUCATION: CPT

## 2023-06-12 NOTE — PROGRESS NOTES
"  [facility]  Occupational Therapy Treatment Note    Date: 6/12/2023  Name: Liang Monterroso Jr.  Clinic Number: [id number]    Therapy Diagnosis:   Encounter Diagnoses   Name Primary?    Stiffness of finger joint of left hand Yes    Left hand pain            Physician: Russo-Digeorge, Jamie L*    Physician Orders: Continue with OT for aggressive ROM of the finger. Discontinue orthosis. He may have WBAT for his ADLs, but avoid unnecessary WB at this time.  Medical Diagnosis: S62.615B (ICD-10-CM) - Open displaced fracture of proximal phalanx of left ring finger, initial encounter   Surgical Procedure and Date: 4/5/2023  Open reduction internal fixation open fracture left 4th proximal phalanx  Irrigation and debridement left dorsal hand and ring finger laceration with excisional debridement of nonviable portions of skin and subcutaneous tissue  Irrigation debridement left ring finger volar laceration in flexor zone 2 with excisional debridement of nonviable portions of skin subcutaneous tissue,   Evaluation Date: 4/19/2023  Insurance Authorization Period Expiration: 8/30/2023  Plan of Care Expiration:  6/30/23  Progress Note Due: 5/26/2023   Date of Return to MD: 5/26/2023  Visit # / Visits authorized: 16/ 20  FOTO: 4/14/23  50% limitation               5/24 23 50% limitation     Precautions:   Standard and Weightbearing, A2 pulley partially torn    Time In: 11:00 am  Time Out: 11: 54 pm  Total Billable Time: 54 minutes    SUBJECTIVE     Liang reports continued progress with ROM.  He was compliant with home exercise program given last session.   Response to previous treatment: nothing reported  Functional change: nothing new reported    Pain: 0/10 "Occasionally I get some pain. At base of the thumb.  Location: left ring finger    OBJECTIVE   Objective Measures updated at progress report unless specified.    Observation/Appearance: moderate swelling mainly in only RF now. Edema. Measured in " centimeters.    4/19/2023 4/19/2023     Left Right   Wrist Crease 17.9 17.2   DPC 22.9 22.2   MCPs 22.8 22      Edema. Measured in centimeters.    4/19/2023 4/19/2023     Left Right   Ring:         P1            9 6.6    PIP            8.8 6.4   P2             7.2 5.7    DIP 6.1 5.2   P3 5.4 4.8      Elbow and Wrist ROM. Measured in degrees.    4/19/2023 4/19/2023 4/26/2023 5/4/2023     Left Right Left Left   Wrist Ext/Flex 50/80 65/80 59/71 64/80   Wrist RD/UD WFL WFL     Elbow          Hand ROM. Measured in degrees.  R hand WFL, but limited with PIP and DIP flexion due to past flexor tendon repair.     4/19/2023 4/26/2023 5/4/2023 5/11/2023 5/26/2023 5/26/2023 6/7/2023     Left Left   Left Pre tx Left Post tx Left Pre tx               Index: MP                       PIP                          DIP                      PRETTY WFL                     Long:  MP 13/82 WNL                   PIP 92                    DIP 15                    PRETTY 176                     Ring:   MP 16/65 8/77 12/78 14/83 16/79 10/87 13/89              PIP 13/30 0/48 0/51 0/63 0/43 /67 /77              DIP 5/15 0/26 0/34 0/33 0/16 /29 /38              PRETTY 76 143                    Small:  MP 15/82 WNL                    PIP 40                     DIP 27                    PRETTY 134                     Thumb: MP WFL                      IP WFL             Rad ADD/ABD WFL             Pal ADD/ABD WFL                Opposition WFL             Strength (Dynamometer) and Pinch Strength (Pinch Gauge)  Measured in pounds.    4/19/2023 4/19/2023     Left Right   Rung II deferred deferred   Key Pinch       3pt Pinch       2pt Pinch          Sensation: reports constant numbness at PIPand P1 of RF, reports coldness in RF tip. Numbness reported in SF.     4/19/2023 4/19/2023     Left Right   Newberg Naty       Normal 1.65-2.83       Diminished Light Touch 3.22-3.61       Diminished Protective 3.84-4.31       Loss of Protective 4.56-6.65        Untestable >6.65       2 Point Discrimination       Static       Dynamic                   Limitation/Restriction for FOTO Hand Survey     FOTO scores for Liang Monterroso Jr. on 4/19/2023.   FOTO documents entered into [employer] - see Media section.     Limitation Score: 50%            Treatment   Liang received the following treatment:    Direct contact modalities after being cleared for contraindications:None   Supervised Modalities:   Patient received fluidotherapy to L hand with ring and small finger taped into flexion for 10 minutes to increase blood flow, circulation, and increase soft tissue extensibility.    Manual therapy techniques: 10  min  Retrograde massage (NT)  Deep Friction Scar Massage to the: left ring finger   Gentle PROM Ring Fing: Hook, Full Fist,  MP ext and adduction with long holds    :  Therapeutic exercises to develop ROM and flexibility for 20 minutes, including   :  Exercise  Reps/ TIme   AROM:  IP joint blocking 10 reps ring finger   Gentle AAROM:  Hook  straight fist, composite fist 15 + reps ea    AROM: wave, finger lifts and spreads  (with yellow RB) X 10+ reps each (NT)   adduction Pressing pink sponge squares x 10 reps. (NT)   Yellow putty pulling with thumb, ring, and small x 3 min  Finding and retrieving small buttons in yellow putty   Digi Flex Yellow x 20 reps (NT)   Calibrated Gripper Rung II, silver spring x 20 reps         Therapeutic Activities 15 min  Octy Manipulation X 3 min, palm up on table (NT)   In- hand manipulation   Small beads x 1 min   In- hand manipulation buttons of multiple sizes x 3 min   Pompom pickup  using yellow clothespins with ring, small, thumb pompoms of multiple sizes x 3min   Hammer rotation Holding on end x 20 reps   Sustained  Holing 2.2 lb ball and moving through space to make large letters - the alphabet       Patient Education and Home Exercises      Education provided:   - cont HEP  -cont DFM and retrograde massages  -Use of matheus strap  - use of  relative motion exercise orthosis    Written Home Exercises Provided: Patient instructed to cont prior HEP.  Exercises were reviewed and Liang was able to demonstrate them prior to the end of the session.  Liang demonstrated good  understanding of the HEP provided. See EMR under Patient Instructions for exercises provided during therapy sessions.      ASSESSMENT   Liang tolerated therapy well. Fist much improved as noted on clinical observation. IP joint ligament limited as well as terminal tendon.    Liang is working hard towards his goals and there are no updates to goals at this time. Pt prognosis is Good.     Pt will continue to benefit from skilled outpatient occupational therapy to address the deficits listed in the problem list on initial evaluation, provide pt/family education and to maximize pt's level of independence in the home and community environment.     Pt's spiritual, cultural and educational needs considered and pt agreeable to plan of care and goals.    Anticipated barriers to occupational therapy: none      Goals:   The following goals were discussed with the patient and patient is in agreement with them as to be addressed in the treatment plan.   Long Term Goals (LTGs); to be met by discharge.  1) Pt will have an improved FOTO score by at least 20 points ---progressing  2) Pt will report improved pain no higher than 1/10 with ADLs and daily tasks---progressing  3) Pt will report return to prior level of function---progressing  4) Pt will demonstrate improved left RF AROM WFL making a composite fist for improved function with daily tasks---progressing  5) Pt will demonstrate improved edema in left RF by at least 0.4 cm for improved motion and functional use---progressing  6)  and pinch strength to be assessed when appropriate, and set goal---progressing     Short Term Goals (STGs); to be met within 4 weeks (5/19/23).  1) Pt will report pain no higher than 3/10 with ADLs---MET 6/7/2023  2)Pt will  report or demonstrate independence with HEP and orthosis wear---MET 5/22/23  3) Pt will be independent with dressing tasks---MET 5/22/23  4) Pt will demonstrate improved L digits 3-5 AROM by at least 30 degrees each for improved grasp and function. ---progressing (Met)    PLAN     Continue skilled occupational therapy with individualized plan of care focusing on rom and scar maturation    Updates/Grading for next session: Progress tx as tolerated and appropriate. .   Reassess ROM and reissue FOTO.  Mercedes Sneed, OT

## 2023-06-12 NOTE — PROGRESS NOTES
[facility]   Physical Therapy Treatment Note     Name: Liang Monterroso Jr.  Clinic Number: [id number]    Therapy Diagnosis:   Encounter Diagnoses   Name Primary?    Pain of left hip Yes    Weakness of left hip          Physician: Parviz Neville    Visit Date: 6/12/2023    Physician Orders: PT Eval and Treat   Medical Diagnosis from Referral: M16.12 (ICD-10-CM) - Primary osteoarthritis of left hip  L hip OA. PT eval/treat. WBAT, ROMAT, modalities prn. HEP  Evaluation Date: 5/2/2023  Authorization Period Expiration: 12/31/23  Plan of Care Expiration: 8/2/23  Progress Note Due: 6/2/23  Visit # / Visits authorized: 1/ 1 , 10/10  FOTO: 5/5     Precautions:  HTN      PTA Visit #: 4/5   Date of last FOTO: 5/2/23    Time In: 10 am  Time Out: 10:45 am  Total Billable Time: 45 minutes    SUBJECTIVE     Pt reports: his hip is feeling better since starting PT. He no longer notices pain when he sneezes or coughs. He still has difficulty with donning pants and his balance feels off. He no longer has difficulty with car transfers and raising his L leg into bed.     He was compliant with home exercise program.  Response to previous treatment: no adverse affect  Functional change: none    Pain: 2/10  Location: left groin      OBJECTIVE      6/12/23      Hip Right MMT Left MMT Pain/Dysfunction with Movement (pain=!)   AROM             flexion  WFL 4+/5  WFL 4-/5 L groin pain with AROM and MMT   extension         Able to perform bridge without pain    abduction  WFL 3+/5  WFL 3+/5     Internal rotation  WFL 4/5  WFL 4-/5    External rotation  WFL 4/5  WFL 4-/5 Mild groin pain        Knee Right MMT Left MMT Pain/Dysfunction with Movement (pain=!)   AROM             flexion  WFL 4+/5  WFL 4-/5     extension  WFL 4/5  WFL 4/5            Flexibility: hamstring 90/90: R lacking 10 deg, L lacking 20 deg        Treatment     Liang received the treatments listed below:  (new treatments are indicated in  "bold)    therapeutic exercises to develop strength, endurance, ROM, flexibility, posture, and core stabilization for 15 minutes including:    Re-assessment completed     Nu Step 6' Lvl 2- BLE strength & endurance     Not performed    Seated hamstring stretch 30" x3 B NP  B piriformis stretch 30" x2 B NP  Standing hip abd Green TB 2x15 B   -standing hip Ext Green TB 2x10   Straight leg raise BLE 2x10 2#  Supine isometric hip abd with pilates ring 5" 2x10 NP    manual therapy techniques: were applied to the: left lower extremity  for 00 minutes, including:    Long axis distraction left lower extremity  L hip internal rotation joint mobs with belt        neuromuscular re-education activities to improve: Balance, Coordination, Kinesthetic, Sense, Proprioception, and Posture for 15 minutes. The following activities were included:    Bridge on BOSU 3x10  Hip abd with contralateral foot on airex x10 B (intermittent BUE support)       Step ups BOSU with contralateral hip flex x20 ea  Standing marches airex (single UE support) 1'  - RTB marches B 20x airex  SLS on airex without BUE support (intermittent UE) x30"   - no airex B x 30  Hip abd iso /c belt 20 5  Short arc quad into straight leg raise 2x10  not performed    Left lower extremity LAQ 3# 3" 2x10 not performed          therapeutic activities to improve functional performance for 15 minutes, including:    Standing march with 1Green TB x20 B  Fwd hurdles orange  x 2 laps     Not performed    Touch and go squats plyobox 20' 2x10  Lateral stepping 2 laps B Green TB   Shuttle DLP 3c till fatigue x 2   -SLP 2c till fatigue x 2  Shuttle hip ext. .5 cord B 2x10  Lateral step ups Lvl 3 step x20 B not performed        Patient Education and Home Exercises     Home Exercises Provided and Patient Education Provided     Education provided:   - HEP    Written Home Exercises Provided: yes. Exercises were reviewed and Liang was able to demonstrate them prior to the end of the session. "  Liang demonstrated good  understanding of the education provided. See EMR under Patient Instructions for exercises provided during therapy sessions    ASSESSMENT   See updated plan of care     Liang Is progressing well towards his goals.   Pt prognosis is Good.     Pt will continue to benefit from skilled outpatient physical therapy to address the deficits listed in the problem list box on initial evaluation, provide pt/family education and to maximize pt's level of independence in the home and community environment.     Pt's spiritual, cultural and educational needs considered and pt agreeable to plan of care and goals.     Anticipated barriers to physical therapy: none    Goals: see updated plan of care     PLAN     See updated plan of care     Marleny Funez, PT

## 2023-06-13 NOTE — PLAN OF CARE
[facility]  Physical Therapy Plan of Care Note     Name: Liang Monterroso Jr.  Clinic Number: [id number]    Therapy Diagnosis:   Encounter Diagnoses   Name Primary?    Pain of left hip Yes    Weakness of left hip      Physician: Parviz Neville    Visit Date: 6/12/2023       Physician Orders: PT Eval and Treat   Medical Diagnosis from Referral: M16.12 (ICD-10-CM) - Primary osteoarthritis of left hip  L hip OA. PT eval/treat. WBAT, ROMAT, modalities prn. HEP  Evaluation Date: 5/2/2023  Authorization Period Expiration: 12/31/23   Plan of Care Expiration: 8/2/23  Progress Note Due: 7/12/23   Visit # / Visits authorized: 1/1, 10/10  FOTO: 1/5    Precautions: Standard, HTN  Functional Level Prior to Evaluation:  (I)    SUBJECTIVE     Update: his hip is feeling better since starting PT. He no longer notices pain when he sneezes or coughs. He still has difficulty with donning pants and his balance feels off. He no longer has difficulty with car transfers and raising his L leg into bed.     OBJECTIVE     Update:  6/12/23      Hip Right MMT Left MMT Pain/Dysfunction with Movement (pain=!)   AROM             flexion  WFL 4+/5  WFL 4-/5 L groin pain with AROM and MMT   extension         Able to perform bridge without pain    abduction  WFL 3+/5  WFL 3+/5     Internal rotation  WFL 4/5  WFL 4-/5    External rotation  WFL 4/5  WFL 4-/5 Mild groin pain        Knee Right MMT Left MMT Pain/Dysfunction with Movement (pain=!)   AROM             flexion  WFL 4+/5  WFL 4-/5     extension  WFL 4/5  WFL 4/5            Flexibility: hamstring 90/90: R lacking 10 deg, L lacking 20 deg        ASSESSMENT     Update: Pt presents with improving L hip strength and reducing pain. He continues to lack adequate B glute medius strength required to maintain single leg balance and stability. He demonstrate impaired dynamic balance, placing him at risk for falls and would benefit from further skilled PT to address these  deficits.     Previous Short Term Goals Status:   Short Term Goals: 6 visits  1. Pt will be compliant /c HEP to supplement PT in order to improve functional tasks (progressing)   2. Pt will improve B glute medius MMTs to 4-/5 grossly to improve strength for functional activities (progressing)        New Short Term Goals Status:   per start of care   Long Term Goal Status: continue per initial plan of care.  Reasons for Recertification of Therapy:   extension of plan of care and request for further visits       PLAN     Updated Certification Period: 6/12/23 to 9/12/23   Recommended Treatment Plan: 2 times per week for 3 weeks:  Manual Therapy, Moist Heat/ Ice, Neuromuscular Re-ed, Patient Education, Therapeutic Activities, and Therapeutic Exercise  Other Recommendations: none    Marleny Funez, PT

## 2023-06-14 ENCOUNTER — CLINICAL SUPPORT (OUTPATIENT)
Dept: REHABILITATION | Facility: HOSPITAL | Age: 83
End: 2023-06-14
Payer: MEDICARE

## 2023-06-14 DIAGNOSIS — M79.642 LEFT HAND PAIN: ICD-10-CM

## 2023-06-14 DIAGNOSIS — M25.552 PAIN OF LEFT HIP: Primary | ICD-10-CM

## 2023-06-14 DIAGNOSIS — R29.898 WEAKNESS OF LEFT HIP: ICD-10-CM

## 2023-06-14 DIAGNOSIS — M25.642 STIFFNESS OF FINGER JOINT OF LEFT HAND: Primary | ICD-10-CM

## 2023-06-14 PROCEDURE — 97530 THERAPEUTIC ACTIVITIES: CPT | Mod: CQ

## 2023-06-14 PROCEDURE — 97112 NEUROMUSCULAR REEDUCATION: CPT | Mod: CQ

## 2023-06-14 PROCEDURE — 97022 WHIRLPOOL THERAPY: CPT | Mod: 59

## 2023-06-14 PROCEDURE — 97530 THERAPEUTIC ACTIVITIES: CPT

## 2023-06-14 PROCEDURE — 97110 THERAPEUTIC EXERCISES: CPT

## 2023-06-14 PROCEDURE — 97110 THERAPEUTIC EXERCISES: CPT | Mod: CQ

## 2023-06-14 PROCEDURE — 97140 MANUAL THERAPY 1/> REGIONS: CPT

## 2023-06-14 NOTE — PROGRESS NOTES
"  [facility]  Occupational Therapy Treatment Note    Date: 6/14/2023  Name: Liang Monterroso Jr.  Clinic Number: [id number]    Therapy Diagnosis:   Encounter Diagnoses   Name Primary?    Stiffness of finger joint of left hand Yes    Left hand pain            Physician: Russo-Digeorge, Jamie L*    Physician Orders: Continue with OT for aggressive ROM of the finger. Discontinue orthosis. He may have WBAT for his ADLs, but avoid unnecessary WB at this time.  Medical Diagnosis: S62.615B (ICD-10-CM) - Open displaced fracture of proximal phalanx of left ring finger, initial encounter   Surgical Procedure and Date: 4/5/2023  Open reduction internal fixation open fracture left 4th proximal phalanx  Irrigation and debridement left dorsal hand and ring finger laceration with excisional debridement of nonviable portions of skin and subcutaneous tissue  Irrigation debridement left ring finger volar laceration in flexor zone 2 with excisional debridement of nonviable portions of skin subcutaneous tissue,   Evaluation Date: 4/19/2023  Insurance Authorization Period Expiration: 8/30/2023  Plan of Care Expiration:  6/30/23  Progress Note Due: 5/26/2023   Date of Return to MD: 5/26/2023  Visit # / Visits authorized: 16/ 20  FOTO: 4/14/23  50% limitation               5/24 23 50% limitation     Precautions:   Standard and Weightbearing, A2 pulley partially torn    Time In: 11:00 am  Time Out: 12:00  pm  Total Billable Time: 54 minutes    SUBJECTIVE     Liang reports continued progress with ROM.  He was compliant with home exercise program given last session.   Response to previous treatment: nothing reported  Functional change: nothing new reported    Pain: 0/10 "Occasionally I get some pain. At base of the thumb.  Location: left ring finger    OBJECTIVE   Objective Measures updated at progress report unless specified.    Observation/Appearance: moderate swelling mainly in only RF now. Edema. Measured in " centimeters.    4/19/2023 4/19/2023     Left Right   Wrist Crease 17.9 17.2   DPC 22.9 22.2   MCPs 22.8 22      Edema. Measured in centimeters.    4/19/2023 4/19/2023     Left Right   Ring:         P1            9 6.6    PIP            8.8 6.4   P2             7.2 5.7    DIP 6.1 5.2   P3 5.4 4.8      Elbow and Wrist ROM. Measured in degrees.    4/19/2023 4/19/2023 4/26/2023 5/4/2023     Left Right Left Left   Wrist Ext/Flex 50/80 65/80 59/71 64/80   Wrist RD/UD WFL WFL     Elbow          Hand ROM. Measured in degrees.  R hand WFL, but limited with PIP and DIP flexion due to past flexor tendon repair.     4/19/2023 4/26/2023 5/4/2023 5/11/2023 5/26/2023 5/26/2023 6/7/2023 6/14/2023     Left Left   Left Pre tx Left Post tx Left Pre tx                 Index: MP                        PIP                           DIP                       PRETTY WFL                       Long:  MP 13/82 WNL                    PIP 92                     DIP 15                     PRETTY 176                       Ring:   MP 16/65 8/77 12/78 14/83 16/79 10/87 13/89 18/98              PIP 13/30 0/48 0/51 0/63 0/43 /67 /77 /77/              DIP 5/15 0/26 0/34 0/33 0/16 /29 /38 0/44              PRETTY 76 143                      Small:  MP 15/82 WNL                     PIP 40                      DIP 27                     PRETTY 134                       Thumb: MP WFL                       IP WFL              Rad ADD/ABD WFL              Pal ADD/ABD WFL                 Opposition WFL              Strength (Dynamometer) and Pinch Strength (Pinch Gauge)  Measured in pounds.    6/14/2023 6/14/2023     Left Right   Rung II 35 70   Key Pinch       3pt Pinch       2pt Pinch          Sensation: reports constant numbness at PIPand P1 of RF, reports coldness in RF tip. Numbness reported in SF.     4/19/2023 4/19/2023     Left Right   [place] Naty       Normal 1.65-2.83       Diminished Light Touch 3.22-3.61       Diminished Protective 3.84-4.31        Loss of Protective 4.56-6.65       Untestable >6.65       2 Point Discrimination       Static       Dynamic                   Limitation/Restriction for FOTO Hand Survey     FOTO scores for Liang Monterroso Jr. on 4/19/2023.   FOTO documents entered into [employer] - see Media section.     Limitation Score: 50%            Treatment   Liang received the following treatment:    Direct contact modalities after being cleared for contraindications:None   Supervised Modalities:   Patient received fluidotherapy to L hand with ring and small finger taped into flexion for 10 minutes to increase blood flow, circulation, and increase soft tissue extensibility.    Manual therapy techniques: 10  min  Retrograde massage (NT)  Deep Friction Scar Massage to the: left ring finger   Gentle PROM Ring Fing: Hook, Full Fist,  MP ext and adduction with long holds    :  Therapeutic exercises to develop ROM and flexibility for 19 minutes, including reassessment of rom and baseline assessment of  strength.   :  Exercise  Reps/ TIme   AROM:  IP joint blocking 10 reps ring finger   Gentle AAROM:  Hook  straight fist, composite fist 15 + reps ea    AROM: wave, finger lifts and spreads  (with yellow RB) X 10+ reps each (NT)   adduction Pressing pink sponge squares x 10 reps. (NT)   Yellow putty pulling with thumb, ring, and small x 3 min  Finding and retrieving small buttons in yellow putty   Digi Flex Yellow x 20 reps (NT)   Calibrated Gripper Rung II, silver spring x 20 reps         Therapeutic Activities 15 min  Octy Manipulation X 3 min, palm up on table (NT)   In- hand manipulation   Small beads x 1 min   In- hand manipulation buttons of multiple sizes x 3 min   Pompom pickup  using yellow clothespins with ring, small, thumb pompoms of multiple sizes x 3min   Hammer rotation Holding on end x 20 reps   Sustained  Holing 2.2 lb ball and moving through space to make large letters - the alphabet       Patient Education and Home Exercises       Education provided:   - cont HEP  -cont DFM and retrograde massages  -Use of matheus strap  - use of relative motion exercise orthosis    Written Home Exercises Provided: Patient instructed to cont prior HEP.  Exercises were reviewed and Liang was able to demonstrate them prior to the end of the session.  Liang demonstrated good  understanding of the HEP provided. See EMR under Patient Instructions for exercises provided during therapy sessions.      ASSESSMENT   Upon formal reassessment, Liang is noted to have increase in DIP flexion of the ring finger. No change in PIP flex. However, he responds very well to heat and stretch and is able to touch the tip of ring finger to the palm.  Liang is working hard towards his goals and there are no updates to goals at this time. Pt prognosis is Good.     Pt will continue to benefit from skilled outpatient occupational therapy to address the deficits listed in the problem list on initial evaluation, provide pt/family education and to maximize pt's level of independence in the home and community environment.     Pt's spiritual, cultural and educational needs considered and pt agreeable to plan of care and goals.    Anticipated barriers to occupational therapy: none      Goals:   The following goals were discussed with the patient and patient is in agreement with them as to be addressed in the treatment plan.   Long Term Goals (LTGs); to be met by discharge.  1) Pt will have an improved FOTO score by at least 20 points ---progressing  2) Pt will report improved pain no higher than 1/10 with ADLs and daily tasks---progressing  3) Pt will report return to prior level of function---progressing  4) Pt will demonstrate improved left RF AROM WFL making a composite fist for improved function with daily tasks---progressing  5) Pt will demonstrate improved edema in left RF by at least 0.4 cm for improved motion and functional use---progressing  6)  and pinch strength to be assessed  when appropriate, and set goal---progressing     Short Term Goals (STGs); to be met within 4 weeks (5/19/23).  1) Pt will report pain no higher than 3/10 with ADLs---MET 6/7/2023  2)Pt will report or demonstrate independence with HEP and orthosis wear---MET 5/22/23  3) Pt will be independent with dressing tasks---MET 5/22/23  4) Pt will demonstrate improved L digits 3-5 AROM by at least 30 degrees each for improved grasp and function. ---progressing (Met)    PLAN     Continue skilled occupational therapy with individualized plan of care focusing on rom and scar maturation    Updates/Grading for next session: Progress tx as tolerated and appropriate. .   Reassess ROM and reissue FOTO.  Mercedes Sneed, OT

## 2023-06-14 NOTE — PROGRESS NOTES
"[facility]   Physical Therapy Treatment Note     Name: Liang Monterroso Jr.  Clinic Number: [id number]    Therapy Diagnosis:   Encounter Diagnoses   Name Primary?    Pain of left hip Yes    Weakness of left hip          Physician: Russo-Digeorge, Jamie L*    Visit Date: 6/14/2023    Physician Orders: PT Eval and Treat   Medical Diagnosis from Referral: M16.12 (ICD-10-CM) - Primary osteoarthritis of left hip  L hip OA. PT eval/treat. WBAT, ROMAT, modalities prn. HEP  Evaluation Date: 5/2/2023  Authorization Period Expiration: 12/31/23  Plan of Care Expiration: 8/2/23  Progress Note Due: 6/2/23  Visit # / Visits authorized: 1/ 1 , 11/20  FOTO: 5/5     Precautions:  HTN      PTA Visit #: 1/5   Date of last FOTO: 5/2/23    Time In: 8:55 am  Time Out: 9:42 am  Total Billable Time: 45 minutes    SUBJECTIVE     Pt reports: his hip is feeling better since starting PT. He no longer notices pain when he sneezes or coughs. He still has difficulty with donning pants and his balance feels off. He no longer has difficulty with car transfers and raising his L leg into bed.     He was compliant with home exercise program.  Response to previous treatment: no adverse affect  Functional change: none    Pain: 2/10  Location: left groin      OBJECTIVE          Treatment     Liang received the treatments listed below:  (new treatments are indicated in bold)    therapeutic exercises to develop strength, endurance, ROM, flexibility, posture, and core stabilization for 15 minutes including:      Nu Step 6' Lvl 2- BLE strength & endurance     Not performed    Long Sitting hamstring stretch 30" x3 B   B piriformis stretch 30" x2 B NP  Standing hip abd Green TB 2x15 B   -standing hip Ext Green TB 2x10   Straight leg raise BLE 2x10 2#  Supine isometric hip abd with pilates ring 5" 2x10 NP    manual therapy techniques: were applied to the: left lower extremity  for 00 minutes, including:    Long axis distraction left lower " "extremity  L hip internal rotation joint mobs with belt        neuromuscular re-education activities to improve: Balance, Coordination, Kinesthetic, Sense, Proprioception, and Posture for 15 minutes. The following activities were included:    Bridge on BOSU 3x10  Hip abd with contralateral foot on airex 2x10 B (intermittent BUE support)   Step ups BOSU with contralateral hip flex x20 ea  Step ups onto airex 20x B (No support)  Lateral step overs on airex 20x B (No support)    NP  Standing marches airex (single UE support) 1'  - RTB marches B 20x airex  SLS on airex without BUE support (intermittent UE) x30"   - no airex B x 30  Hip abd iso /c belt 20 5  Short arc quad into straight leg raise 2x10  not performed    Left lower extremity LAQ 3# 3" 2x10 not performed          therapeutic activities to improve functional performance for 15 minutes, including:    Standing march with 1Green TB x20 B  Fwd hurdles orange  x 2 laps     Not performed    Touch and go squats plyobox 20' 2x10  Lateral stepping 2 laps B Green TB   Shuttle DLP 3c till fatigue x 2   -SLP 2c till fatigue x 2  Shuttle hip ext. .5 cord B 2x10  Lateral step ups Lvl 3 step x20 B not performed        Patient Education and Home Exercises     Home Exercises Provided and Patient Education Provided     Education provided:   - HEP    Written Home Exercises Provided: yes. Exercises were reviewed and Linag was able to demonstrate them prior to the end of the session.  Liang demonstrated good  understanding of the education provided. See EMR under Patient Instructions for exercises provided during therapy sessions    ASSESSMENT   Pt continues with intermittent L groin pain. Session with emphasis on B glute medius strengthening and balance. Progressed patient with step ups and overs on airex with out support with good results. Patient with improved balance today with hurdles and BOSU ball. Patient verbalized difficulty with SLR on (L) LE.    Liang Is progressing well " towards his goals.   Pt prognosis is Good.     Pt will continue to benefit from skilled outpatient physical therapy to address the deficits listed in the problem list box on initial evaluation, provide pt/family education and to maximize pt's level of independence in the home and community environment.     Pt's spiritual, cultural and educational needs considered and pt agreeable to plan of care and goals.     Anticipated barriers to physical therapy: none      Goals: 6 visits  1. Pt will be compliant /c HEP to supplement PT in order to improve functional tasks (progressing)   2. Pt will improve B glute medius MMTs to 4-/5 grossly to improve strength for functional activities (progressing)    New Short Term Goals Status:   per start of care   Long Term Goal Status: continue per initial plan of care.  Reasons for Recertification of Therapy:   extension of plan of care and request for further visits   PLAN   Updated Certification Period: 6/12/23 to 9/12/23   Recommended Treatment Plan: 2 times per week for 3 weeks:  Manual Therapy, Moist Heat/ Ice, Neuromuscular Re-ed, Patient Education, Therapeutic Activities, and Therapeutic Exercise      Ricardo Hdez, PTA

## 2023-06-16 ENCOUNTER — CLINICAL SUPPORT (OUTPATIENT)
Dept: REHABILITATION | Facility: HOSPITAL | Age: 83
End: 2023-06-16
Payer: MEDICARE

## 2023-06-16 ENCOUNTER — PATIENT MESSAGE (OUTPATIENT)
Dept: CARDIOLOGY | Facility: CLINIC | Age: 83
End: 2023-06-16
Payer: MEDICARE

## 2023-06-16 DIAGNOSIS — M79.642 LEFT HAND PAIN: ICD-10-CM

## 2023-06-16 DIAGNOSIS — M25.642 STIFFNESS OF FINGER JOINT OF LEFT HAND: Primary | ICD-10-CM

## 2023-06-16 PROCEDURE — 97110 THERAPEUTIC EXERCISES: CPT

## 2023-06-16 PROCEDURE — 97530 THERAPEUTIC ACTIVITIES: CPT

## 2023-06-16 PROCEDURE — 97140 MANUAL THERAPY 1/> REGIONS: CPT

## 2023-06-16 NOTE — PROGRESS NOTES
"  [facility]  Occupational Therapy Treatment Note    Date: 6/16/2023  Name: Liang Monterroso Jr.  Clinic Number: [id number]    Therapy Diagnosis:   Encounter Diagnoses   Name Primary?    Stiffness of finger joint of left hand Yes    Left hand pain              Physician: Russo-Digeorge, Jamie L*    Physician Orders: Continue with OT for aggressive ROM of the finger. Discontinue orthosis. He may have WBAT for his ADLs, but avoid unnecessary WB at this time.  Medical Diagnosis: S62.615B (ICD-10-CM) - Open displaced fracture of proximal phalanx of left ring finger, initial encounter   Surgical Procedure and Date: 4/5/2023  Open reduction internal fixation open fracture left 4th proximal phalanx  Irrigation and debridement left dorsal hand and ring finger laceration with excisional debridement of nonviable portions of skin and subcutaneous tissue  Irrigation debridement left ring finger volar laceration in flexor zone 2 with excisional debridement of nonviable portions of skin subcutaneous tissue,   Evaluation Date: 4/19/2023  Insurance Authorization Period Expiration: 8/30/2023  Plan of Care Expiration:  6/30/23  Progress Note Due: 5/26/2023   Date of Return to MD: 5/26/2023  Visit # / Visits authorized: 16/ 20  FOTO: 4/14/23  50% limitation               5/24 /23 50% limitation                6/16/23   43% limitation  Precautions:   Standard and Weightbearing, A2 pulley partially torn    Time In: 11:05 am  Time Out: 11:55  pm  Total Billable Time: 48 minutes    SUBJECTIVE     Liang reports continued progress with ROM.  He was compliant with home exercise program given last session.   Response to previous treatment: nothing reported  Functional change: nothing new reported    Pain: 0/10 "Occasionally I get some pain. At base of the thumb.  Location: left ring finger    OBJECTIVE   Objective Measures updated at progress report unless specified.    Observation/Appearance: moderate swelling mainly in " only RF now. Edema. Measured in centimeters.    4/19/2023 4/19/2023     Left Right   Wrist Crease 17.9 17.2   DPC 22.9 22.2   MCPs 22.8 22      Edema. Measured in centimeters.    4/19/2023 4/19/2023     Left Right   Ring:         P1            9 6.6    PIP            8.8 6.4   P2             7.2 5.7    DIP 6.1 5.2   P3 5.4 4.8      Elbow and Wrist ROM. Measured in degrees.    4/19/2023 4/19/2023 4/26/2023 5/4/2023     Left Right Left Left   Wrist Ext/Flex 50/80 65/80 59/71 64/80   Wrist RD/UD WFL WFL     Elbow          Hand ROM. Measured in degrees.  R hand WFL, but limited with PIP and DIP flexion due to past flexor tendon repair.     4/19/2023 4/26/2023 5/4/2023 5/11/2023 5/26/2023 5/26/2023 6/7/2023 6/14/2023     Left Left   Left Pre tx Left Post tx Left Pre tx                 Index: MP                        PIP                           DIP                       PRETTY WFL                       Long:  MP 13/82 WNL                    PIP 92                     DIP 15                     PRETTY 176                       Ring:   MP 16/65 8/77 12/78 14/83 16/79 10/87 13/89 18/98              PIP 13/30 0/48 0/51 0/63 0/43 /67 /77 /77/              DIP 5/15 0/26 0/34 0/33 0/16 /29 /38 0/44              PRETTY 76 143                      Small:  MP 15/82 WNL                     PIP 40                      DIP 27                     PRETTY 134                       Thumb: MP WFL                       IP WFL              Rad ADD/ABD WFL              Pal ADD/ABD WFL                 Opposition WFL              Strength (Dynamometer) and Pinch Strength (Pinch Gauge)  Measured in pounds.    6/14/2023 6/14/2023     Left Right   Rung II 35 70   Key Pinch       3pt Pinch       2pt Pinch          Sensation: reports constant numbness at PIPand P1 of RF, reports coldness in RF tip. Numbness reported in SF.     4/19/2023 4/19/2023     Left Right   [place] Naty       Normal 1.65-2.83       Diminished Light Touch 3.22-3.61        Diminished Protective 3.84-4.31       Loss of Protective 4.56-6.65       Untestable >6.65       2 Point Discrimination       Static       Dynamic                   Limitation/Restriction for FOTO Hand Survey     FOTO scores for Liang Monterroso Jr. on 4/19/2023.   FOTO documents entered into [employer] - see Media section.     Limitation Score: 50%            Treatment   Liang received the following treatment:    Direct contact modalities after being cleared for contraindications:None   Supervised Modalities:   Patient received fluidotherapy to L hand with ring and small finger taped into flexion for 10 minutes to increase blood flow, circulation, and increase soft tissue extensibility.    Manual therapy techniques: 15  min  Gentle PROM Ring Fing: Hook, Full Fist,  MP ext and adduction with long holds   Retrograde massage (NT)  Deep Friction Scar Massage to the: left ring finger (NT)     :  Therapeutic exercises to develop ROM and flexibility for 15 min, including:  Exercise  Reps/ TIme   AROM:  IP joint blocking 10 reps ring finger   Gentle AAROM:  Hook  composite fist 15 + reps ea    AROM: wave, finger lifts and spreads  (with yellow RB) X 10+ reps each (NT)   adduction Pressing pink sponge squares x 10 reps. (NT)   Yellow putty pulling with thumb, ring, and small x 3 min  Finding and retrieving small buttons in yellow putty (NT)   Digi Flex Red x 20 reps    Calibrated Gripper Rung II, silver spring x 20 reps         Therapeutic Activities 8 min  Octy Manipulation X 3 min, palm up on table    In- hand manipulation   Small beads x 1 min(NT)   In- hand manipulation buttons of multiple sizes x 3 min (NT)   Pompom pickup  using yellow clothespins with ring, small, thumb pompoms of multiple sizes x (NT)   Hammer rotation Holding on end x 20 reps   Sustained  Holing 2.2 lb ball and moving through space to make large letters - the alphabet x 3min       Patient Education and Home Exercises      Education provided:   - cont  HEP  -cont DFM and retrograde massages  -Use of matheus strap  - use of relative motion exercise orthosis    Written Home Exercises Provided: Patient instructed to cont prior HEP.  Exercises were reviewed and Liang was able to demonstrate them prior to the end of the session.  Liang demonstrated good  understanding of the HEP provided. See EMR under Patient Instructions for exercises provided during therapy sessions.      ASSESSMENT   Upon clinical observation, Liang continues to make progress with composite flex. After heat and stretch, he was able to touch DIP to the palm with less effort today.FOTO score improved 7 points as compared to the previous one.  Liang is working hard towards his goals and there are no updates to goals at this time. Pt prognosis is Good.     Pt will continue to benefit from skilled outpatient occupational therapy to address the deficits listed in the problem list on initial evaluation, provide pt/family education and to maximize pt's level of independence in the home and community environment.     Pt's spiritual, cultural and educational needs considered and pt agreeable to plan of care and goals.    Anticipated barriers to occupational therapy: none      Goals:   The following goals were discussed with the patient and patient is in agreement with them as to be addressed in the treatment plan.   Long Term Goals (LTGs); to be met by discharge.  1) Pt will have an improved FOTO score by at least 20 points ---progressing  2) Pt will report improved pain no higher than 1/10 with ADLs and daily tasks---progressing  3) Pt will report return to prior level of function---progressing  4) Pt will demonstrate improved left RF AROM WFL making a composite fist for improved function with daily tasks---progressing  5) Pt will demonstrate improved edema in left RF by at least 0.4 cm for improved motion and functional use---progressing  6)  and pinch strength to be assessed when appropriate, and set  goal---progressing     Short Term Goals (STGs); to be met within 4 weeks (5/19/23).  1) Pt will report pain no higher than 3/10 with ADLs---MET 6/7/2023  2)Pt will report or demonstrate independence with HEP and orthosis wear---MET 5/22/23  3) Pt will be independent with dressing tasks---MET 5/22/23  4) Pt will demonstrate improved L digits 3-5 AROM by at least 30 degrees each for improved grasp and function. ---progressing (Met)    PLAN     Continue skilled occupational therapy with individualized plan of care focusing on rom and scar maturation    Updates/Grading for next session: Progress tx as tolerated and appropriate. .   Reassess ROM and reissue FOTO.  Mercedes Sneed, OT

## 2023-06-19 ENCOUNTER — PATIENT MESSAGE (OUTPATIENT)
Dept: INTERNAL MEDICINE | Facility: CLINIC | Age: 83
End: 2023-06-19
Payer: MEDICARE

## 2023-06-19 ENCOUNTER — CLINICAL SUPPORT (OUTPATIENT)
Dept: REHABILITATION | Facility: HOSPITAL | Age: 83
End: 2023-06-19
Payer: MEDICARE

## 2023-06-19 DIAGNOSIS — D64.9 ANEMIA, UNSPECIFIED TYPE: Primary | ICD-10-CM

## 2023-06-19 DIAGNOSIS — M25.642 STIFFNESS OF FINGER JOINT OF LEFT HAND: Primary | ICD-10-CM

## 2023-06-19 DIAGNOSIS — M79.642 LEFT HAND PAIN: ICD-10-CM

## 2023-06-19 DIAGNOSIS — R29.898 WEAKNESS OF LEFT HIP: ICD-10-CM

## 2023-06-19 DIAGNOSIS — M25.552 PAIN OF LEFT HIP: Primary | ICD-10-CM

## 2023-06-19 PROCEDURE — 97022 WHIRLPOOL THERAPY: CPT

## 2023-06-19 PROCEDURE — 97110 THERAPEUTIC EXERCISES: CPT

## 2023-06-19 PROCEDURE — 97530 THERAPEUTIC ACTIVITIES: CPT

## 2023-06-19 PROCEDURE — 97140 MANUAL THERAPY 1/> REGIONS: CPT

## 2023-06-19 PROCEDURE — 97110 THERAPEUTIC EXERCISES: CPT | Mod: CQ

## 2023-06-19 PROCEDURE — 97530 THERAPEUTIC ACTIVITIES: CPT | Mod: CQ

## 2023-06-19 PROCEDURE — 97112 NEUROMUSCULAR REEDUCATION: CPT | Mod: CQ

## 2023-06-19 RX ORDER — FERROUS SULFATE 325(65) MG
325 TABLET, DELAYED RELEASE (ENTERIC COATED) ORAL DAILY
Qty: 30 TABLET | Refills: 2 | Status: SHIPPED | OUTPATIENT
Start: 2023-06-19 | End: 2023-07-25

## 2023-06-19 NOTE — PROGRESS NOTES
"[facility]   Physical Therapy Treatment Note     Name: Liang Monterroso Jr.  Clinic Number: [id number]    Therapy Diagnosis:   Encounter Diagnoses   Name Primary?    Pain of left hip Yes    Weakness of left hip          Physician: Russo-Digeorge, Jamie L*    Visit Date: 6/19/2023    Physician Orders: PT Eval and Treat   Medical Diagnosis from Referral: M16.12 (ICD-10-CM) - Primary osteoarthritis of left hip  L hip OA. PT eval/treat. WBAT, ROMAT, modalities prn. HEP  Evaluation Date: 5/2/2023  Authorization Period Expiration: 12/31/23  Plan of Care Expiration: 8/2/23  Progress Note Due: 6/2/23  Visit # / Visits authorized: 1/ 1 , 11/20  FOTO: 5/5     Precautions:  HTN      PTA Visit #: 1/5   Date of last FOTO: 5/2/23    Time In: 12:10 pm  Time Out: 1:00 pm  Total Billable Time: 50 minutes    SUBJECTIVE     Pt reports: he is about the same since the last visit. He can kneel down, but has trouble getting back up.    He was compliant with home exercise program.  Response to previous treatment: no adverse affect  Functional change: none    Pain: 2/10  Location: left groin      OBJECTIVE          Treatment     Liang received the treatments listed below:  (new treatments are indicated in bold)    therapeutic exercises to develop strength, endurance, ROM, flexibility, posture, and core stabilization for 15 minutes including:      Shuttle DLP 3c till fatigue x 2 (3x10)   -SLP 2c till fatigue x 2 (3x10)  Nu Step 6' Lvl 2- BLE strength & endurance   SL hip Abd 2x10 B    Not performed    Long Sitting hamstring stretch 30" x3 B   B piriformis stretch 30" x2 B NP  Standing hip abd Green TB 2x15 B   -standing hip Ext Green TB 2x10   Straight leg raise BLE 2x10 2#  Supine isometric hip abd with pilates ring 5" 2x10 NP    manual therapy techniques: were applied to the: left lower extremity  for 00 minutes, including:    Long axis distraction left lower extremity  L hip internal rotation joint mobs with " "belt        neuromuscular re-education activities to improve: Balance, Coordination, Kinesthetic, Sense, Proprioception, and Posture for 15 minutes. The following activities were included:    Bridge on BOSU 3x10  Hip abd with contralateral foot on airex 2x10 B (intermittent BUE support)   Step ups BOSU with contralateral hip flex x20 ea  Step ups onto airex 20x B (No support)  Lateral step overs on airex 20x B (No support)    NP  Standing marches airex (single UE support) 1'  - RTB marches B 20x airex  SLS on airex without BUE support (intermittent UE) x30"   - no airex B x 30  Hip abd iso /c belt 20 5  Short arc quad into straight leg raise 2x10  not performed    Left lower extremity LAQ 3# 3" 2x10 not performed          therapeutic activities to improve functional performance for 15 minutes, including:  Fwd hurdles orange  x 2 laps   Standing march with 1Green TB x20 B  1/2 kneel to stand B 2x10 UE support    Not performed    Touch and go squats plyobox 20' 2x10  Lateral stepping 2 laps B Green TB   Shuttle hip ext. .5 cord B 2x10  Lateral step ups Lvl 3 step x20 B not performed        Patient Education and Home Exercises     Home Exercises Provided and Patient Education Provided     Education provided:   - HEP    Written Home Exercises Provided: yes. Exercises were reviewed and Liang was able to demonstrate them prior to the end of the session.  Liang demonstrated good  understanding of the education provided. See EMR under Patient Instructions for exercises provided during therapy sessions    ASSESSMENT   Pt continues with intermittent L groin pain. Session with emphasis on B glute medius strengthening and balance. Progressed patient with half kneeling for functional mobility and SL hip abduction for improved strength to help with ambulation and balance. Better tolerance to hurdles as evidenced by decrease LOB. He did require SBA with all balance activities and CGA with madhavi negotiating.    Liang Is progressing " well towards his goals.   Pt prognosis is Good.     Pt will continue to benefit from skilled outpatient physical therapy to address the deficits listed in the problem list box on initial evaluation, provide pt/family education and to maximize pt's level of independence in the home and community environment.     Pt's spiritual, cultural and educational needs considered and pt agreeable to plan of care and goals.     Anticipated barriers to physical therapy: none      Goals: 6 visits  1. Pt will be compliant /c HEP to supplement PT in order to improve functional tasks (progressing)   2. Pt will improve B glute medius MMTs to 4-/5 grossly to improve strength for functional activities (progressing)    New Short Term Goals Status:   per start of care   Long Term Goal Status: continue per initial plan of care.  Reasons for Recertification of Therapy:   extension of plan of care and request for further visits   PLAN   Updated Certification Period: 6/12/23 to 9/12/23   Recommended Treatment Plan: 2 times per week for 3 weeks:  Manual Therapy, Moist Heat/ Ice, Neuromuscular Re-ed, Patient Education, Therapeutic Activities, and Therapeutic Exercise      Ricardo Hdez, PTA

## 2023-06-19 NOTE — PROGRESS NOTES
"  [facility]  Occupational Therapy Treatment Note    Date: 6/19/2023  Name: Liang Monterroso Jr.  Clinic Number: [id number]    Therapy Diagnosis:   Encounter Diagnoses   Name Primary?    Stiffness of finger joint of left hand Yes    Left hand pain                Physician: Russo-Digeorge, Jamie L*    Physician Orders: Continue with OT for aggressive ROM of the finger. Discontinue orthosis. He may have WBAT for his ADLs, but avoid unnecessary WB at this time.  Medical Diagnosis: S62.615B (ICD-10-CM) - Open displaced fracture of proximal phalanx of left ring finger, initial encounter   Surgical Procedure and Date: 4/5/2023  Open reduction internal fixation open fracture left 4th proximal phalanx  Irrigation and debridement left dorsal hand and ring finger laceration with excisional debridement of nonviable portions of skin and subcutaneous tissue  Irrigation debridement left ring finger volar laceration in flexor zone 2 with excisional debridement of nonviable portions of skin subcutaneous tissue,   Evaluation Date: 4/19/2023  Insurance Authorization Period Expiration: 8/30/2023  Plan of Care Expiration:  6/30/23  Progress Note Due: 5/26/2023   Date of Return to MD: 5/26/2023  Visit # / Visits authorized: 16/ 20  FOTO: 4/14/23  50% limitation               5/24 /23 50% limitation                6/16/23   43% limitation  Precautions:   Standard and Weightbearing, A2 pulley partially torn    Time In: 11:03 am  Time Out: 11:53  pm  Total Billable Time: 50 minutes    SUBJECTIVE     Liang reports continued progress with ROM.  He was compliant with home exercise program given last session.   Response to previous treatment: nothing reported  Functional change: nothing new reported    Pain: 0/10 "Occasionally I get some pain. At base of the thumb.  Location: left ring finger    OBJECTIVE   Objective Measures updated at progress report unless specified.    Observation/Appearance: moderate swelling mainly in " only RF now. Edema. Measured in centimeters.    4/19/2023 4/19/2023     Left Right   Wrist Crease 17.9 17.2   DPC 22.9 22.2   MCPs 22.8 22      Edema. Measured in centimeters.    4/19/2023 4/19/2023     Left Right   Ring:         P1            9 6.6    PIP            8.8 6.4   P2             7.2 5.7    DIP 6.1 5.2   P3 5.4 4.8      Elbow and Wrist ROM. Measured in degrees.    4/19/2023 4/19/2023 4/26/2023 5/4/2023     Left Right Left Left   Wrist Ext/Flex 50/80 65/80 59/71 64/80   Wrist RD/UD WFL WFL     Elbow          Hand ROM. Measured in degrees.  R hand WFL, but limited with PIP and DIP flexion due to past flexor tendon repair.     4/19/2023 4/26/2023 5/4/2023 5/11/2023 5/26/2023 5/26/2023 6/7/2023 6/14/2023     Left Left   Left Pre tx Left Post tx Left Pre tx                 Index: MP                        PIP                           DIP                       PRETTY WFL                       Long:  MP 13/82 WNL                    PIP 92                     DIP 15                     PRETTY 176                       Ring:   MP 16/65 8/77 12/78 14/83 16/79 10/87 13/89 18/98              PIP 13/30 0/48 0/51 0/63 0/43 /67 /77 /77/              DIP 5/15 0/26 0/34 0/33 0/16 /29 /38 0/44              PRETTY 76 143                      Small:  MP 15/82 WNL                     PIP 40                      DIP 27                     PRETTY 134                       Thumb: MP WFL                       IP WFL              Rad ADD/ABD WFL              Pal ADD/ABD WFL                 Opposition WFL              Strength (Dynamometer) and Pinch Strength (Pinch Gauge)  Measured in pounds.    6/14/2023 6/14/2023     Left Right   Rung II 35 70   Key Pinch       3pt Pinch       2pt Pinch          Sensation: reports constant numbness at PIPand P1 of RF, reports coldness in RF tip. Numbness reported in SF.     4/19/2023 4/19/2023     Left Right   [place] Nat[place]       Normal 1.65-2.83       Diminished Light Touch 3.22-3.61        Diminished Protective 3.84-4.31       Loss of Protective 4.56-6.65       Untestable >6.65       2 Point Discrimination       Static       Dynamic                   Limitation/Restriction for FOTO Hand Survey     FOTO scores for Liang Monterroso Jr. on 4/19/2023.   FOTO documents entered into [employer] - see Media section.     Limitation Score: 50%            Treatment   Liang received the following treatment:    Direct contact modalities after being cleared for contraindications:None   Supervised Modalities:   Patient received fluidotherapy to L hand with ring and small finger taped into flexion for 10 minutes to increase blood flow, circulation, and increase soft tissue extensibility.    Manual therapy techniques: 15  min  Gentle PROM Ring Fing: Hook, Full Fist,  MP ext and adduction with long holds   Deep Friction Scar Massage to the: left ring finger     :  Therapeutic exercises to develop ROM and flexibility for 17 min, including:  Exercise  Reps/ TIme   AROM:  IP joint blocking 10 reps ring finger   Gentle AAROM:  Hook,  straight fist,   composite fist, ABD ring to long  15 + reps ea        Digi Flex Red x 20 reps    Calibrated Gripper Rung II, silver spring x 20 reps   Wrist Pre's  3 lbs, 3 ways, 3/10 reps ea way     Therapeutic Activities 8 min  Octy Manipulation X 3 min, palm up on table    In- hand manipulation   Small beads x 1 min(NT)   In- hand manipulation buttons of multiple sizes x 3 min (NT)   Pompom pickup  using yellow clothespins with ring, small, thumb pompoms of multiple sizes x 3 min   Hammer rotation Holding on end x 20 reps   Sustained  Holing 2.2 lb ball and moving through space to make large letters - the alphabet x 3min(NT)       Patient Education and Home Exercises      Education provided:   - cont HEP  -cont DFM and retrograde massages  -Use of matheus strap  - use of relative motion exercise orthosis    Written Home Exercises Provided: Patient instructed to cont prior HEP.  Exercises were  reviewed and Liang was able to demonstrate them prior to the end of the session.  Liang demonstrated good  understanding of the HEP provided. See EMR under Patient Instructions for exercises provided during therapy sessions.      ASSESSMENT     Liang is progressing well towards his goals and there are no updates to goals at this time. Pt prognosis is Good.     Pt will continue to benefit from skilled outpatient occupational therapy to address the deficits listed in the problem list on initial evaluation, provide pt/family education and to maximize pt's level of independence in the home and community environment.     Pt's spiritual, cultural and educational needs considered and pt agreeable to plan of care and goals.    Anticipated barriers to occupational therapy: none      Goals:   The following goals were discussed with the patient and patient is in agreement with them as to be addressed in the treatment plan.   Long Term Goals (LTGs); to be met by discharge.  1) Pt will have an improved FOTO score by at least 20 points ---progressing  2) Pt will report improved pain no higher than 1/10 with ADLs and daily tasks---progressing  3) Pt will report return to prior level of function---progressing  4) Pt will demonstrate improved left RF AROM WFL making a composite fist for improved function with daily tasks---progressing  5) Pt will demonstrate improved edema in left RF by at least 0.4 cm for improved motion and functional use---progressing  6)  and pinch strength to be assessed when appropriate, and set goal---progressing     Short Term Goals (STGs); to be met within 4 weeks (5/19/23).  1) Pt will report pain no higher than 3/10 with ADLs---MET 6/7/2023  2)Pt will report or demonstrate independence with HEP and orthosis wear---MET 5/22/23  3) Pt will be independent with dressing tasks---MET 5/22/23  4) Pt will demonstrate improved L digits 3-5 AROM by at least 30 degrees each for improved grasp and function.  ---progressing (Met)    PLAN     Continue skilled occupational therapy with individualized plan of care focusing on rom and scar maturation    Updates/Grading for next session: Progress tx as tolerated and appropriate. .   Reassess ROM and reissue FOTO.  Mercedes Sneed, OT

## 2023-06-20 ENCOUNTER — PATIENT MESSAGE (OUTPATIENT)
Dept: CARDIOLOGY | Facility: CLINIC | Age: 83
End: 2023-06-20
Payer: MEDICARE

## 2023-06-20 DIAGNOSIS — S62.615B OPEN DISPLACED FRACTURE OF PROXIMAL PHALANX OF LEFT RING FINGER, INITIAL ENCOUNTER: Primary | ICD-10-CM

## 2023-06-21 ENCOUNTER — CLINICAL SUPPORT (OUTPATIENT)
Dept: REHABILITATION | Facility: HOSPITAL | Age: 83
End: 2023-06-21
Payer: MEDICARE

## 2023-06-21 DIAGNOSIS — M79.642 LEFT HAND PAIN: ICD-10-CM

## 2023-06-21 DIAGNOSIS — S62.615B OPEN DISPLACED FRACTURE OF PROXIMAL PHALANX OF LEFT RING FINGER, INITIAL ENCOUNTER: ICD-10-CM

## 2023-06-21 DIAGNOSIS — M25.642 STIFFNESS OF FINGER JOINT OF LEFT HAND: Primary | ICD-10-CM

## 2023-06-21 DIAGNOSIS — R29.898 WEAKNESS OF LEFT HIP: ICD-10-CM

## 2023-06-21 DIAGNOSIS — M25.552 PAIN OF LEFT HIP: Primary | ICD-10-CM

## 2023-06-21 DIAGNOSIS — Z98.890 POSTOPERATIVE STATE: ICD-10-CM

## 2023-06-21 PROCEDURE — 97112 NEUROMUSCULAR REEDUCATION: CPT | Mod: CQ

## 2023-06-21 PROCEDURE — 97110 THERAPEUTIC EXERCISES: CPT

## 2023-06-21 PROCEDURE — 97530 THERAPEUTIC ACTIVITIES: CPT | Mod: CQ

## 2023-06-21 PROCEDURE — 97530 THERAPEUTIC ACTIVITIES: CPT

## 2023-06-21 PROCEDURE — 97140 MANUAL THERAPY 1/> REGIONS: CPT

## 2023-06-21 NOTE — PROGRESS NOTES
"  [facility]  Occupational Therapy Treatment Note    Date: 6/21/2023  Name: Liang Monterroso Jr.  Clinic Number: [id number]    Therapy Diagnosis:   Encounter Diagnoses   Name Primary?    Open displaced fracture of proximal phalanx of left ring finger, initial encounter     Postoperative state     Stiffness of finger joint of left hand Yes    Left hand pain                  Physician: Russo-Digeorge, Jamie L*    Physician Orders: Continue with OT for aggressive ROM of the finger. Discontinue orthosis. He may have WBAT for his ADLs, but avoid unnecessary WB at this time.  Medical Diagnosis: S62.615B (ICD-10-CM) - Open displaced fracture of proximal phalanx of left ring finger, initial encounter   Surgical Procedure and Date: 4/5/2023  Open reduction internal fixation open fracture left 4th proximal phalanx  Irrigation and debridement left dorsal hand and ring finger laceration with excisional debridement of nonviable portions of skin and subcutaneous tissue  Irrigation debridement left ring finger volar laceration in flexor zone 2 with excisional debridement of nonviable portions of skin subcutaneous tissue,   Evaluation Date: 4/19/2023  Insurance Authorization Period Expiration: 8/30/2023  Plan of Care Expiration:  6/30/23  Progress Note Due: 5/26/2023   Date of Return to MD: 5/26/2023  Visit # / Visits authorized: 16/ 20  FOTO: 4/14/23  50% limitation               5/24 /23 50% limitation                6/16/23   43% limitation  Precautions:   Standard and Weightbearing, A2 pulley partially torn    Time In: 11:00 am  Time Out: 11:50  pm  Total Billable Time: 48 minutes    SUBJECTIVE     Liang reports continued progress with ROM.  He was compliant with home exercise program given last session.   Response to previous treatment: nothing reported  Functional change: nothing new reported    Pain: 0/10 "Occasionally I get some pain. At base of the thumb.  Location: left ring finger    OBJECTIVE "   Objective Measures updated at progress report unless specified.    Observation/Appearance: moderate swelling mainly in only RF now. Edema. Measured in centimeters.    4/19/2023 4/19/2023     Left Right   Wrist Crease 17.9 17.2   DPC 22.9 22.2   MCPs 22.8 22      Edema. Measured in centimeters.    4/19/2023 4/19/2023     Left Right   Ring:         P1            9 6.6    PIP            8.8 6.4   P2             7.2 5.7    DIP 6.1 5.2   P3 5.4 4.8      Elbow and Wrist ROM. Measured in degrees.    4/19/2023 4/19/2023 4/26/2023 5/4/2023     Left Right Left Left   Wrist Ext/Flex 50/80 65/80 59/71 64/80   Wrist RD/UD WFL WFL     Elbow          Hand ROM. Measured in degrees.  R hand WFL, but limited with PIP and DIP flexion due to past flexor tendon repair.     4/19/2023 4/26/2023 5/4/2023 5/11/2023 5/26/2023 5/26/2023 6/7/2023 6/14/2023     Left Left   Left Pre tx Left Post tx Left Pre tx                 Index: MP                        PIP                           DIP                       PRETTY WFL                       Long:  MP 13/82 WNL                    PIP 92                     DIP 15                     PRETTY 176                       Ring:   MP 16/65 8/77 12/78 14/83 16/79 10/87 13/89 18/98              PIP 13/30 0/48 0/51 0/63 0/43 /67 /77 /77/              DIP 5/15 0/26 0/34 0/33 0/16 /29 /38 0/44              PRETTY 76 143                      Small:  MP 15/82 WNL                     PIP 40                      DIP 27                     PRETTY 134                       Thumb: MP WFL                       IP WFL              Rad ADD/ABD WFL              Pal ADD/ABD WFL                 Opposition WFL              Strength (Dynamometer) and Pinch Strength (Pinch Gauge)  Measured in pounds.    6/14/2023 6/14/2023     Left Right   Rung II 35 70   Key Pinch       3pt Pinch       2pt Pinch          Sensation: reports constant numbness at PIPand P1 of RF, reports coldness in RF tip. Numbness reported in SF.      4/19/2023 4/19/2023     Left Right   [place] Naty       Normal 1.65-2.83       Diminished Light Touch 3.22-3.61       Diminished Protective 3.84-4.31       Loss of Protective 4.56-6.65       Untestable >6.65       2 Point Discrimination       Static       Dynamic                   Limitation/Restriction for FOTO Hand Survey     FOTO scores for Liang Monterroso Jr. on 4/19/2023.   FOTO documents entered into [employer] - see Media section.     Limitation Score: 50%            Treatment   Liang received the following treatment:    Direct contact modalities after being cleared for contraindications:None   Supervised Modalities:   Patient received fluidotherapy to L hand with ring and small finger taped into flexion for 10 minutes to increase blood flow, circulation, and increase soft tissue extensibility.    Manual therapy techniques: 15  min  Gentle PROM Ring Fing: Hook, Full Fist,  MP ext and adduction with long holds   Deep Friction Scar Massage to the: left ring finger     :  Therapeutic exercises to develop ROM and flexibility for 15 min, including:  Exercise  Reps/ TIme   AROM:  IP joint blocking 10 reps ring finger   Gentle AAROM:  Hook,  straight fist,   composite fist, ABD ring to long  15 + reps ea        Digi Flex Red x 20 reps    Calibrated Gripper Rung II, silver spring x 20 reps   Wrist Pre's  3 lbs, 3 ways, 3/10 reps ea way     Therapeutic Activities 8 min  Octy Manipulation X 3 min, palm up on table    In- hand manipulation   Small beads x 1 min(NT)   In- hand manipulation buttons of multiple sizes x 3 min (NT)   Pompom pickup  using yellow clothespins with ring, small, thumb pompoms of multiple sizes x 3 min   Hammer rotation Holding on end x 20 reps   Sustained  Holing 2.2 lb ball and moving through space to make large letters - the alphabet x 3min(NT)       Patient Education and Home Exercises      Education provided:   - cont HEP  -cont DFM and retrograde massages  -Use of matheus strap  - use of  relative motion exercise orthosis    Written Home Exercises Provided: Patient instructed to cont prior HEP.  Exercises were reviewed and Liang was able to demonstrate them prior to the end of the session.  Liang demonstrated good  understanding of the HEP provided. See EMR under Patient Instructions for exercises provided during therapy sessions.      ASSESSMENT     Liang is progressing well towards his goals and there are no updates to goals at this time. Pt prognosis is Good.     Pt will continue to benefit from skilled outpatient occupational therapy to address the deficits listed in the problem list on initial evaluation, provide pt/family education and to maximize pt's level of independence in the home and community environment.     Pt's spiritual, cultural and educational needs considered and pt agreeable to plan of care and goals.    Anticipated barriers to occupational therapy: none      Goals:   The following goals were discussed with the patient and patient is in agreement with them as to be addressed in the treatment plan.   Long Term Goals (LTGs); to be met by discharge.  1) Pt will have an improved FOTO score by at least 20 points ---progressing  2) Pt will report improved pain no higher than 1/10 with ADLs and daily tasks---progressing  3) Pt will report return to prior level of function---progressing  4) Pt will demonstrate improved left RF AROM WFL making a composite fist for improved function with daily tasks---progressing  5) Pt will demonstrate improved edema in left RF by at least 0.4 cm for improved motion and functional use---progressing  6)  and pinch strength to be assessed when appropriate, and set goal---progressing     Short Term Goals (STGs); to be met within 4 weeks (5/19/23).  1) Pt will report pain no higher than 3/10 with ADLs---MET 6/7/2023  2)Pt will report or demonstrate independence with HEP and orthosis wear---MET 5/22/23  3) Pt will be independent with dressing tasks---MET  5/22/23  4) Pt will demonstrate improved L digits 3-5 AROM by at least 30 degrees each for improved grasp and function. ---progressing (Met)    PLAN     Continue skilled occupational therapy with individualized plan of care focusing on rom and scar maturation    Updates/Grading for next session: Progress tx as tolerated and appropriate. .   Reassess ROM and reissue FOTO.  Mercedes Sneed, OT

## 2023-06-21 NOTE — PROGRESS NOTES
"[facility]   Physical Therapy Treatment Note     Name: Liang Monterroso Jr.  Clinic Number: [id number]    Therapy Diagnosis:   Encounter Diagnoses   Name Primary?    Pain of left hip Yes    Weakness of left hip          Physician: Russo-Digeorge, Jamie L*    Visit Date: 6/21/2023    Physician Orders: PT Eval and Treat   Medical Diagnosis from Referral: M16.12 (ICD-10-CM) - Primary osteoarthritis of left hip  L hip OA. PT eval/treat. WBAT, ROMAT, modalities prn. HEP  Evaluation Date: 5/2/2023  Authorization Period Expiration: 12/31/23  Plan of Care Expiration: 8/2/23  Progress Note Due: 6/2/23  Visit # / Visits authorized: 1/ 1 , 11/20  FOTO: 5/5     Precautions:  HTN      PTA Visit #: 1/5   Date of last FOTO: 5/2/23    Time In: 9:45 am  Time Out: 10:25 am  Total Billable Time: 40 minutes    SUBJECTIVE     Pt reports: he has difficulty putting on his pants.    He was compliant with home exercise program.  Response to previous treatment: no adverse affect  Functional change: none    Pain: 2/10  Location: left groin      OBJECTIVE          Treatment     Liang received the treatments listed below:  (new treatments are indicated in bold)    therapeutic exercises to develop strength, endurance, ROM, flexibility, posture, and core stabilization for 08 minutes including:      Shuttle DLP 3c till fatigue x 2 (3x10) NP   -SLP 2c till fatigue x 2 (3x10) NP  Nu Step 6' Lvl 2- BLE strength & endurance (recumbent bike today 6 min lvl 2)  SL hip Abd 2x10 B    Not performed    Long Sitting hamstring stretch 30" x3 B   B piriformis stretch 30" x2 B NP  Standing hip abd Green TB 2x15 B   -standing hip Ext Green TB 2x10   Straight leg raise BLE 2x10 2#  Supine isometric hip abd with pilates ring 5" 2x10 NP    manual therapy techniques: were applied to the: left lower extremity  for 00 minutes, including:    Long axis distraction left lower extremity  L hip internal rotation joint mobs with belt        neuromuscular " "re-education activities to improve: Balance, Coordination, Kinesthetic, Sense, Proprioception, and Posture for 27 minutes. The following activities were included:    Bridge on BOSU 3x10  Hip abd with contralateral foot on airex 2x10 B (intermittent BUE support)   Step ups BOSU with contralateral hip flex x20 ea  Step ups onto airex 20x B (No support)  Lateral step overs on airex 20x B (No support)  SLS /c contralateral hip flexion to 90* B 2x30"    NP  Standing marches airex (single UE support) 1'  - RTB marches B 20x airex  SLS on airex without BUE support (intermittent UE) x30"   - no airex B x 30  Hip abd iso /c belt 20 5  Short arc quad into straight leg raise 2x10  not performed    Left lower extremity LAQ 3# 3" 2x10 not performed          therapeutic activities to improve functional performance for 10 minutes, including:  Fwd hurdles orange  x 2 laps   Standing march with 1Green TB x20 B on airex  1/2 kneel to stand B 2x10 UE support    Not performed    Touch and go squats plyobox 20' 2x10  Lateral stepping 2 laps B Green TB   Shuttle hip ext. .5 cord B 2x10  Lateral step ups Lvl 3 step x20 B not performed        Patient Education and Home Exercises     Home Exercises Provided and Patient Education Provided     Education provided:   - HEP    Written Home Exercises Provided: yes. Exercises were reviewed and Liang was able to demonstrate them prior to the end of the session.  Liang demonstrated good  understanding of the education provided. See EMR under Patient Instructions for exercises provided during therapy sessions    ASSESSMENT   Pt is with improved groin pain. Progressed Liang with activities to improve his functional mobility. He continues with B hip weakness which is being addressed in PT. Will continue to monitor patient and progress pending presentation.    Liang Is progressing well towards his goals.   Pt prognosis is Good.     Pt will continue to benefit from skilled outpatient physical therapy to " address the deficits listed in the problem list box on initial evaluation, provide pt/family education and to maximize pt's level of independence in the home and community environment.     Pt's spiritual, cultural and educational needs considered and pt agreeable to plan of care and goals.     Anticipated barriers to physical therapy: none      Goals: 6 visits  1. Pt will be compliant /c HEP to supplement PT in order to improve functional tasks (progressing)   2. Pt will improve B glute medius MMTs to 4-/5 grossly to improve strength for functional activities (progressing)    New Short Term Goals Status:   per start of care   Long Term Goal Status: continue per initial plan of care.  Reasons for Recertification of Therapy:   extension of plan of care and request for further visits   PLAN   Updated Certification Period: 6/12/23 to 9/12/23   Recommended Treatment Plan: 2 times per week for 3 weeks:  Manual Therapy, Moist Heat/ Ice, Neuromuscular Re-ed, Patient Education, Therapeutic Activities, and Therapeutic Exercise      Ricardo Hdez, PTA

## 2023-06-22 ENCOUNTER — PATIENT MESSAGE (OUTPATIENT)
Dept: INTERNAL MEDICINE | Facility: CLINIC | Age: 83
End: 2023-06-22
Payer: MEDICARE

## 2023-06-22 ENCOUNTER — PATIENT MESSAGE (OUTPATIENT)
Dept: ORTHOPEDICS | Facility: CLINIC | Age: 83
End: 2023-06-22
Payer: MEDICARE

## 2023-06-23 ENCOUNTER — OFFICE VISIT (OUTPATIENT)
Dept: ORTHOPEDICS | Facility: CLINIC | Age: 83
End: 2023-06-23
Payer: MEDICARE

## 2023-06-23 ENCOUNTER — CLINICAL SUPPORT (OUTPATIENT)
Dept: REHABILITATION | Facility: HOSPITAL | Age: 83
End: 2023-06-23
Payer: MEDICARE

## 2023-06-23 ENCOUNTER — HOSPITAL ENCOUNTER (OUTPATIENT)
Dept: RADIOLOGY | Facility: HOSPITAL | Age: 83
Discharge: HOME | End: 2023-06-23
Attending: PHYSICIAN ASSISTANT
Payer: MEDICARE

## 2023-06-23 DIAGNOSIS — Z98.890 POSTOPERATIVE STATE: ICD-10-CM

## 2023-06-23 DIAGNOSIS — S62.615B OPEN DISPLACED FRACTURE OF PROXIMAL PHALANX OF LEFT RING FINGER, INITIAL ENCOUNTER: Primary | ICD-10-CM

## 2023-06-23 DIAGNOSIS — M25.642 STIFFNESS OF FINGER JOINT OF LEFT HAND: Primary | ICD-10-CM

## 2023-06-23 DIAGNOSIS — M79.642 LEFT HAND PAIN: ICD-10-CM

## 2023-06-23 DIAGNOSIS — S62.615B OPEN DISPLACED FRACTURE OF PROXIMAL PHALANX OF LEFT RING FINGER, INITIAL ENCOUNTER: ICD-10-CM

## 2023-06-23 PROCEDURE — 1101F PT FALLS ASSESS-DOCD LE1/YR: CPT | Mod: CPTII,S$GLB,, | Performed by: PHYSICIAN ASSISTANT

## 2023-06-23 PROCEDURE — 97140 MANUAL THERAPY 1/> REGIONS: CPT

## 2023-06-23 PROCEDURE — 99999 PR PBB SHADOW E&M-EST. PATIENT-LVL III: ICD-10-PCS | Mod: PBBFAC,,, | Performed by: PHYSICIAN ASSISTANT

## 2023-06-23 PROCEDURE — 99999 PR PBB SHADOW E&M-EST. PATIENT-LVL III: CPT | Mod: PBBFAC,,, | Performed by: PHYSICIAN ASSISTANT

## 2023-06-23 PROCEDURE — 3288F PR FALLS RISK ASSESSMENT DOCUMENTED: ICD-10-PCS | Mod: CPTII,S$GLB,, | Performed by: PHYSICIAN ASSISTANT

## 2023-06-23 PROCEDURE — 1101F PR PT FALLS ASSESS DOC 0-1 FALLS W/OUT INJ PAST YR: ICD-10-PCS | Mod: CPTII,S$GLB,, | Performed by: PHYSICIAN ASSISTANT

## 2023-06-23 PROCEDURE — 97022 WHIRLPOOL THERAPY: CPT

## 2023-06-23 PROCEDURE — 73130 XR HAND COMPLETE 3 VIEW LEFT: ICD-10-PCS | Mod: 26,LT,, | Performed by: RADIOLOGY

## 2023-06-23 PROCEDURE — 1159F PR MEDICATION LIST DOCUMENTED IN MEDICAL RECORD: ICD-10-PCS | Mod: CPTII,S$GLB,, | Performed by: PHYSICIAN ASSISTANT

## 2023-06-23 PROCEDURE — 97110 THERAPEUTIC EXERCISES: CPT

## 2023-06-23 PROCEDURE — 99024 PR POST-OP FOLLOW-UP VISIT: ICD-10-PCS | Mod: S$GLB,,, | Performed by: PHYSICIAN ASSISTANT

## 2023-06-23 PROCEDURE — 1126F PR PAIN SEVERITY QUANTIFIED, NO PAIN PRESENT: ICD-10-PCS | Mod: CPTII,S$GLB,, | Performed by: PHYSICIAN ASSISTANT

## 2023-06-23 PROCEDURE — 3288F FALL RISK ASSESSMENT DOCD: CPT | Mod: CPTII,S$GLB,, | Performed by: PHYSICIAN ASSISTANT

## 2023-06-23 PROCEDURE — 73130 X-RAY EXAM OF HAND: CPT | Mod: 26,LT,, | Performed by: RADIOLOGY

## 2023-06-23 PROCEDURE — 1159F MED LIST DOCD IN RCRD: CPT | Mod: CPTII,S$GLB,, | Performed by: PHYSICIAN ASSISTANT

## 2023-06-23 PROCEDURE — 1126F AMNT PAIN NOTED NONE PRSNT: CPT | Mod: CPTII,S$GLB,, | Performed by: PHYSICIAN ASSISTANT

## 2023-06-23 PROCEDURE — 73130 X-RAY EXAM OF HAND: CPT | Mod: TC,LT

## 2023-06-23 PROCEDURE — 99024 POSTOP FOLLOW-UP VISIT: CPT | Mod: S$GLB,,, | Performed by: PHYSICIAN ASSISTANT

## 2023-06-23 NOTE — PROGRESS NOTES
"  [facility]  Occupational Therapy Treatment Note    Date: 6/23/2023  Name: Liang Monterroso Jr.  Clinic Number: [id number]    Therapy Diagnosis:   Encounter Diagnoses   Name Primary?    Stiffness of finger joint of left hand Yes    Left hand pain                  Physician: Russo-Digeorge, Jamie L*    Physician Orders: Continue with OT for aggressive ROM of the finger. Discontinue orthosis. He may have WBAT for his ADLs, but avoid unnecessary WB at this time.  Medical Diagnosis: S62.615B (ICD-10-CM) - Open displaced fracture of proximal phalanx of left ring finger, initial encounter   Surgical Procedure and Date: 4/5/2023  Open reduction internal fixation open fracture left 4th proximal phalanx  Irrigation and debridement left dorsal hand and ring finger laceration with excisional debridement of nonviable portions of skin and subcutaneous tissue  Irrigation debridement left ring finger volar laceration in flexor zone 2 with excisional debridement of nonviable portions of skin subcutaneous tissue,   Evaluation Date: 4/19/2023  Insurance Authorization Period Expiration: 8/30/2023  Plan of Care Expiration:  6/30/23  Progress Note Due: 5/26/2023   Date of Return to MD: 5/26/2023  Visit # / Visits authorized: 16/ 20  FOTO: 4/14/23  50% limitation               5/24 /23 50% limitation                6/16/23   43% limitation  Precautions:   Standard and Weightbearing, A2 pulley partially torn    Time In: 11:00 am  Time Out: 11:50  pm  Total Billable Time: 48 minutes    SUBJECTIVE     Liang reports continued progress with ROM.  He was compliant with home exercise program given last session.   Response to previous treatment: nothing reported  Functional change: nothing new reported    Pain: 0/10 "Occasionally I get some pain. At base of the thumb.  Location: left ring finger    OBJECTIVE   Objective Measures updated at progress report unless specified.    Observation/Appearance: moderate swelling mainly " in only RF now. Edema. Measured in centimeters.    4/19/2023 4/19/2023     Left Right   Wrist Crease 17.9 17.2   DPC 22.9 22.2   MCPs 22.8 22      Edema. Measured in centimeters.    4/19/2023 4/19/2023     Left Right   Ring:         P1            9 6.6    PIP            8.8 6.4   P2             7.2 5.7    DIP 6.1 5.2   P3 5.4 4.8      Elbow and Wrist ROM. Measured in degrees.    4/19/2023 4/19/2023 4/26/2023 5/4/2023     Left Right Left Left   Wrist Ext/Flex 50/80 65/80 59/71 64/80   Wrist RD/UD WFL WFL     Elbow          Hand ROM. Measured in degrees.  R hand WFL, but limited with PIP and DIP flexion due to past flexor tendon repair.     4/19/2023 4/26/2023 5/4/2023 5/11/2023 5/26/2023 5/26/2023 6/7/2023 6/14/2023 6/23/2023     Left Left   Left Pre tx Left Post tx Left Pre tx Left Pre-Tx Left Pre-Tx                 Index: MP                         PIP                            DIP                        PRETTY WFL                         Long:  MP 13/82 WNL                     PIP 92                      DIP 15                      PRETTY 176                         Ring:   MP 16/65 8/77 12/78 14/83 16/79 10/87 13/89 18/98 7/94              PIP 13/30 0/48 0/51 0/63 0/43 /67 /77 /77/ 0/78              DIP 5/15 0/26 0/34 0/33 0/16 /29 /38 0/44 0/52              PRETTY 76 143                        Small:  MP 15/82 WNL                      PIP 40                       DIP 27                      PRETTY 134                         Thumb: MP WFL                        IP WFL               Rad ADD/ABD WFL               Pal ADD/ABD WFL                  Opposition WFL               Strength (Dynamometer) and Pinch Strength (Pinch Gauge)  Measured in pounds.    6/14/2023 6/14/2023 6/23/2023     Left Right Left   Rung II 35 70 32, 37, 35   Key Pinch        3pt Pinch        2pt Pinch           Sensation: reports constant numbness at PIPand P1 of RF, reports coldness in RF tip. Numbness reported in SF.     4/19/2023 4/19/2023      Left Right   [place]       Normal 1.65-2.83       Diminished Light Touch 3.22-3.61       Diminished Protective 3.84-4.31       Loss of Protective 4.56-6.65       Untestable >6.65       2 Point Discrimination       Static       Dynamic                   Limitation/Restriction for FOTO Hand Survey     FOTO scores for Liang Monterroso Jr. on 4/19/2023.   FOTO documents entered into [employer] - see Media section.     Limitation Score: 50%            Treatment   Liang received the following treatment:    Direct contact modalities after being cleared for contraindications:None   Supervised Modalities:   Patient received fluidotherapy to L hand with ring and small finger taped into flexion for 10 minutes to increase blood flow, circulation, and increase soft tissue extensibility.    Manual therapy techniques: 15  min  Gentle PROM Ring Fing: Hook, Full Fist,  MP ext and adduction with long holds   Deep Friction Scar Massage to the: left ring finger     :  Therapeutic exercises to develop ROM and flexibility for 20 min, including:  Exercise  Reps/ TIme   Reassessment  Arom and   strength   AROM:  IP joint blocking 10 reps ring finger   Gentle AAROM:  Hook,  straight fist,   composite fist,  15 + reps ea        Digi Flex Red x 20 reps    Calibrated Gripper Rung II, silver spring x 20 reps   Wrist Pre's  3 lbs, 3 ways, 3/10 reps ea way     Therapeutic Activities 3 min  Octy Manipulation X 3 min, palm up on table    In- hand manipulation   Small beads x 1 min(NT)   In- hand manipulation buttons of multiple sizes x 3 min (NT)   Pompom pickup  using yellow clothespins with ring, small, thumb pompoms of multiple sizes x 3 min (NT)   Hammer rotation Holding on end x 20 reps (NT)   Sustained  Holing 2.2 lb ball and moving through space to make large letters - the alphabet x 3min(NT)       Patient Education and Home Exercises      Education provided:   - cont HEP  -cont DFM and retrograde massages  -Use of matheus strap  - use  of relative motion exercise orthosis    Written Home Exercises Provided: Patient instructed to cont prior HEP.  Exercises were reviewed and Liang was able to demonstrate them prior to the end of the session.  Liang demonstrated good  understanding of the HEP provided. See EMR under Patient Instructions for exercises provided during therapy sessions.      ASSESSMENT     Liang is progressing well towards his goals and there are no updates to goals at this time. Pt prognosis is Good. Upon formal reassessment today, increase noted in DIP flex.  is status quo and remains weak.    Pt will continue to benefit from skilled outpatient occupational therapy to address the deficits listed in the problem list on initial evaluation, provide pt/family education and to maximize pt's level of independence in the home and community environment.     Pt's spiritual, cultural and educational needs considered and pt agreeable to plan of care and goals.    Anticipated barriers to occupational therapy: none      Goals:   The following goals were discussed with the patient and patient is in agreement with them as to be addressed in the treatment plan.   Long Term Goals (LTGs); to be met by discharge.  1) Pt will have an improved FOTO score by at least 20 points ---progressing  2) Pt will report improved pain no higher than 1/10 with ADLs and daily tasks---progressing  3) Pt will report return to prior level of function---progressing  4) Pt will demonstrate improved left RF AROM WFL making a composite fist for improved function with daily tasks---progressing  5) Pt will demonstrate improved edema in left RF by at least 0.4 cm for improved motion and functional use---progressing  6)  and pinch strength to be assessed when appropriate, and set goal---progressing     Short Term Goals (STGs); to be met within 4 weeks (5/19/23).  1) Pt will report pain no higher than 3/10 with ADLs---MET 6/7/2023  2)Pt will report or demonstrate independence  with HEP and orthosis wear---MET 5/22/23  3) Pt will be independent with dressing tasks---MET 5/22/23  4) Pt will demonstrate improved L digits 3-5 AROM by at least 30 degrees each for improved grasp and function. ---progressing (Met)    PLAN     Continue skilled occupational therapy with individualized plan of care focusing on rom and scar maturation    Updates/Grading for next session: Progress tx as tolerated and appropriate. .   Reassess ROM and reissue FOTO.  Mercedes Sneed, OT

## 2023-06-23 NOTE — PROGRESS NOTES
Dr. Cummins is the supervising physician for this encounter/patient    Liang Monterroso Jr. presents for post-operative evaluation.  The patient is now 11 weeks s/p Left ORIF 4th proximal phalanx, I&D of ring finger with Dr. Cummins on 4/5/23.  Overall the patient reports doing well.  He reports 0/10 pain, denies any f/c/s, no numbness. He has been working with OT on motion and strengthening. He reports there is not much around the house he is not able to do.    PE:    AA&O x 4.  NAD  HEENT:  NCAT, sclera nonicteric  Lungs:  Respirations are equal and unlabored.  CV:  2+ bilateral upper and lower extremity pulses.  MSK: The wounds are well healed without any concern for infection. NTTP along the proximal phalanx. Full extension is decreased due to Dupuytrens nodules.  Near full flexion of the long finger, DIP near full but can passively flex fully. SILT. DNVI.    IMAGING - reviewed with patient  Stable appearance of ORIF 4th proximal phalanx fracture, no hardware complications, improved healing present.    A/P: Status post above, doing well  1) Continue with OT for aggressive ROM of the finger. Strengthening as tolerated.  2) Continue with scar massage.  3) F/U as needed.  4) Call with any questions/concerns in the interim      Jamie Russo-DiGeorge PA-C

## 2023-06-25 NOTE — PROGRESS NOTES
"[facility]   Physical Therapy Treatment Note     Name: Liang Monterroso Jr.  Clinic Number: [id number]    Therapy Diagnosis:   Encounter Diagnoses   Name Primary?    Pain of left hip Yes    Weakness of left hip          Physician: Russo-Digeorge, Jamie L*    Visit Date: 6/26/2023    Physician Orders: PT Eval and Treat   Medical Diagnosis from Referral: M16.12 (ICD-10-CM) - Primary osteoarthritis of left hip  L hip OA. PT eval/treat. WBAT, ROMAT, modalities prn. HEP  Evaluation Date: 5/2/2023  Authorization Period Expiration: 12/31/23  Plan of Care Expiration: 8/2/23  Progress Note Due: 6/2/23  Visit # / Visits authorized: 1/ 1 , 12/20  FOTO: 5/5     Precautions:  HTN      PTA Visit #: 1/5   Date of last FOTO: 5/2/23    Time In: 9:45 am  Time Out: 10:30am  Total Billable Time: 45 minutes    SUBJECTIVE     Pt reports: he has difficulty putting on his pants.    He was compliant with home exercise program.  Response to previous treatment: no adverse affect  Functional change: none    Pain: 2/10  Location: left groin      OBJECTIVE          Treatment     Liang received the treatments listed below:  (new treatments are indicated in bold)    therapeutic exercises to develop strength, endurance, ROM, flexibility, posture, and core stabilization for 15 minutes including:      Shuttle DLP 3c till fatigue x 2 (3x10)    -SLP 2c till fatigue x 2 (3x10) NP  Nu Step 6' Lvl 2- BLE strength & endurance (recumbent bike today 6 min lvl 2)  SL hip Abd 2x12 B    Not performed    Long Sitting hamstring stretch 30" x3 B   B piriformis stretch 30" x2 B NP  Standing hip abd Green TB 2x15 B   -standing hip Ext Green TB 2x10   Straight leg raise BLE 2x10 2#  Supine isometric hip abd with pilates ring 5" 2x10 NP    manual therapy techniques: were applied to the: left lower extremity  for 00 minutes, including:    Long axis distraction left lower extremity  L hip internal rotation joint mobs with belt      neuromuscular " "re-education activities to improve: Balance, Coordination, Kinesthetic, Sense, Proprioception, and Posture for  25 minutes. The following activities were included:    Bridge on BOSU 3x10  Lateral step overs (ylw hurdles) on airex 10x B   Hip abd with contralateral foot on airex 2x15 B (intermittent BUE support)   Step ups BOSU with contralateral hip flex x20 ea  Palloff press green TB x15 B   Standing march x20 with isometric shoulder Ext Green TB         Not performed   Step ups onto airex 20x B (No support)  SLS /c contralateral hip flexion to 90* B 2x30"  Standing marches airex (single UE support) 1'  - RTB marches B 20x airex  SLS on airex without BUE support (intermittent UE) x30"   - no airex B x 30  Hip abd iso /c belt 20 5  Short arc quad into straight leg raise 2x10  not performed    Left lower extremity LAQ 3# 3" 2x10 not performed          therapeutic activities to improve functional performance for 0 minutes, including:  Fwd hurdles orange  x 2 laps   Standing march with 1Green TB x20 B on airex  1/2 kneel to stand B 2x10 UE support    Not performed    Touch and go squats plyobox 20' 2x10  Lateral stepping 2 laps B Green TB   Shuttle hip ext. .5 cord B 2x10  Lateral step ups Lvl 3 step x20 B not performed        Patient Education and Home Exercises     Home Exercises Provided and Patient Education Provided     Education provided:   - HEP    Written Home Exercises Provided: yes. Exercises were reviewed and Liang was able to demonstrate them prior to the end of the session.  Liang demonstrated good  understanding of the education provided. See EMR under Patient Instructions for exercises provided during therapy sessions    ASSESSMENT   Pt demonstrates improving dynamic balance with less UE support required during exercises. Progressed core stability training for improved righting reactions with balance perturbations. No c/o hip pain reported throughout session.     Liang Is progressing well towards his goals. "   Pt prognosis is Good.     Pt will continue to benefit from skilled outpatient physical therapy to address the deficits listed in the problem list box on initial evaluation, provide pt/family education and to maximize pt's level of independence in the home and community environment.     Pt's spiritual, cultural and educational needs considered and pt agreeable to plan of care and goals.     Anticipated barriers to physical therapy: none      Goals: 6 visits  1. Pt will be compliant /c HEP to supplement PT in order to improve functional tasks (progressing)   2. Pt will improve B glute medius MMTs to 4-/5 grossly to improve strength for functional activities (progressing)    New Short Term Goals Status:   per start of care   Long Term Goal Status: continue per initial plan of care.  Reasons for Recertification of Therapy:   extension of plan of care and request for further visits   PLAN   Updated Certification Period: 6/12/23 to 9/12/23   Recommended Treatment Plan: 2 times per week for 3 weeks:  Manual Therapy, Moist Heat/ Ice, Neuromuscular Re-ed, Patient Education, Therapeutic Activities, and Therapeutic Exercise      Marleny Funez, PT

## 2023-06-26 ENCOUNTER — CLINICAL SUPPORT (OUTPATIENT)
Dept: REHABILITATION | Facility: HOSPITAL | Age: 83
End: 2023-06-26
Payer: MEDICARE

## 2023-06-26 DIAGNOSIS — M25.642 STIFFNESS OF FINGER JOINT OF LEFT HAND: Primary | ICD-10-CM

## 2023-06-26 DIAGNOSIS — R29.898 WEAKNESS OF LEFT HIP: ICD-10-CM

## 2023-06-26 DIAGNOSIS — M25.552 PAIN OF LEFT HIP: Primary | ICD-10-CM

## 2023-06-26 DIAGNOSIS — M79.642 LEFT HAND PAIN: ICD-10-CM

## 2023-06-26 PROCEDURE — 97530 THERAPEUTIC ACTIVITIES: CPT

## 2023-06-26 PROCEDURE — 97112 NEUROMUSCULAR REEDUCATION: CPT

## 2023-06-26 PROCEDURE — 97110 THERAPEUTIC EXERCISES: CPT

## 2023-06-26 PROCEDURE — 97140 MANUAL THERAPY 1/> REGIONS: CPT

## 2023-06-26 PROCEDURE — 97022 WHIRLPOOL THERAPY: CPT

## 2023-06-26 NOTE — PROGRESS NOTES
"  [facility]  Occupational Therapy Treatment Note    Date: 6/26/2023  Name: Liang Monterroso Jr.  Clinic Number: [id number]    Therapy Diagnosis:   Encounter Diagnoses   Name Primary?    Stiffness of finger joint of left hand Yes    Left hand pain                    Physician: Russo-Digeorge, Jamie L*    Physician Orders: Continue with OT for aggressive ROM of the finger. Discontinue orthosis. He may have WBAT for his ADLs, but avoid unnecessary WB at this time.  Medical Diagnosis: S62.615B (ICD-10-CM) - Open displaced fracture of proximal phalanx of left ring finger, initial encounter   Surgical Procedure and Date: 4/5/2023  Open reduction internal fixation open fracture left 4th proximal phalanx  Irrigation and debridement left dorsal hand and ring finger laceration with excisional debridement of nonviable portions of skin and subcutaneous tissue  Irrigation debridement left ring finger volar laceration in flexor zone 2 with excisional debridement of nonviable portions of skin subcutaneous tissue,   Evaluation Date: 4/19/2023  Insurance Authorization Period Expiration: 8/30/2023  Plan of Care Expiration:  6/30/23  Progress Note Due: 5/26/2023   Date of Return to MD: 5/26/2023  Visit # / Visits authorized: 16/ 20  FOTO: 4/14/23  50% limitation               5/24 /23 50% limitation                6/16/23   43% limitation  Precautions:   Standard and Weightbearing, A2 pulley partially torn    Time In: 11:00 am  Time Out: 11:55  pm  Total Billable Time: 55 minutes    SUBJECTIVE     Liang reports continued progress with ROM.  He was compliant with home exercise program given last session.   Response to previous treatment: nothing reported  Functional change: nothing new reported    Pain: "nothing to make note of"  Location: left ring finger    OBJECTIVE   Objective Measures updated at progress report unless specified.    Observation/Appearance: moderate swelling mainly in only RF now. Edema. " Measured in centimeters.    4/19/2023 4/19/2023     Left Right   Wrist Crease 17.9 17.2   DPC 22.9 22.2   MCPs 22.8 22      Edema. Measured in centimeters.    4/19/2023 4/19/2023     Left Right   Ring:         P1            9 6.6    PIP            8.8 6.4   P2             7.2 5.7    DIP 6.1 5.2   P3 5.4 4.8      Elbow and Wrist ROM. Measured in degrees.    4/19/2023 4/19/2023 4/26/2023 5/4/2023     Left Right Left Left   Wrist Ext/Flex 50/80 65/80 59/71 64/80   Wrist RD/UD WFL WFL     Elbow          Hand ROM. Measured in degrees.  R hand WFL, but limited with PIP and DIP flexion due to past flexor tendon repair.     4/19/2023 4/26/2023 5/4/2023 5/11/2023 5/26/2023 5/26/2023 6/7/2023 6/14/2023 6/23/2023     Left Left   Left Pre tx Left Post tx Left Pre tx Left Pre-Tx Left Pre-Tx                 Index: MP                         PIP                            DIP                        PRETTY WFL                         Long:  MP 13/82 WNL                     PIP 92                      DIP 15                      PRETTY 176                         Ring:   MP 16/65 8/77 12/78 14/83 16/79 10/87 13/89 18/98 7/94              PIP 13/30 0/48 0/51 0/63 0/43 /67 /77 /77/ 0/78              DIP 5/15 0/26 0/34 0/33 0/16 /29 /38 0/44 0/52              PRETTY 76 143                        Small:  MP 15/82 WNL                      PIP 40                       DIP 27                      PRETTY 134                         Thumb: MP WFL                        IP WFL               Rad ADD/ABD WFL               Pal ADD/ABD WFL                  Opposition WFL               Strength (Dynamometer) and Pinch Strength (Pinch Gauge)  Measured in pounds.    6/14/2023 6/14/2023 6/23/2023     Left Right Left   Rung II 35 70 32, 37, 35   Key Pinch        3pt Pinch        2pt Pinch           Sensation: reports constant numbness at PIPand P1 of RF, reports coldness in RF tip. Numbness reported in SF.     4/19/2023 4/19/2023     Left Right   [place]       Normal 1.65-2.83       Diminished Light Touch 3.22-3.61       Diminished Protective 3.84-4.31       Loss of Protective 4.56-6.65       Untestable >6.65       2 Point Discrimination       Static       Dynamic                   Limitation/Restriction for FOTO Hand Survey     FOTO scores for Liang Monterroso Jr. on 4/19/2023.   FOTO documents entered into [employer] - see Media section.     Limitation Score: 50%            Treatment   Liang received the following treatment:    Direct contact modalities after being cleared for contraindications:None   Supervised Modalities:   Patient received fluidotherapy to L hand with ring and small finger taped into flexion for 10 minutes to increase blood flow, circulation, and increase soft tissue extensibility.    Manual therapy techniques: 15  min  Gentle PROM Ring Fing: Hook, Full Fist,  MP ext and adduction with long holds   Deep Friction Scar Massage to the: left ring finger     :  Therapeutic exercises to develop ROM and flexibility for 15 min, including:  Exercise  Reps/ TIme   AROM:  IP joint blocking 10 reps ring finger   Gentle AAROM:  Hook,  straight fist,   composite fist,  15 + reps ea        Digi Flex  x 20  reps    Calibrated Gripper Rung II, silver spring x 20 reps   Wrist Pre's  4  lbs, 3 ways, 3/10 reps ea way     Therapeutic Activities 15 min  Octy Manipulation X 3 min, palm up on table    Pompom pickup  using  green clothespin with ring, small, thumb pompoms of multiple sizes x 3 min    Hammer rotation Holding on end x 20 reps (NT)   Sustained  Holing 2.2 lb ball and moving through space to make large letters - the alphabet x 3min(NT)   Red theraband bar 20 reps ea: fa sup, fa pro, wrist extensibilty, wrist flex, twist rd, twist ud       Patient Education and Home Exercises      Education provided:   - cont HEP  -cont DFM and retrograde massages    Written Home Exercises Provided: Patient instructed to cont prior HEP.  Exercises were reviewed and Liang  was able to demonstrate them prior to the end of the session.  Liang demonstrated good  understanding of the HEP provided. See EMR under Patient Instructions for exercises provided during therapy sessions.      ASSESSMENT     Liang is progressing well towards his goals and there are no updates to goals at this time. Pt prognosis is Good. Upon formal reassessment today, increase noted in DIP flex.  is status quo and remains weak.    Pt will continue to benefit from skilled outpatient occupational therapy to address the deficits listed in the problem list on initial evaluation, provide pt/family education and to maximize pt's level of independence in the home and community environment.     Pt's spiritual, cultural and educational needs considered and pt agreeable to plan of care and goals.    Anticipated barriers to occupational therapy: none      Goals:   The following goals were discussed with the patient and patient is in agreement with them as to be addressed in the treatment plan.   Long Term Goals (LTGs); to be met by discharge.  1) Pt will have an improved FOTO score by at least 20 points ---progressing  2) Pt will report improved pain no higher than 1/10 with ADLs and daily tasks---progressing  3) Pt will report return to prior level of function---progressing  4) Pt will demonstrate improved left RF AROM WFL making a composite fist for improved function with daily tasks---progressing  5) Pt will demonstrate improved edema in left RF by at least 0.4 cm for improved motion and functional use---progressing  6)  and pinch strength to be assessed when appropriate, and set goal---progressing     Short Term Goals (STGs); to be met within 4 weeks (5/19/23).  1) Pt will report pain no higher than 3/10 with ADLs---MET 6/7/2023  2)Pt will report or demonstrate independence with HEP and orthosis wear---MET 5/22/23  3) Pt will be independent with dressing tasks---MET 5/22/23  4) Pt will demonstrate improved L digits  3-5 AROM by at least 30 degrees each for improved grasp and function. ---progressing (Met)    PLAN     Continue skilled occupational therapy with individualized plan of care focusing on rom and scar maturation    Updates/Grading for next session: Progress tx as tolerated and appropriate. .   Reassess ROM and reissue FOTO.  Mercedes Sneed, OT

## 2023-06-28 ENCOUNTER — CLINICAL SUPPORT (OUTPATIENT)
Dept: REHABILITATION | Facility: HOSPITAL | Age: 83
End: 2023-06-28
Payer: MEDICARE

## 2023-06-28 DIAGNOSIS — M25.642 STIFFNESS OF FINGER JOINT OF LEFT HAND: Primary | ICD-10-CM

## 2023-06-28 DIAGNOSIS — R29.898 WEAKNESS OF LEFT HIP: ICD-10-CM

## 2023-06-28 DIAGNOSIS — M25.552 PAIN OF LEFT HIP: Primary | ICD-10-CM

## 2023-06-28 DIAGNOSIS — M79.642 LEFT HAND PAIN: ICD-10-CM

## 2023-06-28 PROCEDURE — 97112 NEUROMUSCULAR REEDUCATION: CPT | Mod: CQ

## 2023-06-28 PROCEDURE — 97110 THERAPEUTIC EXERCISES: CPT | Mod: CQ

## 2023-06-28 NOTE — PROGRESS NOTES
"  [facility]  Occupational Therapy Treatment Note and Discharge Summary    Date: 6/28/2023  Name: Liang Monterroso Jr.  Clinic Number: [id number]    Therapy Diagnosis:   Encounter Diagnoses   Name Primary?    Stiffness of finger joint of left hand Yes    Left hand pain          Physician: Russo-Digeorge, Jamie L*    Physician Orders: Continue with OT for aggressive ROM of the finger. Discontinue orthosis. He may have WBAT for his ADLs, but avoid unnecessary WB at this time.  Medical Diagnosis: S62.615B (ICD-10-CM) - Open displaced fracture of proximal phalanx of left ring finger, initial encounter   Surgical Procedure and Date: 4/5/2023  Open reduction internal fixation open fracture left 4th proximal phalanx  Irrigation and debridement left dorsal hand and ring finger laceration with excisional debridement of nonviable portions of skin and subcutaneous tissue  Irrigation debridement left ring finger volar laceration in flexor zone 2 with excisional debridement of nonviable portions of skin subcutaneous tissue,   Evaluation Date: 4/19/2023  Insurance Authorization Period Expiration: 8/30/2023  Plan of Care Expiration:  6/30/23  Progress Note Due: 5/26/2023   Date of Return to MD: 5/26/2023  Visit # / Visits authorized: 16/ 20  FOTO: 4/14/23  50% limitation               5/24 /23 50% limitation                6/16/23   43% limitation    Precautions:   Standard and Weightbearing, A2 pulley partially torn    Time In: 11:00 am  Time Out: 11:55  pm  Total Billable Time: 55 minutes    SUBJECTIVE     Liang reports that he has made great progress with rom and function, though his FOTO score has not improved.   He was compliant with home exercise program given last session.   Response to previous treatment: nothing reported  Functional change: nothing new reported    Pain: "minimal"  Location: left ring finger    OBJECTIVE   Objective Measures updated at progress report unless " specified.    Observation/Appearance: moderate swelling mainly in only RF now. Edema. Measured in centimeters.    4/19/2023 4/19/2023     Left Right   Wrist Crease 17.9 17.2   DPC 22.9 22.2   MCPs 22.8 22      Edema. Measured in centimeters.    4/19/2023 4/19/2023     Left Right   Ring:         P1            9 6.6    PIP            8.8 6.4   P2             7.2 5.7    DIP 6.1 5.2   P3 5.4 4.8      Elbow and Wrist ROM. Measured in degrees.    4/19/2023 4/19/2023 4/26/2023 5/4/2023     Left Right Left Left   Wrist Ext/Flex 50/80 65/80 59/71 64/80   Wrist RD/UD WFL WFL     Elbow          Hand ROM. Measured in degrees.  R hand WFL, but limited with PIP and DIP flexion due to past flexor tendon repair.     4/19/2023 4/26/2023 5/4/2023 5/11/2023 5/26/2023 5/26/2023 6/7/2023 6/14/2023 6/23/2023      Left Left   Left Pre tx Left Post tx Left Pre tx Left Pre-Tx Left Pre-Tx                   Index: MP                          PIP                             DIP                         PRETTY WFL                           Long:  MP 13/82 WNL                      PIP 92                       DIP 15                       PRETTY 176                           Ring:   MP 16/65 8/77 12/78 14/83 16/79 10/87 13/89 18/98 7/94 9/94              PIP 13/30 0/48 0/51 0/63 0/43 /67 /77 /77/ 0/78 83              DIP 5/15 0/26 0/34 0/33 0/16 /29 /38 0/44 0/52 52              PRETTY 76 143                          Small:  MP 15/82 WNL                       PIP 40                        DIP 27                       PRETTY 134                           Thumb: MP WFL                         IP WFL                Rad ADD/ABD WFL                Pal ADD/ABD WFL                   Opposition WFL                Strength (Dynamometer) and Pinch Strength (Pinch Gauge)  Measured in pounds.    6/14/2023 6/14/2023 6/23/2023     Left Right Left   Rung II 35 70 32, 37, 35   Key Pinch        3pt Pinch        2pt Pinch           Sensation: reports constant numbness at  PIPand P1 of RF, reports coldness in RF tip. Numbness reported in SF.     4/19/2023 4/19/2023     Left Right   [place]       Normal 1.65-2.83       Diminished Light Touch 3.22-3.61       Diminished Protective 3.84-4.31       Loss of Protective 4.56-6.65       Untestable >6.65       2 Point Discrimination       Static       Dynamic                   Limitation/Restriction for FOTO Hand Survey     FOTO scores for Liang Monterroso Jr. on 4/19/2023.   FOTO documents entered into [employer] - see Media section.     Limitation Score: 50%            Treatment   Liang received the following treatment:    Direct contact modalities after being cleared for contraindications:None   Supervised Modalities:   Patient received fluidotherapy to L hand with ring and small finger taped into flexion for 10 minutes to increase blood flow, circulation, and increase soft tissue extensibility.    Manual therapy techniques: 10  min  Gentle PROM Ring Fing: Hook, Full Fist,  MP ext    :  Therapeutic exercises to develop ROM and flexibility for 20 min, including:  Exercise  Reps/ TIme   AROM:  IP joint blocking 10 reps ring finger   Gentle AAROM:  Hook,  straight fist,   composite fist,  15 + reps ea     Issued peach putty for home use. Reviewed HEP and instructed pt to continue with this until his strength has returned and the morning stiffness resolves or is minimal.                 Patient Education and Home Exercises      Education provided:   - cont HEP  -cont DFM and retrograde massages    Written Home Exercises Provided: Patient instructed to cont prior HEP.  Exercises were reviewed and Liang was able to demonstrate them prior to the end of the session.  Liang demonstrated good  understanding of the HEP provided. See EMR under Patient Instructions for exercises provided during therapy sessions.      ASSESSMENT   Over this course of therapy, Liang has made excellent progress achieving the ability to make a loose fist. He is able to touch tip  of the ring finger to the palm. His residual deficits are in terminal IP flexion and  strength. Liang should make continued progress with continuance of HEP.    Pt will continue to benefit from skilled outpatient occupational therapy to address the deficits listed in the problem list on initial evaluation, provide pt/family education and to maximize pt's level of independence in the home and community environment.     Pt's spiritual, cultural and educational needs considered and pt agreeable to plan of care and goals.    Anticipated barriers to occupational therapy: none      Goals:   The following goals were discussed with the patient and patient is in agreement with them as to be addressed in the treatment plan.   Long Term Goals (LTGs); to be met by discharge.  1) Pt will have an improved FOTO score by at least 20 points ---Not met  2) Pt will report improved pain no higher than 1/10 with ADLs and daily tasks---Met  3) Pt will report return to prior level of function---partially met  4) Pt will demonstrate improved left RF AROM WFL making a composite fist for improved function with daily tasks---Met  5) Pt will demonstrate improved edema in left RF by at least 0.4 cm for improved motion and functional use---Met  6)  and pinch strength to be assessed when appropriate, and set goal---Met     Short Term Goals (STGs); to be met within 4 weeks (5/19/23).  1) Pt will report pain no higher than 3/10 with ADLs---MET 6/7/2023  2)Pt will report or demonstrate independence with HEP and orthosis wear---MET 5/22/23  3) Pt will be independent with dressing tasks---MET 5/22/23  4) Pt will demonstrate improved L digits 3-5 AROM by at least 30 degrees each for improved grasp and function. --MET    PLAN     Liang has been discharged with HEP. He has regained very good rom and function, despite FOTO score has not improved.    Updates/Grading for next session: Progress tx as tolerated and appropriate. .   Reassess ROM and  fernie HERRERA.  Mercedes Sneed, OT

## 2023-06-28 NOTE — PROGRESS NOTES
"[facility]   Physical Therapy Treatment Note     Name: Liang Monterroso Jr.  Clinic Number: [id number]    Therapy Diagnosis:   Encounter Diagnoses   Name Primary?    Pain of left hip Yes    Weakness of left hip          Physician: Russo-Digeorge, Jamie L*    Visit Date: 6/28/2023    Physician Orders: PT Eval and Treat   Medical Diagnosis from Referral: M16.12 (ICD-10-CM) - Primary osteoarthritis of left hip  L hip OA. PT eval/treat. WBAT, ROMAT, modalities prn. HEP  Evaluation Date: 5/2/2023  Authorization Period Expiration: 12/31/23  Plan of Care Expiration: 8/2/23  Progress Note Due: 6/2/23  Visit # / Visits authorized: 1/ 1 , 15/20  FOTO: 5/5     Precautions:  HTN      PTA Visit #: 1/5   Date of last FOTO: 5/2/23    Time In: 9:45 am  Time Out: 10:32 am  Total Billable Time: 47 minutes    SUBJECTIVE     Pt reports: No hip or groin pain at this time.    He was compliant with home exercise program.  Response to previous treatment: no adverse affect  Functional change: none    Pain: 0/10  Location: left groin      OBJECTIVE          Treatment     Liang received the treatments listed below:  (new treatments are indicated in bold)    therapeutic exercises to develop strength, endurance, ROM, flexibility, posture, and core stabilization for 15 minutes including:    Shuttle DLP 3c (3x10)    -SLP 2c (3x10)   Nu Step 6' Lvl 2- BLE strength & endurance   SL hip Abd 2x12 B    Not performed    Long Sitting hamstring stretch 30" x3 B   B piriformis stretch 30" x2 B NP  Standing hip abd Green TB 2x15 B   -standing hip Ext Green TB 2x10   Straight leg raise BLE 2x10 2#  Supine isometric hip abd with pilates ring 5" 2x10 NP    manual therapy techniques: were applied to the: left lower extremity  for 08 minutes, including:    Long axis distraction left lower extremity  L hip internal rotation joint mobs with belt NP  P/A prone hip mob    neuromuscular re-education activities to improve: Balance, Coordination, " "Kinesthetic, Sense, Proprioception, and Posture for  24 minutes. The following activities were included:    Bridge on BOSU 3x10  Lateral step overs (ylw hurdles) on airex 10x B   Hip abd with contralateral foot on airex 2x15 B (intermittent BUE support)   Step ups BOSU with contralateral hip flex x20 ea  Palloff press green TB x15 B   Standing march x20 with isometric shoulder Ext Green TB   Toe taps onto 20' plyo box x 20 ea      Not performed   Step ups onto airex 20x B (No support)  SLS /c contralateral hip flexion to 90* B 2x30"  Standing marches airex (single UE support) 1'  - RTB marches B 20x airex  SLS on airex without BUE support (intermittent UE) x30"   - no airex B x 30  Hip abd iso /c belt 20 5  Short arc quad into straight leg raise 2x10  not performed    Left lower extremity LAQ 3# 3" 2x10 not performed          therapeutic activities to improve functional performance for 00 minutes, including:  Fwd hurdles orange  x 2 laps   Standing march with 1Green TB x20 B on airex  1/2 kneel to stand B 2x10 UE support    Not performed    Touch and go squats plyobox 20' 2x10  Lateral stepping 2 laps B Green TB   Shuttle hip ext. .5 cord B 2x10  Lateral step ups Lvl 3 step x20 B not performed        Patient Education and Home Exercises     Home Exercises Provided and Patient Education Provided     Education provided:   - HEP    Written Home Exercises Provided: yes. Exercises were reviewed and Liang was able to demonstrate them prior to the end of the session.  Liang demonstrated good  understanding of the education provided. See EMR under Patient Instructions for exercises provided during therapy sessions    ASSESSMENT   Pt exhibits improved (L) hip and groin symptoms. He continues with decreased glute medius strength although improved since initial evaluation. Added high step toe taps to work on tolerance to SLS.     Liang Is progressing well towards his goals.   Pt prognosis is Good.     Pt will continue to benefit " from skilled outpatient physical therapy to address the deficits listed in the problem list box on initial evaluation, provide pt/family education and to maximize pt's level of independence in the home and community environment.     Pt's spiritual, cultural and educational needs considered and pt agreeable to plan of care and goals.     Anticipated barriers to physical therapy: none      Goals: 6 visits  1. Pt will be compliant /c HEP to supplement PT in order to improve functional tasks (progressing)   2. Pt will improve B glute medius MMTs to 4-/5 grossly to improve strength for functional activities (progressing)    New Short Term Goals Status:   per start of care   Long Term Goal Status: continue per initial plan of care.  Reasons for Recertification of Therapy:   extension of plan of care and request for further visits   PLAN   Updated Certification Period: 6/12/23 to 9/12/23   Recommended Treatment Plan: 2 times per week for 3 weeks:  Manual Therapy, Moist Heat/ Ice, Neuromuscular Re-ed, Patient Education, Therapeutic Activities, and Therapeutic Exercise      Ricardo Hdez, PTA

## 2023-07-05 ENCOUNTER — PATIENT MESSAGE (OUTPATIENT)
Dept: CARDIOLOGY | Facility: CLINIC | Age: 83
End: 2023-07-05
Payer: MEDICARE

## 2023-07-06 ENCOUNTER — PATIENT MESSAGE (OUTPATIENT)
Dept: CARDIOLOGY | Facility: CLINIC | Age: 83
End: 2023-07-06
Payer: MEDICARE

## 2023-07-06 DIAGNOSIS — I48.0 PAROXYSMAL ATRIAL FIBRILLATION: ICD-10-CM

## 2023-07-06 NOTE — PROGRESS NOTES
"[facility]   Physical Therapy Treatment Note     Name: Liang Monterroso Jr.  Clinic Number: [id number]    Therapy Diagnosis:   Encounter Diagnoses   Name Primary?    Pain of left hip Yes    Weakness of left hip          Physician: Russo-Digeorge, Jamie L*    Visit Date: 7/7/2023    Physician Orders: PT Eval and Treat   Medical Diagnosis from Referral: M16.12 (ICD-10-CM) - Primary osteoarthritis of left hip  L hip OA. PT eval/treat. WBAT, ROMAT, modalities prn. HEP  Evaluation Date: 5/2/2023  Authorization Period Expiration: 12/31/23  Plan of Care Expiration: 8/2/23  Progress Note Due: 6/2/23  Visit # / Visits authorized: 1/ 1 , 16/20  FOTO: 5/5     Precautions:  HTN      PTA Visit #: 1/5   Date of last FOTO: 5/2/23    Time In: 10 am  Time Out: 10:45 am  Total Billable Time: 45  minutes    SUBJECTIVE     Pt reports: he's feeling good today, no c/o hip pain     He was compliant with home exercise program.  Response to previous treatment: no adverse affect  Functional change: none    Pain: 0/10  Location: left groin      OBJECTIVE          Treatment     Liang received the treatments listed below:  (new treatments are indicated in bold)    therapeutic exercises to develop strength, endurance, ROM, flexibility, posture, and core stabilization for 15 minutes including:    Recumbent bike Lvl 3- 6'- for BLE strength & endurance   Shuttle DLP 3c (3x10)    -SLP 2c (3x10) not performed    Seated hamstring stretch 3x30" B     Not performed    SL hip Abd 2x12 B  Nu Step 6' Lvl 2- BLE strength & endurance   B piriformis stretch 30" x2 B NP  Standing hip abd Green TB 2x15 B   -standing hip Ext Green TB 2x10   Straight leg raise BLE 2x10 2#  Supine isometric hip abd with pilates ring 5" 2x10 NP    manual therapy techniques: were applied to the: left lower extremity  for 0 minutes, including:    Long axis distraction left lower extremity  L hip internal rotation joint mobs with belt NP  P/A prone hip " "mob    neuromuscular re-education activities to improve: Balance, Coordination, Kinesthetic, Sense, Proprioception, and Posture for  15 minutes. The following activities were included:    -Hip abd with contralateral foot on airex 2x15 B (intermittent BUE support)   -Step ups BOSU with contralateral hip flex x20 ea  Palloff press green TB x15 B   -Standing march x20 with isometric shoulder Ext Green TB         Not performed     Bridge on BOSU 3x10  Toe taps onto 20' plyo box x 20 ea  Step ups onto airex 20x B (No support)  SLS /c contralateral hip flexion to 90* B 2x30"  Standing marches airex (single UE support) 1'  - RTB marches B 20x airex  SLS on airex without BUE support (intermittent UE) x30"   - no airex B x 30  Hip abd iso /c belt 20 5  Short arc quad into straight leg raise 2x10  not performed    Left lower extremity LAQ 3# 3" 2x10 not performed          therapeutic activities to improve functional performance for 15 minutes, including:    Lateral Ylw hurdles   x 5 laps   Touch and go squats plyobox 20' 3x10  Lateral step ups Lvl 3 step  3x10 B       Not performed    Standing march with 1Green TB x20 B on airex  Lateral stepping 2 laps B Green TB   Shuttle hip ext. .5 cord B 2x10  1/2 kneel to stand B 2x10 UE support      Patient Education and Home Exercises     Home Exercises Provided and Patient Education Provided     Education provided:   - HEP    Written Home Exercises Provided: yes. Exercises were reviewed and Liang was able to demonstrate them prior to the end of the session.  Liang demonstrated good  understanding of the education provided. See EMR under Patient Instructions for exercises provided during therapy sessions    ASSESSMENT   Pt tolerated session well. Fatigue noted with increased reps  of closed chain gluteal strengthening today, for increased ease with steps/curbs in community and chair transfers. Mild instability intermittently with lateral hurdles requiring SBA for safety, but improved " from previous visits. No c/o hip pain reported throughout session.     Liang Is progressing well towards his goals.   Pt prognosis is Good.     Pt will continue to benefit from skilled outpatient physical therapy to address the deficits listed in the problem list box on initial evaluation, provide pt/family education and to maximize pt's level of independence in the home and community environment.     Pt's spiritual, cultural and educational needs considered and pt agreeable to plan of care and goals.     Anticipated barriers to physical therapy: none      Goals: 6 visits  1. Pt will be compliant /c HEP to supplement PT in order to improve functional tasks (progressing)   2. Pt will improve B glute medius MMTs to 4-/5 grossly to improve strength for functional activities (progressing)     Long Term Goals: 12 visits   1. Pt will improve L hip flexor MMTs to 4-/5 to improve strength for functional activities (progressing)   2. Pt will improve FOTO score to </= 29% limitation to reduce perceived pain with mobility  (progressing)   3. Pt will be able to perform car transfers pain free with out using BUE to raise L leg (progressing)   PLAN   Updated Certification Period: 6/12/23 to 9/12/23   Recommended Treatment Plan: 2 times per week for 3 weeks:  Manual Therapy, Moist Heat/ Ice, Neuromuscular Re-ed, Patient Education, Therapeutic Activities, and Therapeutic Exercise      Marleny Funez, PT

## 2023-07-07 ENCOUNTER — CLINICAL SUPPORT (OUTPATIENT)
Dept: REHABILITATION | Facility: HOSPITAL | Age: 83
End: 2023-07-07
Payer: MEDICARE

## 2023-07-07 DIAGNOSIS — M25.552 PAIN OF LEFT HIP: Primary | ICD-10-CM

## 2023-07-07 DIAGNOSIS — R29.898 WEAKNESS OF LEFT HIP: ICD-10-CM

## 2023-07-07 PROCEDURE — 97530 THERAPEUTIC ACTIVITIES: CPT

## 2023-07-07 PROCEDURE — 97112 NEUROMUSCULAR REEDUCATION: CPT

## 2023-07-07 PROCEDURE — 97110 THERAPEUTIC EXERCISES: CPT

## 2023-07-12 ENCOUNTER — PATIENT MESSAGE (OUTPATIENT)
Dept: CARDIOLOGY | Facility: CLINIC | Age: 83
End: 2023-07-12
Payer: MEDICARE

## 2023-07-14 ENCOUNTER — CLINICAL SUPPORT (OUTPATIENT)
Dept: REHABILITATION | Facility: HOSPITAL | Age: 83
End: 2023-07-14
Payer: MEDICARE

## 2023-07-14 DIAGNOSIS — M25.552 PAIN OF LEFT HIP: Primary | ICD-10-CM

## 2023-07-14 DIAGNOSIS — R29.898 WEAKNESS OF LEFT HIP: ICD-10-CM

## 2023-07-14 PROCEDURE — 97530 THERAPEUTIC ACTIVITIES: CPT | Mod: HCNC,CQ

## 2023-07-14 PROCEDURE — 97112 NEUROMUSCULAR REEDUCATION: CPT | Mod: HCNC,CQ

## 2023-07-14 PROCEDURE — 97110 THERAPEUTIC EXERCISES: CPT | Mod: HCNC,CQ

## 2023-07-14 NOTE — PROGRESS NOTES
"[facility]   Physical Therapy Treatment Note     Name: Liang Monterroso Jr.  Clinic Number: [id number]    Therapy Diagnosis:   Encounter Diagnoses   Name Primary?    Pain of left hip Yes    Weakness of left hip          Physician: Russo-Digeorge, Jamie L*    Visit Date: 7/14/2023    Physician Orders: PT Eval and Treat   Medical Diagnosis from Referral: M16.12 (ICD-10-CM) - Primary osteoarthritis of left hip  L hip OA. PT eval/treat. WBAT, ROMAT, modalities prn. HEP  Evaluation Date: 5/2/2023  Authorization Period Expiration: 12/31/23  Plan of Care Expiration: 8/2/23  Progress Note Due: 6/2/23  Visit # / Visits authorized: 1/ 1 , 17/20  FOTO: 5/5     Precautions:  HTN      PTA Visit #: 1/5   Date of last FOTO: 5/2/23    Time In: 10:45 am  Time Out: 11:30 am  Total Billable Time: 45 inutes    SUBJECTIVE     Pt reports: he's feeling good today, no c/o hip pain. He is about 70% better.  He was compliant with home exercise program.  Response to previous treatment: no adverse affect  Functional change: none    Pain: 0/10  Location: left groin      OBJECTIVE          Treatment     Liang received the treatments listed below:  (new treatments are indicated in bold)    therapeutic exercises to develop strength, endurance, ROM, flexibility, posture, and core stabilization for 15 minutes including:    Recumbent bike Lvl 3- 6'- for BLE strength & endurance   Shuttle DLP 3c (3x10)    -SLP 3c (3x10)   Seated hamstring stretch 3x30" B   Slit squats 2x10 UE  Gym Tour     Not performed    SL hip Abd 2x12 B  Nu Step 6' Lvl 2- BLE strength & endurance   B piriformis stretch 30" x2 B NP  Standing hip abd Green TB 2x15 B   -standing hip Ext Green TB 2x10   Straight leg raise BLE 2x10 2#  Supine isometric hip abd with pilates ring 5" 2x10 NP    manual therapy techniques: were applied to the: left lower extremity  for 0 minutes, including:    Long axis distraction left lower extremity  L hip internal rotation joint mobs " "with belt NP  P/A prone hip mob    neuromuscular re-education activities to improve: Balance, Coordination, Kinesthetic, Sense, Proprioception, and Posture for  15 minutes. The following activities were included:    -Hip abd with contralateral foot on airex 2x15 B (intermittent BUE support)   -Step ups BOSU with contralateral hip flex x20 ea  Palloff press green TB x15 B   -Standing march x20 with isometric shoulder Ext Green TB       Not performed     Bridge on BOSU 3x10  Toe taps onto 20' plyo box x 20 ea  Step ups onto airex 20x B (No support)  SLS /c contralateral hip flexion to 90* B 2x30"  Standing marches airex (single UE support) 1'  - RTB marches B 20x airex  SLS on airex without BUE support (intermittent UE) x30"   - no airex B x 30  Hip abd iso /c belt 20 5  Short arc quad into straight leg raise 2x10  not performed    Left lower extremity LAQ 3# 3" 2x10 not performed          therapeutic activities to improve functional performance for 15 minutes, including:    Lateral Ylw hurdles   x 5 laps   Touch and go squats plyobox 20' 3x10  Lateral step ups Lvl 3 step  3x10 B NP      Not performed    Standing march with 1Green TB x20 B on airex  Lateral stepping 2 laps B Green TB   Shuttle hip ext. .5 cord B 2x10  1/2 kneel to stand B 2x10 UE support      Patient Education and Home Exercises     Home Exercises Provided and Patient Education Provided     Education provided:   - HEP    Written Home Exercises Provided: yes. Exercises were reviewed and Liang was able to demonstrate them prior to the end of the session.  Liang demonstrated good  understanding of the education provided. See EMR under Patient Instructions for exercises provided during therapy sessions    ASSESSMENT   Pt tolerated session well. Pt is able to tolerate progressions to date without adverse affect. Overall he is significantly better since the beginning of PT as evidenced by subjective reports of hip pain absence. He continues with limitations " regarding (L) hip flexor strength which was recently noticed at home as he had difficulty elevating the leg onto his coffee table. He has one remaining visit which will be his final as he plans to resume gym usage. PTA guided patient on abbreviated gym tour to educate which exercise stations he would fully benefit from using.  Liang Is progressing well towards his goals.   Pt prognosis is Good.     Pt will continue to benefit from skilled outpatient physical therapy to address the deficits listed in the problem list box on initial evaluation, provide pt/family education and to maximize pt's level of independence in the home and community environment.     Pt's spiritual, cultural and educational needs considered and pt agreeable to plan of care and goals.     Anticipated barriers to physical therapy: none      Goals: 6 visits  1. Pt will be compliant /c HEP to supplement PT in order to improve functional tasks (progressing)   2. Pt will improve B glute medius MMTs to 4-/5 grossly to improve strength for functional activities (progressing)     Long Term Goals: 12 visits   1. Pt will improve L hip flexor MMTs to 4-/5 to improve strength for functional activities (progressing)   2. Pt will improve FOTO score to </= 29% limitation to reduce perceived pain with mobility  (progressing)   3. Pt will be able to perform car transfers pain free with out using BUE to raise L leg (progressing)   PLAN   Updated Certification Period: 6/12/23 to 9/12/23   Recommended Treatment Plan: 2 times per week for 3 weeks:  Manual Therapy, Moist Heat/ Ice, Neuromuscular Re-ed, Patient Education, Therapeutic Activities, and Therapeutic Exercise      Ricardo Hdez PTA

## 2023-07-21 ENCOUNTER — CLINICAL SUPPORT (OUTPATIENT)
Dept: REHABILITATION | Facility: HOSPITAL | Age: 83
End: 2023-07-21
Payer: MEDICARE

## 2023-07-21 ENCOUNTER — LAB VISIT (OUTPATIENT)
Dept: LAB | Facility: HOSPITAL | Age: 83
End: 2023-07-21
Attending: INTERNAL MEDICINE
Payer: MEDICARE

## 2023-07-21 DIAGNOSIS — E78.5 HYPERLIPIDEMIA, UNSPECIFIED HYPERLIPIDEMIA TYPE: ICD-10-CM

## 2023-07-21 DIAGNOSIS — I25.84 CORONARY ARTERY DISEASE DUE TO CALCIFIED CORONARY LESION: ICD-10-CM

## 2023-07-21 DIAGNOSIS — R29.898 WEAKNESS OF LEFT HIP: ICD-10-CM

## 2023-07-21 DIAGNOSIS — M25.552 PAIN OF LEFT HIP: Primary | ICD-10-CM

## 2023-07-21 DIAGNOSIS — I25.10 CORONARY ARTERY DISEASE DUE TO CALCIFIED CORONARY LESION: ICD-10-CM

## 2023-07-21 DIAGNOSIS — I70.0 AORTIC ATHEROSCLEROSIS: ICD-10-CM

## 2023-07-21 LAB
CHOLEST SERPL-MCNC: 139 MG/DL (ref 120–199)
CHOLEST/HDLC SERPL: 3 {RATIO} (ref 2–5)
HDLC SERPL-MCNC: 47 MG/DL (ref 40–75)
HDLC SERPL: 33.8 % (ref 20–50)
LDLC SERPL CALC-MCNC: 78.8 MG/DL (ref 63–159)
NONHDLC SERPL-MCNC: 92 MG/DL
TRIGL SERPL-MCNC: 66 MG/DL (ref 30–150)

## 2023-07-21 PROCEDURE — 36415 COLL VENOUS BLD VENIPUNCTURE: CPT | Mod: HCNC | Performed by: INTERNAL MEDICINE

## 2023-07-21 PROCEDURE — 80061 LIPID PANEL: CPT | Mod: HCNC | Performed by: INTERNAL MEDICINE

## 2023-07-21 PROCEDURE — 97110 THERAPEUTIC EXERCISES: CPT | Mod: HCNC

## 2023-07-21 PROCEDURE — 97112 NEUROMUSCULAR REEDUCATION: CPT | Mod: HCNC

## 2023-07-21 PROCEDURE — 97530 THERAPEUTIC ACTIVITIES: CPT | Mod: HCNC

## 2023-07-21 NOTE — PROGRESS NOTES
[facility]   Physical Therapy Treatment Note     Name: Liang Monterroso Jr.  Clinic Number: [id number]    Therapy Diagnosis:   Encounter Diagnoses   Name Primary?    Pain of left hip Yes    Weakness of left hip            Physician: Russo-Digeorge, Jamie L*    Visit Date: 7/21/2023    Physician Orders: PT Eval and Treat   Medical Diagnosis from Referral: M16.12 (ICD-10-CM) - Primary osteoarthritis of left hip  L hip OA. PT eval/treat. WBAT, ROMAT, modalities prn. HEP  Evaluation Date: 5/2/2023  Authorization Period Expiration: 12/31/23  Plan of Care Expiration: 8/2/23  Progress Note Due: 6/2/23  Visit # / Visits authorized: 1/ 1 , 18/20  FOTO: 5/5     Precautions:  HTN      PTA Visit #: 1/5   Date of last FOTO: 5/2/23    Time In: 9:50 am  Time Out: 1043 am  Total Billable Time: 53 minutes    SUBJECTIVE     Pt reports: he's feeling good today, no c/o hip pain. He is about 70% better.  He was compliant with home exercise program.  Response to previous treatment: no adverse affect  Functional change: none    Pain: 0/10  Location: left groin      OBJECTIVE        Hip Right MMT Left MMT Pain/Dysfunction with Movement (pain=!)   AROM             flexion  WFL 4+/5  WFL 4/5    extension         Able to perform bridge without pain    abduction  WFL 4/5  WFL 4/5     Internal rotation  WFL 4/5  WFL 4-5     External rotation  WFL 4/5  WFL 4/5 Mild groin pain        Knee Right MMT Left MMT Pain/Dysfunction with Movement (pain=!)   AROM             flexion  WFL 4+/5  WFL 4/5     extension  WFL 4/5  WFL 4/5              Flexibility: hamstring 90/90: R lacking 10 deg, L lacking 20 deg     Treatment     Liang received the treatments listed below:  (new treatments are indicated in bold)    therapeutic exercises to develop strength, endurance, ROM, flexibility, posture, and core stabilization for 28  minutes including:    Re-assessment completed     Recumbent bike Lvl 3- 6'- for BLE strength & endurance   B  "piriformis stretch 30" x2 B   Shuttle DLP 3c (3x10)    -SLP 3c (3x10)   Seated hamstring stretch 3x30" B         neuromuscular re-education activities to improve: Balance, Coordination, Kinesthetic, Sense, Proprioception, and Posture for  10 minutes. The following activities were included:    -Step ups BOSU with contralateral hip flex x20 ea  Single leg balance on airex- to fatigue       therapeutic activities to improve functional performance for 15 minutes, including:    Bridge 3x10   Lateral Ylw hurdles   x 5 laps   Touch and go squats plyobox 20' 3x10  Lateral step ups Lvl 4 step  3x10 B     Patient Education and Home Exercises     Home Exercises Provided and Patient Education Provided     Education provided:   - HEP    Written Home Exercises Provided: yes. Exercises were reviewed and Liang was able to demonstrate them prior to the end of the session.  Liang demonstrated good  understanding of the education provided. See EMR under Patient Instructions for exercises provided during therapy sessions    [facility]  Discharge Note    Name: Liang RODO Monterroso Jr.  Clinic Number: [id number]    Therapy Diagnosis:   Encounter Diagnoses   Name Primary?    Pain of left hip Yes    Weakness of left hip      Physician: Russo-Digeorge, Jamie L*    Physician Orders: PT Eval and Treat   Medical Diagnosis from Referral: M16.12 (ICD-10-CM) - Primary osteoarthritis of left hip  L hip OA. PT eval/treat. WBAT, ROMAT, modalities prn. HEP  Evaluation Date: 5/2/2023      Date of Last visit: 7/21/23  Total Visits Received: 18    ASSESSMENT      Pt demonstrates improved L hip gross strength and reports his hip pain is much less intense and occurs much less often. Pt is (I) with HEP and plans to resume his exercises in the gym that he was doing prior to the pandemic.     Discharge reason: Patient has reached the maximum rehab potential for the present time    Discharge FOTO Score: 50%    Goals: Goals: 6 visits  1. Pt will " be compliant /c HEP to supplement PT in order to improve functional tasks MET  2. Pt will improve B glute medius MMTs to 4-/5 grossly to improve strength for functional activities MET    Long Term Goals: 12 visits   1. Pt will improve L hip flexor MMTs to 4-/5 to improve strength for functional activities MET  2. Pt will improve FOTO score to </= 29% limitation to reduce perceived pain with mobility  (progressing)   3. Pt will be able to perform car transfers pain free with out using BUE to raise L leg MET    PLAN   This patient is discharged from Physical Therapy      Marleny Funez, PT

## 2023-07-25 ENCOUNTER — OFFICE VISIT (OUTPATIENT)
Dept: INTERNAL MEDICINE | Facility: CLINIC | Age: 83
End: 2023-07-25
Payer: MEDICARE

## 2023-07-25 VITALS
SYSTOLIC BLOOD PRESSURE: 110 MMHG | BODY MASS INDEX: 26.86 KG/M2 | HEIGHT: 68 IN | WEIGHT: 177.25 LBS | DIASTOLIC BLOOD PRESSURE: 72 MMHG | HEART RATE: 56 BPM | OXYGEN SATURATION: 98 %

## 2023-07-25 DIAGNOSIS — N13.8 BENIGN PROSTATIC HYPERPLASIA WITH URINARY OBSTRUCTION: ICD-10-CM

## 2023-07-25 DIAGNOSIS — I25.10 CORONARY ARTERY DISEASE DUE TO CALCIFIED CORONARY LESION: ICD-10-CM

## 2023-07-25 DIAGNOSIS — S63.408A TRAUMATIC RUPTURE OF FLEXOR SHEATH PULLEY OF FINGER: ICD-10-CM

## 2023-07-25 DIAGNOSIS — N40.1 BENIGN PROSTATIC HYPERPLASIA WITH URINARY OBSTRUCTION: ICD-10-CM

## 2023-07-25 DIAGNOSIS — Z00.00 ROUTINE PHYSICAL EXAMINATION: Primary | ICD-10-CM

## 2023-07-25 DIAGNOSIS — E03.9 ACQUIRED HYPOTHYROIDISM: ICD-10-CM

## 2023-07-25 DIAGNOSIS — M72.0 DUPUYTREN CONTRACTURE: ICD-10-CM

## 2023-07-25 DIAGNOSIS — I25.84 CORONARY ARTERY DISEASE DUE TO CALCIFIED CORONARY LESION: ICD-10-CM

## 2023-07-25 DIAGNOSIS — R33.9 INCOMPLETE EMPTYING OF BLADDER: ICD-10-CM

## 2023-07-25 DIAGNOSIS — I10 PRIMARY HYPERTENSION: ICD-10-CM

## 2023-07-25 PROCEDURE — 1159F PR MEDICATION LIST DOCUMENTED IN MEDICAL RECORD: ICD-10-PCS | Mod: HCNC,CPTII,S$GLB, | Performed by: INTERNAL MEDICINE

## 2023-07-25 PROCEDURE — 1101F PR PT FALLS ASSESS DOC 0-1 FALLS W/OUT INJ PAST YR: ICD-10-PCS | Mod: HCNC,CPTII,S$GLB, | Performed by: INTERNAL MEDICINE

## 2023-07-25 PROCEDURE — 3074F PR MOST RECENT SYSTOLIC BLOOD PRESSURE < 130 MM HG: ICD-10-PCS | Mod: HCNC,CPTII,S$GLB, | Performed by: INTERNAL MEDICINE

## 2023-07-25 PROCEDURE — 99999 PR PBB SHADOW E&M-EST. PATIENT-LVL III: CPT | Mod: PBBFAC,HCNC,, | Performed by: INTERNAL MEDICINE

## 2023-07-25 PROCEDURE — 99397 PR PREVENTIVE VISIT,EST,65 & OVER: ICD-10-PCS | Mod: HCNC,S$GLB,, | Performed by: INTERNAL MEDICINE

## 2023-07-25 PROCEDURE — 3078F DIAST BP <80 MM HG: CPT | Mod: HCNC,CPTII,S$GLB, | Performed by: INTERNAL MEDICINE

## 2023-07-25 PROCEDURE — 1101F PT FALLS ASSESS-DOCD LE1/YR: CPT | Mod: HCNC,CPTII,S$GLB, | Performed by: INTERNAL MEDICINE

## 2023-07-25 PROCEDURE — 1160F PR REVIEW ALL MEDS BY PRESCRIBER/CLIN PHARMACIST DOCUMENTED: ICD-10-PCS | Mod: HCNC,CPTII,S$GLB, | Performed by: INTERNAL MEDICINE

## 2023-07-25 PROCEDURE — 1126F AMNT PAIN NOTED NONE PRSNT: CPT | Mod: HCNC,CPTII,S$GLB, | Performed by: INTERNAL MEDICINE

## 2023-07-25 PROCEDURE — 3288F FALL RISK ASSESSMENT DOCD: CPT | Mod: HCNC,CPTII,S$GLB, | Performed by: INTERNAL MEDICINE

## 2023-07-25 PROCEDURE — 1159F MED LIST DOCD IN RCRD: CPT | Mod: HCNC,CPTII,S$GLB, | Performed by: INTERNAL MEDICINE

## 2023-07-25 PROCEDURE — 3288F PR FALLS RISK ASSESSMENT DOCUMENTED: ICD-10-PCS | Mod: HCNC,CPTII,S$GLB, | Performed by: INTERNAL MEDICINE

## 2023-07-25 PROCEDURE — 1126F PR PAIN SEVERITY QUANTIFIED, NO PAIN PRESENT: ICD-10-PCS | Mod: HCNC,CPTII,S$GLB, | Performed by: INTERNAL MEDICINE

## 2023-07-25 PROCEDURE — 3078F PR MOST RECENT DIASTOLIC BLOOD PRESSURE < 80 MM HG: ICD-10-PCS | Mod: HCNC,CPTII,S$GLB, | Performed by: INTERNAL MEDICINE

## 2023-07-25 PROCEDURE — 99397 PER PM REEVAL EST PAT 65+ YR: CPT | Mod: HCNC,S$GLB,, | Performed by: INTERNAL MEDICINE

## 2023-07-25 PROCEDURE — 99999 PR PBB SHADOW E&M-EST. PATIENT-LVL III: ICD-10-PCS | Mod: PBBFAC,HCNC,, | Performed by: INTERNAL MEDICINE

## 2023-07-25 PROCEDURE — 1160F RVW MEDS BY RX/DR IN RCRD: CPT | Mod: HCNC,CPTII,S$GLB, | Performed by: INTERNAL MEDICINE

## 2023-07-25 PROCEDURE — 3074F SYST BP LT 130 MM HG: CPT | Mod: HCNC,CPTII,S$GLB, | Performed by: INTERNAL MEDICINE

## 2023-07-25 RX ORDER — LEVOTHYROXINE SODIUM 112 UG/1
112 TABLET ORAL DAILY
Qty: 90 TABLET | Refills: 3 | Status: SHIPPED | OUTPATIENT
Start: 2023-07-25 | End: 2024-02-29

## 2023-07-25 NOTE — PROGRESS NOTES
Subjective:       Patient ID: Liang Monterroso Jr. is a 83 y.o. male.    Chief Complaint: Annual Exam    Patient seen for annual exam.  He just completed therapy for hip and hand.  Had a serious injury with fracture and laceration to the left hand.  Required surgery with pins and sutures.  He is seeing Cardiology for follow-up with echo and final consultation.  His blood count improved to nearly normal with iron and his stool study was negative for blood so he decided not to do a colonoscopy at this time siting age.  He would consider it in the future but for now he says he is not interested.  Prescriptions and labs were reviewed.  He does need a levothyroxine refill    Review of Systems   Constitutional:  Negative for chills, fatigue and fever.   HENT:  Negative for nosebleeds and trouble swallowing.    Eyes:  Negative for pain and visual disturbance.   Respiratory:  Negative for cough, shortness of breath and wheezing.    Cardiovascular:  Negative for chest pain and palpitations.   Gastrointestinal:  Negative for abdominal pain, constipation, diarrhea, nausea and vomiting.   Genitourinary:  Negative for difficulty urinating and hematuria.   Musculoskeletal:  Positive for arthralgias and joint deformity. Negative for back pain and neck pain.   Integumentary:  Negative for rash.   Neurological:  Negative for dizziness and headaches.   Hematological:  Does not bruise/bleed easily.   Psychiatric/Behavioral:  Negative for dysphoric mood and sleep disturbance.          Past Medical History:   Diagnosis Date    Allergy     CKD (chronic kidney disease) stage 3, GFR 30-59 ml/min 6/15/2016    Coronary artery disease due to calcified coronary lesion 4/20/2023    GERD (gastroesophageal reflux disease)     Grade II open fracture of proximal phalanx of left ring finger 4/2/2023    Hx of colonic polyp     Hyperlipidemia     Hypertension     Hypospadias in male     Hypothyroidism     Open displaced fracture of proximal phalanx of  left ring finger     Sleep apnea     Thyroid disease     Urinary tract infection      Past Surgical History:   Procedure Laterality Date    APPENDECTOMY      CATARACT EXTRACTION      CYSTOSCOPY      CYSTOSCOPY WITH URODYNAMIC TESTING N/A 10/06/2022    Procedure: CYSTOSCOPY, WITH URODYNAMIC TESTING;  Surgeon: Shade Sullivan MD;  Location: [address];  Service: Urology;  Laterality: N/A;    EYE SURGERY      HERNIA REPAIR      INSERTION, NEUROSTIMULATOR      IRRIGATION AND DEBRIDEMENT Left 4/5/2023    Procedure: IRRIGATION AND DEBRIDEMENT;  Surgeon: Jean-Paul Cummins MD;  Location: [facility];  Service: Orthopedics;  Laterality: Left;    OPEN REDUCTION AND INTERNAL FIXATION (ORIF) OF INJURY OF FINGER Left 4/5/2023    Procedure: ORIF, FINGER - 4th proximal phalanx. Possible tendon/nerve repair;  Surgeon: Jean-Paul Cummins MD;  Location: [facility];  Service: Orthopedics;  Laterality: Left;    TONSILLECTOMY        Patient Active Problem List   Diagnosis    Allergic rhinitis due to pollen    Hypertension    Hypothyroid    Hyperlipidemia    Aortic atherosclerosis    Dupuytren contracture    Decreased ROM of neck    Impaired functional mobility, balance, gait, and endurance    Constipation    Meningioma    BPH (benign prostatic hyperplasia)    JUDITH (obstructive sleep apnea)    Incomplete emptying of bladder    Renal cyst, left    gait instability    Personal history of colonic polyps    Open displaced fracture of proximal phalanx of left ring finger    Traumatic rupture of flexor sheath pulley of finger - 50% rupture of left ring finger A2 pulley    Stiffness of finger joint of left hand    Left hand pain    Coronary artery disease due to calcified coronary lesion    Pain of left hip    Weakness of left hip        Objective:      Physical Exam  Constitutional:       General: He is not in acute distress.     Appearance: He is well-developed.   HENT:      Head: Normocephalic and atraumatic.      Right Ear: Tympanic membrane,  ear canal and external ear normal.      Left Ear: Tympanic membrane, ear canal and external ear normal.      Mouth/Throat:      Pharynx: No oropharyngeal exudate or posterior oropharyngeal erythema.   Eyes:      General: No scleral icterus.     Conjunctiva/sclera: Conjunctivae normal.      Pupils: Pupils are equal, round, and reactive to light.   Neck:      Thyroid: No thyromegaly.      Comments: No supraclavicular nodes palpated  Cardiovascular:      Rate and Rhythm: Normal rate and regular rhythm.      Pulses: Normal pulses.      Heart sounds: Normal heart sounds. No murmur heard.  Pulmonary:      Effort: Pulmonary effort is normal.      Breath sounds: Normal breath sounds. No wheezing.   Abdominal:      General: Bowel sounds are normal.      Palpations: Abdomen is soft. There is no mass.      Tenderness: There is no abdominal tenderness.   Musculoskeletal:         General: Swelling and deformity (left hand) present. No tenderness.      Cervical back: Normal range of motion and neck supple.      Right lower leg: No edema.      Left lower leg: No edema.   Lymphadenopathy:      Cervical: No cervical adenopathy.   Skin:     Coloration: Skin is not jaundiced or pale.   Neurological:      General: No focal deficit present.      Mental Status: He is alert and oriented to person, place, and time.   Psychiatric:         Mood and Affect: Mood normal.         Behavior: Behavior normal.       Assessment:       Problem List Items Addressed This Visit          Cardiac/Vascular    Hypertension    Coronary artery disease due to calcified coronary lesion       Renal/    BPH (benign prostatic hyperplasia)    Incomplete emptying of bladder       Endocrine    Hypothyroid       Orthopedic    Dupuytren contracture    Traumatic rupture of flexor sheath pulley of finger - 50% rupture of left ring finger A2 pulley     Other Visit Diagnoses       Routine physical examination    -  Primary            Plan:         Liang was seen today for  "annual exam.    Diagnoses and all orders for this visit:    Routine physical examination    Primary hypertension    Acquired hypothyroidism    Dupuytren contracture    Benign prostatic hyperplasia with urinary obstruction    Incomplete emptying of bladder    Traumatic rupture of flexor sheath pulley of finger - 50% rupture of left ring finger A2 pulley    Coronary artery disease due to calcified coronary lesion    Other orders  -     levothyroxine (SYNTHROID) 112 MCG tablet; Take 1 tablet (112 mcg total) by mouth once daily.           Continue meds, continue cardiology follow-up.  See me in about 5-6 months          Portions of this note may have been created with voice recognition software. Occasional "wrong-word" or "sound-a-like" substitutions may have occurred due to the inherent limitations of voice recognition software. Please, read the note carefully and recognize, using context, where substitutions have occurred.  "

## 2023-08-04 ENCOUNTER — PATIENT MESSAGE (OUTPATIENT)
Dept: CARDIOLOGY | Facility: CLINIC | Age: 83
End: 2023-08-04
Payer: MEDICARE

## 2023-08-04 ENCOUNTER — HOSPITAL ENCOUNTER (OUTPATIENT)
Dept: CARDIOLOGY | Facility: HOSPITAL | Age: 83
Discharge: HOME | End: 2023-08-04
Attending: INTERNAL MEDICINE
Payer: MEDICARE

## 2023-08-04 VITALS
WEIGHT: 177 LBS | SYSTOLIC BLOOD PRESSURE: 128 MMHG | HEIGHT: 68 IN | DIASTOLIC BLOOD PRESSURE: 80 MMHG | HEART RATE: 70 BPM | BODY MASS INDEX: 26.83 KG/M2

## 2023-08-04 DIAGNOSIS — I48.0 PAROXYSMAL ATRIAL FIBRILLATION: ICD-10-CM

## 2023-08-04 LAB
ASCENDING AORTA: 3.87 CM
AV INDEX (PROSTH): 0.35
AV MEAN GRADIENT: 9 MMHG
AV PEAK GRADIENT: 14 MMHG
AV VALVE AREA BY VELOCITY RATIO: 1.75 CM²
AV VALVE AREA: 1.43 CM²
AV VELOCITY RATIO: 0.42
BSA FOR ECHO PROCEDURE: 1.96 M2
CV ECHO LV RWT: 0.34 CM
DOP CALC AO PEAK VEL: 1.9 M/S
DOP CALC AO VTI: 44.3 CM
DOP CALC LVOT AREA: 4.2 CM2
DOP CALC LVOT DIAMETER: 2.3 CM
DOP CALC LVOT PEAK VEL: 0.8 M/S
DOP CALC LVOT STROKE VOLUME: 63.54 CM3
DOP CALCLVOT PEAK VEL VTI: 15.3 CM
E WAVE DECELERATION TIME: 206.83 MSEC
E/A RATIO: 0.82
E/E' RATIO: 6.15 M/S
ECHO LV POSTERIOR WALL: 0.87 CM (ref 0.6–1.1)
EJECTION FRACTION: 65 %
FRACTIONAL SHORTENING: 48 % (ref 28–44)
INTERVENTRICULAR SEPTUM: 0.62 CM (ref 0.6–1.1)
LA MAJOR: 5.34 CM
LA MINOR: 4.8 CM
LA WIDTH: 4.1 CM
LEFT ATRIUM SIZE: 4.39 CM
LEFT ATRIUM VOLUME INDEX MOD: 28.9 ML/M2
LEFT ATRIUM VOLUME INDEX: 39.9 ML/M2
LEFT ATRIUM VOLUME MOD: 55.97 CM3
LEFT ATRIUM VOLUME: 77.35 CM3
LEFT INTERNAL DIMENSION IN SYSTOLE: 2.69 CM (ref 2.1–4)
LEFT VENTRICLE DIASTOLIC VOLUME INDEX: 64.73 ML/M2
LEFT VENTRICLE DIASTOLIC VOLUME: 125.57 ML
LEFT VENTRICLE MASS INDEX: 67 G/M2
LEFT VENTRICLE SYSTOLIC VOLUME INDEX: 13.9 ML/M2
LEFT VENTRICLE SYSTOLIC VOLUME: 26.87 ML
LEFT VENTRICULAR INTERNAL DIMENSION IN DIASTOLE: 5.13 CM (ref 3.5–6)
LEFT VENTRICULAR MASS: 129.65 G
LV LATERAL E/E' RATIO: 5 M/S
LV SEPTAL E/E' RATIO: 8 M/S
MV PEAK A VEL: 0.49 M/S
MV PEAK E VEL: 0.4 M/S
MV STENOSIS PRESSURE HALF TIME: 59.98 MS
MV VALVE AREA P 1/2 METHOD: 3.67 CM2
PISA TR MAX VEL: 2.2 M/S
RA MAJOR: 5.13 CM
RA PRESSURE ESTIMATED: 3 MMHG
RA WIDTH: 3.91 CM
RIGHT VENTRICULAR END-DIASTOLIC DIMENSION: 3.95 CM
RV TB RVSP: 5 MMHG
SINUS: 4.11 CM
STJ: 3.46 CM
TDI LATERAL: 0.08 M/S
TDI SEPTAL: 0.05 M/S
TDI: 0.07 M/S
TR MAX PG: 19 MMHG
TRICUSPID ANNULAR PLANE SYSTOLIC EXCURSION: 1.92 CM
TV REST PULMONARY ARTERY PRESSURE: 22 MMHG
Z-SCORE OF LEFT VENTRICULAR DIMENSION IN END DIASTOLE: -0.72
Z-SCORE OF LEFT VENTRICULAR DIMENSION IN END SYSTOLE: -1.83

## 2023-08-04 PROCEDURE — 93306 TTE W/DOPPLER COMPLETE: CPT | Mod: 26,HCNC,, | Performed by: INTERNAL MEDICINE

## 2023-08-04 PROCEDURE — 93306 ECHO (CUPID ONLY): ICD-10-PCS | Mod: 26,HCNC,, | Performed by: INTERNAL MEDICINE

## 2023-08-04 PROCEDURE — 93306 TTE W/DOPPLER COMPLETE: CPT | Mod: HCNC

## 2023-08-07 PROBLEM — I77.89 AORTIC ROOT ENLARGEMENT: Status: ACTIVE | Noted: 2023-08-07

## 2023-08-07 PROBLEM — I48.0 PAROXYSMAL ATRIAL FIBRILLATION: Status: ACTIVE | Noted: 2023-08-07

## 2023-08-07 NOTE — PROGRESS NOTES
Cardiology Clinic Note  Reason for Visit: afib    HPI:     Liang Monterroso Jr. is a 83 y.o. M, who presents for afib.    He denies GI/ bleeding, no nosebleeds.    He denies symptoms of atrial fibrillation.  Occasionally feels his pulse and feels skipped beat or irregularity. No racing.    He has been doing well.  No symptoms of HF, angina, claudication.  BP has been 110/60s.    Medical: HTN, HLD, JUDITH, CKD 3, CAD due to calcified coronary lesion (CT), aortic atherosclerosis (CT), paroxysmal atrial fibrillation  Surgical: Reviewed, as below.  Family: Reviewed, as below. Mother with heart disease.  Social: Reviewed, as below. Never smoked.  Allergies/side effects: iodinated contrast (hives)    ROS:    Pertinent ROS included in HPI and below.  PMH:     Past Medical History:   Diagnosis Date    Allergy     CKD (chronic kidney disease) stage 3, GFR 30-59 ml/min 6/15/2016    Coronary artery disease due to calcified coronary lesion 4/20/2023    GERD (gastroesophageal reflux disease)     Grade II open fracture of proximal phalanx of left ring finger 4/2/2023    Hx of colonic polyp     Hyperlipidemia     Hypertension     Hypospadias in male     Hypothyroidism     Open displaced fracture of proximal phalanx of left ring finger     Paroxysmal atrial fibrillation 8/7/2023    Sleep apnea     Thyroid disease     Urinary tract infection      Past Surgical History:   Procedure Laterality Date    APPENDECTOMY      CATARACT EXTRACTION      CYSTOSCOPY      CYSTOSCOPY WITH URODYNAMIC TESTING N/A 10/06/2022    Procedure: CYSTOSCOPY, WITH URODYNAMIC TESTING;  Surgeon: Shade Sullivan MD;  Location: [address];  Service: Urology;  Laterality: N/A;    EYE SURGERY      HERNIA REPAIR      INSERTION, NEUROSTIMULATOR      IRRIGATION AND DEBRIDEMENT Left 4/5/2023    Procedure: IRRIGATION AND DEBRIDEMENT;  Surgeon: Jean-Paul Cummins MD;  Location: [facility] OR;  Service: Orthopedics;  Laterality: Left;    OPEN REDUCTION AND INTERNAL  FIXATION (ORIF) OF INJURY OF FINGER Left 4/5/2023    Procedure: ORIF, FINGER - 4th proximal phalanx. Possible tendon/nerve repair;  Surgeon: Jean-Paul Cummins MD;  Location: [facility];  Service: Orthopedics;  Laterality: Left;    TONSILLECTOMY       Allergies:     Review of patient's allergies indicates:   Allergen Reactions    Contrast media Hives and Itching     Iv dye     Medications:     Current Outpatient Medications:     apixaban (ELIQUIS) 5 mg Tab, Take 1 tablet (5 mg total) by mouth 2 (two) times daily., Disp: 60 tablet, Rfl: 11    aspirin (ECOTRIN) 81 MG EC tablet, Take 81 mg by mouth once daily., Disp: , Rfl:     fexofenadine (ALLEGRA) 180 MG tablet, Take 1 tablet (180 mg total) by mouth once daily. (Patient taking differently: Take 180 mg by mouth daily as needed.), Disp: 30 tablet, Rfl: 11    levothyroxine (SYNTHROID) 112 MCG tablet, Take 1 tablet (112 mcg total) by mouth once daily., Disp: 90 tablet, Rfl: 3    meloxicam (MOBIC) 7.5 MG tablet, Take 1 tablet (7.5 mg total) by mouth once daily. (Patient not taking: Reported on 7/25/2023), Disp: 30 tablet, Rfl: 0    metoprolol succinate (TOPROL-XL) 25 MG 24 hr tablet, Take 1 tablet (25 mg total) by mouth once daily., Disp: 90 tablet, Rfl: 3    multivitamin with minerals tablet, Take 1 tablet by mouth once daily., Disp: , Rfl:     ramipriL (ALTACE) 5 MG capsule, TAKE 1 CAPSULE(5 MG) BY MOUTH EVERY DAY, Disp: 90 capsule, Rfl: 3    rosuvastatin (CRESTOR) 20 MG tablet, Take 1 tablet (20 mg total) by mouth once daily., Disp: 90 tablet, Rfl: 3    tamsulosin (FLOMAX) 0.4 mg Cap, TAKE 2 CAPSULES(0.8 MG) BY MOUTH EVERY DAY, Disp: 180 capsule, Rfl: 3   Social History:     Social History     Tobacco Use    Smoking status: Never    Smokeless tobacco: Never   Substance Use Topics    Alcohol use: Yes     Comment: 1-3 glasses wine weekly, 2-3 beers weekly     Family History:     Family History   Problem Relation Age of Onset    Diabetes Mother     Heart disease Mother      "Hypertension Mother     Vision loss Mother     Dementia Mother     Alcohol abuse Father     Cancer Sister         lung with mets, was a heavy smoker    No Known Problems Daughter     No Known Problems Son     No Known Problems Daughter     No Known Problems Son     Amblyopia Neg Hx     Blindness Neg Hx     Cataracts Neg Hx     Glaucoma Neg Hx     Macular degeneration Neg Hx     Retinal detachment Neg Hx     Strabismus Neg Hx     Stroke Neg Hx     Thyroid disease Neg Hx      Physical Exam:   BP (!) 120/90   Pulse 64   Ht 5' 8" (1.727 m)   Wt 81.6 kg (179 lb 14.3 oz)   SpO2 95%   BMI 27.35 kg/m²      Constitutional: No apparent distress, conversant  Neck: No jugular venous distension, no carotid bruits  CV: Regular rate and rhythm, no murmurs, normal S1/S2  Pulm: Clear to auscultation bilaterally  Extremities: No lower extremity edema, warm with palpable pulses    Labs:     Blood Tests:  Lab Results   Component Value Date    BNP 43 04/03/2018     04/02/2023    K 4.4 04/02/2023     04/02/2023    CO2 24 04/02/2023    BUN 16 04/02/2023    CREATININE 1.2 04/02/2023    GLU 93 04/02/2023    HGBA1C 5.3 02/09/2023    MG 2.4 04/02/2004    AST 21 04/02/2023    ALT 15 04/02/2023    ALBUMIN 3.6 04/02/2023    PROT 7.2 04/02/2023    BILITOT 0.4 04/02/2023    WBC 8.67 05/26/2023    HGB 13.7 (L) 05/26/2023    HCT 43.1 05/26/2023    HCT 47 09/01/2022    MCV 87 05/26/2023     05/26/2023    INR 1.0 04/02/2023    PSA 1.8 08/03/2018    TSH 0.229 (L) 02/09/2023       Lab Results   Component Value Date    CHOL 139 07/21/2023    HDL 47 07/21/2023    TRIG 66 07/21/2023       Lab Results   Component Value Date    LDLCALC 78.8 07/21/2023       Urine Tests:  Lab Results   Component Value Date    COLORU Yellow 09/01/2022    APPEARANCEUA Hazy (A) 09/01/2022    PHUR 6.0 09/01/2022    SPECGRAV 1.005 09/01/2022    PROTEINUA Trace (A) 09/01/2022    GLUCUA Negative 09/01/2022    KETONESU Negative 09/01/2022    BILIRUBINUA " Negative 2022    OCCULTUA 3+ (A) 2022    NITRITE Negative 2022    UROBILINOGEN normal 08/15/2022    UROBILINOGEN Negative 10/10/2018    LEUKOCYTESUR 3+ (A) 2022       Imaging:     Echocardiogram  TTE 23    Left Ventricle: The left ventricle is normal in size. Normal wall thickness. Normal wall motion. There is normal systolic function with a visually estimated ejection fraction of 55 - 70%. Ejection fraction by visual approximation is 65%. There is normal diastolic function.    Right Ventricle: Normal right ventricular cavity size. Wall thickness is normal. Right ventricle wall motion  is normal. Systolic function is normal.    Aortic Valve: The aortic valve is a trileaflet valve. There is moderate aortic valve sclerosis without significant stenosis. Mild annular calcification.    Aorta: Aortic root is mildly dilated measuring 4.11 cm. Ascending aorta is mildly dilated measuring 3.87 cm.    Pulmonary Artery: The estimated pulmonary artery systolic pressure is 22 mmHg.    IVC/SVC: Normal venous pressure at 3 mmHg.    Stress testing  VINOD 10/17/17  CONCLUSIONS     1 - Normal left ventricular systolic function (EF 55-60%).     2 - Normal right ventricular systolic function .     3 - Normal left ventricular diastolic function.     4 - Mildly enlarged ascending aorta.     No evidence of stress induced myocardial ischemia.     Cath Lab  None    Other  Event 23  Sinus rhythm with PACs and possibly at times an ectopic atrial rhythm based on P-wave morphology changes.  Paroxysmal rate controlled atrial fibrillation.    CT uro 10/12/20  Heart: No cardiomegaly or pericardial effusion.  Aortic valve and multivessel coronary artery calcific atherosclerosis.  Vasculature: Abdominal aorta is normal in caliber with significant calcific atherosclerosis.     EK/8/23 - sinus latesha with 1st deg, PACs, LAFB (personally reviewed)  23 - sinus rhythm with 1st deg AVB, PAC, PVCs, LAD (personally  reviewed)    Assessment:     1. Paroxysmal atrial fibrillation    2. Coronary artery disease due to calcified coronary lesion    3. Aortic atherosclerosis    4. Hyperlipidemia, unspecified hyperlipidemia type    5. Primary hypertension    6. Aortic root enlargement      Plan:     Paroxysmal atrial fibrillation  [id number] Score is 4  Continue eliquis 5mg BID (age >80, Cr <1.6, weight >60kg)  Continue toprol 25mg qd    Primary hypertension  BP at goal  Continue meds    Aortic atherosclerosis  Hyperlipidemia  Coronary artery disease due to calcified coronary lesion  Continue rosuvastatin 20mg qd    Aortic root enlargement  4.1cm on echo (8/2023)  Surveillance echo, annual    Signed:  John Calhoun MD  Cardiology     Follow-up:     Future Appointments   Date Time Provider Department Center   8/8/2023 10:00 AM Samir Calhoun III, MD [facility] CARDIO [facility]

## 2023-08-08 ENCOUNTER — OFFICE VISIT (OUTPATIENT)
Dept: CARDIOLOGY | Facility: CLINIC | Age: 83
End: 2023-08-08
Payer: MEDICARE

## 2023-08-08 VITALS
OXYGEN SATURATION: 95 % | SYSTOLIC BLOOD PRESSURE: 120 MMHG | HEART RATE: 64 BPM | DIASTOLIC BLOOD PRESSURE: 90 MMHG | WEIGHT: 179.88 LBS | HEIGHT: 68 IN | BODY MASS INDEX: 27.26 KG/M2

## 2023-08-08 DIAGNOSIS — E78.5 HYPERLIPIDEMIA, UNSPECIFIED HYPERLIPIDEMIA TYPE: ICD-10-CM

## 2023-08-08 DIAGNOSIS — I48.0 PAROXYSMAL ATRIAL FIBRILLATION: Primary | ICD-10-CM

## 2023-08-08 DIAGNOSIS — I77.89 AORTIC ROOT ENLARGEMENT: ICD-10-CM

## 2023-08-08 DIAGNOSIS — I10 PRIMARY HYPERTENSION: ICD-10-CM

## 2023-08-08 DIAGNOSIS — I25.84 CORONARY ARTERY DISEASE DUE TO CALCIFIED CORONARY LESION: ICD-10-CM

## 2023-08-08 DIAGNOSIS — I25.10 CORONARY ARTERY DISEASE DUE TO CALCIFIED CORONARY LESION: ICD-10-CM

## 2023-08-08 DIAGNOSIS — I70.0 AORTIC ATHEROSCLEROSIS: ICD-10-CM

## 2023-08-08 PROCEDURE — 3288F FALL RISK ASSESSMENT DOCD: CPT | Mod: HCNC,CPTII,S$GLB, | Performed by: INTERNAL MEDICINE

## 2023-08-08 PROCEDURE — 1101F PT FALLS ASSESS-DOCD LE1/YR: CPT | Mod: HCNC,CPTII,S$GLB, | Performed by: INTERNAL MEDICINE

## 2023-08-08 PROCEDURE — 1159F MED LIST DOCD IN RCRD: CPT | Mod: HCNC,CPTII,S$GLB, | Performed by: INTERNAL MEDICINE

## 2023-08-08 PROCEDURE — 99999 PR PBB SHADOW E&M-EST. PATIENT-LVL IV: ICD-10-PCS | Mod: PBBFAC,HCNC,, | Performed by: INTERNAL MEDICINE

## 2023-08-08 PROCEDURE — 3080F DIAST BP >= 90 MM HG: CPT | Mod: HCNC,CPTII,S$GLB, | Performed by: INTERNAL MEDICINE

## 2023-08-08 PROCEDURE — 99999 PR PBB SHADOW E&M-EST. PATIENT-LVL IV: CPT | Mod: PBBFAC,HCNC,, | Performed by: INTERNAL MEDICINE

## 2023-08-08 PROCEDURE — 1126F PR PAIN SEVERITY QUANTIFIED, NO PAIN PRESENT: ICD-10-PCS | Mod: HCNC,CPTII,S$GLB, | Performed by: INTERNAL MEDICINE

## 2023-08-08 PROCEDURE — 99214 OFFICE O/P EST MOD 30 MIN: CPT | Mod: HCNC,S$GLB,, | Performed by: INTERNAL MEDICINE

## 2023-08-08 PROCEDURE — 99214 PR OFFICE/OUTPT VISIT, EST, LEVL IV, 30-39 MIN: ICD-10-PCS | Mod: HCNC,S$GLB,, | Performed by: INTERNAL MEDICINE

## 2023-08-08 PROCEDURE — 1101F PR PT FALLS ASSESS DOC 0-1 FALLS W/OUT INJ PAST YR: ICD-10-PCS | Mod: HCNC,CPTII,S$GLB, | Performed by: INTERNAL MEDICINE

## 2023-08-08 PROCEDURE — 1160F PR REVIEW ALL MEDS BY PRESCRIBER/CLIN PHARMACIST DOCUMENTED: ICD-10-PCS | Mod: HCNC,CPTII,S$GLB, | Performed by: INTERNAL MEDICINE

## 2023-08-08 PROCEDURE — 1126F AMNT PAIN NOTED NONE PRSNT: CPT | Mod: HCNC,CPTII,S$GLB, | Performed by: INTERNAL MEDICINE

## 2023-08-08 PROCEDURE — 1159F PR MEDICATION LIST DOCUMENTED IN MEDICAL RECORD: ICD-10-PCS | Mod: HCNC,CPTII,S$GLB, | Performed by: INTERNAL MEDICINE

## 2023-08-08 PROCEDURE — 3080F PR MOST RECENT DIASTOLIC BLOOD PRESSURE >= 90 MM HG: ICD-10-PCS | Mod: HCNC,CPTII,S$GLB, | Performed by: INTERNAL MEDICINE

## 2023-08-08 PROCEDURE — 3074F SYST BP LT 130 MM HG: CPT | Mod: HCNC,CPTII,S$GLB, | Performed by: INTERNAL MEDICINE

## 2023-08-08 PROCEDURE — 3288F PR FALLS RISK ASSESSMENT DOCUMENTED: ICD-10-PCS | Mod: HCNC,CPTII,S$GLB, | Performed by: INTERNAL MEDICINE

## 2023-08-08 PROCEDURE — 3074F PR MOST RECENT SYSTOLIC BLOOD PRESSURE < 130 MM HG: ICD-10-PCS | Mod: HCNC,CPTII,S$GLB, | Performed by: INTERNAL MEDICINE

## 2023-08-08 PROCEDURE — 1160F RVW MEDS BY RX/DR IN RCRD: CPT | Mod: HCNC,CPTII,S$GLB, | Performed by: INTERNAL MEDICINE

## 2023-08-08 RX ORDER — ROSUVASTATIN CALCIUM 20 MG/1
20 TABLET, COATED ORAL DAILY
Qty: 90 TABLET | Refills: 3 | Status: SHIPPED | OUTPATIENT
Start: 2023-08-08 | End: 2024-08-07

## 2023-08-08 RX ORDER — METOPROLOL SUCCINATE 25 MG/1
25 TABLET, EXTENDED RELEASE ORAL DAILY
Qty: 90 TABLET | Refills: 3 | Status: SHIPPED | OUTPATIENT
Start: 2023-08-08 | End: 2024-08-07

## 2023-08-09 ENCOUNTER — PATIENT MESSAGE (OUTPATIENT)
Dept: CARDIOLOGY | Facility: CLINIC | Age: 83
End: 2023-08-09
Payer: MEDICARE

## 2023-09-06 ENCOUNTER — PATIENT MESSAGE (OUTPATIENT)
Dept: INTERNAL MEDICINE | Facility: CLINIC | Age: 83
End: 2023-09-06
Payer: MEDICARE

## 2023-09-06 NOTE — TELEPHONE ENCOUNTER
Sorry, I need much more info than this. Can we find the letter or request? What type catheter?  Who do I send something to at [facility]?

## 2023-09-08 ENCOUNTER — TELEPHONE (OUTPATIENT)
Dept: INTERNAL MEDICINE | Facility: CLINIC | Age: 83
End: 2023-09-08
Payer: MEDICARE

## 2023-09-08 NOTE — TELEPHONE ENCOUNTER
Good morning,  Dr Iqbal asked that I reach out to you.  This is a mutual patient.  Patient is sending us a message to order catheters [employer] Magic 3 intermittent  catheters 14 Setswana 16 inch provided by [employer].  We are asking for advise on how to put in the order for this.  Thanks, Eleanor ext [phone]

## 2023-09-12 DIAGNOSIS — I10 ESSENTIAL HYPERTENSION: ICD-10-CM

## 2023-09-12 DIAGNOSIS — E78.5 HYPERLIPIDEMIA, UNSPECIFIED HYPERLIPIDEMIA TYPE: ICD-10-CM

## 2023-09-12 RX ORDER — PRAVASTATIN SODIUM 20 MG/1
TABLET ORAL
Qty: 90 TABLET | Refills: 0 | OUTPATIENT
Start: 2023-09-12

## 2023-09-12 RX ORDER — RAMIPRIL 5 MG/1
CAPSULE ORAL
Qty: 90 CAPSULE | Refills: 1 | Status: SHIPPED | OUTPATIENT
Start: 2023-09-12 | End: 2023-09-21 | Stop reason: SDUPTHER

## 2023-09-12 NOTE — TELEPHONE ENCOUNTER
Refill Routing Note   Medication(s) are not appropriate for processing by [facility] for the following reason(s):      Required vitals abnormal    [facility] action(s):  Defer  Quick Discontinue Care Due:  None identified     Medication Therapy Plan: QDC pravastatin (Discontinued by: Samir Calhoun III, MD on 4/21/2023 10:18) med chg to Crestor 20mg      Appointments  past 12m or future 3m with PCP    Date Provider   Last Visit   7/25/2023 Corey Iqbal MD   Next Visit   Visit date not found Corey Iqbal MD   ED visits in past 90 days: 0        Note composed:11:00 AM 09/12/2023

## 2023-09-12 NOTE — TELEPHONE ENCOUNTER
No care due was identified.  [facility] Embedded Care Due Messages. Reference number: [id number].   9/12/2023 5:21:45 AM CDT

## 2023-09-14 ENCOUNTER — IMMUNIZATION (OUTPATIENT)
Dept: INTERNAL MEDICINE | Facility: CLINIC | Age: 83
End: 2023-09-14
Payer: MEDICARE

## 2023-09-14 ENCOUNTER — OFFICE VISIT (OUTPATIENT)
Dept: INTERNAL MEDICINE | Facility: CLINIC | Age: 83
End: 2023-09-14
Payer: MEDICARE

## 2023-09-14 VITALS
BODY MASS INDEX: 27.17 KG/M2 | OXYGEN SATURATION: 97 % | HEIGHT: 68 IN | WEIGHT: 179.25 LBS | HEART RATE: 67 BPM | DIASTOLIC BLOOD PRESSURE: 74 MMHG | SYSTOLIC BLOOD PRESSURE: 120 MMHG

## 2023-09-14 DIAGNOSIS — L98.9 SKIN LESION: ICD-10-CM

## 2023-09-14 DIAGNOSIS — R51.9 SCALP PAIN: Primary | ICD-10-CM

## 2023-09-14 PROCEDURE — 99999 PR PBB SHADOW E&M-EST. PATIENT-LVL IV: ICD-10-PCS | Mod: PBBFAC,HCNC,, | Performed by: INTERNAL MEDICINE

## 2023-09-14 PROCEDURE — 1125F PR PAIN SEVERITY QUANTIFIED, PAIN PRESENT: ICD-10-PCS | Mod: HCNC,CPTII,S$GLB, | Performed by: INTERNAL MEDICINE

## 2023-09-14 PROCEDURE — 99213 OFFICE O/P EST LOW 20 MIN: CPT | Mod: HCNC,S$GLB,, | Performed by: INTERNAL MEDICINE

## 2023-09-14 PROCEDURE — 1101F PT FALLS ASSESS-DOCD LE1/YR: CPT | Mod: HCNC,CPTII,S$GLB, | Performed by: INTERNAL MEDICINE

## 2023-09-14 PROCEDURE — 3288F FALL RISK ASSESSMENT DOCD: CPT | Mod: HCNC,CPTII,S$GLB, | Performed by: INTERNAL MEDICINE

## 2023-09-14 PROCEDURE — G0008 ADMIN INFLUENZA VIRUS VAC: HCPCS | Mod: HCNC,S$GLB,, | Performed by: INTERNAL MEDICINE

## 2023-09-14 PROCEDURE — 1160F PR REVIEW ALL MEDS BY PRESCRIBER/CLIN PHARMACIST DOCUMENTED: ICD-10-PCS | Mod: HCNC,CPTII,S$GLB, | Performed by: INTERNAL MEDICINE

## 2023-09-14 PROCEDURE — 1159F MED LIST DOCD IN RCRD: CPT | Mod: HCNC,CPTII,S$GLB, | Performed by: INTERNAL MEDICINE

## 2023-09-14 PROCEDURE — 3078F PR MOST RECENT DIASTOLIC BLOOD PRESSURE < 80 MM HG: ICD-10-PCS | Mod: HCNC,CPTII,S$GLB, | Performed by: INTERNAL MEDICINE

## 2023-09-14 PROCEDURE — 99213 PR OFFICE/OUTPT VISIT, EST, LEVL III, 20-29 MIN: ICD-10-PCS | Mod: HCNC,S$GLB,, | Performed by: INTERNAL MEDICINE

## 2023-09-14 PROCEDURE — 1101F PR PT FALLS ASSESS DOC 0-1 FALLS W/OUT INJ PAST YR: ICD-10-PCS | Mod: HCNC,CPTII,S$GLB, | Performed by: INTERNAL MEDICINE

## 2023-09-14 PROCEDURE — 1160F RVW MEDS BY RX/DR IN RCRD: CPT | Mod: HCNC,CPTII,S$GLB, | Performed by: INTERNAL MEDICINE

## 2023-09-14 PROCEDURE — 1125F AMNT PAIN NOTED PAIN PRSNT: CPT | Mod: HCNC,CPTII,S$GLB, | Performed by: INTERNAL MEDICINE

## 2023-09-14 PROCEDURE — 90694 FLU VACCINE - QUADRIVALENT - ADJUVANTED: ICD-10-PCS | Mod: HCNC,S$GLB,, | Performed by: INTERNAL MEDICINE

## 2023-09-14 PROCEDURE — 3074F SYST BP LT 130 MM HG: CPT | Mod: HCNC,CPTII,S$GLB, | Performed by: INTERNAL MEDICINE

## 2023-09-14 PROCEDURE — 3288F PR FALLS RISK ASSESSMENT DOCUMENTED: ICD-10-PCS | Mod: HCNC,CPTII,S$GLB, | Performed by: INTERNAL MEDICINE

## 2023-09-14 PROCEDURE — 3078F DIAST BP <80 MM HG: CPT | Mod: HCNC,CPTII,S$GLB, | Performed by: INTERNAL MEDICINE

## 2023-09-14 PROCEDURE — 90694 VACC AIIV4 NO PRSRV 0.5ML IM: CPT | Mod: HCNC,S$GLB,, | Performed by: INTERNAL MEDICINE

## 2023-09-14 PROCEDURE — G0008 FLU VACCINE - QUADRIVALENT - ADJUVANTED: ICD-10-PCS | Mod: HCNC,S$GLB,, | Performed by: INTERNAL MEDICINE

## 2023-09-14 PROCEDURE — 99999 PR PBB SHADOW E&M-EST. PATIENT-LVL IV: CPT | Mod: PBBFAC,HCNC,, | Performed by: INTERNAL MEDICINE

## 2023-09-14 PROCEDURE — 1159F PR MEDICATION LIST DOCUMENTED IN MEDICAL RECORD: ICD-10-PCS | Mod: HCNC,CPTII,S$GLB, | Performed by: INTERNAL MEDICINE

## 2023-09-14 PROCEDURE — 3074F PR MOST RECENT SYSTOLIC BLOOD PRESSURE < 130 MM HG: ICD-10-PCS | Mod: HCNC,CPTII,S$GLB, | Performed by: INTERNAL MEDICINE

## 2023-09-14 NOTE — PROGRESS NOTES
Subjective:       Patient ID: Liang Monterroso Jr. is a 83 y.o. male.    Chief Complaint: Headache    HPI : Posterior scalp/head pain for about 1 week.   Worsens with daily activities.   Area is sensitive to touch. No fever. Last possible trauma was over a year ago when a board broke and hit his head but no trouble since.   Took two Tylenol last night. Did not help much.     Headache   Pertinent negatives include no hearing loss, neck pain, rhinorrhea, vomiting or weakness.     Review of Systems   Constitutional:  Negative for activity change and unexpected weight change.   HENT:  Negative for hearing loss, rhinorrhea and trouble swallowing.    Eyes:  Negative for discharge and visual disturbance.   Respiratory:  Negative for chest tightness and wheezing.    Cardiovascular:  Negative for chest pain and palpitations.   Gastrointestinal:  Negative for blood in stool, constipation, diarrhea and vomiting.   Endocrine: Negative for polydipsia and polyuria.   Genitourinary:  Negative for difficulty urinating, hematuria and urgency.   Musculoskeletal:  Negative for arthralgias, joint swelling and neck pain.   Neurological:  Positive for headaches. Negative for weakness.   Psychiatric/Behavioral:  Negative for confusion and dysphoric mood.            Past Medical History:   Diagnosis Date    Allergy     CKD (chronic kidney disease) stage 3, GFR 30-59 ml/min 6/15/2016    Coronary artery disease due to calcified coronary lesion 4/20/2023    GERD (gastroesophageal reflux disease)     Grade II open fracture of proximal phalanx of left ring finger 4/2/2023    Hx of colonic polyp     Hyperlipidemia     Hypertension     Hypospadias in male     Hypothyroidism     Open displaced fracture of proximal phalanx of left ring finger     Paroxysmal atrial fibrillation 8/7/2023    Sleep apnea     Thyroid disease     Urinary tract infection      Past Surgical History:   Procedure Laterality Date    APPENDECTOMY      CATARACT EXTRACTION       CYSTOSCOPY      CYSTOSCOPY WITH URODYNAMIC TESTING N/A 10/06/2022    Procedure: CYSTOSCOPY, WITH URODYNAMIC TESTING;  Surgeon: Shade Sullivan MD;  Location: [address];  Service: Urology;  Laterality: N/A;    EYE SURGERY      HERNIA REPAIR      INSERTION, NEUROSTIMULATOR      IRRIGATION AND DEBRIDEMENT Left 4/5/2023    Procedure: IRRIGATION AND DEBRIDEMENT;  Surgeon: Jean-Paul Cummins MD;  Location: [facility];  Service: Orthopedics;  Laterality: Left;    OPEN REDUCTION AND INTERNAL FIXATION (ORIF) OF INJURY OF FINGER Left 4/5/2023    Procedure: ORIF, FINGER - 4th proximal phalanx. Possible tendon/nerve repair;  Surgeon: Jean-Paul Cummins MD;  Location: [facility];  Service: Orthopedics;  Laterality: Left;    TONSILLECTOMY        Patient Active Problem List   Diagnosis    Allergic rhinitis due to pollen    Hypertension    Hypothyroid    Hyperlipidemia    Aortic atherosclerosis    Dupuytren contracture    Decreased ROM of neck    Impaired functional mobility, balance, gait, and endurance    Constipation    Meningioma    BPH (benign prostatic hyperplasia)    JUDITH (obstructive sleep apnea)    Incomplete emptying of bladder    Renal cyst, left    gait instability    Personal history of colonic polyps    Open displaced fracture of proximal phalanx of left ring finger    Traumatic rupture of flexor sheath pulley of finger - 50% rupture of left ring finger A2 pulley    Stiffness of finger joint of left hand    Left hand pain    Coronary artery disease due to calcified coronary lesion    Pain of left hip    Weakness of left hip    Paroxysmal atrial fibrillation    Aortic root enlargement        Objective:      Physical Exam  HENT:      Head: Normocephalic and atraumatic.        Comments: Red skin lesion in the drawing above is noted in the photo.  There does not seem to be much tenderness to palpation at this area but superior to that there is what seems to be more scalp or scalp muscle tenderness, not skull tenderness      "Mouth/Throat:      Pharynx: No oropharyngeal exudate.   Eyes:      Conjunctiva/sclera: Conjunctivae normal.      Pupils: Pupils are equal, round, and reactive to light.   Cardiovascular:      Rate and Rhythm: Normal rate.   Musculoskeletal:         General: Tenderness present.      Cervical back: No tenderness.   Lymphadenopathy:      Cervical: No cervical adenopathy.   Neurological:      Mental Status: He is alert.               Assessment:       Problem List Items Addressed This Visit    None  Visit Diagnoses       Scalp pain    -  Primary    Skin lesion                Plan:         Liang was seen today for headache.    Diagnoses and all orders for this visit:    Scalp pain    Skin lesion     This may be a cervical 2 or 3 dermatome nerve pain.  I am not certain that the skin lesion is contributing although the color maybe a little different compared to previous years according to the Dermatology note.  Has a dermatology note upcoming.  I suggested heat, Tylenol p.r.n. and possibly a trial of Neosporin for a few days to the skin lesion.  Update me if not improving or after Dermatology visit or for any changes in the character of the pain.                Portions of this note may have been created with voice recognition software. Occasional "wrong-word" or "sound-a-like" substitutions may have occurred due to the inherent limitations of voice recognition software. Please, read the note carefully and recognize, using context, where substitutions have occurred.  "

## 2023-09-20 ENCOUNTER — PATIENT MESSAGE (OUTPATIENT)
Dept: INTERNAL MEDICINE | Facility: CLINIC | Age: 83
End: 2023-09-20
Payer: MEDICARE

## 2023-09-20 DIAGNOSIS — I10 ESSENTIAL HYPERTENSION: ICD-10-CM

## 2023-09-21 RX ORDER — RAMIPRIL 5 MG/1
CAPSULE ORAL
Qty: 90 CAPSULE | Refills: 3 | Status: SHIPPED | OUTPATIENT
Start: 2023-09-21

## 2023-09-21 NOTE — TELEPHONE ENCOUNTER
No care due was identified.  [facility] Embedded Care Due Messages. Reference number: [id number].   9/21/2023 6:08:48 AM CDT

## 2023-09-30 ENCOUNTER — PATIENT MESSAGE (OUTPATIENT)
Dept: CARDIOLOGY | Facility: CLINIC | Age: 83
End: 2023-09-30
Payer: MEDICARE

## 2023-10-02 ENCOUNTER — PATIENT MESSAGE (OUTPATIENT)
Dept: CARDIOLOGY | Facility: CLINIC | Age: 83
End: 2023-10-02
Payer: MEDICARE

## 2023-10-10 ENCOUNTER — OFFICE VISIT (OUTPATIENT)
Dept: DERMATOLOGY | Facility: CLINIC | Age: 83
End: 2023-10-10
Payer: MEDICARE

## 2023-10-10 VITALS — BODY MASS INDEX: 27.22 KG/M2 | WEIGHT: 179 LBS

## 2023-10-10 DIAGNOSIS — L81.4 LENTIGINES: ICD-10-CM

## 2023-10-10 DIAGNOSIS — D22.9 NEVUS OF MULTIPLE SITES: ICD-10-CM

## 2023-10-10 DIAGNOSIS — D18.01 CHERRY ANGIOMA: ICD-10-CM

## 2023-10-10 DIAGNOSIS — L82.1 SEBORRHEIC KERATOSES: Primary | ICD-10-CM

## 2023-10-10 PROCEDURE — 99213 PR OFFICE/OUTPT VISIT, EST, LEVL III, 20-29 MIN: ICD-10-PCS | Mod: HCNC,S$GLB,, | Performed by: DERMATOLOGY

## 2023-10-10 PROCEDURE — 1101F PT FALLS ASSESS-DOCD LE1/YR: CPT | Mod: HCNC,CPTII,S$GLB, | Performed by: DERMATOLOGY

## 2023-10-10 PROCEDURE — 99999 PR PBB SHADOW E&M-EST. PATIENT-LVL III: CPT | Mod: PBBFAC,HCNC,, | Performed by: DERMATOLOGY

## 2023-10-10 PROCEDURE — 3288F FALL RISK ASSESSMENT DOCD: CPT | Mod: HCNC,CPTII,S$GLB, | Performed by: DERMATOLOGY

## 2023-10-10 PROCEDURE — 1126F AMNT PAIN NOTED NONE PRSNT: CPT | Mod: HCNC,CPTII,S$GLB, | Performed by: DERMATOLOGY

## 2023-10-10 PROCEDURE — 1160F PR REVIEW ALL MEDS BY PRESCRIBER/CLIN PHARMACIST DOCUMENTED: ICD-10-PCS | Mod: HCNC,CPTII,S$GLB, | Performed by: DERMATOLOGY

## 2023-10-10 PROCEDURE — 99999 PR PBB SHADOW E&M-EST. PATIENT-LVL III: ICD-10-PCS | Mod: PBBFAC,HCNC,, | Performed by: DERMATOLOGY

## 2023-10-10 PROCEDURE — 3288F PR FALLS RISK ASSESSMENT DOCUMENTED: ICD-10-PCS | Mod: HCNC,CPTII,S$GLB, | Performed by: DERMATOLOGY

## 2023-10-10 PROCEDURE — 99213 OFFICE O/P EST LOW 20 MIN: CPT | Mod: HCNC,S$GLB,, | Performed by: DERMATOLOGY

## 2023-10-10 PROCEDURE — 1126F PR PAIN SEVERITY QUANTIFIED, NO PAIN PRESENT: ICD-10-PCS | Mod: HCNC,CPTII,S$GLB, | Performed by: DERMATOLOGY

## 2023-10-10 PROCEDURE — 1159F MED LIST DOCD IN RCRD: CPT | Mod: HCNC,CPTII,S$GLB, | Performed by: DERMATOLOGY

## 2023-10-10 PROCEDURE — 1159F PR MEDICATION LIST DOCUMENTED IN MEDICAL RECORD: ICD-10-PCS | Mod: HCNC,CPTII,S$GLB, | Performed by: DERMATOLOGY

## 2023-10-10 PROCEDURE — 1160F RVW MEDS BY RX/DR IN RCRD: CPT | Mod: HCNC,CPTII,S$GLB, | Performed by: DERMATOLOGY

## 2023-10-10 PROCEDURE — 1101F PR PT FALLS ASSESS DOC 0-1 FALLS W/OUT INJ PAST YR: ICD-10-PCS | Mod: HCNC,CPTII,S$GLB, | Performed by: DERMATOLOGY

## 2023-10-10 NOTE — PROGRESS NOTES
Subjective:      Patient ID:  Liang Monterroso Jr. is a 83 y.o. male who presents for   Chief Complaint   Patient presents with    Follow-up     UBSE    Lesion     Back of scalp     See note Dr Iqbal, had some pain on the left side of his scalp now improved, same side as lesion on scalp.  Would like skin check.     Follow-up - Follow-up  Affected locations: face, left arm, right arm, chest, torso, back, abdomen and scalp  Signs / symptoms: asymptomatic    Lesion      Review of Systems   Constitutional:  Negative for fever, chills, weight loss, weight gain, fatigue, night sweats and malaise.   Skin:  Negative for daily sunscreen use, activity-related sunscreen use and wears hat.   Hematologic/Lymphatic: Does not bruise/bleed easily.       Objective:   Physical Exam   Constitutional: He appears well-developed and well-nourished. No distress.   Neurological: He is alert and oriented to person, place, and time. He is not disoriented.   Psychiatric: He has a normal mood and affect.   Skin:   Areas Examined (abnormalities noted in diagram):   Scalp / Hair Palpated and Inspected  Head / Face Inspection Performed  Neck Inspection Performed  Chest / Axilla Inspection Performed  Abdomen Inspection Performed  Back Inspection Performed  RUE Inspected  LUE Inspection Performed                 Diagram Legend     Erythematous scaling macule/papule c/w actinic keratosis       Vascular papule c/w angioma      Pigmented verrucoid papule/plaque c/w seborrheic keratosis      Yellow umbilicated papule c/w sebaceous hyperplasia      Irregularly shaped tan macule c/w lentigo     1-2 mm smooth white papules consistent with Milia      Movable subcutaneous cyst with punctum c/w epidermal inclusion cyst      Subcutaneous movable cyst c/w pilar cyst      Firm pink to brown papule c/w dermatofibroma      Pedunculated fleshy papule(s) c/w skin tag(s)      Evenly pigmented macule c/w junctional nevus     Mildly variegated pigmented, slightly  "irregular-bordered macule c/w mildly atypical nevus      Flesh colored to evenly pigmented papule c/w intradermal nevus       Pink pearly papule/plaque c/w basal cell carcinoma      Erythematous hyperkeratotic cursted plaque c/w SCC      Surgical scar with no sign of skin cancer recurrence      Open and closed comedones      Inflammatory papules and pustules      Verrucoid papule consistent consistent with wart sunscreen    Erythematous eczematous patches and plaques     Dystrophic onycholytic nail with subungual debris c/w onychomycosis     Umbilicated papule    Erythematous-base heme-crusted tan verrucoid plaque consistent with inflamed seborrheic keratosis     Erythematous Silvery Scaling Plaque c/w Psoriasis     See annotation      Assessment / Plan:        Seborrheic keratoses  reassurance      Lentigines  The "ABCD" rules to observe pigmented lesions were reviewed.  sunscreen      Cherry angiomas  reassurance       post scalp vs nevus which is irritated vs angioma  Refer for removal        Nevus of multiple sites  The "ABCD" rules to observe pigmented lesions were reviewed.                   Follow up in about 1 year (around 10/10/2024).  "

## 2023-10-14 ENCOUNTER — PATIENT MESSAGE (OUTPATIENT)
Dept: DERMATOLOGY | Facility: CLINIC | Age: 83
End: 2023-10-14
Payer: MEDICARE

## 2023-10-20 ENCOUNTER — PATIENT MESSAGE (OUTPATIENT)
Dept: DERMATOLOGY | Facility: CLINIC | Age: 83
End: 2023-10-20
Payer: MEDICARE

## 2023-10-31 ENCOUNTER — PATIENT MESSAGE (OUTPATIENT)
Dept: DERMATOLOGY | Facility: CLINIC | Age: 83
End: 2023-10-31
Payer: MEDICARE

## 2023-11-03 ENCOUNTER — PATIENT MESSAGE (OUTPATIENT)
Dept: DERMATOLOGY | Facility: CLINIC | Age: 83
End: 2023-11-03
Payer: MEDICARE

## 2023-11-15 ENCOUNTER — OFFICE VISIT (OUTPATIENT)
Dept: DERMATOLOGY | Facility: CLINIC | Age: 83
End: 2023-11-15
Payer: MEDICARE

## 2023-11-15 DIAGNOSIS — D18.00 HEMANGIOMA, UNSPECIFIED SITE: Primary | ICD-10-CM

## 2023-11-15 PROCEDURE — 1101F PR PT FALLS ASSESS DOC 0-1 FALLS W/OUT INJ PAST YR: ICD-10-PCS | Mod: HCNC,CPTII,S$GLB, | Performed by: DERMATOLOGY

## 2023-11-15 PROCEDURE — 3288F FALL RISK ASSESSMENT DOCD: CPT | Mod: HCNC,CPTII,S$GLB, | Performed by: DERMATOLOGY

## 2023-11-15 PROCEDURE — 1160F RVW MEDS BY RX/DR IN RCRD: CPT | Mod: HCNC,CPTII,S$GLB, | Performed by: DERMATOLOGY

## 2023-11-15 PROCEDURE — 99999 PR PBB SHADOW E&M-EST. PATIENT-LVL II: ICD-10-PCS | Mod: PBBFAC,HCNC,, | Performed by: DERMATOLOGY

## 2023-11-15 PROCEDURE — 99212 OFFICE O/P EST SF 10 MIN: CPT | Mod: HCNC,S$GLB,, | Performed by: DERMATOLOGY

## 2023-11-15 PROCEDURE — 1159F MED LIST DOCD IN RCRD: CPT | Mod: HCNC,CPTII,S$GLB, | Performed by: DERMATOLOGY

## 2023-11-15 PROCEDURE — 1159F PR MEDICATION LIST DOCUMENTED IN MEDICAL RECORD: ICD-10-PCS | Mod: HCNC,CPTII,S$GLB, | Performed by: DERMATOLOGY

## 2023-11-15 PROCEDURE — 1126F PR PAIN SEVERITY QUANTIFIED, NO PAIN PRESENT: ICD-10-PCS | Mod: HCNC,CPTII,S$GLB, | Performed by: DERMATOLOGY

## 2023-11-15 PROCEDURE — 1126F AMNT PAIN NOTED NONE PRSNT: CPT | Mod: HCNC,CPTII,S$GLB, | Performed by: DERMATOLOGY

## 2023-11-15 PROCEDURE — 1160F PR REVIEW ALL MEDS BY PRESCRIBER/CLIN PHARMACIST DOCUMENTED: ICD-10-PCS | Mod: HCNC,CPTII,S$GLB, | Performed by: DERMATOLOGY

## 2023-11-15 PROCEDURE — 99999 PR PBB SHADOW E&M-EST. PATIENT-LVL II: CPT | Mod: PBBFAC,HCNC,, | Performed by: DERMATOLOGY

## 2023-11-15 PROCEDURE — 99212 PR OFFICE/OUTPT VISIT, EST, LEVL II, 10-19 MIN: ICD-10-PCS | Mod: HCNC,S$GLB,, | Performed by: DERMATOLOGY

## 2023-11-15 PROCEDURE — 3288F PR FALLS RISK ASSESSMENT DOCUMENTED: ICD-10-PCS | Mod: HCNC,CPTII,S$GLB, | Performed by: DERMATOLOGY

## 2023-11-15 PROCEDURE — 1101F PT FALLS ASSESS-DOCD LE1/YR: CPT | Mod: HCNC,CPTII,S$GLB, | Performed by: DERMATOLOGY

## 2023-11-15 NOTE — PROGRESS NOTES
Subjective:      Patient ID:  Liang Monterroso Jr. is a 83 y.o. male who presents for   Chief Complaint   Patient presents with    Lesion     82 yo gentleman who presents for spot check of scalp. He reports it has been there x years. Overall asymptomatic but a few weeks ago the montesinos nicked it and the lesion was irritated and bleed for a week or two. He is concerned about it happening again. He regularly follows with a dermatologist for FBSE. Denies history of melanoma or non melanoma skin cancers.    Lesion      Review of Systems   Skin:  Positive for daily sunscreen use and wears hat.        Skin lesion of scalp       Objective:   Physical Exam   Constitutional: He appears well-developed and well-nourished. No distress.   Neurological: He is alert and oriented to person, place, and time. He is not disoriented.   Psychiatric: He has a normal mood and affect.   Skin:   Areas Examined (abnormalities noted in diagram):   Scalp / Hair Palpated and Inspected  Head / Face Inspection Performed            Diagram Legend     Erythematous scaling macule/papule c/w actinic keratosis       Vascular papule c/w angioma      Pigmented verrucoid papule/plaque c/w seborrheic keratosis      Yellow umbilicated papule c/w sebaceous hyperplasia      Irregularly shaped tan macule c/w lentigo     1-2 mm smooth white papules consistent with Milia      Movable subcutaneous cyst with punctum c/w epidermal inclusion cyst      Subcutaneous movable cyst c/w pilar cyst      Firm pink to brown papule c/w dermatofibroma      Pedunculated fleshy papule(s) c/w skin tag(s)      Evenly pigmented macule c/w junctional nevus     Mildly variegated pigmented, slightly irregular-bordered macule c/w mildly atypical nevus      Flesh colored to evenly pigmented papule c/w intradermal nevus       Pink pearly papule/plaque c/w basal cell carcinoma      Erythematous hyperkeratotic cursted plaque c/w SCC      Surgical scar with no sign of skin cancer recurrence       Open and closed comedones      Inflammatory papules and pustules      Verrucoid papule consistent consistent with wart     Erythematous eczematous patches and plaques     Dystrophic onycholytic nail with subungual debris c/w onychomycosis     Umbilicated papule    Erythematous-base heme-crusted tan verrucoid plaque consistent with inflamed seborrheic keratosis     Erythematous Silvery Scaling Plaque c/w Psoriasis     See annotation      Assessment / Plan:        Hemangioma, unspecified site  Patient with likely benign hemangioma on left occipital scalp, however lesion has become irritated +pain, Discussed risks and benefits of surgery. Lesion is about 7-8 mm and would likely need full excision, and patient on eliquis, Given that it has been a one time occurrence, recommend patient watch and if it becomes problematic can consider excision. Recommend patient discuss with montesinos to only clip in that area and not shave over it.   - Continue to monitor    Follow up in about 3 months (around 2/15/2024).    Shelby Ro MD  Dermatology PGY-III

## 2023-11-15 NOTE — PROGRESS NOTES
I have seen the patient, reviewed the Resident's progress note. I have personally interviewed and examined the patient at bedside and agree with the findings.     Debbie Fuentes MD  [facility]

## 2023-12-28 ENCOUNTER — PATIENT MESSAGE (OUTPATIENT)
Dept: CARDIOLOGY | Facility: CLINIC | Age: 83
End: 2023-12-28
Payer: MEDICARE

## 2024-01-23 ENCOUNTER — HOSPITAL ENCOUNTER (EMERGENCY)
Facility: HOSPITAL | Age: 84
Discharge: HOME | End: 2024-01-24
Attending: STUDENT IN AN ORGANIZED HEALTH CARE EDUCATION/TRAINING PROGRAM
Payer: MEDICARE

## 2024-01-23 VITALS
DIASTOLIC BLOOD PRESSURE: 82 MMHG | BODY MASS INDEX: 26.61 KG/M2 | OXYGEN SATURATION: 97 % | WEIGHT: 175 LBS | TEMPERATURE: 98 F | RESPIRATION RATE: 20 BRPM | HEART RATE: 55 BPM | SYSTOLIC BLOOD PRESSURE: 151 MMHG

## 2024-01-23 DIAGNOSIS — S39.012A STRAIN OF LUMBAR REGION, INITIAL ENCOUNTER: Primary | ICD-10-CM

## 2024-01-23 PROCEDURE — 99284 EMERGENCY DEPT VISIT MOD MDM: CPT | Mod: HCNC

## 2024-01-23 PROCEDURE — 25000003 PHARM REV CODE 250: Mod: HCNC | Performed by: STUDENT IN AN ORGANIZED HEALTH CARE EDUCATION/TRAINING PROGRAM

## 2024-01-23 RX ORDER — LIDOCAINE 50 MG/G
1 PATCH TOPICAL
Status: DISCONTINUED | OUTPATIENT
Start: 2024-01-23 | End: 2024-01-24 | Stop reason: HOSPADM

## 2024-01-23 RX ORDER — OXYCODONE AND ACETAMINOPHEN 5; 325 MG/1; MG/1
1 TABLET ORAL
Status: COMPLETED | OUTPATIENT
Start: 2024-01-23 | End: 2024-01-23

## 2024-01-23 RX ADMIN — OXYCODONE HYDROCHLORIDE AND ACETAMINOPHEN 1 TABLET: 5; 325 TABLET ORAL at 10:01

## 2024-01-23 RX ADMIN — LIDOCAINE 5% 1 PATCH: 700 PATCH TOPICAL at 10:01

## 2024-01-24 RX ORDER — LIDOCAINE 50 MG/G
1 PATCH TOPICAL DAILY
Qty: 10 PATCH | Refills: 0 | Status: SHIPPED | OUTPATIENT
Start: 2024-01-24 | End: 2024-02-03

## 2024-01-24 NOTE — ED TRIAGE NOTES
Back pain, Since Saturday after lifting heavy bags of sand, felt better today and then lifted up a tv stand and now having right sided lower back pain, unable to walk due to the pain

## 2024-01-24 NOTE — ED PROVIDER NOTES
Source of History  patient, family, and EMR    Chief Complaint    Back Pain (Since Saturday after lifting heavy bags of sand, felt better today and then lifted up a tv stand and now having right sided lower back pain, unable to walk due to the pain)      History of Present Illness    Liang Monterroso Jr. is a 83 y.o. male presenting with heavy lifting of bags of sand, a few days ago, was feeling better, then lifted up a TV stand earlier and then felt worse, particularly over the right lower back.  Did not have a pop in his back.  No numbness, tingling.  Has chronic urinary issues, but no changes today.  No prior lumbar disc disease.  No blunt trauma.  Pain feels worse when he flexes.    Review of Systems    As per HPI and below:  Constitutional symptoms:  No generalized weakness, no fevers or chills  Skin symptoms:  No rash, no bruising, no hematoma    Musculoskeletal symptoms:  + back pain, No joint pains or aches, no joint swelling  GI/ symptoms:  No urinary or bowel incontinence or retention (other than normal), no saddle anesthesia  Neurologic symptoms:  No headache, no numbness or tingling to extremities, no focal or unilateral weakness  Hematologic symptoms:  No bleeding disorder  Psychiatric symptoms:  No substance abuse      Past History    As per HPI and below:  Past Medical History:   Diagnosis Date    Allergy     CKD (chronic kidney disease) stage 3, GFR 30-59 ml/min 6/15/2016    Coronary artery disease due to calcified coronary lesion 4/20/2023    GERD (gastroesophageal reflux disease)     Grade II open fracture of proximal phalanx of left ring finger 4/2/2023    Hx of colonic polyp     Hyperlipidemia     Hypertension     Hypospadias in male     Hypothyroidism     Open displaced fracture of proximal phalanx of left ring finger     Paroxysmal atrial fibrillation 8/7/2023    Sleep apnea     Thyroid disease     Urinary tract infection        Past Surgical History:   Procedure Laterality Date    APPENDECTOMY       CATARACT EXTRACTION      CYSTOSCOPY      CYSTOSCOPY WITH URODYNAMIC TESTING N/A 10/06/2022    Procedure: CYSTOSCOPY, WITH URODYNAMIC TESTING;  Surgeon: Shade Sullivan MD;  Location: [address];  Service: Urology;  Laterality: N/A;    EYE SURGERY      HERNIA REPAIR      INSERTION, NEUROSTIMULATOR      IRRIGATION AND DEBRIDEMENT Left 4/5/2023    Procedure: IRRIGATION AND DEBRIDEMENT;  Surgeon: Jean-Paul Cummins MD;  Location: [facility];  Service: Orthopedics;  Laterality: Left;    OPEN REDUCTION AND INTERNAL FIXATION (ORIF) OF INJURY OF FINGER Left 4/5/2023    Procedure: ORIF, FINGER - 4th proximal phalanx. Possible tendon/nerve repair;  Surgeon: Jean-Paul Cummins MD;  Location: [facility];  Service: Orthopedics;  Laterality: Left;    TONSILLECTOMY         Social History     Socioeconomic History    Marital status:      Spouse name: parul    Number of children: 4   Occupational History    Occupation: retired   Tobacco Use    Smoking status: Never    Smokeless tobacco: Never   Substance and Sexual Activity    Alcohol use: Yes     Comment: 1-3 glasses wine weekly, 2-3 beers weekly    Drug use: No    Sexual activity: Yes     Partners: Female     Social Determinants of Health     Financial Resource Strain: Low Risk  (11/9/2023)    Overall Financial Resource Strain (CARDIA)     Difficulty of Paying Living Expenses: Not hard at all   Food Insecurity: No Food Insecurity (11/9/2023)    Hunger Vital Sign     Worried About Running Out of Food in the Last Year: Never true     Ran Out of Food in the Last Year: Never true   Transportation Needs: No Transportation Needs (11/9/2023)    PRAPARE - Transportation     Lack of Transportation (Medical): No     Lack of Transportation (Non-Medical): No   Physical Activity: Sufficiently Active (11/9/2023)    Exercise Vital Sign     Days of Exercise per Week: 3 days     Minutes of Exercise per Session: 60 min   Recent Concern: Physical Activity - Insufficiently Active  (9/14/2023)    Exercise Vital Sign     Days of Exercise per Week: 2 days     Minutes of Exercise per Session: 60 min   Stress: No Stress Concern Present (11/9/2023)    [employer] - Occupational Stress Questionnaire     Feeling of Stress : Only a little   Social Connections: Unknown (11/9/2023)    Social Connection and Isolation Panel [NHANES]     Frequency of Communication with Friends and Family: Twice a week     Frequency of Social Gatherings with Friends and Family: Once a week     Active Member of Clubs or Organizations: No     Attends Club or Organization Meetings: Never     Marital Status:    Housing Stability: Low Risk  (11/9/2023)    Housing Stability Vital Sign     Unable to Pay for Housing in the Last Year: No     Number of Places Lived in the Last Year: 1     Unstable Housing in the Last Year: No       Family History   Problem Relation Age of Onset    Diabetes Mother     Heart disease Mother     Hypertension Mother     Vision loss Mother     Dementia Mother     Alcohol abuse Father     Cancer Sister         lung with mets, was a heavy smoker    No Known Problems Daughter     No Known Problems Son     No Known Problems Daughter     No Known Problems Son     Amblyopia Neg Hx     Blindness Neg Hx     Cataracts Neg Hx     Glaucoma Neg Hx     Macular degeneration Neg Hx     Retinal detachment Neg Hx     Strabismus Neg Hx     Stroke Neg Hx     Thyroid disease Neg Hx        Review of patient's allergies indicates:   Allergen Reactions    Contrast media Hives and Itching     Iv dye       No current facility-administered medications on file prior to encounter.     Current Outpatient Medications on File Prior to Encounter   Medication Sig Dispense Refill    apixaban (ELIQUIS) 5 mg Tab Take 1 tablet (5 mg total) by mouth 2 (two) times daily. 60 tablet 11    levothyroxine (SYNTHROID) 112 MCG tablet Take 1 tablet (112 mcg total) by mouth once daily. 90 tablet 3    metoprolol  succinate (TOPROL-XL) 25 MG 24 hr tablet Take 1 tablet (25 mg total) by mouth once daily. 90 tablet 3    multivitamin with minerals tablet Take 1 tablet by mouth once daily.      ramipriL (ALTACE) 5 MG capsule TAKE 1 CAPSULE(5 MG) BY MOUTH EVERY DAY 90 capsule 3    rosuvastatin (CRESTOR) 20 MG tablet Take 1 tablet (20 mg total) by mouth once daily. 90 tablet 3    tamsulosin (FLOMAX) 0.4 mg Cap TAKE 2 CAPSULES(0.8 MG) BY MOUTH EVERY  capsule 3       Physical Exam    Reviewed nursing notes.  Vitals:    01/23/24 1912 01/23/24 2214 01/23/24 2223 01/23/24 2321   BP: 133/77 128/72  (!) 151/82   Pulse: 61 60  (!) 55   Resp: 18 16 18 20   Temp: 98 °F (36.7 °C) 98 °F (36.7 °C)  97.8 °F (36.6 °C)   TempSrc: Oral   Oral   SpO2: 97% 98%  97%   Weight:         General:  Alert, no acute distress.    Skin:  Warm, dry, intact.  No rash.  Head:  Normocephalic, atraumatic.    Neck:  Supple.   Eye:  extraocular movements are intact.    Ears, nose, mouth and throat:  Normal phonation.  Cardiovascular:  No edema.  Regular pulses.    Respiratory:  Respirations are non-labored.   Gastrointestinal:  Nondistended.  No belching or vomiting.  Back:  Antalgic gait.  Ambulatory.  Nontender.  No specific muscle spasm palpated.  No external skin changes.  Has difficulty with flexion at the hips.  Musculoskeletal:  Normal range of motion observed.  Equal strength.  Neurological:  Alert and oriented to person, place, time, and situation.  No focal deficits observed.   Psychiatric:  Cooperative, appropriate mood & affect.       Initial MDM and Data Review    83 y.o. male presenting for evaluation of concerns of low back pain, particularly on the right lower lumbar region after lifting heavy items in the last few days.    Differential includes but is not limited to:  Doubt acute spinal cord pathology, possible diagnoses include herniated disc, pathologic fracture seems less likely but possible, lumbago or muscle strain seems most  likely    Work-up includes:  CT lumbar spine    Interventions include:  Analgesia    The patient has significant medical comorbidities that influence decision making for this acute process, such as:  Hypertension, hyperlipidemia    I decided to obtain the patient's medical records and review relevant documentation from hospital records and clinic records.  Pertinent information is noted.  No prior imaging of the spine    Medications   LIDOcaine 5 % patch 1 patch (1 patch Transdermal Patch Applied 1/23/24 2223)   oxyCODONE-acetaminophen 5-325 mg per tablet 1 tablet (1 tablet Oral Given 1/23/24 2223)       Results and ED Course    Labs Reviewed   HIV 1 / 2 ANTIBODY   HEPATITIS C ANTIBODY       Imaging Results              CT Lumbar Spine Without Contrast (In process)                            Relevant imaging interpreted by myself  I reviewed the CT scan.  I see no evidence of obvious fracture or step-off    Impression and Plan    83 y.o. male with findings of lumbar discomfort most likely muscle strain based on the work up in the emergency department as above.    Important lab/imaging findings include:  As above    All tests, treatment options and disposition options were discussed with the patient.  The decision was made to observe the patient in the ED while awaiting further work up.    The patient was signed out to oncoming attending in stable condition and all further questions/concerns by patient and/or family were addressed.    CT pending at time of sign-out.             Final diagnoses:  [S39.012A] Strain of lumbar region, initial encounter (Primary)                 Miky Magdaleno,   01/24/24 0110

## 2024-01-24 NOTE — DISCHARGE INSTRUCTIONS
You were evaluated in the emergency department today for back pain.  Although there were no findings of concern to necessitate admission to the hospital or warrant immediate surgical intervention, disease exists on a spectrum and your disease process may progress.  If this is the case, please watch your symptoms and return to the emergency department if you feel worse and are unable to discuss care with your primary care doctor in follow up in the next several days.  Specific information regarding your complaint has been provided.  Thank you for choosing [facility]!    Rest, take motrin as directed for aches and pains. You may get relief from gentle stretching and hot compresses (or heating pads) and ice packs.  If you were given a lidocaine patch, please use this as directed.  Do not place a heat pack over the lidocaine patch as this may increase uptake of the medication and may cause toxicity.  If you have increasing pain, numbness in your arms or legs or around your groin, weakness, or  difficulty holding your urine. Return to the ED promptly. Please make an appointment to see your primary care doctor.  You can take acetaminophen 650 mg every 6 hours as needed for pain.

## 2024-01-24 NOTE — ED PROVIDER NOTES
"ED Physician Hand-off Note:    ED Course: I assumed care of patient from off-going ED physician, Dr. Magdaleno.  Briefly, Patient is presented for traumatic low back pain.    At the time of signout plan was pending CT lumbar spine, pain relief.  CT of lumbar spine significant for degenerative changes including "multilevel moderate spinal canal stenosis and mild to moderate neural foraminal narrowing" patient continues without numbness or radicular pain, reports pain is well controlled after oxycodone given several hours prior.   CT also with questionable urinary retention/large distended bladder, patient reports that he voided with no difficulty following CT imaging, has not had issues with urinary retention, leg weakness or numbness, in the last few days with back pain.    Given return precautions for cord compression symptoms.    Disposition:  Discharge with over-the-counter medications for pain control, Tylenol, ibuprofen, lidocaine patches.    Patient comfortable with discharge. Patient counseled regarding exam, results, diagnosis, treatment, and plan.    Impression:  Lumbar strain    Final diagnoses:  [S39.012A] Strain of lumbar region, initial encounter (Primary)       Juana Ayala MD  01/24/24 5382    "

## 2024-01-30 ENCOUNTER — OFFICE VISIT (OUTPATIENT)
Dept: OTOLARYNGOLOGY | Facility: CLINIC | Age: 84
End: 2024-01-30
Payer: MEDICARE

## 2024-01-30 ENCOUNTER — CLINICAL SUPPORT (OUTPATIENT)
Dept: AUDIOLOGY | Facility: CLINIC | Age: 84
End: 2024-01-30
Payer: MEDICARE

## 2024-01-30 DIAGNOSIS — H91.13 PRESBYCUSIS OF BOTH EARS: ICD-10-CM

## 2024-01-30 DIAGNOSIS — H90.3 SENSORINEURAL HEARING LOSS, BILATERAL: ICD-10-CM

## 2024-01-30 DIAGNOSIS — H61.23 IMPACTED CERUMEN, BILATERAL: Primary | ICD-10-CM

## 2024-01-30 DIAGNOSIS — H90.3 ASYMMETRICAL SENSORINEURAL HEARING LOSS: Primary | ICD-10-CM

## 2024-01-30 PROCEDURE — 99213 OFFICE O/P EST LOW 20 MIN: CPT | Mod: 25,HCNC,S$GLB, | Performed by: OTOLARYNGOLOGY

## 2024-01-30 PROCEDURE — 1159F MED LIST DOCD IN RCRD: CPT | Mod: HCNC,CPTII,S$GLB, | Performed by: OTOLARYNGOLOGY

## 2024-01-30 PROCEDURE — 1126F AMNT PAIN NOTED NONE PRSNT: CPT | Mod: HCNC,CPTII,S$GLB, | Performed by: OTOLARYNGOLOGY

## 2024-01-30 PROCEDURE — 3288F FALL RISK ASSESSMENT DOCD: CPT | Mod: HCNC,CPTII,S$GLB, | Performed by: OTOLARYNGOLOGY

## 2024-01-30 PROCEDURE — 99999 PR PBB SHADOW E&M-EST. PATIENT-LVL II: CPT | Mod: PBBFAC,HCNC,, | Performed by: OTOLARYNGOLOGY

## 2024-01-30 PROCEDURE — 1101F PT FALLS ASSESS-DOCD LE1/YR: CPT | Mod: HCNC,CPTII,S$GLB, | Performed by: OTOLARYNGOLOGY

## 2024-01-30 PROCEDURE — 69210 REMOVE IMPACTED EAR WAX UNI: CPT | Mod: HCNC,S$GLB,, | Performed by: OTOLARYNGOLOGY

## 2024-01-30 PROCEDURE — 92567 TYMPANOMETRY: CPT | Mod: HCNC,S$GLB,,

## 2024-01-30 PROCEDURE — 92557 COMPREHENSIVE HEARING TEST: CPT | Mod: HCNC,S$GLB,,

## 2024-01-30 NOTE — PROGRESS NOTES
Liang Monterroso JrEstephania was seen today in the clinic for an annual audiologic evaluation. Mr. Monterroso denied overall hearing changes since his last audiogram. Previous audiologic testing revealed normal hearing sloping to a mild to moderately-severe sensorineural hearing loss for the right ear and normal hearing sloping to a mild to severe sensorineural hearing loss for the left ear.     Tympanometry revealed Type A in the right ear and Type A in the left ear.     Audiogram results revealed mild sloping to moderately severe sensorineural hearing loss in the right ear and mild sloping to severe sensorineural hearing loss in the left ear.     Speech reception thresholds were noted at 30 dB in the right ear and 40 dB in the left ear.    Speech discrimination scores were 80% in the right ear and 56% in the left ear.    Recommendations:  Otologic evaluation  Annual audiogram  Hearing protection when in noise  Hearing aid consultation pending medical clearance

## 2024-01-30 NOTE — PROCEDURES
Ear Cerumen Removal    Date/Time: 1/30/2024 9:15 AM    Performed by: Fer Jasso MD  Authorized by: Fer Jasso MD    Consent Done?:  Yes (Verbal)  Location details:  Both ears  Procedure type: curette    Cerumen  Removal Results:  Cerumen completely removed  Patient tolerance:  Patient tolerated the procedure well with no immediate complications

## 2024-01-30 NOTE — PROGRESS NOTES
Subjective     Patient ID: Liang Monterroso Jr. is a 83 y.o. male.    Chief Complaint: Cerumen Impaction    Hearing Loss: No fever.      Liang Monterroso Jr. is a 83 y.o. male presents for wax removal, but also e endorses that he is noticed some difficulties with hearing.  Has been gradual.  He notices difficulty with background noise.  Seems to notice the problem worse in his left ear.  Denies any history of ear surgery.    Review of Systems   Constitutional:  Negative for chills and fever.   HENT:  Positive for hearing loss. Negative for sore throat and trouble swallowing.    Respiratory:  Negative for apnea and chest tightness.    Cardiovascular:  Negative for chest pain.          Objective     Physical Exam  Vitals and nursing note reviewed.   Constitutional:       Appearance: Normal appearance.   HENT:      Head: Normocephalic and atraumatic.      Right Ear: Tympanic membrane, ear canal and external ear normal. There is impacted cerumen.      Left Ear: Tympanic membrane, ear canal and external ear normal. There is impacted cerumen.   Neurological:      Mental Status: He is alert.     Data Reviewed:  Audiogram tracings independently reviewed and discussed with patient shows bilateral SNHL             Assessment and Plan     1. Impacted cerumen, bilateral  -     Ear Cerumen Removal    2. Sensorineural hearing loss, bilateral    3. Presbycusis of both ears        1. SNHL   noise protection  Consideration of hearing aid evaluation  Recheck hearing in 3 years           No follow-ups on file.

## 2024-02-15 ENCOUNTER — PATIENT MESSAGE (OUTPATIENT)
Dept: INTERNAL MEDICINE | Facility: CLINIC | Age: 84
End: 2024-02-15
Payer: MEDICARE

## 2024-02-15 DIAGNOSIS — E03.9 ACQUIRED HYPOTHYROIDISM: ICD-10-CM

## 2024-02-15 DIAGNOSIS — E78.5 HYPERLIPIDEMIA, UNSPECIFIED HYPERLIPIDEMIA TYPE: ICD-10-CM

## 2024-02-15 DIAGNOSIS — N13.8 BENIGN PROSTATIC HYPERPLASIA WITH URINARY OBSTRUCTION: ICD-10-CM

## 2024-02-15 DIAGNOSIS — I10 PRIMARY HYPERTENSION: ICD-10-CM

## 2024-02-15 DIAGNOSIS — R73.09 ELEVATED GLUCOSE LEVEL: ICD-10-CM

## 2024-02-15 DIAGNOSIS — N40.1 BENIGN PROSTATIC HYPERPLASIA WITH URINARY OBSTRUCTION: ICD-10-CM

## 2024-02-15 DIAGNOSIS — I10 ESSENTIAL HYPERTENSION: Primary | ICD-10-CM

## 2024-02-15 NOTE — TELEPHONE ENCOUNTER
Care Due:                  Date            Visit Type   Department     Provider  --------------------------------------------------------------------------------                                MYCHART                              FOLLOWUP/OF  [facility] INTERNAL  Last Visit: 09-      FICE VISIT   [facility]       Corey Iqbal                              MYCHART                              FOLLOWUP/OF  [facility] INTERNAL  Next Visit: 02-      FICE VISIT   [facility]       Corey Iqbal                                                            Last  Test          Frequency    Reason                     Performed    Due Date  --------------------------------------------------------------------------------    CMP.........  12 months..  ramipriL.................  04- 03-    TSH.........  12 months..  levothyroxine............  02- 02-    [facility] Embedded Care Due Messages. Reference number: [id number].   2/15/2024 10:27:02 AM CST

## 2024-02-16 RX ORDER — TAMSULOSIN HYDROCHLORIDE 0.4 MG/1
0.8 CAPSULE ORAL DAILY
Qty: 180 CAPSULE | Refills: 0 | Status: SHIPPED | OUTPATIENT
Start: 2024-02-16 | End: 2024-06-03

## 2024-02-16 NOTE — TELEPHONE ENCOUNTER
Provider Staff:  Action required for this patient    Requires labs      Please see care gap opportunities below in Care Due Message.    Thanks!  [facility]     Appointments      Date Provider   Last Visit   9/14/2023 Corey Iqbal MD   Next Visit   2/28/2024 Corey Iqbal MD     Refill Decision Note   Liang Monterroso  is requesting a refill authorization.  Brief Assessment and Rationale for Refill:  Approve     Medication Therapy Plan: ED documentation reviewed. No change in therapy. FOV 2/28/24      Extended chart review required: Yes   Comments:     Note composed:3:35 AM 02/16/2024

## 2024-02-28 ENCOUNTER — OFFICE VISIT (OUTPATIENT)
Dept: INTERNAL MEDICINE | Facility: CLINIC | Age: 84
End: 2024-02-28
Payer: MEDICARE

## 2024-02-28 ENCOUNTER — LAB VISIT (OUTPATIENT)
Dept: LAB | Facility: HOSPITAL | Age: 84
End: 2024-02-28
Attending: INTERNAL MEDICINE
Payer: MEDICARE

## 2024-02-28 VITALS
DIASTOLIC BLOOD PRESSURE: 60 MMHG | OXYGEN SATURATION: 98 % | WEIGHT: 179.25 LBS | HEART RATE: 68 BPM | SYSTOLIC BLOOD PRESSURE: 104 MMHG | HEIGHT: 68 IN | BODY MASS INDEX: 27.17 KG/M2

## 2024-02-28 DIAGNOSIS — E03.9 ACQUIRED HYPOTHYROIDISM: ICD-10-CM

## 2024-02-28 DIAGNOSIS — R73.09 ELEVATED GLUCOSE LEVEL: ICD-10-CM

## 2024-02-28 DIAGNOSIS — Z00.00 ROUTINE PHYSICAL EXAMINATION: Primary | ICD-10-CM

## 2024-02-28 DIAGNOSIS — E78.5 HYPERLIPIDEMIA, UNSPECIFIED HYPERLIPIDEMIA TYPE: ICD-10-CM

## 2024-02-28 DIAGNOSIS — I48.0 PAROXYSMAL ATRIAL FIBRILLATION: ICD-10-CM

## 2024-02-28 DIAGNOSIS — D32.9 MENINGIOMA: ICD-10-CM

## 2024-02-28 DIAGNOSIS — I70.0 AORTIC ATHEROSCLEROSIS: ICD-10-CM

## 2024-02-28 DIAGNOSIS — I10 ESSENTIAL HYPERTENSION: ICD-10-CM

## 2024-02-28 DIAGNOSIS — Z00.00 ROUTINE PHYSICAL EXAMINATION: ICD-10-CM

## 2024-02-28 LAB
ANION GAP SERPL CALC-SCNC: 13 MMOL/L (ref 8–16)
BUN SERPL-MCNC: 17 MG/DL (ref 8–23)
CALCIUM SERPL-MCNC: 9.7 MG/DL (ref 8.7–10.5)
CHLORIDE SERPL-SCNC: 104 MMOL/L (ref 95–110)
CHOLEST SERPL-MCNC: 143 MG/DL (ref 120–199)
CO2 SERPL-SCNC: 21 MMOL/L (ref 23–29)
CREAT SERPL-MCNC: 1.6 MG/DL (ref 0.5–1.4)
EST. GFR  (NO RACE VARIABLE): 42.8 ML/MIN/1.73 M^2
ESTIMATED AVG GLUCOSE: 105 MG/DL (ref 68–131)
GLUCOSE SERPL-MCNC: 82 MG/DL (ref 70–110)
HBA1C MFR BLD: 5.3 % (ref 4–5.6)
POTASSIUM SERPL-SCNC: 4.9 MMOL/L (ref 3.5–5.1)
SODIUM SERPL-SCNC: 138 MMOL/L (ref 136–145)
T4 FREE SERPL-MCNC: 1.29 NG/DL (ref 0.71–1.51)
TSH SERPL DL<=0.005 MIU/L-ACNC: 0.06 UIU/ML (ref 0.4–4)

## 2024-02-28 PROCEDURE — 36415 COLL VENOUS BLD VENIPUNCTURE: CPT | Mod: HCNC | Performed by: INTERNAL MEDICINE

## 2024-02-28 PROCEDURE — 80048 BASIC METABOLIC PNL TOTAL CA: CPT | Mod: HCNC | Performed by: INTERNAL MEDICINE

## 2024-02-28 PROCEDURE — 1159F MED LIST DOCD IN RCRD: CPT | Mod: HCNC,CPTII,S$GLB, | Performed by: INTERNAL MEDICINE

## 2024-02-28 PROCEDURE — 3074F SYST BP LT 130 MM HG: CPT | Mod: HCNC,CPTII,S$GLB, | Performed by: INTERNAL MEDICINE

## 2024-02-28 PROCEDURE — 1126F AMNT PAIN NOTED NONE PRSNT: CPT | Mod: HCNC,CPTII,S$GLB, | Performed by: INTERNAL MEDICINE

## 2024-02-28 PROCEDURE — 99214 OFFICE O/P EST MOD 30 MIN: CPT | Mod: HCNC,S$GLB,, | Performed by: INTERNAL MEDICINE

## 2024-02-28 PROCEDURE — 82465 ASSAY BLD/SERUM CHOLESTEROL: CPT | Mod: HCNC | Performed by: INTERNAL MEDICINE

## 2024-02-28 PROCEDURE — 83036 HEMOGLOBIN GLYCOSYLATED A1C: CPT | Mod: HCNC | Performed by: INTERNAL MEDICINE

## 2024-02-28 PROCEDURE — 99999 PR PBB SHADOW E&M-EST. PATIENT-LVL IV: CPT | Mod: PBBFAC,HCNC,, | Performed by: INTERNAL MEDICINE

## 2024-02-28 PROCEDURE — 1160F RVW MEDS BY RX/DR IN RCRD: CPT | Mod: HCNC,CPTII,S$GLB, | Performed by: INTERNAL MEDICINE

## 2024-02-28 PROCEDURE — 3078F DIAST BP <80 MM HG: CPT | Mod: HCNC,CPTII,S$GLB, | Performed by: INTERNAL MEDICINE

## 2024-02-28 PROCEDURE — 84439 ASSAY OF FREE THYROXINE: CPT | Mod: HCNC | Performed by: INTERNAL MEDICINE

## 2024-02-28 PROCEDURE — 1101F PT FALLS ASSESS-DOCD LE1/YR: CPT | Mod: HCNC,CPTII,S$GLB, | Performed by: INTERNAL MEDICINE

## 2024-02-28 PROCEDURE — 3288F FALL RISK ASSESSMENT DOCD: CPT | Mod: HCNC,CPTII,S$GLB, | Performed by: INTERNAL MEDICINE

## 2024-02-28 PROCEDURE — 84443 ASSAY THYROID STIM HORMONE: CPT | Mod: HCNC | Performed by: INTERNAL MEDICINE

## 2024-02-28 NOTE — PROGRESS NOTES
Subjective:       Patient ID: Liang Monterroso Jr. is a 83 y.o. male.    Chief Complaint: Follow-up    Patient in for interval follow-up of medical problems.  He was in the ER for an acute back strain was fairly severe but that was treated and improved.  He also had a mild UTI earlier this year but was treated with antibiotics.    He is due for a few labs follow-ups including thyroid and A1c.    He denies any chest pain or shortness a breath.  He remains physically active.  Blood pressure has been well-controlled    Follow-up  Associated symptoms include neck pain. Pertinent negatives include no arthralgias, chest pain, headaches, joint swelling, vomiting or weakness.     Review of Systems   Constitutional:  Negative for activity change and unexpected weight change.   HENT:  Positive for hearing loss and rhinorrhea. Negative for trouble swallowing.    Eyes:  Negative for discharge and visual disturbance.   Respiratory:  Negative for chest tightness and wheezing.    Cardiovascular:  Negative for chest pain and palpitations.   Gastrointestinal:  Positive for constipation. Negative for blood in stool, diarrhea and vomiting.   Endocrine: Negative for polydipsia and polyuria.   Genitourinary:  Negative for difficulty urinating, hematuria and urgency.   Musculoskeletal:  Positive for neck pain. Negative for arthralgias and joint swelling.   Neurological:  Negative for weakness and headaches.   Psychiatric/Behavioral:  Negative for confusion and dysphoric mood.            Past Medical History:   Diagnosis Date    Allergy     CKD (chronic kidney disease) stage 3, GFR 30-59 ml/min 6/15/2016    Coronary artery disease due to calcified coronary lesion 4/20/2023    GERD (gastroesophageal reflux disease)     Grade II open fracture of proximal phalanx of left ring finger 4/2/2023    Hx of colonic polyp     Hyperlipidemia     Hypertension     Hypospadias in male     Hypothyroidism     Open displaced fracture of proximal phalanx of  left ring finger     Paroxysmal atrial fibrillation 8/7/2023    Sleep apnea     Thyroid disease     Urinary tract infection      Past Surgical History:   Procedure Laterality Date    APPENDECTOMY      CATARACT EXTRACTION      CYSTOSCOPY      CYSTOSCOPY WITH URODYNAMIC TESTING N/A 10/06/2022    Procedure: CYSTOSCOPY, WITH URODYNAMIC TESTING;  Surgeon: Shade Sullivan MD;  Location: [address];  Service: Urology;  Laterality: N/A;    EYE SURGERY      HERNIA REPAIR      INSERTION, NEUROSTIMULATOR      IRRIGATION AND DEBRIDEMENT Left 4/5/2023    Procedure: IRRIGATION AND DEBRIDEMENT;  Surgeon: Jean-Paul Cummins MD;  Location: [facility];  Service: Orthopedics;  Laterality: Left;    OPEN REDUCTION AND INTERNAL FIXATION (ORIF) OF INJURY OF FINGER Left 4/5/2023    Procedure: ORIF, FINGER - 4th proximal phalanx. Possible tendon/nerve repair;  Surgeon: Jean-Paul Cummins MD;  Location: [facility];  Service: Orthopedics;  Laterality: Left;    TONSILLECTOMY        Patient Active Problem List   Diagnosis    Allergic rhinitis due to pollen    Hypertension    Hypothyroid    Hyperlipidemia    Aortic atherosclerosis    Dupuytren contracture    Decreased ROM of neck    Impaired functional mobility, balance, gait, and endurance    Constipation    Meningioma    BPH (benign prostatic hyperplasia)    JUDITH (obstructive sleep apnea)    Incomplete emptying of bladder    Renal cyst, left    gait instability    Personal history of colonic polyps    Open displaced fracture of proximal phalanx of left ring finger    Traumatic rupture of flexor sheath pulley of finger - 50% rupture of left ring finger A2 pulley    Stiffness of finger joint of left hand    Left hand pain    Coronary artery disease due to calcified coronary lesion    Pain of left hip    Weakness of left hip    Paroxysmal atrial fibrillation    Aortic root enlargement        Objective:      Physical Exam  Constitutional:       General: He is not in acute distress.     Appearance: He  is well-developed.   HENT:      Head: Normocephalic and atraumatic.      Right Ear: Tympanic membrane, ear canal and external ear normal.      Left Ear: Tympanic membrane, ear canal and external ear normal.      Mouth/Throat:      Pharynx: No oropharyngeal exudate or posterior oropharyngeal erythema.   Eyes:      General: No scleral icterus.     Conjunctiva/sclera: Conjunctivae normal.      Pupils: Pupils are equal, round, and reactive to light.   Neck:      Thyroid: No thyromegaly.      Comments: No supraclavicular nodes palpated  Cardiovascular:      Rate and Rhythm: Normal rate and regular rhythm.      Pulses: Normal pulses.      Heart sounds: Normal heart sounds. No murmur heard.  Pulmonary:      Effort: Pulmonary effort is normal.      Breath sounds: Normal breath sounds. No wheezing.   Abdominal:      General: Bowel sounds are normal.      Palpations: Abdomen is soft. There is no mass.      Tenderness: There is no abdominal tenderness.   Musculoskeletal:         General: No tenderness.      Cervical back: Normal range of motion and neck supple.      Right lower leg: No edema.      Left lower leg: No edema.   Lymphadenopathy:      Cervical: No cervical adenopathy.   Skin:     Coloration: Skin is not jaundiced or pale.   Neurological:      General: No focal deficit present.      Mental Status: He is alert and oriented to person, place, and time.   Psychiatric:         Mood and Affect: Mood normal.         Behavior: Behavior normal.         Assessment:       Problem List Items Addressed This Visit          Cardiac/Vascular    Hyperlipidemia    Relevant Orders    Basic Metabolic Panel    TSH    Hemoglobin A1C    Cholesterol, Total    Aortic atherosclerosis    Paroxysmal atrial fibrillation       Oncology    Meningioma       Endocrine    Hypothyroid    Relevant Orders    Basic Metabolic Panel    TSH    Hemoglobin A1C    Cholesterol, Total     Other Visit Diagnoses       Routine physical examination    -  Primary     "Relevant Orders    Basic Metabolic Panel    TSH    Hemoglobin A1C    Cholesterol, Total    Essential hypertension        Relevant Orders    Basic Metabolic Panel    TSH    Hemoglobin A1C    Cholesterol, Total    Elevated glucose level        Relevant Orders    Hemoglobin A1C            Plan:         Liang was seen today for follow-up.    Diagnoses and all orders for this visit:    Routine physical examination  -     Basic Metabolic Panel; Future  -     TSH; Future  -     Hemoglobin A1C; Future  -     Cholesterol, Total; Future    Acquired hypothyroidism  -     Basic Metabolic Panel; Future  -     TSH; Future  -     Hemoglobin A1C; Future  -     Cholesterol, Total; Future    Essential hypertension  -     Basic Metabolic Panel; Future  -     TSH; Future  -     Hemoglobin A1C; Future  -     Cholesterol, Total; Future    Hyperlipidemia, unspecified hyperlipidemia type  -     Basic Metabolic Panel; Future  -     TSH; Future  -     Hemoglobin A1C; Future  -     Cholesterol, Total; Future    Elevated glucose level  -     Hemoglobin A1C; Future    Meningioma  Comments:  Clinically stable-asymptomatic.  Monitor for changes    Paroxysmal atrial fibrillation  Comments:  Clinically stable-continue to monitor through Cardiology    Aortic atherosclerosis  Comments:  Clinically stable-continues to be monitored by Cardiology and myself.       Review labs, follow-up in 6 months              Portions of this note may have been created with voice recognition software. Occasional "wrong-word" or "sound-a-like" substitutions may have occurred due to the inherent limitations of voice recognition software. Please, read the note carefully and recognize, using context, where substitutions have occurred.  "

## 2024-02-29 ENCOUNTER — PATIENT MESSAGE (OUTPATIENT)
Dept: INTERNAL MEDICINE | Facility: CLINIC | Age: 84
End: 2024-02-29
Payer: MEDICARE

## 2024-02-29 DIAGNOSIS — E03.9 ACQUIRED HYPOTHYROIDISM: ICD-10-CM

## 2024-02-29 DIAGNOSIS — R79.89 ELEVATED SERUM CREATININE: Primary | ICD-10-CM

## 2024-02-29 RX ORDER — LEVOTHYROXINE SODIUM 100 UG/1
100 TABLET ORAL DAILY
Qty: 90 TABLET | Refills: 3 | Status: SHIPPED | OUTPATIENT
Start: 2024-02-29 | End: 2024-03-04 | Stop reason: SDUPTHER

## 2024-02-29 NOTE — TELEPHONE ENCOUNTER
Please assist patient with the blood chemistry in about 2 weeks.  The new thyroid medication has been sent to his pharmacy and the TSH lab needs to be repeated in about 2-3 months

## 2024-02-29 NOTE — TELEPHONE ENCOUNTER
Called pt; pt answered    Scheduled labs as advised       There are recently placed orders for CBC and CMP   Would you like us to link the CBC and CMP panels to to labwork in 2 weeks?

## 2024-03-04 RX ORDER — LEVOTHYROXINE SODIUM 100 UG/1
100 TABLET ORAL DAILY
Qty: 90 TABLET | Refills: 3 | Status: SHIPPED | OUTPATIENT
Start: 2024-03-04

## 2024-03-13 ENCOUNTER — LAB VISIT (OUTPATIENT)
Dept: LAB | Facility: HOSPITAL | Age: 84
End: 2024-03-13
Attending: INTERNAL MEDICINE
Payer: MEDICARE

## 2024-03-13 DIAGNOSIS — R79.89 ELEVATED SERUM CREATININE: ICD-10-CM

## 2024-03-13 LAB
ANION GAP SERPL CALC-SCNC: 6 MMOL/L (ref 8–16)
BUN SERPL-MCNC: 15 MG/DL (ref 8–23)
CALCIUM SERPL-MCNC: 9.1 MG/DL (ref 8.7–10.5)
CHLORIDE SERPL-SCNC: 104 MMOL/L (ref 95–110)
CO2 SERPL-SCNC: 26 MMOL/L (ref 23–29)
CREAT SERPL-MCNC: 1.1 MG/DL (ref 0.5–1.4)
EST. GFR  (NO RACE VARIABLE): >60 ML/MIN/1.73 M^2
GLUCOSE SERPL-MCNC: 81 MG/DL (ref 70–110)
POTASSIUM SERPL-SCNC: 4.2 MMOL/L (ref 3.5–5.1)
SODIUM SERPL-SCNC: 136 MMOL/L (ref 136–145)

## 2024-03-13 PROCEDURE — 80048 BASIC METABOLIC PNL TOTAL CA: CPT | Mod: HCNC | Performed by: INTERNAL MEDICINE

## 2024-03-13 PROCEDURE — 36415 COLL VENOUS BLD VENIPUNCTURE: CPT | Mod: HCNC | Performed by: INTERNAL MEDICINE

## 2024-03-17 ENCOUNTER — PATIENT MESSAGE (OUTPATIENT)
Dept: INTERNAL MEDICINE | Facility: CLINIC | Age: 84
End: 2024-03-17
Payer: MEDICARE

## 2024-04-19 NOTE — PROGRESS NOTES
I have reviewed the notes, assessments, and/or procedures performed this visit, and I concur with the documentation.    As  noted  Significant anemia from acute gi blood loss  Higher than usual complexity based on cirrhosis  Will proceed with urgent push enteroscopy, given prior negative standard EGD  If that is negative, may consider video capsule   Result(s) reviewed and within acceptable range. Portal message sent to patient.

## 2024-05-01 ENCOUNTER — LAB VISIT (OUTPATIENT)
Dept: LAB | Facility: HOSPITAL | Age: 84
End: 2024-05-01
Attending: INTERNAL MEDICINE
Payer: MEDICARE

## 2024-05-01 ENCOUNTER — PATIENT MESSAGE (OUTPATIENT)
Dept: INTERNAL MEDICINE | Facility: CLINIC | Age: 84
End: 2024-05-01
Payer: MEDICARE

## 2024-05-01 DIAGNOSIS — E03.9 ACQUIRED HYPOTHYROIDISM: ICD-10-CM

## 2024-05-01 LAB — TSH SERPL DL<=0.005 MIU/L-ACNC: 0.69 UIU/ML (ref 0.4–4)

## 2024-05-01 PROCEDURE — 36415 COLL VENOUS BLD VENIPUNCTURE: CPT | Mod: HCNC | Performed by: INTERNAL MEDICINE

## 2024-05-01 PROCEDURE — 84443 ASSAY THYROID STIM HORMONE: CPT | Mod: HCNC | Performed by: INTERNAL MEDICINE

## 2024-06-03 DIAGNOSIS — N40.1 BENIGN PROSTATIC HYPERPLASIA WITH URINARY OBSTRUCTION: ICD-10-CM

## 2024-06-03 DIAGNOSIS — N13.8 BENIGN PROSTATIC HYPERPLASIA WITH URINARY OBSTRUCTION: ICD-10-CM

## 2024-06-03 RX ORDER — TAMSULOSIN HYDROCHLORIDE 0.4 MG/1
2 CAPSULE ORAL
Qty: 180 CAPSULE | Refills: 2 | Status: SHIPPED | OUTPATIENT
Start: 2024-06-03

## 2024-06-03 NOTE — TELEPHONE ENCOUNTER
Refill Decision Note   Liang Monterroso  is requesting a refill authorization.  Brief Assessment and Rationale for Refill:  Approve     Medication Therapy Plan:         Comments:     Note composed:1:47 PM 06/03/2024

## 2024-06-03 NOTE — TELEPHONE ENCOUNTER
No care due was identified.  [facility] Embedded Care Due Messages. Reference number: [id number].   6/03/2024 8:56:34 AM CDT

## 2024-06-10 ENCOUNTER — OFFICE VISIT (OUTPATIENT)
Dept: OPTOMETRY | Facility: CLINIC | Age: 84
End: 2024-06-10
Payer: COMMERCIAL

## 2024-06-10 ENCOUNTER — PATIENT MESSAGE (OUTPATIENT)
Dept: INTERNAL MEDICINE | Facility: CLINIC | Age: 84
End: 2024-06-10
Payer: MEDICARE

## 2024-06-10 DIAGNOSIS — H35.363 MACULAR DRUSEN, BILATERAL: ICD-10-CM

## 2024-06-10 DIAGNOSIS — H52.203 ASTIGMATISM OF BOTH EYES, UNSPECIFIED TYPE: ICD-10-CM

## 2024-06-10 DIAGNOSIS — H26.493 POSTERIOR CAPSULAR OPACIFICATION NON VISUALLY SIGNIFICANT OF BOTH EYES: ICD-10-CM

## 2024-06-10 DIAGNOSIS — Z01.00 EXAMINATION OF EYES AND VISION: Primary | ICD-10-CM

## 2024-06-10 DIAGNOSIS — Z98.42 S/P CATARACT SURGERY, LEFT: ICD-10-CM

## 2024-06-10 DIAGNOSIS — Z96.1 PSEUDOPHAKIA: ICD-10-CM

## 2024-06-10 DIAGNOSIS — Z98.41 S/P CATARACT SURGERY, RIGHT: ICD-10-CM

## 2024-06-10 PROCEDURE — 92014 COMPRE OPH EXAM EST PT 1/>: CPT | Mod: HCNC,S$GLB,, | Performed by: OPTOMETRIST

## 2024-06-10 PROCEDURE — 92015 DETERMINE REFRACTIVE STATE: CPT | Mod: HCNC,S$GLB,, | Performed by: OPTOMETRIST

## 2024-06-10 PROCEDURE — 99999 PR PBB SHADOW E&M-EST. PATIENT-LVL III: CPT | Mod: PBBFAC,HCNC,, | Performed by: OPTOMETRIST

## 2024-06-10 NOTE — PROGRESS NOTES
"HPI     eye exam            Comments: Annual general eye exam and refraction           Comments    Patient's age: 83 y.o. WM  Occupation: Retired   Approximate date of last eye examination:  03/20/2023  Name of last eye doctor seen: Dr. Gill  City/State: [place]  Wears glasses? Yes, OTC      If yes, wears  Full-time or part-time?  Part time SV  Wears CLs?:  no  Headaches?  no  Eye pain/discomfort?  Near vision changes   Flashes?  no  Floaters?  Rarely   Diplopia/Double vision?  no  Patient's Ocular History:         Any eye surgeries? PCIOL O[facility]         Any eye injury?  no         Any treatment for eye disease?  no  Family history of eye disease?  no  Significant patient medical history:         1. Diabetes?  no       If yes, IDDM or NIDDM? no   2. HBP?  Yes, controlled with medication              3. Other (describe):  Prostate, Hyperipidemia   ! OTC eyedrops currently using: no    ! Prescription eye meds currently using:  no   ! Any history of allergy/adverse reaction to any eye meds used   previously?  no    ! Any history of allergy/adverse reaction to eyedrops used during prior   eye exam(s)? no    ! Any history of allergy/adverse reaction to Novacaine or similar meds?   no   ! Any history of allergy/adverse reaction to Epinephrine or similar meds?   no    ! Patient okay with use of anesthetic eyedrops to check eye pressure?    yes        ! Patient okay with use of eyedrops to dilate pupils today?  yes   !  Allergies/Medications/Medical History/Family History reviewed today?    yes      PD =   63/59  Desired reading distance =  17.5"                                                                        Last edited by Abdiaziz Gill OD on 6/10/2024  9:22 AM.            Assessment /Plan     For exam results, see Encounter Report.  1. Examination of eyes and vision        2. Macular drusen, bilateral        3. Posterior capsular opacification non visually significant of both eyes        4. S/P cataract " surgery, left        5. S/P cataract surgery, right        6. Pseudophakia - Both Eyes        7. Astigmatism of both eyes, unspecified type                       Prior diagnosis of bilateral macular changes (drusen and pigmentary changes) consistent with age-related macular degeneration.    Confirmed with OCT of retina done on a previous visit.     Continue taking Ocuvite or Preservsion supplement by mouth (recommend AREDS-2 formuation).     S/P cataract surgery in both eyes, with bilateral posterior chamber intraocular lens.  Mild clouding/opacification of the posterior lens capsule in both eyes.   Still no need for YAG laser posterior capsulotomy at this time in either eye.       Astigmatic refractive error in each eye. Good best-corrected VA in each eye.  Best corrected VA of 20/40-1 in the right eye and 20/30-1 in the left eye/  New spectacle lens Rx issued for use as desired.     Recheck in one year.

## 2024-06-10 NOTE — PATIENT INSTRUCTIONS
Prior diagnosis of bilateral macular changes (drusen and pigmentary changes) consistent with age-related macular degeneration.    Confirmed with OCT of retina done on a previous visit.     Continue taking Ocuvite or Preservsion supplement by mouth (recommend AREDS-2 formuation).     S/P cataract surgery in both eyes, with bilateral posterior chamber intraocular lens.  Mild clouding/opacification of the posterior lens capsule in both eyes.   Still no need for YAG laser posterior capsulotomy at this time in either eye.       Astigmatic refractive error in each eye. Good best-corrected VA in each eye.  Best corrected VA of 20/40-1 in the right eye and 20/30-1 in the left eye/  New spectacle lens Rx issued for use as desired.     Recheck in one year.

## 2024-06-25 NOTE — PLAN OF CARE
Pt needs and a appointment      VSS.  Patient tolerating oral liquids without difficulty.   No apparent s&s of distress noted at this time, no complaints voiced at this time.   Discharge instructions reviewed with patient and spouse with good verbal feedback received.   Post op medications escribed to [facility] in [place]. No DME required.  Referral for cardiology sent to pts PCP by MD Roseanne.  Patient ready for discharge.

## 2024-08-13 DIAGNOSIS — I48.0 PAROXYSMAL ATRIAL FIBRILLATION: ICD-10-CM

## 2024-08-13 RX ORDER — APIXABAN 5 MG/1
5 TABLET, FILM COATED ORAL 2 TIMES DAILY
Qty: 60 TABLET | Refills: 4 | Status: SHIPPED | OUTPATIENT
Start: 2024-08-13

## 2024-08-29 DIAGNOSIS — I48.0 PAROXYSMAL ATRIAL FIBRILLATION: ICD-10-CM

## 2024-08-29 RX ORDER — METOPROLOL SUCCINATE 25 MG/1
25 TABLET, EXTENDED RELEASE ORAL
Qty: 90 TABLET | Refills: 0 | Status: SHIPPED | OUTPATIENT
Start: 2024-08-29

## 2024-09-05 DIAGNOSIS — I10 ESSENTIAL HYPERTENSION: ICD-10-CM

## 2024-09-05 RX ORDER — RAMIPRIL 5 MG/1
CAPSULE ORAL
Qty: 90 CAPSULE | Refills: 1 | Status: SHIPPED | OUTPATIENT
Start: 2024-09-05

## 2024-09-05 NOTE — TELEPHONE ENCOUNTER
Refill Decision Note   Liang Monterroso  is requesting a refill authorization.  Brief Assessment and Rationale for Refill:  Approve     Medication Therapy Plan:         Comments:     Note composed:12:01 PM 09/05/2024

## 2024-09-05 NOTE — TELEPHONE ENCOUNTER
No care due was identified.  [facility] Embedded Care Due Messages. Reference number: [id number].   9/05/2024 8:23:00 AM CDT

## 2024-09-25 RX ORDER — ROSUVASTATIN CALCIUM 20 MG/1
20 TABLET, COATED ORAL
Qty: 90 TABLET | Refills: 1 | Status: SHIPPED | OUTPATIENT
Start: 2024-09-25

## 2024-10-01 NOTE — PATIENT INSTRUCTIONS
Bilateral macular changes (drusen and pigmentary changes) consistent with age-related macular degeneration, more apparent in the right eye as noted previously.  Continue/resume taking Ocuvite supplement by mouth (recommend AREDS-2) formuation.  S/P cataract surgery in both eyes, with bilateral posterior chamber intraocular lens.  Mild clouding/opacification of the posterior lens capsule in both eyes. No need for YAG laser posterior capsulotomy at this time in either eye.    Astigmatic refractive error in each eye. Good best-corrected VA in each eye.  New spectacle lens Rx issued for use as desired.  Recheck in one year.    Bilateral Rotation Flap Text: The defect edges were debeveled with a #15 scalpel blade. Given the location of the defect, shape of the defect and the proximity to free margins a bilateral rotation flap was deemed most appropriate. Using a sterile surgical marker, an appropriate rotation flap was drawn incorporating the defect and placing the expected incisions within the relaxed skin tension lines where possible. The area thus outlined was incised deep to adipose tissue with a #15 scalpel blade. The skin margins were undermined to an appropriate distance in all directions utilizing iris scissors. Following this, the designed flap was carried over into the primary defect and sutured into place.

## 2024-10-09 ENCOUNTER — IMMUNIZATION (OUTPATIENT)
Dept: INTERNAL MEDICINE | Facility: CLINIC | Age: 84
End: 2024-10-09
Payer: MEDICARE

## 2024-10-09 DIAGNOSIS — Z23 NEED FOR VACCINATION: Primary | ICD-10-CM

## 2024-10-09 PROCEDURE — G0008 ADMIN INFLUENZA VIRUS VAC: HCPCS | Mod: HCNC,S$GLB,, | Performed by: INTERNAL MEDICINE

## 2024-10-09 PROCEDURE — 90653 IIV ADJUVANT VACCINE IM: CPT | Mod: HCNC,S$GLB,, | Performed by: INTERNAL MEDICINE

## 2024-11-30 ENCOUNTER — PATIENT MESSAGE (OUTPATIENT)
Dept: CARDIOLOGY | Facility: CLINIC | Age: 84
End: 2024-11-30
Payer: MEDICARE

## 2024-11-30 DIAGNOSIS — I48.0 PAROXYSMAL ATRIAL FIBRILLATION: ICD-10-CM

## 2024-12-02 DIAGNOSIS — I48.0 PAROXYSMAL ATRIAL FIBRILLATION: ICD-10-CM

## 2024-12-03 RX ORDER — ROSUVASTATIN CALCIUM 20 MG/1
20 TABLET, COATED ORAL NIGHTLY
Qty: 90 TABLET | Refills: 3 | Status: SHIPPED | OUTPATIENT
Start: 2024-12-03

## 2024-12-03 RX ORDER — METOPROLOL SUCCINATE 25 MG/1
25 TABLET, EXTENDED RELEASE ORAL DAILY
Qty: 90 TABLET | Refills: 3 | Status: SHIPPED | OUTPATIENT
Start: 2024-12-03

## 2024-12-03 RX ORDER — METOPROLOL SUCCINATE 25 MG/1
25 TABLET, EXTENDED RELEASE ORAL DAILY
Qty: 90 TABLET | Refills: 3 | Status: SHIPPED | OUTPATIENT
Start: 2024-12-03 | End: 2024-12-03

## 2024-12-06 ENCOUNTER — PATIENT MESSAGE (OUTPATIENT)
Dept: CARDIOLOGY | Facility: CLINIC | Age: 84
End: 2024-12-06
Payer: MEDICARE

## 2024-12-30 DIAGNOSIS — I48.0 PAROXYSMAL ATRIAL FIBRILLATION: ICD-10-CM

## 2024-12-30 RX ORDER — APIXABAN 5 MG/1
5 TABLET, FILM COATED ORAL 2 TIMES DAILY
Qty: 60 TABLET | Refills: 4 | Status: SHIPPED | OUTPATIENT
Start: 2024-12-30

## 2025-01-14 ENCOUNTER — OFFICE VISIT (OUTPATIENT)
Dept: INTERNAL MEDICINE | Facility: CLINIC | Age: 85
End: 2025-01-14
Payer: MEDICARE

## 2025-01-14 VITALS
SYSTOLIC BLOOD PRESSURE: 110 MMHG | BODY MASS INDEX: 27.5 KG/M2 | WEIGHT: 181.44 LBS | OXYGEN SATURATION: 98 % | HEIGHT: 68 IN | DIASTOLIC BLOOD PRESSURE: 60 MMHG | HEART RATE: 67 BPM

## 2025-01-14 DIAGNOSIS — I10 ESSENTIAL HYPERTENSION: ICD-10-CM

## 2025-01-14 DIAGNOSIS — E03.9 ACQUIRED HYPOTHYROIDISM: ICD-10-CM

## 2025-01-14 DIAGNOSIS — B02.9 HERPES ZOSTER WITHOUT COMPLICATION: Primary | ICD-10-CM

## 2025-01-14 PROCEDURE — 99213 OFFICE O/P EST LOW 20 MIN: CPT | Mod: HCNC,S$GLB,, | Performed by: INTERNAL MEDICINE

## 2025-01-14 PROCEDURE — 1101F PT FALLS ASSESS-DOCD LE1/YR: CPT | Mod: HCNC,CPTII,S$GLB, | Performed by: INTERNAL MEDICINE

## 2025-01-14 PROCEDURE — 3288F FALL RISK ASSESSMENT DOCD: CPT | Mod: HCNC,CPTII,S$GLB, | Performed by: INTERNAL MEDICINE

## 2025-01-14 PROCEDURE — 3078F DIAST BP <80 MM HG: CPT | Mod: HCNC,CPTII,S$GLB, | Performed by: INTERNAL MEDICINE

## 2025-01-14 PROCEDURE — 99999 PR PBB SHADOW E&M-EST. PATIENT-LVL IV: CPT | Mod: PBBFAC,HCNC,, | Performed by: INTERNAL MEDICINE

## 2025-01-14 PROCEDURE — 1159F MED LIST DOCD IN RCRD: CPT | Mod: HCNC,CPTII,S$GLB, | Performed by: INTERNAL MEDICINE

## 2025-01-14 PROCEDURE — 1125F AMNT PAIN NOTED PAIN PRSNT: CPT | Mod: HCNC,CPTII,S$GLB, | Performed by: INTERNAL MEDICINE

## 2025-01-14 PROCEDURE — 1160F RVW MEDS BY RX/DR IN RCRD: CPT | Mod: HCNC,CPTII,S$GLB, | Performed by: INTERNAL MEDICINE

## 2025-01-14 PROCEDURE — 3074F SYST BP LT 130 MM HG: CPT | Mod: HCNC,CPTII,S$GLB, | Performed by: INTERNAL MEDICINE

## 2025-01-14 RX ORDER — VALACYCLOVIR HYDROCHLORIDE 1 G/1
1000 TABLET, FILM COATED ORAL 3 TIMES DAILY
Qty: 21 TABLET | Refills: 0 | Status: SHIPPED | OUTPATIENT
Start: 2025-01-14 | End: 2025-01-21

## 2025-01-14 RX ORDER — GABAPENTIN 100 MG/1
CAPSULE ORAL
Qty: 30 CAPSULE | Refills: 1 | Status: SHIPPED | OUTPATIENT
Start: 2025-01-14

## 2025-01-14 NOTE — PROGRESS NOTES
CC:  Painful rash     HPI:  The patient is a 84-year-old male with hypertension, hyperlipidemia, hypothyroidism, reflux, obstructive sleep apnea and PAF who presents today with complaints of a painful rash in the right groin.  Symptoms started a proximally 4 days ago.  It started off what he thought was a sebaceous cyst in the groin area.  Now it involves the right buttock.  It is painful for him to sit.    ROS: Patient reports otherwise feeling well.  No fever chills.    Physical exam:   General appearance: No acute distress   Pulmonary: Good inspiratory, expiratory breath sounds are heard.  Lungs are clear to auscultation.    Cardiovascular: S1-S2, rhythm appear to be regular.  Extremities without edema.    GI: Abdomen was nontender, nondistended without hepatosplenomegaly  Derm: The patient right groin and buttock was evaluated.  The patient has lesions consistent with shingles.    Assessment:   Shingles   2.  Hypertension currently stable   3.  Hypothyroidism on thyroid replacement  Plan:    Will: Valtrex 1000 mg t.I.d. for 7 days   2.  Will start the patient on gabapentin 100 mg.  He is still off with 100 mg q.h.s. x3 days and increase to b.I.d. then to t.I.d.  3.  The patient has get over-the-counter calamine lotion to apply to the skin   4.  The patient is to call for any worsening of symptoms or problems with the medication.

## 2025-01-23 RX ORDER — LEVOTHYROXINE SODIUM 100 UG/1
100 TABLET ORAL
Qty: 90 TABLET | Refills: 0 | Status: SHIPPED | OUTPATIENT
Start: 2025-01-23

## 2025-01-23 NOTE — TELEPHONE ENCOUNTER
Provider Staff:  Action required for this patient    Requires labs      Please see care gap opportunities below in Care Due Message.    Thanks!  [facility]     Appointments      Date Provider   Last Visit   2/28/2024 Corey Iqbal MD   Next Visit   Visit date not found Corey Iqbal MD     Refill Decision Note   Liang Monterroso  is requesting a refill authorization.  Brief Assessment and Rationale for Refill:  Approve     Medication Therapy Plan:         Comments:     Note composed:9:27 AM 01/23/2025

## 2025-01-23 NOTE — TELEPHONE ENCOUNTER
Care Due:                  Date            Visit Type   Department     Provider  --------------------------------------------------------------------------------                                SAME DAY -                              ESTABLISHED   [facility] INTERNAL  Last Visit: 01-      PATIENT      MEDICINE       BROWN SCHULTZ  Next Visit: None Scheduled  None         None Found                                                            Last  Test          Frequency    Reason                     Performed    Due Date  --------------------------------------------------------------------------------    CBC.........  12 months..  valACYclovir.............  05- 05-    CMP.........  12 months..  ramipriL, valACYclovir...  04- 03-    [facility] Embedded Care Due Messages. Reference number: [id number].   1/23/2025 8:05:05 AM CST

## 2025-02-17 DIAGNOSIS — N13.8 BENIGN PROSTATIC HYPERPLASIA WITH URINARY OBSTRUCTION: ICD-10-CM

## 2025-02-17 DIAGNOSIS — N40.1 BENIGN PROSTATIC HYPERPLASIA WITH URINARY OBSTRUCTION: ICD-10-CM

## 2025-02-17 RX ORDER — TAMSULOSIN HYDROCHLORIDE 0.4 MG/1
2 CAPSULE ORAL
Qty: 180 CAPSULE | Refills: 0 | Status: SHIPPED | OUTPATIENT
Start: 2025-02-17

## 2025-02-17 NOTE — TELEPHONE ENCOUNTER
Provider Staff:  Action required for this patient    Requires labs      Please see care gap opportunities below in Care Due Message.    Thanks!  [facility]     Appointments      Date Provider   Last Visit   2/28/2024 Corey Iqbal MD   Next Visit   Visit date not found Corey Iqbal MD     Refill Decision Note   Liang Monterroso  is requesting a refill authorization.  Brief Assessment and Rationale for Refill:  Approve     Medication Therapy Plan:         Comments:     Note composed:11:59 AM 02/17/2025

## 2025-02-17 NOTE — TELEPHONE ENCOUNTER
Care Due:                  Date            Visit Type   Department     Provider  --------------------------------------------------------------------------------                                SAME DAY -                              ESTABLISHED   [facility] INTERNAL  Last Visit: 01-      PATIENT      MEDICINE       BROWN SCHULTZ  Next Visit: None Scheduled  None         None Found                                                            Last  Test          Frequency    Reason                     Performed    Due Date  --------------------------------------------------------------------------------    TSH.........  12 months..  levothyroxine............  05- 04-    [facility] Embedded Care Due Messages. Reference number: [id number].   2/17/2025 8:10:06 AM CST

## 2025-02-21 DIAGNOSIS — Z00.00 ENCOUNTER FOR MEDICARE ANNUAL WELLNESS EXAM: ICD-10-CM

## 2025-02-24 DIAGNOSIS — I10 ESSENTIAL HYPERTENSION: ICD-10-CM

## 2025-02-24 RX ORDER — RAMIPRIL 5 MG/1
5 CAPSULE ORAL
Qty: 90 CAPSULE | Refills: 0 | Status: SHIPPED | OUTPATIENT
Start: 2025-02-24

## 2025-02-24 NOTE — TELEPHONE ENCOUNTER
No care due was identified.  [facility] Embedded Care Due Messages. Reference number: [id number].   2/24/2025 8:16:07 AM CST

## 2025-02-24 NOTE — TELEPHONE ENCOUNTER
Refill Decision Note   Liang Monterroso  is requesting a refill authorization.  Brief Assessment and Rationale for Refill:  Approve     Medication Therapy Plan:  FOVS      Comments:     Note composed:1:16 PM 02/24/2025

## 2025-03-11 RX ORDER — ROSUVASTATIN CALCIUM 20 MG/1
20 TABLET, COATED ORAL
Qty: 90 TABLET | Refills: 1 | Status: SHIPPED | OUTPATIENT
Start: 2025-03-11

## 2025-04-11 ENCOUNTER — OFFICE VISIT (OUTPATIENT)
Dept: INTERNAL MEDICINE | Facility: CLINIC | Age: 85
End: 2025-04-11
Payer: MEDICARE

## 2025-04-11 ENCOUNTER — LAB VISIT (OUTPATIENT)
Dept: LAB | Facility: HOSPITAL | Age: 85
End: 2025-04-11
Attending: INTERNAL MEDICINE
Payer: MEDICARE

## 2025-04-11 VITALS
SYSTOLIC BLOOD PRESSURE: 110 MMHG | BODY MASS INDEX: 27.36 KG/M2 | HEART RATE: 66 BPM | OXYGEN SATURATION: 98 % | HEIGHT: 68 IN | WEIGHT: 180.56 LBS | DIASTOLIC BLOOD PRESSURE: 76 MMHG

## 2025-04-11 DIAGNOSIS — E78.5 HYPERLIPIDEMIA, UNSPECIFIED HYPERLIPIDEMIA TYPE: ICD-10-CM

## 2025-04-11 DIAGNOSIS — R73.09 ELEVATED GLUCOSE LEVEL: ICD-10-CM

## 2025-04-11 DIAGNOSIS — I48.0 PAROXYSMAL ATRIAL FIBRILLATION: ICD-10-CM

## 2025-04-11 DIAGNOSIS — Z00.00 ROUTINE PHYSICAL EXAMINATION: Primary | ICD-10-CM

## 2025-04-11 DIAGNOSIS — E03.9 ACQUIRED HYPOTHYROIDISM: ICD-10-CM

## 2025-04-11 DIAGNOSIS — Z00.00 ROUTINE PHYSICAL EXAMINATION: ICD-10-CM

## 2025-04-11 PROBLEM — D32.9 MENINGIOMA: Status: RESOLVED | Noted: 2017-03-20 | Resolved: 2025-04-11

## 2025-04-11 LAB
ALBUMIN SERPL BCP-MCNC: 3.6 G/DL (ref 3.5–5.2)
ALP SERPL-CCNC: 58 UNIT/L (ref 40–150)
ALT SERPL W/O P-5'-P-CCNC: 19 UNIT/L (ref 10–44)
ANION GAP (OHS): 8 MMOL/L (ref 8–16)
AST SERPL-CCNC: 21 UNIT/L (ref 11–45)
BILIRUB SERPL-MCNC: 0.4 MG/DL (ref 0.1–1)
BUN SERPL-MCNC: 14 MG/DL (ref 8–23)
CALCIUM SERPL-MCNC: 8.8 MG/DL (ref 8.7–10.5)
CHLORIDE SERPL-SCNC: 105 MMOL/L (ref 95–110)
CHOLEST SERPL-MCNC: 135 MG/DL (ref 120–199)
CHOLEST/HDLC SERPL: 3 {RATIO} (ref 2–5)
CO2 SERPL-SCNC: 26 MMOL/L (ref 23–29)
CREAT SERPL-MCNC: 1.2 MG/DL (ref 0.5–1.4)
EAG (OHS): 111 MG/DL (ref 68–131)
GFR SERPLBLD CREATININE-BSD FMLA CKD-EPI: 60 ML/MIN/1.73/M2
GLUCOSE SERPL-MCNC: 80 MG/DL (ref 70–110)
HBA1C MFR BLD: 5.5 % (ref 4–5.6)
HDLC SERPL-MCNC: 45 MG/DL (ref 40–75)
HDLC SERPL: 33.3 % (ref 20–50)
LDLC SERPL CALC-MCNC: 69.2 MG/DL (ref 63–159)
NONHDLC SERPL-MCNC: 90 MG/DL
POTASSIUM SERPL-SCNC: 4.3 MMOL/L (ref 3.5–5.1)
PROT SERPL-MCNC: 7 GM/DL (ref 6–8.4)
SODIUM SERPL-SCNC: 139 MMOL/L (ref 136–145)
T4 FREE SERPL-MCNC: 1.25 NG/DL (ref 0.71–1.51)
TRIGL SERPL-MCNC: 104 MG/DL (ref 30–150)
TSH SERPL-ACNC: 0.38 UIU/ML (ref 0.4–4)

## 2025-04-11 PROCEDURE — 83036 HEMOGLOBIN GLYCOSYLATED A1C: CPT | Mod: HCNC

## 2025-04-11 PROCEDURE — 80053 COMPREHEN METABOLIC PANEL: CPT | Mod: HCNC

## 2025-04-11 PROCEDURE — 80061 LIPID PANEL: CPT | Mod: HCNC

## 2025-04-11 PROCEDURE — 36415 COLL VENOUS BLD VENIPUNCTURE: CPT | Mod: HCNC

## 2025-04-11 PROCEDURE — 84443 ASSAY THYROID STIM HORMONE: CPT | Mod: HCNC

## 2025-04-11 PROCEDURE — 84439 ASSAY OF FREE THYROXINE: CPT | Mod: HCNC

## 2025-04-11 PROCEDURE — 99999 PR PBB SHADOW E&M-EST. PATIENT-LVL IV: CPT | Mod: PBBFAC,HCNC,, | Performed by: INTERNAL MEDICINE

## 2025-04-11 NOTE — PROGRESS NOTES
Subjective:       Patient ID: Liang Monterroso Jr. is a 84 y.o. male.    Chief Complaint: Annual Exam    Patient seen for annual exam.  Overall doing okay.  He had a UTI and shingles earlier this year but has recovered.  He sees Urology for follow-up including after bladder stimulator device.    He is up-to-date with vision exam but is having some changes and has a follow-up in few months.    He is up-to-date with cardiology follow-up  Prescriptions reviewed and up-to-date with refills.  He is due for some blood follow-up.   He is trying to stay active.  Digestion has been stable.  Is nutrition has been a little variable because of his wife's health condition.  They do not always eat consistent meals.      Review of Systems   Constitutional:  Negative for activity change and unexpected weight change.   HENT:  Negative for hearing loss, rhinorrhea and trouble swallowing.    Eyes:  Positive for visual disturbance. Negative for discharge.   Respiratory:  Negative for chest tightness and wheezing.    Cardiovascular:  Negative for chest pain and palpitations.   Gastrointestinal:  Negative for blood in stool, constipation, diarrhea and vomiting.   Endocrine: Negative for polydipsia and polyuria.   Genitourinary:  Negative for difficulty urinating, hematuria and urgency.   Musculoskeletal:  Negative for arthralgias, joint swelling and neck pain.   Neurological:  Negative for weakness and headaches.   Psychiatric/Behavioral:  Positive for dysphoric mood. Negative for confusion.            Past Medical History:   Diagnosis Date    Allergy     CKD (chronic kidney disease) stage 3, GFR 30-59 ml/min 6/15/2016    Coronary artery disease due to calcified coronary lesion 4/20/2023    GERD (gastroesophageal reflux disease)     Grade II open fracture of proximal phalanx of left ring finger 4/2/2023    Hx of colonic polyp     Hyperlipidemia     Hypertension     Hypospadias in male     Hypothyroidism     Open displaced fracture of  proximal phalanx of left ring finger     Paroxysmal atrial fibrillation 8/7/2023    Sleep apnea     Thyroid disease     Urinary tract infection      Past Surgical History:   Procedure Laterality Date    APPENDECTOMY      CATARACT EXTRACTION      CYSTOSCOPY      CYSTOSCOPY WITH URODYNAMIC TESTING N/A 10/06/2022    Procedure: CYSTOSCOPY, WITH URODYNAMIC TESTING;  Surgeon: Shade Sullivan MD;  Location: [address];  Service: Urology;  Laterality: N/A;    EYE SURGERY      HERNIA REPAIR      INSERTION, NEUROSTIMULATOR      IRRIGATION AND DEBRIDEMENT Left 4/5/2023    Procedure: IRRIGATION AND DEBRIDEMENT;  Surgeon: Jean-Paul Cummins MD;  Location: [facility];  Service: Orthopedics;  Laterality: Left;    OPEN REDUCTION AND INTERNAL FIXATION (ORIF) OF INJURY OF FINGER Left 4/5/2023    Procedure: ORIF, FINGER - 4th proximal phalanx. Possible tendon/nerve repair;  Surgeon: Jean-Paul Cummins MD;  Location: [facility];  Service: Orthopedics;  Laterality: Left;    TONSILLECTOMY        Problem List[1]     Objective:      Physical Exam  Constitutional:       General: He is not in acute distress.     Appearance: He is well-developed.   HENT:      Head: Normocephalic and atraumatic.      Right Ear: Tympanic membrane, ear canal and external ear normal.      Left Ear: Tympanic membrane, ear canal and external ear normal.      Mouth/Throat:      Pharynx: No oropharyngeal exudate or posterior oropharyngeal erythema.   Eyes:      General: No scleral icterus.     Conjunctiva/sclera: Conjunctivae normal.      Pupils: Pupils are equal, round, and reactive to light.   Neck:      Thyroid: No thyromegaly.      Comments: No supraclavicular nodes palpated  Cardiovascular:      Rate and Rhythm: Normal rate and regular rhythm.      Pulses: Normal pulses.      Heart sounds: Normal heart sounds. No murmur heard.  Pulmonary:      Effort: Pulmonary effort is normal.      Breath sounds: Normal breath sounds. No wheezing.   Abdominal:      General:  Bowel sounds are normal.      Palpations: Abdomen is soft. There is no mass.      Tenderness: There is no abdominal tenderness.   Musculoskeletal:         General: No tenderness.      Cervical back: Normal range of motion and neck supple.      Right lower leg: No edema.      Left lower leg: No edema.   Lymphadenopathy:      Cervical: No cervical adenopathy.   Skin:     Coloration: Skin is not jaundiced or pale.   Neurological:      General: No focal deficit present.      Mental Status: He is alert and oriented to person, place, and time.   Psychiatric:         Mood and Affect: Mood normal.         Behavior: Behavior normal.         Assessment:       Problem List Items Addressed This Visit          Cardiac/Vascular    Hyperlipidemia    Relevant Orders    Comprehensive Metabolic Panel    Lipid Panel    TSH    Hemoglobin A1C    Paroxysmal atrial fibrillation    Relevant Orders    Comprehensive Metabolic Panel    Lipid Panel    TSH    Hemoglobin A1C       Endocrine    Hypothyroid    Relevant Orders    Comprehensive Metabolic Panel    Lipid Panel    TSH    Hemoglobin A1C     Other Visit Diagnoses         Routine physical examination    -  Primary    Relevant Orders    Comprehensive Metabolic Panel    Lipid Panel    TSH    Hemoglobin A1C      Elevated glucose level        Relevant Orders    Comprehensive Metabolic Panel    Lipid Panel    TSH    Hemoglobin A1C            Plan:         Liang was seen today for annual exam.    Diagnoses and all orders for this visit:    Routine physical examination  -     Comprehensive Metabolic Panel; Future  -     Lipid Panel; Future  -     TSH; Future  -     Hemoglobin A1C; Future    Paroxysmal atrial fibrillation  -     Comprehensive Metabolic Panel; Future  -     Lipid Panel; Future  -     TSH; Future  -     Hemoglobin A1C; Future    Acquired hypothyroidism  Comments:  Continue Eliquis and metoprolol. Clinically stable rate and rhythm.  Orders:  -     Comprehensive Metabolic Panel;  "Future  -     Lipid Panel; Future  -     TSH; Future  -     Hemoglobin A1C; Future    Hyperlipidemia, unspecified hyperlipidemia type  -     Comprehensive Metabolic Panel; Future  -     Lipid Panel; Future  -     TSH; Future  -     Hemoglobin A1C; Future    Elevated glucose level  -     Comprehensive Metabolic Panel; Future  -     Lipid Panel; Future  -     TSH; Future  -     Hemoglobin A1C; Future           Review all labs.  Follow-up in a few months.  Monitor mood, situational stress related to wife's condition          Portions of this note may have been created with voice recognition software. Occasional "wrong-word" or "sound-a-like" substitutions may have occurred due to the inherent limitations of voice recognition software. Please, read the note carefully and recognize, using context, where substitutions have occurred.       [1]   Patient Active Problem List  Diagnosis    Allergic rhinitis due to pollen    Hypertension    Hypothyroid    Hyperlipidemia    Aortic atherosclerosis    Dupuytren contracture    Decreased ROM of neck    Impaired functional mobility, balance, gait, and endurance    Constipation    BPH (benign prostatic hyperplasia)    JUDITH (obstructive sleep apnea)    Incomplete emptying of bladder    Renal cyst, left    gait instability    Personal history of colonic polyps    Open displaced fracture of proximal phalanx of left ring finger    Traumatic rupture of flexor sheath pulley of finger - 50% rupture of left ring finger A2 pulley    Stiffness of finger joint of left hand    Left hand pain    Coronary artery disease due to calcified coronary lesion    Pain of left hip    Weakness of left hip    Paroxysmal atrial fibrillation    Aortic root enlargement     "

## 2025-04-13 ENCOUNTER — PATIENT MESSAGE (OUTPATIENT)
Dept: INTERNAL MEDICINE | Facility: CLINIC | Age: 85
End: 2025-04-13
Payer: MEDICARE

## 2025-04-28 RX ORDER — LEVOTHYROXINE SODIUM 100 UG/1
100 TABLET ORAL
Qty: 90 TABLET | Refills: 3 | Status: SHIPPED | OUTPATIENT
Start: 2025-04-28

## 2025-04-28 NOTE — TELEPHONE ENCOUNTER
Refill Decision Note   Liang Monterroso  is requesting a refill authorization.  Brief Assessment and Rationale for Refill:  Approve     Medication Therapy Plan:    ACCEPTABLE USE PER OR PROTOCOL ; RISK MINIMAL WHEN THYROID LABS NORMAL , T4 WNL    Medication Reconciliation Completed: No   Comments:     Provider Staff:     Action is required for this patient.   Please see care gap opportunities below in Care Due Message.     Thanks!  [facility]     Appointments      Date Provider   Last Visit   4/11/2025 Corey Iqbal MD   Next Visit   Visit date not found Corey Iqbal MD     Note composed:10:25 AM 04/28/2025           Note composed:10:25 AM 04/28/2025

## 2025-04-28 NOTE — TELEPHONE ENCOUNTER
Care Due:                  Date            Visit Type   Department     Provider  --------------------------------------------------------------------------------                                MYCHART                              FOLLOWUP/OF  [facility] INTERNAL  Last Visit: 04-      FICE VISIT   MEDICINE       Corey Iqbal  Next Visit: None Scheduled  None         None Found                                                            Last  Test          Frequency    Reason                     Performed    Due Date  --------------------------------------------------------------------------------    CBC.........  12 months..  aayush MANZANARESACYclovir....  05- 05-    [facility] Embedded Care Due Messages. Reference number: [id number].   4/28/2025 8:18:36 AM CDT

## 2025-05-12 DIAGNOSIS — I48.0 PAROXYSMAL ATRIAL FIBRILLATION: ICD-10-CM

## 2025-05-13 RX ORDER — APIXABAN 5 MG/1
5 TABLET, FILM COATED ORAL 2 TIMES DAILY
Qty: 60 TABLET | Refills: 4 | Status: SHIPPED | OUTPATIENT
Start: 2025-05-13

## 2025-05-13 NOTE — TELEPHONE ENCOUNTER
Refill Routing Note   Medication(s) are not appropriate for processing by [facility] for the following reason(s):        Outside of protocol  Patient not seen by provider within 15 months  Required labs outdated    [facility] action(s):  Defer  Route        Medication Therapy Plan: Patient's age outside [facility] protocol      Appointments  past 12m or future 3m with PCP    Date Provider   Last Visit   8/8/2023 Samir Calhoun III, MD   Next Visit   Visit date not found Samir Calhoun III, MD   ED visits in past 90 days: 0        Note composed:7:43 PM 05/12/2025

## 2025-05-14 DIAGNOSIS — N40.1 BENIGN PROSTATIC HYPERPLASIA WITH URINARY OBSTRUCTION: ICD-10-CM

## 2025-05-14 DIAGNOSIS — N13.8 BENIGN PROSTATIC HYPERPLASIA WITH URINARY OBSTRUCTION: ICD-10-CM

## 2025-05-14 RX ORDER — TAMSULOSIN HYDROCHLORIDE 0.4 MG/1
2 CAPSULE ORAL
Qty: 180 CAPSULE | Refills: 3 | Status: SHIPPED | OUTPATIENT
Start: 2025-05-14

## 2025-05-14 NOTE — TELEPHONE ENCOUNTER
Refill Decision Note   Liang Monterroso  is requesting a refill authorization.  Brief Assessment and Rationale for Refill:  Approve     Medication Therapy Plan:         Comments:     Note composed:10:20 AM 05/14/2025             Appointments     Last Visit   4/11/2025 Corey Iqbal MD   Next Visit   Visit date not found Corey Iqbal MD

## 2025-05-14 NOTE — TELEPHONE ENCOUNTER
No care due was identified.  [facility] Embedded Care Due Messages. Reference number: [id number].   5/14/2025 7:56:00 AM CDT

## 2025-05-22 DIAGNOSIS — I10 ESSENTIAL HYPERTENSION: ICD-10-CM

## 2025-05-22 RX ORDER — RAMIPRIL 5 MG/1
5 CAPSULE ORAL
Qty: 90 CAPSULE | Refills: 3 | Status: SHIPPED | OUTPATIENT
Start: 2025-05-22

## 2025-05-22 NOTE — TELEPHONE ENCOUNTER
No care due was identified.  [facility] Embedded Care Due Messages. Reference number: [id number].   5/22/2025 8:07:25 AM CDT

## 2025-05-22 NOTE — TELEPHONE ENCOUNTER
Refill Decision Note   Liang Monterroso  is requesting a refill authorization.  Brief Assessment and Rationale for Refill:  Approve     Medication Therapy Plan:         Comments:     Note composed:11:15 AM 05/22/2025

## 2025-05-31 ENCOUNTER — PATIENT MESSAGE (OUTPATIENT)
Dept: CARDIOLOGY | Facility: CLINIC | Age: 85
End: 2025-05-31
Payer: MEDICARE

## 2025-06-10 ENCOUNTER — CLINICAL SUPPORT (OUTPATIENT)
Dept: OPHTHALMOLOGY | Facility: CLINIC | Age: 85
End: 2025-06-10
Payer: MEDICARE

## 2025-06-10 ENCOUNTER — OFFICE VISIT (OUTPATIENT)
Dept: OPHTHALMOLOGY | Facility: CLINIC | Age: 85
End: 2025-06-10
Payer: MEDICARE

## 2025-06-10 ENCOUNTER — OFFICE VISIT (OUTPATIENT)
Dept: OPTOMETRY | Facility: CLINIC | Age: 85
End: 2025-06-10
Payer: MEDICARE

## 2025-06-10 ENCOUNTER — PATIENT MESSAGE (OUTPATIENT)
Dept: OPHTHALMOLOGY | Facility: CLINIC | Age: 85
End: 2025-06-10

## 2025-06-10 DIAGNOSIS — Z98.41 S/P CATARACT SURGERY, RIGHT: ICD-10-CM

## 2025-06-10 DIAGNOSIS — H43.813 POSTERIOR VITREOUS DETACHMENT, BILATERAL: ICD-10-CM

## 2025-06-10 DIAGNOSIS — H35.3211 EXUDATIVE AGE-RELATED MACULAR DEGENERATION OF RIGHT EYE WITH ACTIVE CHOROIDAL NEOVASCULARIZATION: Primary | ICD-10-CM

## 2025-06-10 DIAGNOSIS — Z01.00 EXAMINATION OF EYES AND VISION: ICD-10-CM

## 2025-06-10 DIAGNOSIS — H52.203 ASTIGMATISM OF BOTH EYES, UNSPECIFIED TYPE: ICD-10-CM

## 2025-06-10 DIAGNOSIS — Z96.1 PSEUDOPHAKIA: ICD-10-CM

## 2025-06-10 DIAGNOSIS — Z98.42 S/P CATARACT SURGERY, LEFT: ICD-10-CM

## 2025-06-10 DIAGNOSIS — H35.363 MACULAR DRUSEN, BILATERAL: Primary | ICD-10-CM

## 2025-06-10 DIAGNOSIS — H35.3122 INTERMEDIATE STAGE NONEXUDATIVE AGE-RELATED MACULAR DEGENERATION OF LEFT EYE: ICD-10-CM

## 2025-06-10 DIAGNOSIS — H35.30 ARMD (AGE-RELATED MACULAR DEGENERATION), BILATERAL: ICD-10-CM

## 2025-06-10 DIAGNOSIS — H26.493 POSTERIOR CAPSULAR OPACIFICATION NON VISUALLY SIGNIFICANT OF BOTH EYES: ICD-10-CM

## 2025-06-10 PROCEDURE — 99214 OFFICE O/P EST MOD 30 MIN: CPT | Mod: S$GLB,,, | Performed by: OPTOMETRIST

## 2025-06-10 PROCEDURE — 92004 COMPRE OPH EXAM NEW PT 1/>: CPT | Mod: 25,S$GLB,, | Performed by: OPHTHALMOLOGY

## 2025-06-10 PROCEDURE — 1159F MED LIST DOCD IN RCRD: CPT | Mod: CPTII,S$GLB,, | Performed by: OPTOMETRIST

## 2025-06-10 PROCEDURE — 1101F PT FALLS ASSESS-DOCD LE1/YR: CPT | Mod: CPTII,S$GLB,, | Performed by: OPHTHALMOLOGY

## 2025-06-10 PROCEDURE — 1159F MED LIST DOCD IN RCRD: CPT | Mod: CPTII,S$GLB,, | Performed by: OPHTHALMOLOGY

## 2025-06-10 PROCEDURE — 1126F AMNT PAIN NOTED NONE PRSNT: CPT | Mod: CPTII,S$GLB,, | Performed by: OPTOMETRIST

## 2025-06-10 PROCEDURE — 3288F FALL RISK ASSESSMENT DOCD: CPT | Mod: CPTII,S$GLB,, | Performed by: OPHTHALMOLOGY

## 2025-06-10 PROCEDURE — 1160F RVW MEDS BY RX/DR IN RCRD: CPT | Mod: CPTII,S$GLB,, | Performed by: OPHTHALMOLOGY

## 2025-06-10 PROCEDURE — 99999 PR PBB SHADOW E&M-EST. PATIENT-LVL III: CPT | Mod: PBBFAC,,, | Performed by: OPTOMETRIST

## 2025-06-10 PROCEDURE — 92134 CPTRZ OPH DX IMG PST SGM RTA: CPT | Mod: S$GLB,,, | Performed by: OPTOMETRIST

## 2025-06-10 PROCEDURE — 92134 CPTRZ OPH DX IMG PST SGM RTA: CPT | Mod: S$GLB,,, | Performed by: OPHTHALMOLOGY

## 2025-06-10 PROCEDURE — 3288F FALL RISK ASSESSMENT DOCD: CPT | Mod: CPTII,S$GLB,, | Performed by: OPTOMETRIST

## 2025-06-10 PROCEDURE — 99999 PR PBB SHADOW E&M-EST. PATIENT-LVL III: CPT | Mod: PBBFAC,,, | Performed by: OPHTHALMOLOGY

## 2025-06-10 PROCEDURE — 67028 INJECTION EYE DRUG: CPT | Mod: RT,S$GLB,, | Performed by: OPHTHALMOLOGY

## 2025-06-10 PROCEDURE — 1101F PT FALLS ASSESS-DOCD LE1/YR: CPT | Mod: CPTII,S$GLB,, | Performed by: OPTOMETRIST

## 2025-06-10 RX ORDER — PREGABALIN 75 MG/1
75 CAPSULE ORAL
COMMUNITY
Start: 2025-01-28

## 2025-06-10 RX ADMIN — Medication 1.25 MG: at 02:06

## 2025-06-10 NOTE — PROGRESS NOTES
HPI    Cons per Dr Khan  Last edited by Keely Raya on 6/10/2025  1:18 PM.        OCT - OD PED with SRF and some fibrosis  OS  - Drusen      A/P    Wet AMD OD  SRF with some fibrosis - pt unsure of duration.  Will treat  to stabilize to assess vision potential    Avastin OD today    2. Intermediate Dry AMD OS  AREDS/AG    3. PVD OU    4. PCIOL OU        1 month OCT no dilate and Avastin OD      Risks, benefits, and alternatives to treatment discussed in detail with the patient.  The patient voiced understanding and wished to proceed with the procedure    Injection Procedure Note:  Diagnosis: Wet AMD OD    Patient Identified and Time Out complete  Pt marked  Topical Proparacaine and Betadine.  Inject Avastin OD at 6:00 @ 3.5-4mm posterior to limbus  Post Operative Dx: Same  Complications: None  Follow up as above.

## 2025-06-11 NOTE — PROGRESS NOTES
HPI    Last eye exam was 6/10/24 with Dr. Gill.  Paitent states seeing big black blotches on the wall and ceiling after   waking-lasts a few seconds. Overall vision has gotten worse over the past   3-4 months. Doesn't remember filling glasses rx after last exam.   Patient denies diplopia, headaches, flashes, and pain.    Last edited by Marlene Johnston MA on 6/10/2025  9:35 AM.            Assessment /Plan     For exam results, see Encounter Report.    Macular drusen, bilateral    Pseudophakia - Both Eyes    Posterior capsular opacification non visually significant of both eyes    S/P cataract surgery, right    Astigmatism of both eyes, unspecified type    S/P cataract surgery, left    Examination of eyes and vision    ARMD (age-related macular degeneration), bilateral      MONITOR. ED PT ON ALL EXAM FINDINGS  HOLD SPECS AT THIS TIME; MONITOR  S/P PCIOL OU; UV PROTECTION; MONITOR  REDUCED VISION OD; LONGSTANDING ARMD OU; POSSIBLE WET; REFER FOR CONSULT W/ RETINA  RTC 1 YEAR FOR REE/DFE      OCT(MAC):  OD: RELIABLE; RPE SEVERE DISRUPTIONS; + CNVM   OS: RELIABLE; RPE MODERATE DISRUPTIONS

## 2025-07-15 ENCOUNTER — PROCEDURE VISIT (OUTPATIENT)
Dept: OPHTHALMOLOGY | Facility: CLINIC | Age: 85
End: 2025-07-15
Payer: MEDICARE

## 2025-07-15 ENCOUNTER — CLINICAL SUPPORT (OUTPATIENT)
Dept: OPHTHALMOLOGY | Facility: CLINIC | Age: 85
End: 2025-07-15
Payer: MEDICARE

## 2025-07-15 DIAGNOSIS — H35.3211 EXUDATIVE AGE-RELATED MACULAR DEGENERATION OF RIGHT EYE WITH ACTIVE CHOROIDAL NEOVASCULARIZATION: Primary | ICD-10-CM

## 2025-07-15 PROCEDURE — 67028 INJECTION EYE DRUG: CPT | Mod: HCNC,RT,S$GLB, | Performed by: OPHTHALMOLOGY

## 2025-07-15 PROCEDURE — 92134 CPTRZ OPH DX IMG PST SGM RTA: CPT | Mod: HCNC,S$GLB,, | Performed by: OPHTHALMOLOGY

## 2025-07-15 PROCEDURE — 92012 INTRM OPH EXAM EST PATIENT: CPT | Mod: 25,HCNC,S$GLB, | Performed by: OPHTHALMOLOGY

## 2025-07-15 RX ADMIN — Medication 1.25 MG: at 01:07

## 2025-07-15 NOTE — PROGRESS NOTES
HPI     armd     Additional comments: Armd   Oct  Pvd   No gtts   Drusen   Pt c/o harder to focus  since last visit   Dls 06/10/25          Last edited by Katherine Phoenix on 7/15/2025  1:13 PM.        OCT - OD PED with SRF and some fibrosis  OS  - Drusen      A/P    Wet AMD OD  SRF with some fibrosis - pt unsure of duration.  Will treat  to stabilize to assess vision potential    S/p- Avastin OD x 1    Avastin OD today    2. Intermediate Dry AMD OS  AREDS/AG    3. PVD OU    4. PCIOL OU        1 month OCT no dilate and Avastin OD      Risks, benefits, and alternatives to treatment discussed in detail with the patient.  The patient voiced understanding and wished to proceed with the procedure    Injection Procedure Note:  Diagnosis: Wet AMD OD    Patient Identified and Time Out complete  Pt marked  Topical Proparacaine and Betadine.  Inject Avastin OD at 6:00 @ 3.5-4mm posterior to limbus  Post Operative Dx: Same  Complications: None  Follow up as above.

## 2025-08-19 ENCOUNTER — PROCEDURE VISIT (OUTPATIENT)
Dept: OPHTHALMOLOGY | Facility: CLINIC | Age: 85
End: 2025-08-19
Payer: MEDICARE

## 2025-08-19 ENCOUNTER — CLINICAL SUPPORT (OUTPATIENT)
Dept: OPHTHALMOLOGY | Facility: CLINIC | Age: 85
End: 2025-08-19
Payer: MEDICARE

## 2025-08-19 DIAGNOSIS — H35.3211 EXUDATIVE AGE-RELATED MACULAR DEGENERATION OF RIGHT EYE WITH ACTIVE CHOROIDAL NEOVASCULARIZATION: ICD-10-CM

## 2025-08-19 DIAGNOSIS — H35.3211 EXUDATIVE AGE-RELATED MACULAR DEGENERATION OF RIGHT EYE WITH ACTIVE CHOROIDAL NEOVASCULARIZATION: Primary | ICD-10-CM

## 2025-08-19 PROCEDURE — 92134 CPTRZ OPH DX IMG PST SGM RTA: CPT | Mod: HCNC,S$GLB,, | Performed by: OPHTHALMOLOGY

## 2025-08-19 PROCEDURE — 67028 INJECTION EYE DRUG: CPT | Mod: HCNC,RT,S$GLB, | Performed by: OPHTHALMOLOGY

## 2025-08-19 PROCEDURE — 92012 INTRM OPH EXAM EST PATIENT: CPT | Mod: 25,HCNC,S$GLB, | Performed by: OPHTHALMOLOGY

## 2025-08-19 RX ADMIN — Medication 1.25 MG: at 02:08

## 2025-08-27 RX ORDER — ROSUVASTATIN CALCIUM 20 MG/1
20 TABLET, COATED ORAL
Qty: 90 TABLET | Refills: 0 | Status: SHIPPED | OUTPATIENT
Start: 2025-08-27

## (undated) DEVICE — SOL NS 1000CC

## (undated) DEVICE — DRESSING SURGICAL 1/2X1/2

## (undated) DEVICE — TRAY CYSTO BASIN

## (undated) DEVICE — SUT CTD VICRYL 4-0 BR PS-2

## (undated) DEVICE — DRAPE OPMI STERILE

## (undated) DEVICE — COTTON BALLS 1/2IN

## (undated) DEVICE — SEE L#95700

## (undated) DEVICE — CARTRIDGE OIL

## (undated) DEVICE — DIFFUSER

## (undated) DEVICE — HOOK STAY ELAS 5MM 8EA/PK

## (undated) DEVICE — SEE MEDLINE ITEM 146292

## (undated) DEVICE — SEE MEDLINE ITEM 156905

## (undated) DEVICE — TUBING SUC UNIV W/CONN 12FT

## (undated) DEVICE — UNDERGLOVE BIOGEL PI SZ 6.5 LF

## (undated) DEVICE — SPLINT PLASTER F.S 4INX15IN

## (undated) DEVICE — SOL IRR NACL .9% 3000ML

## (undated) DEVICE — Device

## (undated) DEVICE — SLING ARM X-LARGE FOAM STRAP

## (undated) DEVICE — BURR ROUTER TAPERED

## (undated) DEVICE — ELECTRODE REM PLYHSV RETURN 9

## (undated) DEVICE — BLADE SURG CARBON STEEL SZ11

## (undated) DEVICE — SYR 50ML CATH TIP

## (undated) DEVICE — TOURNIQUET SB QC DP 18X4IN

## (undated) DEVICE — SUT VICRYL PLUS 2-0

## (undated) DEVICE — SPONGE NEURO 1/4X1/4

## (undated) DEVICE — ADHESIVE MASTISOL VIAL 48/BX

## (undated) DEVICE — DURAPREP SURG SCRUB 26ML

## (undated) DEVICE — MARKERS SPHERZ PASSIVE

## (undated) DEVICE — SET TUR BLADDER IRRIG Y TYPE

## (undated) DEVICE — SEALANT EVICEL FIBRIN HUMN 2ML

## (undated) DEVICE — DRESSING MEPORE 3.6 X 10

## (undated) DEVICE — DRUG BACITRACIN UNGT OINTMENT

## (undated) DEVICE — GOWN ECLIPSE POLY REINF 3XL

## (undated) DEVICE — GOWN SMARTGOWN LVL4 X-LONG XL

## (undated) DEVICE — DRAPE SURGICAL STERI IRRG PCH

## (undated) DEVICE — SET BASIN 48X48IN 6000ML RING

## (undated) DEVICE — DRESSING TELFA N ADH 3X8

## (undated) DEVICE — SEE MEDLINE ITEM 152622

## (undated) DEVICE — SUT MCRYL PLUS 4-0 PS2 27IN

## (undated) DEVICE — DRESSING SURGICAL 1X1

## (undated) DEVICE — PACK CYSTO

## (undated) DEVICE — BURR ACORN 7.5MM

## (undated) DEVICE — DRAPE C-ARM MINI DISP

## (undated) DEVICE — SUT 4/0 18IN NUROLON BLK B

## (undated) DEVICE — CORD BIPOLAR 12 FOOT

## (undated) DEVICE — GLOVE BIOGEL PI MICRO SZ 6.5

## (undated) DEVICE — DRESSING SURGICAL 1X3

## (undated) DEVICE — WARMER DRAPE STERILE LF

## (undated) DEVICE — ROUTER TAPERED 2.3MM

## (undated) DEVICE — DRAPE THYROID WITH ARMBOARD

## (undated) DEVICE — IRRIGATION SET Y-TYPE TUR/BLAD

## (undated) DEVICE — STOCKINETTE DBL PLY ST 4X

## (undated) DEVICE — TAPE SURG MEDIPORE 6X72IN

## (undated) DEVICE — DRESSING XEROFORM FOIL PK 1X8

## (undated) DEVICE — UNDERGLOVES BIOGEL PI SZ 7 LF

## (undated) DEVICE — BANDAGE MATRIX HK LOOP 4IN 5YD

## (undated) DEVICE — PAD CAST SPECIALIST STRL 4

## (undated) DEVICE — SEE MEDLINE ITEM 154981

## (undated) DEVICE — MARKER SKIN STND TIP BLUE BARR

## (undated) DEVICE — DRAPE HAND STERILE

## (undated) DEVICE — TUBE FRAZIER 5MM 2FT SOFT TIP

## (undated) DEVICE — TRAY SKIN SCRUB WET PREMIUM

## (undated) DEVICE — BUR BONE CUT MICRO TPS 3X3.8MM

## (undated) DEVICE — GLOVE BIOGEL SKINSENSE PI 7.0

## (undated) DEVICE — RUBBERBAND STERILE 3X1/8IN

## (undated) DEVICE — SYR 10CC LUER LOCK

## (undated) DEVICE — CATH URET FOLEY 2W 22FR 5ML

## (undated) DEVICE — GAUZE SPONGE 4X4 12PLY

## (undated) DEVICE — CONTAINER SPECIMEN STRL 4OZ

## (undated) DEVICE — DRESSING XEROFORM NONADH 1X8IN

## (undated) DEVICE — SUT 4-0 ETHILON 18 PS-2

## (undated) DEVICE — HEMOSTAT SURGICEL 4X8IN